# Patient Record
Sex: FEMALE | Race: WHITE | NOT HISPANIC OR LATINO | Employment: PART TIME | URBAN - METROPOLITAN AREA
[De-identification: names, ages, dates, MRNs, and addresses within clinical notes are randomized per-mention and may not be internally consistent; named-entity substitution may affect disease eponyms.]

---

## 2017-04-04 ENCOUNTER — TRANSFERRED RECORDS (OUTPATIENT)
Dept: HEALTH INFORMATION MANAGEMENT | Facility: CLINIC | Age: 57
End: 2017-04-04

## 2017-07-24 ENCOUNTER — TRANSFERRED RECORDS (OUTPATIENT)
Dept: HEALTH INFORMATION MANAGEMENT | Facility: CLINIC | Age: 57
End: 2017-07-24

## 2017-09-27 ENCOUNTER — OFFICE VISIT - RIVER FALLS (OUTPATIENT)
Dept: FAMILY MEDICINE | Facility: CLINIC | Age: 57
End: 2017-09-27

## 2017-09-29 ENCOUNTER — TRANSFERRED RECORDS (OUTPATIENT)
Dept: HEALTH INFORMATION MANAGEMENT | Facility: CLINIC | Age: 57
End: 2017-09-29

## 2017-11-01 PROBLEM — R87.810 CERVICAL HIGH RISK HPV (HUMAN PAPILLOMAVIRUS) TEST POSITIVE: Status: ACTIVE | Noted: 2017-07-01

## 2017-11-01 PROBLEM — Z02.89 PAIN MEDICATION AGREEMENT SIGNED: Status: ACTIVE | Noted: 2017-03-20

## 2017-11-01 PROBLEM — F41.1 GAD (GENERALIZED ANXIETY DISORDER): Status: ACTIVE | Noted: 2017-03-20

## 2017-11-01 PROBLEM — E78.5 HYPERLIPIDEMIA, UNSPECIFIED: Status: ACTIVE | Noted: 2017-03-20

## 2017-11-01 PROBLEM — G20.A1 PARKINSON'S DISEASE (H): Status: ACTIVE | Noted: 2017-03-20

## 2017-11-01 RX ORDER — BUSPIRONE HYDROCHLORIDE 10 MG/1
10 TABLET ORAL 2 TIMES DAILY
COMMUNITY
Start: 2017-08-29 | End: 2017-11-07

## 2017-11-01 RX ORDER — FLUOXETINE 10 MG/1
10 CAPSULE ORAL DAILY
COMMUNITY
Start: 2017-03-20 | End: 2017-11-07

## 2017-11-01 RX ORDER — POLYETHYLENE GLYCOL 3350 17 G/17G
17 POWDER, FOR SOLUTION ORAL
COMMUNITY
Start: 2017-10-06 | End: 2017-11-07

## 2017-11-01 RX ORDER — DOCUSATE SODIUM 100 MG/1
100 CAPSULE, LIQUID FILLED ORAL 2 TIMES DAILY
COMMUNITY
Start: 2017-11-01 | End: 2017-11-07

## 2017-11-01 RX ORDER — FLUTICASONE PROPIONATE 50 MCG
1 SPRAY, SUSPENSION (ML) NASAL
COMMUNITY
Start: 2016-01-21 | End: 2017-11-07

## 2017-11-01 RX ORDER — SIMVASTATIN 20 MG
20 TABLET ORAL AT BEDTIME
COMMUNITY
Start: 2017-07-24 | End: 2017-11-07

## 2017-11-01 RX ORDER — CARBIDOPA AND LEVODOPA 25; 100 MG/1; MG/1
3 TABLET ORAL 4 TIMES DAILY
COMMUNITY
End: 2017-11-02

## 2017-11-01 RX ORDER — ASPIRIN 81 MG/1
81 TABLET ORAL DAILY
COMMUNITY
Start: 2016-02-17 | End: 2017-11-07

## 2017-11-01 RX ORDER — AMANTADINE HYDROCHLORIDE 100 MG/1
100 CAPSULE, GELATIN COATED ORAL 2 TIMES DAILY
COMMUNITY
Start: 2017-03-20 | End: 2017-11-02

## 2017-11-01 RX ORDER — LORAZEPAM 1 MG/1
TABLET ORAL
COMMUNITY
Start: 2017-09-13 | End: 2017-11-07

## 2017-11-01 RX ORDER — LIDOCAINE 50 MG/G
PATCH TOPICAL
COMMUNITY
Start: 2017-10-06 | End: 2017-11-07

## 2017-11-01 RX ORDER — PRAMIPEXOLE DIHYDROCHLORIDE 0.5 MG/1
0.5 TABLET ORAL AT BEDTIME
COMMUNITY
Start: 2016-02-19 | End: 2017-11-07

## 2017-11-02 RX ORDER — CARBIDOPA AND LEVODOPA 25; 100 MG/1; MG/1
TABLET ORAL
Qty: 1080 TABLET | Refills: 3 | COMMUNITY
Start: 2017-11-02 | End: 2017-11-07

## 2017-11-02 RX ORDER — AMANTADINE HYDROCHLORIDE 100 MG/1
100 CAPSULE, GELATIN COATED ORAL 2 TIMES DAILY
Qty: 180 CAPSULE | Refills: 3 | COMMUNITY
Start: 2017-11-02 | End: 2017-11-07
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
## 2017-11-06 NOTE — TELEPHONE ENCOUNTER
APPT INFO    Date /Time: 11/7/17, 1:10pm   Reason for Appt: Parkinson's Disease   Ref Provider/Clinic: ANDREA POON   Are there internal records? If yes, list:    Patient Contact (Y/N) & Call Details: No, referred    Action: Faxed cover sheets     OUTSIDE RECORDS CHECKLIST     CLINIC NAME COMMENTS REC (x) IMG (x)   [facility]  x    [facility]  x x

## 2017-11-07 ENCOUNTER — PRE VISIT (OUTPATIENT)
Dept: NEUROLOGY | Facility: CLINIC | Age: 57
End: 2017-11-07

## 2017-11-07 ENCOUNTER — OFFICE VISIT (OUTPATIENT)
Dept: NEUROLOGY | Facility: CLINIC | Age: 57
End: 2017-11-07

## 2017-11-07 VITALS
HEART RATE: 63 BPM | RESPIRATION RATE: 12 BRPM | OXYGEN SATURATION: 98 % | DIASTOLIC BLOOD PRESSURE: 78 MMHG | SYSTOLIC BLOOD PRESSURE: 145 MMHG | HEIGHT: 66 IN | BODY MASS INDEX: 34.33 KG/M2 | WEIGHT: 213.6 LBS

## 2017-11-07 DIAGNOSIS — F43.23 ADJUSTMENT DISORDER WITH MIXED ANXIETY AND DEPRESSED MOOD: ICD-10-CM

## 2017-11-07 DIAGNOSIS — R25.1 TREMOR: ICD-10-CM

## 2017-11-07 DIAGNOSIS — G20.A1 PARALYSIS AGITANS (H): Primary | ICD-10-CM

## 2017-11-07 PROBLEM — N73.9 PID (PELVIC INFLAMMATORY DISEASE): Status: ACTIVE | Noted: 2017-11-07

## 2017-11-07 PROBLEM — Z87.891 FORMER SMOKER: Status: ACTIVE | Noted: 2017-11-07

## 2017-11-07 PROBLEM — Z86.0100 HISTORY OF COLONIC POLYPS: Status: ACTIVE | Noted: 2017-11-07

## 2017-11-07 PROBLEM — I26.99 PULMONARY EMBOLI (H): Status: ACTIVE | Noted: 2017-11-07

## 2017-11-07 PROBLEM — E73.9 LACTOSE INTOLERANCE IN ADULT: Status: ACTIVE | Noted: 2017-11-07

## 2017-11-07 PROBLEM — E78.00 HYPERCHOLESTEROLEMIA: Status: ACTIVE | Noted: 2017-11-07

## 2017-11-07 PROBLEM — M19.90 DEGENERATIVE JOINT DISEASE: Status: ACTIVE | Noted: 2017-11-07

## 2017-11-07 PROBLEM — E66.9 OBESITY: Status: ACTIVE | Noted: 2017-11-07

## 2017-11-07 RX ORDER — SIMVASTATIN 20 MG
TABLET ORAL
Qty: 90 TABLET | Refills: 3 | COMMUNITY
Start: 2017-11-07 | End: 2018-02-13

## 2017-11-07 RX ORDER — BUSPIRONE HYDROCHLORIDE 10 MG/1
TABLET ORAL
Qty: 180 TABLET | Refills: 3 | COMMUNITY
Start: 2017-11-07 | End: 2017-12-14

## 2017-11-07 RX ORDER — ASPIRIN 81 MG/1
TABLET ORAL
Qty: 90 TABLET | Refills: 3 | Status: ON HOLD
Start: 2017-11-07 | End: 2018-08-22

## 2017-11-07 RX ORDER — LORAZEPAM 1 MG/1
1 TABLET ORAL DAILY PRN
Qty: 60 TABLET | Refills: 3 | COMMUNITY
Start: 2017-11-07 | End: 2017-11-07

## 2017-11-07 RX ORDER — CARBIDOPA AND LEVODOPA 25; 100 MG/1; MG/1
TABLET ORAL
Qty: 1080 TABLET | Refills: 3 | COMMUNITY
Start: 2017-11-07 | End: 2017-11-07

## 2017-11-07 RX ORDER — FLUOXETINE 10 MG/1
CAPSULE ORAL
Qty: 90 CAPSULE | Refills: 3 | COMMUNITY
Start: 2017-11-07 | End: 2018-04-17

## 2017-11-07 RX ORDER — LORAZEPAM 1 MG/1
1 TABLET ORAL DAILY PRN
Qty: 60 TABLET | Refills: 3 | Status: SHIPPED | OUTPATIENT
Start: 2017-11-07 | End: 2018-04-17

## 2017-11-07 RX ORDER — AMANTADINE HYDROCHLORIDE 100 MG/1
CAPSULE, GELATIN COATED ORAL
Qty: 180 CAPSULE | Refills: 3 | COMMUNITY
Start: 2017-11-07 | End: 2017-12-14

## 2017-11-07 RX ORDER — FLUTICASONE PROPIONATE 50 MCG
1 SPRAY, SUSPENSION (ML) NASAL DAILY PRN
Qty: 1 BOTTLE | COMMUNITY
Start: 2017-11-07 | End: 2018-08-07

## 2017-11-07 RX ORDER — CARBIDOPA AND LEVODOPA 25; 100 MG/1; MG/1
1.5 TABLET ORAL 4 TIMES DAILY
Qty: 1170 TABLET | Refills: 3 | COMMUNITY
Start: 2017-11-07 | End: 2019-06-26

## 2017-11-07 RX ORDER — POLYETHYLENE GLYCOL 3350 17 G/17G
17 POWDER, FOR SOLUTION ORAL DAILY PRN
Qty: 119 G | COMMUNITY
Start: 2017-11-07 | End: 2018-08-07

## 2017-11-07 RX ORDER — PRAMIPEXOLE DIHYDROCHLORIDE 0.5 MG/1
TABLET ORAL
Qty: 180 TABLET | Refills: 3 | COMMUNITY
Start: 2017-11-07 | End: 2018-02-13

## 2017-11-07 RX ORDER — DOCUSATE SODIUM 100 MG/1
CAPSULE, LIQUID FILLED ORAL
Qty: 180 CAPSULE | Refills: 3 | COMMUNITY
Start: 2017-11-07 | End: 2018-02-13

## 2017-11-07 NOTE — MR AVS SNAPSHOT
After Visit Summary   11/7/2017    Erika Diaz    MRN: [id number]           Patient Information     Date Of Birth          1960        Visit Information        Provider Department      11/7/2017 1:10 PM Elver Stratton MD [facility]        Today's Diagnoses     Paralysis agitans (H)    -  1    Tremor        Adjustment disorder with mixed anxiety and depressed mood          Care Instructions    Summary and Recommendations:     Parkinson's disease 57 yrs old with diagnosis in 2009 or 2008 left side onset   Ongoing mood issues that are being managed with pharmacotherapy  She may benefit from a visit with psychiatry and psychology  Would recommend baseline dbs evaluation  Would recommend dbs class    Will need return visit to review her medications in the coming months.   She met with Zina Yu.    Her email [email or web address]  [email or web address] was set up by me to allow ready access to us    We will review her medications and decide if we will make a change to her regimen    Return back after evaluations to see Karina, or with a fellow with me or other colleague.    Elver Stratton MD          Follow-ups after your visit        Additional Services     MENTAL HEALTH REFERRAL       Your provider has referred you to: psychiatry    Please be aware that coverage of these services is subject to the terms and limitations of your health insurance plan.  Call member services at your health plan with any benefit or coverage questions.      Please bring the following to your appointment:  >>   Any x-rays, CTs or MRIs which have been performed.  Contact the facility where they were done to arrange for  prior to your scheduled appointment.  Any new CT, MRI or other procedures ordered by your specialist must be performed at a [place] facility or coordinated by your clinic's referral office.    >>   List of current medications   >>   This referral request   >>   Any documents/labs given to you for this  referral            NEUROPSYCHOLOGY REFERRAL       Your provider has referred you to:  neuropsych - Dr. Hernandez.   Baseline evaluation. Considering dbs. Has had ptsd and mood issues. She does not have a psychologist or psychiatrist presently    All scheduling is subject to the client's specific insurance plan & benefits, provider/location availability, and provider clinical specialities.  Please arrive 15 minutes early for your first appointment and bring your completed paperwork.    Please be aware that coverage of these services is subject to the terms and limitations of your health insurance plan.  Call member services at your health plan with any benefit or coverage questions.    Please bring the following to your appointment:  >>   Any x-rays, CTs or MRIs which have been performed.  Contact the facility where they were done to arrange for  prior to your scheduled appointment.  Any new CT, MRI or other procedures ordered by your specialist must be performed at a [facility] or coordinated by your clinic's referral office.    >>   List of current medications   >>   This referral request   >>   Any documents/labs given to you for this referral            PSYCHOLOGY REFERRAL       Your provider has referred you to:  psychology    Please be aware that coverage of these services is subject to the terms and limitations of your health insurance plan.  Call member services at your health plan with any benefit or coverage questions.      Please bring the following to your appointment:    >>   Any x-rays, CTs or MRIs which have been performed.  Contact the facility where they were done to arrange for  prior to your scheduled appointment.   >>   List of current medications   >>   This referral request   >>   Any documents/labs given to you for this referral                  Follow-up notes from your care team     Return in about 3 months (around 2/7/2018).      Your next 10 appointments already scheduled      Dec 12, 2017  8:00 AM CST   (Arrive by 7:45 AM)   Neuropsych Eval with Amanda Hernandez, PhD [facility] Neuropsychology ([facility])    [address] [id number] [phone]            Feb 13, 2018  1:10 PM CST   (Arrive by 12:55 PM)   Return Movement Disorder with Elver Stratton MD   [facility] ([facility])    [address] [id number] [phone]              Who to contact     Please call your clinic at [phone] to:    Ask questions about your health    Make or cancel appointments    Discuss your medicines    Learn about your test results    Speak to your doctor   If you have compliments or concerns about an experience at your clinic, or if you wish to file a complaint, please contact [facility] Patient Relations at [phone] or email us at [email or web address]         Additional Information About Your Visit        [employer] Information     [email or web address] gives you secure access to your electronic health record. If you see a primary care provider, you can also send messages to your care team and make appointments. If you have questions, please call your primary care clinic.  If you do not have a primary care provider, please call [phone] and they will assist you.      [email or web address] is an electronic gateway that provides easy, online access to your medical records. With [email or web address], you can request a clinic appointment, read your test results, renew a prescription or communicate with your care team.     To access your existing account, please contact your [facility] or call [phone] for assistance.        Care EveryWhere ID     This is your Care EveryWhere ID. This could be used by other organizations to access your [facility] medical records  [id number]        Your Vitals Were     Pulse Respirations  "Height Pulse Oximetry BMI (Body Mass Index)       63 12 1.676 m (5' 6\") 98% 34.48 kg/m2        Blood Pressure from Last 3 Encounters:   11/07/17 145/78    Weight from Last 3 Encounters:   11/07/17 96.9 kg (213 lb 9.6 oz)              We Performed the Following     MENTAL HEALTH REFERRAL     NEUROPSYCHOLOGY REFERRAL     PSYCHOLOGY REFERRAL          Today's Medication Changes          These changes are accurate as of: 11/7/17  2:00 PM.  If you have any questions, ask your nurse or doctor.               Start taking these medicines.        Dose/Directions    amantadine 100 MG capsule   Commonly known as:  SYMMETREL   Used for:  Paralysis agitans (H)   Started by:  Elver Stratton MD        1 capsule @ 7am and 4pm   Quantity:  180 capsule   Refills:  3       carbidopa-levodopa  MG per tablet   Commonly known as:  SINEMET   Used for:  Paralysis agitans (H)   Started by:  Elver Stratton MD        3 tabs @7, noon, 4p and 9pm and a few as needed = 13/day x 90 = 1170tablets   Quantity:  1170 tablet   Refills:  3       LORazepam 1 MG tablet   Commonly known as:  ATIVAN   Used for:  Paralysis agitans (H)   Started by:  Elver Stratton MD        Dose:  1 mg   Take 1 tablet (1 mg) by mouth daily as needed for anxiety   Quantity:  60 tablet   Refills:  3         These medicines have changed or have updated prescriptions.        Dose/Directions    aspirin EC 81 MG EC tablet   This may have changed:    - how much to take  - how to take this  - when to take this  - additional instructions   Used for:  Paralysis agitans (H)   Changed by:  Elver Stratton MD        1 tab @ 9pm   Quantity:  90 tablet   Refills:  3       busPIRone 10 MG tablet   Commonly known as:  BUSPAR   This may have changed:    - how much to take  - how to take this  - when to take this  - additional instructions   Used for:  Paralysis agitans (H)   Changed by:  Elver Stratton MD        1 Tab @ 7am and 1 tab @ 4pm   Quantity:  180 tablet "   Refills:  3       docusate sodium 100 MG capsule   Commonly known as:  COLACE   This may have changed:    - how much to take  - how to take this  - when to take this  - additional instructions   Used for:  Paralysis agitans (H)   Changed by:  Elver Stratton MD        1 tab @ 7am and 9pm   Quantity:  180 capsule   Refills:  3       doxylamine 25 MG Tabs tablet   Commonly known as:  UNISOM   This may have changed:    - how much to take  - when to take this  - reasons to take this   Changed by:  Elver Stratton MD        Dose:  25 mg   Take 1 tablet (25 mg) by mouth nightly as needed   Quantity:  14 each   Refills:  0       * FLUoxetine 10 MG capsule   Commonly known as:  PROzac   This may have changed:    - how much to take  - how to take this  - when to take this  - additional instructions   Used for:  Paralysis agitans (H)   Changed by:  Elver Stratton MD        Take 10 + 20mg tabs @ 7am= 30mg/day   Quantity:  90 capsule   Refills:  3       * FLUoxetine 20 MG capsule   Commonly known as:  PROzac   This may have changed:    - how much to take  - how to take this  - when to take this  - additional instructions   Used for:  Paralysis agitans (H)   Changed by:  Elver Stratton MD        Take 20mg capsule with 10mg capsule @ 7am = 30mg/day   Quantity:  90 capsule   Refills:  3       fluticasone 50 MCG/ACT spray   Commonly known as:  FLONASE   This may have changed:    - how to take this  - when to take this  - reasons to take this   Changed by:  Elver Stratton MD        Dose:  1 spray   Spray 1 spray into both nostrils daily as needed for rhinitis or allergies   Quantity:  1 Bottle   Refills:  0       omeprazole 20 MG CR capsule   Commonly known as:  priLOSEC   This may have changed:  when to take this   Changed by:  Elver Stratton MD        Dose:  20 mg   Take 1 capsule (20 mg) by mouth 3 times daily (with meals)   Quantity:  30 capsule   Refills:  0       polyethylene glycol powder    Commonly known as:  MIRALAX/GLYCOLAX   This may have changed:    - when to take this  - reasons to take this   Used for:  Paralysis agitans (H)   Changed by:  Elver Stratton MD        Dose:  17 g   Take 17 g by mouth daily as needed for constipation   Quantity:  119 g   Refills:  0       pramipexole 0.5 MG tablet   Commonly known as:  MIRAPEX   This may have changed:    - how much to take  - how to take this  - when to take this  - additional instructions   Used for:  Paralysis agitans (H)   Changed by:  Elver Stratton MD        2 tabs @ 9pm   Quantity:  180 tablet   Refills:  3       simvastatin 20 MG tablet   Commonly known as:  ZOCOR   This may have changed:    - how much to take  - how to take this  - when to take this  - additional instructions   Used for:  Paralysis agitans (H)   Changed by:  Elver Stratton MD        1 tab @ 9pm (may go back on it)   Quantity:  90 tablet   Refills:  3       * Notice:  This list has 2 medication(s) that are the same as other medications prescribed for you. Read the directions carefully, and ask your doctor or other care provider to review them with you.      Stop taking these medicines if you haven't already. Please contact your care team if you have questions.     diclofenac 1 % Gel topical gel   Commonly known as:  VOLTAREN   Stopped by:  Elver Stratton MD           lidocaine 5 % Patch   Commonly known as:  LIDODERM   Stopped by:  Elver Stratton MD                Where to get your medicines      Some of these will need a paper prescription and others can be bought over the counter.  Ask your nurse if you have questions.     Bring a paper prescription for each of these medications     LORazepam 1 MG tablet       You don't need a prescription for these medications     aspirin EC 81 MG EC tablet                Primary Care Provider Office Phone # Fax #    Ondina Edmondson [phone]       [facility] [address]  [facility] [address]        Equal Access to Services     [facility]: Hadii matilde ku sandrita Quinonez, waaxda luqadaha, qaybta kaalmada stephanie, naga krystianin hayaaelly villegasfang perkinsdomingo olivo. So [facility] [phone].    ATENCIÓN: Si habla español, tiene a guerra disposición servicios gratuitos de asistencia lingüística. Hollyame al [phone].    We comply with applicable federal civil rights laws and [place] laws. We do not discriminate on the basis of race, color, national origin, age, disability, sex, sexual orientation, or gender identity.            Thank you!     Thank you for choosing [facility] NEUROLOGY  for your care. Our goal is always to provide you with excellent care. Hearing back from our patients is one way we can continue to improve our services. Please take a few minutes to complete the written survey that you may receive in the mail after your visit with us. Thank you!             Your Updated Medication List - Protect others around you: Learn how to safely use, store and throw away your medicines at www.disposemymeds.org.          This list is accurate as of: 11/7/17  2:00 PM.  Always use your most recent med list.                   Brand Name Dispense Instructions for use Diagnosis    amantadine 100 MG capsule    SYMMETREL    180 capsule    1 capsule @ 7am and 4pm    Paralysis agitans (H)       aspirin EC 81 MG EC tablet     90 tablet    1 tab @ 9pm    Paralysis agitans (H)       busPIRone 10 MG tablet    BUSPAR    180 tablet    1 Tab @ 7am and 1 tab @ 4pm    Paralysis agitans (H)       carbidopa-levodopa  MG per tablet    SINEMET    1170 tablet    3 tabs @7, noon, 4p and 9pm and a few as needed = 13/day x 90 = 1170tablets    Paralysis agitans (H)       docusate sodium 100 MG capsule    COLACE    180 capsule    1 tab @ 7am and 9pm    Paralysis agitans (H)       doxylamine 25 MG Tabs tablet    UNISOM    14 each    Take 1 tablet (25 mg) by mouth nightly as needed        * FLUoxetine 10 MG capsule    PROzac     90 capsule    Take 10 + 20mg tabs @ 7am= 30mg/day    Paralysis agitans (H)       * FLUoxetine 20 MG capsule    PROzac    90 capsule    Take 20mg capsule with 10mg capsule @ 7am = 30mg/day    Paralysis agitans (H)       fluticasone 50 MCG/ACT spray    FLONASE    1 Bottle    Spray 1 spray into both nostrils daily as needed for rhinitis or allergies        LORazepam 1 MG tablet    ATIVAN    60 tablet    Take 1 tablet (1 mg) by mouth daily as needed for anxiety    Paralysis agitans (H)       omeprazole 20 MG CR capsule    priLOSEC    30 capsule    Take 1 capsule (20 mg) by mouth 3 times daily (with meals)        polyethylene glycol powder    MIRALAX/GLYCOLAX    119 g    Take 17 g by mouth daily as needed for constipation    Paralysis agitans (H)       pramipexole 0.5 MG tablet    MIRAPEX    180 tablet    2 tabs @ 9pm    Paralysis agitans (H)       simvastatin 20 MG tablet    ZOCOR    90 tablet    1 tab @ 9pm (may go back on it)    Paralysis agitans (H)       * Notice:  This list has 2 medication(s) that are the same as other medications prescribed for you. Read the directions carefully, and ask your doctor or other care provider to review them with you.

## 2017-11-07 NOTE — PATIENT INSTRUCTIONS
Summary and Recommendations:     Parkinson's disease 57 yrs old with diagnosis in 2009 or 2008 left side onset   Ongoing mood issues that are being managed with pharmacotherapy  She may benefit from a visit with psychiatry and psychology  Would recommend baseline dbs evaluation  Would recommend dbs class    Will need return visit to review her medications in the coming months.   She met with Zina Yu.    Her email [email or web address] was set up by me to allow ready access to us    We will review her medications and decide if we will make a change to her regimen    Return back after evaluations to see Karina, or with a fellow with me or other colleague.    Elver Stratton MD
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
## 2017-11-07 NOTE — TELEPHONE ENCOUNTER
Records Received From:  [facility]     Date/Exam/Location  (specify location if different)   Office Notes:  5/10/17, 7/24/17   Radiology Reports: - MR lumbar 9/29/17   Labs:  2017

## 2017-11-07 NOTE — TELEPHONE ENCOUNTER
Records Received From:  [place]     Date/Exam/Location  (specify location if different)   Office Notes:  2017, 2016, 2015, 2014, 2013, 2012, 2011   Radiology Reports: - MRI head cervical 12/12/16  - US soft tissue head neck 3/31/11    Images pushed    Procedure Notes:  (EMG/EEG/ECG/LP) - electromyography 12/7/16

## 2017-11-07 NOTE — TELEPHONE ENCOUNTER
Received Imaging From:     Image Type (x): Disc:_____  Pacs:____x_      Exam Date/Name: MRI head cervical 12/12/16  - US soft tissue head neck 3/31/11 Comments:

## 2017-11-08 ENCOUNTER — TELEPHONE (OUTPATIENT)
Dept: NEUROSURGERY | Facility: CLINIC | Age: 57
End: 2017-11-08

## 2017-11-08 NOTE — TELEPHONE ENCOUNTER
Called pt to invite her to attend our DBS class, per Dr. Stratton. Pt will attend December 20th class, will send letter with all pertinent information. No further questions.

## 2017-12-12 ENCOUNTER — OFFICE VISIT (OUTPATIENT)
Dept: NEUROPSYCHOLOGY | Facility: CLINIC | Age: 57
End: 2017-12-12

## 2017-12-12 DIAGNOSIS — F09 MENTAL DISORDER DUE TO GENERAL MEDICAL CONDITION: ICD-10-CM

## 2017-12-12 DIAGNOSIS — G20.A1 PARKINSON'S DISEASE (H): Primary | ICD-10-CM

## 2017-12-12 ASSESSMENT — ANXIETY QUESTIONNAIRES
GAD7 TOTAL SCORE: 2
5. BEING SO RESTLESS THAT IT IS HARD TO SIT STILL: SEVERAL DAYS
6. BECOMING EASILY ANNOYED OR IRRITABLE: NOT AT ALL
1. FEELING NERVOUS, ANXIOUS, OR ON EDGE: NOT AT ALL
2. NOT BEING ABLE TO STOP OR CONTROL WORRYING: NOT AT ALL
GAD7 TOTAL SCORE: 2
GAD7 TOTAL SCORE: 2
7. FEELING AFRAID AS IF SOMETHING AWFUL MIGHT HAPPEN: NOT AT ALL
4. TROUBLE RELAXING: SEVERAL DAYS
7. FEELING AFRAID AS IF SOMETHING AWFUL MIGHT HAPPEN: NOT AT ALL
3. WORRYING TOO MUCH ABOUT DIFFERENT THINGS: NOT AT ALL

## 2017-12-12 ASSESSMENT — PATIENT HEALTH QUESTIONNAIRE - PHQ9
10. IF YOU CHECKED OFF ANY PROBLEMS, HOW DIFFICULT HAVE THESE PROBLEMS MADE IT FOR YOU TO DO YOUR WORK, TAKE CARE OF THINGS AT HOME, OR GET ALONG WITH OTHER PEOPLE: NOT DIFFICULT AT ALL
SUM OF ALL RESPONSES TO PHQ QUESTIONS 1-9: 5
SUM OF ALL RESPONSES TO PHQ QUESTIONS 1-9: 5

## 2017-12-12 NOTE — PROGRESS NOTES
The patient was seen for neuropsychological evaluation at the request of Elver Stratton MD for the purposes of diagnostic clarification and treatment planning.  2 hours of face-to-face testing were provided by this writer.  Please see Dr. Amanda Hernandez's report for a full interpretation of the findings.

## 2017-12-12 NOTE — MR AVS SNAPSHOT
After Visit Summary   12/12/2017    Erika Diaz    MRN: [id number]           Patient Information     Date Of Birth          1960        Visit Information        Provider Department      12/12/2017 8:00 AM Amanda Hernandez, PhD [facility] Neuropsychology        Today's Diagnoses     Parkinson's disease (H)    -  1    Mental disorder due to general medical condition           Follow-ups after your visit        Your next 10 appointments already scheduled     Feb 13, 2018  1:10 PM CST   (Arrive by 12:55 PM)   Return Movement Disorder with Elver Stratton MD   [facility] Neurology ([facility])    [address] [address] [phone]              Who to contact     Please call your clinic at [phone] to:    Ask questions about your health    Make or cancel appointments    Discuss your medicines    Learn about your test results    Speak to your doctor   If you have compliments or concerns about an experience at your clinic, or if you wish to file a complaint, please contact [facility] Patient Relations at [phone] or email us at [email or web address]         Additional Information About Your Visit        MyChart Information     [employer] gives you secure access to your electronic health record. If you see a primary care provider, you can also send messages to your care team and make appointments. If you have questions, please call your primary care clinic.  If you do not have a primary care provider, please call [phone] and they will assist you.      [employer] is an electronic gateway that provides easy, online access to your medical records. With [employer], you can request a clinic appointment, read your test results, renew a prescription or communicate with your care team.     To access your existing account, please contact your [facility] Clinic or call  [phone] for assistance.        Care EveryWhere ID     This is your Care EveryWhere ID. This could be used by other organizations to access your [facility] medical records  [id number]         Blood Pressure from Last 3 Encounters:   11/07/17 145/78    Weight from Last 3 Encounters:   11/07/17 96.9 kg (213 lb 9.6 oz)              We Performed the Following     [id number] TESTING, PER HR/PSYCHOLOGIST     NEUROPSYCH TESTING BY [facility]        Primary Care Provider Office Phone # Fax #    Ondina Edmnodson [phone]       [facility] [address] [facility] [address]        Equal Access to Services     [facility]: Hadii aad peyton hadcleopatra Quinonez, waaxda luchioadaha, qaybta kaalmada adefangyaaltagracia, naga troncoso . So [facility] [phone].    ATENCIÓN: Si habla español, tiene a guerra disposición servicios gratuitos de asistencia lingüística. [facility] [phone].    We comply with applicable federal civil rights laws and [place] laws. We do not discriminate on the basis of race, color, national origin, age, disability, sex, sexual orientation, or gender identity.            Thank you!     Thank you for choosing [facility] NEUROPSYCHOLOGY  for your care. Our goal is always to provide you with excellent care. Hearing back from our patients is one way we can continue to improve our services. Please take a few minutes to complete the written survey that you may receive in the mail after your visit with us. Thank you!             Your Updated Medication List - Protect others around you: Learn how to safely use, store and throw away your medicines at www.disposemymeds.org.          This list is accurate as of: 12/12/17 11:59 PM.  Always use your most recent med list.                   Brand Name Dispense Instructions for use Diagnosis    aspirin EC 81 MG EC tablet     90 tablet    1 tab @ 9pm    Paralysis agitans (H)       carbidopa-levodopa  MG per tablet    SINEMET     1170 tablet    3 tabs @7, noon, 4p and 9pm and a few as needed = 13/day x 90 = 1170tablets    Paralysis agitans (H)       docusate sodium 100 MG capsule    COLACE    180 capsule    1 tab @ 7am and 9pm    Paralysis agitans (H)       doxylamine 25 MG Tabs tablet    UNISOM    14 each    Take 1 tablet (25 mg) by mouth nightly as needed        * FLUoxetine 10 MG capsule    PROzac    90 capsule    Take 10 + 20mg tabs @ 7am= 30mg/day    Paralysis agitans (H)       * FLUoxetine 20 MG capsule    PROzac    90 capsule    Take 20mg capsule with 10mg capsule @ 7am = 30mg/day    Paralysis agitans (H)       fluticasone 50 MCG/ACT spray    FLONASE    1 Bottle    Spray 1 spray into both nostrils daily as needed for rhinitis or allergies        LORazepam 1 MG tablet    ATIVAN    60 tablet    Take 1 tablet (1 mg) by mouth daily as needed for anxiety    Paralysis agitans (H)       omeprazole 20 MG CR capsule    priLOSEC    30 capsule    Take 1 capsule (20 mg) by mouth 3 times daily (with meals)        polyethylene glycol powder    MIRALAX/GLYCOLAX    119 g    Take 17 g by mouth daily as needed for constipation    Paralysis agitans (H)       pramipexole 0.5 MG tablet    MIRAPEX    180 tablet    2 tabs @ 9pm    Paralysis agitans (H)       simvastatin 20 MG tablet    ZOCOR    90 tablet    1 tab @ 9pm (may go back on it)    Paralysis agitans (H)       * Notice:  This list has 2 medication(s) that are the same as other medications prescribed for you. Read the directions carefully, and ask your doctor or other care provider to review them with you.

## 2017-12-13 ASSESSMENT — PATIENT HEALTH QUESTIONNAIRE - PHQ9: SUM OF ALL RESPONSES TO PHQ QUESTIONS 1-9: 5

## 2017-12-13 ASSESSMENT — ANXIETY QUESTIONNAIRES: GAD7 TOTAL SCORE: 2

## 2017-12-14 ENCOUNTER — OFFICE VISIT (OUTPATIENT)
Dept: PSYCHOLOGY | Facility: CLINIC | Age: 57
End: 2017-12-14
Payer: MEDICARE

## 2017-12-14 DIAGNOSIS — F41.1 GAD (GENERALIZED ANXIETY DISORDER): ICD-10-CM

## 2017-12-14 DIAGNOSIS — G20.A1 PARALYSIS AGITANS (H): ICD-10-CM

## 2017-12-14 DIAGNOSIS — F43.12 POST-TRAUMATIC STRESS DISORDER, CHRONIC: Primary | ICD-10-CM

## 2017-12-14 RX ORDER — AMANTADINE HYDROCHLORIDE 100 MG/1
CAPSULE, GELATIN COATED ORAL
Qty: 180 CAPSULE | Refills: 3 | Status: SHIPPED | OUTPATIENT
Start: 2017-12-14 | End: 2018-02-13

## 2017-12-14 RX ORDER — BUSPIRONE HYDROCHLORIDE 10 MG/1
TABLET ORAL
Qty: 180 TABLET | Refills: 3 | Status: SHIPPED | OUTPATIENT
Start: 2017-12-14 | End: 2018-04-17

## 2017-12-14 RX ORDER — AMANTADINE HYDROCHLORIDE 100 MG/1
CAPSULE, GELATIN COATED ORAL
Qty: 180 CAPSULE | Refills: 3 | Status: SHIPPED | OUTPATIENT
Start: 2017-12-14 | End: 2017-12-14

## 2017-12-14 RX ORDER — BUSPIRONE HYDROCHLORIDE 10 MG/1
TABLET ORAL
Qty: 180 TABLET | Refills: 3 | Status: SHIPPED | OUTPATIENT
Start: 2017-12-14 | End: 2017-12-14

## 2017-12-14 NOTE — MR AVS SNAPSHOT
After Visit Summary   12/14/2017    Erika Diaz    MRN: [id number]           Patient Information     Date Of Birth          1960        Visit Information        Provider Department      12/14/2017 8:00 AM Aimee Lowe, PhD [facility]        Today's Diagnoses     Post-traumatic stress disorder, chronic    -  1    JASON (generalized anxiety disorder)           Follow-ups after your visit        Your next 10 appointments already scheduled     Feb 13, 2018  1:10 PM CST   (Arrive by 12:55 PM)   Return Movement Disorder with Elver Stratton MD   [facility] ([facility])    [address] [place] [id number] [phone]              Who to contact     Please call your clinic at [phone] to:    Ask questions about your health    Make or cancel appointments    Discuss your medicines    Learn about your test results    Speak to your doctor   If you have compliments or concerns about an experience at your clinic, or if you wish to file a complaint, please contact [facility] Patient Relations at [phone] or email us at [email or web address]         Additional Information About Your Visit        MyChart Information     [employer] gives you secure access to your electronic health record. If you see a primary care provider, you can also send messages to your care team and make appointments. If you have questions, please call your primary care clinic.  If you do not have a primary care provider, please call [phone] and they will assist you.      [employer] is an electronic gateway that provides easy, online access to your medical records. With [facility], you can request a clinic appointment, read your test results, renew a prescription or communicate with your care team.     To access your existing account, please contact your [facility] or call  [phone] for assistance.        Care EveryWhere ID     This is your Care EveryWhere ID. This could be used by other organizations to access your [facility] medical records  [id number]         Blood Pressure from Last 3 Encounters:   11/07/17 145/78    Weight from Last 3 Encounters:   11/07/17 96.9 kg (213 lb 9.6 oz)              Today, you had the following     No orders found for display         Today's Medication Changes          These changes are accurate as of: 12/14/17 11:59 PM.  If you have any questions, ask your nurse or doctor.               Start taking these medicines.        Dose/Directions    amantadine 100 MG capsule   Commonly known as:  SYMMETREL   Used for:  Paralysis agitans (H)   Started by:  Zina Yu RN        1 capsule @ 7am and 4pm   Quantity:  180 capsule   Refills:  3       busPIRone 10 MG tablet   Commonly known as:  BUSPAR   Used for:  Paralysis agitans (H)   Started by:  Zina Yu RN        1 Tab @ 7am and 1 tab @ 4pm   Quantity:  180 tablet   Refills:  3            Where to get your medicines      These medications were sent to [facility] [place] [id number] [facility] [address] [place] [address] [address] [address]    Hours:  TRANSPLANT PHONE NUMBER [phone] Phone:  [phone]     amantadine 100 MG capsule    busPIRone 10 MG tablet                Primary Care Provider Office Phone # Fax #    Ondinakory Edmondson [phone]       [facility] [address] [facility] [address]        Equal Access to Services     AYLEEN GASCA AH: Hadii matilde todd hadasho Sovance, waaxda luqadaha, qaybta kaalmada stephanie, naga olivo. So [facility] [phone].    ATENCIÓN: Si habla español, tiene a guerra disposición servicios gratuitos de asistencia lingüística. Llame al [phone].    We comply with applicable federal civil rights laws and [place] laws. We do not discriminate on the basis of  race, color, national origin, age, disability, sex, sexual orientation, or gender identity.            Thank you!     Thank you for choosing [facility]  for your care. Our goal is always to provide you with excellent care. Hearing back from our patients is one way we can continue to improve our services. Please take a few minutes to complete the written survey that you may receive in the mail after your visit with us. Thank you!             Your Updated Medication List - Protect others around you: Learn how to safely use, store and throw away your medicines at www.disposemymeds.org.          This list is accurate as of: 12/14/17 11:59 PM.  Always use your most recent med list.                   Brand Name Dispense Instructions for use Diagnosis    amantadine 100 MG capsule    SYMMETREL    180 capsule    1 capsule @ 7am and 4pm    Paralysis agitans (H)       aspirin EC 81 MG EC tablet     90 tablet    1 tab @ 9pm    Paralysis agitans (H)       busPIRone 10 MG tablet    BUSPAR    180 tablet    1 Tab @ 7am and 1 tab @ 4pm    Paralysis agitans (H)       carbidopa-levodopa  MG per tablet    SINEMET    1170 tablet    3 tabs @7, noon, 4p and 9pm and a few as needed = 13/day x 90 = 1170tablets    Paralysis agitans (H)       docusate sodium 100 MG capsule    COLACE    180 capsule    1 tab @ 7am and 9pm    Paralysis agitans (H)       doxylamine 25 MG Tabs tablet    UNISOM    14 each    Take 1 tablet (25 mg) by mouth nightly as needed        * FLUoxetine 10 MG capsule    PROzac    90 capsule    Take 10 + 20mg tabs @ 7am= 30mg/day    Paralysis agitans (H)       * FLUoxetine 20 MG capsule    PROzac    90 capsule    Take 20mg capsule with 10mg capsule @ 7am = 30mg/day    Paralysis agitans (H)       fluticasone 50 MCG/ACT spray    FLONASE    1 Bottle    Spray 1 spray into both nostrils daily as needed for rhinitis or allergies        LORazepam 1 MG tablet    ATIVAN    60 tablet    Take 1 tablet (1 mg) by  mouth daily as needed for anxiety    Paralysis agitans (H)       omeprazole 20 MG CR capsule    priLOSEC    30 capsule    Take 1 capsule (20 mg) by mouth 3 times daily (with meals)        polyethylene glycol powder    MIRALAX/GLYCOLAX    119 g    Take 17 g by mouth daily as needed for constipation    Paralysis agitans (H)       pramipexole 0.5 MG tablet    MIRAPEX    180 tablet    2 tabs @ 9pm    Paralysis agitans (H)       simvastatin 20 MG tablet    ZOCOR    90 tablet    1 tab @ 9pm (may go back on it)    Paralysis agitans (H)       * Notice:  This list has 2 medication(s) that are the same as other medications prescribed for you. Read the directions carefully, and ask your doctor or other care provider to review them with you.

## 2017-12-20 ENCOUNTER — TELEPHONE (OUTPATIENT)
Dept: NEUROLOGY | Facility: CLINIC | Age: 57
End: 2017-12-20

## 2017-12-20 DIAGNOSIS — F39 MOOD DISORDER (H): Primary | ICD-10-CM

## 2017-12-20 PROBLEM — Z01.89 ENCOUNTER FOR NEUROPSYCHOLOGICAL TESTING: Status: ACTIVE | Noted: 2017-12-20

## 2017-12-20 NOTE — TELEPHONE ENCOUNTER
"                                                      Deep Brain Stimulation Surgery Surgical Candidacy Form    Referring Provider: Dr. Elver Stratton   Patient Information: Lives in [place], [place]  Name: Erika Diaz  YOB: 1960  Age: 57 year old  Height: 5'6\"  Weight: 218 lb. 11.1 oz  BMI: 35.37  Blood Pressure: 162/97  Diagnosis: Parkinson's disease  Age of Onset of Symptoms. Not found in record   Year of Onset of Symptoms: Not found in record  Age of Diagnosis:. Year of Diagnosis: 2008  Handedness:right handed   Side of Onset:LUE   Disease Features:Stiffness, gait imbalance   Surgery Goals: Decrease stiffness, improve gait, decrease medication    \"I want to feel better. I want to be more normal without taking the medication.\"  She would like to improve wearing off stiffness, difficulty walking, & foggy thinking, which all get better when she is ON.   reports that \"she would like to take less medication.\"  She agreed & stated that she would like to improve nausea & dyskinesias, which are the side effects of her medications.  She also wanted to be able to dance like she did when she was younger.  Currently, she is able to dance occasionally when her medications are working.   discussed some of her unrealistic expectations like being able to go back to dancing like she did when she was young    Medications: As of 3/29/18    Current Outpatient Prescriptions   Medication Sig Dispense Refill     amantadine (SYMMETREL) 100 MG capsule 1 capsule @ 7am and 4pm 180 capsule 3     docusate sodium (COLACE) 100 MG capsule 1 tab @ 7am and 9pm as needed 180 capsule 3     omeprazole (PRILOSEC) 20 MG CR capsule As needed 30 capsule      pramipexole (MIRAPEX) 0.5 MG tablet 2 tabs @ 9pm 180 tablet 3     busPIRone (BUSPAR) 10 MG tablet 1 Tab @ 7am and 1 tab @ 4pm 180 tablet 3     aspirin EC 81 MG EC tablet 1 tab @ 9pm 90 tablet 3     carbidopa-levodopa (SINEMET)  MG per tablet 3 tabs @7, noon, 4p and 9pm " and a few as needed = 13/day x 90 = 1170tablets 1170 tablet 3     polyethylene glycol (MIRALAX/GLYCOLAX) powder Take 17 g by mouth daily as needed for constipation 119 g      FLUoxetine (PROZAC) 10 MG capsule Take 10 + 20mg tabs @ 7am= 30mg/day 90 capsule 3     FLUoxetine (PROZAC) 20 MG capsule Take 20mg capsule with 10mg capsule @ 7am = 30mg/day 90 capsule 3     doxylamine (UNISOM) 25 MG TABS tablet Take 1 tablet (25 mg) by mouth nightly as needed 14 each      fluticasone (FLONASE) 50 MCG/ACT spray Spray 1 spray into both nostrils daily as needed for rhinitis or allergies 1 Bottle      LORazepam (ATIVAN) 1 MG tablet Take 1 tablet (1 mg) by mouth daily as needed for anxiety 60 tablet 3        Motor Assessment:3/12/18  ON:19  OFF: 44  % Change:57%   Neuropsychological Evaluation: 12/12/2017    Cognitive Issues: Results indicated variability in attention and memory, ranging from moderately impaired to superior, in some cases with greater difficulty on less complex tasks. Performance otherwise fell within normal limits.    Psychiatric Issues: She has a history of generalized anxiety disorder and panic disorder. Marked variability in attention and memory was suggestive of an underlying psychiatric etiology. Personality assessment was notable for somatization, and raised the possibility of a thought disorder and a possible history of manic episodes. These have been untreated. It is recommended that these psychiatric concerns be addressed prior to further consideration of surgery.     Depression: anxiety    Cognitive Function: variable attention/memory; no dementia    Psychosis: possible thought disorder, may include somatic delusions based on personality assessment    Aimee Lowe, PhD LP  Zina Yu RN; Elver Stratton MD            [facility].  I wanted to write to you both about my evaluation of Ms. Diaz.  I have administered the MMPI and we have seen one another on 8 occasions.  We have been addressing her  history of PTSD and her symptoms of anxiety.  From what I understand to date about the DBS surgery, I believe she is an appropriate candidate from a psych perspective.  We will continue to work together to increase her coping skills.  She is attending [facility] now to receive increased support to address her concerns about her grandchildren.  We have watched multiple videos about the procedure and I believe she understands the procedure and has realistic expectations.       You are scheduled to meet with her in 2 weeks.  If you continue to be concerned, please let me know what gives you pause so that we might work together to address these.  Please feel free to write back!          MRI Date: 3/19/18    Impression  Several scattered foci of T2 white matter hyperintensity,  nonspecific. Differential diagnosis includes chronic small vessel  ischemic changes, as well as prior infectious, inflammatory and  ischemic processes.   PMH: -  Past Medical History:   Diagnosis Date     Degenerative joint disease 11/7/2017     Former smoker quit 2009 11/7/2017     History of colonic polyps 11/7/2017     HTN (hypertension) 11/7/2017     Hypercholesterolemia 11/7/2017     Lactose intolerance in adult 11/7/2017     Obesity 11/7/2017     PID (pelvic inflammatory disease) due to IUD 11/7/2017     Pulmonary emboli (H) - coumadin lovenox 11/7/2017     Restless Less Syndrome - from Dr. Stratton's office visit note 11/7/17  Migraines with headaches every day  Falls    Past Surgical History:   Procedure Laterality Date     adhesioloysis       CHOLECYSTECTOMY       COLONOSCOPY       JOINT REPLACEMENT Right     right partial knee replacement     LAPAROSCOPY      adhesioloysis     LAPAROSCOPY      supracervical hysterectomy     LAPAROTOMY EXPLORATORY Left     partial removal of left ovary     REMOVE INTRAUTERINE DEVICE  2006     salpingoopherectomy         Soc Hx:  reports that she has quit smoking. Her smoking use included Cigarettes. She has a  10.00 pack-year smoking history. She has never used smokeless tobacco. She reports that she does not drink alcohol or use illicit drugs.    Family Hx of Movement Disorders:family history includes Breast Cancer in her mother.  Consult Dr. Elver Stratton     Patient discussed at conference on: 3/29/18     DBS Meeting Notes/Further Work-up Needed: -    ATTENDED DEEP BRAIN STIMULATION CLASS ON 12/20/17    1. Med management first - decrease time between doses   2. Are dyskinesias intractable after increasing the carbidopa/levodopa?    Called patient to discuss. Appointment made with Dr. Stratton on 4/17 at 12:40 to maximize her medication before deciding if she is a candidate. Patient verbalized understanding of this plan of care.    From Dr. Stratton's office visit note 4/17/18:    In summary, we wanted to make sure that we addressed her mood issues before we proceeded with surgery. She has seen Aimee Lowe and then subsequently now with Dr. Lantigua. She has done all the requirements for surgery and she continues to have wearing off and that additional medication changes probably wont resolve this as well as DBS surgery. The plan would be to proceed with unilateral DBS. She would be looking at one of the 3 surgical options: Medtronic, [employer] (8 contacts) and [employer] (8 contacts and rechargeable). We would recommend medtronic or Abbott (only Medtronic is approved in regards to MRI compatibility).  She has not had a 7T MRI scan done at this time.  She would be a right GPI DBS and is interested in the 7T study. She is right handed but wants her left body treated. She is on aspirin and does not have diabetes and has not had prior anesthesia problems and is not on a blood thinner.      PLAN  RIGHT Gpi brain DBS surgery  We explained our overly cautious approach to her condition  Return to be determined based on the surgery.
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
## 2017-12-28 ENCOUNTER — OFFICE VISIT (OUTPATIENT)
Dept: PSYCHOLOGY | Facility: CLINIC | Age: 57
End: 2017-12-28
Payer: MEDICARE

## 2017-12-28 DIAGNOSIS — F41.1 GAD (GENERALIZED ANXIETY DISORDER): Primary | ICD-10-CM

## 2017-12-28 DIAGNOSIS — F54 PSYCHOLOGICAL AND BEHAVIORAL FACTORS ASSOCIATED WITH DISORDERS OR DISEASES CLASSIFIED ELSEWHERE: ICD-10-CM

## 2017-12-28 DIAGNOSIS — G20.A1 PARKINSON'S DISEASE (H): ICD-10-CM
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
## 2017-12-28 NOTE — MR AVS SNAPSHOT
After Visit Summary   12/28/2017    Erika Diaz    MRN: [id number]           Patient Information     Date Of Birth          1960        Visit Information        Provider Department      12/28/2017 8:00 AM Aimee Lowe, PhD [facility]        Today's Diagnoses     JASON (generalized anxiety disorder)    -  1    Psychological and behavioral factors associated with disorders or diseases classified elsewhere        Parkinson's disease (H)           Follow-ups after your visit        Your next 10 appointments already scheduled     Feb 13, 2018  1:10 PM CST   (Arrive by 12:55 PM)   Return Movement Disorder with Elver Stratton MD   [facility] ([facility])    [address] [address] [phone]              Who to contact     Please call your clinic at [phone] to:    Ask questions about your health    Make or cancel appointments    Discuss your medicines    Learn about your test results    Speak to your doctor   If you have compliments or concerns about an experience at your clinic, or if you wish to file a complaint, please contact [facility] Patient Relations at [phone] or email us at [email or web address]         Additional Information About Your Visit        MyChart Information     [employer]wickt gives you secure access to your electronic health record. If you see a primary care provider, you can also send messages to your care team and make appointments. If you have questions, please call your primary care clinic.  If you do not have a primary care provider, please call [phone] and they will assist you.      [employer] is an electronic gateway that provides easy, online access to your medical records. With [employer], you can request a clinic appointment, read your test results, renew a prescription or communicate with your care team.     To access your existing  account, please contact your [facility] or call [phone] for assistance.        Care EveryWhere ID     This is your Care EveryWhere ID. This could be used by other organizations to access your [place] medical records  [id number]         Blood Pressure from Last 3 Encounters:   11/07/17 145/78    Weight from Last 3 Encounters:   11/07/17 96.9 kg (213 lb 9.6 oz)              Today, you had the following     No orders found for display       Primary Care Provider Office Phone # Fax #    Ondina Edmondson [phone]       [facility] [address] [facility] [address]        Equal Access to Services     [facility] CenterNISREEN : Hadii matilde todd hadasho Sovance, waaxda luqadaha, qaybta kaalmada adefangyada, naga troncoso . So [facility] [phone].    ATENCIÓN: Si habla español, tiene a guerra disposición servicios gratuitos de asistencia lingüística. Llame al [phone].    We comply with applicable federal civil rights laws and [place] laws. We do not discriminate on the basis of race, color, national origin, age, disability, sex, sexual orientation, or gender identity.            Thank you!     Thank you for choosing [facility]  for your care. Our goal is always to provide you with excellent care. Hearing back from our patients is one way we can continue to improve our services. Please take a few minutes to complete the written survey that you may receive in the mail after your visit with us. Thank you!             Your Updated Medication List - Protect others around you: Learn how to safely use, store and throw away your medicines at www.disposemymeds.org.          This list is accurate as of: 12/28/17 11:59 PM.  Always use your most recent med list.                   Brand Name Dispense Instructions for use Diagnosis    amantadine 100 MG capsule    SYMMETREL    180 capsule    1 capsule @ 7am and 4pm    Paralysis agitans (H)        aspirin EC 81 MG EC tablet     90 tablet    1 tab @ 9pm    Paralysis agitans (H)       busPIRone 10 MG tablet    BUSPAR    180 tablet    1 Tab @ 7am and 1 tab @ 4pm    Paralysis agitans (H)       carbidopa-levodopa  MG per tablet    SINEMET    1170 tablet    3 tabs @7, noon, 4p and 9pm and a few as needed = 13/day x 90 = 1170tablets    Paralysis agitans (H)       docusate sodium 100 MG capsule    COLACE    180 capsule    1 tab @ 7am and 9pm    Paralysis agitans (H)       doxylamine 25 MG Tabs tablet    UNISOM    14 each    Take 1 tablet (25 mg) by mouth nightly as needed        * FLUoxetine 10 MG capsule    PROzac    90 capsule    Take 10 + 20mg tabs @ 7am= 30mg/day    Paralysis agitans (H)       * FLUoxetine 20 MG capsule    PROzac    90 capsule    Take 20mg capsule with 10mg capsule @ 7am = 30mg/day    Paralysis agitans (H)       fluticasone 50 MCG/ACT spray    FLONASE    1 Bottle    Spray 1 spray into both nostrils daily as needed for rhinitis or allergies        LORazepam 1 MG tablet    ATIVAN    60 tablet    Take 1 tablet (1 mg) by mouth daily as needed for anxiety    Paralysis agitans (H)       omeprazole 20 MG CR capsule    priLOSEC    30 capsule    Take 1 capsule (20 mg) by mouth 3 times daily (with meals)        polyethylene glycol powder    MIRALAX/GLYCOLAX    119 g    Take 17 g by mouth daily as needed for constipation    Paralysis agitans (H)       pramipexole 0.5 MG tablet    MIRAPEX    180 tablet    2 tabs @ 9pm    Paralysis agitans (H)       simvastatin 20 MG tablet    ZOCOR    90 tablet    1 tab @ 9pm (may go back on it)    Paralysis agitans (H)       * Notice:  This list has 2 medication(s) that are the same as other medications prescribed for you. Read the directions carefully, and ask your doctor or other care provider to review them with you.

## 2018-01-04 ENCOUNTER — OFFICE VISIT (OUTPATIENT)
Dept: PSYCHOLOGY | Facility: CLINIC | Age: 58
End: 2018-01-04
Payer: MEDICARE

## 2018-01-04 DIAGNOSIS — F43.12 CHRONIC POST-TRAUMATIC STRESS DISORDER: ICD-10-CM

## 2018-01-04 DIAGNOSIS — G20.A1 PARKINSON'S DISEASE (H): ICD-10-CM

## 2018-01-04 DIAGNOSIS — F43.12 POST-TRAUMATIC STRESS DISORDER, CHRONIC: ICD-10-CM

## 2018-01-04 DIAGNOSIS — F41.1 GAD (GENERALIZED ANXIETY DISORDER): Primary | ICD-10-CM

## 2018-01-04 NOTE — MR AVS SNAPSHOT
After Visit Summary   1/4/2018    Erika Diaz    MRN: [id number]           Patient Information     Date Of Birth          1960        Visit Information        Provider Department      1/4/2018 1:00 PM Aimee Lowe, PhD LP [facility]        Today's Diagnoses     JASON (generalized anxiety disorder)    -  1    Parkinson's disease (H)        Chronic post-traumatic stress disorder        Post-traumatic stress disorder, chronic           Follow-ups after your visit        Your next 10 appointments already scheduled     Jan 18, 2018  1:00 PM CST   (Arrive by 12:45 PM)   Return Visit with Aimee Lowe, PhD QUINTON   [facility] ([facility])    [address] [address] [phone]            Feb 13, 2018  1:10 PM CST   (Arrive by 12:55 PM)   Return Movement Disorder with Elver Stratton MD   [facility] ([facility])    [address] [place] [address] [phone]              Who to contact     Please call your clinic at [phone] to:    Ask questions about your health    Make or cancel appointments    Discuss your medicines    Learn about your test results    Speak to your doctor   If you have compliments or concerns about an experience at your clinic, or if you wish to file a complaint, please contact [facility] Patient Relations at [phone] or email us at [email or web address]         Additional Information About Your Visit        [employer] Information     [employer] gives you secure access to your electronic health record. If you see a primary care provider, you can also send messages to your care team and make appointments. If you have questions, please call your primary care clinic.  If you do not have a primary care provider, please call [phone] and they will assist you.      [employer] is an  electronic gateway that provides easy, online access to your medical records. With [employer], you can request a clinic appointment, read your test results, renew a prescription or communicate with your care team.     To access your existing account, please contact your [facility] or call [phone] for assistance.        Care EveryWhere ID     This is your Care EveryWhere ID. This could be used by other organizations to access your [place] medical records  [id number]         Blood Pressure from Last 3 Encounters:   11/07/17 145/78    Weight from Last 3 Encounters:   11/07/17 96.9 kg (213 lb 9.6 oz)              Today, you had the following     No orders found for display       Primary Care Provider Office Phone # Fax #    Ondina Edmondson [phone]       [facility] [address] [facility] [address]        Equal Access to Services     AYLEEN GASCA : Hadii matilde todd hadasho Soomaali, waaxda luqadaha, qaybta kaalmada adeegyada, naga alcocer haylupe troncoso . So [facility] [phone].    ATENCIÓN: Si habla español, tiene a guerra disposición servicios gratuitos de asistencia lingüística. Hollyame al [phone].    We comply with applicable federal civil rights laws and [place] laws. We do not discriminate on the basis of race, color, national origin, age, disability, sex, sexual orientation, or gender identity.            Thank you!     Thank you for choosing [facility]  for your care. Our goal is always to provide you with excellent care. Hearing back from our patients is one way we can continue to improve our services. Please take a few minutes to complete the written survey that you may receive in the mail after your visit with us. Thank you!             Your Updated Medication List - Protect others around you: Learn how to safely use, store and throw away your medicines at www.disposemymeds.org.          This list is accurate as  of: 1/4/18 11:59 PM.  Always use your most recent med list.                   Brand Name Dispense Instructions for use Diagnosis    amantadine 100 MG capsule    SYMMETREL    180 capsule    1 capsule @ 7am and 4pm    Paralysis agitans (H)       aspirin EC 81 MG EC tablet     90 tablet    1 tab @ 9pm    Paralysis agitans (H)       busPIRone 10 MG tablet    BUSPAR    180 tablet    1 Tab @ 7am and 1 tab @ 4pm    Paralysis agitans (H)       carbidopa-levodopa  MG per tablet    SINEMET    1170 tablet    3 tabs @7, noon, 4p and 9pm and a few as needed = 13/day x 90 = 1170tablets    Paralysis agitans (H)       docusate sodium 100 MG capsule    COLACE    180 capsule    1 tab @ 7am and 9pm    Paralysis agitans (H)       doxylamine 25 MG Tabs tablet    UNISOM    14 each    Take 1 tablet (25 mg) by mouth nightly as needed        * FLUoxetine 10 MG capsule    PROzac    90 capsule    Take 10 + 20mg tabs @ 7am= 30mg/day    Paralysis agitans (H)       * FLUoxetine 20 MG capsule    PROzac    90 capsule    Take 20mg capsule with 10mg capsule @ 7am = 30mg/day    Paralysis agitans (H)       fluticasone 50 MCG/ACT spray    FLONASE    1 Bottle    Spray 1 spray into both nostrils daily as needed for rhinitis or allergies        LORazepam 1 MG tablet    ATIVAN    60 tablet    Take 1 tablet (1 mg) by mouth daily as needed for anxiety    Paralysis agitans (H)       omeprazole 20 MG CR capsule    priLOSEC    30 capsule    Take 1 capsule (20 mg) by mouth 3 times daily (with meals)        polyethylene glycol powder    MIRALAX/GLYCOLAX    119 g    Take 17 g by mouth daily as needed for constipation    Paralysis agitans (H)       pramipexole 0.5 MG tablet    MIRAPEX    180 tablet    2 tabs @ 9pm    Paralysis agitans (H)       simvastatin 20 MG tablet    ZOCOR    90 tablet    1 tab @ 9pm (may go back on it)    Paralysis agitans (H)       * Notice:  This list has 2 medication(s) that are the same as other medications prescribed for you. Read  the directions carefully, and ask your doctor or other care provider to review them with you.

## 2018-01-08 NOTE — PROGRESS NOTES
WAIS-IV    Raw              Age Scaled   Similarities   23     9    Comprehension        15     6   Letter Num. Seq.   18     9   Digit Span   23     8   Matrix Reasoning   13     9     WIDE RANGE ACHIEVEMENT TEST - 4    Standard  %tile               Grade      Score  Rank               Equiv.  Reading    87     19           10.2       WECHSLER MEMORY SCALE - REVISED    Raw Score        MAS         Information & Orientation       14   Logical Memory  Immed.    5    2    Logical Memory  30 min.    2    2      BOSTON NAMING TEST  Score (Correct+Stim cue)         51          MAS    7     # correct Stimulus Cue:         0    # correct Phonemic Cue:       7      ACOSTA VERBAL LEARNING TEST - REVISED  Form 4                                               Raw                T                                        Trial 1               4   Trial 2               7   Trial 3  6   Total 1-3     17   25   Learning  3   Delay  7   35   Percent Retained   100   55   True Positives     10   Discrimination Index  9   36   False Positives  1     BRIEF VISUAL MEMORY TEST - REVISED  Form 4                                              Raw                T                                        Trial 1               4   Trial 2                  11   Trial 3     11   Total 1-3     26   56   Learning  7   68   Delay     12   66   Percent Retained   100     >16 (%ile)  True Positives  5   Discrimination Index  5    11-16  (%ile)  False Positives  0     D-KEFS VERBAL FLUENCY  Alternate    Raw              Age Scaled   Letter Fluency  47     13    Category Fluency       45    13    Switching Fluency    Total Correct    9      5    Switching Acc.    6      4     CLOCK DRAWING  Command  3/3    Copy   3/3       TRAIL MAKING TEST         Seconds         Errors           MAS                 A    23   0   13  .  B    55   3   12  .    STROOP                    Raw   +  Steven =     Total       MAS        Word  87  +  - =   87   8    Color  68   +  - =   68   10    C-W  54  +  - =   54   15      [place] CARD SORTING TEST - 64  # Categories   3            >16      %ile  # persev err.         8         T        49       Conc. level resp.       39           T      41        FMS           0           VELAZQUEZ JUDGMENT OF LINE ORIENTATION  Form H     Raw     20  MAS   9     DEMENTIA RATING SCALE - 2  Standard/Alternate    Raw MAS Raw     MAS   Attention  31    5   Concept  37   10    Init/Psv  35    8  Memory  21     6   Construct    5    7  Total     129/144    6     BDI-2:       8         MMPI-2-RF    MAS = Moore Older Adult Normative Study Age Adj. Scaled Score

## 2018-01-11 ENCOUNTER — OFFICE VISIT (OUTPATIENT)
Dept: PSYCHOLOGY | Facility: CLINIC | Age: 58
End: 2018-01-11
Payer: MEDICARE

## 2018-01-11 DIAGNOSIS — F43.12 POST-TRAUMATIC STRESS DISORDER, CHRONIC: ICD-10-CM

## 2018-01-11 DIAGNOSIS — F41.1 GAD (GENERALIZED ANXIETY DISORDER): Primary | ICD-10-CM

## 2018-01-11 NOTE — PROGRESS NOTES
Health Psychology                                      Department of Medicine                                           [facility] Mail Code 743    Carli Nesbitt, Ph.D., L.P. [phone]  [address] [place] [address], [facility] Darlene Starks, Ph.D.,  L.P. [phone]  [place] [address]  Marcos Rios, Ph.D., A.B.P.P., L.P. [phone]   ________________________________________________________________________________________________  Health Psychology - Follow up Visit  Confidential Summary*    REFERRAL SOURCE  Endocrine clinic    CHIEF COMPLAINT/REASON FOR VISIT  Cognitive behavioral therapy and behavioral counseling in context of health and behavior issues related to advanced PD and evaluation for deep brain stimulator.  Patient also has a history of anxiety and trauma and multiple stressful life events.      Patient was seen today for a 60 minute individual health and behavior intervention session.  In addition, the MMPI was administered, scored and interpreted.      Subjective:  Patient arrived today at 10am to complete the MMPI.  The test was ordered to facilitate an evaluation for candidacy to receive a deep brain stimulator to assist in the treatment of her PD.      The patient was also seen for a 60 minute session and was provided with a letter supporting her application for an emotional support animal.      The patient reported hat she did NOT attend an [employer] meeting but discussed it with her  and the location of the meetings is the same [employer] where he attends AA meetings. The patient continues to describ success in her efforts to detach herself from his drinking behavior.  She continues to describe a close relationship and denied any interference of his alcohol use on her well being.  She has concerns about the impact of alcohol on his health and his continued cigarette smoking.  He is attempting to quit both substances.      She  continues to describe concern surrounding the well being of her grandchildren and described a very challenging situation with her16 year old grand daughter.  Discussed the possbility of meeting with the girls mother to offer her support.  Discussed detachment from these challenges also.  Encouraged attendance to [facility] where she will receive support and assistance in navigating some of these challenging areas.      Discussed her use of mj to help with initial insomnia.  Reviewed good sleep hygiene. Patient agreeable to discontinuing daytime napping and to have a consistent sleep and wake time and to discontinue watching tv in the bedroom.      Objective:  Patient was on time for today s session, appropriately groomed and dressed, and demonstrated good eye contact.  She appeared friendly, alert and oriented.  Mood was euthymic, with appropriate range of affect. Patient denied suicidal or assaultive ideation, plan, or intent.        Assessment:  The patient has PD and is under consideration for the DBS surgery.  She has a history of trauma and some anxiety surrounding these events.     Plan:  We will work together to decrease anxiety and increase coping mechanisms.      Will see in one week.      Time In:  1230  Time Out:  130    Diagnosis:  Axis I Generalized Anxiety Disorder;  PTSD   Axis II Deferred   Axis III Obesity (278.00), PD, please see medical records for details   [place] IV Psychosocial and Environmental Stressors:  health & concern surrounding grand daughter       Aimee Lowe, Ph.D., L.P.      *In accordance with the Rules of the [place] Board of Psychology, it is noted that psychological descriptions and scientific procedures underlying psychological evaluations have limitations.  Absolute predictions cannot be made based on information in this report.

## 2018-01-11 NOTE — MR AVS SNAPSHOT
After Visit Summary   1/11/2018    Erika Diaz    MRN: [id number]           Patient Information     Date Of Birth          1960        Visit Information        Provider Department      1/11/2018 1:00 PM Aimee Lowe, PhD [facility]        Today's Diagnoses     JASON (generalized anxiety disorder)    -  1    Post-traumatic stress disorder, chronic           Follow-ups after your visit        Your next 10 appointments already scheduled     Feb 13, 2018  1:10 PM CST   (Arrive by 12:55 PM)   Return Movement Disorder with Elver Stratton MD   [facility]rology ([facility] [place])    [address] [place] [id number] [phone]              Who to contact     Please call your clinic at [phone] to:    Ask questions about your health    Make or cancel appointments    Discuss your medicines    Learn about your test results    Speak to your doctor   If you have compliments or concerns about an experience at your clinic, or if you wish to file a complaint, please contact [facility] Patient Relations at [phone] or email us at [email or web address]         Additional Information About Your Visit        MyChart Information     [employer] gives you secure access to your electronic health record. If you see a primary care provider, you can also send messages to your care team and make appointments. If you have questions, please call your primary care clinic.  If you do not have a primary care provider, please call [phone] and they will assist you.      [employer] is an electronic gateway that provides easy, online access to your medical records. With [employer], you can request a clinic appointment, read your test results, renew a prescription or communicate with your care team.     To access your existing account, please contact your [facility] or call  [phone] for assistance.        Care EveryWhere ID     This is your Care EveryWhere ID. This could be used by other organizations to access your [place] medical records  [id number]         Blood Pressure from Last 3 Encounters:   11/07/17 145/78    Weight from Last 3 Encounters:   11/07/17 96.9 kg (213 lb 9.6 oz)              Today, you had the following     No orders found for display       Primary Care Provider Office Phone # Fax #    Ondina Edmondson [phone]       [facility] [address] [facility] [address]        Equal Access to Services     : Hadii aad ku hadasho Soomaali, waaxda luqadaha, qaybta kaalmada adeegyada, naga alcocer haylupe troncoso . So [facility] [phone].    ATENCIÓN: Si habla español, tiene a guerra disposición servicios gratuitos de asistencia lingüística. [facility] [phone].    We comply with applicable federal civil rights laws and [place] laws. We do not discriminate on the basis of race, color, national origin, age, disability, sex, sexual orientation, or gender identity.            Thank you!     Thank you for choosing [facility]  for your care. Our goal is always to provide you with excellent care. Hearing back from our patients is one way we can continue to improve our services. Please take a few minutes to complete the written survey that you may receive in the mail after your visit with us. Thank you!             Your Updated Medication List - Protect others around you: Learn how to safely use, store and throw away your medicines at www.disposemymeds.org.          This list is accurate as of: 1/11/18  1:59 PM.  Always use your most recent med list.                   Brand Name Dispense Instructions for use Diagnosis    amantadine 100 MG capsule    SYMMETREL    180 capsule    1 capsule @ 7am and 4pm    Paralysis agitans (H)       aspirin EC 81 MG EC tablet     90 tablet    1 tab @ 9pm    Paralysis  agitans (H)       busPIRone 10 MG tablet    BUSPAR    180 tablet    1 Tab @ 7am and 1 tab @ 4pm    Paralysis agitans (H)       carbidopa-levodopa  MG per tablet    SINEMET    1170 tablet    3 tabs @7, noon, 4p and 9pm and a few as needed = 13/day x 90 = 1170tablets    Paralysis agitans (H)       docusate sodium 100 MG capsule    COLACE    180 capsule    1 tab @ 7am and 9pm    Paralysis agitans (H)       doxylamine 25 MG Tabs tablet    UNISOM    14 each    Take 1 tablet (25 mg) by mouth nightly as needed        * FLUoxetine 10 MG capsule    PROzac    90 capsule    Take 10 + 20mg tabs @ 7am= 30mg/day    Paralysis agitans (H)       * FLUoxetine 20 MG capsule    PROzac    90 capsule    Take 20mg capsule with 10mg capsule @ 7am = 30mg/day    Paralysis agitans (H)       fluticasone 50 MCG/ACT spray    FLONASE    1 Bottle    Spray 1 spray into both nostrils daily as needed for rhinitis or allergies        LORazepam 1 MG tablet    ATIVAN    60 tablet    Take 1 tablet (1 mg) by mouth daily as needed for anxiety    Paralysis agitans (H)       omeprazole 20 MG CR capsule    priLOSEC    30 capsule    Take 1 capsule (20 mg) by mouth 3 times daily (with meals)        polyethylene glycol powder    MIRALAX/GLYCOLAX    119 g    Take 17 g by mouth daily as needed for constipation    Paralysis agitans (H)       pramipexole 0.5 MG tablet    MIRAPEX    180 tablet    2 tabs @ 9pm    Paralysis agitans (H)       simvastatin 20 MG tablet    ZOCOR    90 tablet    1 tab @ 9pm (may go back on it)    Paralysis agitans (H)       * Notice:  This list has 2 medication(s) that are the same as other medications prescribed for you. Read the directions carefully, and ask your doctor or other care provider to review them with you.

## 2018-01-12 NOTE — PROGRESS NOTES
Health Psychology                                      Department of Medicine                                           [facility] Mail Code 743    Carli Nebsitt, Ph.D., L.P. [phone]  [address] [place] [address], [facility] Darlene Starks, Ph.D.,  L.P. [phone]  [place] [address]  Marcos Rios, Ph.D., A.B.P.P., L.P. [phone]       Aimee Lowe, PhD, LP [phone]  ________________________________________________________________________________________________  Health Psychology - Follow up Visit  Confidential Summary*    REFERRAL SOURCE  Dr. Reid (Neurology)    CHIEF COMPLAINT/REASON FOR VISIT  Cognitive behavioral therapy and behavioral counseling in context of DBS procedure and address chronic anxiety and concerns surrounding grandchildren.      Patient was seen today for a 60 minute individual health and behavior intervention session.    Subjective: Session began with my review of the concern that neurology may have with her candidacy for the DBS procedure.  Discussed the impact of her anxiety on her ability to withstand the procedure.  Encourage patient to actively pursue CBT treatment to address her chronic generalized anxiety and her history of trauma.  Given her challenges and excepting several situations that she has no concrete control over including her 's ongoing alcohol use and several behavioral and mental health challenges experienced by her teenage grandchildren, the patient was encouraged to attend Al-Anon to learn skills related to cognitive detachment and increasing coping skills to deal with ongoing stress.  The patient did not attend a group this past week but she was encouraged to attend going forward.      Discussed her concern surrounding her 16-year-old granddaughter.  Encouraged her to examine some of her thoughts and possible miss attributions and distortion surrounding her responsibilities.  The patient was  taught paced breathing and was encouraged to consider alternate cognitions to replace fearful thoughts.    She continues to work part time and described good work relations.  She reported a busy, full life.    Discussed the possibility of MMPI testing which will give us another picture of some possible underlying personality characteristics that may enlighten our work.  She is in agreement and will come early to next session in order to complete.    Objective:  Patient was on time for today s session, appropriately groomed and dressed, and demonstrated good eye contact.  She appeared friendly, alert and oriented.  Mood was euthymic, with appropriate range of affect. Patient denied suicidal or assaultive ideation, plan, or intent.        Assessment: Patient has a long history of JASON and chronic PTSD related to multiple traumatic incidents.  She is amenable to CBT treatment and should be a good candidate for a positive treatment response.  She is also likely to benefit from the education support and cognitive framing that is encouraged and Al-Anon attendance.    Plan:  Patient has been provided with my contact information and knows how to contact me or any of my colleagues in Health Psychology for future follow up should she wish to do so.     Time In:  1:00  Time Out:  2:00    Diagnosis:  Axis I Generalized Anxiety Disorder; PTSD chronic   Axis II Deferred   Axis III Obesity (278.00), please see medical records for details   [place] IV Psychosocial and Environmental Stressors: Health & grandchildren wellbeing       Aimee Lowe, Ph.D., L.P.      *In accordance with the Rules of the [place] Board of [employer], it is noted that psychological descriptions and scientific procedures underlying psychological evaluations have limitations.  Absolute predictions cannot be made based on information in this report.

## 2018-01-18 ENCOUNTER — OFFICE VISIT (OUTPATIENT)
Dept: PSYCHOLOGY | Facility: CLINIC | Age: 58
End: 2018-01-18
Payer: MEDICARE

## 2018-01-18 DIAGNOSIS — F43.12 CHRONIC POST-TRAUMATIC STRESS DISORDER: Primary | ICD-10-CM

## 2018-01-18 NOTE — MR AVS SNAPSHOT
After Visit Summary   1/18/2018    Erika Diaz    MRN: [id number]           Patient Information     Date Of Birth          1960        Visit Information        Provider Department      1/18/2018 1:00 PM Aimee Lowe, PhD [facility]        Today's Diagnoses     Chronic post-traumatic stress disorder    -  1       Follow-ups after your visit        Your next 10 appointments already scheduled     Feb 13, 2018  1:10 PM CST   (Arrive by 12:55 PM)   Return Movement Disorder with Elver Stratton MD   [facility] ([facility] [facility] [place])    [address] [place] [address] [phone]              Who to contact     Please call your clinic at [phone] to:    Ask questions about your health    Make or cancel appointments    Discuss your medicines    Learn about your test results    Speak to your doctor   If you have compliments or concerns about an experience at your clinic, or if you wish to file a complaint, please contact [facility] Patient Relations at [phone] or email us at [email or web address]         Additional Information About Your Visit        MyChart Information     [employer] gives you secure access to your electronic health record. If you see a primary care provider, you can also send messages to your care team and make appointments. If you have questions, please call your primary care clinic.  If you do not have a primary care provider, please call [phone] and they will assist you.      [employer] is an electronic gateway that provides easy, online access to your medical records. With [employer], you can request a clinic appointment, read your test results, renew a prescription or communicate with your care team.     To access your existing account, please contact your [facility] or call [phone] for assistance.        Care EveryWhere  ID     This is your Care EveryWhere ID. This could be used by other organizations to access your [place] medical records  [id number]         Blood Pressure from Last 3 Encounters:   11/07/17 145/78    Weight from Last 3 Encounters:   11/07/17 96.9 kg (213 lb 9.6 oz)              Today, you had the following     No orders found for display       Primary Care Provider Office Phone # Fax #    Ondina Edmondson [phone]       [facility] [address] [facility] [address]        Equal Access to Services     [facility]: Hadii aad ku hadasho Soomaali, waaxda luqadaha, qaybta kaalmada adeegyada, waxay krystianin haycharletten adeeg abigail troncoso . So [facility] [phone].    ATENCIÓN: Si habla español, tiene a guerra disposición servicios gratuitos de asistencia lingüística. [facility] [phone].    We comply with applicable federal civil rights laws and [place] laws. We do not discriminate on the basis of race, color, national origin, age, disability, sex, sexual orientation, or gender identity.            Thank you!     Thank you for choosing [facility]  for your care. Our goal is always to provide you with excellent care. Hearing back from our patients is one way we can continue to improve our services. Please take a few minutes to complete the written survey that you may receive in the mail after your visit with us. Thank you!             Your Updated Medication List - Protect others around you: Learn how to safely use, store and throw away your medicines at www.disposemymeds.org.          This list is accurate as of: 1/18/18 11:59 PM.  Always use your most recent med list.                   Brand Name Dispense Instructions for use Diagnosis    amantadine 100 MG capsule    SYMMETREL    180 capsule    1 capsule @ 7am and 4pm    Paralysis agitans (H)       aspirin EC 81 MG EC tablet     90 tablet    1 tab @ 9pm    Paralysis agitans (H)       busPIRone 10 MG tablet    BUSPAR    180  tablet    1 Tab @ 7am and 1 tab @ 4pm    Paralysis agitans (H)       carbidopa-levodopa  MG per tablet    SINEMET    1170 tablet    3 tabs @7, noon, 4p and 9pm and a few as needed = 13/day x 90 = 1170tablets    Paralysis agitans (H)       docusate sodium 100 MG capsule    COLACE    180 capsule    1 tab @ 7am and 9pm    Paralysis agitans (H)       doxylamine 25 MG Tabs tablet    UNISOM    14 each    Take 1 tablet (25 mg) by mouth nightly as needed        * FLUoxetine 10 MG capsule    PROzac    90 capsule    Take 10 + 20mg tabs @ 7am= 30mg/day    Paralysis agitans (H)       * FLUoxetine 20 MG capsule    PROzac    90 capsule    Take 20mg capsule with 10mg capsule @ 7am = 30mg/day    Paralysis agitans (H)       fluticasone 50 MCG/ACT spray    FLONASE    1 Bottle    Spray 1 spray into both nostrils daily as needed for rhinitis or allergies        LORazepam 1 MG tablet    ATIVAN    60 tablet    Take 1 tablet (1 mg) by mouth daily as needed for anxiety    Paralysis agitans (H)       omeprazole 20 MG CR capsule    priLOSEC    30 capsule    Take 1 capsule (20 mg) by mouth 3 times daily (with meals)        polyethylene glycol powder    MIRALAX/GLYCOLAX    119 g    Take 17 g by mouth daily as needed for constipation    Paralysis agitans (H)       pramipexole 0.5 MG tablet    MIRAPEX    180 tablet    2 tabs @ 9pm    Paralysis agitans (H)       simvastatin 20 MG tablet    ZOCOR    90 tablet    1 tab @ 9pm (may go back on it)    Paralysis agitans (H)       * Notice:  This list has 2 medication(s) that are the same as other medications prescribed for you. Read the directions carefully, and ask your doctor or other care provider to review them with you.

## 2018-01-21 ENCOUNTER — HEALTH MAINTENANCE LETTER (OUTPATIENT)
Age: 58
End: 2018-01-21

## 2018-01-21 NOTE — PROGRESS NOTES
"Health Psychology                                      Department of Medicine                                           [facility] Mail Code 740    Carli Nesbitt, Ph.D., L.P. [phone]  [address] [place] [address], [facility] Darlene Starks, Ph.D.,  L.P. [phone]  [place] [address]  Marcos Rios, Ph.D., A.B.P.P., L.P. [phone]   ________________________________________________________________________________________________  Health Psychology - Follow up Visit  Confidential Summary*    REFERRAL SOURCE  Endocrine clinic    CHIEF COMPLAINT/REASON FOR VISIT  Cognitive behavioral therapy and behavioral counseling in context of health and behavior issues related to advanced PD and evaluation for deep brain stimulator.  Patient also has a history of anxiety and trauma and multiple stressful life events.      Patient was seen today for a 60 minute individual health and behavior intervention session.  I  Subjective: Patient seen for weekly session.  Began with a review of the MMPI which was taken last week and scored.  The patient has elevations on scales 1 3 and 9.  Her scores appear valid and consistent with what you may find in someone experiencing a chronic health condition.  Discussed her elevated energy level and her desire to do more with her time.  She described enjoyment of work and plans to have more involvement with her grandchildren.    The patient has yet to be to an Al-Anon meeting.  She reported that she discussed attending Al-Anon with the sponsor of her .  He informed her that the meeting that we had found close to her home may not be a \"good\" meeting therefore he offered to take her to another meeting.  He is also in both programs, AA and Al-Anon.  Discussed her caretaking history and reviewed why participating in Al-Anon may increase her support as well as her detachment from problems that she has no control over.    Discussed " her interest in having the deep brain stimulation operation and concerns surrounding her history of anxiety.  The patient appears to be responding well to therapy and is more open in her discussion of her prior history of trauma.      She describes sadness in session surrounding she and 's decision to euthanize their oldest.  She reported that she is reexperiencing some of the loss from losing other pets in the past.  She again described in detail witnessing the death of 1 of her dogs.      The patient today described family of origin issues in which her parents adopted a brother when she was in sixth grade she reported that she took a maternal role in the care of him and is disappointed that he now is rejecting of her because of her movement disorder and his embarrassment surrounding what others might think of him because of her.    Objective:  Patient was on time for today s session, appropriately groomed and dressed, and demonstrated good eye contact.  She appeared friendly, alert and oriented.  Mood was euthymic, with appropriate range of affect. Patient denied suicidal or assaultive ideation, plan, or intent.        Assessment:  The patient has PD and is under consideration for the DBS surgery.  She has a history of trauma.  Patient denied any anxiety related to the surgery but does have a history of anxiety surrounding other life events    Plan:  We will work together to decrease anxiety and increase coping mechanisms.      Will see in one week.      Time In:  100  Time Out:  200    Diagnosis:  Axis I Generalized Anxiety Disorder;  PTSD   Axis II Deferred   Axis III Obesity (278.00), PD, please see medical records for details   [place] IV Psychosocial and Environmental Stressors:  health & concern surrounding grand daughter       Aimee Mynor, Ph.D., L.P.      *In accordance with the Rules of the [place] Board of Psychology, it is noted that psychological descriptions and scientific procedures underlying  psychological evaluations have limitations.  Absolute predictions cannot be made based on information in this report.
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
## 2018-01-25 ENCOUNTER — OFFICE VISIT (OUTPATIENT)
Dept: PSYCHOLOGY | Facility: CLINIC | Age: 58
End: 2018-01-25
Payer: MEDICARE

## 2018-01-25 DIAGNOSIS — F54 PSYCHOLOGICAL FACTORS AFFECTING MEDICAL CONDITION: ICD-10-CM

## 2018-01-25 DIAGNOSIS — F43.12 CHRONIC POST-TRAUMATIC STRESS DISORDER: Primary | ICD-10-CM

## 2018-01-25 NOTE — MR AVS SNAPSHOT
After Visit Summary   1/25/2018    Erika Diaz    MRN: [id number]           Patient Information     Date Of Birth          1960        Visit Information        Provider Department      1/25/2018 1:00 PM Aimee Lowe, PhD [facility]        Today's Diagnoses     Chronic post-traumatic stress disorder    -  1    Psychological factors affecting medical condition           Follow-ups after your visit        Your next 10 appointments already scheduled     Feb 13, 2018  1:10 PM CST   (Arrive by 12:55 PM)   Return Movement Disorder with Elver Stratton MD   [facility] ([facility] [place])    [address] [place] [id number] [phone]              Who to contact     Please call your clinic at [phone] to:    Ask questions about your health    Make or cancel appointments    Discuss your medicines    Learn about your test results    Speak to your doctor   If you have compliments or concerns about an experience at your clinic, or if you wish to file a complaint, please contact [facility] Patient Relations at [phone] or email us at [email or web address]         Additional Information About Your Visit        MyChart Information     [employer] gives you secure access to your electronic health record. If you see a primary care provider, you can also send messages to your care team and make appointments. If you have questions, please call your primary care clinic.  If you do not have a primary care provider, please call [phone] and they will assist you.      [employer] is an electronic gateway that provides easy, online access to your medical records. With [employer], you can request a clinic appointment, read your test results, renew a prescription or communicate with your care team.     To access your existing account, please contact your [facility] or  call [phone] for assistance.        Care EveryWhere ID     This is your Care EveryWhere ID. This could be used by other organizations to access your [place] medical records  [id number]         Blood Pressure from Last 3 Encounters:   11/07/17 145/78    Weight from Last 3 Encounters:   11/07/17 96.9 kg (213 lb 9.6 oz)              Today, you had the following     No orders found for display       Primary Care Provider Office Phone # Fax #    Ondina Edmondson [phone]       [facility] [address] [facility] [address]        Equal Access to Services     [facility]: Hadii aad ku hadasho Soomaali, waaxda luqadaha, qaybta kaalmada adeegyada, naga alcocer hayaan adefang troncoso . So [facility] [phone].    ATENCIÓN: Si habla español, tiene a guerra disposición servicios gratuitos de asistencia lingüística. Llame al [phone].    We comply with applicable federal civil rights laws and [place] laws. We do not discriminate on the basis of race, color, national origin, age, disability, sex, sexual orientation, or gender identity.            Thank you!     Thank you for choosing [facility]  for your care. Our goal is always to provide you with excellent care. Hearing back from our patients is one way we can continue to improve our services. Please take a few minutes to complete the written survey that you may receive in the mail after your visit with us. Thank you!             Your Updated Medication List - Protect others around you: Learn how to safely use, store and throw away your medicines at www.disposemymeds.org.          This list is accurate as of 1/25/18 11:59 PM.  Always use your most recent med list.                   Brand Name Dispense Instructions for use Diagnosis    amantadine 100 MG capsule    SYMMETREL    180 capsule    1 capsule @ 7am and 4pm    Paralysis agitans (H)       aspirin EC 81 MG EC tablet     90 tablet    1 tab @ 9pm    Paralysis  agitans (H)       busPIRone 10 MG tablet    BUSPAR    180 tablet    1 Tab @ 7am and 1 tab @ 4pm    Paralysis agitans (H)       carbidopa-levodopa  MG per tablet    SINEMET    1170 tablet    3 tabs @7, noon, 4p and 9pm and a few as needed = 13/day x 90 = 1170tablets    Paralysis agitans (H)       docusate sodium 100 MG capsule    COLACE    180 capsule    1 tab @ 7am and 9pm    Paralysis agitans (H)       doxylamine 25 MG Tabs tablet    UNISOM    14 each    Take 1 tablet (25 mg) by mouth nightly as needed        * FLUoxetine 10 MG capsule    PROzac    90 capsule    Take 10 + 20mg tabs @ 7am= 30mg/day    Paralysis agitans (H)       * FLUoxetine 20 MG capsule    PROzac    90 capsule    Take 20mg capsule with 10mg capsule @ 7am = 30mg/day    Paralysis agitans (H)       fluticasone 50 MCG/ACT spray    FLONASE    1 Bottle    Spray 1 spray into both nostrils daily as needed for rhinitis or allergies        LORazepam 1 MG tablet    ATIVAN    60 tablet    Take 1 tablet (1 mg) by mouth daily as needed for anxiety    Paralysis agitans (H)       omeprazole 20 MG CR capsule    priLOSEC    30 capsule    Take 1 capsule (20 mg) by mouth 3 times daily (with meals)        polyethylene glycol powder    MIRALAX/GLYCOLAX    119 g    Take 17 g by mouth daily as needed for constipation    Paralysis agitans (H)       pramipexole 0.5 MG tablet    MIRAPEX    180 tablet    2 tabs @ 9pm    Paralysis agitans (H)       simvastatin 20 MG tablet    ZOCOR    90 tablet    1 tab @ 9pm (may go back on it)    Paralysis agitans (H)       * Notice:  This list has 2 medication(s) that are the same as other medications prescribed for you. Read the directions carefully, and ask your doctor or other care provider to review them with you.

## 2018-02-01 ENCOUNTER — OFFICE VISIT (OUTPATIENT)
Dept: PSYCHOLOGY | Facility: CLINIC | Age: 58
End: 2018-02-01
Payer: MEDICARE

## 2018-02-01 DIAGNOSIS — F43.12 CHRONIC POST-TRAUMATIC STRESS DISORDER: Primary | ICD-10-CM

## 2018-02-01 NOTE — MR AVS SNAPSHOT
After Visit Summary   2/1/2018    Erika Diaz    MRN: [id number]           Patient Information     Date Of Birth          1960        Visit Information        Provider Department      2/1/2018 1:00 PM Aimee Lowe, PhD [facility]        Today's Diagnoses     Chronic post-traumatic stress disorder    -  1       Follow-ups after your visit        Your next 10 appointments already scheduled     Feb 13, 2018  1:10 PM CST   (Arrive by 12:55 PM)   Return Movement Disorder with Elver Stratton MD   [facility] ([facility])    [address] [place] [id number] [phone]              Who to contact     Please call your clinic at [phone] to:    Ask questions about your health    Make or cancel appointments    Discuss your medicines    Learn about your test results    Speak to your doctor   If you have compliments or concerns about an experience at your clinic, or if you wish to file a complaint, please contact [facility] Patient Relations at [phone] or email us at [email or web address]         Additional Information About Your Visit        MyChart Information     [employer] 24/7[employer] gives you secure access to your electronic health record. If you see a primary care provider, you can also send messages to your care team and make appointments. If you have questions, please call your primary care clinic.  If you do not have a primary care provider, please call [phone] and they will assist you.      [employer] is an electronic gateway that provides easy, online access to your medical records. With [employer], you can request a clinic appointment, read your test results, renew a prescription or communicate with your care team.     To access your existing account, please contact your [facility] or call [phone] for assistance.        Care EveryWhere ID      This is your Care EveryWhere ID. This could be used by other organizations to access your [place] medical records  [id number]         Blood Pressure from Last 3 Encounters:   11/07/17 145/78    Weight from Last 3 Encounters:   11/07/17 96.9 kg (213 lb 9.6 oz)              Today, you had the following     No orders found for display       Primary Care Provider Office Phone # Fax #    Ondina Edmondson [phone]       [facility] [address] [id number]        Equal Access to Services     [facility]: Hadii aad ku hadasho Soomaali, waaxda luqadaha, qaybta kaalmada adeegyada, waxay krystianin haylupe adefang troncoso . So [facility] [phone].    ATENCIÓN: Si habla español, tiene a guerra disposición servicios gratuitos de asistencia lingüística. [facility] [phone].    We comply with applicable federal civil rights laws and [place] laws. We do not discriminate on the basis of race, color, national origin, age, disability, sex, sexual orientation, or gender identity.            Thank you!     Thank you for choosing [facility]  for your care. Our goal is always to provide you with excellent care. Hearing back from our patients is one way we can continue to improve our services. Please take a few minutes to complete the written survey that you may receive in the mail after your visit with us. Thank you!             Your Updated Medication List - Protect others around you: Learn how to safely use, store and throw away your medicines at www.disposemymeds.org.          This list is accurate as of 2/1/18 11:59 PM.  Always use your most recent med list.                   Brand Name Dispense Instructions for use Diagnosis    amantadine 100 MG capsule    SYMMETREL    180 capsule    1 capsule @ 7am and 4pm    Paralysis agitans (H)       aspirin EC 81 MG EC tablet     90 tablet    1 tab @ 9pm    Paralysis agitans (H)       busPIRone 10 MG tablet    BUSPAR    180 tablet     1 Tab @ 7am and 1 tab @ 4pm    Paralysis agitans (H)       carbidopa-levodopa  MG per tablet    SINEMET    1170 tablet    3 tabs @7, noon, 4p and 9pm and a few as needed = 13/day x 90 = 1170tablets    Paralysis agitans (H)       docusate sodium 100 MG capsule    COLACE    180 capsule    1 tab @ 7am and 9pm    Paralysis agitans (H)       doxylamine 25 MG Tabs tablet    UNISOM    14 each    Take 1 tablet (25 mg) by mouth nightly as needed        * FLUoxetine 10 MG capsule    PROzac    90 capsule    Take 10 + 20mg tabs @ 7am= 30mg/day    Paralysis agitans (H)       * FLUoxetine 20 MG capsule    PROzac    90 capsule    Take 20mg capsule with 10mg capsule @ 7am = 30mg/day    Paralysis agitans (H)       fluticasone 50 MCG/ACT spray    FLONASE    1 Bottle    Spray 1 spray into both nostrils daily as needed for rhinitis or allergies        LORazepam 1 MG tablet    ATIVAN    60 tablet    Take 1 tablet (1 mg) by mouth daily as needed for anxiety    Paralysis agitans (H)       omeprazole 20 MG CR capsule    priLOSEC    30 capsule    Take 1 capsule (20 mg) by mouth 3 times daily (with meals)        polyethylene glycol powder    MIRALAX/GLYCOLAX    119 g    Take 17 g by mouth daily as needed for constipation    Paralysis agitans (H)       pramipexole 0.5 MG tablet    MIRAPEX    180 tablet    2 tabs @ 9pm    Paralysis agitans (H)       simvastatin 20 MG tablet    ZOCOR    90 tablet    1 tab @ 9pm (may go back on it)    Paralysis agitans (H)       * Notice:  This list has 2 medication(s) that are the same as other medications prescribed for you. Read the directions carefully, and ask your doctor or other care provider to review them with you.

## 2018-02-03 NOTE — PROGRESS NOTES
Health Psychology                                      Department of Medicine                                           [facility] Mail Code 749    Carli Nesbitt, Ph.D., L.P. [phone]  [address] [place] [address], [facility] Darlene Starks, Ph.D.,  L.P. [phone]  [place] [address]  Marcos Rios, Ph.D., A.B.P.P., L.P. [phone]   ________________________________________________________________________________________________  Health Psychology - Follow up Visit  Confidential Summary*    REFERRAL SOURCE  Endocrine clinic    CHIEF COMPLAINT/REASON FOR VISIT  Cognitive behavioral therapy and behavioral counseling in context of health and behavior issues related to advanced PD and evaluation for deep brain stimulator.  Patient also has a history of anxiety and trauma and multiple stressful life events.      Patient was seen today for a 60 minute individual health and behavior intervention session.  I  Subjective: Patient seen for weekly visit.  We began with review of last session in which we discussed in detail the procedure that she is interested in having that will involve deep brain stimulator placement.  She reported that she watch several additional videos and has a list of questions to follow up with her primary neurological provider.  The patient was again cautioned that all her symptoms may not resolve following surgery and that continued medication may be indicated.  She reported that she is aware of this.    The patient reported that she attended her first Al-Anon meeting and shared with the group some of her concerns surrounding her grandchildren.  The patient also voiced some concern surrounding her 's continued alcohol use.  She became tearful as she described their early years of marriage and shared a song that he dedicated to her.  She reported that he had a fall this week while intoxicated.  Continue to encourage Al-Catherine  attendance.  Invited her  into the session to encourage alcohol abstinence given the likelihood that he may be her primary caretaker following DBS surgery.  He has had several periods of abstinence and is currently open to treatment.  Encouraged him to reach out if treatment recommendations are desired.    Objective:  Patient was on time for today s session, appropriately groomed and dressed, and demonstrated good eye contact.  She appeared friendly, alert and oriented.  Mood was euthymic, with appropriate range of affect. Patient denied suicidal or assaultive ideation, plan, or intent.        Assessment:  The patient has PD and is under consideration for the DBS surgery.  She has a history of trauma.  Patient denied any anxiety related to the surgery but does have a history of anxiety surrounding other life events    Plan:  We will work together to decrease anxiety and increase coping mechanisms.      Will see in two weeks.      Time In:  100  Time Out:  200    Diagnosis:  Axis I PTSD, chronic   Axis II Deferred   Axis III Obesity (278.00), PD, please see medical records for details   [place] IV Psychosocial and Environmental Stressors:  health & concern surrounding grand daughter, husbands alcohol abuse       Aimee Lowe, Ph.D., L.P.      *In accordance with the Rules of the [place] Board of Psychology, it is noted that psychological descriptions and scientific procedures underlying psychological evaluations have limitations.  Absolute predictions cannot be made based on information in this report.

## 2018-02-04 NOTE — PROGRESS NOTES
Health Psychology                                      Department of Medicine                                           [facility] Mail Code 745    Carli Nesbitt, Ph.D., L.P. [phone]  [address] [place] [address], [facility] Darlene Starks, Ph.D.,  L.P. [phone]  [place] [address]  Marcos Rios, Ph.D., A.B.P.P., L.P. [phone]   ________________________________________________________________________________________________  Health Psychology - Follow up Visit  Confidential Summary*    REFERRAL SOURCE  Endocrine clinic    CHIEF COMPLAINT/REASON FOR VISIT  Cognitive behavioral therapy and behavioral counseling in context of health and behavior issues related to advanced PD and evaluation for deep brain stimulator.  Patient also has a history of anxiety and trauma and multiple stressful life events.      Patient was seen today for a 60 minute individual health and behavior intervention session.  I  Subjective: Patient began with report that she has been feeling more impaired with her Parkinson's disease given that she forgot a dose and this has had a negative impact on her even 24 hours later.  She was noted to appear sleepy today.  Spent the majority of the session reviewing DBS procedure and details.  The patient described a new awareness that the procedure may not eliminate the need for all medications.  She also described a belief that she may have a brief to prolonged.  Where she is asymptomatic immediately following the procedure however this is likely not to last.  Discussed the procedure at length and watched numerous YouTube videos.    She continues to describe concern surrounding her 's increasing alcohol consumption but reported that her granddaughter is doing much better with a new psychiatrist and medication adjustment.  She continues to work part-time and described feeling busy and satisfied with her life and  relationships.    Objective:  Patient was on time for today s session, appropriately groomed and dressed, and demonstrated good eye contact.  She appeared friendly, alert and oriented.  Mood was euthymic, with appropriate range of affect. Patient denied suicidal or assaultive ideation, plan, or intent.        Assessment:  The patient has PD and is under consideration for the DBS surgery.  She has a history of trauma.  Patient denied any anxiety related to the surgery but does have a history of anxiety surrounding other life events    Plan:  We will work together to decrease anxiety and increase coping mechanisms.      Will see in one week.      Time In:  100  Time Out:  200    Diagnosis:  Axis I Generalized Anxiety Disorder;  PTSD   Axis II Deferred   Axis III Obesity (278.00), PD, please see medical records for details   [place] IV Psychosocial and Environmental Stressors:  health & concern surrounding grand daughter       Aimee Lowe, Ph.D., L.P.      *In accordance with the Rules of the [place] Board of Psychology, it is noted that psychological descriptions and scientific procedures underlying psychological evaluations have limitations.  Absolute predictions cannot be made based on information in this report.

## 2018-02-11 PROBLEM — G43.909 MIGRAINE SYNDROME: Status: ACTIVE | Noted: 2017-12-01
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
## 2018-02-13 ENCOUNTER — OFFICE VISIT (OUTPATIENT)
Dept: NEUROLOGY | Facility: CLINIC | Age: 58
End: 2018-02-13
Payer: MEDICARE

## 2018-02-13 VITALS
HEIGHT: 66 IN | BODY MASS INDEX: 34.82 KG/M2 | DIASTOLIC BLOOD PRESSURE: 86 MMHG | OXYGEN SATURATION: 96 % | SYSTOLIC BLOOD PRESSURE: 143 MMHG | WEIGHT: 216.7 LBS | HEART RATE: 69 BPM

## 2018-02-13 DIAGNOSIS — G20.A1 PARALYSIS AGITANS (H): Primary | ICD-10-CM

## 2018-02-13 RX ORDER — PRAMIPEXOLE DIHYDROCHLORIDE 0.5 MG/1
TABLET ORAL
Qty: 180 TABLET | Refills: 3 | Status: SHIPPED | OUTPATIENT
Start: 2018-02-13 | End: 2018-04-17

## 2018-02-13 RX ORDER — AMANTADINE HYDROCHLORIDE 100 MG/1
CAPSULE, GELATIN COATED ORAL
Qty: 180 CAPSULE | Refills: 3 | Status: SHIPPED | OUTPATIENT
Start: 2018-02-13 | End: 2018-04-17

## 2018-02-13 RX ORDER — DOCUSATE SODIUM 100 MG/1
CAPSULE, LIQUID FILLED ORAL
Qty: 180 CAPSULE | Refills: 3 | COMMUNITY
Start: 2018-02-13 | End: 2018-04-17

## 2018-02-13 ASSESSMENT — PAIN SCALES - GENERAL: PAINLEVEL: NO PAIN (0)

## 2018-02-13 NOTE — MR AVS SNAPSHOT
After Visit Summary   2018    Erika Diaz    MRN: [id number]           Patient Information     Date Of Birth          1960        Visit Information        Provider Department      2018 1:10 PM Elver Stratton MD [facility] Neurology        Today's Diagnoses     Paralysis agitans (H)    -  1      Care Instructions    Diagnosis/Summary/Recommendations:    PATIENT: Erika Diaz  57 year old female     : 1960    GINNY: 2018    Parkinson   or  left side onset     Had some off time with stress  Had problems this past weekend  Here today with Zane    Had neuropsychological evaluation with Dr. Hernandez    Assessment:  The patient has PD and is under consideration for the DBS surgery.  She has a history of trauma and some anxiety surrounding these events.      Plan:  With continued guidance and education from her health care team, there are no major concerns from a psychological standpoint, and patient is probably a good candidate for DBS surgery. We will work together to decrease anxiety and increase coping mechanisms.       Will see in one week.       Time In:  8:00  Time Out:  9:00     Diagnosis:  Axis I Generalized Anxiety Disorder;  Psychological factors associated with disease   Axis II Deferred   Axis III Obesity (278.00), PD, please see medical records for details   [place] IV Psychosocial and Environmental Stressors:  health & concern surrounding grand daughter         Aimee Lowe, Ph.D., L.P.    Attended the DBS class  They are interested in DBS workup - had neuropsychological evalaution  Has metal in her right knee  Is not claustrophobic  Has had mri since the knee but not clear if cleared for a 7T mri scan  Has not had motor testing yet.       Medications     7am Noon 4pm  9pm Night time   Amantadine 100mg 1  1      Aspirin 81mg     1    Buspirone buspar 10mg 1  1      Carbidopa/levodopa sinemet 25/100 3 3 3  3 As needed   4am   Docusate colace  100mg As needed        Doxylamine unisom 25mg     prn    Fluoxetine prozac 10mg 1        Fluoxetine prozac 20mg 1        Fluticasone flonase prn        Lorazepam ativan 1mg prn        Omeprazole prilosec 20mg as needed        Polyethylene gycol miralax As needed        Pramipexole mirapex 0.5mg     2    Simvastatin zocor 20mg     Not tkaing                                            Over 50% of this visit was spent in patient care and care coordination.     History obtained from patient    Total visit time was 25 minutes    PLAN  Complete dbs workup with brain mri, motor testing and meet with Dr. Concepcion  Will need to see Dr. Lowe again  Return to be determined  With me or Karina Stratton MD             Follow-ups after your visit        Follow-up notes from your care team     Return in about 6 months (around 8/13/2018).      Your next 10 appointments already scheduled     Aug 14, 2018 12:10 PM CDT   (Arrive by 11:55 AM)   Return Movement Disorder with Elver Stratton MD   [facility] ([facility])    [address] [place] [id number] [phone]              Who to contact     Please call your clinic at [phone] to:    Ask questions about your health    Make or cancel appointments    Discuss your medicines    Learn about your test results    Speak to your doctor            Additional Information About Your Visit        LinkCycle Information     LinkC[employer] gives you secure access to your electronic health record. If you see a primary care provider, you can also send messages to your care team and make appointments. If you have questions, please call your primary care clinic.  If you do not have a primary care provider, please call [phone] and they will assist you.      LinkC[employer] is an electronic gateway that provides easy, online access to your medical records. With [employer], you can request a clinic appointment, read your test results, renew  "a prescription or communicate with your care team.     To access your existing account, please contact your [facility] or call [phone] for assistance.        Care EveryWhere ID     This is your Care EveryWhere ID. This could be used by other organizations to access your [facility] medical records  [id number]        Your Vitals Were     Pulse Height Pulse Oximetry BMI (Body Mass Index)          69 1.676 m (5' 6\") 96% 34.98 kg/m2         Blood Pressure from Last 3 Encounters:   02/13/18 143/86   11/07/17 145/78    Weight from Last 3 Encounters:   02/13/18 98.3 kg (216 lb 11.2 oz)   11/07/17 96.9 kg (213 lb 9.6 oz)              Today, you had the following     No orders found for display         Today's Medication Changes          These changes are accurate as of 2/13/18  2:09 PM.  If you have any questions, ask your nurse or doctor.               These medicines have changed or have updated prescriptions.        Dose/Directions    docusate sodium 100 MG capsule   Commonly known as:  COLACE   This may have changed:  additional instructions   Used for:  Paralysis agitans (H)   Changed by:  Elver Startton MD        1 tab @ 7am and 9pm as needed   Quantity:  180 capsule   Refills:  3       omeprazole 20 MG CR capsule   Commonly known as:  priLOSEC   This may have changed:    - how much to take  - how to take this  - when to take this  - additional instructions   Changed by:  Elver Stratton MD        As needed   Quantity:  30 capsule   Refills:  0         Stop taking these medicines if you haven't already. Please contact your care team if you have questions.     simvastatin 20 MG tablet   Commonly known as:  ZOCOR   Stopped by:  Elver Stratton MD                Where to get your medicines      These medications were sent to [id number] - [place], [place] - [address]   [address] [address]     Phone:  " [phone]     amantadine 100 MG capsule    pramipexole 0.5 MG tablet                Primary Care Provider Office Phone # Fax #    Ondina Edmondson [phone]       [facility] [address] [address]        Equal Access to Services     SHANASEAN CAMPOS : Hadii aad ku hadasho Soomaali, waaxda luqadaha, qaybta kaalmada adeegyada, waxay idiin hayaan adeeg abigail lapeten ah. So [facility] [phone].    ATENCIÓN: Si habla español, tiene a guerra disposición servicios gratuitos de asistencia lingüística. Llame al [phone].    We comply with applicable federal civil rights laws and [place] laws. We do not discriminate on the basis of race, color, national origin, age, disability, sex, sexual orientation, or gender identity.            Thank you!     Thank you for choosing [facility]UROLOG[facility]  for your care. Our goal is always to provide you with excellent care. Hearing back from our patients is one way we can continue to improve our services. Please take a few minutes to complete the written survey that you may receive in the mail after your visit with us. Thank you!             Your Updated Medication List - Protect others around you: Learn how to safely use, store and throw away your medicines at www.disposemymeds.org.          This list is accurate as of 2/13/18  2:09 PM.  Always use your most recent med list.                   Brand Name Dispense Instructions for use Diagnosis    amantadine 100 MG capsule    SYMMETREL    180 capsule    1 capsule @ 7am and 4pm    Paralysis agitans (H)       aspirin EC 81 MG EC tablet     90 tablet    1 tab @ 9pm    Paralysis agitans (H)       busPIRone 10 MG tablet    BUSPAR    180 tablet    1 Tab @ 7am and 1 tab @ 4pm    Paralysis agitans (H)       carbidopa-levodopa  MG per tablet    SINEMET    1170 tablet    3 tabs @7, noon, 4p and 9pm and a few as needed = 13/day x 90 = 1170tablets    Paralysis agitans (H)       docusate sodium 100 MG  capsule    COLACE    180 capsule    1 tab @ 7am and 9pm as needed    Paralysis agitans (H)       doxylamine 25 MG Tabs tablet    UNISOM    14 each    Take 1 tablet (25 mg) by mouth nightly as needed        * FLUoxetine 10 MG capsule    PROzac    90 capsule    Take 10 + 20mg tabs @ 7am= 30mg/day    Paralysis agitans (H)       * FLUoxetine 20 MG capsule    PROzac    90 capsule    Take 20mg capsule with 10mg capsule @ 7am = 30mg/day    Paralysis agitans (H)       fluticasone 50 MCG/ACT spray    FLONASE    1 Bottle    Spray 1 spray into both nostrils daily as needed for rhinitis or allergies        LORazepam 1 MG tablet    ATIVAN    60 tablet    Take 1 tablet (1 mg) by mouth daily as needed for anxiety    Paralysis agitans (H)       omeprazole 20 MG CR capsule    priLOSEC    30 capsule    As needed        polyethylene glycol powder    MIRALAX/GLYCOLAX    119 g    Take 17 g by mouth daily as needed for constipation    Paralysis agitans (H)       pramipexole 0.5 MG tablet    MIRAPEX    180 tablet    2 tabs @ 9pm    Paralysis agitans (H)       * Notice:  This list has 2 medication(s) that are the same as other medications prescribed for you. Read the directions carefully, and ask your doctor or other care provider to review them with you.

## 2018-02-13 NOTE — PATIENT INSTRUCTIONS
Diagnosis/Summary/Recommendations:    PATIENT: Erika Diaz  57 year old female     : 1960    GINNY: 2018    Parkinson  2009 or 2008 left side onset     Had some off time with stress  Had problems this past weekend  Here today with Zane    Had neuropsychological evaluation with Dr. Hernandez    Assessment:  The patient has PD and is under consideration for the DBS surgery.  She has a history of trauma and some anxiety surrounding these events.      Plan:  With continued guidance and education from her health care team, there are no major concerns from a psychological standpoint, and patient is probably a good candidate for DBS surgery. We will work together to decrease anxiety and increase coping mechanisms.       Will see in one week.       Time In:  8:00  Time Out:  9:00     Diagnosis:  Axis I Generalized Anxiety Disorder;  Psychological factors associated with disease   Axis II Deferred   Axis III Obesity (278.00), PD, please see medical records for details   [place] IV Psychosocial and Environmental Stressors:  health & concern surrounding grand daughter         Aimee Mynor, Ph.D., L.P.    Attended the DBS class  They are interested in DBS workup - had neuropsychological evalaution  Has metal in her right knee  Is not claustrophobic  Has had mri since the knee but not clear if cleared for a 7T mri scan  Has not had motor testing yet.       Medications     7am Noon 4pm  9pm Night time   Amantadine 100mg 1  1      Aspirin 81mg     1    Buspirone buspar 10mg 1  1      Carbidopa/levodopa sinemet 25/100 3 3 3  3 As needed   4am   Docusate colace 100mg As needed        Doxylamine unisom 25mg     prn    Fluoxetine prozac 10mg 1        Fluoxetine prozac 20mg 1        Fluticasone flonase prn        Lorazepam ativan 1mg prn        Omeprazole prilosec 20mg as needed        Polyethylene gycol miralax As needed        Pramipexole mirapex 0.5mg     2    Simvastatin zocor 20mg     Not tkaing                                             Over 50% of this visit was spent in patient care and care coordination.     History obtained from patient    Total visit time was 25 minutes    PLAN  Complete dbs workup with brain mri, motor testing and meet with Dr. Concepcion  Will need to see Dr. Lowe again  Return to be determined  With me or Karina Stratton MD
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
## 2018-02-13 NOTE — NURSING NOTE
Chief Complaint   Patient presents with     RECHECK     P RETURN - MOVEMENT DISORDER     Khadra Kang MA
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
## 2018-02-13 NOTE — Clinical Note
2/13/2018       RE: Erika Diaz  [address] [facility]  [address] [place] [id number]     Dear Colleague,    Thank you for referring your patient, Erika Diaz, to the [facility] NEUROLOGY at [facility]. Please see a copy of my visit note below.    No notes on file    Again, thank you for allowing me to participate in the care of your patient.      Sincerely,    Elver Stratton MD

## 2018-02-15 DIAGNOSIS — G20.A1 PARKINSON'S DISEASE (H): Primary | ICD-10-CM

## 2018-03-06 ASSESSMENT — MOVEMENT DISORDERS SOCIETY - UNIFIED PARKINSONS DISEASE RATING SCALE (MDS-UPDRS)
FREEZING: NORMAL: NOT AT ALL (NO PROBLEMS).
HANDWRITING: NORMAL: NOT AT ALL (NO PROBLEMS).
SALIVA_AND_DROOLING: NORMAL: NOT AT ALL (NO PROBLEMS).
CHEWING_AND_SWALLOWING: SLIGHT: I AM AWARE OF SLOWNESS IN MY CHEWING OR INCREASED EFFORT AT SWALLOWING, BUT I DO NOT CHOKE OR NEED TO HAVE MY FOOD SPECIALLY PREPARED.
TURNING_IN_BED: NORMAL: NOT AT ALL (NO PROBLEMS).
TREMOR: MILD: SHAKING OR TREMOR CAUSES PROBLEMS WITH ONLY A FEW ACTIVITES.
HOBBIES_AND_OTHER_ACTIVITIES: NORMAL:  NOT AT ALL (NO PROBLEMS).
WALKING_AND_BALANCE: NORMAL: NOT AT ALL (NO PROBLEMS).
TOTAL_SCORE: 6
GETTING_OUT_OF_BED_CAR_DEEP_CHAIR: SLIGHT: I AM SLOW OR AWKWARD, BUT I USUALLY CAN DO IT ON MY FIRST TRY.
EATING_TASKS: NORMAL: NOT AT ALL (NO PROBLEMS).
HYGIENE: NORMAL: NOT AT ALL (NO PROBLEMS).
SPEECH: MILD: MY SPEECH CAUSES PEOPLE TO ASK ME TO OCCASIONALLY REPEAT MYSELF, BUT NOT EVERYDAY.
DRESSING: NORMAL: NOT AT ALL (NO PROBLEMS).

## 2018-03-12 ENCOUNTER — OFFICE VISIT (OUTPATIENT)
Dept: NEUROSURGERY | Facility: CLINIC | Age: 58
End: 2018-03-12
Payer: MEDICARE

## 2018-03-12 ENCOUNTER — OFFICE VISIT (OUTPATIENT)
Dept: NEUROLOGY | Facility: CLINIC | Age: 58
End: 2018-03-12
Payer: MEDICARE

## 2018-03-12 VITALS
DIASTOLIC BLOOD PRESSURE: 97 MMHG | SYSTOLIC BLOOD PRESSURE: 162 MMHG | HEART RATE: 85 BPM | HEIGHT: 66 IN | WEIGHT: 218.7 LBS | BODY MASS INDEX: 35.15 KG/M2

## 2018-03-12 VITALS
SYSTOLIC BLOOD PRESSURE: 162 MMHG | HEART RATE: 85 BPM | BODY MASS INDEX: 35.17 KG/M2 | WEIGHT: 218.8 LBS | OXYGEN SATURATION: 98 % | DIASTOLIC BLOOD PRESSURE: 97 MMHG | HEIGHT: 66 IN

## 2018-03-12 DIAGNOSIS — G20.A1 PARKINSON'S DISEASE (H): Primary | ICD-10-CM

## 2018-03-12 DIAGNOSIS — G20.B1 DYSKINESIA DUE TO PARKINSON'S DISEASE (H): ICD-10-CM

## 2018-03-12 ASSESSMENT — UNIFIED PARKINSONS DISEASE RATING SCALE (UPDRS)
TOTAL_SCORE_LEFT: 6
AMPLITUDE_LLE: NORMAL: NO TREMOR.
TOTAL_SCORE: 19
RIGIDITY_RUE: SLIGHT: RIGIDITY ONLY DETECTED WITH ACTIVATION MANEUVER.
RIGIDITY_LUE: SLIGHT: RIGIDITY ONLY DETECTED WITH ACTIVATION MANEUVER.
RIGIDITY_LLE: MODERATE: RIGIDITY DETECTED WITHOUT THE ACTIVATION MANEUVER. FULL RANGE OF MOTION IS ACHIEVED WITH EFFORT.
GAIT: MILD: INDEPENDENT WALKING BUT WITH SUBSTANTIAL GAIT IMPAIRMENT.
LEG_AGILITY_LEFT: MODERATE: ANY OF THE FOLLOWING:  A) MORE THAN 5 INTERRUPTIONS  OR AT LEAST ONE LONGER ARREST (FREEZE) IN ONGOING MOVEMENT  B) MODERATE SLOWING C) THE AMPLITUDE DECREMENTS STARTING AFTER THE FIRST MOVEMENT.
CONSTANCY_TREMOR_ATREST: NORMAL: NO TREMOR.
AMPLITUDE_RUE: NORMAL: NO TREMOR.
AMPLITUDE_LUE: NORMAL: NO TREMOR.
TOETAPPING_LEFT: SLIGHT: ANY OF THE FOLLOWING: A) THE REGULAR RHYTHM IS BROKEN WITH ONE WITH ONE OR TWO INTERRUPTIONS OR HESITATIONS OF THE MOVEMENT B) SLIGHT SLOWING C) THE AMPLITUDE DECREMENTS NEAR THE END OF THE 10 MOVEMENTS.
SPEECH: SLIGHT: LOSS OF MODULATION, DICTION OR VOLUME, BUT STILL ALL WORDS EASY TO UNDERSTAND.
TOETAPPING_LEFT: SEVERE: CANNOT OR CAN ONLY BARELY PERFORM THE TASK BECAUSE OF SLOWING, INTERRUPTIONS, OR DECREMENTS.
SPONTANEITY_OF_MOVEMENT: 2: MILD: MILD GLOBAL SLOWNESS AND POVERTY OF SPONTANEOUS MOVEMENTS.
POSTURAL_STABILITY: NORMAL:  RECOVERS WITH ONE OR TWO STEPS.
TOTAL_SCORE_LEFT: 21
AMPLITUDE_RLE: NORMAL: NO TREMOR.
ARISING_CHAIR: SLIGHT: ARISING IS SLOWER THAN NORMAL, OR MAY NEED MORE THAN ONE ATTEMPT, OR MAY NEED TO MOVE FORWARD IN THE CHAIR TO ARISE.  NO NEED TO USE THE ARMS OF THE CHAIR.
AMPLITUDE_RLE: NORMAL: NO TREMOR.
AMPLITUDE_RUE: NORMAL: NO TREMOR.
SPONTANEITY_OF_MOVEMENT: 1: SLIGHT: SLIGHT GLOBAL SLOWNESS AND POVERTY OF SPONTANEOUS MOVEMENTS.
RIGIDITY_LLE: NORMAL
HANDMOVEMENTS_RIGHT: SLIGHT: ANY OF THE FOLLOWING: A) THE REGULAR RHYTHM IS BROKEN WITH ONE WITH ONE OR TWO INTERRUPTIONS OR HESITATIONS OF THE MOVEMENT B) SLIGHT SLOWING C) THE AMPLITUDE DECREMENTS NEAR THE END OF THE 10 MOVEMENTS.
LEG_AGILITY_RIGHT: SLIGHT: ANY OF THE FOLLOWING: A) THE REGULAR RHYTHM IS BROKEN WITH ONE WITH ONE OR TWO INTERRUPTIONS OR HESITATIONS OF THE MOVEMENT B) SLIGHT SLOWING C) THE AMPLITUDE DECREMENTS NEAR THE END OF THE 10 MOVEMENTS.
FACIAL_EXPRESSION: MODERATE: MASKED FACIES WITH LIPS PARTED SOME OF THE TIME WHEN THE MOUTH IS AT REST.
PRONATION_SUPINATION_LEFT: SLIGHT: ANY OF THE FOLLOWING: A) THE REGULAR RHYTHM IS BROKEN WITH ONE WITH ONE OR TWO INTERRUPTIONS OR HESITATIONS OF THE MOVEMENT B) SLIGHT SLOWING C) THE AMPLITUDE DECREMENTS NEAR THE END OF THE 10 MOVEMENTS.
TOTAL_SCORE: 7
PARKINSONS_MEDS: ON
AMPLITUDE_LIP_JAW: NORMAL: NO TREMOR.
TOTAL_SCORE: 44
RIGIDITY_RLE: NORMAL
FINGER_TAPPING_RIGHT: SLIGHT: ANY OF THE FOLLOWING: A) THE REGULAR RHYTHM IS BROKEN WITH ONE WITH ONE OR TWO INTERRUPTIONS OR HESITATIONS OF THE MOVEMENT B) SLIGHT SLOWING C) THE AMPLITUDE DECREMENTS NEAR THE END OF THE 10 MOVEMENTS.
RIGIDITY_NECK: SLIGHT: RIGIDITY ONLY DETECTED WITH ACTIVATION MANEUVER.
FREEZING_GAIT: NORMAL
POSTURE: 1 SLIGHT.  NOT QUITE ERECT BUT COULD BE NORMAL FOR OLDER PERSONS.
FINGER_TAPPING_RIGHT: MILD: ANY OF THE FOLLOWING: A) 3 TO 5 INTERRUPTIONS DURING TAPPING B) MILD SLOWING C) THE AMPLITUDE DECREMENTS MIDWAY IN THE 10-MOVEMENT SEQUENCE
HANDMOVEMENTS_RIGHT: SLIGHT: ANY OF THE FOLLOWING: A) THE REGULAR RHYTHM IS BROKEN WITH ONE WITH ONE OR TWO INTERRUPTIONS OR HESITATIONS OF THE MOVEMENT B) SLIGHT SLOWING C) THE AMPLITUDE DECREMENTS NEAR THE END OF THE 10 MOVEMENTS.
TOETAPPING_RIGHT: SLIGHT: ANY OF THE FOLLOWING: A) THE REGULAR RHYTHM IS BROKEN WITH ONE WITH ONE OR TWO INTERRUPTIONS OR HESITATIONS OF THE MOVEMENT B) SLIGHT SLOWING C) THE AMPLITUDE DECREMENTS NEAR THE END OF THE 10 MOVEMENTS.
AMPLITUDE_LIP_JAW: NORMAL: NO TREMOR.
RIGIDITY_NECK: MILD: RIGIDITY DETECTED WITHOUT THE ACTIVATION MANEUVER.  FULL RANGE OF MOTION IS EASILY ACHIEVED.
FINGER_TAPPING_LEFT: SLIGHT: ANY OF THE FOLLOWING: A) THE REGULAR RHYTHM IS BROKEN WITH ONE WITH ONE OR TWO INTERRUPTIONS OR HESITATIONS OF THE MOVEMENT B) SLIGHT SLOWING C) THE AMPLITUDE DECREMENTS NEAR THE END OF THE 10 MOVEMENTS.
FINGER_TAPPING_LEFT: MODERATE: ANY OF THE FOLLOWING:  A) MORE THAN 5 INTERRUPTIONS  OR AT LEAST ONE LONGER ARREST (FREEZE) IN ONGOING MOVEMENT  B) MODERATE SLOWING C) THE AMPLITUDE DECREMENTS STARTING AFTER THE FIRST MOVEMENT.
GAIT: SLIGHT: INDEPENDENT WALKING WITH MINOR GAIT IMPAIRMENT.
RIGIDITY_LUE: MILD: RIGIDITY DETECTED WITHOUT THE ACTIVATION MANEUVER.  FULL RANGE OF MOTION IS EASILY ACHIEVED.
CONSTANCY_TREMOR_ATREST: NORMAL: NO TREMOR.
PRONATION_SUPINATION_RIGHT: SLIGHT: ANY OF THE FOLLOWING: A) THE REGULAR RHYTHM IS BROKEN WITH ONE WITH ONE OR TWO INTERRUPTIONS OR HESITATIONS OF THE MOVEMENT B) SLIGHT SLOWING C) THE AMPLITUDE DECREMENTS NEAR THE END OF THE 10 MOVEMENTS.
AMPLITUDE_LUE: NORMAL: NO TREMOR.
HANDMOVEMENTS_LEFT: MODERATE: ANY OF THE FOLLOWING:  A) MORE THAN 5 INTERRUPTIONS  OR AT LEAST ONE LONGER ARREST (FREEZE) IN ONGOING MOVEMENT  B) MODERATE SLOWING C) THE AMPLITUDE DECREMENTS STARTING AFTER THE FIRST MOVEMENT.
FACIAL_EXPRESSION: MILD: IN ADDITION TO DECREASED EYE-BLINK FREQUENCY, MASKED FACIES PRESENT IN THE LOWER FACE AS WELL, NAMELY FEWER MOVEMENTS AROUND THE MOUTH, SUCH AS LESS SPONTANEOUS SMILING, BUT LIPS NOT PARTED.
RIGIDITY_RLE: NORMAL
AMPLITUDE_LLE: NORMAL: NO TREMOR.
LEG_AGILITY_RIGHT: SLIGHT: ANY OF THE FOLLOWING: A) THE REGULAR RHYTHM IS BROKEN WITH ONE WITH ONE OR TWO INTERRUPTIONS OR HESITATIONS OF THE MOVEMENT B) SLIGHT SLOWING C) THE AMPLITUDE DECREMENTS NEAR THE END OF THE 10 MOVEMENTS.
PRONATION_SUPINATION_RIGHT: SLIGHT: ANY OF THE FOLLOWING: A) THE REGULAR RHYTHM IS BROKEN WITH ONE WITH ONE OR TWO INTERRUPTIONS OR HESITATIONS OF THE MOVEMENT B) SLIGHT SLOWING C) THE AMPLITUDE DECREMENTS NEAR THE END OF THE 10 MOVEMENTS.
ARISING_CHAIR: SLIGHT: ARISING IS SLOWER THAN NORMAL, OR MAY NEED MORE THAN ONE ATTEMPT, OR MAY NEED TO MOVE FORWARD IN THE CHAIR TO ARISE.  NO NEED TO USE THE ARMS OF THE CHAIR.
HANDMOVEMENTS_LEFT: SLIGHT: ANY OF THE FOLLOWING: A) THE REGULAR RHYTHM IS BROKEN WITH ONE WITH ONE OR TWO INTERRUPTIONS OR HESITATIONS OF THE MOVEMENT B) SLIGHT SLOWING C) THE AMPLITUDE DECREMENTS NEAR THE END OF THE 10 MOVEMENTS.
TOETAPPING_RIGHT: SLIGHT: ANY OF THE FOLLOWING: A) THE REGULAR RHYTHM IS BROKEN WITH ONE WITH ONE OR TWO INTERRUPTIONS OR HESITATIONS OF THE MOVEMENT B) SLIGHT SLOWING C) THE AMPLITUDE DECREMENTS NEAR THE END OF THE 10 MOVEMENTS.
TOTAL_SCORE: 5
AXIAL_SCORE: 8
POSTURE: 1 SLIGHT.  NOT QUITE ERECT BUT COULD BE NORMAL FOR OLDER PERSONS.
PARKINSONS_MEDS: OFF
RIGIDITY_RUE: NORMAL
PRONATION_SUPINATION_LEFT: MILD: ANY OF THE FOLLOWING: A) 3 TO 5 INTERRUPTIONS DURING TAPPING B) MILD SLOWING C) THE AMPLITUDE DECREMENTS MIDWAY IN THE 10-MOVEMENT SEQUENCE
AXIAL_SCORE: 16
FREEZING_GAIT: NORMAL
LEG_AGILITY_LEFT: SLIGHT: ANY OF THE FOLLOWING: A) THE REGULAR RHYTHM IS BROKEN WITH ONE WITH ONE OR TWO INTERRUPTIONS OR HESITATIONS OF THE MOVEMENT B) SLIGHT SLOWING C) THE AMPLITUDE DECREMENTS NEAR THE END OF THE 10 MOVEMENTS.
POSTURAL_STABILITY: MODERATE: STANDS SAFELY, BUT WITH ABSENCE OF POSTURAL RESPONSE,  FALLS IF NOT CAUGHT BY EXAMINER.
SPEECH: MILD: LOSS OF MODULATION, DICTION OR VOLUME, WITH A FEW WORDS UNCLEAR, BUT THE OVERALL SENTENCES EASY TO FOLLOW.

## 2018-03-12 ASSESSMENT — PAIN SCALES - GENERAL
PAINLEVEL: MODERATE PAIN (4)
PAINLEVEL: MODERATE PAIN (4)

## 2018-03-12 NOTE — PROGRESS NOTES
"PATIENT: Erika Diaz    : 1960    GINNY: 2018    REASON FOR VISIT:  Pre-DBS ON/OFF motor symptom evaluation.      HPI: Ms. Erika Diaz is a 57 year old right - handed  female who came to the [facility] neurology clinic accompanied by her , Zane, for ON/OFF motor evaluation as part of Deep Brain Stimulation surgery work-up for management of Parkinson's disease.     She was diagnosed with Idiopathic Parkinson's disease in  - . Her symptom started with stiffness in left hand.  She was holding her arm \"weird\" & thought she might have stroke.    When asked about her DBS goals, her  started responding.  When pt was asked directly, she had a hard time providing specific goals & said, \"I want to feel better. I want to be more normal without taking the medication.\"  She gave an example of how her medication stop working while at work & \"it's like turning the switch off.\" She would like to improve wearing off stiffness, difficulty walking, & foggy thinking, which all get better when she is ON.   reports that \"she would like to take less medication.\"  She agreed & stated that she would like to improve nausea & dyskinesias, which are the side effects of her medications.  She also wanted to be able to dance like she did when she was younger.  Currently, she is able to dance occasionally when her medications are working.     When she was asked about the risk of DBS surgery, she stated, \"stroke & infection.\"  She acknowledges that it would take longer to optimize programing & the need to return to clinic frequently.      As far as her mood & Dr. Hernandez's note, pt &  report that they're not too concerned about it.   reports that she doesn't have bipolar.  Her memory is better than his.  She sees Aimee Lowe, psychologist.  She has a dog for emotional therapy.  Pt &  report that she is psychologically stable to go through the surgery.      Current " "antiparkinsonian medication:   PD Medications 7 am 12 pm 4 pm 9 pm   Sinemet 25/100 mg  3 3 3 3   Amantadine 100 mg  1  1    Pramipexole 0.5 mg    1       Last dose of antiparkinsonian medication was taken:   Sinemet -- 3/12 at 4 pm   Amantadine -- 38 at 4 pm  Pramipexole -- 3/10 at 9 pm      In clinic, OFF motor exam was completed and patient took Sinemet 25/100 mg 3 tabs & Amantadine 100 mg 1 at 8 am.  After 60 minutes, ON motor exam was completed.    Physical Exam:    Vital Signs:  Blood pressure (!) 162/97, pulse 85, height 1.676 m (5' 6\"), weight 99.2 kg (218 lb 12.8 oz), SpO2 98 %.  Body mass index is 35.32 kg/(m^2).    MDS Part II  -- Total Score: 6     -- Over the last week -- 0: Normal -- 1: Slight -- 2: Mild -- 3: Moderate -- 4: Severe     2.1 Speech: 2   2.2 Saliva and droolin   2.3 Chewing and swallowin   2.4 Eating tasks: 0   2.5 Dressin   2.6 Hygiene:  0   2.7 Handwritin   2.8 Doing hobbies and other activities:  0   2.9 Turning in bed:  0   2.10 Tremor:  2   2.11 Getting out of bed/car/deep chair:   1   2.12 Walking and balance: 0   2.13 Freezin     Total:    6        UPDRS Values 3/12/2018 3/12/2018   Time: 8:00 AM 9:00 AM   Medication Off On   R Brain DBS: None None   L Brain DBS: None None   Speech 2 1   Facial Expression 3 2   Rigidity Neck 2 1   Rigidity RUE 1 0   Rigidity LUE 2 1   Rigidity RLE 0 0   Rigidity LLE 3 0   Finger Taps R 2 1   Finger Taps L 3 1   Hand Mvt R 1 1   Hand Mvt L 3 1   Pron-/Supinate R 1 1   Pron-/Supinate L 2 1   Toe Tap R 1 1   Toe Tap L 4 1   Leg Agility R 1 1   Leg Agility L 3 1   Arise From Chair 1 1   Gait 2 1   Gait Freezing 0 0   Postural Stability 3 0   Posture 1 1   Global Spont Mvt 2 1   Postural Tremor RUE 0 0   Postural Tremor LUE 0 0   Kinetic Tremor RUE 0 0   Kinetic Tremor LUE 1 0   Rest Tremor RUE 0 0   Rest Tremor LUE 0 0   Rest Tremor RLE 0 0   Rest Tremor LLE 0 0   Rest Tremor Lip/Jaw 0 0   Rest Tremor Constancy 0 0   Total Right " 7 5   Total Left 21 6   Axial Total 16 8   Total 44 19     MOTOR TEST: UPDRS Flowsheet was completed in Epic. Exam was videotaped.   Total OFF score = 44  Total ON score = 19    Percentage: 44 - 19 = 25 --> 25 ÷ 44 = 56.8 = 57% motor improvement.    NOTE:  She had mild body dyskinesias affecting her neck & extremities.  This became worse with mental activation.     ASSESSMENT/PLAN:    Parkinson's Disease: Ms. Diaz is a 57 year old right - handed female with about 10 year history of Idiopathic Parkinson's disease who came to the clinic for ON/OFF motor evaluation as part of her Deep Brain Stimulation surgery work-up.    __  Pt had some knowledge about DBS.       __  I discussed some of her unrealistic expectations like being able to go back to dancing like she did when she was young  . . .    __  I also discussed the concern about her neuropsychological evaluation.  We might need to get a support letter form her psychologist.     __  I briefly went over DBS risks, & benefits; the possibility of 1 - 3 % serious side effects including infection, bleeding, stroke, cognitive & speech impairment, seizures, . . . . & death; the possibility of lead misplacement; appropriate DBS candidates; and DBS programming & the need to return to clinic for reprogramming.  All questions were answered.    __  She was informed that we'll contact her after the DBS team meets & discusses her work-up. She understands the plan.    The total time spent with the patient in ON/OFF motor evaluation & discussing about PD symptoms & DBS surgery was 120 minutes and greater than 50% of the time was spent in counseling & coordination of care.    GUILLERMINA Krueger, CNP   [facility]

## 2018-03-12 NOTE — LETTER
3/12/2018       RE: Erika Diaz  [address] [facility]  [address] [place] [id number]     Dear Colleague,    Thank you for referring your patient, Erika Diaz, to the [facility] NEUROSURGERY at [facility]. Please see a copy of my visit note below.    HISTORY AND PHYSICAL EXAM    Chief Complaint   Patient presents with     Consult     Deep Brain Stimulation; Parkinson's Disease       HISTORY OF PRESENT ILLNESS  We saw Ms. Erika Diaz today in Neurosurgery Clinic as part of her work-up for Deep Brain Stimulation.  She is a 57 year old woman with a 9-10 year history of Parkinson s disease, being diagnosed in [id number].  She first developed stiffness in her left hand and shoulder that has since progressed to include tremors.  Her symptoms are worse on the left-side.  Her goals with Deep Brain Stimulation are to gain tremor control and reduce dyskinesias.  She wants to feel better, be more normal without taking the medications, improve wearing off stiffness, difficulty walking and foggy thinking.  Medication reduction is important.  She does have regular sessions with a chiropractor to relieve tension in her neck.  She is currently undergoing DBS candidacy evaluation.  She has had her ON/OFF testing and neuropsych evaluation.  She is also scheduled for MRI brain.      Past Medical History:   Diagnosis Date     Degenerative joint disease 11/7/2017     Encounter for neuropsychological testing 12/20/2017    Current results indicate variability in attention and memory, ranging from moderately impaired to superior, in some cases with greater difficulty on less complex tasks. Basic language, visual processing, and executive functioning fall within normal limits. Personality assessment is suggestive of somatization, and also raises the possibility of a thought disorder as well as a history of manic episo     Former smoker quit 2009 11/7/2017     History of colonic polyps 11/7/2017      HTN (hypertension) 11/7/2017     Hypercholesterolemia 11/7/2017     Lactose intolerance in adult 11/7/2017     Migraines      Obesity 11/7/2017     Other nervous system complications      PID (pelvic inflammatory disease) due to IUD 11/7/2017     Pulmonary emboli (H) - coumadin lovenox 11/7/2017       Past Surgical History:   Procedure Laterality Date     adhesioloysis       CHOLECYSTECTOMY       COLONOSCOPY       JOINT REPLACEMENT Right     right partial knee replacement     LAPAROSCOPY      adhesioloysis     LAPAROSCOPY      supracervical hysterectomy     LAPAROTOMY EXPLORATORY Left     partial removal of left ovary     REMOVE INTRAUTERINE DEVICE  2006     salpingoopherectomy         Family History   Problem Relation Age of Onset     Breast Cancer Mother        Social History     Social History     Marital status:      Spouse name: N/A     Number of children: N/A     Years of education: N/A     Occupational History     Not on file.     Social History Main Topics     Smoking status: Former Smoker     Packs/day: 0.50     Years: 20.00     Types: Cigarettes     Smokeless tobacco: Never Used      Comment: quit smoking 2009     Alcohol use No     Drug use: No     Sexual activity: Not Currently     Partners: Male     Birth control/ protection: None     Other Topics Concern     Parent/Sibling W/ Cabg, Mi Or Angioplasty Before 65f 55m? No     Social History Narrative    . lives in [place]. Zane Diaz spouse          Allergies   Allergen Reactions     Atorvastatin      Other reaction(s): Myalgia     Codeine Itching       Current Outpatient Prescriptions   Medication     amantadine (SYMMETREL) 100 MG capsule     docusate sodium (COLACE) 100 MG capsule     omeprazole (PRILOSEC) 20 MG CR capsule     pramipexole (MIRAPEX) 0.5 MG tablet     busPIRone (BUSPAR) 10 MG tablet     aspirin EC 81 MG EC tablet     carbidopa-levodopa (SINEMET)  MG per tablet     polyethylene glycol (MIRALAX/GLYCOLAX) powder      FLUoxetine (PROZAC) 10 MG capsule     FLUoxetine (PROZAC) 20 MG capsule     doxylamine (UNISOM) 25 MG TABS tablet     fluticasone (FLONASE) 50 MCG/ACT spray     LORazepam (ATIVAN) 1 MG tablet     No current facility-administered medications for this visit.          REVIEW OF SYSTEMS:  General: Negative for chills/sweats/fever, difficulty sleeping, headache, recent fatigue, or weight gain/loss.  Eyes: Negative for blurred vision, crossed eyes, double vision, recent eye infections, vision flashes, or vision halos.  Ears/Nose/Mouth/Throat: Negative for bleeding gums, difficulty swallowing, earache, ear discharge, hearing loss, hoarseness, nosebleeds, tinnitus, or sinus problems.  Respiratory:Negative for chronic cough, coughing blood, night sweats, shortness of breath, Tuberculosis, or wheezing.  Cardiovascular: Negative for chest pain, dyspnea at night, heart murmur, palpitations, pacemaker, pacemaker, poor circulation, swollen legs/feet, or varicose veins.  Gastrointestinal: Negative for melena, hematochezia, chronic diarrhea, heartburn, Hepatitis A/B/C, increasing constipation, Liver Disease, nausea, or vomiting.   Genitourinary: Negative for Urinary retention, genital discharge, urinary incontinence, prostate problems, urgency, or UTI.   Neurological: Negative for syncope, headaches, numbness of arms/legs, tingling in hands/arms/legs, memory problems, or seizures.  Psychological: Negative for anxiety, depression, panic attacks, or restlessness.  Skin: Negative for chronic skin itching, color changes in hand/feet when cold, poor scarring, non-healing ulcers, skin rashes/hives, unusual moles.  Musculoskeletal: Negative for arthritis, joint swelling in hands/wrists/hips/knees/joints, muscle tenderness in arms/legs, or osteoporosis.  Endocrine: Negative for excessive thirst/hunger, intolerance for warm rooms, loss of libido, multiple broken bones, rapid weight gain/loss, galactorrhea, or thyroid  "issues.  Hematologic/Lymphatic: Negative for easy skin bruising, significant fatigue, prolonged bleeding, tender glands/lymph nodes.  Allergies: Negative for asthma or hay fever.      PHYSICAL EXAM  BP (!) 162/97 (BP Location: Right arm, Patient Position: Sitting, Cuff Size: Adult Large)  Pulse 85  Ht 1.676 m (5' 6\")  Wt 99.2 kg (218 lb 11.1 oz)  BMI 35.3 kg/m2    General: Awake, alert, oriented. Well nourished, well developed, no acute distress.  HEENT: Head normocephalic, atraumatic. No carotid bruit. Neck supple. Good range of motion. No palpable thyroid mass.  Heart: Regular rhythm and rate. No murmurs.  Lungs: Clear to auscultation and percussion bilaterally. No rhonchi, rales or wheeze.  Abdomen: Soft, non-tender, non-distended. No hepatosplenomegaly.  Extremity: Warm with no clubbing or cyanosis. No lower extremity edema.    Neurological:  Awake, alert and oriented to date, time, place and person. Speech fluent.   Pupils equal, round, reactive to light.  Extraocular movement intact.  Visual fields are full on confrontation.  Hearing is grossly normal to finger rub.   Facial sensation intact.  Face symmetric.  Tongue midline.  Uvula elevates equally.    Motor: full strength throughout.  Sensation: intact to light touch and pinpoint.  Deep tendon reflexes: Trace throughout. Negative for clonus. Negative for Ashraf's sign. No dysmetria.      ASSESSMENT   57 year old right handed female with a history of Parkinson's disease.    Patient is undergoing DBS candidacy evaluation.  Her symptom is worse on the left side so right side implantation should be considered first.  She may also be a wait and see candidate.  In terms of target, STN or GPi may be appropriate but if she is desiring less medication, STN may be better.  However, if stiffness and dyskinesia GPi could be considered.  We will have to discuss the target location.    There are no images for me to review at this time so anatomical considerations for " the surgery cannot be determined.  She is scheduled for an MRI soon.    Medically, there is no significant contraindication.  She has a history of PE which was treated with Coumadin.  She is not on any blood thinners at this time.    During today's visit, we discussed both phase I and II of DBS surgery. We discussed that during Phase I, we would place the DBS electrode on the right side under MAC and microelectrode recording.  She would then return one week later for the phase II with placement of the DBS generator/battery over the right chest wall under general anesthesia.  If she is a unilateral candidate or wait and see strategy candidate, then she will undergo another evaluation/discussion prior to proceeding to the left side implantation.  If  she is a bilateral candidate in a staged fashion, she would return three weeks later for the phase I and II combined for the placement of the DBS electrode on the left side under MAC and microelectrode recording followed by connection to the previously implanted generator/battery.     Briefly, following topic was discussed.    [employer]  MRI compatible.  Non-rechargeable and rechargeable generator/battery available.  4 contact electrode.  Communication method: have the  or patient controller on the skin directly over the generator/battery.    [employer]  Not yet MRI compatible.  Will become MRI compatible with retro-approval when FDA approves the device for MRI use.  Non-rechargeable generator/battery.  9 contact segmented/directional electrode.  Communication method: Wireless/bluetooth.    [employer]  Not MRI compatible.  Working on generator/battery that will be MRI compatible.  If patient gets an implant and needs an MRI in the future, when the device becomes available, patient can have the upgrade.  Rechargeable generator/battery.  8 contact electrode.  Independent current control at each contacts.  Communication method: Wireless.    I did discuss that  the implant technique for these devices are relatively the same which was discussed again.  They all have similar risks associated with the surgery.     Risks, benefits, alternative therapies were discussed with the patient, including but not limited to infection and bleeding (intracranial), injury to the brain, stroke, death, hardware failure and possible need for more surgeries.  Surgical procedure was discussed in detail.  I also discussed possible use of NASRIN for neuronavigation.  All questions were answered, and she expressed understanding.  A full history and physical exam was performed during today's visit.  Her case will be discussed at the Movement Disorder Consensus Group Meeting to determine whether she is a good candidate for DBS surgery.      PLAN:  1. We will discuss her case at the Movement Disorder Consensus Group Meeting, and we will contact her regarding her DBS candidacy.     I, Ramiro Khan, am serving as a scribe to document services personally performed by Jerry Concepcion MD, PhD, based upon my observations and the provider's statements to me. All documentation has been reviewed and edited by the aforementioned doctor prior to being entered into the official medical record.    I, Jerry Concepcion, attest that above named individual is acting in scribe capacity, has observed my performance of the services and has documented them in accordance with my direction. The documentation recorded by the scribe accurately reflects the service I personally performed and the decisions made by me. The document was also partially recorded by me and the entire document was edited by me as well.       65 minutes were spent face to face with the patient of which more than 50% of the time was spent counseling and discussing the above issues regarding diagnosis, surgical and device options, and steps for further evaluation.    Again, thank you for allowing me to participate in the care of your patient.       Sincerely,    Jerry Concepcion MD

## 2018-03-12 NOTE — PROGRESS NOTES
HISTORY AND PHYSICAL EXAM    Chief Complaint   Patient presents with     Consult     Deep Brain Stimulation; Parkinson's Disease       HISTORY OF PRESENT ILLNESS  We saw Ms. Erika Diaz today in Neurosurgery Clinic as part of her work-up for Deep Brain Stimulation.  She is a 57 year old woman with a 9-10 year history of Parkinson s disease, being diagnosed in [id number].  She first developed stiffness in her left hand and shoulder that has since progressed to include tremors.  Her symptoms are worse on the left-side.  Her goals with Deep Brain Stimulation are to gain tremor control and reduce dyskinesias.  She wants to feel better, be more normal without taking the medications, improve wearing off stiffness, difficulty walking and foggy thinking.  Medication reduction is important.  She does have regular sessions with a chiropractor to relieve tension in her neck.  She is currently undergoing DBS candidacy evaluation.  She has had her ON/OFF testing and neuropsych evaluation.  She is also scheduled for MRI brain.      Past Medical History:   Diagnosis Date     Degenerative joint disease 11/7/2017     Encounter for neuropsychological testing 12/20/2017    Current results indicate variability in attention and memory, ranging from moderately impaired to superior, in some cases with greater difficulty on less complex tasks. Basic language, visual processing, and executive functioning fall within normal limits. Personality assessment is suggestive of somatization, and also raises the possibility of a thought disorder as well as a history of manic episo     Former smoker quit 2009 11/7/2017     History of colonic polyps 11/7/2017     HTN (hypertension) 11/7/2017     Hypercholesterolemia 11/7/2017     Lactose intolerance in adult 11/7/2017     Migraines      Obesity 11/7/2017     Other nervous system complications      PID (pelvic inflammatory disease) due to IUD 11/7/2017     Pulmonary emboli (H) - coumadin lovenox  11/7/2017       Past Surgical History:   Procedure Laterality Date     adhesioloysis       CHOLECYSTECTOMY       COLONOSCOPY       JOINT REPLACEMENT Right     right partial knee replacement     LAPAROSCOPY      adhesioloysis     LAPAROSCOPY      supracervical hysterectomy     LAPAROTOMY EXPLORATORY Left     partial removal of left ovary     REMOVE INTRAUTERINE DEVICE  2006     salpingoopherectomy         Family History   Problem Relation Age of Onset     Breast Cancer Mother        Social History     Social History     Marital status:      Spouse name: N/A     Number of children: N/A     Years of education: N/A     Occupational History     Not on file.     Social History Main Topics     Smoking status: Former Smoker     Packs/day: 0.50     Years: 20.00     Types: Cigarettes     Smokeless tobacco: Never Used      Comment: quit smoking 2009     Alcohol use No     Drug use: No     Sexual activity: Not Currently     Partners: Male     Birth control/ protection: None     Other Topics Concern     Parent/Sibling W/ Cabg, Mi Or Angioplasty Before 65f 55m? No     Social History Narrative    . lives in [place]. Zane Diaz spouse          Allergies   Allergen Reactions     Atorvastatin      Other reaction(s): Myalgia     Codeine Itching       Current Outpatient Prescriptions   Medication     amantadine (SYMMETREL) 100 MG capsule     docusate sodium (COLACE) 100 MG capsule     omeprazole (PRILOSEC) 20 MG CR capsule     pramipexole (MIRAPEX) 0.5 MG tablet     busPIRone (BUSPAR) 10 MG tablet     aspirin EC 81 MG EC tablet     carbidopa-levodopa (SINEMET)  MG per tablet     polyethylene glycol (MIRALAX/GLYCOLAX) powder     FLUoxetine (PROZAC) 10 MG capsule     FLUoxetine (PROZAC) 20 MG capsule     doxylamine (UNISOM) 25 MG TABS tablet     fluticasone (FLONASE) 50 MCG/ACT spray     LORazepam (ATIVAN) 1 MG tablet     No current facility-administered medications for this visit.          REVIEW OF  SYSTEMS:  General: Negative for chills/sweats/fever, difficulty sleeping, headache, recent fatigue, or weight gain/loss.  Eyes: Negative for blurred vision, crossed eyes, double vision, recent eye infections, vision flashes, or vision halos.  Ears/Nose/Mouth/Throat: Negative for bleeding gums, difficulty swallowing, earache, ear discharge, hearing loss, hoarseness, nosebleeds, tinnitus, or sinus problems.  Respiratory:Negative for chronic cough, coughing blood, night sweats, shortness of breath, Tuberculosis, or wheezing.  Cardiovascular: Negative for chest pain, dyspnea at night, heart murmur, palpitations, pacemaker, pacemaker, poor circulation, swollen legs/feet, or varicose veins.  Gastrointestinal: Negative for melena, hematochezia, chronic diarrhea, heartburn, Hepatitis A/B/C, increasing constipation, Liver Disease, nausea, or vomiting.   Genitourinary: Negative for Urinary retention, genital discharge, urinary incontinence, prostate problems, urgency, or UTI.   Neurological: Negative for syncope, headaches, numbness of arms/legs, tingling in hands/arms/legs, memory problems, or seizures.  Psychological: Negative for anxiety, depression, panic attacks, or restlessness.  Skin: Negative for chronic skin itching, color changes in hand/feet when cold, poor scarring, non-healing ulcers, skin rashes/hives, unusual moles.  Musculoskeletal: Negative for arthritis, joint swelling in hands/wrists/hips/knees/joints, muscle tenderness in arms/legs, or osteoporosis.  Endocrine: Negative for excessive thirst/hunger, intolerance for warm rooms, loss of libido, multiple broken bones, rapid weight gain/loss, galactorrhea, or thyroid issues.  Hematologic/Lymphatic: Negative for easy skin bruising, significant fatigue, prolonged bleeding, tender glands/lymph nodes.  Allergies: Negative for asthma or hay fever.      PHYSICAL EXAM  BP (!) 162/97 (BP Location: Right arm, Patient Position: Sitting, Cuff Size: Adult Large)  Pulse 85  " Ht 1.676 m (5' 6\")  Wt 99.2 kg (218 lb 11.1 oz)  BMI 35.3 kg/m2    General: Awake, alert, oriented. Well nourished, well developed, no acute distress.  HEENT: Head normocephalic, atraumatic. No carotid bruit. Neck supple. Good range of motion. No palpable thyroid mass.  Heart: Regular rhythm and rate. No murmurs.  Lungs: Clear to auscultation and percussion bilaterally. No rhonchi, rales or wheeze.  Abdomen: Soft, non-tender, non-distended. No hepatosplenomegaly.  Extremity: Warm with no clubbing or cyanosis. No lower extremity edema.    Neurological:  Awake, alert and oriented to date, time, place and person. Speech fluent.   Pupils equal, round, reactive to light.  Extraocular movement intact.  Visual fields are full on confrontation.  Hearing is grossly normal to finger rub.   Facial sensation intact.  Face symmetric.  Tongue midline.  Uvula elevates equally.    Motor: full strength throughout.  Sensation: intact to light touch and pinpoint.  Deep tendon reflexes: Trace throughout. Negative for clonus. Negative for Ashraf's sign. No dysmetria.      ASSESSMENT   57 year old right handed female with a history of Parkinson's disease.    Patient is undergoing DBS candidacy evaluation.  Her symptom is worse on the left side so right side implantation should be considered first.  She may also be a wait and see candidate.  In terms of target, STN or GPi may be appropriate but if she is desiring less medication, STN may be better.  However, if stiffness and dyskinesia GPi could be considered.  We will have to discuss the target location.    There are no images for me to review at this time so anatomical considerations for the surgery cannot be determined.  She is scheduled for an MRI soon.    Medically, there is no significant contraindication.  She has a history of PE which was treated with Coumadin.  She is not on any blood thinners at this time.    During today's visit, we discussed both phase I and II of DBS " surgery. We discussed that during Phase I, we would place the DBS electrode on the right side under MAC and microelectrode recording.  She would then return one week later for the phase II with placement of the DBS generator/battery over the right chest wall under general anesthesia.  If she is a unilateral candidate or wait and see strategy candidate, then she will undergo another evaluation/discussion prior to proceeding to the left side implantation.  If she is a bilateral candidate in a staged fashion, she would return three weeks later for the phase I and II combined for the placement of the DBS electrode on the left side under MAC and microelectrode recording followed by connection to the previously implanted generator/battery.     Briefly, following topic was discussed.    [employer]  MRI compatible.  Non-rechargeable and rechargeable generator/battery available.  4 contact electrode.  Communication method: have the  or patient controller on the skin directly over the generator/battery.    [employer]  Not yet MRI compatible.  Will become MRI compatible with retro-approval when FDA approves the device for MRI use.  Non-rechargeable generator/battery.  9 contact segmented/directional electrode.  Communication method: Wireless/bluetooth.    [employer]  Not MRI compatible.  Working on generator/battery that will be MRI compatible.  If patient gets an implant and needs an MRI in the future, when the device becomes available, patient can have the upgrade.  Rechargeable generator/battery.  8 contact electrode.  Independent current control at each contacts.  Communication method: Wireless.    I did discuss that the implant technique for these devices are relatively the same which was discussed again.  They all have similar risks associated with the surgery.     Risks, benefits, alternative therapies were discussed with the patient, including but not limited to infection and bleeding (intracranial), injury  to the brain, stroke, death, hardware failure and possible need for more surgeries.  Surgical procedure was discussed in detail.  I also discussed possible use of NASRIN for neuronavigation.  All questions were answered, and she expressed understanding.  A full history and physical exam was performed during today's visit.  Her case will be discussed at the Movement Disorder Consensus Group Meeting to determine whether she is a good candidate for DBS surgery.      PLAN:  1. We will discuss her case at the Movement Disorder Consensus Group Meeting, and we will contact her regarding her DBS candidacy.     I, Ramiro Khan, am serving as a scribe to document services personally performed by Jerry Concepcion MD, PhD, based upon my observations and the provider's statements to me. All documentation has been reviewed and edited by the aforementioned doctor prior to being entered into the official medical record.    I, Jerry Concepcion, attest that above named individual is acting in scribe capacity, has observed my performance of the services and has documented them in accordance with my direction. The documentation recorded by the scribe accurately reflects the service I personally performed and the decisions made by me. The document was also partially recorded by me and the entire document was edited by me as well.       65 minutes were spent face to face with the patient of which more than 50% of the time was spent counseling and discussing the above issues regarding diagnosis, surgical and device options, and steps for further evaluation.

## 2018-03-12 NOTE — MR AVS SNAPSHOT
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 Erika Diaz,    Thank you for coming today.  During your visit, we have discussed the following:     __  You have completed the ON/OFF Motor Assessment.    __  Continue with your DBS workup appointments.  __  Once you're done with your workup, we'll discuss you at the DBS Consensus meeting & will let you know the decision.  __  You may contact us with any question.       For questions, you may send us a HERMEL DELOR message or call [phone]    Fax number: [phone]    GUILLERMINA Krueger, CNP  [facility]               Follow-ups after your visit        Your next 10 appointments already scheduled     Mar 19, 2018 12:00 PM CDT   (Arrive by 11:45 AM)   MR BRAIN W/O & W CONTRAST with [id number]   [facility]    [address] [address]   [phone]           Take your medicines as usual, unless your doctor tells you not to. Bring a list of your current medicines to your exam (including vitamins, minerals and over-the-counter drugs).  You may or may not receive intravenous (IV) contrast for this exam pending the discretion of the Radiologist.  You do not need to do anything special to prepare.  The MRI machine uses a strong magnet. Please wear clothes without metal (snaps, zippers). A sweatsuit works well, or we may give you a hospital gown.  Please remove any body piercings and hair extensions before you arrive. You will also remove watches, jewelry, hairpins, wallets, dentures, partial dental plates and  hearing aids. You may wear contact lenses, and you may be able to wear your rings. We have a safe place to keep your personal items, but it is safer to leave them at home.  **IMPORTANT** THE INSTRUCTIONS BELOW ARE ONLY FOR THOSE PATIENTS WHO HAVE BEEN PRESCRIBED SEDATION OR GENERAL ANESTHESIA DURING THEIR MRI PROCEDURE:  IF YOUR DOCTOR PRESCRIBED ORAL SEDATION (take medicine to help you relax during your exam):   You must get the medicine from your doctor (oral medication) before you arrive. Bring the medicine to the exam. Do not take it at home. You ll be told when to take it upon arriving for your exam.   Arrive one hour early. Bring someone who can take you home after the test. Your medicine will make you sleepy. After the exam, you may not drive, take a bus or take a taxi by yourself.  IF YOUR DOCTOR PRESCRIBED IV SEDATION:   Arrive one hour early. Bring someone who can take you home after the test. Your medicine will make you sleepy. After the exam, you may not drive, take a bus or take a taxi by yourself.   No eating 6 hours before your exam. You may have clear liquids up until 4 hours before your exam. (Clear liquids include water, clear tea, black coffee and fruit juice without pulp.)  IF YOUR DOCTOR PRESCRIBED ANESTHESIA (be asleep for your exam):   Arrive 1 1/2 hours early. Bring someone who can take you home after the test. You may not drive, take a bus or take a taxi by yourself.   No eating 8 hours before your exam. You may have clear liquids up until 4 hours before your exam. (Clear liquids include water, clear tea, black coffee and fruit juice without pulp.)   You will spend four to five hours in the recovery room.  Please call the Imaging Department at your exam site with any questions.            Aug 14, 2018 12:10 PM CDT   (Arrive by 11:55 AM)   Return Movement Disorder with Elver Stratton MD   [facility] ([facility])    [address] [place] [address]  "[facility] [address] [phone]              Who to contact     Please call your clinic at [phone] to:    Ask questions about your health    Make or cancel appointments    Discuss your medicines    Learn about your test results    Speak to your doctor            Additional Information About Your Visit        MyChart Information     [employer] gives you secure access to your electronic health record. If you see a primary care provider, you can also send messages to your care team and make appointments. If you have questions, please call your primary care clinic.  If you do not have a primary care provider, please call [phone] and they will assist you.      [employer] is an electronic gateway that provides easy, online access to your medical records. With [employer], you can request a clinic appointment, read your test results, renew a prescription or communicate with your care team.     To access your existing account, please contact your [facility] or call [phone] for assistance.        Care EveryWhere ID     This is your Care EveryWhere ID. This could be used by other organizations to access your [facility] medical records  [id number]        Your Vitals Were     Pulse Height Pulse Oximetry BMI (Body Mass Index)          85 1.676 m (5' 6\") 98% 35.32 kg/m2         Blood Pressure from Last 3 Encounters:   03/12/18 (!) 162/97   03/12/18 (!) 162/97   02/13/18 143/86    Weight from Last 3 Encounters:   03/12/18 99.2 kg (218 lb 11.1 oz)   03/12/18 99.2 kg (218 lb 12.8 oz)   02/13/18 98.3 kg (216 lb 11.2 oz)              Today, you had the following     No orders found for display       Primary Care Provider Office Phone # Fax #    Ondina Edmondson [phone]       [facility] [address] [facility] [address]        Equal Access to Services     AYLEEN GASCA AH: Mahamed Quinonez, angelica mina, qarileyta rohit brown, naga " leodan whitten duglas shepard'aan ah. So [facility] [phone].    ATENCIÓN: Si red art, tiene a guerra disposición servicios gratuitos de asistencia lingüística. Candi al [phone].    We comply with applicable federal civil rights laws and [place] laws. We do not discriminate on the basis of race, color, national origin, age, disability, sex, sexual orientation, or gender identity.            Thank you!     Thank you for choosing [facility]  for your care. Our goal is always to provide you with excellent care. Hearing back from our patients is one way we can continue to improve our services. Please take a few minutes to complete the written survey that you may receive in the mail after your visit with us. Thank you!             Your Updated Medication List - Protect others around you: Learn how to safely use, store and throw away your medicines at www.disposemymeds.org.          This list is accurate as of 3/12/18 11:59 PM.  Always use your most recent med list.                   Brand Name Dispense Instructions for use Diagnosis    amantadine 100 MG capsule    SYMMETREL    180 capsule    1 capsule @ 7am and 4pm    Paralysis agitans (H)       aspirin EC 81 MG EC tablet     90 tablet    1 tab @ 9pm    Paralysis agitans (H)       busPIRone 10 MG tablet    BUSPAR    180 tablet    1 Tab @ 7am and 1 tab @ 4pm    Paralysis agitans (H)       carbidopa-levodopa  MG per tablet    SINEMET    1170 tablet    3 tabs @7, noon, 4p and 9pm and a few as needed = 13/day x 90 = 1170tablets    Paralysis agitans (H)       docusate sodium 100 MG capsule    COLACE    180 capsule    1 tab @ 7am and 9pm as needed    Paralysis agitans (H)       doxylamine 25 MG Tabs tablet    UNISOM    14 each    Take 1 tablet (25 mg) by mouth nightly as needed        * FLUoxetine 10 MG capsule    PROzac    90 capsule    Take 10 + 20mg tabs @ 7am= 30mg/day    Paralysis agitans (H)       * FLUoxetine 20 MG capsule    PROzac    90 capsule     Take 20mg capsule with 10mg capsule @ 7am = 30mg/day    Paralysis agitans (H)       fluticasone 50 MCG/ACT spray    FLONASE    1 Bottle    Spray 1 spray into both nostrils daily as needed for rhinitis or allergies        LORazepam 1 MG tablet    ATIVAN    60 tablet    Take 1 tablet (1 mg) by mouth daily as needed for anxiety    Paralysis agitans (H)       omeprazole 20 MG CR capsule    priLOSEC    30 capsule    As needed        polyethylene glycol powder    MIRALAX/GLYCOLAX    119 g    Take 17 g by mouth daily as needed for constipation    Paralysis agitans (H)       pramipexole 0.5 MG tablet    MIRAPEX    180 tablet    2 tabs @ 9pm    Paralysis agitans (H)       * Notice:  This list has 2 medication(s) that are the same as other medications prescribed for you. Read the directions carefully, and ask your doctor or other care provider to review them with you.

## 2018-03-12 NOTE — PATIENT INSTRUCTIONS
March 12, 2018    Dear Ms. Erika Diaz,    Thank you for coming today.  During your visit, we have discussed the following:     __  You have completed the ON/OFF Motor Assessment.    __  Continue with your DBS workup appointments.  __  Once you're done with your workup, we'll discuss you at the DBS Consensus meeting & will let you know the decision.  __  You may contact us with any question.       For questions, you may send us a [employer] message or call [phone]    Fax number: [phone]    GUILLERMINA Krueger, CNP  [facility]

## 2018-03-12 NOTE — MR AVS SNAPSHOT
"              After Visit Summary   3/12/2018    Erika Diaz    MRN: [id number]           Patient Information     Date Of Birth          1960        Visit Information        Provider Department      3/12/2018 10:00 AM Jerry Concepcion MD [facility] Neurosurgery        Today's Diagnoses     Parkinson's disease (H)    -  1       Follow-ups after your visit        Your next 10 appointments already scheduled     Aug 14, 2018 12:10 PM CDT   (Arrive by 11:55 AM)   Return Movement Disorder with Elver Stratton MD   [facility] ([facility])    [address] [place] [address] [phone]              Who to contact     Please call your clinic at [phone] to:    Ask questions about your health    Make or cancel appointments    Discuss your medicines    Learn about your test results    Speak to your doctor            Additional Information About Your Visit        MyChart Information     G[employer] gives you secure access to your electronic health record. If you see a primary care provider, you can also send messages to your care team and make appointments. If you have questions, please call your primary care clinic.  If you do not have a primary care provider, please call [phone] and they will assist you.      Ganos is an electronic gateway that provides easy, online access to your medical records. With [employer], you can request a clinic appointment, read your test results, renew a prescription or communicate with your care team.     To access your existing account, please contact your [facility] or call [phone] for assistance.        Care EveryWhere ID     This is your Care EveryWhere ID. This could be used by other organizations to access your [place] medical records  [id number]        Your Vitals Were     Pulse Height BMI (Body Mass Index)             85 1.676 m (5' 6\") 35.3 kg/m2          Blood " Pressure from Last 3 Encounters:   03/12/18 (!) 162/97   03/12/18 (!) 162/97   02/13/18 143/86    Weight from Last 3 Encounters:   03/12/18 99.2 kg (218 lb 11.1 oz)   03/12/18 99.2 kg (218 lb 12.8 oz)   02/13/18 98.3 kg (216 lb 11.2 oz)              Today, you had the following     No orders found for display       Primary Care Provider Office Phone # Fax #    Ondina Edmondson [phone]       [facility] [address] [facility] [address]        Equal Access to Services     [facility]: Hadii matlide Quinonez, walaurada leopoldo, qajony kaalmada stephanie, naga troncoso . So [facility] [phone].    ATENCIÓN: Si habla español, tiene a guerra disposición servicios gratuitos de asistencia lingüística. [facility] [phone].    We comply with applicable federal civil rights laws and [place] laws. We do not discriminate on the basis of race, color, national origin, age, disability, sex, sexual orientation, or gender identity.            Thank you!     Thank you for choosing [facility]  for your care. Our goal is always to provide you with excellent care. Hearing back from our patients is one way we can continue to improve our services. Please take a few minutes to complete the written survey that you may receive in the mail after your visit with us. Thank you!             Your Updated Medication List - Protect others around you: Learn how to safely use, store and throw away your medicines at www.disposemymeds.org.          This list is accurate as of 3/12/18 11:59 PM.  Always use your most recent med list.                   Brand Name Dispense Instructions for use Diagnosis    amantadine 100 MG capsule    SYMMETREL    180 capsule    1 capsule @ 7am and 4pm    Paralysis agitans (H)       aspirin EC 81 MG EC tablet     90 tablet    1 tab @ 9pm    Paralysis agitans (H)       busPIRone 10 MG tablet    BUSPAR    180 tablet    1 Tab @ 7am and 1 tab @ 4pm     Paralysis agitans (H)       carbidopa-levodopa  MG per tablet    SINEMET    1170 tablet    3 tabs @7, noon, 4p and 9pm and a few as needed = 13/day x 90 = 1170tablets    Paralysis agitans (H)       docusate sodium 100 MG capsule    COLACE    180 capsule    1 tab @ 7am and 9pm as needed    Paralysis agitans (H)       doxylamine 25 MG Tabs tablet    UNISOM    14 each    Take 1 tablet (25 mg) by mouth nightly as needed        * FLUoxetine 10 MG capsule    PROzac    90 capsule    Take 10 + 20mg tabs @ 7am= 30mg/day    Paralysis agitans (H)       * FLUoxetine 20 MG capsule    PROzac    90 capsule    Take 20mg capsule with 10mg capsule @ 7am = 30mg/day    Paralysis agitans (H)       fluticasone 50 MCG/ACT spray    FLONASE    1 Bottle    Spray 1 spray into both nostrils daily as needed for rhinitis or allergies        LORazepam 1 MG tablet    ATIVAN    60 tablet    Take 1 tablet (1 mg) by mouth daily as needed for anxiety    Paralysis agitans (H)       omeprazole 20 MG CR capsule    priLOSEC    30 capsule    As needed        polyethylene glycol powder    MIRALAX/GLYCOLAX    119 g    Take 17 g by mouth daily as needed for constipation    Paralysis agitans (H)       pramipexole 0.5 MG tablet    MIRAPEX    180 tablet    2 tabs @ 9pm    Paralysis agitans (H)       * Notice:  This list has 2 medication(s) that are the same as other medications prescribed for you. Read the directions carefully, and ask your doctor or other care provider to review them with you.

## 2018-03-19 ENCOUNTER — RADIANT APPOINTMENT (OUTPATIENT)
Dept: MRI IMAGING | Facility: CLINIC | Age: 58
End: 2018-03-19
Attending: PSYCHIATRY & NEUROLOGY
Payer: MEDICARE

## 2018-03-19 DIAGNOSIS — G20.A1 PARKINSON'S DISEASE (H): ICD-10-CM

## 2018-03-19 RX ORDER — GADOBUTROL 604.72 MG/ML
0.1 INJECTION INTRAVENOUS ONCE
Status: COMPLETED | OUTPATIENT
Start: 2018-03-19 | End: 2018-03-19

## 2018-03-19 RX ADMIN — GADOBUTROL 9.92 ML: 604.72 INJECTION INTRAVENOUS at 12:15

## 2018-03-21 ENCOUNTER — TELEPHONE (OUTPATIENT)
Dept: NEUROSURGERY | Facility: CLINIC | Age: 58
End: 2018-03-21

## 2018-03-21 NOTE — TELEPHONE ENCOUNTER
Pt called to say she completed her DBS workup appts on Monday and she wants to know what the decision is. I let her know that we are discussing her case at our consensus meeting tomorrow and she will get a call from Zina Yu, most likely next week, regarding her candidacy for DBS surgery. Pt understands and is in agreement with plan. No further questions.

## 2018-03-28 ENCOUNTER — TELEPHONE (OUTPATIENT)
Dept: NEUROSURGERY | Facility: CLINIC | Age: 58
End: 2018-03-28

## 2018-03-28 NOTE — TELEPHONE ENCOUNTER
I let pt know that our consensus group did not meet last week to discuss patients, so her case was moved to this week's meeting. She will get a call re outcome after our meeting. Pt understands and has no further questions.

## 2018-03-30 ENCOUNTER — TELEPHONE (OUTPATIENT)
Dept: NEUROLOGY | Facility: CLINIC | Age: 58
End: 2018-03-30

## 2018-03-30 NOTE — TELEPHONE ENCOUNTER
From the 3/29/18 DEEP BRAIN STIMULATION consensus meetin. Med management first - decrease time between doses   2. Are dyskinesias intractable after increasing the carbidopa/levodopa?    Called patient to discuss. Appointment made with Dr. Stratton on  at 12:40 to maximize her medication before deciding if she is a candidate. Patient verbalized understanding of this plan of care.    From Dr. Stratton's office visit note 18:    In summary, we wanted to make sure that we addressed her mood issues before we proceeded with surgery. She has seen Aimee Lowe and then subsequently now with Dr. Lantigua. She has done all the requirements for surgery and she continues to have wearing off and that additional medication changes probably wont resolve this as well as DBS surgery. The plan would be to proceed with unilateral DBS. She would be looking at one of the 3 surgical options: Medtronic, Abbott (8 contacts) and [employer] (8 contacts and rechargeable). We would recommend medtronic or Abbott (only Medtronic is approved in regards to MRI compatibility).  She has not had a 7T MRI scan done at this time.  She would be a right GPI DBS and is interested in the 7T study. She is right handed but wants her left body treated. She is on aspirin and does not have diabetes and has not had prior anesthesia problems and is not on a blood thinner.      PLAN  RIGHT Gpi brain DBS surgery  We explained our overly cautious approach to her condition  Return to be determined based on the surgery.

## 2018-04-11 ASSESSMENT — ENCOUNTER SYMPTOMS
PALPITATIONS: 0
FEVER: 0
ABDOMINAL PAIN: 0
SINUS CONGESTION: 0
HYPERTENSION: 1
NECK PAIN: 1
NIGHT SWEATS: 0
NECK MASS: 0
ARTHRALGIAS: 1
EYE REDNESS: 0
DIFFICULTY URINATING: 1
NAUSEA: 1
FATIGUE: 0
WEIGHT GAIN: 1
SINUS PAIN: 0
TROUBLE SWALLOWING: 1
FLANK PAIN: 0
DIARRHEA: 0
WEIGHT LOSS: 0
SMELL DISTURBANCE: 1
DECREASED APPETITE: 0
HEMATURIA: 0
LIGHT-HEADEDNESS: 0
EYE WATERING: 0
EYE PAIN: 0
SORE THROAT: 0
EXERCISE INTOLERANCE: 0
EYE IRRITATION: 0
CONSTIPATION: 1
JAUNDICE: 0
POLYDIPSIA: 0
LEG PAIN: 0
SYNCOPE: 0
ALTERED TEMPERATURE REGULATION: 1
MUSCLE WEAKNESS: 0
CHILLS: 0
BLOOD IN STOOL: 0
HYPOTENSION: 0
SLEEP DISTURBANCES DUE TO BREATHING: 0
DOUBLE VISION: 0
BACK PAIN: 1
TASTE DISTURBANCE: 0
STIFFNESS: 1
MUSCLE CRAMPS: 1
POLYPHAGIA: 1
RECTAL PAIN: 0
ORTHOPNEA: 0
MYALGIAS: 1
HALLUCINATIONS: 0
INCREASED ENERGY: 0
BLOATING: 0
HEARTBURN: 1
JOINT SWELLING: 1
DYSURIA: 0
BOWEL INCONTINENCE: 0
HOARSE VOICE: 0

## 2018-04-12 NOTE — PROGRESS NOTES
Diagnosis/Summary/Recommendations:    PATIENT: Erika Diaz  57 year old female     : 1960    GINNY: 2018    Parkinson  Idiopathic Parkinson's disease in  - .  Left side onset  Wants the left body treated    Seeing Dr. Lantigua in [place] for mood issues  Elva Rhina, Rika  [address] [place] [address]   [phone]    Has seen Aimee Lowe at the     MRI of the brain without and with intravenous contrast: 3/19/2018  12:24 PM  Parkinson's disease  ICD-10:     Comparison: 2016     Technique: Midline sagittal T1-weighted images and axial diffusion  with images through the whole brain were obtained. Axial and coronal  T2-weighted images, and inversion recovery images were obtained with  focus on the sandra through the corpus callosum. Following  administration of 10 cc intravenous Gadavist, three-dimensional  T1-weighted MP rage images were obtained.     Findings: Mild cerebral volume loss. Multiple scattered foci of T2  white matter hyperintensity, most of which are not a periventricular  location. Gray-white matter differentiation of the cerebral  hemispheres appears normal. Axial diffusion-weighted images appear  within normal limits without evidence of acute infarction. There is no  definite intra or extra-axial mass.  No abnormal enhancement.         Impression: Several scattered foci of T2 white matter hyperintensity,  nonspecific. Differential diagnosis includes chronic small vessel  ischemic changes, as well as prior infectious, inflammatory and  ischemic processes.     OLVIN BANKS MD    IMPRESSIONS AND RECOMMENDATIONS     Current results indicate variability in attention and memory, ranging from moderately impaired to superior, in some cases with greater difficulty on less complex tasks. Basic language, visual processing, and executive functioning fall within normal limits. Personality assessment is suggestive of somatization, and  also raises the possibility of a thought disorder as well as a history of manic episodes. Bipolar affective disorder or schizoaffective disorder could be considered.     This pattern of performance does not reflect dementia at this time. The marked variability in measures of attention and memory suggest an underlying psychiatric etiology; medications and nonrestorative sleep may also contribute. As noted, there is an indication of somatization on personality assessment, as well as other psychiatric symptoms including possible somatic delusions and hypomanic episodes. This does not preclude the presence of an underlying neurologic disorder, but she is likely to experience increases in physical symptoms during times of stress, and there may be a psychological component to her somatic symptoms. It will be particularly important to rely on objective medical data as much as possible when making decisions regarding diagnosis and treatment.     Given the suggestion of possible untreated psychiatric illness, there are concerns regarding her candidacy for surgery. Review of her records indicates that she has been diagnosed with generalized anxiety disorder and panic disorder. Given the concerns raised of this evaluation, she may benefit from referral to psychiatry for further evaluation and treatment recommendations. These psychiatric concerns would need to be addressed prior to further consideration of surgery from a neurocognitive and psychosocial perspective. Records indicate that she has established care recently with a health psychologist. Additionally, she has received care at various institutions. She stated that she was diagnosed with parkinsonism at [facility]. These records are not in her electronic medical records, and it may be helpful to obtain them for additional history.     In terms of daily functioning, Ms. Diaz s cognitive inefficiencies are not likely to interfere with her ability to actively  participate in treatment or to manage her instrument activities of daily living independently. Given her variability in memory and attention, however, she may find it helpful to carry a small notepad in which record important information, or for others to provide her with written reminders or checklists if possible. She may work best in environments that are relatively free from distractions, such as noises or other interruptions. She may find it helpful to complete one task before beginning another. Results may serve as a baseline of her neurocognitive functioning. Repeated neuropsychological evaluation in one year may help to determine whether her cognitive difficulties progress, remit, or remain stable.      Amanda Hernandez, Ph.D., ABPP  Licensed Psychologist, [place] [id number]  Board Certified in Clinical Neuropsychology     Time spent:  Four hours professional time, including interview, record review, data integration, and report writing (CPT 41156); an additional two hours, including testing administered by a psychometrist and interpreted by a neuropsychologist (CPT 04099). ICD-10 diagnosis: G20; F06.8.       Medications     7am noon 4pm 9pm 4AM   Amantadine symmetrel 100mg 1  1     Aspirin 81mg    1    Buspirone buspar 10mg 1  1     Carbidopa/levodopa sinemet 25/100  3 3 3 3 3 TABS as needed   Docusate colace 100mg 1       Doxylamine unisom 25mg    1 prn    Fluoxetine prozac 10mg Not taking       Fluoxetine prozac 20mg 1       Fluticasone flonase 50mcg/act spray As needed       Hydrochlorothiazide hydrodiuril 25mg 1       Lorazepam ativan 1mg As needed   1    Omeprazole prilosec 20mg Not taking       Polyethylene glycol miralax As needed       Pramipexole mirapex 0.5mg    2                                History obtained from patient     In summary, we wanted to make sure that we addressed her mood issues before we proceeded with surgery. She has seen Aimee Lowe and then subsequently now with Dr. Lantigua. She has  "done all the requirements for surgery and she continues to have wearing off and that additional medication changes probably wont resolve this as well as DBS surgery. The plan would be to proceed with unilateral DBS. She would be looking at one of the 3 surgical options: Medtronic, [employer] (8 contacts) and [employer] (8 contacts and rechargeable). We would recommend medtronic or Abbott (only Medtronic is approved in regards to MRI compatibility).  She has not had a 7T MRI scan done at this time.  She would be a right GPI DBS and is interested in the 7T study. She is right handed but wants her left body treated. She is on aspirin and does not have diabetes and has not had prior anesthesia problems and is not on a blood thinner.     PLAN  RIGHT brain DBS surgery  We explained our overly cautious approach to her condition  Return to be determined based on the surgery.       Coding statement:   Duration of  Services: patient care and care coordination was 25 minutes  Greater than 50% of this visit was spent in counseling and coordination of care.     Elver Stratton MD     ______________________________________      PATIENT: Erika Diaz     : 1960     GINNY: 2018     REASON FOR VISIT:  Pre-DBS ON/OFF motor symptom evaluation.       HPI: Ms. Erika Diaz is a 57 year old right - handed  female who came to the [facility] neurology clinic accompanied by her , Zane, for ON/OFF motor evaluation as part of Deep Brain Stimulation surgery work-up for management of Parkinson's disease.      She was diagnosed with Idiopathic Parkinson's disease in  - . Her symptom started with stiffness in left hand.  She was holding her arm \"weird\" & thought she might have stroke.     When asked about her DBS goals, her  started responding.  When pt was asked directly, she had a hard time providing specific goals & said, \"I want to feel better. I want to be more normal without taking the " "medication.\"  She gave an example of how her medication stop working while at work & \"it's like turning the switch off.\" She would like to improve wearing off stiffness, difficulty walking, & foggy thinking, which all get better when she is ON.   reports that \"she would like to take less medication.\"  She agreed & stated that she would like to improve nausea & dyskinesias, which are the side effects of her medications.  She also wanted to be able to dance like she did when she was younger.  Currently, she is able to dance occasionally when her medications are working.      When she was asked about the risk of DBS surgery, she stated, \"stroke & infection.\"  She acknowledges that it would take longer to optimize programing & the need to return to clinic frequently.       As far as her mood & Dr. Hernandez's note, pt &  report that they're not too concerned about it.   reports that she doesn't have bipolar.  Her memory is better than his.  She sees Aimee Lowe, psychologist.  She has a dog for emotional therapy.  Pt &  report that she is psychologically stable to go through the surgery.       Current antiparkinsonian medication:   PD Medications 7 am 12 pm 4 pm 9 pm   Sinemet 25/100 mg  3 3 3 3   Amantadine 100 mg  1   1     Pramipexole 0.5 mg       1         Last dose of antiparkinsonian medication was taken:   Sinemet -- 3/12 at 4 pm   Amantadine -- 3/8 at 4 pm  Pramipexole -- 3/10 at 9 pm        In clinic, OFF motor exam was completed and patient took Sinemet 25/100 mg 3 tabs & Amantadine 100 mg 1 at 8 am.  After 60 minutes, ON motor exam was completed.     Physical Exam:     Vital Signs:  Blood pressure (!) 162/97, pulse 85, height 1.676 m (5' 6\"), weight 99.2 kg (218 lb 12.8 oz), SpO2 98 %.  Body mass index is 35.32 kg/(m^2).     MDS Part II  -- Total Score: 6      -- Over the last week -- 0: Normal -- 1: Slight -- 2: Mild -- 3: Moderate -- 4: Severe      2.1 Speech: 2   2.2 Saliva and " droolin   2.3 Chewing and swallowin   2.4 Eating tasks: 0   2.5 Dressin   2.6 Hygiene:  0   2.7 Handwritin   2.8 Doing hobbies and other activities:  0   2.9 Turning in bed:  0   2.10 Tremor:  2   2.11 Getting out of bed/car/deep chair:   1   2.12 Walking and balance: 0   2.13 Freezin      Total:     6          UPDRS Values 3/12/2018 3/12/2018   Time: 8:00 AM 9:00 AM   Medication Off On   R Brain DBS: None None   L Brain DBS: None None   Speech 2 1   Facial Expression 3 2   Rigidity Neck 2 1   Rigidity RUE 1 0   Rigidity LUE 2 1   Rigidity RLE 0 0   Rigidity LLE 3 0   Finger Taps R 2 1   Finger Taps L 3 1   Hand Mvt R 1 1   Hand Mvt L 3 1   Pron-/Supinate R 1 1   Pron-/Supinate L 2 1   Toe Tap R 1 1   Toe Tap L 4 1   Leg Agility R 1 1   Leg Agility L 3 1   Arise From Chair 1 1   Gait 2 1   Gait Freezing 0 0   Postural Stability 3 0   Posture 1 1   Global Spont Mvt 2 1   Postural Tremor RUE 0 0   Postural Tremor LUE 0 0   Kinetic Tremor RUE 0 0   Kinetic Tremor LUE 1 0   Rest Tremor RUE 0 0   Rest Tremor LUE 0 0   Rest Tremor RLE 0 0   Rest Tremor LLE 0 0   Rest Tremor Lip/Jaw 0 0   Rest Tremor Constancy 0 0   Total Right 7 5   Total Left 21 6   Axial Total 16 8   Total 44 19      MOTOR TEST: UPDRS Flowsheet was completed in Epic. Exam was videotaped.   Total OFF score = 44  Total ON score = 19     Percentage: 44 - 19 = 25 --> 25 ÷ 44 = 56.8 = 57% motor improvement.     NOTE:  She had mild body dyskinesias affecting her neck & extremities.  This became worse with mental activation.      ASSESSMENT/PLAN:     Parkinson's Disease: Ms. Diaz is a 57 year old right - handed female with about 10 year history of Idiopathic Parkinson's disease who came to the clinic for ON/OFF motor evaluation as part of her Deep Brain Stimulation surgery work-up.     __  Pt had some knowledge about DBS.        __  I discussed some of her unrealistic expectations like being able to go back to dancing like she did  when she was young  . . .     __  I also discussed the concern about her neuropsychological evaluation.  We might need to get a support letter form her psychologist.      __  I briefly went over DBS risks, & benefits; the possibility of 1 - 3 % serious side effects including infection, bleeding, stroke, cognitive & speech impairment, seizures, . . . . & death; the possibility of lead misplacement; appropriate DBS candidates; and DBS programming & the need to return to clinic for reprogramming.  All questions were answered.     __  She was informed that we'll contact her after the DBS team meets & discusses her work-up. She understands the plan.     The total time spent with the patient in ON/OFF motor evaluation & discussing about PD symptoms & DBS surgery was 120 minutes and greater than 50% of the time was spent in counseling & coordination of care.     GUILLERMINA Krueger, CNP   [facility]      Answers for HPI/ROS submitted by the patient on 4/11/2018   General Symptoms: Yes  Skin Symptoms: No  HENT Symptoms: Yes  EYE SYMPTOMS: Yes  HEART SYMPTOMS: Yes  LUNG SYMPTOMS: No  INTESTINAL SYMPTOMS: Yes  URINARY SYMPTOMS: Yes  GYNECOLOGIC SYMPTOMS: No  BREAST SYMPTOMS: No  SKELETAL SYMPTOMS: Yes  BLOOD SYMPTOMS: No  NERVOUS SYSTEM SYMPTOMS: No  MENTAL HEALTH SYMPTOMS: No  Fever: No  Loss of appetite: No  Weight loss: No  Weight gain: Yes  Fatigue: No  Night sweats: No  Chills: No  Increased stress: No  Excessive hunger: Yes  Excessive thirst: No  Feeling hot or cold when others believe the temperature is normal: Yes  Loss of height: No  Post-operative complications: No  Surgical site pain: No  Hallucinations: No  Change in or Loss of Energy: No  Hyperactivity: No  Confusion: No  Ear pain: No  Ear discharge: No  Hearing loss: No  Tinnitus: Yes  Nosebleeds: No  Congestion: No  Sinus pain: No  Trouble swallowing: Yes   Voice hoarseness: No  Mouth sores: No  Sore throat: No  Tooth pain: No  Gum tenderness:  No  Bleeding gums: No  Change in taste: No  Change in sense of smell: Yes  Dry mouth: Yes  Hearing aid used: No  Neck lump: No  Eye pain: No  Vision loss: Yes  Dry eyes: Yes  Watery eyes: No  Eye bulging: No  Double vision: No  Flashing of lights: No  Spots: No  Floaters: Yes  Redness: No  Crossed eyes: No  Tunnel Vision: No  Yellowing of eyes: No  Eye irritation: No  Chest pain or pressure: No  Fast or irregular heartbeat: No  Pain in legs with walking: No  Trouble breathing while lying down: No  Fingers or toes appear blue: No  High blood pressure: Yes  Low blood pressure: No  Fainting: No  Murmurs: Yes  Pacemaker: No  Varicose veins: Yes  Edema or swelling: No  Wake up at night with shortness of breath: No  Light-headedness: No  Exercise intolerance: No  Heart burn or indigestion: Yes  Nausea: Yes  Abdominal pain: No  Bloating: No  Constipation: Yes  Diarrhea: No  Blood in stool: No  Black stools: No  Rectal or Anal pain: No  Fecal incontinence: No  Yellowing of skin or eyes: No  Vomit with blood: No  Change in stools: No  Trouble holding urine or incontinence: No  Pain or burning: No  Trouble starting or stopping: No  Increased frequency of urination: No  Blood in urine: No  Decreased frequency of urination: No  Frequent nighttime urination: No  Flank pain: No  Difficulty emptying bladder: Yes  Back pain: Yes  Muscle aches: Yes  Neck pain: Yes  Swollen joints: Yes  Joint pain: Yes  Bone pain: No  Muscle cramps: Yes  Muscle weakness: No  Joint stiffness: Yes  Bone fracture: No    Last visit date and details:      PATIENT: Erika Diaz  57 year old female      : 1960     GINNY: 2018     Parkinson   or  left side onset      Had some off time with stress  Had problems this past weekend  Here today with Zane     Had neuropsychological evaluation with Dr. Hernandez     Assessment:  The patient has PD and is under consideration for the DBS surgery.  She has a history of trauma and some  anxiety surrounding these events.       Plan:  With continued guidance and education from her health care team, there are no major concerns from a psychological standpoint, and patient is probably a good candidate for DBS surgery. We will work together to decrease anxiety and increase coping mechanisms.        Will see in one week.        Time In:  8:00  Time Out:  9:00      Diagnosis:  Axis I Generalized Anxiety Disorder;  Psychological factors associated with disease   Axis II Deferred   Axis III Obesity (278.00), PD, please see medical records for details   [place] IV Psychosocial and Environmental Stressors:  health & concern surrounding grand daughter           Aimee Lowe, Ph.D., L.P.     Attended the DBS class  They are interested in DBS workup - had neuropsychological evalaution  Has metal in her right knee  Is not claustrophobic  Has had mri since the knee but not clear if cleared for a 7T mri scan  Has not had motor testing yet.         Medications     7am Noon 4pm   9pm Night time   Amantadine 100mg 1   1         Aspirin 81mg         1     Buspirone buspar 10mg 1   1         Carbidopa/levodopa sinemet 25/100 3 3 3   3 As needed   4am   Docusate colace 100mg As needed             Doxylamine unisom 25mg         prn     Fluoxetine prozac 10mg 1             Fluoxetine prozac 20mg 1             Fluticasone flonase prn             Lorazepam ativan 1mg prn             Omeprazole prilosec 20mg as needed             Polyethylene gycol miralax As needed             Pramipexole mirapex 0.5mg         2     Simvastatin zocor 20mg         Not tkaing                                                                           Over 50% of this visit was spent in patient care and care coordination.      History obtained from patient     Total visit time was 25 minutes     PLAN  Complete dbs workup with brain mri, motor testing and meet with Dr. Concepcion  Will need to see Dr. Lowe again  Return to be determined  With me or Karina  "Rob Stratton MD      ______________________________________     Last visit date and details:      Parkinson's disease 57 yrs old with diagnosis in  or  left side onset   Ongoing mood issues that are being managed with pharmacotherapy  She may benefit from a visit with psychiatry and psychology  Would recommend baseline dbs evaluation  Would recommend dbs class      Will need return visit to review her medications in the coming months.   She met with Zina Yu.      Her email [email or web address] was set up by me to allow ready access to us      We will review her medications and decide if we will make a change to her regimen      Return back after evaluations to see Karina, or with a fellow with me or other colleague.      Elver Stratton MD        PATIENT: Erika Diaz     : 1960     GINNY: 2018     REASON FOR VISIT:  Pre-DBS ON/OFF motor symptom evaluation.       HPI: Ms. Erika Diaz is a 57 year old right - handed  female who came to the [facility] neurology clinic accompanied by her , Zane, for ON/OFF motor evaluation as part of Deep Brain Stimulation surgery work-up for management of Parkinson's disease.      She was diagnosed with Idiopathic Parkinson's disease in  - . Her symptom started with stiffness in left hand.  She was holding her arm \"weird\" & thought she might have stroke.     When asked about her DBS goals, her  started responding.  When pt was asked directly, she had a hard time providing specific goals & said, \"I want to feel better. I want to be more normal without taking the medication.\"  She gave an example of how her medication stop working while at work & \"it's like turning the switch off.\" She would like to improve wearing off stiffness, difficulty walking, & foggy thinking, which all get better when she is ON.   reports that \"she would like to take less medication.\"  She agreed & stated that she would like to " "improve nausea & dyskinesias, which are the side effects of her medications.  She also wanted to be able to dance like she did when she was younger.  Currently, she is able to dance occasionally when her medications are working.      When she was asked about the risk of DBS surgery, she stated, \"stroke & infection.\"  She acknowledges that it would take longer to optimize programing & the need to return to clinic frequently.       As far as her mood & Dr. Hernandez's note, pt &  report that they're not too concerned about it.   reports that she doesn't have bipolar.  Her memory is better than his.  She sees Aimee Lowe, psychologist.  She has a dog for emotional therapy.  Pt &  report that she is psychologically stable to go through the surgery.       Current antiparkinsonian medication:   PD Medications 7 am 12 pm 4 pm 9 pm   Sinemet 25/100 mg  3 3 3 3   Amantadine 100 mg  1   1     Pramipexole 0.5 mg       1         Last dose of antiparkinsonian medication was taken:   Sinemet -- 3/12 at 4 pm   Amantadine -- 3/8 at 4 pm  Pramipexole -- 3/10 at 9 pm        In clinic, OFF motor exam was completed and patient took Sinemet 25/100 mg 3 tabs & Amantadine 100 mg 1 at 8 am.  After 60 minutes, ON motor exam was completed.     Physical Exam:     Vital Signs:  Blood pressure (!) 162/97, pulse 85, height 1.676 m (5' 6\"), weight 99.2 kg (218 lb 12.8 oz), SpO2 98 %.  Body mass index is 35.32 kg/(m^2).     MDS Part II  -- Total Score: 6      -- Over the last week -- 0: Normal -- 1: Slight -- 2: Mild -- 3: Moderate -- 4: Severe      2.1 Speech: 2   2.2 Saliva and droolin   2.3 Chewing and swallowin   2.4 Eating tasks: 0   2.5 Dressin   2.6 Hygiene:  0   2.7 Handwritin   2.8 Doing hobbies and other activities:  0   2.9 Turning in bed:  0   2.10 Tremor:  2   2.11 Getting out of bed/car/deep chair:   1   2.12 Walking and balance: 0   2.13 Freezin      Total:     6          UPDRS Values " 3/12/2018 3/12/2018   Time: 8:00 AM 9:00 AM   Medication Off On   R Brain DBS: None None   L Brain DBS: None None   Speech 2 1   Facial Expression 3 2   Rigidity Neck 2 1   Rigidity RUE 1 0   Rigidity LUE 2 1   Rigidity RLE 0 0   Rigidity LLE 3 0   Finger Taps R 2 1   Finger Taps L 3 1   Hand Mvt R 1 1   Hand Mvt L 3 1   Pron-/Supinate R 1 1   Pron-/Supinate L 2 1   Toe Tap R 1 1   Toe Tap L 4 1   Leg Agility R 1 1   Leg Agility L 3 1   Arise From Chair 1 1   Gait 2 1   Gait Freezing 0 0   Postural Stability 3 0   Posture 1 1   Global Spont Mvt 2 1   Postural Tremor RUE 0 0   Postural Tremor LUE 0 0   Kinetic Tremor RUE 0 0   Kinetic Tremor LUE 1 0   Rest Tremor RUE 0 0   Rest Tremor LUE 0 0   Rest Tremor RLE 0 0   Rest Tremor LLE 0 0   Rest Tremor Lip/Jaw 0 0   Rest Tremor Constancy 0 0   Total Right 7 5   Total Left 21 6   Axial Total 16 8   Total 44 19      MOTOR TEST: UPDRS Flowsheet was completed in Epic. Exam was videotaped.   Total OFF score = 44  Total ON score = 19     Percentage: 44 - 19 = 25 --> 25 ÷ 44 = 56.8 = 57% motor improvement.     NOTE:  She had mild body dyskinesias affecting her neck & extremities.  This became worse with mental activation.      ASSESSMENT/PLAN:     Parkinson's Disease: Ms. Diaz is a 57 year old right - handed female with about 10 year history of Idiopathic Parkinson's disease who came to the clinic for ON/OFF motor evaluation as part of her Deep Brain Stimulation surgery work-up.     __  Pt had some knowledge about DBS.        __  I discussed some of her unrealistic expectations like being able to go back to dancing like she did when she was young  . . .     __  I also discussed the concern about her neuropsychological evaluation.  We might need to get a support letter form her psychologist.      __  I briefly went over DBS risks, & benefits; the possibility of 1 - 3 % serious side effects including infection, bleeding, stroke, cognitive & speech impairment, seizures, . . .  . & death; the possibility of lead misplacement; appropriate DBS candidates; and DBS programming & the need to return to clinic for reprogramming.  All questions were answered.     __  She was informed that we'll contact her after the DBS team meets & discusses her work-up. She understands the plan.     The total time spent with the patient in ON/OFF motor evaluation & discussing about PD symptoms & DBS surgery was 120 minutes and greater than 50% of the time was spent in counseling & coordination of care.     GUILLERMINA Krueger, CNP   [facility]      HISTORY OF PRESENT ILLNESS  We saw Ms. Erika Diaz today in Neurosurgery Clinic as part of her work-up for Deep Brain Stimulation.  She is a 57 year old woman with a 9-10 year history of Parkinson s disease, being diagnosed in [id number].  She first developed stiffness in her left hand and shoulder that has since progressed to include tremors.  Her symptoms are worse on the left-side.  Her goals with Deep Brain Stimulation are to gain tremor control and reduce dyskinesias.  She wants to feel better, be more normal without taking the medications, improve wearing off stiffness, difficulty walking and foggy thinking.  Medication reduction is important.  She does have regular sessions with a chiropractor to relieve tension in her neck.  She is currently undergoing DBS candidacy evaluation.  She has had her ON/OFF testing and neuropsych evaluation.  She is also scheduled for MRI brain.         Past Medical History         Past Medical History:   Diagnosis Date     Degenerative joint disease 11/7/2017     Encounter for neuropsychological testing 12/20/2017     Current results indicate variability in attention and memory, ranging from moderately impaired to superior, in some cases with greater difficulty on less complex tasks. Basic language, visual processing, and executive functioning fall within normal limits. Personality assessment is suggestive of  somatization, and also raises the possibility of a thought disorder as well as a history of manic episo     Former smoker quit 2009 11/7/2017     History of colonic polyps 11/7/2017     HTN (hypertension) 11/7/2017     Hypercholesterolemia 11/7/2017     Lactose intolerance in adult 11/7/2017     Migraines       Obesity 11/7/2017     Other nervous system complications       PID (pelvic inflammatory disease) due to IUD 11/7/2017     Pulmonary emboli (H) - coumadin lovenox 11/7/2017             Past Surgical History    Past Surgical History:   Procedure Laterality Date     adhesioloysis         CHOLECYSTECTOMY         COLONOSCOPY         JOINT REPLACEMENT Right       right partial knee replacement     LAPAROSCOPY         adhesioloysis     LAPAROSCOPY         supracervical hysterectomy     LAPAROTOMY EXPLORATORY Left       partial removal of left ovary     REMOVE INTRAUTERINE DEVICE   2006     salpingoopherectomy                 Family History          Family History   Problem Relation Age of Onset     Breast Cancer Mother               Social History    Social History            Social History     Marital status:        Spouse name: N/A     Number of children: N/A     Years of education: N/A          Occupational History     Not on file.             Social History Main Topics     Smoking status: Former Smoker       Packs/day: 0.50       Years: 20.00       Types: Cigarettes     Smokeless tobacco: Never Used         Comment: quit smoking 2009     Alcohol use No     Drug use: No     Sexual activity: Not Currently       Partners: Male       Birth control/ protection: None           Other Topics Concern     Parent/Sibling W/ Cabg, Mi Or Angioplasty Before 65f 55m? No          Social History Narrative     . lives in [place]. Zane Diaz spouse                      Allergies   Allergen Reactions     Atorvastatin         Other reaction(s): Myalgia     Codeine Itching         Current Outpatient  Prescriptions   Medication     amantadine (SYMMETREL) 100 MG capsule     docusate sodium (COLACE) 100 MG capsule     omeprazole (PRILOSEC) 20 MG CR capsule     pramipexole (MIRAPEX) 0.5 MG tablet     busPIRone (BUSPAR) 10 MG tablet     aspirin EC 81 MG EC tablet     carbidopa-levodopa (SINEMET)  MG per tablet     polyethylene glycol (MIRALAX/GLYCOLAX) powder     FLUoxetine (PROZAC) 10 MG capsule     FLUoxetine (PROZAC) 20 MG capsule     doxylamine (UNISOM) 25 MG TABS tablet     fluticasone (FLONASE) 50 MCG/ACT spray     LORazepam (ATIVAN) 1 MG tablet      No current facility-administered medications for this visit.             REVIEW OF SYSTEMS:  General: Negative for chills/sweats/fever, difficulty sleeping, headache, recent fatigue, or weight gain/loss.  Eyes: Negative for blurred vision, crossed eyes, double vision, recent eye infections, vision flashes, or vision halos.  Ears/Nose/Mouth/Throat: Negative for bleeding gums, difficulty swallowing, earache, ear discharge, hearing loss, hoarseness, nosebleeds, tinnitus, or sinus problems.  Respiratory:Negative for chronic cough, coughing blood, night sweats, shortness of breath, Tuberculosis, or wheezing.  Cardiovascular: Negative for chest pain, dyspnea at night, heart murmur, palpitations, pacemaker, pacemaker, poor circulation, swollen legs/feet, or varicose veins.  Gastrointestinal: Negative for melena, hematochezia, chronic diarrhea, heartburn, Hepatitis A/B/C, increasing constipation, Liver Disease, nausea, or vomiting.   Genitourinary: Negative for Urinary retention, genital discharge, urinary incontinence, prostate problems, urgency, or UTI.   Neurological: Negative for syncope, headaches, numbness of arms/legs, tingling in hands/arms/legs, memory problems, or seizures.  Psychological: Negative for anxiety, depression, panic attacks, or restlessness.  Skin: Negative for chronic skin itching, color changes in hand/feet when cold, poor scarring,  "non-healing ulcers, skin rashes/hives, unusual moles.  Musculoskeletal: Negative for arthritis, joint swelling in hands/wrists/hips/knees/joints, muscle tenderness in arms/legs, or osteoporosis.  Endocrine: Negative for excessive thirst/hunger, intolerance for warm rooms, loss of libido, multiple broken bones, rapid weight gain/loss, galactorrhea, or thyroid issues.  Hematologic/Lymphatic: Negative for easy skin bruising, significant fatigue, prolonged bleeding, tender glands/lymph nodes.  Allergies: Negative for asthma or hay fever.        PHYSICAL EXAM  BP (!) 162/97 (BP Location: Right arm, Patient Position: Sitting, Cuff Size: Adult Large)  Pulse 85  Ht 1.676 m (5' 6\")  Wt 99.2 kg (218 lb 11.1 oz)  BMI 35.3 kg/m2     General: Awake, alert, oriented. Well nourished, well developed, no acute distress.  HEENT: Head normocephalic, atraumatic. No carotid bruit. Neck supple. Good range of motion. No palpable thyroid mass.  Heart: Regular rhythm and rate. No murmurs.  Lungs: Clear to auscultation and percussion bilaterally. No rhonchi, rales or wheeze.  Abdomen: Soft, non-tender, non-distended. No hepatosplenomegaly.  Extremity: Warm with no clubbing or cyanosis. No lower extremity edema.     Neurological:  Awake, alert and oriented to date, time, place and person. Speech fluent.   Pupils equal, round, reactive to light.  Extraocular movement intact.  Visual fields are full on confrontation.  Hearing is grossly normal to finger rub.   Facial sensation intact.  Face symmetric.  Tongue midline.  Uvula elevates equally.     Motor: full strength throughout.  Sensation: intact to light touch and pinpoint.  Deep tendon reflexes: Trace throughout. Negative for clonus. Negative for Ashraf's sign. No dysmetria.        ASSESSMENT   57 year old right handed female with a history of Parkinson's disease.     Patient is undergoing DBS candidacy evaluation.  Her symptom is worse on the left side so right side implantation should " be considered first.  She may also be a wait and see candidate.  In terms of target, STN or GPi may be appropriate but if she is desiring less medication, STN may be better.  However, if stiffness and dyskinesia GPi could be considered.  We will have to discuss the target location.     There are no images for me to review at this time so anatomical considerations for the surgery cannot be determined.  She is scheduled for an MRI soon.     Medically, there is no significant contraindication.  She has a history of PE which was treated with Coumadin.  She is not on any blood thinners at this time.     During today's visit, we discussed both phase I and II of DBS surgery. We discussed that during Phase I, we would place the DBS electrode on the right side under MAC and microelectrode recording.  She would then return one week later for the phase II with placement of the DBS generator/battery over the right chest wall under general anesthesia.  If she is a unilateral candidate or wait and see strategy candidate, then she will undergo another evaluation/discussion prior to proceeding to the left side implantation.  If she is a bilateral candidate in a staged fashion, she would return three weeks later for the phase I and II combined for the placement of the DBS electrode on the left side under MAC and microelectrode recording followed by connection to the previously implanted generator/battery.      Briefly, following topic was discussed.     [employer]  MRI compatible.  Non-rechargeable and rechargeable generator/battery available.  4 contact electrode.  Communication method: have the  or patient controller on the skin directly over the generator/battery.     [employer]  Not yet MRI compatible.  Will become MRI compatible with retro-approval when FDA approves the device for MRI use.  Non-rechargeable generator/battery.  9 contact segmented/directional electrode.  Communication method: Wireless/bluetooth.     [employer]  [employer]  Not MRI compatible.  Working on generator/battery that will be MRI compatible.  If patient gets an implant and needs an MRI in the future, when the device becomes available, patient can have the upgrade.  Rechargeable generator/battery.  8 contact electrode.  Independent current control at each contacts.  Communication method: Wireless.     I did discuss that the implant technique for these devices are relatively the same which was discussed again.  They all have similar risks associated with the surgery.      Risks, benefits, alternative therapies were discussed with the patient, including but not limited to infection and bleeding (intracranial), injury to the brain, stroke, death, hardware failure and possible need for more surgeries.  Surgical procedure was discussed in detail.  I also discussed possible use of NASRIN for neuronavigation.  All questions were answered, and she expressed understanding.  A full history and physical exam was performed during today's visit.  Her case will be discussed at the Movement Disorder Consensus Group Meeting to determine whether she is a good candidate for DBS surgery.        PLAN:  1. We will discuss her case at the Movement Disorder Consensus Group Meeting, and we will contact her regarding her DBS candidacy.      I, Ramiro Khan, am serving as a scribe to document services personally performed by Jerry Concepcion MD, PhD, based upon my observations and the provider's statements to me. All documentation has been reviewed and edited by the aforementioned doctor prior to being entered into the official medical record.     I, Jerry Concepcion, attest that above named individual is acting in scribe capacity, has observed my performance of the services and has documented them in accordance with my direction. The documentation recorded by the scribe accurately reflects the service I personally performed and the decisions made by me. The document was also partially  recorded by me and the entire document was edited by me as well.         65 minutes were spent face to face with the patient of which more than 50% of the time was spent counseling and discussing the above issues regarding diagnosis, surgical and device options, and steps for further evaluation.    ______________________________________      Patient was asked about 14 Review of systems including changes in vision (dry eyes, double vision), hearing, heart, lungs, musculoskeletal, depression, anxiety, snoring, RBD, insomnia, urinary frequency, urinary urgency, constipation, swallowing problems, hematological, ID, allergies, skin problems: seborrhea, endocrinological: thyroid, diabetes, cholesterol; balance, weight changes, and other neurological problems and these were not significant at this time except for   Patient Active Problem List   Diagnosis     Abdominal pain     Cervical high risk HPV (human papillomavirus) test positive     JASON (generalized anxiety disorder)     Hyperlipidemia, unspecified     Osteoarthritis of knee, unilateral     Pain medication agreement signed     Parkinson's disease (H)     Pulmonary embolism (H)     Lactose intolerance in adult     Degenerative joint disease     History of colonic polyps     Hypercholesterolemia     HTN (hypertension)     Obesity     PID (pelvic inflammatory disease) due to IUD     Pulmonary emboli (H) - coumadin lovenox     Former smoker quit 2009     Encounter for neuropsychological testing     Migraine syndrome          Allergies   Allergen Reactions     Atorvastatin      Other reaction(s): Myalgia     Codeine Itching     Past Surgical History:   Procedure Laterality Date     adhesioloysis       CHOLECYSTECTOMY       COLONOSCOPY       JOINT REPLACEMENT Right     right partial knee replacement     LAPAROSCOPY      adhesioloysis     LAPAROSCOPY      supracervical hysterectomy     LAPAROTOMY EXPLORATORY Left     partial removal of left ovary     REMOVE INTRAUTERINE  DEVICE  2006     salpingoopherectomy       Past Medical History:   Diagnosis Date     Degenerative joint disease 11/7/2017     Encounter for neuropsychological testing 12/20/2017    Current results indicate variability in attention and memory, ranging from moderately impaired to superior, in some cases with greater difficulty on less complex tasks. Basic language, visual processing, and executive functioning fall within normal limits. Personality assessment is suggestive of somatization, and also raises the possibility of a thought disorder as well as a history of manic episo     Former smoker quit 2009 11/7/2017     History of colonic polyps 11/7/2017     HTN (hypertension) 11/7/2017     Hypercholesterolemia 11/7/2017     Lactose intolerance in adult 11/7/2017     Migraines      Obesity 11/7/2017     Other nervous system complications      PID (pelvic inflammatory disease) due to IUD 11/7/2017     Pulmonary emboli (H) - coumadin lovenox 11/7/2017     Social History     Social History     Marital status:      Spouse name: N/A     Number of children: N/A     Years of education: N/A     Occupational History     Not on file.     Social History Main Topics     Smoking status: Former Smoker     Packs/day: 0.50     Years: 20.00     Types: Cigarettes     Smokeless tobacco: Never Used      Comment: quit smoking 2009     Alcohol use No     Drug use: No     Sexual activity: Not Currently     Partners: Male     Birth control/ protection: None     Other Topics Concern     Parent/Sibling W/ Cabg, Mi Or Angioplasty Before 65f 55m? No     Social History Narrative    . lives in [place]. Zane Diaz spouse       Drug and lactation database from the [place] [employer]:  http://toxnet.nlm.nih.gov/cgi-bin/sis/htmlgen?LACT      B/P: Data Unavailable, T: Data Unavailable, P: Data Unavailable, R: Data Unavailable 0 lbs 0 oz  There were no vitals taken for this visit., There is no height or  weight on file to calculate BMI.  Medications and Vitals not listed above were documented in the cart and reviewed by me.     Current Outpatient Prescriptions   Medication Sig Dispense Refill     amantadine (SYMMETREL) 100 MG capsule 1 capsule @ 7am and 4pm 180 capsule 3     docusate sodium (COLACE) 100 MG capsule 1 tab @ 7am and 9pm as needed 180 capsule 3     omeprazole (PRILOSEC) 20 MG CR capsule As needed 30 capsule      pramipexole (MIRAPEX) 0.5 MG tablet 2 tabs @ 9pm 180 tablet 3     busPIRone (BUSPAR) 10 MG tablet 1 Tab @ 7am and 1 tab @ 4pm 180 tablet 3     aspirin EC 81 MG EC tablet 1 tab @ 9pm 90 tablet 3     carbidopa-levodopa (SINEMET)  MG per tablet 3 tabs @7, noon, 4p and 9pm and a few as needed = 13/day x 90 = 1170tablets 1170 tablet 3     polyethylene glycol (MIRALAX/GLYCOLAX) powder Take 17 g by mouth daily as needed for constipation 119 g      FLUoxetine (PROZAC) 10 MG capsule Take 10 + 20mg tabs @ 7am= 30mg/day 90 capsule 3     FLUoxetine (PROZAC) 20 MG capsule Take 20mg capsule with 10mg capsule @ 7am = 30mg/day 90 capsule 3     doxylamine (UNISOM) 25 MG TABS tablet Take 1 tablet (25 mg) by mouth nightly as needed 14 each      fluticasone (FLONASE) 50 MCG/ACT spray Spray 1 spray into both nostrils daily as needed for rhinitis or allergies 1 Bottle      LORazepam (ATIVAN) 1 MG tablet Take 1 tablet (1 mg) by mouth daily as needed for anxiety 60 tablet 3         Elver Stratton MD        Answers for HPI/ROS submitted by the patient on 4/11/2018   General Symptoms: Yes  Skin Symptoms: No  HENT Symptoms: Yes  EYE SYMPTOMS: Yes  HEART SYMPTOMS: Yes  LUNG SYMPTOMS: No  INTESTINAL SYMPTOMS: Yes  URINARY SYMPTOMS: Yes  GYNECOLOGIC SYMPTOMS: No  BREAST SYMPTOMS: No  SKELETAL SYMPTOMS: Yes  BLOOD SYMPTOMS: No  NERVOUS SYSTEM SYMPTOMS: No  MENTAL HEALTH SYMPTOMS: No  Fever: No  Loss of appetite: No  Weight loss: No  Weight gain: Yes  Fatigue: No  Night sweats: No  Chills: No  Increased stress: No  Excessive  hunger: Yes  Excessive thirst: No  Feeling hot or cold when others believe the temperature is normal: Yes  Loss of height: No  Post-operative complications: No  Surgical site pain: No  Hallucinations: No  Change in or Loss of Energy: No  Hyperactivity: No  Confusion: No  Ear pain: No  Ear discharge: No  Hearing loss: No  Tinnitus: Yes  Nosebleeds: No  Congestion: No  Sinus pain: No  Trouble swallowing: Yes   Voice hoarseness: No  Mouth sores: No  Sore throat: No  Tooth pain: No  Gum tenderness: No  Bleeding gums: No  Change in taste: No  Change in sense of smell: Yes  Dry mouth: Yes  Hearing aid used: No  Neck lump: No  Eye pain: No  Vision loss: Yes  Dry eyes: Yes  Watery eyes: No  Eye bulging: No  Double vision: No  Flashing of lights: No  Spots: No  Floaters: Yes  Redness: No  Crossed eyes: No  Tunnel Vision: No  Yellowing of eyes: No  Eye irritation: No  Chest pain or pressure: No  Fast or irregular heartbeat: No  Pain in legs with walking: No  Trouble breathing while lying down: No  Fingers or toes appear blue: No  High blood pressure: Yes  Low blood pressure: No  Fainting: No  Murmurs: Yes  Pacemaker: No  Varicose veins: Yes  Edema or swelling: No  Wake up at night with shortness of breath: No  Light-headedness: No  Exercise intolerance: No  Heart burn or indigestion: Yes  Nausea: Yes  Abdominal pain: No  Bloating: No  Constipation: Yes  Diarrhea: No  Blood in stool: No  Black stools: No  Rectal or Anal pain: No  Fecal incontinence: No  Yellowing of skin or eyes: No  Vomit with blood: No  Change in stools: No  Trouble holding urine or incontinence: No  Pain or burning: No  Trouble starting or stopping: No  Increased frequency of urination: No  Blood in urine: No  Decreased frequency of urination: No  Frequent nighttime urination: No  Flank pain: No  Difficulty emptying bladder: Yes  Back pain: Yes  Muscle aches: Yes  Neck pain: Yes  Swollen joints: Yes  Joint pain: Yes  Bone pain: No  Muscle cramps:  Yes  Muscle weakness: No  Joint stiffness: Yes  Bone fracture: No

## 2018-04-17 ENCOUNTER — OFFICE VISIT (OUTPATIENT)
Dept: NEUROLOGY | Facility: CLINIC | Age: 58
End: 2018-04-17
Payer: MEDICARE

## 2018-04-17 VITALS
DIASTOLIC BLOOD PRESSURE: 80 MMHG | WEIGHT: 218 LBS | TEMPERATURE: 98.2 F | BODY MASS INDEX: 35.03 KG/M2 | HEIGHT: 66 IN | RESPIRATION RATE: 24 BRPM | OXYGEN SATURATION: 95 % | HEART RATE: 85 BPM | SYSTOLIC BLOOD PRESSURE: 119 MMHG

## 2018-04-17 DIAGNOSIS — G20.A1 PARALYSIS AGITANS (H): ICD-10-CM

## 2018-04-17 DIAGNOSIS — G20.A1 PARKINSON'S DISEASE (H): Primary | ICD-10-CM

## 2018-04-17 RX ORDER — AMANTADINE HYDROCHLORIDE 100 MG/1
CAPSULE, GELATIN COATED ORAL
Qty: 180 CAPSULE | Refills: 3 | Status: SHIPPED | OUTPATIENT
Start: 2018-04-17 | End: 2018-11-13

## 2018-04-17 RX ORDER — HYDROCHLOROTHIAZIDE 25 MG/1
25 TABLET ORAL AT BEDTIME
Status: ON HOLD | COMMUNITY
Start: 2018-04-09 | End: 2021-03-12

## 2018-04-17 RX ORDER — PRAMIPEXOLE DIHYDROCHLORIDE 0.5 MG/1
TABLET ORAL
Qty: 180 TABLET | Refills: 3 | Status: SHIPPED | OUTPATIENT
Start: 2018-04-17 | End: 2018-11-13

## 2018-04-17 RX ORDER — BUSPIRONE HYDROCHLORIDE 10 MG/1
10 TABLET ORAL 2 TIMES DAILY
Qty: 180 TABLET | Refills: 3 | COMMUNITY
Start: 2018-04-17 | End: 2019-04-01

## 2018-04-17 RX ORDER — DOCUSATE SODIUM 100 MG/1
CAPSULE, LIQUID FILLED ORAL
Qty: 180 CAPSULE | Refills: 3 | COMMUNITY
Start: 2018-04-17 | End: 2018-08-07

## 2018-04-17 RX ORDER — LORAZEPAM 1 MG/1
TABLET ORAL
Qty: 60 TABLET | Refills: 3 | Status: SHIPPED | OUTPATIENT
Start: 2018-04-17 | End: 2018-09-21

## 2018-04-17 ASSESSMENT — PAIN SCALES - GENERAL: PAINLEVEL: MILD PAIN (3)

## 2018-04-17 NOTE — MR AVS SNAPSHOT
After Visit Summary   2018    Erika Diaz    MRN: [id number]           Patient Information     Date Of Birth          1960        Visit Information        Provider Department      2018 12:40 PM Elver Stratton MD [facility]        Today's Diagnoses     Parkinson's disease (H)    -  1    Paralysis agitans (H)          Care Instructions    Diagnosis/Summary/Recommendations:    PATIENT: Erika Diaz  57 year old female     : 1960    GINNY: 2018    Parkinson  Idiopathic Parkinson's disease in  - .  Left side onset  Wants the left body treated    Seeing Dr. Lantigua in [place] for mood issues  Elva Lantigua, [facility]  [address] [place] [address]   [phone]    Has seen Aimee Lowe at the     MRI of the brain without and with intravenous contrast: 3/19/2018  12:24 PM  Parkinson's disease  ICD-10:     Comparison: 2016     Technique: Midline sagittal T1-weighted images and axial diffusion  with images through the whole brain were obtained. Axial and coronal  T2-weighted images, and inversion recovery images were obtained with  focus on the sandra through the corpus callosum. Following  administration of 10 cc intravenous Gadavist, three-dimensional  T1-weighted MP rage images were obtained.     Findings: Mild cerebral volume loss. Multiple scattered foci of T2  white matter hyperintensity, most of which are not a periventricular  location. Gray-white matter differentiation of the cerebral  hemispheres appears normal. Axial diffusion-weighted images appear  within normal limits without evidence of acute infarction. There is no  definite intra or extra-axial mass.  No abnormal enhancement.         Impression: Several scattered foci of T2 white matter hyperintensity,  nonspecific. Differential diagnosis includes chronic small vessel  ischemic changes, as well as prior infectious, inflammatory and  ischemic  processes.     OLVIN BANKS MD    IMPRESSIONS AND RECOMMENDATIONS     Current results indicate variability in attention and memory, ranging from moderately impaired to superior, in some cases with greater difficulty on less complex tasks. Basic language, visual processing, and executive functioning fall within normal limits. Personality assessment is suggestive of somatization, and also raises the possibility of a thought disorder as well as a history of manic episodes. Bipolar affective disorder or schizoaffective disorder could be considered.     This pattern of performance does not reflect dementia at this time. The marked variability in measures of attention and memory suggest an underlying psychiatric etiology; medications and nonrestorative sleep may also contribute. As noted, there is an indication of somatization on personality assessment, as well as other psychiatric symptoms including possible somatic delusions and hypomanic episodes. This does not preclude the presence of an underlying neurologic disorder, but she is likely to experience increases in physical symptoms during times of stress, and there may be a psychological component to her somatic symptoms. It will be particularly important to rely on objective medical data as much as possible when making decisions regarding diagnosis and treatment.     Given the suggestion of possible untreated psychiatric illness, there are concerns regarding her candidacy for surgery. Review of her records indicates that she has been diagnosed with generalized anxiety disorder and panic disorder. Given the concerns raised of this evaluation, she may benefit from referral to psychiatry for further evaluation and treatment recommendations. These psychiatric concerns would need to be addressed prior to further consideration of surgery from a neurocognitive and psychosocial perspective. Records indicate that she has established care recently with a health psychologist.  Additionally, she has received care at various institutions. She stated that she was diagnosed with parkinsonism at [facility]. These records are not in her electronic medical records, and it may be helpful to obtain them for additional history.     In terms of daily functioning, Ms. Diaz s cognitive inefficiencies are not likely to interfere with her ability to actively participate in treatment or to manage her instrument activities of daily living independently. Given her variability in memory and attention, however, she may find it helpful to carry a small notepad in which record important information, or for others to provide her with written reminders or checklists if possible. She may work best in environments that are relatively free from distractions, such as noises or other interruptions. She may find it helpful to complete one task before beginning another. Results may serve as a baseline of her neurocognitive functioning. Repeated neuropsychological evaluation in one year may help to determine whether her cognitive difficulties progress, remit, or remain stable.      Amanda Hernandez, Ph.D., [facility]  Licensed Psychologist, LP [id number]  Board Certified in Clinical Neuropsychology     Time spent:  Four hours professional time, including interview, record review, data integration, and report writing (CPT 74481); an additional two hours, including testing administered by a psychometrist and interpreted by a neuropsychologist (CPT 26798). ICD-10 diagnosis: G20; F06.8.       Medications     7am noon 4pm 9pm 4AM   Amantadine symmetrel 100mg 1  1     Aspirin 81mg    1    Buspirone buspar 10mg 1  1     Carbidopa/levodopa sinemet 25/100  3 3 3 3 3 TABS as needed   Docusate colace 100mg 1       Doxylamine unisom 25mg    1 prn    Fluoxetine prozac 10mg Not taking       Fluoxetine prozac 20mg 1       Fluticasone flonase 50mcg/act spray As needed       Hydrochlorothiazide hydrodiuril 25mg 1       Lorazepam ativan 1mg As  needed   1    Omeprazole prilosec 20mg Not taking       Polyethylene glycol miralax As needed       Pramipexole mirapex 0.5mg    2                                History obtained from patient     In summary, we wanted to make sure that we addressed her mood issues before we proceeded with surgery. She has seen Aimee Lowe and then subsequently now with Dr. Lantigua. She has done all the requirements for surgery and she continues to have wearing off and that additional medication changes probably wont resolve this as well as DBS surgery. The plan would be to proceed with unilateral DBS. She would be looking at one of the 3 surgical options: Medtronic, Abbott (8 contacts) and [employer] (8 contacts and rechargeable). We would recommend medtronic or Abbott (only Medtronic is approved in regards to MRI compatibility).  She has not had a 7T MRI scan done at this time.  She would be a right GPI DBS and is interested in the 7T study. She is right handed but wants her left body treated. She is on aspirin and does not have diabetes and has not had prior anesthesia problems and is not on a blood thinner.     PLAN  RIGHT brain DBS surgery  We explained our overly cautious approach to her condition  Return to be determined based on the surgery.             Follow-ups after your visit        Your next 10 appointments already scheduled     Aug 14, 2018 12:10 PM CDT   (Arrive by 11:55 AM)   Return Movement Disorder with Elver Stratton MD   [facility] ([facility])    [address] [place] [id number] [phone]              Who to contact     Please call your clinic at [phone] to:    Ask questions about your health    Make or cancel appointments    Discuss your medicines    Learn about your test results    Speak to your doctor            Additional Information About Your Visit        MyChart Information     [employer] gives you secure access to your  "electronic health record. If you see a primary care provider, you can also send messages to your care team and make appointments. If you have questions, please call your primary care clinic.  If you do not have a primary care provider, please call [phone] and they will assist you.      [employer] is an electronic gateway that provides easy, online access to your medical records. With [employer], you can request a clinic appointment, read your test results, renew a prescription or communicate with your care team.     To access your existing account, please contact your [facility] or call [phone] for assistance.        Care EveryWhere ID     This is your Care EveryWhere ID. This could be used by other organizations to access your [place] medical records  [id number]        Your Vitals Were     Pulse Temperature Respirations Height Pulse Oximetry Breastfeeding?    85 98.2  F (36.8  C) 24 1.676 m (5' 6\") 95% No    BMI (Body Mass Index)                   35.19 kg/m2            Blood Pressure from Last 3 Encounters:   04/17/18 119/80   03/12/18 (!) 162/97   03/12/18 (!) 162/97    Weight from Last 3 Encounters:   04/17/18 98.9 kg (218 lb)   03/12/18 99.2 kg (218 lb 11.1 oz)   03/12/18 99.2 kg (218 lb 12.8 oz)              Today, you had the following     No orders found for display         Today's Medication Changes          These changes are accurate as of 4/17/18  1:25 PM.  If you have any questions, ask your nurse or doctor.               These medicines have changed or have updated prescriptions.        Dose/Directions    docusate sodium 100 MG capsule   Commonly known as:  COLACE   This may have changed:  additional instructions   Used for:  Paralysis agitans (H)   Changed by:  Elver Stratton MD        1 tab @ 7am   Quantity:  180 capsule   Refills:  3       FLUoxetine 20 MG capsule   Commonly known as:  PROzac   This may have changed:    - additional instructions  - Another " medication with the same name was removed. Continue taking this medication, and follow the directions you see here.   Used for:  Paralysis agitans (H)   Changed by:  Elver Stratton MD        Take 20mg capsule  @ 7am   Quantity:  90 capsule   Refills:  3       LORazepam 1 MG tablet   Commonly known as:  ATIVAN   This may have changed:    - how much to take  - how to take this  - when to take this  - reasons to take this  - additional instructions   Used for:  Paralysis agitans (H)   Changed by:  Elver Stratton MD        1 tab by mouth @ 9pm as needed   Quantity:  60 tablet   Refills:  3         Stop taking these medicines if you haven't already. Please contact your care team if you have questions.     omeprazole 20 MG CR capsule   Commonly known as:  priLOSEC   Stopped by:  Elver Stratton MD                Where to get your medicines      These medications were sent to [email or web address] [id number] [facility] [address] [facility]  [address] [facility] [address]     Phone:  [phone]     pramipexole 0.5 MG tablet         Some of these will need a paper prescription and others can be bought over the counter.  Ask your nurse if you have questions.     Bring a paper prescription for each of these medications     LORazepam 1 MG tablet         Call your pharmacy to confirm that your medication is ready for pickup. It may take up to 24 hours for them to receive the prescription. If the prescription is not ready within 3 business days, please contact your clinic or your provider.     We will let you know when these medications are ready. If you don't hear back within 3 business days, please contact us.     amantadine 100 MG capsule                Primary Care Provider Office Phone # Fax #    Ondina Edmondson [phone]       [facility] [address] [facility] [address]        Equal Access to Services     AYLEEN GASCA AH: Mahamed remy  Somaryali, waaxda luqadaha, qaybta kaalmada stephanie, naga olivo. So [facility] [phone].    ATENCIÓN: Si red art, tiene a guerra disposición servicios gratuitos de asistencia lingüística. Candi al [phone].    We comply with applicable federal civil rights laws and [place] laws. We do not discriminate on the basis of race, color, national origin, age, disability, sex, sexual orientation, or gender identity.            Thank you!     Thank you for choosing [facility]  for your care. Our goal is always to provide you with excellent care. Hearing back from our patients is one way we can continue to improve our services. Please take a few minutes to complete the written survey that you may receive in the mail after your visit with us. Thank you!             Your Updated Medication List - Protect others around you: Learn how to safely use, store and throw away your medicines at www.disposemymeds.org.          This list is accurate as of 4/17/18  1:25 PM.  Always use your most recent med list.                   Brand Name Dispense Instructions for use Diagnosis    amantadine 100 MG capsule    SYMMETREL    180 capsule    1 capsule @ 7am and 4pm    Paralysis agitans (H)       aspirin EC 81 MG EC tablet     90 tablet    1 tab @ 9pm    Paralysis agitans (H)       busPIRone 10 MG tablet    BUSPAR    180 tablet    1 Tab @ 7am and 1 tab @ 4pm    Paralysis agitans (H)       carbidopa-levodopa  MG per tablet    SINEMET    1170 tablet    3 tabs @7, noon, 4p and 9pm and a few as needed = 13/day x 90 = 1170tablets    Paralysis agitans (H)       docusate sodium 100 MG capsule    COLACE    180 capsule    1 tab @ 7am    Paralysis agitans (H)       doxylamine 25 MG Tabs tablet    UNISOM    14 each    Take 1 tablet (25 mg) by mouth nightly as needed        FLUoxetine 20 MG capsule    PROzac    90 capsule    Take 20mg capsule  @ 7am    Paralysis agitans (H)       fluticasone 50 MCG/ACT  spray    FLONASE    1 Bottle    Spray 1 spray into both nostrils daily as needed for rhinitis or allergies        hydrochlorothiazide 25 MG tablet    HYDRODIURIL     Take 25 mg by mouth daily        LORazepam 1 MG tablet    ATIVAN    60 tablet    1 tab by mouth @ 9pm as needed    Paralysis agitans (H)       polyethylene glycol powder    MIRALAX/GLYCOLAX    119 g    Take 17 g by mouth daily as needed for constipation    Paralysis agitans (H)       pramipexole 0.5 MG tablet    MIRAPEX    180 tablet    2 tabs @ 9pm    Paralysis agitans (H)

## 2018-04-17 NOTE — PATIENT INSTRUCTIONS
Diagnosis/Summary/Recommendations:    PATIENT: Erika Diaz  57 year old female     : 1960    GINNY: 2018    Parkinson  Idiopathic Parkinson's disease in  - .  Left side onset  Wants the left body treated    Seeing Dr. Lantigua in [place] for mood issues  Elva Rhina, [facility]  [address] [place] [address]   [phone]    Has seen Aimee Lowe at the     MRI of the brain without and with intravenous contrast: 3/19/2018  12:24 PM  Parkinson's disease  ICD-10:     Comparison: 2016     Technique: Midline sagittal T1-weighted images and axial diffusion  with images through the whole brain were obtained. Axial and coronal  T2-weighted images, and inversion recovery images were obtained with  focus on the sandra through the corpus callosum. Following  administration of 10 cc intravenous Gadavist, three-dimensional  T1-weighted MP rage images were obtained.     Findings: Mild cerebral volume loss. Multiple scattered foci of T2  white matter hyperintensity, most of which are not a periventricular  location. Gray-white matter differentiation of the cerebral  hemispheres appears normal. Axial diffusion-weighted images appear  within normal limits without evidence of acute infarction. There is no  definite intra or extra-axial mass.  No abnormal enhancement.         Impression: Several scattered foci of T2 white matter hyperintensity,  nonspecific. Differential diagnosis includes chronic small vessel  ischemic changes, as well as prior infectious, inflammatory and  ischemic processes.     OLVIN BANKS MD    IMPRESSIONS AND RECOMMENDATIONS     Current results indicate variability in attention and memory, ranging from moderately impaired to superior, in some cases with greater difficulty on less complex tasks. Basic language, visual processing, and executive functioning fall within normal limits. Personality assessment is suggestive of somatization, and also raises  the possibility of a thought disorder as well as a history of manic episodes. Bipolar affective disorder or schizoaffective disorder could be considered.     This pattern of performance does not reflect dementia at this time. The marked variability in measures of attention and memory suggest an underlying psychiatric etiology; medications and nonrestorative sleep may also contribute. As noted, there is an indication of somatization on personality assessment, as well as other psychiatric symptoms including possible somatic delusions and hypomanic episodes. This does not preclude the presence of an underlying neurologic disorder, but she is likely to experience increases in physical symptoms during times of stress, and there may be a psychological component to her somatic symptoms. It will be particularly important to rely on objective medical data as much as possible when making decisions regarding diagnosis and treatment.     Given the suggestion of possible untreated psychiatric illness, there are concerns regarding her candidacy for surgery. Review of her records indicates that she has been diagnosed with generalized anxiety disorder and panic disorder. Given the concerns raised of this evaluation, she may benefit from referral to psychiatry for further evaluation and treatment recommendations. These psychiatric concerns would need to be addressed prior to further consideration of surgery from a neurocognitive and psychosocial perspective. Records indicate that she has established care recently with a health psychologist. Additionally, she has received care at various institutions. She stated that she was diagnosed with parkinsonism at [facility]. These records are not in her electronic medical records, and it may be helpful to obtain them for additional history.     In terms of daily functioning, Ms. Diaz s cognitive inefficiencies are not likely to interfere with her ability to actively participate in  treatment or to manage her instrument activities of daily living independently. Given her variability in memory and attention, however, she may find it helpful to carry a small notepad in which record important information, or for others to provide her with written reminders or checklists if possible. She may work best in environments that are relatively free from distractions, such as noises or other interruptions. She may find it helpful to complete one task before beginning another. Results may serve as a baseline of her neurocognitive functioning. Repeated neuropsychological evaluation in one year may help to determine whether her cognitive difficulties progress, remit, or remain stable.      Amanda Hernandez, Ph.D., ABPP  Licensed Psychologist, LP 4336  Board Certified in Clinical Neuropsychology     Time spent:  Four hours professional time, including interview, record review, data integration, and report writing (CPT 91699); an additional two hours, including testing administered by a psychometrist and interpreted by a neuropsychologist (CPT 05264). ICD-10 diagnosis: G20; F06.8.       Medications     7am noon 4pm 9pm 4AM   Amantadine symmetrel 100mg 1  1     Aspirin 81mg    1    Buspirone buspar 10mg 1  1     Carbidopa/levodopa sinemet 25/100  3 3 3 3 3 TABS as needed   Docusate colace 100mg 1       Doxylamine unisom 25mg    1 prn    Fluoxetine prozac 10mg Not taking       Fluoxetine prozac 20mg 1       Fluticasone flonase 50mcg/act spray As needed       Hydrochlorothiazide hydrodiuril 25mg 1       Lorazepam ativan 1mg As needed   1    Omeprazole prilosec 20mg Not taking       Polyethylene glycol miralax As needed       Pramipexole mirapex 0.5mg    2                                History obtained from patient     In summary, we wanted to make sure that we addressed her mood issues before we proceeded with surgery. She has seen Aimee Lowe and then subsequently now with Dr. Lantigua. She has done all the  requirements for surgery and she continues to have wearing off and that additional medication changes probably wont resolve this as well as DBS surgery. The plan would be to proceed with unilateral DBS. She would be looking at one of the 3 surgical options: Medtronic, Abbott (8 contacts) and [employer] (8 contacts and rechargeable). We would recommend medtronic or Abbott (only Medtronic is approved in regards to MRI compatibility).  She has not had a 7T MRI scan done at this time.  She would be a right GPI DBS and is interested in the 7T study. She is right handed but wants her left body treated. She is on aspirin and does not have diabetes and has not had prior anesthesia problems and is not on a blood thinner.     PLAN  RIGHT brain DBS surgery  We explained our overly cautious approach to her condition  Return to be determined based on the surgery.

## 2018-04-17 NOTE — LETTER
2018      RE: Erika Diaz  [address]    [facility] [address]     Diagnosis/Summary/Recommendations:    PATIENT: Erika Diaz  57 year old female     : 1960    GINNY: 2018    Parkinson  Idiopathic Parkinson's disease in  - .  Left side onset  Wants the left body treated    Seeing Dr. Lantigua in [place] for mood issues  Elva Lantigua, PsyD  [address] [place] [address]   [phone]    Has seen Aimee Lowe at the     MRI of the brain without and with intravenous contrast: 3/19/2018  12:24 PM  Parkinson's disease  ICD-10:     Comparison: 2016     Technique: Midline sagittal T1-weighted images and axial diffusion  with images through the whole brain were obtained. Axial and coronal  T2-weighted images, and inversion recovery images were obtained with  focus on the sandra through the corpus callosum. Following  administration of 10 cc intravenous Gadavist, three-dimensional  T1-weighted MP rage images were obtained.     Findings: Mild cerebral volume loss. Multiple scattered foci of T2  white matter hyperintensity, most of which are not a periventricular  location. Gray-white matter differentiation of the cerebral  hemispheres appears normal. Axial diffusion-weighted images appear  within normal limits without evidence of acute infarction. There is no  definite intra or extra-axial mass.  No abnormal enhancement.         Impression: Several scattered foci of T2 white matter hyperintensity,  nonspecific. Differential diagnosis includes chronic small vessel  ischemic changes, as well as prior infectious, inflammatory and  ischemic processes.     OLVIN BANKS MD    IMPRESSIONS AND RECOMMENDATIONS     Current results indicate variability in attention and memory, ranging from moderately impaired to superior, in some cases with greater difficulty on less complex tasks. Basic language, visual processing, and executive functioning fall  within normal limits. Personality assessment is suggestive of somatization, and also raises the possibility of a thought disorder as well as a history of manic episodes. Bipolar affective disorder or schizoaffective disorder could be considered.     This pattern of performance does not reflect dementia at this time. The marked variability in measures of attention and memory suggest an underlying psychiatric etiology; medications and nonrestorative sleep may also contribute. As noted, there is an indication of somatization on personality assessment, as well as other psychiatric symptoms including possible somatic delusions and hypomanic episodes. This does not preclude the presence of an underlying neurologic disorder, but she is likely to experience increases in physical symptoms during times of stress, and there may be a psychological component to her somatic symptoms. It will be particularly important to rely on objective medical data as much as possible when making decisions regarding diagnosis and treatment.     Given the suggestion of possible untreated psychiatric illness, there are concerns regarding her candidacy for surgery. Review of her records indicates that she has been diagnosed with generalized anxiety disorder and panic disorder. Given the concerns raised of this evaluation, she may benefit from referral to psychiatry for further evaluation and treatment recommendations. These psychiatric concerns would need to be addressed prior to further consideration of surgery from a neurocognitive and psychosocial perspective. Records indicate that she has established care recently with a health psychologist. Additionally, she has received care at various institutions. She stated that she was diagnosed with parkinsonism at [facility]. These records are not in her electronic medical records, and it may be helpful to obtain them for additional history.     In terms of daily functioning, Ms. Diaz s cognitive  inefficiencies are not likely to interfere with her ability to actively participate in treatment or to manage her instrument activities of daily living independently. Given her variability in memory and attention, however, she may find it helpful to carry a small notepad in which record important information, or for others to provide her with written reminders or checklists if possible. She may work best in environments that are relatively free from distractions, such as noises or other interruptions. She may find it helpful to complete one task before beginning another. Results may serve as a baseline of her neurocognitive functioning. Repeated neuropsychological evaluation in one year may help to determine whether her cognitive difficulties progress, remit, or remain stable.      Amanda Hernandez, Ph.D., [facility]  Licensed Psychologist, [employer] [id number]  Board Certified in Clinical Neuropsychology     Time spent:  Four hours professional time, including interview, record review, data integration, and report writing (CPT 25558); an additional two hours, including testing administered by a psychometrist and interpreted by a neuropsychologist (CPT 66290). ICD-10 diagnosis: G20; F06.8.       Medications     7am noon 4pm 9pm 4AM   Amantadine symmetrel 100mg 1  1     Aspirin 81mg    1    Buspirone buspar 10mg 1  1     Carbidopa/levodopa sinemet 25/100  3 3 3 3 3 TABS as needed   Docusate colace 100mg 1       Doxylamine unisom 25mg    1 prn    Fluoxetine prozac 10mg Not taking       Fluoxetine prozac 20mg 1       Fluticasone flonase 50mcg/act spray As needed       Hydrochlorothiazide hydrodiuril 25mg 1       Lorazepam ativan 1mg As needed   1    Omeprazole prilosec 20mg Not taking       Polyethylene glycol miralax As needed       Pramipexole mirapex 0.5mg    2                                History obtained from patient     In summary, we wanted to make sure that we addressed her mood issues before we proceeded with surgery. She has  "seen Aimee Lowe and then subsequently now with Dr. Lantigua. She has done all the requirements for surgery and she continues to have wearing off and that additional medication changes probably wont resolve this as well as DBS surgery. The plan would be to proceed with unilateral DBS. She would be looking at one of the 3 surgical options: Medtronic, Abbott (8 contacts) and [employer] (8 contacts and rechargeable). We would recommend medtronic or Abbott (only Medtronic is approved in regards to MRI compatibility).  She has not had a 7T MRI scan done at this time.  She would be a right GPI DBS and is interested in the 7T study. She is right handed but wants her left body treated. She is on aspirin and does not have diabetes and has not had prior anesthesia problems and is not on a blood thinner.     PLAN  RIGHT brain DBS surgery  We explained our overly cautious approach to her condition  Return to be determined based on the surgery.       Coding statement:   Duration of  Services: patient care and care coordination was 25 minutes  Greater than 50% of this visit was spent in counseling and coordination of care.     Elver Stratton MD     ______________________________________      PATIENT: Erika Diaz     : 1960     GINNY: 2018     REASON FOR VISIT:  Pre-DBS ON/OFF motor symptom evaluation.       HPI: Ms. Erika Diaz is a 57 year old right - handed  female who came to the [facility] neurology clinic accompanied by her , Zane, for ON/OFF motor evaluation as part of Deep Brain Stimulation surgery work-up for management of Parkinson's disease.      She was diagnosed with Idiopathic Parkinson's disease in  - . Her symptom started with stiffness in left hand.  She was holding her arm \"weird\" & thought she might have stroke.     When asked about her DBS goals, her  started responding.  When pt was asked directly, she had a hard time providing specific goals & said, " "\"I want to feel better. I want to be more normal without taking the medication.\"  She gave an example of how her medication stop working while at work & \"it's like turning the switch off.\" She would like to improve wearing off stiffness, difficulty walking, & foggy thinking, which all get better when she is ON.   reports that \"she would like to take less medication.\"  She agreed & stated that she would like to improve nausea & dyskinesias, which are the side effects of her medications.  She also wanted to be able to dance like she did when she was younger.  Currently, she is able to dance occasionally when her medications are working.      When she was asked about the risk of DBS surgery, she stated, \"stroke & infection.\"  She acknowledges that it would take longer to optimize programing & the need to return to clinic frequently.       As far as her mood & Dr. Hernandez's note, pt &  report that they're not too concerned about it.   reports that she doesn't have bipolar.  Her memory is better than his.  She sees Aimee Lowe, psychologist.  She has a dog for emotional therapy.  Pt &  report that she is psychologically stable to go through the surgery.       Current antiparkinsonian medication:   PD Medications 7 am 12 pm 4 pm 9 pm   Sinemet 25/100 mg  3 3 3 3   Amantadine 100 mg  1   1     Pramipexole 0.5 mg       1         Last dose of antiparkinsonian medication was taken:   Sinemet -- 3/12 at 4 pm   Amantadine -- 3/8 at 4 pm  Pramipexole -- 3/10 at 9 pm        In clinic, OFF motor exam was completed and patient took Sinemet 25/100 mg 3 tabs & Amantadine 100 mg 1 at 8 am.  After 60 minutes, ON motor exam was completed.     Physical Exam:     Vital Signs:  Blood pressure (!) 162/97, pulse 85, height 1.676 m (5' 6\"), weight 99.2 kg (218 lb 12.8 oz), SpO2 98 %.  Body mass index is 35.32 kg/(m^2).     MDS Part II  -- Total Score: 6      -- Over the last week -- 0: Normal -- 1: Slight -- 2: " Mild -- 3: Moderate -- 4: Severe      2.1 Speech: 2   2.2 Saliva and droolin   2.3 Chewing and swallowin   2.4 Eating tasks: 0   2.5 Dressin   2.6 Hygiene:  0   2.7 Handwritin   2.8 Doing hobbies and other activities:  0   2.9 Turning in bed:  0   2.10 Tremor:  2   2.11 Getting out of bed/car/deep chair:   1   2.12 Walking and balance: 0   2.13 Freezin      Total:     6          UPDRS Values 3/12/2018 3/12/2018   Time: 8:00 AM 9:00 AM   Medication Off On   R Brain DBS: None None   L Brain DBS: None None   Speech 2 1   Facial Expression 3 2   Rigidity Neck 2 1   Rigidity RUE 1 0   Rigidity LUE 2 1   Rigidity RLE 0 0   Rigidity LLE 3 0   Finger Taps R 2 1   Finger Taps L 3 1   Hand Mvt R 1 1   Hand Mvt L 3 1   Pron-/Supinate R 1 1   Pron-/Supinate L 2 1   Toe Tap R 1 1   Toe Tap L 4 1   Leg Agility R 1 1   Leg Agility L 3 1   Arise From Chair 1 1   Gait 2 1   Gait Freezing 0 0   Postural Stability 3 0   Posture 1 1   Global Spont Mvt 2 1   Postural Tremor RUE 0 0   Postural Tremor LUE 0 0   Kinetic Tremor RUE 0 0   Kinetic Tremor LUE 1 0   Rest Tremor RUE 0 0   Rest Tremor LUE 0 0   Rest Tremor RLE 0 0   Rest Tremor LLE 0 0   Rest Tremor Lip/Jaw 0 0   Rest Tremor Constancy 0 0   Total Right 7 5   Total Left 21 6   Axial Total 16 8   Total 44 19      MOTOR TEST: UPDRS Flowsheet was completed in Epic. Exam was videotaped.   Total OFF score = 44  Total ON score = 19     Percentage: 44 - 19 = 25 --> 25 ÷ 44 = 56.8 = 57% motor improvement.     NOTE:  She had mild body dyskinesias affecting her neck & extremities.  This became worse with mental activation.      ASSESSMENT/PLAN:     Parkinson's Disease: Ms. Diaz is a 57 year old right - handed female with about 10 year history of Idiopathic Parkinson's disease who came to the clinic for ON/OFF motor evaluation as part of her Deep Brain Stimulation surgery work-up.     __  Pt had some knowledge about DBS.        __  I discussed some of her  unrealistic expectations like being able to go back to dancing like she did when she was young  . . .     __  I also discussed the concern about her neuropsychological evaluation.  We might need to get a support letter form her psychologist.      __  I briefly went over DBS risks, & benefits; the possibility of 1 - 3 % serious side effects including infection, bleeding, stroke, cognitive & speech impairment, seizures, . . . . & death; the possibility of lead misplacement; appropriate DBS candidates; and DBS programming & the need to return to clinic for reprogramming.  All questions were answered.     __  She was informed that we'll contact her after the DBS team meets & discusses her work-up. She understands the plan.     The total time spent with the patient in ON/OFF motor evaluation & discussing about PD symptoms & DBS surgery was 120 minutes and greater than 50% of the time was spent in counseling & coordination of care.     GUILLERMINA Krueger, CNP   [facility]      Last visit date and details:      PATIENT: Erika Diaz  57 year old female      : 1960     GINNY: 2018     Parkinson   or  left side onset      Had some off time with stress  Had problems this past weekend  Here today with Zane     Had neuropsychological evaluation with Dr. Hernandez     Assessment:  The patient has PD and is under consideration for the DBS surgery.  She has a history of trauma and some anxiety surrounding these events.       Plan:  With continued guidance and education from her health care team, there are no major concerns from a psychological standpoint, and patient is probably a good candidate for DBS surgery. We will work together to decrease anxiety and increase coping mechanisms.        Will see in one week.        Time In:  8:00  Time Out:  9:00      Diagnosis:  Axis I Generalized Anxiety Disorder;  Psychological factors associated with disease   Axis II Deferred   Axis III Obesity  (278.00), PD, please see medical records for details   [place] IV Psychosocial and Environmental Stressors:  health & concern surrounding grand daughter           Aimee Mynor, Ph.D., L.P.     Attended the DBS class  They are interested in DBS workup - had neuropsychological evalaution  Has metal in her right knee  Is not claustrophobic  Has had mri since the knee but not clear if cleared for a 7T mri scan  Has not had motor testing yet.         Medications     7am Noon 4pm   9pm Night time   Amantadine 100mg 1   1         Aspirin 81mg         1     Buspirone buspar 10mg 1   1         Carbidopa/levodopa sinemet 25/100 3 3 3   3 As needed   4am   Docusate colace 100mg As needed             Doxylamine unisom 25mg         prn     Fluoxetine prozac 10mg 1             Fluoxetine prozac 20mg 1             Fluticasone flonase prn             Lorazepam ativan 1mg prn             Omeprazole prilosec 20mg as needed             Polyethylene gycol miralax As needed             Pramipexole mirapex 0.5mg         2     Simvastatin zocor 20mg         Not tkaing                                                                           Over 50% of this visit was spent in patient care and care coordination.      History obtained from patient     Total visit time was 25 minutes     PLAN  Complete dbs workup with brain mri, motor testing and meet with Dr. Concepcion  Will need to see Dr. Lowe again  Return to be determined  With me or Karina Stratton MD      ______________________________________     Last visit date and details:      Parkinson's disease 57 yrs old with diagnosis in 2009 or 2008 left side onset   Ongoing mood issues that are being managed with pharmacotherapy  She may benefit from a visit with psychiatry and psychology  Would recommend baseline dbs evaluation  Would recommend dbs class      Will need return visit to review her medications in the coming months.   She met with Zina Yu.      Her email  "[email or web address]  mychart was set up by me to allow ready access to us      We will review her medications and decide if we will make a change to her regimen      Return back after evaluations to see Karina, or with a fellow with me or other colleague.      Elver Stratton MD        PATIENT: Erika Diaz     : 1960     GINNY: 2018     REASON FOR VISIT:  Pre-DBS ON/OFF motor symptom evaluation.       HPI: Ms. Erika Diaz is a 57 year old right - handed  female who came to the [facility] neurology clinic accompanied by her , Zane, for ON/OFF motor evaluation as part of Deep Brain Stimulation surgery work-up for management of Parkinson's disease.      She was diagnosed with Idiopathic Parkinson's disease in  - . Her symptom started with stiffness in left hand.  She was holding her arm \"weird\" & thought she might have stroke.     When asked about her DBS goals, her  started responding.  When pt was asked directly, she had a hard time providing specific goals & said, \"I want to feel better. I want to be more normal without taking the medication.\"  She gave an example of how her medication stop working while at work & \"it's like turning the switch off.\" She would like to improve wearing off stiffness, difficulty walking, & foggy thinking, which all get better when she is ON.   reports that \"she would like to take less medication.\"  She agreed & stated that she would like to improve nausea & dyskinesias, which are the side effects of her medications.  She also wanted to be able to dance like she did when she was younger.  Currently, she is able to dance occasionally when her medications are working.      When she was asked about the risk of DBS surgery, she stated, \"stroke & infection.\"  She acknowledges that it would take longer to optimize programing & the need to return to clinic frequently.       As far as her mood & Dr. Hernandez's note, pt &  report that " "they're not too concerned about it.   reports that she doesn't have bipolar.  Her memory is better than his.  She sees Aimee Lowe, psychologist.  She has a dog for emotional therapy.  Pt &  report that she is psychologically stable to go through the surgery.       Current antiparkinsonian medication:   PD Medications 7 am 12 pm 4 pm 9 pm   Sinemet 25/100 mg  3 3 3 3   Amantadine 100 mg  1   1     Pramipexole 0.5 mg       1         Last dose of antiparkinsonian medication was taken:   Sinemet -- 3/12 at 4 pm   Amantadine -- 3/8 at 4 pm  Pramipexole -- 3/10 at 9 pm        In clinic, OFF motor exam was completed and patient took Sinemet 25/100 mg 3 tabs & Amantadine 100 mg 1 at 8 am.  After 60 minutes, ON motor exam was completed.     Physical Exam:     Vital Signs:  Blood pressure (!) 162/97, pulse 85, height 1.676 m (5' 6\"), weight 99.2 kg (218 lb 12.8 oz), SpO2 98 %.  Body mass index is 35.32 kg/(m^2).     MDS Part II  -- Total Score: 6      -- Over the last week -- 0: Normal -- 1: Slight -- 2: Mild -- 3: Moderate -- 4: Severe      2.1 Speech: 2   2.2 Saliva and droolin   2.3 Chewing and swallowin   2.4 Eating tasks: 0   2.5 Dressin   2.6 Hygiene:  0   2.7 Handwritin   2.8 Doing hobbies and other activities:  0   2.9 Turning in bed:  0   2.10 Tremor:  2   2.11 Getting out of bed/car/deep chair:   1   2.12 Walking and balance: 0   2.13 Freezin      Total:     6          UPDRS Values 3/12/2018 3/12/2018   Time: 8:00 AM 9:00 AM   Medication Off On   R Brain DBS: None None   L Brain DBS: None None   Speech 2 1   Facial Expression 3 2   Rigidity Neck 2 1   Rigidity RUE 1 0   Rigidity LUE 2 1   Rigidity RLE 0 0   Rigidity LLE 3 0   Finger Taps R 2 1   Finger Taps L 3 1   Hand Mvt R 1 1   Hand Mvt L 3 1   Pron-/Supinate R 1 1   Pron-/Supinate L 2 1   Toe Tap R 1 1   Toe Tap L 4 1   Leg Agility R 1 1   Leg Agility L 3 1   Arise From Chair 1 1   Gait 2 1   Gait Freezing 0 0   Postural " Stability 3 0   Posture 1 1   Global Spont Mvt 2 1   Postural Tremor RUE 0 0   Postural Tremor LUE 0 0   Kinetic Tremor RUE 0 0   Kinetic Tremor LUE 1 0   Rest Tremor RUE 0 0   Rest Tremor LUE 0 0   Rest Tremor RLE 0 0   Rest Tremor LLE 0 0   Rest Tremor Lip/Jaw 0 0   Rest Tremor Constancy 0 0   Total Right 7 5   Total Left 21 6   Axial Total 16 8   Total 44 19      MOTOR TEST: UPDRS Flowsheet was completed in Epic. Exam was videotaped.   Total OFF score = 44  Total ON score = 19     Percentage: 44 - 19 = 25 --> 25 ÷ 44 = 56.8 = 57% motor improvement.     NOTE:  She had mild body dyskinesias affecting her neck & extremities.  This became worse with mental activation.      ASSESSMENT/PLAN:     Parkinson's Disease: Ms. Diaz is a 57 year old right - handed female with about 10 year history of Idiopathic Parkinson's disease who came to the clinic for ON/OFF motor evaluation as part of her Deep Brain Stimulation surgery work-up.     __  Pt had some knowledge about DBS.        __  I discussed some of her unrealistic expectations like being able to go back to dancing like she did when she was young  . . .     __  I also discussed the concern about her neuropsychological evaluation.  We might need to get a support letter form her psychologist.      __  I briefly went over DBS risks, & benefits; the possibility of 1 - 3 % serious side effects including infection, bleeding, stroke, cognitive & speech impairment, seizures, . . . . & death; the possibility of lead misplacement; appropriate DBS candidates; and DBS programming & the need to return to clinic for reprogramming.  All questions were answered.     __  She was informed that we'll contact her after the DBS team meets & discusses her work-up. She understands the plan.     The total time spent with the patient in ON/OFF motor evaluation & discussing about PD symptoms & DBS surgery was 120 minutes and greater than 50% of the time was spent in counseling & coordination  of care.     GUILLERMINA Krueger, CNP   [facility]      HISTORY OF PRESENT ILLNESS  We saw Ms. Erika Diaz today in Neurosurgery Clinic as part of her work-up for Deep Brain Stimulation.  She is a 57 year old woman with a 9-10 year history of Parkinson s disease, being diagnosed in [id number].  She first developed stiffness in her left hand and shoulder that has since progressed to include tremors.  Her symptoms are worse on the left-side.  Her goals with Deep Brain Stimulation are to gain tremor control and reduce dyskinesias.  She wants to feel better, be more normal without taking the medications, improve wearing off stiffness, difficulty walking and foggy thinking.  Medication reduction is important.  She does have regular sessions with a chiropractor to relieve tension in her neck.  She is currently undergoing DBS candidacy evaluation.  She has had her ON/OFF testing and neuropsych evaluation.  She is also scheduled for MRI brain.         Past Medical History         Past Medical History:   Diagnosis Date     Degenerative joint disease 11/7/2017     Encounter for neuropsychological testing 12/20/2017     Current results indicate variability in attention and memory, ranging from moderately impaired to superior, in some cases with greater difficulty on less complex tasks. Basic language, visual processing, and executive functioning fall within normal limits. Personality assessment is suggestive of somatization, and also raises the possibility of a thought disorder as well as a history of manic episo     Former smoker quit 2009 11/7/2017     History of colonic polyps 11/7/2017     HTN (hypertension) 11/7/2017     Hypercholesterolemia 11/7/2017     Lactose intolerance in adult 11/7/2017     Migraines       Obesity 11/7/2017     Other nervous system complications       PID (pelvic inflammatory disease) due to IUD 11/7/2017     Pulmonary emboli (H) - coumadin lovenox 11/7/2017              Past Surgical History          Past Surgical History:   Procedure Laterality Date     adhesioloysis         CHOLECYSTECTOMY         COLONOSCOPY         JOINT REPLACEMENT Right       right partial knee replacement     LAPAROSCOPY         adhesioloysis     LAPAROSCOPY         supracervical hysterectomy     LAPAROTOMY EXPLORATORY Left       partial removal of left ovary     REMOVE INTRAUTERINE DEVICE   2006     salpingoopherectomy                 Family History          Family History   Problem Relation Age of Onset     Breast Cancer Mother               Social History    Social History            Social History     Marital status:        Spouse name: N/A     Number of children: N/A     Years of education: N/A          Occupational History     Not on file.             Social History Main Topics     Smoking status: Former Smoker       Packs/day: 0.50       Years: 20.00       Types: Cigarettes     Smokeless tobacco: Never Used         Comment: quit smoking 2009     Alcohol use No     Drug use: No     Sexual activity: Not Currently       Partners: Male       Birth control/ protection: None           Other Topics Concern     Parent/Sibling W/ Cabg, Mi Or Angioplasty Before 65f 55m? No          Social History Narrative     . lives in [place]. Zane Diaz spouse                      Allergies   Allergen Reactions     Atorvastatin         Other reaction(s): Myalgia     Codeine Itching             Current Outpatient Prescriptions   Medication     amantadine (SYMMETREL) 100 MG capsule     docusate sodium (COLACE) 100 MG capsule     omeprazole (PRILOSEC) 20 MG CR capsule     pramipexole (MIRAPEX) 0.5 MG tablet     busPIRone (BUSPAR) 10 MG tablet     aspirin EC 81 MG EC tablet     carbidopa-levodopa (SINEMET)  MG per tablet     polyethylene glycol (MIRALAX/GLYCOLAX) powder     FLUoxetine (PROZAC) 10 MG capsule     FLUoxetine (PROZAC) 20 MG capsule     doxylamine (UNISOM) 25 MG TABS tablet      fluticasone (FLONASE) 50 MCG/ACT spray     LORazepam (ATIVAN) 1 MG tablet      No current facility-administered medications for this visit.             REVIEW OF SYSTEMS:  General: Negative for chills/sweats/fever, difficulty sleeping, headache, recent fatigue, or weight gain/loss.  Eyes: Negative for blurred vision, crossed eyes, double vision, recent eye infections, vision flashes, or vision halos.  Ears/Nose/Mouth/Throat: Negative for bleeding gums, difficulty swallowing, earache, ear discharge, hearing loss, hoarseness, nosebleeds, tinnitus, or sinus problems.  Respiratory:Negative for chronic cough, coughing blood, night sweats, shortness of breath, Tuberculosis, or wheezing.  Cardiovascular: Negative for chest pain, dyspnea at night, heart murmur, palpitations, pacemaker, pacemaker, poor circulation, swollen legs/feet, or varicose veins.  Gastrointestinal: Negative for melena, hematochezia, chronic diarrhea, heartburn, Hepatitis A/B/C, increasing constipation, Liver Disease, nausea, or vomiting.   Genitourinary: Negative for Urinary retention, genital discharge, urinary incontinence, prostate problems, urgency, or UTI.   Neurological: Negative for syncope, headaches, numbness of arms/legs, tingling in hands/arms/legs, memory problems, or seizures.  Psychological: Negative for anxiety, depression, panic attacks, or restlessness.  Skin: Negative for chronic skin itching, color changes in hand/feet when cold, poor scarring, non-healing ulcers, skin rashes/hives, unusual moles.  Musculoskeletal: Negative for arthritis, joint swelling in hands/wrists/hips/knees/joints, muscle tenderness in arms/legs, or osteoporosis.  Endocrine: Negative for excessive thirst/hunger, intolerance for warm rooms, loss of libido, multiple broken bones, rapid weight gain/loss, galactorrhea, or thyroid issues.  Hematologic/Lymphatic: Negative for easy skin bruising, significant fatigue, prolonged bleeding, tender glands/lymph  "nodes.  Allergies: Negative for asthma or hay fever.        PHYSICAL EXAM  BP (!) 162/97 (BP Location: Right arm, Patient Position: Sitting, Cuff Size: Adult Large)  Pulse 85  Ht 1.676 m (5' 6\")  Wt 99.2 kg (218 lb 11.1 oz)  BMI 35.3 kg/m2     General: Awake, alert, oriented. Well nourished, well developed, no acute distress.  HEENT: Head normocephalic, atraumatic. No carotid bruit. Neck supple. Good range of motion. No palpable thyroid mass.  Heart: Regular rhythm and rate. No murmurs.  Lungs: Clear to auscultation and percussion bilaterally. No rhonchi, rales or wheeze.  Abdomen: Soft, non-tender, non-distended. No hepatosplenomegaly.  Extremity: Warm with no clubbing or cyanosis. No lower extremity edema.     Neurological:  Awake, alert and oriented to date, time, place and person. Speech fluent.   Pupils equal, round, reactive to light.  Extraocular movement intact.  Visual fields are full on confrontation.  Hearing is grossly normal to finger rub.   Facial sensation intact.  Face symmetric.  Tongue midline.  Uvula elevates equally.     Motor: full strength throughout.  Sensation: intact to light touch and pinpoint.  Deep tendon reflexes: Trace throughout. Negative for clonus. Negative for Ashraf's sign. No dysmetria.        ASSESSMENT   57 year old right handed female with a history of Parkinson's disease.     Patient is undergoing DBS candidacy evaluation.  Her symptom is worse on the left side so right side implantation should be considered first.  She may also be a wait and see candidate.  In terms of target, STN or GPi may be appropriate but if she is desiring less medication, STN may be better.  However, if stiffness and dyskinesia GPi could be considered.  We will have to discuss the target location.     There are no images for me to review at this time so anatomical considerations for the surgery cannot be determined.  She is scheduled for an MRI soon.     Medically, there is no significant " contraindication.  She has a history of PE which was treated with Coumadin.  She is not on any blood thinners at this time.     During today's visit, we discussed both phase I and II of DBS surgery. We discussed that during Phase I, we would place the DBS electrode on the right side under MAC and microelectrode recording.  She would then return one week later for the phase II with placement of the DBS generator/battery over the right chest wall under general anesthesia.  If she is a unilateral candidate or wait and see strategy candidate, then she will undergo another evaluation/discussion prior to proceeding to the left side implantation.  If she is a bilateral candidate in a staged fashion, she would return three weeks later for the phase I and II combined for the placement of the DBS electrode on the left side under MAC and microelectrode recording followed by connection to the previously implanted generator/battery.      Briefly, following topic was discussed.     [employer]  MRI compatible.  Non-rechargeable and rechargeable generator/battery available.  4 contact electrode.  Communication method: have the  or patient controller on the skin directly over the generator/battery.     [employer]  Not yet MRI compatible.  Will become MRI compatible with retro-approval when FDA approves the device for MRI use.  Non-rechargeable generator/battery.  9 contact segmented/directional electrode.  Communication method: Wireless/bluetooth.     "[employer]  Not MRI compatible.  Working on generator/battery that will be MRI compatible.  If patient gets an implant and needs an MRI in the future, when the device becomes available, patient can have the upgrade.  Rechargeable generator/battery.  8 contact electrode.  Independent current control at each contacts.  Communication method: Wireless.     I did discuss that the implant technique for these devices are relatively the same which was discussed again.  They all  have similar risks associated with the surgery.      Risks, benefits, alternative therapies were discussed with the patient, including but not limited to infection and bleeding (intracranial), injury to the brain, stroke, death, hardware failure and possible need for more surgeries.  Surgical procedure was discussed in detail.  I also discussed possible use of NASRIN for neuronavigation.  All questions were answered, and she expressed understanding.  A full history and physical exam was performed during today's visit.  Her case will be discussed at the Movement Disorder Consensus Group Meeting to determine whether she is a good candidate for DBS surgery.        PLAN:  1. We will discuss her case at the Movement Disorder Consensus Group Meeting, and we will contact her regarding her DBS candidacy.      I, Ramiro Khan, am serving as a scribe to document services personally performed by Jerry Concepcion MD, PhD, based upon my observations and the provider's statements to me. All documentation has been reviewed and edited by the aforementioned doctor prior to being entered into the official medical record.     I, Jerry Concepcion, attest that above named individual is acting in scribe capacity, has observed my performance of the services and has documented them in accordance with my direction. The documentation recorded by the scribe accurately reflects the service I personally performed and the decisions made by me. The document was also partially recorded by me and the entire document was edited by me as well.         65 minutes were spent face to face with the patient of which more than 50% of the time was spent counseling and discussing the above issues regarding diagnosis, surgical and device options, and steps for further evaluation.    ______________________________________      Patient was asked about 14 Review of systems including changes in vision (dry eyes, double vision), hearing, heart, lungs,  musculoskeletal, depression, anxiety, snoring, RBD, insomnia, urinary frequency, urinary urgency, constipation, swallowing problems, hematological, ID, allergies, skin problems: seborrhea, endocrinological: thyroid, diabetes, cholesterol; balance, weight changes, and other neurological problems and these were not significant at this time except for   Patient Active Problem List   Diagnosis     Abdominal pain     Cervical high risk HPV (human papillomavirus) test positive     JASON (generalized anxiety disorder)     Hyperlipidemia, unspecified     Osteoarthritis of knee, unilateral     Pain medication agreement signed     Parkinson's disease (H)     Pulmonary embolism (H)     Lactose intolerance in adult     Degenerative joint disease     History of colonic polyps     Hypercholesterolemia     HTN (hypertension)     Obesity     PID (pelvic inflammatory disease) due to IUD     Pulmonary emboli (H) - coumadin lovenox     Former smoker quit 2009     Encounter for neuropsychological testing     Migraine syndrome          Allergies   Allergen Reactions     Atorvastatin      Other reaction(s): Myalgia     Codeine Itching     Past Surgical History:   Procedure Laterality Date     adhesioloysis       CHOLECYSTECTOMY       COLONOSCOPY       JOINT REPLACEMENT Right     right partial knee replacement     LAPAROSCOPY      adhesioloysis     LAPAROSCOPY      supracervical hysterectomy     LAPAROTOMY EXPLORATORY Left     partial removal of left ovary     REMOVE INTRAUTERINE DEVICE  2006     salpingoopherectomy       Past Medical History:   Diagnosis Date     Degenerative joint disease 11/7/2017     Encounter for neuropsychological testing 12/20/2017    Current results indicate variability in attention and memory, ranging from moderately impaired to superior, in some cases with greater difficulty on less complex tasks. Basic language, visual processing, and executive functioning fall within normal limits. Personality assessment is  suggestive of somatization, and also raises the possibility of a thought disorder as well as a history of manic episo     Former smoker quit 2009 11/7/2017     History of colonic polyps 11/7/2017     HTN (hypertension) 11/7/2017     Hypercholesterolemia 11/7/2017     Lactose intolerance in adult 11/7/2017     Migraines      Obesity 11/7/2017     Other nervous system complications      PID (pelvic inflammatory disease) due to IUD 11/7/2017     Pulmonary emboli (H) - coumadin lovenox 11/7/2017     Social History     Social History     Marital status:      Spouse name: N/A     Number of children: N/A     Years of education: N/A     Occupational History     Not on file.     Social History Main Topics     Smoking status: Former Smoker     Packs/day: 0.50     Years: 20.00     Types: Cigarettes     Smokeless tobacco: Never Used      Comment: quit smoking 2009     Alcohol use No     Drug use: No     Sexual activity: Not Currently     Partners: Male     Birth control/ protection: None     Other Topics Concern     Parent/Sibling W/ Cabg, Mi Or Angioplasty Before 65f 55m? No     Social History Narrative    . lives in [place]. Zane Diaz spouse       Drug and lactation database from the [place] [employer]:  http://toxnet.nlm.nih.gov/cgi-bin/sis/htmlgen?LACT      B/P: Data Unavailable, T: Data Unavailable, P: Data Unavailable, R: Data Unavailable 0 lbs 0 oz  There were no vitals taken for this visit., There is no height or weight on file to calculate BMI.  Medications and Vitals not listed above were documented in the cart and reviewed by me.     Current Outpatient Prescriptions   Medication Sig Dispense Refill     amantadine (SYMMETREL) 100 MG capsule 1 capsule @ 7am and 4pm 180 capsule 3     docusate sodium (COLACE) 100 MG capsule 1 tab @ 7am and 9pm as needed 180 capsule 3     omeprazole (PRILOSEC) 20 MG CR capsule As needed 30 capsule      pramipexole (MIRAPEX) 0.5 MG tablet 2  tabs @ 9pm 180 tablet 3     busPIRone (BUSPAR) 10 MG tablet 1 Tab @ 7am and 1 tab @ 4pm 180 tablet 3     aspirin EC 81 MG EC tablet 1 tab @ 9pm 90 tablet 3     carbidopa-levodopa (SINEMET)  MG per tablet 3 tabs @7, noon, 4p and 9pm and a few as needed = 13/day x 90 = 1170tablets 1170 tablet 3     polyethylene glycol (MIRALAX/GLYCOLAX) powder Take 17 g by mouth daily as needed for constipation 119 g      FLUoxetine (PROZAC) 10 MG capsule Take 10 + 20mg tabs @ 7am= 30mg/day 90 capsule 3     FLUoxetine (PROZAC) 20 MG capsule Take 20mg capsule with 10mg capsule @ 7am = 30mg/day 90 capsule 3     doxylamine (UNISOM) 25 MG TABS tablet Take 1 tablet (25 mg) by mouth nightly as needed 14 each      fluticasone (FLONASE) 50 MCG/ACT spray Spray 1 spray into both nostrils daily as needed for rhinitis or allergies 1 Bottle      LORazepam (ATIVAN) 1 MG tablet Take 1 tablet (1 mg) by mouth daily as needed for anxiety 60 tablet 3         Elver Stratton MD        Answers for HPI/ROS submitted by the patient on 4/11/2018   General Symptoms: Yes  Skin Symptoms: No  HENT Symptoms: Yes  EYE SYMPTOMS: Yes  HEART SYMPTOMS: Yes  LUNG SYMPTOMS: No  INTESTINAL SYMPTOMS: Yes  URINARY SYMPTOMS: Yes  GYNECOLOGIC SYMPTOMS: No  BREAST SYMPTOMS: No  SKELETAL SYMPTOMS: Yes  BLOOD SYMPTOMS: No  NERVOUS SYSTEM SYMPTOMS: No  MENTAL HEALTH SYMPTOMS: No  Fever: No  Loss of appetite: No  Weight loss: No  Weight gain: Yes  Fatigue: No  Night sweats: No  Chills: No  Increased stress: No  Excessive hunger: Yes  Excessive thirst: No  Feeling hot or cold when others believe the temperature is normal: Yes  Loss of height: No  Post-operative complications: No  Surgical site pain: No  Hallucinations: No  Change in or Loss of Energy: No  Hyperactivity: No  Confusion: No  Ear pain: No  Ear discharge: No  Hearing loss: No  Tinnitus: Yes  Nosebleeds: No  Congestion: No  Sinus pain: No  Trouble swallowing: Yes   Voice hoarseness: No  Mouth sores: No  Sore throat:  No  Tooth pain: No  Gum tenderness: No  Bleeding gums: No  Change in taste: No  Change in sense of smell: Yes  Dry mouth: Yes  Hearing aid used: No  Neck lump: No  Eye pain: No  Vision loss: Yes  Dry eyes: Yes  Watery eyes: No  Eye bulging: No  Double vision: No  Flashing of lights: No  Spots: No  Floaters: Yes  Redness: No  Crossed eyes: No  Tunnel Vision: No  Yellowing of eyes: No  Eye irritation: No  Chest pain or pressure: No  Fast or irregular heartbeat: No  Pain in legs with walking: No  Trouble breathing while lying down: No  Fingers or toes appear blue: No  High blood pressure: Yes  Low blood pressure: No  Fainting: No  Murmurs: Yes  Pacemaker: No  Varicose veins: Yes  Edema or swelling: No  Wake up at night with shortness of breath: No  Light-headedness: No  Exercise intolerance: No  Heart burn or indigestion: Yes  Nausea: Yes  Abdominal pain: No  Bloating: No  Constipation: Yes  Diarrhea: No  Blood in stool: No  Black stools: No  Rectal or Anal pain: No  Fecal incontinence: No  Yellowing of skin or eyes: No  Vomit with blood: No  Change in stools: No  Trouble holding urine or incontinence: No  Pain or burning: No  Trouble starting or stopping: No  Increased frequency of urination: No  Blood in urine: No  Decreased frequency of urination: No  Frequent nighttime urination: No  Flank pain: No  Difficulty emptying bladder: Yes  Back pain: Yes  Muscle aches: Yes  Neck pain: Yes  Swollen joints: Yes  Joint pain: Yes  Bone pain: No  Muscle cramps: Yes  Muscle weakness: No  Joint stiffness: Yes  Bone fracture: No      Elver Stratton MD

## 2018-04-17 NOTE — NURSING NOTE
Chief Complaint   Patient presents with     RECHECK     [facility]- MOVEMENT DISORDER F/U     Elpidio Marti, CECILIA

## 2018-04-17 NOTE — Clinical Note
4/17/2018       RE: Erika Diaz  [address] [facility]  [address] [place] [id number]     Dear Colleague,    Thank you for referring your patient, Erika Diaz, to the [facility] NEUROLOGY at [facility]. Please see a copy of my visit note below.    No notes on file    Again, thank you for allowing me to participate in the care of your patient.      Sincerely,    Elver Stratton MD

## 2018-05-21 DIAGNOSIS — G20.A1 PARKINSON'S DISEASE (H): Primary | ICD-10-CM

## 2018-06-27 ENCOUNTER — MYC MEDICAL ADVICE (OUTPATIENT)
Dept: OTHER | Age: 58
End: 2018-06-27

## 2018-06-28 NOTE — TELEPHONE ENCOUNTER
"Patient called me at 9:30am on 18 to talk about research studies. She said she is very interested in all she can do to \"give back and figure out this Parkinsons\". I will email her consents for Beddit sleep study, , and Clinical Core studies and follow up with her in one week. We will either meet to sign consents at her PAC appt on  or at her 7T once scheduled.     She said she is very interested in 7T, and if she can't be reached on her phone, we are welcome to call her  Zane at [phone]. I will forward this information to Mindy Car, research coordinator for 7T MRI study.       Shanice Contreras, RN, MPH  Research Nurse, Movement Disorders  "

## 2018-07-05 ENCOUNTER — DOCUMENTATION ONLY (OUTPATIENT)
Dept: NEUROSURGERY | Facility: CLINIC | Age: 58
End: 2018-07-05

## 2018-07-24 DIAGNOSIS — G20.A1 PARKINSON'S DISEASE (H): Primary | ICD-10-CM

## 2018-07-27 ENCOUNTER — ALLIED HEALTH/NURSE VISIT (OUTPATIENT)
Dept: NEUROLOGY | Facility: CLINIC | Age: 58
End: 2018-07-27
Payer: MEDICARE

## 2018-07-27 ENCOUNTER — APPOINTMENT (OUTPATIENT)
Dept: LAB | Facility: CLINIC | Age: 58
End: 2018-07-27
Payer: MEDICARE

## 2018-07-27 VITALS
BODY MASS INDEX: 34.36 KG/M2 | HEART RATE: 79 BPM | TEMPERATURE: 97.8 F | OXYGEN SATURATION: 97 % | WEIGHT: 213.8 LBS | DIASTOLIC BLOOD PRESSURE: 57 MMHG | RESPIRATION RATE: 20 BRPM | SYSTOLIC BLOOD PRESSURE: 121 MMHG | HEIGHT: 66 IN

## 2018-07-27 DIAGNOSIS — Z00.6 EXAMINATION OF PARTICIPANT IN CLINICAL TRIAL: Primary | ICD-10-CM

## 2018-07-27 RX ORDER — SIMVASTATIN 20 MG
20 TABLET ORAL AT BEDTIME
Qty: 90 TABLET | Refills: 3 | Status: CANCELLED | OUTPATIENT
Start: 2018-07-27

## 2018-07-27 ASSESSMENT — PAIN SCALES - GENERAL: PAINLEVEL: NO PAIN (0)

## 2018-07-27 NOTE — MR AVS SNAPSHOT
After Visit Summary   7/27/2018    Erika Diaz    MRN: [id number]           Patient Information     Date Of Birth          1960        Visit Information        Provider Department      7/27/2018 10:00 AM Nurse, [facility]        Today's Diagnoses     Examination of participant in clinical trial    -  1       Follow-ups after your visit        Your next 10 appointments already scheduled     Aug 07, 2018 10:30 AM CDT   (Arrive by 10:15 AM)   PAC EVALUATION with Rose Roland PA-C   [facility] ([facility])    [address] [place] [address]  4th [facility] [address]   [phone]            Aug 07, 2018 11:30 AM CDT   (Arrive by 11:15 AM)   PAC RN ASSESSMENT with  Pac Rn   [facility] ([facility])    [address] [place] [address]   [phone]            Aug 07, 2018 12:00 PM CDT   (Arrive by 11:45 AM)   PAC Anesthesia Consult with  Pac Anesthesiologist   [facility] ([facility])    [address] [place] [address]   [phone]            Aug 14, 2018 12:10 PM CDT   (Arrive by 11:55 AM)   Return Movement Disorder with Elver Stratton MD   [facility] ([facility])    [address] [place] [address]  3rd [facility] [address]   [phone]            Aug 21, 2018   Procedure with Jerry Concepcion MD   [facility] [place], Same Day Surgery (--)    [address] [place] [id number] [phone]            Aug 21, 2018  8:40 AM CDT   CT HEAD W/O CONTRAST with UUCT1   [facility] [place], [place] ([facility])    [address] [facility] [id number] [phone]           Please bring any scans or X-rays taken at other hospitals, if similar tests were done. Also  bring a list of your medicines, including vitamins, minerals and over-the-counter drugs. It is safest to leave personal items at home.  Be sure to tell your doctor:   If you have any allergies.   If there s any chance you are pregnant.   If you are breastfeeding.  You do not need to do anything special to prepare for this exam.  Please wear loose clothing, such as a sweat suit or jogging clothes. Avoid snaps, zippers and other metal. We may ask you to undress and put on a hospital gown.            Aug 28, 2018   Procedure with Jerry Concepcion MD   [facility] [place], Same Day Surgery (--)    [address] [facility] [address]   [phone]            Sep 11, 2018 12:00 PM CDT   (Arrive by 11:45 AM)   Return Visit with Venus Anthony PA-C   [facility] ([facility])    [address] [place] [id number] [phone]            Sep 21, 2018  7:00 AM CDT   (Arrive by 6:45 AM)   Return Visit with GUILLERMINA Vallecillo On license of [facility] ([facility])    [address] [place] [id number] [phone]            Sep 21, 2018 11:40 AM CDT   CT HEAD W/O CONTRAST with UCCT2   [facility] CT ([facility])    [address] [place] [id number] [phone]           Please bring any scans or X-rays taken at other hospitals, if similar tests were done. Also bring a list of your medicines, including vitamins, minerals and over-the-counter drugs. It is safest to leave personal items at home.  Be sure to tell your doctor:   If you have any allergies.   If there s any chance you are pregnant.   If you are breastfeeding.  You do not need to do anything special to prepare for this exam.  Please wear loose clothing, such as a sweat suit or jogging clothes. Avoid snaps, zippers and other metal. We may ask you to undress and put on a hospital gown.     "          Who to contact     Please call your clinic at [phone] to:    Ask questions about your health    Make or cancel appointments    Discuss your medicines    Learn about your test results    Speak to your doctor            Additional Information About Your Visit        [employer] Information     [employer] gives you secure access to your electronic health record. If you see a primary care provider, you can also send messages to your care team and make appointments. If you have questions, please call your primary care clinic.  If you do not have a primary care provider, please call [phone] and they will assist you.      [employer] is an electronic gateway that provides easy, online access to your medical records. With [employer], you can request a clinic appointment, read your test results, renew a prescription or communicate with your care team.     To access your existing account, please contact your [facility] or call [phone] for assistance.        Care EveryWhere ID     This is your Care EveryWhere ID. This could be used by other organizations to access your [place] medical records  [id number]        Your Vitals Were     Pulse Temperature Respirations Height Pulse Oximetry BMI (Body Mass Index)    79 97.8  F (36.6  C) (Oral) 20 1.676 m (5' 6\") 97% 34.51 kg/m2       Blood Pressure from Last 3 Encounters:   07/27/18 121/57   04/17/18 119/80   03/12/18 (!) 162/97    Weight from Last 3 Encounters:   07/27/18 97 kg (213 lb 12.8 oz)   04/17/18 98.9 kg (218 lb)   03/12/18 99.2 kg (218 lb 11.1 oz)              Today, you had the following     No orders found for display       Primary Care Provider Office Phone # Fax #    Ondina Edmondson [phone]       [facility] [address] [facility] [address]        Equal Access to Services     AYLEEN GASCA AH: Mahamed Quinonez, angelica mina, qajony brown, naga ardon " richaromiddomingo shepardTiffanieaaelly ah. So [facility] [phone].    ATENCIÓN: Si red art, tiene a guerra disposición servicios gratuitos de asistencia lingüística. Candi leon [phone].    We comply with applicable federal civil rights laws and [place] laws. We do not discriminate on the basis of race, color, national origin, age, disability, sex, sexual orientation, or gender identity.            Thank you!     Thank you for choosing [facility]  for your care. Our goal is always to provide you with excellent care. Hearing back from our patients is one way we can continue to improve our services. Please take a few minutes to complete the written survey that you may receive in the mail after your visit with us. Thank you!             Your Updated Medication List - Protect others around you: Learn how to safely use, store and throw away your medicines at www.disposemymeds.org.          This list is accurate as of 7/27/18  1:18 PM.  Always use your most recent med list.                   Brand Name Dispense Instructions for use Diagnosis    amantadine 100 MG capsule    SYMMETREL    180 capsule    1 capsule @ 7am and 4pm    Paralysis agitans (H)       aspirin 81 MG EC tablet     90 tablet    1 tab @ 9pm    Paralysis agitans (H)       busPIRone 10 MG tablet    BUSPAR    180 tablet    1 Tab @ 7am and 1 tab @ 4pm    Paralysis agitans (H)       carbidopa-levodopa  MG per tablet    SINEMET    1170 tablet    3 tabs @7, noon, 4p and 9pm and a few as needed = 13/day x 90 = 1170tablets    Paralysis agitans (H)       docusate sodium 100 MG capsule    COLACE    180 capsule    1 tab @ 7am    Paralysis agitans (H)       doxylamine 25 MG Tabs tablet    UNISOM    14 each    Take 1 tablet (25 mg) by mouth nightly as needed        FLUoxetine 20 MG capsule    PROzac    90 capsule    Take 20mg capsule  @ 7am    Paralysis agitans (H)       fluticasone 50 MCG/ACT spray    FLONASE    1 Bottle    Spray 1 spray into both nostrils daily as needed for  rhinitis or allergies        hydrochlorothiazide 25 MG tablet    HYDRODIURIL     Take 25 mg by mouth daily        LORazepam 1 MG tablet    ATIVAN    60 tablet    1 tab by mouth @ 9pm as needed    Paralysis agitans (H)       polyethylene glycol powder    MIRALAX/GLYCOLAX    119 g    Take 17 g by mouth daily as needed for constipation    Paralysis agitans (H)       pramipexole 0.5 MG tablet    MIRAPEX    180 tablet    2 tabs @ 9pm    Paralysis agitans (H)

## 2018-07-27 NOTE — PROGRESS NOTES
Participant consented for  intraoperative recordings study as well as Clinical Core study. Following consent, baseline nurse visit for clinical core study was completed. Writer escorted participant to 1st floor lab at end of visit for research-only lab draw. Participant to mail back data watch in 6 days. All questions answered and participant was given my contact information for any further concerns.     Shanice Contreras RN, MPH  Research Nurse, Movement Disorders

## 2018-08-02 ENCOUNTER — TELEPHONE (OUTPATIENT)
Dept: NEUROLOGY | Facility: CLINIC | Age: 58
End: 2018-08-02

## 2018-08-02 NOTE — TELEPHONE ENCOUNTER
Patient had expressed interest at her nurse appointment on 7/27/18 about participating in the [place] externalization study. After discussion with the study team, if she agrees to consent we would be able to reschedule her IPG surgery from Tues 8/28/18 to Thurs 8/30/18 to allow for the clinical research stay during weekdays.    Patient has agreed to meet after her PAC appointment on Tues 8/7 to discuss consent and scheduling. Current consent emailed.     Also of note, she has her 7T MRI at the [facility] on Monday 8/6/18.     Shanice Contreras, RN, MPH  Research Nurse, Movement Disorders

## 2018-08-07 ENCOUNTER — OFFICE VISIT (OUTPATIENT)
Dept: SURGERY | Facility: CLINIC | Age: 58
End: 2018-08-07
Payer: MEDICARE

## 2018-08-07 ENCOUNTER — ANESTHESIA EVENT (OUTPATIENT)
Dept: SURGERY | Facility: CLINIC | Age: 58
DRG: 027 | End: 2018-08-07
Payer: MEDICARE

## 2018-08-07 ENCOUNTER — APPOINTMENT (OUTPATIENT)
Dept: SURGERY | Facility: CLINIC | Age: 58
End: 2018-08-07
Payer: MEDICARE

## 2018-08-07 ENCOUNTER — ALLIED HEALTH/NURSE VISIT (OUTPATIENT)
Dept: SURGERY | Facility: CLINIC | Age: 58
End: 2018-08-07
Payer: MEDICARE

## 2018-08-07 VITALS
OXYGEN SATURATION: 99 % | HEIGHT: 66 IN | HEART RATE: 66 BPM | RESPIRATION RATE: 16 BRPM | SYSTOLIC BLOOD PRESSURE: 126 MMHG | BODY MASS INDEX: 34.84 KG/M2 | TEMPERATURE: 97.6 F | DIASTOLIC BLOOD PRESSURE: 76 MMHG | WEIGHT: 216.8 LBS

## 2018-08-07 DIAGNOSIS — G20.A1 PARKINSON'S DISEASE (H): ICD-10-CM

## 2018-08-07 DIAGNOSIS — Z01.818 PREOP EXAMINATION: Primary | ICD-10-CM

## 2018-08-07 LAB
ANION GAP SERPL CALCULATED.3IONS-SCNC: 7 MMOL/L (ref 3–14)
APTT PPP: 27 SEC (ref 22–37)
BUN SERPL-MCNC: 14 MG/DL (ref 7–30)
CALCIUM SERPL-MCNC: 8.4 MG/DL (ref 8.5–10.1)
CHLORIDE SERPL-SCNC: 103 MMOL/L (ref 94–109)
CO2 SERPL-SCNC: 28 MMOL/L (ref 20–32)
CREAT SERPL-MCNC: 0.76 MG/DL (ref 0.52–1.04)
ERYTHROCYTE [DISTWIDTH] IN BLOOD BY AUTOMATED COUNT: 13.6 % (ref 10–15)
GFR SERPL CREATININE-BSD FRML MDRD: 77 ML/MIN/1.7M2
GLUCOSE SERPL-MCNC: 99 MG/DL (ref 70–99)
HCT VFR BLD AUTO: 47.8 % (ref 35–47)
HGB BLD-MCNC: 15.4 G/DL (ref 11.7–15.7)
INR PPP: 0.91 (ref 0.86–1.14)
MCH RBC QN AUTO: 29.6 PG (ref 26.5–33)
MCHC RBC AUTO-ENTMCNC: 32.2 G/DL (ref 31.5–36.5)
MCV RBC AUTO: 92 FL (ref 78–100)
PLATELET # BLD AUTO: 229 10E9/L (ref 150–450)
POTASSIUM SERPL-SCNC: 3.7 MMOL/L (ref 3.4–5.3)
RBC # BLD AUTO: 5.2 10E12/L (ref 3.8–5.2)
SODIUM SERPL-SCNC: 138 MMOL/L (ref 133–144)
WBC # BLD AUTO: 7.3 10E9/L (ref 4–11)

## 2018-08-07 RX ORDER — CALCIUM CARBONATE 500 MG/1
2 TABLET, CHEWABLE ORAL PRN
COMMUNITY

## 2018-08-07 RX ORDER — SIMVASTATIN 20 MG
20 TABLET ORAL AT BEDTIME
Refills: 1 | COMMUNITY
Start: 2018-07-24 | End: 2018-09-25 | Stop reason: SINTOL

## 2018-08-07 ASSESSMENT — LIFESTYLE VARIABLES: TOBACCO_USE: 1

## 2018-08-07 NOTE — ANESTHESIA PREPROCEDURE EVALUATION
Anesthesia Evaluation     . Pt has had prior anesthetic. Type: General and MAC    No history of anesthetic complications          ROS/MED HX    ENT/Pulmonary:     (+)TYLER risk factors hypertension, obese, tobacco use, Past use , . .    Neurologic:     (+)Parkinson's disease features: limited mouth opening and speech ,     Cardiovascular:     (+) Dyslipidemia, hypertension----. : . . . :. . Previous cardiac testing date:results:date: results:ECG reviewed date:2015 results: date: results:          METS/Exercise Tolerance:  >4 METS   Hematologic:     (+) History of blood clots pt is not anticoagulated, -      Musculoskeletal:   (+) , , other musculoskeletal- sprained ankle on right      GI/Hepatic:  - neg GI/hepatic ROS       Renal/Genitourinary:  - ROS Renal section negative       Endo:     (+) Obesity, .      Psychiatric:     (+) psychiatric history anxiety      Infectious Disease:  - neg infectious disease ROS       Malignancy:      - no malignancy   Other:    (+) no H/O Chronic Pain,                   Physical Exam      Airway   Comment: Limited mouth opening due to Parkinsons    Dental     Cardiovascular       Pulmonary                PAC Discussion and Assessment    ASA Classification: 3  Case is suitable for: [place]  Anesthetic techniques and relevant risks discussed:   Invasive monitoring and risk discussed:   Types:   Possibility and Risk of blood transfusion discussed:   NPO instructions given:   Additional anesthetic preparation and risks discussed:   Needs early admission to pre-op area:   Other:     PAC Resident/NP Anesthesia Assessment:  Erika Diaz is a 57 year old female who presents for pre-operative H & P in preparation for a   Phase I Stealth Assisted   Side Deep Brain Stimulator Placement on 8/21/18  and Phase II Placement Of Deep Brain Stimulator Generator/Battery  on 8/28/18  with Dr. Concepcion for Parkinsons at the [facility].    PAC referral for  risk assessment and optimization for anesthesia with comorbid conditions of:    Pre-operative considerations:  1.  Cardiac:  Functional status METS = >4    Risk of Major Adverse Cardiac event: 0.4%  -HTN controlled hydrochlorothiazide (hold AM DOS)  2.  Pulm:   TYLER risk:  Intermediate  -Former smoker 10 pack years, quit 2009  -No known pulmonary disease    3.  GI:  Risk of PONV score = 3 .  If > 2, anti-emetic intervention recommended.  4.  Neuro:  -Parkinson's with features of limited mouth opening and speech difficulties  5.  Heme:  -History of provoked PE after knee replacement in 2015  4.  Meds:  -Antiplts:  Asa 81 mg, hold starting  8/14      Patient is optimized and is an acceptable candidate for the proposed procedure.  No further diagnostic evaluation is needed.    Rose Roland MS, PA-C  08/07/18 11:09 AM        Mid-Level Provider/Resident:   Date:   Time:     Attending Anesthesiologist Anesthesia Assessment:        Anesthesiologist:   Date:   Time:   Pass/Fail:   Disposition:     PAC Pharmacist Assessment:        Pharmacist:   Date:   Time:      Anesthesia Plan      History & Physical Review      ASA Status:  3 .    NPO Status:  > 6 hours    Plan for MAC, General and ETT with Intravenous induction. Maintenance will be Other.    PONV prophylaxis:  Ondansetron (or other 5HT-3)  Plan is for DBS placement via usual MAC procedure - then after removal of frame, GA for tunneling of leads to a right flank position as part of planned research study.        Postoperative Care      Consents  Anesthetic plan, risks, benefits and alternatives discussed with:  Patient..                          .

## 2018-08-07 NOTE — H&P
Pre-Operative H & P     CC:  Preoperative exam to assess for increased cardiopulmonary risk while undergoing surgery and anesthesia.    Date of Encounter: August 7, 2018   Primary Care Physician:  Ondina Edmondson   Reason for Visit/Surgery:  Parkinson's disease (H) [G20]      HPI  Erika Diaz is a 57 year old female who presents for pre-operative H & P in preparation for a   Phase I Stealth Assisted   Side Deep Brain Stimulator Placement on 8/21/18  and Phase II Placement Of Deep Brain Stimulator Generator/Battery  on 8/28/18  with Dr. Concepcion for Parkinsons at the [facility].     Ms. Diaz has a 9-10 year history of Parkinson s disease, being diagnosed in [id number].  She first developed stiffness in her left hand and shoulder that has since progressed to include tremors.  She also has difficulty opening her mouth and speech issues.    Her goals with Deep Brain Stimulation are to gain tremor control and reduce dyskinesias and she also would like to wean off medications due to side effects.    She has no known cardiac disease.  She has a 10 pack year smoking history and quit in 2009.  She did have a provoked PE after knee surgery in 2015.      History is obtained from the patient and  medical record including Care Everywhere.        Past Medical History  Past Medical History:   Diagnosis Date     Degenerative joint disease 11/7/2017     Encounter for neuropsychological testing 12/20/2017    Current results indicate variability in attention and memory, ranging from moderately impaired to superior, in some cases with greater difficulty on less complex tasks. Basic language, visual processing, and executive functioning fall within normal limits. Personality assessment is suggestive of somatization, and also raises the possibility of a thought disorder as well as a history of manic episo     JASON (generalized anxiety disorder)      History of colonic polyps 11/7/2017      HTN (hypertension) 11/7/2017     Hypercholesterolemia 11/7/2017     Lactose intolerance in adult 11/7/2017     Migraines      Obesity 11/7/2017     Parkinson disease (H)      PID (pelvic inflammatory disease) due to IUD 11/7/2017     Pulmonary emboli (H) - coumadin lovenox 11/07/2017    provoked after knee replacement        Past Surgical History  Past Surgical History:   Procedure Laterality Date     adhesioloysis       CHOLECYSTECTOMY       COLONOSCOPY       JOINT REPLACEMENT Right     right partial knee replacement     LAPAROSCOPY      adhesioloysis     LAPAROSCOPY      supracervical hysterectomy     LAPAROTOMY EXPLORATORY Left     partial removal of left ovary     REMOVE INTRAUTERINE DEVICE  2006     salpingoopherectomy         Hx of Blood transfusions/reactions: No transfusion history       Personal or FH with difficulty with Anesthesia:  None    Prior to Admission Medications  Current Outpatient Prescriptions   Medication Sig Dispense Refill     amantadine (SYMMETREL) 100 MG capsule 1 capsule @ 7am and 4pm (Patient taking differently: Take 100 mg by mouth 2 times daily 1 capsule @ 7am and 4pm) 180 capsule 3     aspirin EC 81 MG EC tablet 1 tab @ 9pm (Patient taking differently: Take 81 mg by mouth At Bedtime 1 tab @ 9pm) 90 tablet 3     Aspirin-Acetaminophen-Caffeine (EXCEDRIN MIGRAINE PO) Take 1-2 tablets by mouth as needed       busPIRone (BUSPAR) 10 MG tablet Take 10 mg by mouth 2 times daily 1 Tab @ 7am and 1 tab @ 4pm 180 tablet 3     calcium carbonate (TUMS) 500 MG chewable tablet Take 2 chew tab by mouth as needed for heartburn       carbidopa-levodopa (SINEMET)  MG per tablet Take 3 tablets by mouth 4 times daily 3 tabs @7, noon, 4p and 9pm and a few as needed = 13/day x 90 = 1170tablets 1170 tablet 3     doxylamine (UNISOM) 25 MG TABS tablet Take 1 tablet (25 mg) by mouth nightly as needed 14 each      FLUoxetine (PROZAC) 20 MG capsule Take 20 mg by mouth every morning Take 20mg capsule  @  7am 90 capsule 3     hydrochlorothiazide (HYDRODIURIL) 25 MG tablet Take 25 mg by mouth At Bedtime @9 pm       LORazepam (ATIVAN) 1 MG tablet 1 tab by mouth @ 9pm as needed (Patient taking differently: Take 1 mg by mouth as needed 1 tab by mouth @ 9pm as needed) 60 tablet 3     pramipexole (MIRAPEX) 0.5 MG tablet 2 tabs @ 9pm (Patient taking differently: Take 1 mg by mouth At Bedtime 2 tabs @ 9pm) 180 tablet 3     simvastatin (ZOCOR) 20 MG tablet Take 20 mg by mouth At Bedtime  1         Allergies  Atorvastatin; Codeine; and Mold     Social History  Social History     Social History     Marital status:      Spouse name: N/A     Number of children: N/A     Years of education: N/A     Occupational History     Not on file.     Social History Main Topics     Smoking status: Former Smoker     Packs/day: 0.50     Years: 20.00     Types: Cigarettes     Quit date: 8/7/2009     Smokeless tobacco: Never Used     Alcohol use No     Drug use: No     Sexual activity: Not Currently     Partners: Male     Birth control/ protection: None     Other Topics Concern     Parent/Sibling W/ Cabg, Mi Or Angioplasty Before 65f 55m? No     Social History Narrative    . lives in [place]. Zane Diaz spouse          Family History  No family history of bleeding, clotting disorders or complications with anesthesia.    ROS/MED HX     ENT/Pulmonary:     (+)TYLER risk factors hypertension, obese, tobacco use, Past use , . .    Neurologic:     (+)Parkinson's disease features: limited mouth opening and speech ,     Cardiovascular:     (+) Dyslipidemia, hypertension----. : . . . :. . Previous cardiac testing date:results:date: results:ECG reviewed date:2015 results: date: results:        METS/Exercise Tolerance:  >4 METS   Hematologic:     (+) History of blood clots pt is not anticoagulated, -    Musculoskeletal:   (+) , , other musculoskeletal- sprained ankle on right    GI/Hepatic:  - neg GI/hepatic ROS     Renal/Genitourinary:   "- ROS Renal section negative     Endo:     (+) Obesity, .    Psychiatric:     (+) psychiatric history anxiety    Infectious Disease:  - neg infectious disease ROS     Malignancy:      - no malignancy   Other:    (+) no H/O Chronic Pain,         Cardiology Tests: (personally reviewed):   Review Results Below in A/P    Labs: (personally reviewed):  Lab Results   Component Value Date    WBC 7.3 08/07/2018    HGB 15.4 08/07/2018    HCT 47.8 (H) 08/07/2018     08/07/2018    INR 0.91 08/07/2018    PTT 27 08/07/2018     08/07/2018    POTASSIUM 3.7 08/07/2018    AVANI 8.4 (L) 08/07/2018    GLC 99 08/07/2018    CR 0.76 08/07/2018    BUN 14 08/07/2018    CO2 28 08/07/2018          Physical Exam:  No LMP recorded. Patient has had a hysterectomy.   Vital signs:  /76  Pulse 66  Temp 97.6  F (36.4  C) (Oral)  Resp 16  Ht 1.676 m (5' 6\")  Wt 98.3 kg (216 lb 12.8 oz)  SpO2 99%  BMI 34.99 kg/m2    Constitutional: Awake, alert, cooperative, no apparent distress, and appears stated age.  Eyes: Pupils equal, round and reactive to light, sclera clear, conjunctiva normal.  HENT: Normocephalic, oral pharynx with moist mucus membranes. No goiter appreciated.   Respiratory: Clear to auscultation bilaterally, no crackles or wheezing.  Cardiovascular: Regular rate and rhythm and no overt murmur noted.  No carotid bruits auscultated. No edema. Palpable pulses to radial  DP and PT arteries.   GI: Normal bowel sounds, soft, non-distended, non-tender, no masses palpated  Skin: Warm and dry.  No rashes at anticipated surgical site.   Musculoskeletal: Full extension of the neck.  No redness, warmth, or swelling of exposed joints noted. Gross motor strength is normal.    Neurologic: Awake, alert, oriented to name, place and time.  Gait is normal.   Neuropsychiatric: Calm, cooperative. Normal affect.     Assessment/Plan  Erika Diaz is a 57 year old female who presents for pre-operative H & P in preparation for a   Phase " I Stealth Assisted   Side Deep Brain Stimulator Placement on 8/21/18  and Phase II Placement Of Deep Brain Stimulator Generator/Battery  on 8/28/18  [facility]    PAC referral for risk assessment and optimization for anesthesia with comorbid conditions of:    Pre-operative considerations:  1.  Cardiac:  Functional status METS = >4    Risk of Major Adverse Cardiac event: 0.4%  -HTN controlled hydrochlorothiazide (hold AM DOS)  2.  Pulm:   TYLER risk:  Intermediate  -Former smoker 10 pack years, quit 2009  -No known pulmonary disease    3.  GI:  Risk of PONV score = 3 .  If > 2, anti-emetic intervention recommended.  4.  Neuro:  -Parkinson's with features of limited mouth opening and speech difficulties  5.  Heme:  -History of provoked PE after knee replacement in 2015  4.  Meds:  -Antiplts:  Asa 81 mg, hold starting  8/14      Patient is optimized and is an acceptable candidate for the proposed procedure.  No further diagnostic evaluation is needed.      AVS given to patient regarding medication instructions,  surgery time/arrival time and NPO status.  Rose Roland MS PA-C   Preoperative Assessment Center  [facility]  Clinic and Surgery Center  Phone: [phone]  Fax: [phone]

## 2018-08-07 NOTE — MR AVS SNAPSHOT
After Visit Summary   2018    Erika Diaz    MRN: [id number]           Patient Information     Date Of Birth          1960        Visit Information        Provider Department      2018 11:30 AM Rn, [facility]        Care Instructions    Preparing for Your Surgery      Name:  Erika Diaz   MRN:  [id number]   :  1960   Today's Date:  2018     Arriving for surgery:  Surgery date:  18  Arrival time:  6 am    Please come to:  [facility] [address]  [address] [place]  [address]    -   parking is available in front of the hospital from 5:15 am to 8:00 pm    -  Stop at the Information Desk in the lobby    -   Inform the information person that you are here for surgery. An escort to 3C will be provided. If you would not like an escort, please proceed to 3C on the 3rd floor. [phone]     -  Bring your ID and insurance card.    What can I eat or drink?  -  You may have solid food or milk products until 8 hours prior to your surgery. (Until 12 midnight )  -  You may have water, apple juice, clear BLACK coffee (NO creamer or nondairy creamer), or 7up/Sprite until 2 hours prior to your surgery. (Until 6 am )    Which medicines can I take?       -  Do not bring your own medications to the hospital.        -  Follow Neurosurgery Clinic instructions regarding Ibuprofen. If no instructions given, NO Ibuprofen the day prior to surgery.         -  Hold Aspirin and Aspirin products (Excedrin) 7 days prior to surgery. Last dose 18.    -  Do NOT take these medications in the morning, the day of surgery:  Pramipexole (Mirapex), Carbidopa-Levodopa (Sinemet), Amantadine (Symmetrel)    -  Please take these medications the morning of surgery:  Buspirone (Buspar), Fluoxetine (Prozac),        Acetaminophen (Tylenol) if needed    How do I prepare myself?  -  Take two showers: one the night before  surgery; and one the morning of surgery.         Use Scrubcare or Hibiclens to wash from neck down.  You may use your own shampoo and conditioner. No other hair products.   -  Do NOT use lotion, powder, colognes, deodorant, or antiperspirant the day of your surgery.  -  Do NOT wear any makeup, fingernail polish or jewelry.    Questions or Concerns:  If you have questions or concerns prior to your surgery, call [phone]. (Mon - Fri   8 am- 5:30 pm)  Questions after surgery, contact your Surgeons office.      AFTER YOUR SURGERY  Breathing exercises   Breathing exercises help you recover faster. Take deep breaths and let the air out slowly. This will:     Help you wake up after surgery.    Help prevent complications like pneumonia.  Preventing complications will help you go home sooner.   We may give you a breathing device (incentive spirometer) to encourage you to breathe deeply.   Nausea and vomiting   You may feel sick to your stomach after surgery; if so, let your nurse know.    Pain control:  After surgery, you may have pain. Our goal is to help you manage your pain. Pain medicine will help you feel comfortable enough to do activities that will help you heal.  These activities may include breathing exercises, walking and physical therapy.   To help your health care team treat your pain we will ask: 1) If you have pain  2) where it is located 3) describe your pain in your words  Methods of pain control include medications given by mouth, vein or by nerve block for some surgeries.  Sequential Compression Device (SCD) or Pneumo Boots:  You may need to wear SCD S on your legs or feet. These are wraps connected to a machine that pumps in air and releases it. The repeated pumping helps prevent blood clots from forming.                     Follow-ups after your visit        Your next 10 appointments already scheduled     Aug 07, 2018 11:30 AM CDT   (Arrive by 11:15 AM)   PAC RN ASSESSMENT with Jesse Pac Rn   [facility] ([facility])    [address]  4th Floor  [facility] [address]   [phone]            Aug 07, 2018 12:00 PM CDT   (Arrive by 11:45 AM)   PAC Anesthesia Consult with  Pac Anesthesiologist   [facility] Preoperative [facility] ([facility] [place])    [address]  4th Floor  [facility] [address]   [phone]            Aug 14, 2018 12:10 PM CDT   (Arrive by 11:55 AM)   Return Movement Disorder with Elver Stratton MD   [facility] Neurology ([facility] [place])    [address]  3rd Floor  [facility] [address]   [phone]            Aug 21, 2018   Procedure with Jerry Concepcion MD   [facility], Same Day Surgery (--)    [address] [place] [address]   [phone]            Aug 21, 2018  8:40 AM CDT   CT HEAD W/O CONTRAST with UUCT1   [facility], CT ([facility])    [address] [address]   [phone]           Please bring any scans or X-rays taken at other hospitals, if similar tests were done. Also bring a list of your medicines, including vitamins, minerals and over-the-counter drugs. It is safest to leave personal items at home.  Be sure to tell your doctor:   If you have any allergies.   If there s any chance you are pregnant.   If you are breastfeeding.  You do not need to do anything special to prepare for this exam.  Please wear loose clothing, such as a sweat suit or jogging clothes. Avoid snaps, zippers and other metal. We may ask you to undress and put on a hospital gown.            Aug 28, 2018   Procedure with Jerry Concepcion MD   [facility] [place], Same Day Surgery (--)    [address] [place] [address]   [phone]            Sep 11, 2018 12:00 PM CDT   (Arrive by 11:45 AM)   Return Visit with Venus Anthony PA-C   [facility] Neurosurgery ([facility])    [address]  [address] [facility] [address] [phone]            Sep 21, 2018  7:00 AM CDT   (Arrive by 6:45 AM)   Return Visit with GUILLERMINA Vallecillo [facility] Neurology ([facility] [place])    [address] [place] [id number] [phone]            Sep 21, 2018 11:40 AM CDT   CT HEAD W/O CONTRAST with UCCT2   [facility] CT ([facility] [place])    [address] [place] [id number] [phone]           Please bring any scans or X-rays taken at other hospitals, if similar tests were done. Also bring a list of your medicines, including vitamins, minerals and over-the-counter drugs. It is safest to leave personal items at home.  Be sure to tell your doctor:   If you have any allergies.   If there s any chance you are pregnant.   If you are breastfeeding.  You do not need to do anything special to prepare for this exam.  Please wear loose clothing, such as a sweat suit or jogging clothes. Avoid snaps, zippers and other metal. We may ask you to undress and put on a hospital gown.              Future tests that were ordered for you today     Open Future Orders        Priority Expected Expires Ordered    CBC with platelets Routine 8/7/2018 9/6/2018 8/7/2018    Basic metabolic panel Routine 8/7/2018 9/6/2018 8/7/2018    INR Routine 8/7/2018 9/6/2018 8/7/2018    Partial thromboplastin time Routine 8/7/2018 9/6/2018 8/7/2018            Who to contact     Please call your clinic at [phone] to:    Ask questions about your health    Make or cancel appointments    Discuss your medicines    Learn about your test results    Speak to your doctor            Additional Information About Your Visit        MyChart Information     [employer] gives you secure access to your electronic health record. If you see a primary care provider, you can also send messages to your care team and  make appointments. If you have questions, please call your primary care clinic.  If you do not have a primary care provider, please call [phone] and they will assist you.      [employer] is an electronic gateway that provides easy, online access to your medical records. With [employer], you can request a clinic appointment, read your test results, renew a prescription or communicate with your care team.     To access your existing account, please contact your [facility] or call [phone] for assistance.        Care EveryWhere ID     This is your Care EveryWhere ID. This could be used by other organizations to access your [place] medical records  [id number]         Blood Pressure from Last 3 Encounters:   08/07/18 126/76   07/27/18 121/57   04/17/18 119/80    Weight from Last 3 Encounters:   08/07/18 98.3 kg (216 lb 12.8 oz)   07/27/18 97 kg (213 lb 12.8 oz)   04/17/18 98.9 kg (218 lb)              Today, you had the following     No orders found for display         Today's Medication Changes          These changes are accurate as of 8/7/18 11:05 AM.  If you have any questions, ask your nurse or doctor.               These medicines have changed or have updated prescriptions.        Dose/Directions    amantadine 100 MG capsule   Commonly known as:  SYMMETREL   This may have changed:    - how much to take  - how to take this  - when to take this  - additional instructions   Used for:  Paralysis agitans (H)        1 capsule @ 7am and 4pm   Quantity:  180 capsule   Refills:  3       aspirin 81 MG EC tablet   This may have changed:    - how much to take  - how to take this  - when to take this  - additional instructions   Used for:  Paralysis agitans (H)        1 tab @ 9pm   Quantity:  90 tablet   Refills:  3       LORazepam 1 MG tablet   Commonly known as:  ATIVAN   This may have changed:    - how much to take  - how to take this  - when to take this  - reasons to take this  -  additional instructions   Used for:  Paralysis agitans (H)        1 tab by mouth @ 9pm as needed   Quantity:  60 tablet   Refills:  3       pramipexole 0.5 MG tablet   Commonly known as:  MIRAPEX   This may have changed:    - how much to take  - how to take this  - when to take this  - additional instructions   Used for:  Paralysis agitans (H)        2 tabs @ 9pm   Quantity:  180 tablet   Refills:  3                Primary Care Provider Office Phone # Fax #    Ondina Edmondson [phone]       [facility] [address] [facility] [address]        Equal Access to Services     [facility]: Hadii matilde todd hadasho Soomaali, waaxda luqadaha, qaybta kaalmada stephanie, naga troncoso . So [facility] [phone].    ATENCIÓN: Si habla español, tiene a guerra disposición servicios gratuitos de asistencia lingüística. [facility] [phone].    We comply with applicable federal civil rights laws and [place] laws. We do not discriminate on the basis of race, color, national origin, age, disability, sex, sexual orientation, or gender identity.            Thank you!     Thank you for choosing [facility]  for your care. Our goal is always to provide you with excellent care. Hearing back from our patients is one way we can continue to improve our services. Please take a few minutes to complete the written survey that you may receive in the mail after your visit with us. Thank you!             Your Updated Medication List - Protect others around you: Learn how to safely use, store and throw away your medicines at www.disposemymeds.org.          This list is accurate as of 8/7/18 11:05 AM.  Always use your most recent med list.                   Brand Name Dispense Instructions for use Diagnosis    amantadine 100 MG capsule    SYMMETREL    180 capsule    1 capsule @ 7am and 4pm    Paralysis agitans (H)       aspirin 81 MG EC tablet     90 tablet    1 tab @  9pm    Paralysis agitans (H)       busPIRone 10 MG tablet    BUSPAR    180 tablet    Take 10 mg by mouth 2 times daily 1 Tab @ 7am and 1 tab @ 4pm    Paralysis agitans (H)       calcium carbonate 500 MG chewable tablet    TUMS     Take 2 chew tab by mouth as needed for heartburn        carbidopa-levodopa  MG per tablet    SINEMET    1170 tablet    Take 3 tablets by mouth 4 times daily 3 tabs @7, noon, 4p and 9pm and a few as needed = 13/day x 90 = 1170tablets    Paralysis agitans (H)       doxylamine 25 MG Tabs tablet    UNISOM    14 each    Take 1 tablet (25 mg) by mouth nightly as needed        EXCEDRIN MIGRAINE PO      Take 1-2 tablets by mouth as needed        FLUoxetine 20 MG capsule    PROzac    90 capsule    Take 20 mg by mouth every morning Take 20mg capsule  @ 7am    Paralysis agitans (H)       hydrochlorothiazide 25 MG tablet    HYDRODIURIL     Take 25 mg by mouth At Bedtime @9 pm        LORazepam 1 MG tablet    ATIVAN    60 tablet    1 tab by mouth @ 9pm as needed    Paralysis agitans (H)       pramipexole 0.5 MG tablet    MIRAPEX    180 tablet    2 tabs @ 9pm    Paralysis agitans (H)       simvastatin 20 MG tablet    ZOCOR     Take 20 mg by mouth At Bedtime

## 2018-08-21 ENCOUNTER — APPOINTMENT (OUTPATIENT)
Dept: CT IMAGING | Facility: CLINIC | Age: 58
DRG: 027 | End: 2018-08-21
Attending: STUDENT IN AN ORGANIZED HEALTH CARE EDUCATION/TRAINING PROGRAM
Payer: MEDICARE

## 2018-08-21 ENCOUNTER — HOSPITAL ENCOUNTER (INPATIENT)
Facility: CLINIC | Age: 58
LOS: 1 days | Discharge: HOME | DRG: 027 | End: 2018-08-22
Attending: NEUROLOGICAL SURGERY | Admitting: NEUROLOGICAL SURGERY
Payer: MEDICARE

## 2018-08-21 ENCOUNTER — APPOINTMENT (OUTPATIENT)
Dept: GENERAL RADIOLOGY | Facility: CLINIC | Age: 58
DRG: 027 | End: 2018-08-21
Attending: NEUROLOGICAL SURGERY
Payer: MEDICARE

## 2018-08-21 ENCOUNTER — APPOINTMENT (OUTPATIENT)
Dept: GENERAL RADIOLOGY | Facility: CLINIC | Age: 58
DRG: 027 | End: 2018-08-21
Attending: STUDENT IN AN ORGANIZED HEALTH CARE EDUCATION/TRAINING PROGRAM
Payer: MEDICARE

## 2018-08-21 ENCOUNTER — HOSPITAL ENCOUNTER (OUTPATIENT)
Dept: CT IMAGING | Facility: CLINIC | Age: 58
DRG: 027 | End: 2018-08-21
Attending: NEUROLOGICAL SURGERY | Admitting: NEUROLOGICAL SURGERY
Payer: MEDICARE

## 2018-08-21 ENCOUNTER — ANESTHESIA (OUTPATIENT)
Dept: SURGERY | Facility: CLINIC | Age: 58
DRG: 027 | End: 2018-08-21
Payer: MEDICARE

## 2018-08-21 DIAGNOSIS — Z96.89 S/P DEEP BRAIN STIMULATOR PLACEMENT: ICD-10-CM

## 2018-08-21 DIAGNOSIS — G20.A1 PARKINSON'S DISEASE (H): Primary | ICD-10-CM

## 2018-08-21 DIAGNOSIS — G20.A1 PARKINSON'S DISEASE (H): ICD-10-CM

## 2018-08-21 LAB
ABO + RH BLD: NORMAL
ABO + RH BLD: NORMAL
ANION GAP SERPL CALCULATED.3IONS-SCNC: 7 MMOL/L (ref 3–14)
APTT PPP: 28 SEC (ref 22–37)
BLD GP AB SCN SERPL QL: NORMAL
BLOOD BANK CMNT PATIENT-IMP: NORMAL
BUN SERPL-MCNC: 17 MG/DL (ref 7–30)
CALCIUM SERPL-MCNC: 8.8 MG/DL (ref 8.5–10.1)
CHLORIDE SERPL-SCNC: 106 MMOL/L (ref 94–109)
CO2 SERPL-SCNC: 28 MMOL/L (ref 20–32)
CREAT SERPL-MCNC: 0.91 MG/DL (ref 0.52–1.04)
ERYTHROCYTE [DISTWIDTH] IN BLOOD BY AUTOMATED COUNT: 14.6 % (ref 10–15)
GFR SERPL CREATININE-BSD FRML MDRD: 63 ML/MIN/1.7M2
GLUCOSE BLDC GLUCOMTR-MCNC: 107 MG/DL (ref 70–99)
GLUCOSE SERPL-MCNC: 118 MG/DL (ref 70–99)
HCT VFR BLD AUTO: 45.7 % (ref 35–47)
HGB BLD-MCNC: 14.9 G/DL (ref 11.7–15.7)
INR PPP: 0.98 (ref 0.86–1.14)
MCH RBC QN AUTO: 30 PG (ref 26.5–33)
MCHC RBC AUTO-ENTMCNC: 32.6 G/DL (ref 31.5–36.5)
MCV RBC AUTO: 92 FL (ref 78–100)
PLATELET # BLD AUTO: 222 10E9/L (ref 150–450)
POTASSIUM SERPL-SCNC: 4.4 MMOL/L (ref 3.4–5.3)
RBC # BLD AUTO: 4.96 10E12/L (ref 3.8–5.2)
SODIUM SERPL-SCNC: 141 MMOL/L (ref 133–144)
SPECIMEN EXP DATE BLD: NORMAL
WBC # BLD AUTO: 5.2 10E9/L (ref 4–11)

## 2018-08-21 PROCEDURE — 8E09XBG COMPUTER ASSISTED PROCEDURE OF HEAD AND NECK REGION, WITH COMPUTERIZED TOMOGRAPHY: ICD-10-PCS | Performed by: NEUROLOGICAL SURGERY

## 2018-08-21 PROCEDURE — 85027 COMPLETE CBC AUTOMATED: CPT | Performed by: NEUROLOGICAL SURGERY

## 2018-08-21 PROCEDURE — 87640 STAPH A DNA AMP PROBE: CPT | Performed by: STUDENT IN AN ORGANIZED HEALTH CARE EDUCATION/TRAINING PROGRAM

## 2018-08-21 PROCEDURE — C1778 LEAD, NEUROSTIMULATOR: HCPCS | Performed by: NEUROLOGICAL SURGERY

## 2018-08-21 PROCEDURE — 37000008 ZZH ANESTHESIA TECHNICAL FEE, 1ST 30 MIN: Performed by: NEUROLOGICAL SURGERY

## 2018-08-21 PROCEDURE — 20000004 ZZH R&B ICU UMMC

## 2018-08-21 PROCEDURE — 86901 BLOOD TYPING SEROLOGIC RH(D): CPT | Performed by: ANESTHESIOLOGY

## 2018-08-21 PROCEDURE — 70250 X-RAY EXAM OF SKULL: CPT

## 2018-08-21 PROCEDURE — 27810169 ZZH OR IMPLANT GENERAL: Performed by: NEUROLOGICAL SURGERY

## 2018-08-21 PROCEDURE — 25000128 H RX IP 250 OP 636: Performed by: NURSE ANESTHETIST, CERTIFIED REGISTERED

## 2018-08-21 PROCEDURE — 86850 RBC ANTIBODY SCREEN: CPT | Performed by: ANESTHESIOLOGY

## 2018-08-21 PROCEDURE — 00H03MZ INSERTION OF NEUROSTIMULATOR LEAD INTO BRAIN, PERCUTANEOUS APPROACH: ICD-10-PCS | Performed by: NEUROLOGICAL SURGERY

## 2018-08-21 PROCEDURE — 25000128 H RX IP 250 OP 636: Performed by: STUDENT IN AN ORGANIZED HEALTH CARE EDUCATION/TRAINING PROGRAM

## 2018-08-21 PROCEDURE — 36415 COLL VENOUS BLD VENIPUNCTURE: CPT | Performed by: NEUROLOGICAL SURGERY

## 2018-08-21 PROCEDURE — 85730 THROMBOPLASTIN TIME PARTIAL: CPT | Performed by: NEUROLOGICAL SURGERY

## 2018-08-21 PROCEDURE — 80048 BASIC METABOLIC PNL TOTAL CA: CPT | Performed by: NEUROLOGICAL SURGERY

## 2018-08-21 PROCEDURE — 86900 BLOOD TYPING SEROLOGIC ABO: CPT | Performed by: ANESTHESIOLOGY

## 2018-08-21 PROCEDURE — 25000566 ZZH SEVOFLURANE, EA 15 MIN: Performed by: NEUROLOGICAL SURGERY

## 2018-08-21 PROCEDURE — 70450 CT HEAD/BRAIN W/O DYE: CPT

## 2018-08-21 PROCEDURE — 40000170 ZZH STATISTIC PRE-PROCEDURE ASSESSMENT II: Performed by: NEUROLOGICAL SURGERY

## 2018-08-21 PROCEDURE — 25000125 ZZHC RX 250: Performed by: NURSE ANESTHETIST, CERTIFIED REGISTERED

## 2018-08-21 PROCEDURE — C1883 ADAPT/EXT, PACING/NEURO LEAD: HCPCS | Performed by: NEUROLOGICAL SURGERY

## 2018-08-21 PROCEDURE — 4A1034G MONITORING OF CENTRAL NERVOUS ELECTRICAL ACTIVITY, INTRAOPERATIVE, PERCUTANEOUS APPROACH: ICD-10-PCS | Performed by: NEUROLOGICAL SURGERY

## 2018-08-21 PROCEDURE — 36000074 ZZH SURGERY LEVEL 6 1ST 30 MIN - UMMC: Performed by: NEUROLOGICAL SURGERY

## 2018-08-21 PROCEDURE — 87641 MR-STAPH DNA AMP PROBE: CPT | Performed by: STUDENT IN AN ORGANIZED HEALTH CARE EDUCATION/TRAINING PROGRAM

## 2018-08-21 PROCEDURE — 71000015 ZZH RECOVERY PHASE 1 LEVEL 2 EA ADDTL HR: Performed by: NEUROLOGICAL SURGERY

## 2018-08-21 PROCEDURE — 25000128 H RX IP 250 OP 636: Performed by: ANESTHESIOLOGY

## 2018-08-21 PROCEDURE — 71000014 ZZH RECOVERY PHASE 1 LEVEL 2 FIRST HR: Performed by: NEUROLOGICAL SURGERY

## 2018-08-21 PROCEDURE — 36000076 ZZH SURGERY LEVEL 6 EA 15 ADDTL MIN - UMMC: Performed by: NEUROLOGICAL SURGERY

## 2018-08-21 PROCEDURE — 25000131 ZZH RX MED GY IP 250 OP 636 PS 637: Mod: GY | Performed by: NURSE ANESTHETIST, CERTIFIED REGISTERED

## 2018-08-21 PROCEDURE — 27210794 ZZH OR GENERAL SUPPLY STERILE: Performed by: NEUROLOGICAL SURGERY

## 2018-08-21 PROCEDURE — 25000128 H RX IP 250 OP 636: Performed by: NEUROLOGICAL SURGERY

## 2018-08-21 PROCEDURE — A9270 NON-COVERED ITEM OR SERVICE: HCPCS | Mod: GY | Performed by: STUDENT IN AN ORGANIZED HEALTH CARE EDUCATION/TRAINING PROGRAM

## 2018-08-21 PROCEDURE — C9399 UNCLASSIFIED DRUGS OR BIOLOG: HCPCS | Performed by: NURSE ANESTHETIST, CERTIFIED REGISTERED

## 2018-08-21 PROCEDURE — 00000146 ZZHCL STATISTIC GLUCOSE BY METER IP

## 2018-08-21 PROCEDURE — 40000277 XR SURGERY CARM FLUORO LESS THAN 5 MIN W STILLS: Mod: TC

## 2018-08-21 PROCEDURE — 25000132 ZZH RX MED GY IP 250 OP 250 PS 637: Mod: GY | Performed by: STUDENT IN AN ORGANIZED HEALTH CARE EDUCATION/TRAINING PROGRAM

## 2018-08-21 PROCEDURE — 70450 CT HEAD/BRAIN W/O DYE: CPT | Mod: 77

## 2018-08-21 PROCEDURE — 37000009 ZZH ANESTHESIA TECHNICAL FEE, EACH ADDTL 15 MIN: Performed by: NEUROLOGICAL SURGERY

## 2018-08-21 PROCEDURE — 27210995 ZZH RX 272: Performed by: NEUROLOGICAL SURGERY

## 2018-08-21 PROCEDURE — 85610 PROTHROMBIN TIME: CPT | Performed by: NEUROLOGICAL SURGERY

## 2018-08-21 PROCEDURE — 25000125 ZZHC RX 250: Performed by: NEUROLOGICAL SURGERY

## 2018-08-21 DEVICE — IMPLANTABLE DEVICE: Type: IMPLANTABLE DEVICE | Site: CHEST  WALL | Status: FUNCTIONAL

## 2018-08-21 DEVICE — IMP BUR HOLE COVER GUARDIAN 6010ANS: Type: IMPLANTABLE DEVICE | Site: CRANIAL | Status: FUNCTIONAL

## 2018-08-21 DEVICE — KIT DBS LEAD DBS 8CH 40CM 1-3,3-1 SPACE 0.5 BLACK 6172ANS: Type: IMPLANTABLE DEVICE | Site: BRAIN | Status: FUNCTIONAL

## 2018-08-21 RX ORDER — PROPOFOL 10 MG/ML
INJECTION, EMULSION INTRAVENOUS CONTINUOUS PRN
Status: DISCONTINUED | OUTPATIENT
Start: 2018-08-21 | End: 2018-08-21

## 2018-08-21 RX ORDER — LABETALOL HYDROCHLORIDE 5 MG/ML
INJECTION, SOLUTION INTRAVENOUS PRN
Status: DISCONTINUED | OUTPATIENT
Start: 2018-08-21 | End: 2018-08-21

## 2018-08-21 RX ORDER — ONDANSETRON 4 MG/1
4 TABLET, ORALLY DISINTEGRATING ORAL EVERY 30 MIN PRN
Status: DISCONTINUED | OUTPATIENT
Start: 2018-08-21 | End: 2018-08-21 | Stop reason: HOSPADM

## 2018-08-21 RX ORDER — NALOXONE HYDROCHLORIDE 0.4 MG/ML
.1-.4 INJECTION, SOLUTION INTRAMUSCULAR; INTRAVENOUS; SUBCUTANEOUS
Status: DISCONTINUED | OUTPATIENT
Start: 2018-08-21 | End: 2018-08-22 | Stop reason: HOSPADM

## 2018-08-21 RX ORDER — FENTANYL CITRATE 50 UG/ML
INJECTION, SOLUTION INTRAMUSCULAR; INTRAVENOUS PRN
Status: DISCONTINUED | OUTPATIENT
Start: 2018-08-21 | End: 2018-08-21

## 2018-08-21 RX ORDER — LABETALOL HYDROCHLORIDE 5 MG/ML
10-40 INJECTION, SOLUTION INTRAVENOUS EVERY 10 MIN PRN
Status: DISCONTINUED | OUTPATIENT
Start: 2018-08-21 | End: 2018-08-22 | Stop reason: HOSPADM

## 2018-08-21 RX ORDER — HYDROMORPHONE HYDROCHLORIDE 1 MG/ML
.3-.5 INJECTION, SOLUTION INTRAMUSCULAR; INTRAVENOUS; SUBCUTANEOUS EVERY 5 MIN PRN
Status: DISCONTINUED | OUTPATIENT
Start: 2018-08-21 | End: 2018-08-21 | Stop reason: HOSPADM

## 2018-08-21 RX ORDER — PRAMIPEXOLE DIHYDROCHLORIDE 1 MG/1
1 TABLET ORAL AT BEDTIME
Status: DISCONTINUED | OUTPATIENT
Start: 2018-08-21 | End: 2018-08-22 | Stop reason: HOSPADM

## 2018-08-21 RX ORDER — LIDOCAINE 40 MG/G
CREAM TOPICAL
Status: DISCONTINUED | OUTPATIENT
Start: 2018-08-21 | End: 2018-08-22 | Stop reason: HOSPADM

## 2018-08-21 RX ORDER — SODIUM CHLORIDE 9 MG/ML
INJECTION, SOLUTION INTRAVENOUS CONTINUOUS
Status: DISPENSED | OUTPATIENT
Start: 2018-08-21 | End: 2018-08-22

## 2018-08-21 RX ORDER — HYDROCHLOROTHIAZIDE 25 MG/1
25 TABLET ORAL AT BEDTIME
Status: DISCONTINUED | OUTPATIENT
Start: 2018-08-21 | End: 2018-08-22 | Stop reason: HOSPADM

## 2018-08-21 RX ORDER — PROPOFOL 10 MG/ML
INJECTION, EMULSION INTRAVENOUS PRN
Status: DISCONTINUED | OUTPATIENT
Start: 2018-08-21 | End: 2018-08-21

## 2018-08-21 RX ORDER — HYDROMORPHONE HYDROCHLORIDE 1 MG/ML
.3-.5 INJECTION, SOLUTION INTRAMUSCULAR; INTRAVENOUS; SUBCUTANEOUS
Status: COMPLETED | OUTPATIENT
Start: 2018-08-21 | End: 2018-08-22

## 2018-08-21 RX ORDER — LIDOCAINE HYDROCHLORIDE AND EPINEPHRINE 10; 10 MG/ML; UG/ML
INJECTION, SOLUTION INFILTRATION; PERINEURAL PRN
Status: DISCONTINUED | OUTPATIENT
Start: 2018-08-21 | End: 2018-08-21 | Stop reason: HOSPADM

## 2018-08-21 RX ORDER — SODIUM CHLORIDE, SODIUM LACTATE, POTASSIUM CHLORIDE, CALCIUM CHLORIDE 600; 310; 30; 20 MG/100ML; MG/100ML; MG/100ML; MG/100ML
INJECTION, SOLUTION INTRAVENOUS CONTINUOUS
Status: DISCONTINUED | OUTPATIENT
Start: 2018-08-21 | End: 2018-08-21 | Stop reason: HOSPADM

## 2018-08-21 RX ORDER — CEFAZOLIN SODIUM 1 G/3ML
1 INJECTION, POWDER, FOR SOLUTION INTRAMUSCULAR; INTRAVENOUS SEE ADMIN INSTRUCTIONS
Status: DISCONTINUED | OUTPATIENT
Start: 2018-08-21 | End: 2018-08-21 | Stop reason: HOSPADM

## 2018-08-21 RX ORDER — CEFAZOLIN SODIUM 2 G/100ML
2 INJECTION, SOLUTION INTRAVENOUS
Status: COMPLETED | OUTPATIENT
Start: 2018-08-21 | End: 2018-08-21

## 2018-08-21 RX ORDER — MAGNESIUM HYDROXIDE 1200 MG/15ML
LIQUID ORAL PRN
Status: DISCONTINUED | OUTPATIENT
Start: 2018-08-21 | End: 2018-08-21 | Stop reason: HOSPADM

## 2018-08-21 RX ORDER — ACETAMINOPHEN 325 MG/1
975 TABLET ORAL EVERY 8 HOURS
Status: DISCONTINUED | OUTPATIENT
Start: 2018-08-21 | End: 2018-08-22 | Stop reason: HOSPADM

## 2018-08-21 RX ORDER — ONDANSETRON 4 MG/1
4 TABLET, ORALLY DISINTEGRATING ORAL EVERY 6 HOURS PRN
Status: DISCONTINUED | OUTPATIENT
Start: 2018-08-21 | End: 2018-08-22 | Stop reason: HOSPADM

## 2018-08-21 RX ORDER — BUSPIRONE HYDROCHLORIDE 10 MG/1
10 TABLET ORAL 2 TIMES DAILY
Status: DISCONTINUED | OUTPATIENT
Start: 2018-08-21 | End: 2018-08-22 | Stop reason: HOSPADM

## 2018-08-21 RX ORDER — SIMVASTATIN 20 MG
20 TABLET ORAL AT BEDTIME
Status: DISCONTINUED | OUTPATIENT
Start: 2018-08-21 | End: 2018-08-22 | Stop reason: HOSPADM

## 2018-08-21 RX ORDER — LIDOCAINE 40 MG/G
CREAM TOPICAL
Status: DISCONTINUED | OUTPATIENT
Start: 2018-08-21 | End: 2018-08-21 | Stop reason: HOSPADM

## 2018-08-21 RX ORDER — AMANTADINE HYDROCHLORIDE 100 MG/1
100 CAPSULE, GELATIN COATED ORAL 2 TIMES DAILY
Status: DISCONTINUED | OUTPATIENT
Start: 2018-08-21 | End: 2018-08-22 | Stop reason: HOSPADM

## 2018-08-21 RX ORDER — LABETALOL HYDROCHLORIDE 5 MG/ML
10 INJECTION, SOLUTION INTRAVENOUS
Status: DISCONTINUED | OUTPATIENT
Start: 2018-08-21 | End: 2018-08-21 | Stop reason: HOSPADM

## 2018-08-21 RX ORDER — HYDRALAZINE HYDROCHLORIDE 20 MG/ML
10-20 INJECTION INTRAMUSCULAR; INTRAVENOUS EVERY 30 MIN PRN
Status: DISCONTINUED | OUTPATIENT
Start: 2018-08-21 | End: 2018-08-22 | Stop reason: HOSPADM

## 2018-08-21 RX ORDER — CARBIDOPA AND LEVODOPA 25; 100 MG/1; MG/1
3 TABLET ORAL 4 TIMES DAILY
Status: DISCONTINUED | OUTPATIENT
Start: 2018-08-21 | End: 2018-08-22 | Stop reason: HOSPADM

## 2018-08-21 RX ORDER — HYDRALAZINE HYDROCHLORIDE 20 MG/ML
INJECTION INTRAMUSCULAR; INTRAVENOUS PRN
Status: DISCONTINUED | OUTPATIENT
Start: 2018-08-21 | End: 2018-08-21

## 2018-08-21 RX ORDER — CEFAZOLIN SODIUM 1 G/3ML
1 INJECTION, POWDER, FOR SOLUTION INTRAMUSCULAR; INTRAVENOUS EVERY 8 HOURS
Status: DISCONTINUED | OUTPATIENT
Start: 2018-08-21 | End: 2018-08-22 | Stop reason: HOSPADM

## 2018-08-21 RX ORDER — DEXMEDETOMIDINE HYDROCHLORIDE 4 UG/ML
0.2-1.2 INJECTION, SOLUTION INTRAVENOUS CONTINUOUS
Status: DISCONTINUED | OUTPATIENT
Start: 2018-08-21 | End: 2018-08-21 | Stop reason: HOSPADM

## 2018-08-21 RX ORDER — ACETAMINOPHEN 325 MG/1
650 TABLET ORAL EVERY 4 HOURS PRN
Status: DISCONTINUED | OUTPATIENT
Start: 2018-08-24 | End: 2018-08-22 | Stop reason: HOSPADM

## 2018-08-21 RX ORDER — BACITRACIN 500 [USP'U]/G
OINTMENT OPHTHALMIC PRN
Status: DISCONTINUED | OUTPATIENT
Start: 2018-08-21 | End: 2018-08-21 | Stop reason: HOSPADM

## 2018-08-21 RX ORDER — SODIUM CHLORIDE, SODIUM LACTATE, POTASSIUM CHLORIDE, CALCIUM CHLORIDE 600; 310; 30; 20 MG/100ML; MG/100ML; MG/100ML; MG/100ML
INJECTION, SOLUTION INTRAVENOUS CONTINUOUS PRN
Status: DISCONTINUED | OUTPATIENT
Start: 2018-08-21 | End: 2018-08-21

## 2018-08-21 RX ORDER — ONDANSETRON 2 MG/ML
INJECTION INTRAMUSCULAR; INTRAVENOUS PRN
Status: DISCONTINUED | OUTPATIENT
Start: 2018-08-21 | End: 2018-08-21

## 2018-08-21 RX ORDER — LIDOCAINE HYDROCHLORIDE 20 MG/ML
INJECTION, SOLUTION INFILTRATION; PERINEURAL PRN
Status: DISCONTINUED | OUTPATIENT
Start: 2018-08-21 | End: 2018-08-21

## 2018-08-21 RX ORDER — FENTANYL CITRATE 50 UG/ML
25-50 INJECTION, SOLUTION INTRAMUSCULAR; INTRAVENOUS
Status: DISCONTINUED | OUTPATIENT
Start: 2018-08-21 | End: 2018-08-21 | Stop reason: HOSPADM

## 2018-08-21 RX ORDER — ONDANSETRON 2 MG/ML
4 INJECTION INTRAMUSCULAR; INTRAVENOUS EVERY 30 MIN PRN
Status: DISCONTINUED | OUTPATIENT
Start: 2018-08-21 | End: 2018-08-21 | Stop reason: HOSPADM

## 2018-08-21 RX ORDER — OXYCODONE HYDROCHLORIDE 5 MG/1
5-10 TABLET ORAL
Status: DISCONTINUED | OUTPATIENT
Start: 2018-08-21 | End: 2018-08-22 | Stop reason: HOSPADM

## 2018-08-21 RX ORDER — HYDRALAZINE HYDROCHLORIDE 20 MG/ML
2.5-5 INJECTION INTRAMUSCULAR; INTRAVENOUS EVERY 10 MIN PRN
Status: DISCONTINUED | OUTPATIENT
Start: 2018-08-21 | End: 2018-08-21 | Stop reason: HOSPADM

## 2018-08-21 RX ORDER — ONDANSETRON 2 MG/ML
4 INJECTION INTRAMUSCULAR; INTRAVENOUS EVERY 6 HOURS PRN
Status: DISCONTINUED | OUTPATIENT
Start: 2018-08-21 | End: 2018-08-22 | Stop reason: HOSPADM

## 2018-08-21 RX ORDER — MEPERIDINE HYDROCHLORIDE 25 MG/ML
12.5 INJECTION INTRAMUSCULAR; INTRAVENOUS; SUBCUTANEOUS EVERY 5 MIN PRN
Status: DISCONTINUED | OUTPATIENT
Start: 2018-08-21 | End: 2018-08-21 | Stop reason: HOSPADM

## 2018-08-21 RX ADMIN — FENTANYL CITRATE 25 MCG: 50 INJECTION, SOLUTION INTRAMUSCULAR; INTRAVENOUS at 14:15

## 2018-08-21 RX ADMIN — DEXMEDETOMIDINE HYDROCHLORIDE 0.5 MCG/KG/HR: 100 INJECTION, SOLUTION INTRAVENOUS at 09:11

## 2018-08-21 RX ADMIN — ONDANSETRON 4 MG: 2 INJECTION INTRAMUSCULAR; INTRAVENOUS at 20:32

## 2018-08-21 RX ADMIN — CEFAZOLIN SODIUM 1 G: 2 INJECTION, SOLUTION INTRAVENOUS at 11:40

## 2018-08-21 RX ADMIN — Medication 0.5 MG: at 23:58

## 2018-08-21 RX ADMIN — CARBIDOPA AND LEVODOPA 3 TABLET: 25; 100 TABLET ORAL at 22:14

## 2018-08-21 RX ADMIN — ONDANSETRON 4 MG: 2 INJECTION INTRAMUSCULAR; INTRAVENOUS at 16:05

## 2018-08-21 RX ADMIN — Medication 0.5 MG: at 20:35

## 2018-08-21 RX ADMIN — HYDRALAZINE HYDROCHLORIDE 5 MG: 20 INJECTION INTRAMUSCULAR; INTRAVENOUS at 13:14

## 2018-08-21 RX ADMIN — CEFAZOLIN SODIUM 1 G: 2 INJECTION, SOLUTION INTRAVENOUS at 15:30

## 2018-08-21 RX ADMIN — FENTANYL CITRATE 75 MCG: 50 INJECTION, SOLUTION INTRAMUSCULAR; INTRAVENOUS at 15:15

## 2018-08-21 RX ADMIN — SIMVASTATIN 20 MG: 20 TABLET, FILM COATED ORAL at 22:15

## 2018-08-21 RX ADMIN — PROPOFOL 10 MG: 10 INJECTION, EMULSION INTRAVENOUS at 08:26

## 2018-08-21 RX ADMIN — ROCURONIUM BROMIDE 30 MG: 10 INJECTION INTRAVENOUS at 15:45

## 2018-08-21 RX ADMIN — PROPOFOL 10 MG: 10 INJECTION, EMULSION INTRAVENOUS at 10:18

## 2018-08-21 RX ADMIN — PRAMIPEXOLE DIHYDROCHLORIDE 1 MG: 1 TABLET ORAL at 22:15

## 2018-08-21 RX ADMIN — ROCURONIUM BROMIDE 40 MG: 10 INJECTION INTRAVENOUS at 15:15

## 2018-08-21 RX ADMIN — ROCURONIUM BROMIDE 20 MG: 10 INJECTION INTRAVENOUS at 16:30

## 2018-08-21 RX ADMIN — HYDROCHLOROTHIAZIDE 25 MG: 25 TABLET ORAL at 22:15

## 2018-08-21 RX ADMIN — SODIUM CHLORIDE: 9 INJECTION, SOLUTION INTRAVENOUS at 19:03

## 2018-08-21 RX ADMIN — CEFAZOLIN SODIUM 1 G: 2 INJECTION, SOLUTION INTRAVENOUS at 13:45

## 2018-08-21 RX ADMIN — HYDRALAZINE HYDROCHLORIDE 5 MG: 20 INJECTION INTRAMUSCULAR; INTRAVENOUS at 12:54

## 2018-08-21 RX ADMIN — AMANTADINE HYDROCHLORIDE 100 MG: 100 CAPSULE ORAL at 22:13

## 2018-08-21 RX ADMIN — HYDRALAZINE HYDROCHLORIDE 5 MG: 20 INJECTION INTRAMUSCULAR; INTRAVENOUS at 13:29

## 2018-08-21 RX ADMIN — SUGAMMADEX 200 MG: 100 INJECTION, SOLUTION INTRAVENOUS at 17:35

## 2018-08-21 RX ADMIN — PROPOFOL 50 MCG/KG/MIN: 10 INJECTION, EMULSION INTRAVENOUS at 08:13

## 2018-08-21 RX ADMIN — CEFAZOLIN SODIUM 2 G: 2 INJECTION, SOLUTION INTRAVENOUS at 09:45

## 2018-08-21 RX ADMIN — LABETALOL HYDROCHLORIDE 5 MG: 5 INJECTION INTRAVENOUS at 13:43

## 2018-08-21 RX ADMIN — PROPOFOL 50 MG: 10 INJECTION, EMULSION INTRAVENOUS at 16:37

## 2018-08-21 RX ADMIN — PROPOFOL 20 MG: 10 INJECTION, EMULSION INTRAVENOUS at 08:30

## 2018-08-21 RX ADMIN — OXYCODONE HYDROCHLORIDE 5 MG: 5 TABLET ORAL at 22:15

## 2018-08-21 RX ADMIN — FENTANYL CITRATE 50 MCG: 50 INJECTION, SOLUTION INTRAMUSCULAR; INTRAVENOUS at 16:35

## 2018-08-21 RX ADMIN — SODIUM CHLORIDE, POTASSIUM CHLORIDE, SODIUM LACTATE AND CALCIUM CHLORIDE: 600; 310; 30; 20 INJECTION, SOLUTION INTRAVENOUS at 14:45

## 2018-08-21 RX ADMIN — PROPOFOL 10 MG: 10 INJECTION, EMULSION INTRAVENOUS at 10:26

## 2018-08-21 RX ADMIN — FENTANYL CITRATE 25 MCG: 50 INJECTION, SOLUTION INTRAMUSCULAR; INTRAVENOUS at 18:57

## 2018-08-21 RX ADMIN — HYDRALAZINE HYDROCHLORIDE 10 MG: 20 INJECTION INTRAMUSCULAR; INTRAVENOUS at 23:58

## 2018-08-21 RX ADMIN — FENTANYL CITRATE 25 MCG: 50 INJECTION, SOLUTION INTRAMUSCULAR; INTRAVENOUS at 17:56

## 2018-08-21 RX ADMIN — PROPOFOL 150 MCG/KG/MIN: 10 INJECTION, EMULSION INTRAVENOUS at 15:46

## 2018-08-21 RX ADMIN — ACETAMINOPHEN 975 MG: 325 TABLET, FILM COATED ORAL at 22:13

## 2018-08-21 RX ADMIN — ONDANSETRON 4 MG: 2 INJECTION INTRAMUSCULAR; INTRAVENOUS at 19:46

## 2018-08-21 RX ADMIN — SODIUM CHLORIDE, POTASSIUM CHLORIDE, SODIUM LACTATE AND CALCIUM CHLORIDE: 600; 310; 30; 20 INJECTION, SOLUTION INTRAVENOUS at 07:45

## 2018-08-21 RX ADMIN — HYDRALAZINE HYDROCHLORIDE 5 MG: 20 INJECTION INTRAMUSCULAR; INTRAVENOUS at 12:33

## 2018-08-21 RX ADMIN — LABETALOL HYDROCHLORIDE 5 MG: 5 INJECTION INTRAVENOUS at 13:50

## 2018-08-21 RX ADMIN — BUSPIRONE HYDROCHLORIDE 10 MG: 10 TABLET ORAL at 22:14

## 2018-08-21 RX ADMIN — PROPOFOL 130 MG: 10 INJECTION, EMULSION INTRAVENOUS at 15:15

## 2018-08-21 RX ADMIN — LIDOCAINE HYDROCHLORIDE 40 MG: 20 INJECTION, SOLUTION INFILTRATION; PERINEURAL at 08:16

## 2018-08-21 RX ADMIN — PROPOFOL 50 MG: 10 INJECTION, EMULSION INTRAVENOUS at 16:47

## 2018-08-21 RX ADMIN — LIDOCAINE HYDROCHLORIDE 100 MG: 20 INJECTION, SOLUTION INFILTRATION; PERINEURAL at 15:15

## 2018-08-21 RX ADMIN — PROPOFOL 20 MG: 10 INJECTION, EMULSION INTRAVENOUS at 10:16

## 2018-08-21 RX ADMIN — CEFAZOLIN 1 G: 330 INJECTION, POWDER, FOR SOLUTION INTRAMUSCULAR; INTRAVENOUS at 22:09

## 2018-08-21 ASSESSMENT — PAIN DESCRIPTION - DESCRIPTORS
DESCRIPTORS: ACHING;SHARP

## 2018-08-21 ASSESSMENT — ACTIVITIES OF DAILY LIVING (ADL): ADLS_ACUITY_SCORE: 10

## 2018-08-21 ASSESSMENT — VISUAL ACUITY: OU: GLASSES

## 2018-08-21 NOTE — IP AVS SNAPSHOT
[address] [address]    Phone:  [phone]                                       After Visit Summary   8/21/2018    Erika Diaz    MRN: [id number]           After Visit Summary Signature Page     I have received my discharge instructions, and my questions have been answered. I have discussed any challenges I see with this plan with the nurse or doctor.    ..........................................................................................................................................  Patient/Patient Representative Signature      ..........................................................................................................................................  Patient Representative Print Name and Relationship to Patient    ..................................................               ................................................  Date                                            Time    ..........................................................................................................................................  Reviewed by Signature/Title    ...................................................              ..............................................  Date                                                            Time

## 2018-08-21 NOTE — ANESTHESIA CARE TRANSFER NOTE
Patient: Erika Diaz    Procedure(s):  Stealth Assisted Right Deep Brain Stimulator Placement, Phase I, Placement Of Right Side Deep Brain Stimulator Electrode, Target Right Globus Pallidus Internus With Microelectrode Recording - Wound Class: I-Clean    Diagnosis: Parkinson's Disease   Diagnosis Additional Information: No value filed.    Anesthesia Type:   No value filed.     Note:  Airway :Nasal Cannula  Patient transferred to:PACU  Comments: Pt extubated in the OR without incident or complications. Pt VSS upon arrival to the PACU. Pt has no c/o pain/N/V. Pt care report given to receiving RN.       Vitals: (Last set prior to Anesthesia Care Transfer)    CRNA VITALS  8/21/2018 1516 - 8/21/2018 1616      8/21/2018             NIBP: 136/67    Pulse: 53    NIBP Mean: 89    Ht Rate: 53    Temp 2: 36.3  C (97.3  F)    SpO2: 98 %    Resp Rate (observed): 11                Electronically Signed By: GUILLERMINA Marin CRNA  August 21, 2018  5:55 PM

## 2018-08-21 NOTE — BRIEF OP NOTE
"   [facility] [place]    Brief Operative Note    Pre-operative diagnosis: Parkinson's Disease   Post-operative diagnosis Parkinson's Disease   Procedure: Procedure(s):  Stealth Assisted Right Deep Brain Stimulator Placement, Phase I, Placement Of Right Side Deep Brain Stimulator Electrode, Target Right Globus Pallidus Internus With Microelectrode Recording - Wound Class: I-Clean  Surgeon: Surgeon(s) and Role:     * Jerry Concepcion MD - Primary     * Kris Price MD - Resident - Assisting  Anesthesia: Combined MAC with Local and General Endotracheal Anesthesia     Estimated blood loss: 30 ml   Drains: None  Specimens: * No specimens in log *  Findings:   Impedances within normal limits assessed intra-operatively.  Complications: Bed in OR 20 had leaking hydraulic fluid after implantation of the DBS electrode and closure of the wound, necessitating relocation to OR 14. Patient did not suffer any immediate complications from this equipment failure.   Implants:   1) [employer] DBS system REF 6372 S/N [id number]; 60 cm extension wire   2) [employer] DBS System REF 6172 S/N [id number]; cranial lead   3) [employer] REF 6010 Lot [id number]; maxwell hole cap   4) [employer] REF 3383 Lot [id number]; 30 cm percutaneous extension wire     Kris \"José Luis\" MD Dee   Neurosurgery, PGY-2    Please contact neurosurgery resident on call with questions.    Dial * * *[phone], enter [phone] when prompted.           "

## 2018-08-21 NOTE — IP AVS SNAPSHOT
MRN:[id number]                      After Visit Summary   8/21/2018    Erika Diaz    MRN: [id number]           Thank you!     Thank you for choosing [facility] for your care. Our goal is always to provide you with excellent care. Hearing back from our patients is one way we can continue to improve our services. Please take a few minutes to complete the written survey that you may receive in the mail after you visit with us. Thank you!        Patient Information     Date Of Birth          1960        Designated Caregiver       Most Recent Value    Caregiver    Name of designated caregiver davin Degroot    Phone number of caregiver [phone]      About your hospital stay     You were admitted on:  August 21, 2018 You last received care in the:  [address] [facility]    You were discharged on:  August 22, 2018        Reason for your hospital stay       You underwent surgery to have Deep Brain Stimulator leads placed                  Who to Call     For medical emergencies, please call 911.  For non-urgent questions about your medical care, please call your primary care provider or clinic, [phone]  For questions related to your surgery, please call your surgery clinic        Attending Provider     Provider Jerry Suero MD Neurosurgery       Primary Care Provider Office Phone # Fax #    Ondina Edmondson [phone]      After Care Instructions     Activity       Your activity upon discharge:   Do not do any bending, twisting, strenuous exercise, or heavy lifting (greater than 10 pounds) for 4-6 weeks. Be careful and ask for assistance when walking or going up and down stairs. Avoid any activities that could result in trauma to the surgical wound. Do not drive within 3 months of having your last seizure or while using narcotics or other sedating medications, such as sleep aids, muscle relaxants, etc.            Diet       Follow this diet  upon discharge: Orders Placed This Encounter      Regular Diet Adult            Discharge Instructions       You underwent surgery place a  deep brain stimulator  by Jerry Concepcion MD, PhD      - Your sutures are absorbable.     - You will have follow up scheduled with the physician assistants and/or nurse practitioners in our clinic 2 weeks after your surgery.  If you live far away, you may see your primary care doctor for a wound check at 2 weeks.     - If you have not heard from our clinic about your follow up visit by 3-4 days following your discharge, please call our clinic at [phone] to schedule an appointment with the Neurosurgery teams.     After discharge, your activity restrictions are:   -We encourage short frequent walks, increasing as tolerated.  - No driving until you are seen in clinic and cleared by your neurosurgeon.  If you have had a seizure, you may not drive for at least 3 months according to [place] law.    - No strenuous activity.  - No lifting more than 10 pounds until you are seen in clinic (a gallon of milk weighs approximately 8 pounds)    Wound care  - You are ok to shower, but do not soak your incisions. Pat them dry if they get damp.   - Avoid coloring your hair or permanent styling until cleared by your surgeon  - No baths, hot tubs or pools for 4-6 weeks after surgery.       Call if you have any of the followin. Temperature greater than 101.5 F.   2. Any redness, swelling or discharge from the wound.   3. Any new weakness, numbness or altered mental status.  4. Worsening pain that is not improving with the pain medications you were prescribed.     Call [phone] or after 5:00 pm or on weekends call [phone] and ask for the neurosurgery resident on call. Thank You.            Wound care and dressings       Instructions to care for your wound at home:   You should remove your dressings and bandages on post-operative day #2. You should then keep the wound undressed  and open to air. You are allowed to take showers and get the wound wet starting on post-operative day #3 but you may not scrub or soak the wound or keep it submerged under water. If you do happen to get the wound wet, be sure to pat dry it rather than scrubbing it with a towel.                  Follow-up Appointments     Adult [facility] Follow-up and recommended labs and tests       Follow up with Dr Jerry Concepcion on 8/30/2018 as scheduled for stage II of your procedure     Appointments on [facility] and/or [facility] (with [facility] or [facility] provider or service). Call [phone] if you haven't heard regarding these appointments within 7 days of discharge.                  Your next 10 appointments already scheduled     Aug 30, 2018   Procedure with Jerry Concepcion MD   [facility], [place], Same Day Surgery (--)    [address] [address]   [phone]            Sep 13, 2018 12:00 PM CDT   (Arrive by 11:45 AM)   Return Visit with Venus Anthony PA-C   [facility] Neurosurgery ([facility])    [address] [place] [id number] [phone]            Sep 21, 2018  7:00 AM CDT   (Arrive by 6:45 AM)   Return Visit with GUILLERMINA Vallecillo [facility] Neurology ([facility])    [address] [place] [id number] [phone]            Sep 21, 2018 11:40 AM CDT   CT HEAD W/O CONTRAST with UCCT2   [facility] CT ([facility])    [address] [place] [id number] [phone]           Please bring any scans or X-rays taken at other hospitals, if similar tests were done. Also bring a list of your medicines, including vitamins, minerals and over-the-counter drugs. It is safest to leave personal items at home.  Be sure to tell your doctor:   If you have any allergies.   If there s any chance you are pregnant.   If you are breastfeeding.  You  "do not need to do anything special to prepare for this exam.  Please wear loose clothing, such as a sweat suit or jogging clothes. Avoid snaps, zippers and other metal. We may ask you to undress and put on a hospital gown.              Future tests that were ordered for you     Basic metabolic panel           CBC with platelets       Last Lab Result: No results found for: HGB            INR           Partial thromboplastin time                 Additional Information     If you use hormonal birth control (such as the pill, patch, ring or implants): You'll need a second form of birth control for 7 days (condoms, a diaphragm or contraceptive foam). While in the hospital, you received a medicine called Bridion. Your normal birth control will not work as well for a week after taking this medicine.          Pending Results     No orders found for last 3 day(s).            Statement of Approval     Ordered          08/22/18 1050  I have reviewed and agree with all the recommendations and orders detailed in this document.  EFFECTIVE NOW     Approved and electronically signed by:  Mindy Montes De Oca APRN CNP             Admission Information     Date & Time Provider Department Dept. Phone    8/21/2018 Jerry Concepcion MD [address] [facility] [phone]      Your Vitals Were     Blood Pressure Pulse Temperature Respirations Height Weight    122/69 65 98.1  F (36.7  C) (Oral) 15 1.676 m (5' 6\") 97.3 kg (214 lb 8.1 oz)    Pulse Oximetry BMI (Body Mass Index)                98% 34.62 kg/m2          MyChart Information     [facility] gives you secure access to your electronic health record. If you see a primary care provider, you can also send messages to your care team and make appointments. If you have questions, please call your primary care clinic.  If you do not have a primary care provider, please call [phone] and they will assist you.        Care EveryWhere ID     This is your Care EveryWhere ID. " This could be used by other organizations to access your [facility] medical records  [id number]        Equal Access to Services     AYLEEN GASCA : Hadii matilde Quinonez, walaurada leopoldo, gail arieximenaaltagracia villegasmacaltagracia, naga alcocer penelopeelly villegasfang richarpolly aba olivo. So [facility] [phone].    ATENCIÓN: Si habla español, tiene a guerra disposición servicios gratuitos de asistencia lingüística. Llame al [phone].    We comply with applicable federal civil rights laws and [place] laws. We do not discriminate on the basis of race, color, national origin, age, disability, sex, sexual orientation, or gender identity.               Review of your medicines      START taking        Dose / Directions    cephALEXin 500 MG capsule   Commonly known as:  KEFLEX        Dose:  500 mg   Take 1 capsule (500 mg) by mouth 3 times daily for 9 days   Quantity:  27 capsule   Refills:  0       cyclobenzaprine 5 MG tablet   Commonly known as:  FLEXERIL        Dose:  5 mg   Take 1 tablet (5 mg) by mouth 3 times daily   Quantity:  30 tablet   Refills:  0       oxyCODONE IR 5 MG tablet   Commonly known as:  ROXICODONE   Used for:  Parkinson's disease (H)        Dose:  5-10 mg   Take 1-2 tablets (5-10 mg) by mouth every 3 hours as needed for other (pain control or improvement in physical function. Hold dose for analgesic side effects.)   Quantity:  45 tablet   Refills:  0       senna 8.6 MG tablet   Commonly known as:  SENOKOT        Dose:  1 tablet   Take 1 tablet by mouth daily   Quantity:  30 tablet   Refills:  0         CONTINUE these medicines which may have CHANGED, or have new prescriptions. If we are uncertain of the size of tablets/capsules you have at home, strength may be listed as something that might have changed.        Dose / Directions    amantadine 100 MG capsule   Commonly known as:  SYMMETREL   This may have changed:    - how much to take  - how to take this  - when to take this  - additional instructions   Used for:   Paralysis agitans (H)        1 capsule @ 7am and 4pm   Quantity:  180 capsule   Refills:  3       LORazepam 1 MG tablet   Commonly known as:  ATIVAN   This may have changed:    - how much to take  - how to take this  - when to take this  - reasons to take this  - additional instructions   Used for:  Paralysis agitans (H)        1 tab by mouth @ 9pm as needed   Quantity:  60 tablet   Refills:  3       pramipexole 0.5 MG tablet   Commonly known as:  MIRAPEX   This may have changed:    - how much to take  - how to take this  - when to take this  - additional instructions   Used for:  Paralysis agitans (H)        2 tabs @ 9pm   Quantity:  180 tablet   Refills:  3         CONTINUE these medicines which have NOT CHANGED        Dose / Directions    busPIRone 10 MG tablet   Commonly known as:  BUSPAR   Used for:  Paralysis agitans (H)        Dose:  10 mg   Take 10 mg by mouth 2 times daily 1 Tab @ 7am and 1 tab @ 4pm   Quantity:  180 tablet   Refills:  3       calcium carbonate 500 MG chewable tablet   Commonly known as:  TUMS        Dose:  2 chew tab   Take 2 chew tab by mouth as needed for heartburn   Refills:  0       carbidopa-levodopa  MG per tablet   Commonly known as:  SINEMET   Used for:  Paralysis agitans (H)        Dose:  3 tablet   Take 3 tablets by mouth 4 times daily 3 tabs @7, noon, 4p and 9pm and a few as needed = 13/day x 90 = 1170tablets   Quantity:  1170 tablet   Refills:  3       doxylamine 25 MG Tabs tablet   Commonly known as:  UNISOM        Dose:  25 mg   Take 1 tablet (25 mg) by mouth nightly as needed   Quantity:  14 each   Refills:  0       FLUoxetine 20 MG capsule   Commonly known as:  PROzac   Used for:  Paralysis agitans (H)        Dose:  20 mg   Take 20 mg by mouth every morning Take 20mg capsule  @ 7am   Quantity:  90 capsule   Refills:  3       hydrochlorothiazide 25 MG tablet   Commonly known as:  HYDRODIURIL        Dose:  25 mg   Take 25 mg by mouth At Bedtime @9 pm   Refills:  0        simvastatin 20 MG tablet   Commonly known as:  ZOCOR        Dose:  20 mg   Take 20 mg by mouth At Bedtime   Refills:  1       TYLENOL PO        Dose:  1000 mg   Take 1,000 mg by mouth every 8 hours as needed for mild pain or fever   Refills:  0         STOP taking     aspirin 81 MG EC tablet           EXCEDRIN MIGRAINE PO                Where to get your medicines      These medications were sent to [facility] - [place], [place] - [address]  [address] [address]     Phone:  [phone]     cephALEXin 500 MG capsule    cyclobenzaprine 5 MG tablet    senna 8.6 MG tablet         Some of these will need a paper prescription and others can be bought over the counter. Ask your nurse if you have questions.     Bring a paper prescription for each of these medications     oxyCODONE IR 5 MG tablet                Protect others around you: Learn how to safely use, store and throw away your medicines at www.disposemymeds.org.        ANTIBIOTIC INSTRUCTION     You've Been Prescribed an Antibiotic - Now What?  Your healthcare team thinks that you or your loved one might have an infection. Some infections can be treated with antibiotics, which are powerful, life-saving drugs. Like all medications, antibiotics have side effects and should only be used when necessary. There are some important things you should know about your antibiotic treatment.      Your healthcare team may run tests before you start taking an antibiotic.    Your team may take samples (e.g., from your blood, urine or other areas) to run tests to look for bacteria. These test can be important to determine if you need an antibiotic at all and, if you do, which antibiotic will work best.      Within a few days, your healthcare team might change or even stop your antibiotic.    Your team may start you on an antibiotic while they are working to find out what is making you sick.    Your team might change your antibiotic  because test results show that a different antibiotic would be better to treat your infection.    In some cases, once your team has more information, they learn that you do not need an antibiotic at all. They may find out that you don't have an infection, or that the antibiotic you're taking won't work against your infection. For example, an infection caused by a virus can't be treated with antibiotics. Staying on an antibiotic when you don't need it is more likely to be harmful than helpful.      You may experience side effects from your antibiotic.    Like all medications, antibiotics have side effects. Some of these can be serious.    Let you healthcare team know if you have any known allergies when you are admitted to the hospital.    One significant side effect of nearly all antibiotics is the risk of severe and sometimes deadly diarrhea caused by Clostridium difficile (C. Difficile). This occurs when a person takes antibiotics because some good germs are destroyed. Antibiotic use allows C. diificile to take over, putting patients at high risk for this serious infection.    As a patient or caregiver, it is important to understand your or your loved one's antibiotic treatment. It is especially important for caregivers to speak up when patients can't speak for themselves. Here are some important questions to ask your healthcare team.    What infection is this antibiotic treating and how do you know I have that infection?    What side effects might occur from this antibiotic?    How long will I need to take this antibiotic?    Is it safe to take this antibiotic with other medications or supplements (e.g., vitamins) that I am taking?     Are there any special directions I need to know about taking this antibiotic? For example, should I take it with food?    How will I be monitored to know whether my infection is responding to the antibiotic?    What tests may help to make sure the right antibiotic is prescribed for  me?      Information provided by:  www.cdc.gov/getsmart  U.S. Department of Health and Human Services  Centers for disease Control and Prevention  National Center for Emerging and Zoonotic Infectious Diseases  Division of Healthcare Quality Promotion        Information about OPIOIDS     PRESCRIPTION OPIOIDS: WHAT YOU NEED TO KNOW   We gave you an opioid (narcotic) pain medicine. It is important to manage your pain, but opioids are not always the best choice. You should first try all the other options your care team gave you. Take this medicine for as short a time (and as few doses) as possible.    Some activities can increase your pain, such as bandage changes or therapy sessions. It may help to take your pain medicine 30 to 60 minutes before these activities. Reduce your stress by getting enough sleep, working on hobbies you enjoy and practicing relaxation or meditation. Talk to your care team about ways to manage your pain beyond prescription opioids.    These medicines have risks:    DO NOT drive when on new or higher doses of pain medicine. These medicines can affect your alertness and reaction times, and you could be arrested for driving under the influence (DUI). If you need to use opioids long-term, talk to your care team about driving.    DO NOT operate heavy machinery    DO NOT do any other dangerous activities while taking these medicines.    DO NOT drink any alcohol while taking these medicines.     If the opioid prescribed includes acetaminophen, DO NOT take with any other medicines that contain acetaminophen. Read all labels carefully. Look for the word  acetaminophen  or  Tylenol.  Ask your pharmacist if you have questions or are unsure.    You can get addicted to pain medicines, especially if you have a history of addiction (chemical, alcohol or substance dependence). Talk to your care team about ways to reduce this risk.    All opioids tend to cause constipation. Drink plenty of water and eat foods  that have a lot of fiber, such as fruits, vegetables, prune juice, apple juice and high-fiber cereal. Take a laxative (Miralax, milk of magnesia, Colace, Senna) if you don t move your bowels at least every other day. Other side effects include upset stomach, sleepiness, dizziness, throwing up, tolerance (needing more of the medicine to have the same effect), physical dependence and slowed breathing.    Store your pills in a secure place, locked if possible. We will not replace any lost or stolen medicine. If you don t finish your medicine, please throw away (dispose) as directed by your pharmacist. The [place] Pollution Control Agency has more information about safe disposal: https://www.pca.[facility].[place]/living-green/managing-unwanted-medications             Medication List: This is a list of all your medications and when to take them. Check marks below indicate your daily home schedule. Keep this list as a reference.      Medications           Morning Afternoon Evening Bedtime As Needed    amantadine 100 MG capsule   Commonly known as:  SYMMETREL   1 capsule @ 7am and 4pm   Last time this was given:  100 mg on 8/22/2018  7:52 AM                                busPIRone 10 MG tablet   Commonly known as:  BUSPAR   Take 10 mg by mouth 2 times daily 1 Tab @ 7am and 1 tab @ 4pm   Last time this was given:  10 mg on 8/22/2018  7:52 AM                                calcium carbonate 500 MG chewable tablet   Commonly known as:  TUMS   Take 2 chew tab by mouth as needed for heartburn                                carbidopa-levodopa  MG per tablet   Commonly known as:  SINEMET   Take 3 tablets by mouth 4 times daily 3 tabs @7, noon, 4p and 9pm and a few as needed = 13/day x 90 = 1170tablets   Last time this was given:  3 tablets on 8/22/2018  6:13 AM                                cephALEXin 500 MG capsule   Commonly known as:  KEFLEX   Take 1 capsule (500 mg) by mouth 3 times daily for 9 days                                 cyclobenzaprine 5 MG tablet   Commonly known as:  FLEXERIL   Take 1 tablet (5 mg) by mouth 3 times daily   Last time this was given:  5 mg on 8/22/2018 10:25 AM                                doxylamine 25 MG Tabs tablet   Commonly known as:  UNISOM   Take 1 tablet (25 mg) by mouth nightly as needed                                FLUoxetine 20 MG capsule   Commonly known as:  PROzac   Take 20 mg by mouth every morning Take 20mg capsule  @ 7am   Last time this was given:  20 mg on 8/22/2018  7:51 AM                                hydrochlorothiazide 25 MG tablet   Commonly known as:  HYDRODIURIL   Take 25 mg by mouth At Bedtime @9 pm   Last time this was given:  25 mg on 8/21/2018 10:15 PM                                LORazepam 1 MG tablet   Commonly known as:  ATIVAN   1 tab by mouth @ 9pm as needed                                oxyCODONE IR 5 MG tablet   Commonly known as:  ROXICODONE   Take 1-2 tablets (5-10 mg) by mouth every 3 hours as needed for other (pain control or improvement in physical function. Hold dose for analgesic side effects.)   Last time this was given:  5 mg on 8/22/2018  8:00 AM                                pramipexole 0.5 MG tablet   Commonly known as:  MIRAPEX   2 tabs @ 9pm   Last time this was given:  1 mg on 8/21/2018 10:15 PM                                senna 8.6 MG tablet   Commonly known as:  SENOKOT   Take 1 tablet by mouth daily                                simvastatin 20 MG tablet   Commonly known as:  ZOCOR   Take 20 mg by mouth At Bedtime   Last time this was given:  20 mg on 8/21/2018 10:15 PM                                TYLENOL PO   Take 1,000 mg by mouth every 8 hours as needed for mild pain or fever   Last time this was given:  975 mg on 8/22/2018  6:13 AM

## 2018-08-21 NOTE — PROGRESS NOTES
SPIRITUAL HEALTH SERVICES  [facility] ([place] 3C   PRE-SURGERY VISIT    Had pre-surgery visit with pt.  Provided spiritual support, prayer.     [facility]  Oncology   Pager [phone]

## 2018-08-22 VITALS
HEART RATE: 65 BPM | WEIGHT: 214.51 LBS | RESPIRATION RATE: 15 BRPM | DIASTOLIC BLOOD PRESSURE: 69 MMHG | OXYGEN SATURATION: 98 % | SYSTOLIC BLOOD PRESSURE: 122 MMHG | BODY MASS INDEX: 34.47 KG/M2 | TEMPERATURE: 98.1 F | HEIGHT: 66 IN

## 2018-08-22 LAB
MRSA DNA SPEC QL NAA+PROBE: NEGATIVE
SPECIMEN SOURCE: NORMAL

## 2018-08-22 PROCEDURE — 25000132 ZZH RX MED GY IP 250 OP 250 PS 637: Mod: GY | Performed by: NURSE PRACTITIONER

## 2018-08-22 PROCEDURE — 25000132 ZZH RX MED GY IP 250 OP 250 PS 637: Mod: GY | Performed by: STUDENT IN AN ORGANIZED HEALTH CARE EDUCATION/TRAINING PROGRAM

## 2018-08-22 PROCEDURE — 25000128 H RX IP 250 OP 636: Performed by: STUDENT IN AN ORGANIZED HEALTH CARE EDUCATION/TRAINING PROGRAM

## 2018-08-22 PROCEDURE — 25000125 ZZHC RX 250: Performed by: STUDENT IN AN ORGANIZED HEALTH CARE EDUCATION/TRAINING PROGRAM

## 2018-08-22 PROCEDURE — A9270 NON-COVERED ITEM OR SERVICE: HCPCS | Mod: GY | Performed by: STUDENT IN AN ORGANIZED HEALTH CARE EDUCATION/TRAINING PROGRAM

## 2018-08-22 PROCEDURE — A9270 NON-COVERED ITEM OR SERVICE: HCPCS | Mod: GY | Performed by: NURSE PRACTITIONER

## 2018-08-22 RX ORDER — ONDANSETRON 4 MG/1
4 TABLET, FILM COATED ORAL EVERY 8 HOURS PRN
Qty: 45 TABLET | Refills: 0 | Status: SHIPPED | OUTPATIENT
Start: 2018-08-22 | End: 2018-08-22

## 2018-08-22 RX ORDER — OXYCODONE HYDROCHLORIDE 5 MG/1
5-10 TABLET ORAL
Qty: 45 TABLET | Refills: 0 | Status: SHIPPED | OUTPATIENT
Start: 2018-08-22 | End: 2018-09-21

## 2018-08-22 RX ORDER — SENNOSIDES A AND B 8.6 MG/1
1 TABLET, FILM COATED ORAL DAILY
Qty: 30 TABLET | Refills: 0 | Status: SHIPPED | OUTPATIENT
Start: 2018-08-22 | End: 2018-09-21

## 2018-08-22 RX ORDER — CYCLOBENZAPRINE HCL 5 MG
5 TABLET ORAL 3 TIMES DAILY
Qty: 30 TABLET | Refills: 0 | Status: SHIPPED | OUTPATIENT
Start: 2018-08-22 | End: 2018-09-21

## 2018-08-22 RX ORDER — CEPHALEXIN 500 MG/1
500 CAPSULE ORAL 3 TIMES DAILY
Qty: 27 CAPSULE | Refills: 0 | Status: ON HOLD | OUTPATIENT
Start: 2018-08-22 | End: 2018-08-30

## 2018-08-22 RX ORDER — CYCLOBENZAPRINE HCL 5 MG
5 TABLET ORAL 3 TIMES DAILY
Status: DISCONTINUED | OUTPATIENT
Start: 2018-08-22 | End: 2018-08-22 | Stop reason: HOSPADM

## 2018-08-22 RX ORDER — ONDANSETRON 4 MG/1
4 TABLET, FILM COATED ORAL EVERY 8 HOURS PRN
Qty: 45 TABLET | Refills: 0 | Status: SHIPPED | OUTPATIENT
Start: 2018-08-22 | End: 2018-09-21

## 2018-08-22 RX ADMIN — CARBIDOPA AND LEVODOPA 3 TABLET: 25; 100 TABLET ORAL at 06:13

## 2018-08-22 RX ADMIN — CYCLOBENZAPRINE HYDROCHLORIDE 5 MG: 5 TABLET, FILM COATED ORAL at 10:25

## 2018-08-22 RX ADMIN — AMANTADINE HYDROCHLORIDE 100 MG: 100 CAPSULE ORAL at 07:52

## 2018-08-22 RX ADMIN — BUSPIRONE HYDROCHLORIDE 10 MG: 10 TABLET ORAL at 07:52

## 2018-08-22 RX ADMIN — ONDANSETRON 4 MG: 2 INJECTION INTRAMUSCULAR; INTRAVENOUS at 02:36

## 2018-08-22 RX ADMIN — Medication 0.5 MG: at 02:36

## 2018-08-22 RX ADMIN — SODIUM CHLORIDE: 9 INJECTION, SOLUTION INTRAVENOUS at 06:21

## 2018-08-22 RX ADMIN — CARBIDOPA AND LEVODOPA 3 TABLET: 25; 100 TABLET ORAL at 12:49

## 2018-08-22 RX ADMIN — ONDANSETRON 4 MG: 4 TABLET, ORALLY DISINTEGRATING ORAL at 11:20

## 2018-08-22 RX ADMIN — ACETAMINOPHEN 975 MG: 325 TABLET, FILM COATED ORAL at 06:13

## 2018-08-22 RX ADMIN — OXYCODONE HYDROCHLORIDE 5 MG: 5 TABLET ORAL at 08:00

## 2018-08-22 RX ADMIN — CEFAZOLIN 1 G: 330 INJECTION, POWDER, FOR SOLUTION INTRAMUSCULAR; INTRAVENOUS at 06:11

## 2018-08-22 RX ADMIN — ACETAMINOPHEN 975 MG: 325 TABLET, FILM COATED ORAL at 12:49

## 2018-08-22 RX ADMIN — FLUOXETINE 20 MG: 20 CAPSULE ORAL at 07:51

## 2018-08-22 ASSESSMENT — ACTIVITIES OF DAILY LIVING (ADL)
ADLS_ACUITY_SCORE: 11
ADLS_ACUITY_SCORE: 11
ADLS_ACUITY_SCORE: 12
ADLS_ACUITY_SCORE: 10

## 2018-08-22 NOTE — DISCHARGE SUMMARY
[facility] Discharge Summary and Instructions    Erika Diaz MRN# [id number]   Age: 57 year old YOB: 1960     Date of Admission:  8/21/2018  Date of Discharge::  8/22/2018  Admitting Physician:  Jerry Concepcion MD  Discharge Physician:  Jerry Concepcion MD          Admission Diagnoses:   S/P deep brain stimulator placement [Z96.89]          Discharge Diagnosis:   S/P deep brain stimulator placement [Z96.89]          Procedures:   8/22/2018  Stealth Assisted Right Deep Brain Stimulator Placement, Phase I, Placement Of Right Side Deep Brain Stimulator Electrode, Target Right Globus Pallidus Internus With Microelectrode Recording            Brief History of Illness:   57 year old woman with a 9-10 year history of Parkinson s disease, being diagnosed in [id number].  She first developed stiffness in her left hand and shoulder that has since progressed to include tremors.  Her symptoms are worse on the left-side.   After undergoing DBS candidacy evaluation, patient was deemed appropriate for procedure and surgical decision was made to move forward DBS placement.            Hospital Course:   Following surgery the patient complained of bifrontal headache and right neck pain secondary to tunneling.  Denied vision changes, weakness, confusion, nausea or vomiting.  Patient was voiding and passing flatus POD #1.  Patient remained medically and neurologically stable throughout her stay.  Prior to discharge patient was ambulating, tolerating diet, pain was controlled, and medically stable.    Patient will follow up next week in research clinic for UDALL study.  Patient will return for generator placement on August 30, 2018.  Patient will discharge on Keflex 500 mg three times daily until he returns for battery generator.  Patient needs to continue to hold Aspirin until after stage II of procedure.             Discharge Medications:     Current Discharge Medication List      START taking  these medications    Details   cephALEXin (KEFLEX) 500 MG capsule Take 1 capsule (500 mg) by mouth 3 times daily for 9 days  Qty: 27 capsule, Refills: 0    Associated Diagnoses: S/P deep brain stimulator placement      cyclobenzaprine (FLEXERIL) 5 MG tablet Take 1 tablet (5 mg) by mouth 3 times daily  Qty: 30 tablet, Refills: 0    Associated Diagnoses: S/P deep brain stimulator placement      oxyCODONE IR (ROXICODONE) 5 MG tablet Take 1-2 tablets (5-10 mg) by mouth every 3 hours as needed for other (pain control or improvement in physical function. Hold dose for analgesic side effects.)  Qty: 45 tablet, Refills: 0    Associated Diagnoses: Parkinson's disease (H)      senna (SENOKOT) 8.6 MG tablet Take 1 tablet by mouth daily  Qty: 30 tablet, Refills: 0    Associated Diagnoses: S/P deep brain stimulator placement         CONTINUE these medications which have NOT CHANGED    Details   Acetaminophen (TYLENOL PO) Take 1,000 mg by mouth every 8 hours as needed for mild pain or fever      amantadine (SYMMETREL) 100 MG capsule 1 capsule @ 7am and 4pm  Qty: 180 capsule, Refills: 3    Associated Diagnoses: Paralysis agitans (H)      busPIRone (BUSPAR) 10 MG tablet Take 10 mg by mouth 2 times daily 1 Tab @ 7am and 1 tab @ 4pm  Qty: 180 tablet, Refills: 3    Associated Diagnoses: Paralysis agitans (H)      carbidopa-levodopa (SINEMET)  MG per tablet Take 3 tablets by mouth 4 times daily 3 tabs @7, noon, 4p and 9pm and a few as needed = 13/day x 90 = 1170tablets  Qty: 1170 tablet, Refills: 3    Associated Diagnoses: Paralysis agitans (H)      FLUoxetine (PROZAC) 20 MG capsule Take 20 mg by mouth every morning Take 20mg capsule  @ 7am  Qty: 90 capsule, Refills: 3    Associated Diagnoses: Paralysis agitans (H)      hydrochlorothiazide (HYDRODIURIL) 25 MG tablet Take 25 mg by mouth At Bedtime @9 pm      LORazepam (ATIVAN) 1 MG tablet 1 tab by mouth @ 9pm as needed  Qty: 60 tablet, Refills: 3    Associated Diagnoses: Paralysis  "agitans (H)      pramipexole (MIRAPEX) 0.5 MG tablet 2 tabs @ 9pm  Qty: 180 tablet, Refills: 3    Associated Diagnoses: Paralysis agitans (H)      simvastatin (ZOCOR) 20 MG tablet Take 20 mg by mouth At Bedtime  Refills: 1      calcium carbonate (TUMS) 500 MG chewable tablet Take 2 chew tab by mouth as needed for heartburn      doxylamine (UNISOM) 25 MG TABS tablet Take 1 tablet (25 mg) by mouth nightly as needed  Qty: 14 each         STOP taking these medications       aspirin EC 81 MG EC tablet Comments:   Reason for Stopping:         Aspirin-Acetaminophen-Caffeine (EXCEDRIN MIGRAINE PO) Comments:   Reason for Stopping:                Exam:   Physical Exam  /78  Pulse 65  Temp 98.1  F (36.7  C) (Oral)  Resp 15  Ht 1.676 m (5' 6\")  Wt 97.3 kg (214 lb 8.1 oz)  SpO2 98%  BMI 34.62 kg/m2  General: Appears comfortable, NAD  Wound:   Right cranial incision:  Clean, dry, intact, closed with monocryl and dermabond  Right chest incision:  Clean, dry, intact, closed with monocryl and dermabond  Right abdominal incision:  Clean, dry, covered with ioband which was redressed until sterile fashion  Neurologic Exam:  - AOx3.  - Follows commands.  - Speech fluent, spontaneous. No aphasia or dysarthria.  - No gaze preference. No apparent hemineglect.  - PERRL, EOMI.  - Face symmetric with sensation intact to light touch.  - Palate elevates symmetrically, uvula midline, tongue protrudes midline.  - Trapezii and sternocleidomastoid muscles 5/5 bilaterally.  - No pronator drift.  Motor: Normal bulk/tone; no tremor, rigidity, or bradykinesia.   Right:  Deltoid 5/5, tricep 5/5, bicep 5/5, wrist flexor 5/5, wrist extensor 5/5, finger intrinsic 5/5  Left:  Deltoid 5/5, tricep 5/5, bicep 5/5, wrist flexor 5/5, wrist extensor 5/5, finger intrinsic 5/5  Right: Iliopsoas 5/5, quadricep 5/5, hamstring 5/5, tibialis anterior 5/5, gastrocnemius 5/5, EHL 5/5  Left:  Iliopsoas 5/5, quadricep 5/5, hamstring 5/5, tibialis anterior 5/5, " gastrocnemius 5/5, EHL 5/5                   Discharge Instructions and Follow-Up:   Discharge diet: Regular   Discharge activity: You may advance activity as tolerated. No strenuous exercise or heay lifting greater than 10 lbs for 4 weeks or until seen and cleared in clinic.   Discharge follow-up: Follow-up with Dr. Jerry Concepcion MD  On 8/30/2018     Wound care: Ok to shower,however no scrubbing of the wound and no soaking of the wound, meaning no bathtubs or swimming pools. Pat dry only. Leave wound open to air.  Sutures are not absorbable and need to be removed in 2 weeks. If patient still at rehab by this time, the sutures may be removed by the rehab physician if he or she considers that the wound has healed completely.     Please call if you have:  1. increased pain, redness, drainage, swelling at your incision  2. fevers > 101.5 F degrees  3. with any questions or concerns.  You may reach the Neurosurgery clinic at [phone] during regular work hours. ER at [phone].    and ask for the Neurosurgery Resident on call at [phone], during off hours or weekends.         Discharge Disposition:   Discharged to home        GUILLERMINA Ontiveros, CNP  Department of Neurosurgery  Pager: [phone]

## 2018-08-22 NOTE — PROGRESS NOTES
"S: complains of head pains    O:  Exam:  General: Awake;  Alert, In No Acute Distress  Pulm: Breathing Comfortably on room air  Mental status: Oriented x 3  Cranial Nerves: Cranial Nerves II-XII Intact Bilaterally  Strength:      Del Tr Bi WE WF Gr  R 5 5 5 5 5 5  L 5 5 5 5 5 5     HF KE KF DF PF EHL  R 5 5 5 5 5 5  L 5 5 5 5 5 5    Pronator Drift: Absent  Sensory: Intact to Light Touch  Reflexes: No Hyperreflexia, Ashraf s or Clonus Present; Toes Down-Going Bilaterally  INCISION: clean, dry    Assessment:   # Parkinson s disease, s/p DBS right GPi    Plan by problem:     Neuro:   Post-operative pain: pain controlled  Neurological exam frequency: q1h then Q2 hrs post-op  Sutures out: absorbable  Required imaging: Completed  PTA Parkinson s medications    Cardiovascular  Blood pressure goals: SBP < 140     Pulmonary:   No concerns    Gastrointestinal: ADAT    Renal: discontinue underwood on POD#1    Heme:   No concerns    Endocrine:   No concerns    Infectious   Ancef x3 doses s/p DBS placement ppx    PT/OT: not indicated    DVT prophylaxis: Sequential compression devices    Ulcer prophylaxis: none indicated    DISPO:  Anticipate D/C Home 8/22    Barriers: None    Ponce \"José Luis\" MD Dee   Neurosurgery, PGY-2    Please contact neurosurgery resident on call with questions.    Dial * * [phone], enter [phone] when prompted.     "

## 2018-08-22 NOTE — ANESTHESIA POSTPROCEDURE EVALUATION
Patient: Erika Diaz    Procedure(s):  Stealth Assisted Right Deep Brain Stimulator Placement, Phase I, Placement Of Right Side Deep Brain Stimulator Electrode, Target Right Globus Pallidus Internus With Microelectrode Recording - Wound Class: I-Clean    Diagnosis:Parkinson's Disease   Diagnosis Additional Information: No value filed.    Anesthesia Type:  No value filed.    Note:  Anesthesia Post Evaluation    Patient location during evaluation: PACU  Patient participation: Able to fully participate in evaluation  Level of consciousness: awake and sleepy but conscious  Pain management: adequate  Airway patency: patent  Cardiovascular status: acceptable  Respiratory status: acceptable  Hydration status: acceptable  PONV: none     Anesthetic complications: None          Last vitals:  Vitals:    08/21/18 1845 08/21/18 1900 08/21/18 1915   BP: 137/62 142/58 139/57   Pulse:      Resp:      Temp:      SpO2: 99% 99% 98%         Electronically Signed By: Conner Pina MD  August 21, 2018  7:17 PM

## 2018-08-22 NOTE — PLAN OF CARE
Problem: Patient Care Overview  Goal: Plan of Care/Patient Progress Review  Neuro: a/o x 4, mild tremors, ambulated independently to bathroom in room. Pt does report mild dizziness when returning to bed.     CV: NSR, /69. Afebrile. No edema.    Pulm: room air    GI: one loose BM today per patient report     : voided 100 ml after underwood removal. 87 ml bladder scanned PVR.    Skin: Dr. Concepcion at bedside to remove forehead dressings, now JUAN, liquid bandage for closure. Right abdomen hardware/stimulator dressing changed. Pt instructed not to get right abdominal area wet, ok to wash hair around incisions closed with liquid bandage.

## 2018-08-22 NOTE — PLAN OF CARE
Problem: Patient Care Overview  Goal: Plan of Care/Patient Progress Review  Outcome: No Change  Pt with parkinson's disease s/p Phase I Stealth Assisted Right Side Deep Brain Stimulator Placement on 8/21/18  and Phase II Placement Of Deep Brain Stimulator Generator/Battery  on 8/28/18. Neuros intact apart from intermittent garbled speech and tremors at baseline. Prn hydralazine given x1 to maintain SBP <140. Sinus bradycardia on cardiac monitor. Afebrile. Sating upper 90's on 2L O2 via N/C. LS clear bilaterally. Tolerating regular diet fairly. Zofran given x2 for nausea with relief. No vomiting and no stool. Bowel sounds hypoactive. Underwood with good UOP. Underwood removed @ 0630. Due to void post underwood removal. Dilaudid, tylenol and oxycodone given for headache with optimal relief. R side head surgical incision is WDL. Plan: see flow sheet for detailed assessments and interventions, continue to support POC.

## 2018-08-22 NOTE — PLAN OF CARE
Problem: Pain, Acute (Adult)  Goal: Identify Related Risk Factors and Signs and Symptoms  Related risk factors and signs and symptoms are identified upon initiation of [employer] Clinical Practice Guideline (CPG).   Bilateral eye and right head pain. She also reports muscle tightness/spasm in left neck. Oxycodone 5 mg x 2 given, cyclobenzaprine 5 mg started and given. Encouraged patient to use heat/ice packs as needed at home.

## 2018-08-24 ENCOUNTER — TELEPHONE (OUTPATIENT)
Dept: NEUROSURGERY | Facility: CLINIC | Age: 58
End: 2018-08-24

## 2018-08-24 ENCOUNTER — TELEPHONE (OUTPATIENT)
Dept: NEUROLOGY | Facility: CLINIC | Age: 58
End: 2018-08-24

## 2018-08-24 DIAGNOSIS — G20.A1 PARKINSON'S DISEASE (H): Primary | ICD-10-CM

## 2018-08-24 NOTE — TELEPHONE ENCOUNTER
"I called patient today to check in with her post-DBS lead placement on 8/21/18 as she had an additional procedure for research involving extra tunneling for the externalization study visit coming up on 8/28.     I reminded her to bring her surgical soap and follow the instructions for holding her Parkinson's disease medications before arriving to the CRU on 8/28.    She explained that her dressing over the externalized cable had come off due to sweating last night so this morning she called her primary care clinic in [place] to be seen today. Labs were drawn to rule out infection (due to sweating symptoms) and results came back WNL (CBC results available in Care Everywhere).  Site dressing was changed and patient was given a \"belly band\" made of mesh stocking material that will help secure dressing.  She said her incisions have been slightly itchy but she denied fever, redness, swelling, or discharge.     Patient reported a mild headache upon waking this morning, but took an oxycodone and it has gone away. She otherwise feels well and has been walking and eating as normal.     She reported she had tremor this morning that went away in the afternoon, which she thinks qualifies as a reduction in tremor since surgery. It is unclear to me if it is a true lesion effect.    All questions answered, patient was encouraged to contact me by phone, Mychart, or email with any additional concerns or questions before Tuesday's Clinical Research Unit stay.    Shanice Contreras RN, MPH  Research Nurse, Movement Disorders                "

## 2018-08-24 NOTE — TELEPHONE ENCOUNTER
I called to follow up with the patient.  She underwent DBS phase I surgery on Tuesday, 8/21/2018.  She was discharged on POD#1.  She is part of the [facility] research project and she is a participant.  She also has an externalized wire which was dressed.    Patient went to her PCP's office today, 8/24/2018 because her dressing came off and she did not have any supplies to reinforce it.  She reported sweating too much and the dressing falling off.  Because of sweating, her PCP performed workup for infection which were negative.    New dressing was placed and patient was sent home.  I called her and she is doing well.  She denies any drainage from the dressing and it is staying put.  She denies any fevers, chills, nausea or vomiting.  She just feels tired.    She had no further questions and I told her to give us a call if she has any questions.

## 2018-08-29 ENCOUNTER — ANESTHESIA EVENT (OUTPATIENT)
Dept: SURGERY | Facility: CLINIC | Age: 58
End: 2018-08-29
Payer: MEDICARE

## 2018-08-30 ENCOUNTER — ANESTHESIA (OUTPATIENT)
Dept: SURGERY | Facility: CLINIC | Age: 58
End: 2018-08-30
Payer: MEDICARE

## 2018-08-30 ENCOUNTER — HOSPITAL ENCOUNTER (OUTPATIENT)
Facility: CLINIC | Age: 58
Discharge: HOME | End: 2018-08-30
Attending: NEUROLOGICAL SURGERY | Admitting: NEUROLOGICAL SURGERY
Payer: MEDICARE

## 2018-08-30 VITALS
OXYGEN SATURATION: 96 % | TEMPERATURE: 97.8 F | RESPIRATION RATE: 16 BRPM | WEIGHT: 211.42 LBS | BODY MASS INDEX: 33.98 KG/M2 | HEIGHT: 66 IN | DIASTOLIC BLOOD PRESSURE: 70 MMHG | SYSTOLIC BLOOD PRESSURE: 138 MMHG

## 2018-08-30 DIAGNOSIS — Z96.89 S/P DEEP BRAIN STIMULATOR PLACEMENT: ICD-10-CM

## 2018-08-30 LAB
CREAT SERPL-MCNC: 0.75 MG/DL (ref 0.52–1.04)
GFR SERPL CREATININE-BSD FRML MDRD: 79 ML/MIN/1.7M2
GLUCOSE BLDC GLUCOMTR-MCNC: 133 MG/DL (ref 70–99)
INR PPP: 1.05 (ref 0.86–1.14)
POTASSIUM SERPL-SCNC: 3.2 MMOL/L (ref 3.4–5.3)

## 2018-08-30 PROCEDURE — 25000128 H RX IP 250 OP 636: Performed by: ANESTHESIOLOGY

## 2018-08-30 PROCEDURE — 27210794 ZZH OR GENERAL SUPPLY STERILE: Performed by: NEUROLOGICAL SURGERY

## 2018-08-30 PROCEDURE — 25000125 ZZHC RX 250: Performed by: NURSE ANESTHETIST, CERTIFIED REGISTERED

## 2018-08-30 PROCEDURE — 25000566 ZZH SEVOFLURANE, EA 15 MIN: Performed by: NEUROLOGICAL SURGERY

## 2018-08-30 PROCEDURE — 82962 GLUCOSE BLOOD TEST: CPT

## 2018-08-30 PROCEDURE — 71000027 ZZH RECOVERY PHASE 2 EACH 15 MINS: Performed by: NEUROLOGICAL SURGERY

## 2018-08-30 PROCEDURE — 27210995 ZZH RX 272: Performed by: NEUROLOGICAL SURGERY

## 2018-08-30 PROCEDURE — 25000125 ZZHC RX 250: Performed by: NEUROLOGICAL SURGERY

## 2018-08-30 PROCEDURE — 25000128 H RX IP 250 OP 636: Performed by: NURSE ANESTHETIST, CERTIFIED REGISTERED

## 2018-08-30 PROCEDURE — 36000059 ZZH SURGERY LEVEL 3 EA 15 ADDTL MIN UMMC: Performed by: NEUROLOGICAL SURGERY

## 2018-08-30 PROCEDURE — C1767 GENERATOR, NEURO NON-RECHARG: HCPCS | Performed by: NEUROLOGICAL SURGERY

## 2018-08-30 PROCEDURE — 71000014 ZZH RECOVERY PHASE 1 LEVEL 2 FIRST HR: Performed by: NEUROLOGICAL SURGERY

## 2018-08-30 PROCEDURE — 85610 PROTHROMBIN TIME: CPT | Performed by: ANESTHESIOLOGY

## 2018-08-30 PROCEDURE — 84132 ASSAY OF SERUM POTASSIUM: CPT | Performed by: ANESTHESIOLOGY

## 2018-08-30 PROCEDURE — 37000009 ZZH ANESTHESIA TECHNICAL FEE, EACH ADDTL 15 MIN: Performed by: NEUROLOGICAL SURGERY

## 2018-08-30 PROCEDURE — 36000057 ZZH SURGERY LEVEL 3 1ST 30 MIN - UMMC: Performed by: NEUROLOGICAL SURGERY

## 2018-08-30 PROCEDURE — 25000128 H RX IP 250 OP 636: Performed by: NEUROLOGICAL SURGERY

## 2018-08-30 PROCEDURE — 36415 COLL VENOUS BLD VENIPUNCTURE: CPT | Performed by: ANESTHESIOLOGY

## 2018-08-30 PROCEDURE — 40000170 ZZH STATISTIC PRE-PROCEDURE ASSESSMENT II: Performed by: NEUROLOGICAL SURGERY

## 2018-08-30 PROCEDURE — 37000008 ZZH ANESTHESIA TECHNICAL FEE, 1ST 30 MIN: Performed by: NEUROLOGICAL SURGERY

## 2018-08-30 PROCEDURE — 82565 ASSAY OF CREATININE: CPT | Performed by: ANESTHESIOLOGY

## 2018-08-30 PROCEDURE — 27211024 ZZHC OR SUPPLY OTHER OPNP: Performed by: NEUROLOGICAL SURGERY

## 2018-08-30 DEVICE — GENERATOR DBS SYSTEM INFINITY 5 IPG 6660ANS
Type: IMPLANTABLE DEVICE | Site: CHEST  WALL | Status: NON-FUNCTIONAL
Removed: 2022-10-24

## 2018-08-30 RX ORDER — CEFAZOLIN SODIUM 2 G/100ML
2 INJECTION, SOLUTION INTRAVENOUS
Status: COMPLETED | OUTPATIENT
Start: 2018-08-30 | End: 2018-08-30

## 2018-08-30 RX ORDER — CEPHALEXIN 500 MG/1
500 CAPSULE ORAL 3 TIMES DAILY
Qty: 18 CAPSULE | Refills: 0 | Status: SHIPPED | OUTPATIENT
Start: 2018-08-30 | End: 2018-09-05

## 2018-08-30 RX ORDER — FENTANYL CITRATE 50 UG/ML
INJECTION, SOLUTION INTRAMUSCULAR; INTRAVENOUS PRN
Status: DISCONTINUED | OUTPATIENT
Start: 2018-08-30 | End: 2018-08-30

## 2018-08-30 RX ORDER — PROPOFOL 10 MG/ML
INJECTION, EMULSION INTRAVENOUS CONTINUOUS PRN
Status: DISCONTINUED | OUTPATIENT
Start: 2018-08-30 | End: 2018-08-30

## 2018-08-30 RX ORDER — ONDANSETRON 4 MG/1
4 TABLET, ORALLY DISINTEGRATING ORAL EVERY 30 MIN PRN
Status: DISCONTINUED | OUTPATIENT
Start: 2018-08-30 | End: 2018-08-30 | Stop reason: HOSPADM

## 2018-08-30 RX ORDER — ONDANSETRON 2 MG/ML
4 INJECTION INTRAMUSCULAR; INTRAVENOUS EVERY 30 MIN PRN
Status: DISCONTINUED | OUTPATIENT
Start: 2018-08-30 | End: 2018-08-30 | Stop reason: HOSPADM

## 2018-08-30 RX ORDER — ONDANSETRON 2 MG/ML
INJECTION INTRAMUSCULAR; INTRAVENOUS PRN
Status: DISCONTINUED | OUTPATIENT
Start: 2018-08-30 | End: 2018-08-30

## 2018-08-30 RX ORDER — LIDOCAINE 40 MG/G
CREAM TOPICAL
Status: DISCONTINUED | OUTPATIENT
Start: 2018-08-30 | End: 2018-08-30 | Stop reason: HOSPADM

## 2018-08-30 RX ORDER — FENTANYL CITRATE 50 UG/ML
25-50 INJECTION, SOLUTION INTRAMUSCULAR; INTRAVENOUS
Status: DISCONTINUED | OUTPATIENT
Start: 2018-08-30 | End: 2018-08-30 | Stop reason: HOSPADM

## 2018-08-30 RX ORDER — LIDOCAINE HYDROCHLORIDE AND EPINEPHRINE 10; 10 MG/ML; UG/ML
INJECTION, SOLUTION INFILTRATION; PERINEURAL PRN
Status: DISCONTINUED | OUTPATIENT
Start: 2018-08-30 | End: 2018-08-30 | Stop reason: HOSPADM

## 2018-08-30 RX ORDER — LIDOCAINE HYDROCHLORIDE 20 MG/ML
INJECTION, SOLUTION INFILTRATION; PERINEURAL PRN
Status: DISCONTINUED | OUTPATIENT
Start: 2018-08-30 | End: 2018-08-30

## 2018-08-30 RX ORDER — PROPOFOL 10 MG/ML
INJECTION, EMULSION INTRAVENOUS PRN
Status: DISCONTINUED | OUTPATIENT
Start: 2018-08-30 | End: 2018-08-30

## 2018-08-30 RX ORDER — CEFAZOLIN SODIUM 1 G/3ML
1 INJECTION, POWDER, FOR SOLUTION INTRAMUSCULAR; INTRAVENOUS SEE ADMIN INSTRUCTIONS
Status: DISCONTINUED | OUTPATIENT
Start: 2018-08-30 | End: 2018-08-30 | Stop reason: HOSPADM

## 2018-08-30 RX ORDER — NALOXONE HYDROCHLORIDE 0.4 MG/ML
.1-.4 INJECTION, SOLUTION INTRAMUSCULAR; INTRAVENOUS; SUBCUTANEOUS
Status: DISCONTINUED | OUTPATIENT
Start: 2018-08-30 | End: 2018-08-30 | Stop reason: HOSPADM

## 2018-08-30 RX ORDER — DEXAMETHASONE SODIUM PHOSPHATE 4 MG/ML
INJECTION, SOLUTION INTRA-ARTICULAR; INTRALESIONAL; INTRAMUSCULAR; INTRAVENOUS; SOFT TISSUE PRN
Status: DISCONTINUED | OUTPATIENT
Start: 2018-08-30 | End: 2018-08-30

## 2018-08-30 RX ORDER — SODIUM CHLORIDE, SODIUM LACTATE, POTASSIUM CHLORIDE, CALCIUM CHLORIDE 600; 310; 30; 20 MG/100ML; MG/100ML; MG/100ML; MG/100ML
INJECTION, SOLUTION INTRAVENOUS CONTINUOUS
Status: DISCONTINUED | OUTPATIENT
Start: 2018-08-30 | End: 2018-08-30 | Stop reason: HOSPADM

## 2018-08-30 RX ADMIN — DEXAMETHASONE SODIUM PHOSPHATE 4 MG: 4 INJECTION, SOLUTION INTRA-ARTICULAR; INTRALESIONAL; INTRAMUSCULAR; INTRAVENOUS; SOFT TISSUE at 07:45

## 2018-08-30 RX ADMIN — PROPOFOL 20 MG: 10 INJECTION, EMULSION INTRAVENOUS at 08:03

## 2018-08-30 RX ADMIN — MIDAZOLAM 1 MG: 1 INJECTION INTRAMUSCULAR; INTRAVENOUS at 07:26

## 2018-08-30 RX ADMIN — PROPOFOL 75 MCG/KG/MIN: 10 INJECTION, EMULSION INTRAVENOUS at 07:32

## 2018-08-30 RX ADMIN — PROPOFOL 150 MG: 10 INJECTION, EMULSION INTRAVENOUS at 07:51

## 2018-08-30 RX ADMIN — PROPOFOL 20 MG: 10 INJECTION, EMULSION INTRAVENOUS at 08:01

## 2018-08-30 RX ADMIN — SODIUM CHLORIDE, POTASSIUM CHLORIDE, SODIUM LACTATE AND CALCIUM CHLORIDE: 600; 310; 30; 20 INJECTION, SOLUTION INTRAVENOUS at 07:21

## 2018-08-30 RX ADMIN — FENTANYL CITRATE 50 MCG: 50 INJECTION, SOLUTION INTRAMUSCULAR; INTRAVENOUS at 08:00

## 2018-08-30 RX ADMIN — FENTANYL CITRATE 50 MCG: 50 INJECTION, SOLUTION INTRAMUSCULAR; INTRAVENOUS at 08:05

## 2018-08-30 RX ADMIN — CEFAZOLIN SODIUM 2 G: 2 INJECTION, SOLUTION INTRAVENOUS at 07:38

## 2018-08-30 RX ADMIN — ONDANSETRON 4 MG: 2 INJECTION INTRAMUSCULAR; INTRAVENOUS at 08:27

## 2018-08-30 RX ADMIN — LIDOCAINE HYDROCHLORIDE 40 MG: 20 INJECTION, SOLUTION INFILTRATION; PERINEURAL at 07:32

## 2018-08-30 RX ADMIN — MIDAZOLAM 1 MG: 1 INJECTION INTRAMUSCULAR; INTRAVENOUS at 07:21

## 2018-08-30 ASSESSMENT — LIFESTYLE VARIABLES: TOBACCO_USE: 1

## 2018-08-30 NOTE — H&P
NEUROSURGERY    HISTORY AND PHYSICAL EXAM UPDATE    Chief Complaint   Patient presents with     Consult       Ongoing Deep Brain Stimulation surgery; Parkinson's Disease, S/p right side deep brain stimulator placement, phase I, placement of right side deep brain stimulator electrode placement, target right globus pallidus internus, with microelectrode recording on 8/21/2018.  [employer] Participant         HISTORY OF PRESENT ILLNESS  Ms. Erika Diaz returns today for her ongoing Deep Brain Stimulation surgery.  She is s/p right side deep brain stimulator placement, phase I, placement of right side deep brain stimulator electrode placement, target right globus pallidus internus, with microelectrode recording on 8/21/2018.  She is also part of a clinical research with [employer] [facility] so she had a modified phase I where extension wire has been placed already and additional extension wire was placed exiting out of her right side/flank area for externalized recording.  She tolerated the procedure, although it was long, and there were no complications.  She was discharged on POD#1 and her postoperative CT head was without any concerning features.  She did go to her PCP few days after her surgery when her dressing came off due to excessive sweating.  Infection workup was negative.  Her externalized wire was redressed and has remained on.  She denies any fevers, chills, nausea, vomiting, dizziness, weakness, numbness or seizure like activity.  Other than the dressing coming off, she did not have any issues with her wounds and they are all healing well.  She is on oral antibiotics.  She also participated in our research for the past two days.  Everything went well.  Briefly, she is a 58 year old woman with a 9-10 year history of Parkinson s disease, being diagnosed in [id number].  She first developed stiffness in her left hand and shoulder that has since progressed to include tremors.  Her symptoms  are worse on the left-side.  Her goals with Deep Brain Stimulation are to gain tremor control and reduce dyskinesias.  She wants to feel better, be more normal without taking the medications, improve wearing off stiffness, difficulty walking and foggy thinking.  Medication reduction is important.  She does have regular sessions with a chiropractor to relieve tension in her neck.  She is currently undergoing DBS candidacy evaluation.  She has had her ON/OFF testing and neuropsych evaluation.  She is also scheduled for MRI brain.      Past Medical History:   Diagnosis Date     Degenerative joint disease 11/7/2017     Encounter for neuropsychological testing 12/20/2017    Current results indicate variability in attention and memory, ranging from moderately impaired to superior, in some cases with greater difficulty on less complex tasks. Basic language, visual processing, and executive functioning fall within normal limits. Personality assessment is suggestive of somatization, and also raises the possibility of a thought disorder as well as a history of manic episo     JASON (generalized anxiety disorder)      History of colonic polyps 11/7/2017     HTN (hypertension) 11/7/2017     Hypercholesterolemia 11/7/2017     Lactose intolerance in adult 11/7/2017     Migraines      Obesity 11/7/2017     Parkinson disease (H)      PID (pelvic inflammatory disease) due to IUD 11/7/2017     Pulmonary emboli (H) - coumadin lovenox 11/07/2017    provoked after knee replacement     Past Surgical History:   Procedure Laterality Date     adhesioloysis       CHOLECYSTECTOMY       COLONOSCOPY       JOINT REPLACEMENT Right     right partial knee replacement     LAPAROSCOPY      adhesioloysis     LAPAROSCOPY      supracervical hysterectomy     LAPAROTOMY EXPLORATORY Left     partial removal of left ovary     OPTICAL TRACKING SYSTEM INSERTION DEEP BRAIN STIMULATION Right 8/21/2018    Procedure: OPTICAL TRACKING SYSTEM INSERTION DEEP BRAIN  STIMULATION;  Stealth Assisted Right Deep Brain Stimulator Placement, Phase I, Placement Of Right Side Deep Brain Stimulator Electrode, Target Right Globus Pallidus Internus With Microelectrode Recording;  Surgeon: Jerry Concepcion MD;  Location: [facility] OR     REMOVE INTRAUTERINE DEVICE  2006     salpingoopherectomy       Social History     Social History     Marital status:      Spouse name: N/A     Number of children: N/A     Years of education: N/A     Occupational History     Not on file.     Social History Main Topics     Smoking status: Former Smoker     Packs/day: 0.50     Years: 20.00     Types: Cigarettes     Quit date: 8/7/2009     Smokeless tobacco: Never Used     Alcohol use No     Drug use: No     Sexual activity: Not Currently     Partners: Male     Birth control/ protection: None     Other Topics Concern     Parent/Sibling W/ Cabg, Mi Or Angioplasty Before 65f 55m? No     Social History Narrative    . lives in [place]. Zane Diaz spouse     Family History   Problem Relation Age of Onset     Breast Cancer Mother         Allergies   Allergen Reactions     Atorvastatin Muscle Pain (Myalgia)     Other reaction(s): Myalgia  Per PCP note 7/24/18 - is to resume simvastatin - monitor for myalgia     Codeine Itching     Mold          Current Outpatient Prescriptions   Medication     amantadine (SYMMETREL) 100 MG capsule     docusate sodium (COLACE) 100 MG capsule     omeprazole (PRILOSEC) 20 MG CR capsule     pramipexole (MIRAPEX) 0.5 MG tablet     busPIRone (BUSPAR) 10 MG tablet     aspirin EC 81 MG EC tablet     carbidopa-levodopa (SINEMET)  MG per tablet     polyethylene glycol (MIRALAX/GLYCOLAX) powder     FLUoxetine (PROZAC) 10 MG capsule     FLUoxetine (PROZAC) 20 MG capsule     doxylamine (UNISOM) 25 MG TABS tablet     fluticasone (FLONASE) 50 MCG/ACT spray     LORazepam (ATIVAN) 1 MG tablet      No current facility-administered medications for this visit.        REVIEW OF  SYSTEMS: Per HPI.  Updated 8/30/2018  General: Negative for chills/sweats/fever, difficulty sleeping, headache, recent fatigue, or weight gain/loss.  Eyes: Negative for blurred vision, crossed eyes, double vision, recent eye infections, vision flashes, or vision halos.  Ears/Nose/Mouth/Throat: Negative for bleeding gums, difficulty swallowing, earache, ear discharge, hearing loss, hoarseness, nosebleeds, tinnitus, or sinus problems.  Respiratory:Negative for chronic cough, coughing blood, night sweats, shortness of breath, Tuberculosis, or wheezing.  Cardiovascular: Negative for chest pain, dyspnea at night, heart murmur, palpitations, pacemaker, pacemaker, poor circulation, swollen legs/feet, or varicose veins.  Gastrointestinal: Negative for melena, hematochezia, chronic diarrhea, heartburn, Hepatitis A/B/C, increasing constipation, Liver Disease, nausea, or vomiting.   Genitourinary: Negative for Urinary retention, genital discharge, urinary incontinence, prostate problems, urgency, or UTI.   Neurological: Negative for syncope, headaches, numbness of arms/legs, tingling in hands/arms/legs, memory problems, or seizures.  Psychological: Negative for anxiety, depression, panic attacks, or restlessness.  Skin: Negative for chronic skin itching, color changes in hand/feet when cold, poor scarring, non-healing ulcers, skin rashes/hives, unusual moles.  Musculoskeletal: Negative for arthritis, joint swelling in hands/wrists/hips/knees/joints, muscle tenderness in arms/legs, or osteoporosis.  Endocrine: Negative for excessive thirst/hunger, intolerance for warm rooms, loss of libido, multiple broken bones, rapid weight gain/loss, galactorrhea, or thyroid issues.  Hematologic/Lymphatic: Negative for easy skin bruising, significant fatigue, prolonged bleeding, tender glands/lymph nodes.  Allergies: Negative for asthma or hay fever.      PHYSICAL EXAM  Temp: 99.1  F (37.3  C) Temp src: Oral BP: 130/77   Heart Rate: 95    SpO2: 97 % O2 Device: None (Room air)      General: Awake, alert, oriented. Well nourished, well developed, no acute distress.  HEENT: Head normocephalic, atraumatic. No carotid bruit. Neck supple. Good range of motion. No palpable thyroid mass.  Heart: Regular rhythm and rate. No murmurs.  Lungs: Clear to auscultation and percussion bilaterally. No rhonchi, rales or wheeze.  Abdomen: Soft, non-tender, non-distended. No hepatosplenomegaly.  Extremity: Warm with no clubbing or cyanosis. No lower extremity edema.  Right frontal incision clean/dry and intact.  No redness. No drainage. No fluid collection.  Right chest wall incision clean/dry and intact.  No redness. No drainge. No fluid collection. Some bruise inferiorly.  Right flank wire exit site dressing intact.    Neurological:  Awake, alert and oriented to date, time, place and person. Speech fluent but rushed.   Pupils equal, round, reactive to light.  Extraocular movement intact.  Visual fields are full on confrontation.  Hearing is grossly normal to finger rub.   Facial sensation intact.  Face symmetric.  Tongue midline.  Uvula elevates equally.    Motor: full strength throughout.  Sensation: intact to light touch and pinpoint.  Deep tendon reflexes: Trace throughout. Negative for clonus. Negative for Ashraf's sign. No dysmetria.      ASSESSMENT   57 year old right handed female with a history of Parkinson's disease.  She is s/p right side deep brain stimulator placement, phase I, placement of right side deep brain stimulator electrode placement, target right globus pallidus internus, with microelectrode recording on 8/21/2018.    Patient returns for her phase II surgery.  She has done well with phase I and the interim research sessions.  There are no concerns for infection or complications at this time.    We discussed phase II of DBS surgery, placement of the DBS generator/battery over the right chest wall MAC.  Since she already has the tunneled extension  wire, we will only need to remove the externalized wire and place the new generator/battery after making the proper connection.        Risks, benefits, alternative therapies were discussed with the patient, including but not limited to infection and bleeding, stroke, death, hardware failure and possible need for more surgeries.  Surgical procedure was discussed in detail.  She expressed understanding and all questions were answered.       PLAN:  1.  Deep brain stimulator placement, phase II, placement of deep brain stimulator generator/battery over the right chest wall.  2.  Study protocol - externalized wire will be removed.  3.  Continue postoperative antibiotics.

## 2018-08-30 NOTE — IP AVS SNAPSHOT
Post Anesthesia Care Unit [address] [address]    Phone:  [phone]                                       After Visit Summary   8/30/2018    Erika Diaz    MRN: [id number]           After Visit Summary Signature Page     I have received my discharge instructions, and my questions have been answered. I have discussed any challenges I see with this plan with the nurse or doctor.    ..........................................................................................................................................  Patient/Patient Representative Signature      ..........................................................................................................................................  Patient Representative Print Name and Relationship to Patient    ..................................................               ................................................  Date                                            Time    ..........................................................................................................................................  Reviewed by Signature/Title    ...................................................              ..............................................  Date                                                            Time          22EPIC Rev 08/18

## 2018-08-30 NOTE — BRIEF OP NOTE
"   [facility], [place]    Brief Operative Note    Pre-operative diagnosis: Parkinson's Disease   Post-operative diagnosis Parkinson's Disease  Procedure: Procedure(s):  Deep Brain Stimulator Placement, Phase II, Placement Of Deep Brain Stimulator Generator/Battery Over The Right Chest Wall - Wound Class: I-Clean  Surgeon: Surgeon(s) and Role:     * Jerry Concepcion MD - Primary     * Kris Price MD - Resident - Assisting  Anesthesia: General   Estimated blood loss: 1 mL  Drains: None  Specimens: * No specimens in log *  Findings:   Impedances within normal limits based on intra-operative interrogation of battery/generator.  Complications: None.  Implants:   1) [employer] 5 REF [id number] S/N [id number]  Explants:   1) [employer] REF [id number]; 30 cm percutaneous extension wire     Kris \"José Luis\" MD Dee   Neurosurgery, PGY-2    Please contact neurosurgery resident on call with questions.    Dial * * [phone], enter [phone] when prompted.           "

## 2018-08-30 NOTE — IP AVS SNAPSHOT
MRN:[id number]                      After Visit Summary   2018    Erika Diaz    MRN: [id number]           Thank you!     Thank you for choosing [facility] for your care. Our goal is always to provide you with excellent care. Hearing back from our patients is one way we can continue to improve our services. Please take a few minutes to complete the written survey that you may receive in the mail after you visit with us. Thank you!        Patient Information     Date Of Birth          1960        About your hospital stay     You were admitted on:  2018 You last received care in the:  Post Anesthesia Care Unit [facility]    You were discharged on:  2018        Reason for your hospital stay       Deep brain stimulator phase II                  Who to Call     For medical emergencies, please call 911.  For non-urgent questions about your medical care, please call your primary care provider or clinic, [phone]  For questions related to your surgery, please call your surgery clinic        Attending Provider     Provider Jerry Suero MD Neurosurgery       Primary Care Provider Office Phone # Fax #    Ondina Edmondson [phone]       When to contact your care team       Call if you have any of the followin. Temperature greater than 101.5 F.   2. Any redness, swelling or discharge from the wound.   3. Any new weakness, numbness or altered mental status.  4. Worsening pain that is not improving with the pain medications you were prescribed.     Call [phone] or after 5:00 pm or on weekends call [phone] and ask for the neurosurgery resident on call. Thank You.                  After Care Instructions     Activity       After discharge, your activity restrictions are:   -We encourage short frequent walks, increasing as tolerated.  - No driving until you are seen in clinic and cleared by your neurosurgeon.   If you have had a seizure, you may not drive for at least 3 months according to [place] law.    - No strenuous activity.  - No lifting more than 10 pounds until you are seen in clinic (a gallon of milk weighs approximately 8 pounds)            Diet       Follow this diet upon discharge: regular diet            Wound care and dressings       Wound care  - You are ok to shower, but do not soak your incisions. Pat them dry if they get damp.   - Avoid coloring your hair or permanent styling until cleared by your surgeon  - No baths, hot tubs or pools for 4-6 weeks after surgery.                  Follow-up Appointments     Follow Up and recommended labs and tests       You underwent surgery place a  deep brain stimulator battery/generator by Jerry Concepcion MD, PhD      - Your sutures are absorbable.   - You will have follow up scheduled with Dr. Concepcion in 2 weeks.   - If you have not heard from our clinic about your follow up visit by 3-4 days following your discharge, please call our clinic at [phone] to schedule an appointment with the Neurosurgery teams.                  Your next 10 appointments already scheduled     Sep 13, 2018 12:00 PM CDT   (Arrive by 11:45 AM)   Return Visit with Venus Anthony PA-C   [facility] ([facility])    [address]  [address] [facility] [address] [phone]            Sep 21, 2018  7:00 AM CDT   (Arrive by 6:45 AM)   Return Visit with GUILLERMINA Vallecillo [facility] Neu[facility] ([facility])    [address]  [address] [facility] [address] [phone]            Sep 21, 2018 11:40 AM CDT   CT HEAD W/O CONTRAST with UCCT2   [facility] CT ([facility])    [address]  [address] [facility] [address] [phone]           Please bring any scans or X-rays taken at other hospitals, if similar tests were done. Also bring a list of  "your medicines, including vitamins, minerals and over-the-counter drugs. It is safest to leave personal items at home.  Be sure to tell your doctor:   If you have any allergies.   If there s any chance you are pregnant.   If you are breastfeeding.  You do not need to do anything special to prepare for this exam.  Please wear loose clothing, such as a sweat suit or jogging clothes. Avoid snaps, zippers and other metal. We may ask you to undress and put on a hospital gown.              Pending Results     No orders found from 8/28/2018 to 8/31/2018.            Admission Information     Date & Time Provider Department Dept. Phone    8/30/2018 Jerry Concepcion MD Post Anesthesia Care Unit [facility] [phone]      Your Vitals Were     Blood Pressure Temperature Respirations Height Weight Pulse Oximetry    129/63 97.8  F (36.6  C) (Oral) 16 1.676 m (5' 6\") 95.9 kg (211 lb 6.7 oz) 97%    BMI (Body Mass Index)                   34.12 kg/m2           [employer] Information     [employer] gives you secure access to your electronic health record. If you see a primary care provider, you can also send messages to your care team and make appointments. If you have questions, please call your primary care clinic.  If you do not have a primary care provider, please call [phone] and they will assist you.        Care EveryWhere ID     This is your Care EveryWhere ID. This could be used by other organizations to access your [facility] medical records  [id number]        Equal Access to Services     AYLEEN GASCA : Hadii aad ku hadasho Soomaali, waaxda luqadaha, qaybta kaalmada adeegyada, waxay idiin haycharletten duglas troncoso . So [facility] [phone].    ATENCIÓN: Si habla español, tiene a guerra disposición servicios gratuitos de asistencia lingüística. Llame al [phone].    We comply with applicable federal civil rights laws and [place] laws. We do not discriminate on the basis of race, color, national origin, age, " disability, sex, sexual orientation, or gender identity.               Review of your medicines      CONTINUE these medicines which may have CHANGED, or have new prescriptions. If we are uncertain of the size of tablets/capsules you have at home, strength may be listed as something that might have changed.        Dose / Directions    amantadine 100 MG capsule   Commonly known as:  SYMMETREL   This may have changed:    - how much to take  - how to take this  - when to take this  - additional instructions   Used for:  Paralysis agitans (H)        1 capsule @ 7am and 4pm   Quantity:  180 capsule   Refills:  3       LORazepam 1 MG tablet   Commonly known as:  ATIVAN   This may have changed:    - how much to take  - how to take this  - when to take this  - reasons to take this  - additional instructions   Used for:  Paralysis agitans (H)        1 tab by mouth @ 9pm as needed   Quantity:  60 tablet   Refills:  3       pramipexole 0.5 MG tablet   Commonly known as:  MIRAPEX   This may have changed:    - how much to take  - how to take this  - when to take this  - additional instructions   Used for:  Paralysis agitans (H)        2 tabs @ 9pm   Quantity:  180 tablet   Refills:  3         CONTINUE these medicines which have NOT CHANGED        Dose / Directions    busPIRone 10 MG tablet   Commonly known as:  BUSPAR   Used for:  Paralysis agitans (H)        Dose:  10 mg   Take 10 mg by mouth 2 times daily 1 Tab @ 7am and 1 tab @ 4pm   Quantity:  180 tablet   Refills:  3       calcium carbonate 500 MG chewable tablet   Commonly known as:  TUMS        Dose:  2 chew tab   Take 2 chew tab by mouth as needed for heartburn   Refills:  0       carbidopa-levodopa  MG per tablet   Commonly known as:  SINEMET   Used for:  Paralysis agitans (H)        Dose:  3 tablet   Take 3 tablets by mouth 4 times daily 3 tabs @7, noon, 4p and 9pm and a few as needed = 13/day x 90 = 1170tablets   Quantity:  1170 tablet   Refills:  3        cephALEXin 500 MG capsule   Commonly known as:  KEFLEX   Used for:  S/P deep brain stimulator placement        Dose:  500 mg   Take 1 capsule (500 mg) by mouth 3 times daily for 6 days   Quantity:  18 capsule   Refills:  0       cyclobenzaprine 5 MG tablet   Commonly known as:  FLEXERIL   Used for:  S/P deep brain stimulator placement        Dose:  5 mg   Take 1 tablet (5 mg) by mouth 3 times daily   Quantity:  30 tablet   Refills:  0       doxylamine 25 MG Tabs tablet   Commonly known as:  UNISOM        Dose:  25 mg   Take 1 tablet (25 mg) by mouth nightly as needed   Quantity:  14 each   Refills:  0       FLUoxetine 20 MG capsule   Commonly known as:  PROzac   Used for:  Paralysis agitans (H)        Dose:  20 mg   Take 20 mg by mouth every morning Take 20mg capsule  @ 7am   Quantity:  90 capsule   Refills:  3       hydrochlorothiazide 25 MG tablet   Commonly known as:  HYDRODIURIL        Dose:  25 mg   Take 25 mg by mouth At Bedtime @9 pm   Refills:  0       ondansetron 4 MG tablet   Commonly known as:  ZOFRAN   Used for:  S/P deep brain stimulator placement        Dose:  4 mg   Take 1 tablet (4 mg) by mouth every 8 hours as needed for nausea   Quantity:  45 tablet   Refills:  0       oxyCODONE IR 5 MG tablet   Commonly known as:  ROXICODONE   Used for:  Parkinson's disease (H)        Dose:  5-10 mg   Take 1-2 tablets (5-10 mg) by mouth every 3 hours as needed for other (pain control or improvement in physical function. Hold dose for analgesic side effects.)   Quantity:  45 tablet   Refills:  0       senna 8.6 MG tablet   Commonly known as:  SENOKOT   Used for:  S/P deep brain stimulator placement        Dose:  1 tablet   Take 1 tablet by mouth daily   Quantity:  30 tablet   Refills:  0       simvastatin 20 MG tablet   Commonly known as:  ZOCOR        Dose:  20 mg   Take 20 mg by mouth At Bedtime   Refills:  1       TYLENOL PO        Dose:  1000 mg   Take 1,000 mg by mouth every 8 hours as needed for mild pain or  fever   Refills:  0            Where to get your medicines      These medications were sent to [facility] - [place], [place] - [address]  [address], [facility] [address]     Phone:  [phone]     cephALEXin 500 MG capsule                Protect others around you: Learn how to safely use, store and throw away your medicines at www.disposemymeds.org.        ANTIBIOTIC INSTRUCTION     You've Been Prescribed an Antibiotic - Now What?  Your healthcare team thinks that you or your loved one might have an infection. Some infections can be treated with antibiotics, which are powerful, life-saving drugs. Like all medications, antibiotics have side effects and should only be used when necessary. There are some important things you should know about your antibiotic treatment.      Your healthcare team may run tests before you start taking an antibiotic.    Your team may take samples (e.g., from your blood, urine or other areas) to run tests to look for bacteria. These test can be important to determine if you need an antibiotic at all and, if you do, which antibiotic will work best.      Within a few days, your healthcare team might change or even stop your antibiotic.    Your team may start you on an antibiotic while they are working to find out what is making you sick.    Your team might change your antibiotic because test results show that a different antibiotic would be better to treat your infection.    In some cases, once your team has more information, they learn that you do not need an antibiotic at all. They may find out that you don't have an infection, or that the antibiotic you're taking won't work against your infection. For example, an infection caused by a virus can't be treated with antibiotics. Staying on an antibiotic when you don't need it is more likely to be harmful than helpful.      You may experience side effects from your antibiotic.    Like all medications,  antibiotics have side effects. Some of these can be serious.    Let you healthcare team know if you have any known allergies when you are admitted to the hospital.    One significant side effect of nearly all antibiotics is the risk of severe and sometimes deadly diarrhea caused by Clostridium difficile (C. Difficile). This occurs when a person takes antibiotics because some good germs are destroyed. Antibiotic use allows C. diificile to take over, putting patients at high risk for this serious infection.    As a patient or caregiver, it is important to understand your or your loved one's antibiotic treatment. It is especially important for caregivers to speak up when patients can't speak for themselves. Here are some important questions to ask your healthcare team.    What infection is this antibiotic treating and how do you know I have that infection?    What side effects might occur from this antibiotic?    How long will I need to take this antibiotic?    Is it safe to take this antibiotic with other medications or supplements (e.g., vitamins) that I am taking?     Are there any special directions I need to know about taking this antibiotic? For example, should I take it with food?    How will I be monitored to know whether my infection is responding to the antibiotic?    What tests may help to make sure the right antibiotic is prescribed for me?      Information provided by:  www.cdc.gov/getsmart  U.S. Department of Health and Human Services  Centers for disease Control and Prevention  National Center for Emerging and Zoonotic Infectious Diseases  Division of Healthcare Quality Promotion             Medication List: This is a list of all your medications and when to take them. Check marks below indicate your daily home schedule. Keep this list as a reference.      Medications           Morning Afternoon Evening Bedtime As Needed    amantadine 100 MG capsule   Commonly known as:  SYMMETREL   1 capsule @ 7am and  4pm                                busPIRone 10 MG tablet   Commonly known as:  BUSPAR   Take 10 mg by mouth 2 times daily 1 Tab @ 7am and 1 tab @ 4pm                                calcium carbonate 500 MG chewable tablet   Commonly known as:  TUMS   Take 2 chew tab by mouth as needed for heartburn                                carbidopa-levodopa  MG per tablet   Commonly known as:  SINEMET   Take 3 tablets by mouth 4 times daily 3 tabs @7, noon, 4p and 9pm and a few as needed = 13/day x 90 = 1170tablets                                cephALEXin 500 MG capsule   Commonly known as:  KEFLEX   Take 1 capsule (500 mg) by mouth 3 times daily for 6 days                                cyclobenzaprine 5 MG tablet   Commonly known as:  FLEXERIL   Take 1 tablet (5 mg) by mouth 3 times daily                                doxylamine 25 MG Tabs tablet   Commonly known as:  UNISOM   Take 1 tablet (25 mg) by mouth nightly as needed                                FLUoxetine 20 MG capsule   Commonly known as:  PROzac   Take 20 mg by mouth every morning Take 20mg capsule  @ 7am                                hydrochlorothiazide 25 MG tablet   Commonly known as:  HYDRODIURIL   Take 25 mg by mouth At Bedtime @9 pm                                LORazepam 1 MG tablet   Commonly known as:  ATIVAN   1 tab by mouth @ 9pm as needed                                ondansetron 4 MG tablet   Commonly known as:  ZOFRAN   Take 1 tablet (4 mg) by mouth every 8 hours as needed for nausea                                oxyCODONE IR 5 MG tablet   Commonly known as:  ROXICODONE   Take 1-2 tablets (5-10 mg) by mouth every 3 hours as needed for other (pain control or improvement in physical function. Hold dose for analgesic side effects.)                                pramipexole 0.5 MG tablet   Commonly known as:  MIRAPEX   2 tabs @ 9pm                                senna 8.6 MG tablet   Commonly known as:  SENOKOT   Take 1 tablet by mouth  daily                                simvastatin 20 MG tablet   Commonly known as:  ZOCOR   Take 20 mg by mouth At Bedtime                                TYLENOL PO   Take 1,000 mg by mouth every 8 hours as needed for mild pain or fever

## 2018-08-30 NOTE — ANESTHESIA POSTPROCEDURE EVALUATION
Patient: Erika Diaz    Procedure(s):  Deep Brain Stimulator Placement, Phase II, Placement Of Deep Brain Stimulator Generator/Battery Over The Right Chest Wall - Wound Class: I-Clean    Diagnosis:Parkinson's Disease   Diagnosis Additional Information: No value filed.    Anesthesia Type:  General, ETT    Note:  Anesthesia Post Evaluation    Patient location during evaluation: PACU  Patient participation: Able to fully participate in evaluation  Level of consciousness: awake and alert  Pain management: adequate  Airway patency: patent  Cardiovascular status: acceptable  Respiratory status: acceptable  Hydration status: acceptable  PONV: none     Anesthetic complications: None          Last vitals:  Vitals:    08/30/18 0855 08/30/18 0900 08/30/18 0915   BP: 122/78 131/72 129/63   Resp: 16 16 16   Temp: 33.9  C (93  F) 35.4  C (95.7  F) 36.6  C (97.8  F)   SpO2:            Electronically Signed By: Carlos dEuardo Mansfield MD  August 30, 2018  9:27 AM

## 2018-08-30 NOTE — ANESTHESIA CARE TRANSFER NOTE
Patient: Erika Diaz    Procedure(s):  Deep Brain Stimulator Placement, Phase II, Placement Of Deep Brain Stimulator Generator/Battery Over The Right Chest Wall - Wound Class: I-Clean    Diagnosis: Parkinson's Disease   Diagnosis Additional Information: No value filed.    Anesthesia Type:   General, ETT     Note:  Airway :Face Mask  Patient transferred to:PACU  Comments: VSS on arrival, report to RN. Handoff Report: Identifed the Patient, Identified the Reponsible Provider, Reviewed the pertinent medical history, Discussed the surgical course, Reviewed Intra-OP anesthesia mangement and issues during anesthesia, Set expectations for post-procedure period and Allowed opportunity for questions and acknowledgement of understanding      Vitals: (Last set prior to Anesthesia Care Transfer)    CRNA VITALS  8/30/2018 0818 - 8/30/2018 0848      8/30/2018             Pulse: 73    SpO2: 100 %    Resp Rate (observed): (!)  6                Electronically Signed By: GUILLERMINA Gao CRNA  August 30, 2018  8:48 AM

## 2018-08-30 NOTE — ANESTHESIA PREPROCEDURE EVALUATION
Anesthesia Evaluation     . Pt has had prior anesthetic. Type: General and MAC    No history of anesthetic complications          ROS/MED HX    ENT/Pulmonary:     (+)TYLER risk factors hypertension, obese, tobacco use, Past use , . .    Neurologic:     (+)Parkinson's disease features: limited mouth opening and speech ,     Cardiovascular:     (+) Dyslipidemia, hypertension----. : . . . :. . Previous cardiac testing date:results:date: results:ECG reviewed date:2015 results: date: results:          METS/Exercise Tolerance:  >4 METS   Hematologic:     (+) History of blood clots pt is not anticoagulated, -      Musculoskeletal:   (+) , , other musculoskeletal- sprained ankle on right      GI/Hepatic:  - neg GI/hepatic ROS       Renal/Genitourinary:  - ROS Renal section negative       Endo:     (+) Obesity, .      Psychiatric:     (+) psychiatric history anxiety      Infectious Disease:  - neg infectious disease ROS       Malignancy:      - no malignancy   Other:    (+) no H/O Chronic Pain,                   Physical Exam      Airway   Mallampati: III  TM distance: >3 FB  Neck ROM: full  Comment: Limited mouth opening due to Parkinsons    Dental   (+) upper dentures and lower dentures    Cardiovascular   Rhythm and rate: regular and normal      Pulmonary    breath sounds clear to auscultation                 PAC Discussion and Assessment    ASA Classification: 3  Case is suitable for: [place]  Anesthetic techniques and relevant risks discussed:   Invasive monitoring and risk discussed:   Types:   Possibility and Risk of blood transfusion discussed:   NPO instructions given:   Additional anesthetic preparation and risks discussed:   Needs early admission to pre-op area:   Other:     PAC Resident/NP Anesthesia Assessment:  Erika Diaz is a 57 year old female who presents for pre-operative H & P in preparation for a   Phase I Stealth Assisted   Side Deep Brain Stimulator Placement on 8/21/18  and Phase II Placement  Of Deep Brain Stimulator Generator/Battery  on 8/28/18  with Dr. Concepcion for Parkinsons at the [facility].    PAC referral for risk assessment and optimization for anesthesia with comorbid conditions of:    Pre-operative considerations:  1.  Cardiac:  Functional status METS = >4    Risk of Major Adverse Cardiac event: 0.4%  -HTN controlled hydrochlorothiazide (hold AM DOS)  2.  Pulm:   TYLER risk:  Intermediate  -Former smoker 10 pack years, quit 2009  -No known pulmonary disease    3.  GI:  Risk of PONV score = 3 .  If > 2, anti-emetic intervention recommended.  4.  Neuro:  -Parkinson's with features of limited mouth opening and speech difficulties  5.  Heme:  -History of provoked PE after knee replacement in 2015  4.  Meds:  -Antiplts:  Asa 81 mg, hold starting  8/14      Patient is optimized and is an acceptable candidate for the proposed procedure.  No further diagnostic evaluation is needed.    Rose WALLACE-MICHI  08/07/18 11:09 AM        Mid-Level Provider/Resident:   Date:   Time:     Attending Anesthesiologist Anesthesia Assessment:        Anesthesiologist:   Date:   Time:   Pass/Fail:   Disposition:     PAC Pharmacist Assessment:        Pharmacist:   Date:   Time:      Anesthesia Plan      History & Physical Review  History and physical reviewed and following examination; no interval change.    ASA Status:  3 .    NPO Status:  > 6 hours    Plan for General and ETT with Intravenous induction. Maintenance will be Balanced.    PONV prophylaxis:  Ondansetron (or other 5HT-3)       Postoperative Care      Consents  Anesthetic plan, risks, benefits and alternatives discussed with:  Patient..                          .

## 2018-09-04 NOTE — OP NOTE
PATIENT NAME: DELIA AQUINO  YOB: 1960  MRN:   [id number]  ACCOUNT:  [id number]      DATE OF PROCEDURE:  08/21/2018    PREOPERATIVE DIAGNOSIS:    1.  Parkinson's disease  2.  Left side stiffness, dyskinesia and tremor    POSTOPERATIVE DIAGNOSIS:    1.  Parkinson's disease   2.  Left side stiffness, dyskinesia and tremor    PROCEDURES PERFORMED:   1.  Placement of CRW stereotactic headframe.   2.  Stereotactic neuronavigation planning and CT of head.   3.  Stereotactic neuronavigation using the [employer] system for surgical planning, targeting and approach. The target is right globus pallidus internus (GPi).  4.  Right side deep brain stimulator placement, phase I, placement of right side deep brain stimulator electrode, target is right globus pallidus internus (GPi).   5.  Use of intraoperative microelectrode recording.   6.  Use of intraoperative fluoroscopy.  7.  Placement of one extension wire and connection to the right side deep brain stimulator electrode, tunneled to the right chest wall.  8.  Placement of one externalization wire and connection to the extension wire, exiting at the right lateral chest wall.    ATTENDING SURGEON:  Jerry Concepcion MD, PhD     RESIDENT SURGEON:  Kris Price MD    ANESTHESIA:    1.  Monitored anesthesia care and local anesthetic.   2.  General anesthesia    ESTIMATED BLOOD LOSS:  30 mL.     COMPLICATIONS:  No surgical complication.  However, surgical bed in OR 20 began leaking what appeared to be hydraulic fluid after the first part of the surgery but before the second part of the surgery.  Decision was made to relocate to OR 14 to continue with surgical case, second part.  Patient did not suffer any immediate complications from this equipment failure.    FINDINGS:  Final electrode location, 1.5 mm X-Y stage shift laterally, then anterior Scott Gun position, 1.5 mm above target.    IMPLANTS:    1.  Zavala DBS electrode, Infinity 0.5, REF [id number], S/N [id number].  2.   Abbott DBS extension wire, 60 cm, REF 6372, S/N [id number].  3.  Abbott Guardian maxwell hole cover, REF 6010, Lot# [id number].  4.  Abbott Medical 30 cm percutaneous extension wire, REF 3383, Lot# [id number]    INDICATIONS FOR PROCEDURE:  Ms. Erika Diaz is a 57 year old woman with a 9-10 year history of Parkinson s disease, being diagnosed in [id number].  She first developed stiffness in her left hand and shoulder that has since progressed to include tremors.  Her symptoms are worse on the left-side.  Her goals with Deep Brain Stimulation are to gain tremor control and reduce dyskinesias.  She wants to feel better, be more normal without taking the medications, improve wearing off stiffness, difficulty walking and foggy thinking.  Medication reduction is important.  She does have regular sessions with a chiropractor to relieve tension in her neck.  She underwent DBS candidacy evaluation and she was considered to be a good candidate.  Her symptom is worse on the left side so right side implantation was recommended.  She was also considered to be a wait and see candidate.  In terms of target, right GPi was recommended.  We discussed both phase I and II of DBS surgery.  We discussed that during phase I, we would place the DBS electrode on the right side under MAC and microelectrode recording.  She is also part of a clinical research with [employer] so she will undergo a modified phase I where extension wire will be placed to the right chest wall and additional extension wire will be placed exiting out of her right side chest area for externalized recording.  She would then return one week later for the phase II with placement of the DBS generator/battery over the right chest wall.  Risks, benefits, alternative therapies were discussed with the patient, including but not limited to infection and bleeding.  Surgical procedure was discussed in detail.  We also discussed at length the device options and reasons  for recommending Abbott device.  All questions were answered, and she expressed understanding.    DESCRIPTION OF PROCEDURE:      CRW head frame placement and stereotactic neuronavigation.      Informed consent was obtained.  The patient was brought to the operating room and kept on the gurney.  She was identified and a brief timeout was performed for the placement of the CRW head frame.  She was given sedation to make her comfortable.  The intended pin sites, two in the front and two in the back of the head, were cleaned and were injected with local anesthetic, 1% lidocaine with epinephrine.  A total of 24 mL were used during this portion of the surgical case.  The CRW stereotactic head frame was placed onto her head with 4 pins for fixation.  Care was taken so that the fiducial box wound fit over the head and the frame.  Once the head frame was on, the fiducial box was easily placed without interference from her forehead.  The patient tolerated the procedure well and her sedation was lightened and she was awakened.      After the CRW stereotactic head frame was placed, she was taken directly to the CT scanner, at which time a CT scan of the head stereotactic protocol was obtained.  Subsequently, the patient was taken back up to the operating room where she was placed supine on the operative bed with the CRW head frame affixed to the bed as well.  Patient was in a slight sitting up position with the neck in a neutral position and she was made comfortable.  The bed was positioned so that it would be a beach chair reclining position.  All pressure points were well padded.  Care was taken to make sure that the neck was in neutral position and that the Patel head carbajal device had room for manipulation in case more flexion or extension is needed throughout the case.     At this time, attention was turned to the neuro navigation imaging that was obtained.  The Stealth neuronavigation device was loaded with the CT head  that had just been obtained.  The device also had an MRI of the head, stereotactic protocol, that was obtained prior to surgery.  CT of the head was merged with the previously obtained MRI of the brain.  The merge demonstrated good coherence/registration.  Then, using this merged image, neuronavigation was used to stereotactically target the right globus pallidus internus.  The technique used was the AC-PC line localization technique to target the GPi using stereotactic coordinates.  We utilized the T1, T2 and SWI sequence of the MRI to identify the GPi and target it.  We also had 7 Shannan MRI available as a reference to confirm our targeting.  The X, Y and Z as well as the ring and arc angles for the CRW head frame were obtained for the right side.  Then, we adjusted the target by 2 mm posteriorly so that the GPi target would correspond with the anterior Scott Gun position.  The entry into the skull, as well as the trajectory of the electrode were checked with a probe's eye view to avoid any sulci, ventricle or vascular structures.     The stereotactic coordinates for the right side was then transferred to the [facility] stereotactic targeting apparatus as well as the phantom base.  The coordinates were confirmed and double checked for accuracy.  Accuracy was also confirmed using phantom base.  The coordinates were X = +18.4, Y = -8.4, Z =+11.4, ring angle = 55.8 degrees anterior, and arc angle = 4.2 degrees to the right.     At this time, attention was turned back to the patient.  Using a hair clipper, her hair over the right frontal area was clipped.  A semicircular C-shaped incision was planned on the right side and this was marked.  Then, the surgical area was prepped and draped in routine surgical fashion.  We also prepped the area posterior to this as well as area towards the right parietal area.  The patient was also made comfortable.     Timeout was then performed confirming the patient's identity, procedure to be  performed, side and site of surgery identified, imaging corresponding with the patient and administration of preoperative prophylactic antibiotic.    Right-sided electrode placement with microelectrode recording: Target right GPi.     The CRW targeting apparatus was attached to the head frame on top of the sterile drapes.  The entry point was marked on the scalp on the right side.  A C-shaped incision was made with a skin knife, after the area was injected with local anesthetic, 1% Lidocaine with epinephrine and 1/4% Marcaine plain, 50:50 mix.  The area posterior to the incision was also injected as a pocket would be created.  Area posterior lateral, towards the right parietal area was also injected as the electrode connector will be tunneled here later.  Total of 17 mL of the above mentioned local anesthetic was used.  The incision was then further carried down to the pericranium.  Hemostasis was obtained using monopolar and bipolar cautery.  Thin layer of pericranial tissue was left behind on the scalp and the skin flap was reflected posteriorly.  Then, using a blunt dissection technique, a pocket was created posteriorly as well as a track that was made towards the right parietal area for later use.    At this time, the targeting apparatus again positioned and the entry point to the skull was marked.  Area of the intended bur hole was cleaned and pericranial tissue removed.  Then using an acorn drill, bur hole was created over this entry point to expose the dura.  The area was vigorously irrigated and bone wax used to control the bone bleeding.  Dura was coagulated with bipolar cautery for hemostasis, and again no active bleeding was noted.     At this time, the electrode fixation cover was fixed to the skull using two screws.  Care was taken to overlap the pericranium over the edge of the cover to provide a smooth tissue transition.  Then, the electrode drive was attached to the targeting apparatus.  The entry  into the dura was again checked.  It appeared that all positions of the Scott Gun electrode carbajal were accessible without any interference from the bone edge.  Then using a Bovie cautery and a #4 Penfield instrument, opening was made into the dura, as well as a small corticectomy.  No active bleeding was noted.    Dr. Raymon Keenan and Dr. Nolberto San of the Neurology Department participated in the recording and neurological testing.  During the microelectrode recording and testing, Dr. Keenan and Dr. San took detailed notes of the electrophysiologic data and neurologic exam as well as any stimulation effects.  Patient also consented to be part of the [employer] research project.  Therefore, intraoperative recording was performed.    At this time, the electrode guide tubes were inserted in the anterior, lateral and posterior Scott Gun positions and they were advanced slowly until they were fully inserted at which point the tips would be well above the target.  No abnormal resistance was noted.  Duraseal was used to seal the entry site to provide a seal and to minimize cerebrospinal fluid leak and air entry.  Then, recording microelectrodes were brought in and placed within the guide tubes and they were connected for intraoperative recording.  The tips of the electrode were now 15 mm above target.  The patient was awakened.  Then, using a micro drive, the electrodes were slowly advanced towards the target, collecting microelectrode recording data for mapping the track.  Anterior track yielded approximately 7.0 mm of GPi with somatosensory response activity in elbow and shoulder.  Optic track was found at 0.5 mm below target.  Posterior track yielded 3.2 mm of GPi with somatosensory response activity in shoulder and possibly hip.  No optic track was noted.  Lateral track yielded 6.5 to 7 mm of GPi with somatosensory response in elbow and wrist.  No optic track was noted.  Microstimulation yielded phosphenes in anterior track  but not posterior or lateral tracks.  Ring electrode stimulation yielded capsular effect in the posterior track at 1 mA, 200 microseconds and 300 Hz setting.    As per Miles protocol, during the microelectrode recording, when the cells were isolated, patient's electrophysiological data was collected while the patient performed various tasks on the experimental apparatus.    Based on the electrophysiology data collected, it was thought that the current tracks were mesial to the desirable target area.  Therefore, a X-Y stage shift was planned for 1.5 mm laterally which would make the new anterior Scott Gun position the desired electrode location.  The electrodes were pulled out of the guide tubes and the guide tubes were removed.  The frame shift was made on the X-Y stage of the microdrive, 1.5 mm laterally.  At this time, the electrode guide tube was inserted in the anterior Scott Gun position and it was advanced slowly until it was fully inserted at which point the tip would be well above the target.  No abnormal resistance was noted.  Duraseal was used to seal the entry site to provide a seal and to minimize cerebrospinal fluid leak and air entry.  Then, recording microelectrode was brought in and placed within the guide tube and it was connected for intraoperative recording.  The tip of the electrode was now 15 mm above target.  The patient remained awakened.  Then, using a micro drive, the electrode was slowly advanced towards the target, collecting microelectrode recording data for mapping the track.  Anterior track yielded approximately 7.4 mm of GPi with somatosensory response activity in elbow.  Rhythmic cells were also noted.  Optic track was found also.     Based on the mapping data, it was decided that the current anterior Scott Gun position may be the best placement.  The electrode drive was positioned to the depth of 1.5 mm above the target level.  Then, the electrode was pulled out of the guide tube.  The  permanent DBS electrode was brought into the field and placed in the anterior guide tube with the tip being placed at 1.5 mm above the target depth.  The electrode demonstrated good impedance values.  The electrode was tested.  With 1- and 3+ configuration, 130 Hz and 60 microsecond pulse width, patient reported cheek pulling.  With various other configuration, patient reported pulling in her neck.  However, these pulling sensation being reported did not accompany any observable muscle contraction and did not correlate consistently with stimulation.  However, she continued to report contraction.  Also, patient was reportedly tired and visibly tired and had tremor in her mouth and fact which made muscle contraction difficult to observe.  However, based on our electrophysiology data, we decided that the current position was satisfactory for placement of the stimulating electrode.    With the satisfactory testing of the electrode position and the stimulation parameters, the electrode was secured at this location.  The patient was again made comfortable.  The guide tube was pulled out of the brain.  Duraseal was again used to seal any opening.  The area was generously irrigated.  Hemostasis was also obtained.  Fluoroscopy was brought into the field to test that the electrode did not move with each manipulation.  The electrode tip was seen to be above the target, as expected.  The electrode clamp was applied to hold the electrode.  Then, the electrode stylet was removed. The electrode was then removed from the electrode carbajal.  The cap cover was finally placed and secured in place.  Fluoroscopy confirmed that the tip of the electrode did not change in position with each manipulation.  The directional marker, on fluoroscopy, also demonstrated that the contact 2A is facing anteriorly.    The connecting portion of the electrode was covered with a protective covering/dead-end connector, and this apparatus was inserted into  the subgaleal space/pocket towards the right parietal area that was created at the beginning of the case.  The excess wire was also buried posteriorly in the previously created pocket.  Fluoroscopy confirmed that the tip did not move.  The wound was then generously irrigated.    The galeal layer was reapproximated using 3-0 Vicryl sutures in an inverted interrupted fashion and the skin was reapproximated using 4-0 Monocryl suture in a running fashion.  The wound was cleaned and dried and prepped with ChoraPrep.  Then, Dermabond was applied.    Removal of the head frame and end of procedure    First, the stereotactic targeting apparatus was removed.  Then, the sterile drapes were removed.  Subsequently, the patient was taken out of the CRW head frame.  The four pin sites were clean and dry and no active bleeding was noted.  Antibiotic ointment was applied to the pin sites.    Placement of extension wire and connection to the right side deep brain stimulator electrode, tunneled to the right chest wall.    At this time, per [place] protocol, we began our modified phase I for the placement of the extension wire.  Patient was positioned supine on the operating room table.  All pressure points were properly padded.  With the placement of endotracheal tube, general endotracheal anesthesia was induced.  During the final positioning, it was noted that there was a pool of yellow fluid on the floor.  It appeared first to be coming from the patient and her underwood problem was suspected.  However, her underwood was intact.  It was determined that the fluid was coming from the surgical bed itself.  It was the surgical bed in OR 20 that began leaking what appeared to be hydraulic fluid.  This began after the closure of the wound and intubation of the patient.  Along with the fluid was the bed's ability to maintain the level of the leg part of the bed.  Decision was made to relocate to OR 14 to continue with the surgical case, second part.   Patient remained intubated and transferred to a transport bed to OR 14.  Then she was transferred to the new OR bed in room 14.  Patient did not suffer any immediate complications from this equipment failure.    We then began to position her.  She remained supine on the operative bed.  All pressure points were well padded.  Her head was turned to the left side, thus exposing the right parietal area of the head.  The previously implanted connector portion of the stimulating electrode from the right side could be palpated on the right side of the head.  Small area of the hair was removed over this palpable connector using a surgical hair clipper.  Then, the right side of the head, neck and the chest area, to include the lower chest area as well, was prepped and draped in the normal sterile fashion.  Local anesthetic was injected in the areas of intended incision at the right scalp and right chest wall as well as along the path of the intended tunneling.  Total of 8 mL of 1% Lidocaine with epinephrine mixed with 1/4% Marcaine plain, 50:50 mix, were used.  A timeout was performed confirming the patient's identity, procedure to be performed, site and side of the surgery and the administration of preoperative prophylactic antibiotic.    Attention was first turned to the head.  A #10 blade was used to make a 2 cm incision at the distal end of the connector that was palpated in her scalp.  Hemostasis was obtained with bipolar cautery.  The incision was carried down to the pericranium.  The buried connector protective cover tip was visible.  The mosquito was used to hold the protective cover and we gently pulled out the connector.  This was found to be fully intact.  At that point, a pocket was made using a Shanice clamp down toward behind the ear into the nuchal line.  This was in preparation for the tunneling of the extension wire.     At that point, attention was turned to the right chest area.  A #10 blade was used to  make a 6 cm incision on the right chest wall.  The #10 blade scalpel was used to finish dissection down to the proper plane of less than 1 cm below the skin.  We found a nice easily dissecting plane superficial to the pectoralis muscle.  Then blunt dissection technique with fingers and mosquito was used to establish a subcutaneous pocket about 3 fingerbreadths wide and deep enough to encompass the full length of the fingers.  Hemostasis was obtained.  The pocket was generously irrigated with antibiotic saline and sponges were placed within the pocket.     At this point, a tunneler was brought onto the field.  A subcutaneous passage was tunneled from the head incision to the chest wall incision, careful not to be too superficial to the skin, but within the correct plane and taking a course over the clavicle.  The tunneler was removed, leaving behind a plastic sheath.  The right side extension wire was placed into the sheath from the head incision to the chest incision.  Then, the plastic sheath was pulled from the chest wound, leaving behind the tunneled extension wire.    We then proceeded to make the connection between the intracranial lead and the extension wire.  The protective cover on the connector of the intracranial electrode was removed.  Then, the connector was inserted into the extension wire and secured using a fastening screws.  The extension wire was gently pulled down at the chest wall area in order to seat the extension wire connection correctly in the scalp pocket and not directly under the incision.  It was placed slightly superior to the incision.      Placement of one externalization wire and connection to the extension wire, exiting at the right lateral chest wall.    Then, the tunneler was again brought in and a subcutaneous passage was tunneled from the right chest pocket incision to the right lower chest area, exiting out of the skin.  The exit site was previously injected with the above named  local anesthetic.  The tunneler was removed, leaving behind a plastic sheath.  At this time extension wire for externalization was brought in and placed into the sheath from the chest incision to the chest exit site.  Then, the plastic sheath was pulled from the chest exit site, leaving behind the externalization wire.  We then proceeded to make the connection between the extension wire and the externalization wire.  The connector of the extension wire was inserted and secured to the externalization wire connector.  Then, the externalization wire was gently pulled at the right chest exit site until the connection was buried within the right chest pocket.    The externalization wire was then connected to a testing cable and tested.  All the impedance values were noted to be within normal.  No problems were found.    The sponges were removed from the pocket and the pocket was inspected for hemostasis.  The excess wire was placed in the pocket.     We began closing the wound.  The scalp incision was closed in the following manner.  A thin layer of tissue was reapproximated using 3-0 Vicryl sutures in an inverted interrupted fashion to cover the hardware and to reapproximate the galeal layer.  The skin was reapproximated using 4-0 Monocryl suture in a running fashion.  The right chest wound was closed in the following manner.  The pocket capsule layer was reapproximated with 3-0 Vicryl sutures in an inverted interrupted fashion.  Subcutaneous tissue was reapproximated with 3-0 Vicryl sutures in an inverted interrupted fashion and the dermal layer was reapproximated with 3-0 Vicryl sutures in an inverted interrupted fashion.  The skin was reapproximated with 4-0 Monocryl suture in a subcuticular fashion.     Both incisions were cleaned with a wet and dry and reprepped with ChloraPrep.  Dermabond was then placed on both incisions.     The right chest wire exit site was prepared.  The exit site was cleaned with wet and dry  and reprepped with ChloraPrep.  Using a 4-0 Monocryl suture, the exit site was closed and the exiting wire anchored.  Then, Biopatch was placed followed by Xeroform.  Then, sponge was laid on top for cushion and the testing cable head was placed on top.  The excess wire was curled around and Ioban dressing was placed on top for dressing.    Patient was extubated and taken to the recovery room in a stable condition.  At the end of the case, all counts including needle, sponge and instrument counts were correct x 2.  There were no complications other than the issues with the OR bed as described.    I, Sherri Concepcion M.D., Ph.D., Neurosurgery Attending, was present and scrubbed for the entire case and performed the key and critical portions of the case.      SHERRI CONCEPCION MD, PHD

## 2018-09-11 NOTE — OP NOTE
Stereotaxic Neurosurgery Neurophysiology Summary    Name: Erika Diaz  : 1960  MRN: [facility] [id number]  Surgery Date: 2018  Surgery Performed:  Right GPi DBS lead placement  Neurologist: Raymon Keenan MD, PhD  Ordering Physician: Jerry Concepcion MD, PhD    Diagnosis: Parkinson s disease    Target Structure: Globus Pallidus internus (GPi)    Target Side:  Right    Procedure  The surgical field was prepared as per the neurosurgeon's note. Microelectrodes were attached to the stereotactic frame and lowered through the brain with a calibrated microdrive. Neuronal activity was recorded along each track. Single units were isolated and somatosensory/motor responses (SSR) were determined.     Functional Physiologic Mappin.0 Hours    Mapping Equipment: [employer] [place] [employer]      Microelectrode Mapping    Electrophysiological Mapping: The target was physiologically mapped using 2 passes, 4 total tracks.    Pass 1    *Prior to GABBY, the frame was adjusted 2.0 mm posterior such that the target location was aligned with the anterior Scott Gun track and the posterior Scott Gun track was used to map the posterior-medial border of GPi.    Mapping: Three simultaneous tracks, utilizing an anterior, lateral and posterior Scott Gun microelectrode configuration, starting 15 mm above target depth.   Anterior track: dense cell activity in GPi with ~7.0 mm interpreted as GPi; SSR s tested in GPi showed responses to elbow extension and int. rotation of the shoulder. A flashlight was shined into the patient s eyes in order to induce light evoked potentials in order to localize optic tract (OT). A positive response for OT was noted at 0.7 mm below the ventral border of GPi.   Posterior track: moderate density cell activity with ~3.2 mm interpreted as GPi; SSR s tested in GPi showed responses to hip/knee rotation and shoulder rotation. Light evoked potentials for identifying OT were absent at 0.5 mm below target  (3.7 mm below interpreted ventral border of GPi).  Lateral track: moderate to high density cell activity with ~6.5 - 7.0 mm interpreted as GPi. SSR s noted during elbow flex, wrist flex and rhythmic cells indicative of tremor. Light evoked potentials for identifying OT were absent at 0.5 - 0.7 mm below interpreted ventral border of GPi.    Microstimulation: Anterior track: patient reported phosphenes with 20  A microstim at 1.7 mm below the ventral border of GPi. Posterior track: no phosphenes reported with up to 100  A microstim. Lateral track: no phosphenes reported with up to 100  A microstim.    Ring electrode stimulation: Posterior track: robust muscle contractions noted in the neck and retrograde of the jaw with stim at the approximate ventral border of GPi with 1.0 mA stim (200  s, 300 Hz), consistent with stimulation of the posterior limb of the internal capsule. Equivocal muscle contractions with lower current and pulse width due to consistent non-stim induced flutter/tremor of jaw and tongue.     Pass 2    Mapping: One track, utilizing the anterior Scott Gun microelectrode position, starting 15 mm above target depth.   Anterior track: robust, dense cell activity in Heber and GPi with noted border cells and presumed lamina between Heber and GPi. ~7.4 mm interpreted as GPi; SSR s tested in GPi showed responses to elbow flexion and rhythmic activity indicative of tremor. Light evoked potentials for identifying OT were sharply present at 0.6 mm below interpreted ventral border of GPi.     Microstimulation: No microstimulation performed.    Ring electrode stimulation: No ring electrode stimulation performed.    Lead placement  Anterior Scott Gun track (original target location) with placement depth set to 1.5 mm above target depth. Intention was for the top of contact 3 to be placed at the dorsal border of GPi, which was ~7 mm above the planned target depth.    Macrostimulation (DBS lead)  Lead: Abbott Infinity 0.5  [contacts 1-4]    *During test stimulation, muscle contractions were generally difficult to discern due to flutter/tremor of the jaw, mouth and tongue region.    Stimulation Parameters: 1(-) 3abc(+) 130 Hz, 60 s:   Outcome: Patient reported feeling muscle pulling of the left cheek/neck area at 2.0 mA. However, stimulation-induced muscle contractions were not discernable.    Stimulation Parameters: 2abc(-) 4(+) 130 Hz, 60 s:   Outcome: Patient reported feeling muscle pulling of the left cheek/neck area at 2.75 mA. However, stimulation-induced muscle contractions were not discernable.    Stimulation Parameters: 2ab(-) 4(+) 130 Hz, 60 s:   Outcome: Patient reported feeling muscle pulling of the left cheek/neck area at 2.75 mA. However, stimulation-induced muscle contractions were not discernable.    Stimulation Parameters: 2bc(-) 4(+) 130 Hz, 60 s:   Outcome: Patient reported feeling muscle pulling of the left cheek/neck area at 1.75 mA. However, stimulation-induced muscle contractions were not discernable.    Stimulation Parameters: 2a(-) 4(+) 130 Hz, 60 s:   Outcome: Patient reported feeling muscle pulling of the left cheek/neck area at 1.75 mA. However, stimulation-induced muscle contractions were not discernable.    Stimulation Parameters: 2b(-) 4(+) 130 Hz, 60 s:   Outcome: Patient reported feeling muscle pulling of the left cheek/neck area at 1.75 mA. However, stimulation-induced muscle contractions were not discernable.    Stimulation Parameters: 2c(-) 4(+) 130 Hz, 60 s:   Outcome: Patient reported feeling muscle pulling of the left cheek/neck area at 1.75 mA. However, stimulation-induced muscle contractions were not discernable.    Stimulation Parameters: 2abc(-) 3(+) 130 Hz, 60 s:   Outcome: Patient reported feeling muscle pulling of the left cheek/neck area at 1.75 mA. However, stimulation-induced muscle contractions were not discernable.    Stimulation Parameters: 3abc(-) 1(+) 130 Hz, 60 s:   Outcome:  Patient reported feeling muscle pulling of the left cheek/neck area at 0.75 mA. However, stimulation-induced muscle contractions were not discernable.    Comments  Overall, microelectrode recordings were good. The lead was placed in a good location commensurate with electrophysiological recordings. During test stimulation of the DBS lead, the patient exhibited organic, persistent fluttering/tremor of the tongue, mouth and jaw that made it difficult to discern stimulation-induced muscle contractions. Although the patient reported muscle  pulling  at low amplitude stimulation, stimulation-induced side effects were not unequivocally observable. The patient also reported severe discomfort due to restless leg syndrome and at times was uncertain when muscle pulling in the cheek/neck region was or was not present. Despite the inability to establish a precise side-effect threshold, contacts 2 or 3 and possibly 4 should provide good benefit with a sufficient therapeutic window.    Raymon Keenan MD, PhD

## 2018-09-13 ENCOUNTER — OFFICE VISIT (OUTPATIENT)
Dept: NEUROSURGERY | Facility: CLINIC | Age: 58
End: 2018-09-13
Payer: MEDICARE

## 2018-09-13 VITALS
OXYGEN SATURATION: 98 % | BODY MASS INDEX: 34.01 KG/M2 | HEIGHT: 68 IN | HEART RATE: 69 BPM | TEMPERATURE: 98 F | DIASTOLIC BLOOD PRESSURE: 61 MMHG | SYSTOLIC BLOOD PRESSURE: 125 MMHG | WEIGHT: 224.4 LBS

## 2018-09-13 DIAGNOSIS — G20.A1 PARKINSON'S DISEASE (H): Primary | ICD-10-CM

## 2018-09-13 DIAGNOSIS — Z98.890 POST-OPERATIVE STATE: ICD-10-CM

## 2018-09-13 PROBLEM — G20.C PARKINSONISM (H): Status: ACTIVE | Noted: 2017-03-20

## 2018-09-13 PROBLEM — I10 ESSENTIAL HYPERTENSION: Status: ACTIVE | Noted: 2017-11-07

## 2018-09-13 ASSESSMENT — PAIN SCALES - GENERAL: PAINLEVEL: MILD PAIN (3)

## 2018-09-13 NOTE — MR AVS SNAPSHOT
After Visit Summary   9/13/2018    Erika Diaz    MRN: [id number]           Patient Information     Date Of Birth          1960        Visit Information        Provider Department      9/13/2018 12:00 PM Venus Anthony PA-C [facility] Neurosurgery        Today's Diagnoses     Parkinson's disease (H)    -  1    Post-operative state           Follow-ups after your visit        Your next 10 appointments already scheduled     Sep 21, 2018  7:00 AM CDT   (Arrive by 6:45 AM)   Return Visit with GUILLERMINA Vallecillo [facility] Neurology ([facility] [place])    [address] [place] [id number] [phone]            Sep 21, 2018 11:40 AM CDT   CT HEAD W/O CONTRAST with UCCT2   [facility] [place] [facility] ([facility] [place])    [address] [place] [id number] [phone]           How do I prepare for my exam? (Food and drink instructions) No Food and Drink Restrictions.  How do I prepare for my exam? (Other instructions) You do not need to do anything special to prepare for this exam. For a sinus scan: Use your nose spray (nasal decongestant spray) as directed.  What should I wear: Please wear loose clothing, such as a sweat suit or jogging clothes. Avoid snaps, zippers and other metal. We may ask you to undress and put on a hospital gown.  How long does the exam take: Most scans take less than 20 minutes.  What should I bring: Please bring any scans or X-rays taken at other hospitals, if similar tests were done. Also bring a list of your medicines, including vitamins, minerals and over-the-counter drugs. It is safest to leave personal items at home.  Do I need a : No  is needed.  What do I need to tell my doctor? Be sure to tell your doctor: * If you have any allergies. * If there s any chance you are pregnant. * If you are breastfeeding.  What should I do after the exam: No  "restrictions, You may resume normal activities.  What is this test: A CT (computed tomography) scan is a series of pictures that allows us to look inside your body. The scanner creates images of the body in cross sections, much like slices of bread. This helps us see any problems more clearly.  Who should I call with questions: If you have any questions, please call the Imaging Department where you will have your exam. Directions, parking instructions, and other information is available on our website, [email or web address].              Who to contact     Please call your clinic at [phone] to:    Ask questions about your health    Make or cancel appointments    Discuss your medicines    Learn about your test results    Speak to your doctor            Additional Information About Your Visit        [employer] Information     [employer] gives you secure access to your electronic health record. If you see a primary care provider, you can also send messages to your care team and make appointments. If you have questions, please call your primary care clinic.  If you do not have a primary care provider, please call [phone] and they will assist you.      [employer] is an electronic gateway that provides easy, online access to your medical records. With [employer], you can request a clinic appointment, read your test results, renew a prescription or communicate with your care team.     To access your existing account, please contact your [facility] or call [phone] for assistance.        Care EveryWhere ID     This is your Care EveryWhere ID. This could be used by other organizations to access your [facility] medical records  [id number]        Your Vitals Were     Pulse Temperature Height Pulse Oximetry Breastfeeding? BMI (Body Mass Index)    69 98  F (36.7  C) (Oral) 1.721 m (5' 7.75\") 98% No 34.37 kg/m2       Blood Pressure from Last 3 Encounters:   09/13/18 125/61   08/30/18 138/70 "   08/22/18 122/69    Weight from Last 3 Encounters:   09/13/18 101.8 kg (224 lb 6.4 oz)   08/30/18 95.9 kg (211 lb 6.7 oz)   08/21/18 97.3 kg (214 lb 8.1 oz)              Today, you had the following     No orders found for display         Today's Medication Changes          These changes are accurate as of 9/13/18  1:47 PM.  If you have any questions, ask your nurse or doctor.               These medicines have changed or have updated prescriptions.        Dose/Directions    amantadine 100 MG capsule   Commonly known as:  SYMMETREL   This may have changed:    - how much to take  - how to take this  - when to take this  - additional instructions   Used for:  Paralysis agitans (H)        1 capsule @ 7am and 4pm   Quantity:  180 capsule   Refills:  3       pramipexole 0.5 MG tablet   Commonly known as:  MIRAPEX   This may have changed:    - how much to take  - how to take this  - when to take this  - additional instructions   Used for:  Paralysis agitans (H)        2 tabs @ 9pm   Quantity:  180 tablet   Refills:  3                Primary Care Provider Office Phone # Fax #    Ondina Edmondson [phone]       [facility] [address] [address]        Equal Access to Services     AYLEEN GASCA AH: Mahamed munozo Somaryali, waaxda luqadaha, qaybta kaalmada adeegyada, naga olivo. So [facility] [phone].    ATENCIÓN: Si habla español, tiene a guerra disposición servicios gratuitos de asistencia lingüística. Llame al [phone].    We comply with applicable federal civil rights laws and [place] laws. We do not discriminate on the basis of race, color, national origin, age, disability, sex, sexual orientation, or gender identity.            Thank you!     Thank you for choosing [facility]  for your care. Our goal is always to provide you with excellent care. Hearing back from our patients is one way we can continue to improve our services.  Please take a few minutes to complete the written survey that you may receive in the mail after your visit with us. Thank you!             Your Updated Medication List - Protect others around you: Learn how to safely use, store and throw away your medicines at www.disposemymeds.org.          This list is accurate as of 9/13/18  1:47 PM.  Always use your most recent med list.                   Brand Name Dispense Instructions for use Diagnosis    amantadine 100 MG capsule    SYMMETREL    180 capsule    1 capsule @ 7am and 4pm    Paralysis agitans (H)       busPIRone 10 MG tablet    BUSPAR    180 tablet    Take 10 mg by mouth 2 times daily 1 Tab @ 7am and 1 tab @ 4pm    Paralysis agitans (H)       calcium carbonate 500 MG chewable tablet    TUMS     Take 2 chew tab by mouth as needed for heartburn        carbidopa-levodopa  MG per tablet    SINEMET    1170 tablet    Take 3 tablets by mouth 4 times daily 3 tabs @7, noon, 4p and 9pm and a few as needed = 13/day x 90 = 1170tablets    Paralysis agitans (H)       cyclobenzaprine 5 MG tablet    FLEXERIL    30 tablet    Take 1 tablet (5 mg) by mouth 3 times daily    S/P deep brain stimulator placement       doxylamine 25 MG Tabs tablet    UNISOM    14 each    Take 1 tablet (25 mg) by mouth nightly as needed        FLUoxetine 20 MG capsule    PROzac    90 capsule    Take 20 mg by mouth every morning Take 20mg capsule  @ 7am    Paralysis agitans (H)       hydrochlorothiazide 25 MG tablet    HYDRODIURIL     Take 25 mg by mouth At Bedtime @9 pm        LORazepam 1 MG tablet    ATIVAN    60 tablet    1 tab by mouth @ 9pm as needed    Paralysis agitans (H)       ondansetron 4 MG tablet    ZOFRAN    45 tablet    Take 1 tablet (4 mg) by mouth every 8 hours as needed for nausea    S/P deep brain stimulator placement       oxyCODONE IR 5 MG tablet    ROXICODONE    45 tablet    Take 1-2 tablets (5-10 mg) by mouth every 3 hours as needed for other (pain control or improvement in  physical function. Hold dose for analgesic side effects.)    Parkinson's disease (H)       pramipexole 0.5 MG tablet    MIRAPEX    180 tablet    2 tabs @ 9pm    Paralysis agitans (H)       senna 8.6 MG tablet    SENOKOT    30 tablet    Take 1 tablet by mouth daily    S/P deep brain stimulator placement       simvastatin 20 MG tablet    ZOCOR     Take 20 mg by mouth At Bedtime        TYLENOL PO      Take 1,000 mg by mouth every 8 hours as needed for mild pain or fever

## 2018-09-13 NOTE — PROGRESS NOTES
NEUROSURGERY WOUND CHECK  September 13, 2018    PROCEDURE 8/30/18 [place]  Placement Of Deep Brain Stimulator Generator/Battery Over The Right   IMPLANT:  1) Zavala Infinity 5 REF 6660 S/N [id number]  Explants:   1) [employer] REF 3383 Lot [id number]; 30 cm percutaneous extension wire     PROCEDURE [place] 8/21/18:    DIAGNOSIS:    1.  Parkinson's disease   2.  Left side stiffness and dyskinesia     PROCEDURES PERFORMED:   1.  Placement of CRW stereotactic headframe.   2.  Stereotactic neuronavigation planning and CT of head.   3.  Stereotactic neuronavigation using the [employer] system for surgical planning, targeting and approach. The target is right globus pallidus internus (GPi).  4.  Right side deep brain stimulator placement, phase I, placement of right side deep brain stimulator electrode, target is right globus pallidus internus (GPi).   5.  Use of intraoperative microelectrode recording.   6.  Use of intraoperative fluoroscopy.     COMPLICATIONS:  No surgical complication.  However, surgical bed in OR 20 began leaking what appeared to be hydraulic fluid after the first part of the surgery but before the second part of the surgery.  Decision was made to relocate to OR 14 to continue with surgical case, second part.  Patient did not suffer any immediate complications from this equipment failure.     FINDINGS:  Final electrode location, 1.5 mm X-Y stage shift laterally, then anterior Scott Gun position, 1.5 mm above target.     IMPLANTS:    1.  Zavala DBS electrode, Infinity 0.5, REF 6172ANS, S/N [id number].  2.  Abbott DBS extension wire, 60 cm, REF 6372, S/N [id number].  3.  Abbott Guardian maxwell hole cover, REF 6010, Lot# [id number].  4.  [employer] 30 cm percutaneous extension wire, REF 3383, Lot# [id number]     INDICATIONS FOR PROCEDURE:    Risks, benefits, alternative therapies were discussed with the patient, including but not limited to infection and bleeding. Surgical procedure was discussed in detail.  We also  "discussed at length the device options and reasons for recommending Abbott device.  All questions were answered, and she expressed understanding.    HPI:  Erika Diaz is a pleasant 58 year old female now 2-3 weeks status post the above procedure.  The procedure itself was without incident.  She recovered well and was discharged home in good condition.  Since then she is doing well, she removed her own stitches.  She has had no headaches, fevers, has a little tenderness over the right postauricular incision.  She is due to be activated on September 21.  She is ready to go back to work as of October 1.  She presents for routine post op visit    EXAM:  /61 (BP Location: Left arm, Patient Position: Chair, Cuff Size: Adult Large)  Pulse 69  Temp 98  F (36.7  C) (Oral)  Ht 1.721 m (5' 7.75\")  Wt 101.8 kg (224 lb 6.4 oz)  SpO2 98%  Breastfeeding? No  BMI 34.37 kg/m2  Well developed well nourished female found seated comfortably in exam chair.  No apparent distress. She is accompanied by her .  A&O X3.  Mood and affect WNL. Language and fund of knowledge intact.  Is able to sit and rise independently.   She has nicely healing incisions at all sites. Sutures are all out already.    ASSESSMENT/PLAN  1. Parkinson's disease (H)    2. Post-operative state      Doing well now 2 weeks sp Placement Of right-sided deep Brain Stimulator and Generator  RTW 10/1/18, no restrictions, forms are written.  Wounds looks great.  May swim or immerse wound when all scabbing has disappeared.  That is any time.  Continue activity restrictions until 1 month post op.  Follow up NS PRN.       It has been a pleasure looking after this very nice patient. We appreciate your confidence in the [facility], Department of Neurosurgery.    Venus Anthony PA-C  [facility]  Department of Neurosurgery  Phone: [phone]  Fax: [phone]      This note was generated using voice recognition software. While " edited for content some inaccurate phrasing may be found.

## 2018-09-13 NOTE — LETTER
9/13/2018       RE: Erika Diaz  [address] [place] [id number]     Dear Colleague,    Thank you for referring your patient, Erika Diaz, to the [facility] NEUROSURGERY at [facility]. Please see a copy of my visit note below.      NEUROSURGERY WOUND CHECK  September 13, 2018    PROCEDURE 8/30/18 [place]  Placement Of Deep Brain Stimulator Generator/Battery Over The Right   IMPLANT:  1) Zavala Infinity 5 REF 6660 S/N [id number]  Explants:   1) [employer] REF 3383 Lot [id number]; 30 cm percutaneous extension wire     PROCEDURE [place] 8/21/18:    DIAGNOSIS:    1.  Parkinson's disease   2.  Left side stiffness and dyskinesia     PROCEDURES PERFORMED:   1.  Placement of CRW stereotactic headframe.   2.  Stereotactic neuronavigation planning and CT of head.   3.  Stereotactic neuronavigation using the [employer] system for surgical planning, targeting and approach. The target is right globus pallidus internus (GPi).  4.  Right side deep brain stimulator placement, phase I, placement of right side deep brain stimulator electrode, target is right globus pallidus internus (GPi).   5.  Use of intraoperative microelectrode recording.   6.  Use of intraoperative fluoroscopy.     COMPLICATIONS:  No surgical complication.  However, surgical bed in OR 20 began leaking what appeared to be hydraulic fluid after the first part of the surgery but before the second part of the surgery.  Decision was made to relocate to OR 14 to continue with surgical case, second part.  Patient did not suffer any immediate complications from this equipment failure.     FINDINGS:  Final electrode location, 1.5 mm X-Y stage shift laterally, then anterior Scott Gun position, 1.5 mm above target.     IMPLANTS:    1.  Zavala DBS electrode, Infinity 0.5, REF 6172ANS, S/N [id number].  2.  Abbott DBS extension wire, 60 cm, REF 6372, S/N [id number].  3.  Abbott Guardian maxwell hole cover, REF 6010, Lot#  [id number].  4.  [employer] 30 cm percutaneous extension wire, REF 3383, Lot# [id number]     INDICATIONS FOR PROCEDURE:    Risks, benefits, alternative therapies were discussed with the patient, including but not limited to infection and bleeding. Surgical procedure was discussed in detail.  We also discussed at length the device options and reasons for recommending Abbott device.  All questions were answered, and she expressed understanding.    HPI:  Erika Diaz is a pleasant 58 year old female now 2-3 weeks status post the above procedure.  The procedure itself was without incident.  She recovered well and was discharged home in good condition.  Since then she is doing well, she removed her own stitches.  She has had no headaches, fevers, has a little tenderness over the right postauricular incision.  She is due to be activated on September 21.  She is ready to go back to work as of October 1.  She presents for routine post op visit    EXAM:  /61 (BP Location: Left arm, Patient Position: Chair, Cuff Size: Adult Large)  Pulse 69  Temp 98  F (36.7  C) (Oral)  Ht 1.721 m (5' 7.75\")  Wt 101.8 kg (224 lb 6.4 oz)  SpO2 98%  Breastfeeding? No  BMI 34.37 kg/m2  Well developed well nourished female found seated comfortably in exam chair.  No apparent distress. She is accompanied by her .  A&O X3.  Mood and affect WNL. Language and fund of knowledge intact.  Is able to sit and rise independently.   She has nicely healing incisions at all sites. Sutures are all out already.    ASSESSMENT/PLAN  1. Parkinson's disease (H)    2. Post-operative state      Doing well now 2 weeks sp Placement Of right-sided deep Brain Stimulator and Generator  RTW 10/1/18, no restrictions, forms are written.  Wounds looks great.  May swim or immerse wound when all scabbing has disappeared.  That is any time.  Continue activity restrictions until 1 month post op.  Follow up NS PRN.     It has been a pleasure looking after " this very nice patient. We appreciate your confidence in the [facility], Department of Neurosurgery.    Venus Anthony PA-C  [facility]  Department of Neurosurgery  Phone: [phone]  Fax: [phone]

## 2018-09-13 NOTE — NURSING NOTE
Chief Complaint   Patient presents with     RECHECK     [facility] RETURN 2WK POST OP       Mara Purdy, EMT

## 2018-09-17 NOTE — OP NOTE
PATIENT NAME: DELIA AQUINO  YOB: 1960  MRN:   [id number]  ACCOUNT:  [id number]      DATE OF PROCEDURE:  08/30/2018    PREOPERATIVE DIAGNOSIS:    1.  Parkinson's disease  2.  Left side stiffness, dyskinesia and tremor  3.  S/p right side deep brain stimulator placement, phase I, placement of right side deep brain stimulator electrode, target right globus pallidus internus, with microelectrode recording, on 8/21/2018.    POSTOPERATIVE DIAGNOSIS:    1.  Parkinson's disease  2.  Left side stiffness, dyskinesia and tremor  3.  S/p right side deep brain stimulator placement, phase I, placement of right side deep brain stimulator electrode, target right globus pallidus internus, with microelectrode recording, on 8/21/2018.    PROCEDURES PERFORMED:   1.  Deep brain stimulator placement, phase II, placement of new deep brain stimulator generator/battery, model Infinity 5, over right chest wall.    2.  Connection of the right side deep brain stimulator electrode, one electrode array, to the new generator/battery.  3.  Electrical interrogation and analysis of deep brain stimulator system.  4.  Removal of the externalization wire.    ATTENDING SURGEON:  Jerry Concepcion MD, PhD    RESIDENT SURGEON:  Kris Price MD    ANESTHESIA:  General anesthesia.     ESTIMATED BLOOD LOSS:  1 mL.    COMPLICATIONS:  None.     EXPLANTS:  [employer] 30 cm percutaneous extension wire, REF 3383, Lot# [id number].    IMPLANTS:  Abbott DBS generator/battery, Infinity 5, REF [id number], S/N [id number].     FINDINGS:  Normal impedance testing of the new device.  No problems found.    INDICATIONS FOR PROCEDURE:  Ms. Delia Aquino returns today for her ongoing Deep Brain Stimulation surgery.  She is s/p right side deep brain stimulator placement, phase I, placement of right side deep brain stimulator electrode placement, target right globus pallidus internus, with microelectrode recording on 8/21/2018.  She is also part of a clinical  research with [facility] so she had a modified phase I where extension wire has been placed already and additional extension wire, or externalization wire, was placed exiting out of her right side/flank area for externalized recording.  She tolerated the procedure, although it was long, and there were no complications other than issues with her OR bed which required switching the OR rooms.  She was discharged on POD#1 and her postoperative CT head was without any concerning features.  She did go to her PCP few days after her surgery when her dressing came off due to excessive sweating.  Infection workup was negative.  Her externalization wire was redressed and has remained on.  She denies any fevers, chills, nausea, vomiting, dizziness, weakness, numbness or seizure like activity.  Other than the dressing coming off, she did not have any issues with her wounds and they are all healing well.  She is on oral antibiotics.  She also participated in our research for the past two days.  Everything went well.  Briefly, she is a 58 year old woman with a 9-10 year history of Parkinson s disease, being diagnosed in [id number].  She first developed stiffness in her left hand and shoulder that has since progressed to include tremors.  Her symptoms are worse on the left-side.  Her goals with Deep Brain Stimulation are to gain tremor control and reduce dyskinesias.  She wants to feel better, be more normal without taking the medications, improve wearing off stiffness, difficulty walking and foggy thinking.  Medication reduction is important.  She does have regular sessions with a chiropractor to relieve tension in her neck.  She underwent DBS candidacy evaluation.  She was recommended right side GPi DBS and wait and see strategy for the second side.  She also agreed to be a participant in the [facility] research protocol.  Patient returns for her phase II surgery.  She has done well with phase I and the interim research  sessions.  There are no concerns for infection or complications at this time.  We discussed phase II of DBS surgery, placement of the DBS generator/battery over the right chest wall MAC.  Since she already has the tunneled extension wire, we will only need to remove the externalization wire and place the new generator/battery after making the proper connection.  Risks, benefits, alternative therapies were discussed with the patient, including but not limited to infection and bleeding, stroke, death, hardware failure and possible need for more surgeries.  Surgical procedure was discussed in detail.  She expressed understanding and all questions were answered.     DESCRIPTION OF PROCEDURE:  The patient was taken to the operating theater and positioned supine on the operating room table.  All pressure points were appropriately padded.  Initially MAC was considered.  However, when the patient was sedated, her O2 saturation could not be maintained at a good level.  Therefore, the decision was made to place an LMA and general anesthesia was obtained.  She remained supine on the operative bed.  Attention was first turned to her right lower chest area and the dressing for the externalization wire.  The dressing was removed, thus revealing the externalization wire exiting out of the skin.  The 4-0 Monocryl suture anchoring the wire was cut and removed.  The externalization wire was tagged with a hemostat.  We cleaned and prepped her right chest wall, her previous incision.  Dermabond was removed and cleaned.  Then, her right chest wall area, location of her previous incision, was cleaned and prepped and draped in routine surgical fashion.  The right lateral chest wall area where the wires are exiting out was also prepped but was draped out with the hemostat under the drapes.  The circulating nurse was notified of the location of the hemostat and was instructed to pull it when given the go ahead.  Time out was then performed  confirming the patient's identity, procedure to be performed, site and side of the surgery, imaging corresponding with the patient and the administration of preoperative prophylactic antibiotic.     The areas of intended incision, right chest wall incision, was then injected with local anesthetic. Total of 10 mL of 1% Lidocaine with epinephrine, 1:100,000, was used for the case.     Attention was first turned to the patient's right chest wall incision.  Using a Metzenbaum scissors and blunt dissection technique, the previous incision was opened by cutting and removing the sutures.  The wound was opened up further.  The layer protecting the hardware or wire was also opened carefully to expose the hardware.  Some serous fluid was seen.  The area did not appear to be infected.  No signs of infection was noted.  The area was generously irrigated and the wire was carefully uncoiled out of the wound.    The one connector for the externalization wire was identified.  This was pulled out slightly from the deep pocket.  Then, under direct vision, the connector was cut at the distal end towards the exiting portion of the wire.  Then, the circulating nurse was instructed to pull the wire and the hemostat that was tagging the wire from below the drapes.  The wire was pulled without any difficulties.  The cut wire was visualized to confirm that all of the wire was removed.  Then the remaining connector was  from the extension wire.  The cut connector was removed.  Therefore, the externalization wire was removed entirely.  Only the extension wire connected to the right intracranial electrode remained.    At this time, a new Infinity 5 generator/battery was brought onto the field.  The extension wire was then cleaned at its contacts and inserted into the generator/battery portal.  The extension wire for the right sided implant was placed into the back connector portal.  A plug was placed in the front connector portal.   These were secured with a screwdriver.  The pocket was inspected to be clean and no active bleeding was noted.  Then, the new generator/battery with the excess wire being placed posterior to the generator/battery was placed within the right chest wall pocket.  The entire apparatus fit into the pocket comfortably.  The generator/battery was anchored to the pocket using two 2-0 Ethibond sutures.  Therefore, one electrode array was connected to the generator/battery.    With proper placement of the connection of the deep brain stimulator system, wireless interrogation and analysis was performed.  All the impedance values were noted to be within normal.  No problems were found.      With the satisfactory placement of the new system, we began closing the wound.  The right chest incision was closed in the following manner.  The deep pocket was reapproximated using 3-0 Vicryl sutures in an inverted interrupted fashion.  The subcutaneous fat layer was reapproximated using 3-0 Vicryl sutures in an inverted interrupted fashion.  The dermal layer was reapproximated using 3-0 Vicryl sutures in an inverted interrupted fashion.  The skin was reapproximated using 4-0 Monocryl suture in a subcuticular fashion.  The wound was cleaned and dried.  ChoraPrep was used to clean the incision again.  Then, Dermabond was applied.    The drapes were taken down.  The right lower chest wire exit site was cleaned and dried.  Antibiotic ointment was applied.  Dry sterile dressing was placed.    We again performed wireless interrogation and analysis of the entire deep brain stimulator system.  All impedances were noted to be within normal and no problems were found.      At the end of the case, all counts including needle, sponge and instrument counts were correct x 2.  There were no complications.  Patient's LMA was removed and taken to the recovery room in a stable condition.    Jerry KC M.D., Ph.D., Neurosurgery Attending, was  present and scrubbed for the entire case and performed the key and critical portions of the case.      SHERRI ALBRIGHT MD, PHD

## 2018-09-21 ENCOUNTER — RADIANT APPOINTMENT (OUTPATIENT)
Dept: CT IMAGING | Facility: CLINIC | Age: 58
End: 2018-09-21
Attending: NURSE PRACTITIONER
Payer: MEDICARE

## 2018-09-21 ENCOUNTER — APPOINTMENT (OUTPATIENT)
Dept: NEUROLOGY | Facility: CLINIC | Age: 58
End: 2018-09-21
Payer: MEDICARE

## 2018-09-21 DIAGNOSIS — Z98.890 POST-OPERATIVE STATE: ICD-10-CM

## 2018-09-25 ENCOUNTER — OFFICE VISIT (OUTPATIENT)
Dept: NEUROLOGY | Facility: CLINIC | Age: 58
End: 2018-09-25
Payer: MEDICARE

## 2018-09-25 VITALS
HEART RATE: 64 BPM | HEIGHT: 68 IN | RESPIRATION RATE: 16 BRPM | WEIGHT: 223.4 LBS | BODY MASS INDEX: 33.86 KG/M2 | SYSTOLIC BLOOD PRESSURE: 142 MMHG | TEMPERATURE: 97.6 F | OXYGEN SATURATION: 96 % | DIASTOLIC BLOOD PRESSURE: 83 MMHG

## 2018-09-25 DIAGNOSIS — G20.A1 PARKINSON'S DISEASE (H): Primary | ICD-10-CM

## 2018-09-25 ASSESSMENT — UNIFIED PARKINSONS DISEASE RATING SCALE (UPDRS)
RIGIDITY_NECK: NORMAL
AMPLITUDE_RUE: NORMAL: NO TREMOR.
RIGIDITY_LLE: SLIGHT: RIGIDITY ONLY DETECTED WITH ACTIVATION MANEUVER.
FINGER_TAPPING_RIGHT: SLIGHT: ANY OF THE FOLLOWING: A) THE REGULAR RHYTHM IS BROKEN WITH ONE WITH ONE OR TWO INTERRUPTIONS OR HESITATIONS OF THE MOVEMENT B) SLIGHT SLOWING C) THE AMPLITUDE DECREMENTS NEAR THE END OF THE 10 MOVEMENTS.
TOTAL_SCORE_LEFT: 12
RIGIDITY_NECK: SLIGHT: RIGIDITY ONLY DETECTED WITH ACTIVATION MANEUVER.
ARISING_CHAIR: SLIGHT: ARISING IS SLOWER THAN NORMAL, OR MAY NEED MORE THAN ONE ATTEMPT, OR MAY NEED TO MOVE FORWARD IN THE CHAIR TO ARISE.  NO NEED TO USE THE ARMS OF THE CHAIR.
LEG_AGILITY_LEFT: NORMAL
RIGIDITY_RUE: NORMAL
AMPLITUDE_RLE: NORMAL: NO TREMOR.
GAIT: SLIGHT: INDEPENDENT WALKING WITH MINOR GAIT IMPAIRMENT.
POSTURE: 1 SLIGHT.  NOT QUITE ERECT BUT COULD BE NORMAL FOR OLDER PERSONS.
PARKINSONS_MEDS: OFF
AMPLITUDE_LUE: NORMAL: NO TREMOR.
AXIAL_SCORE: 11
PRONATION_SUPINATION_LEFT: SLIGHT: ANY OF THE FOLLOWING: A) THE REGULAR RHYTHM IS BROKEN WITH ONE WITH ONE OR TWO INTERRUPTIONS OR HESITATIONS OF THE MOVEMENT B) SLIGHT SLOWING C) THE AMPLITUDE DECREMENTS NEAR THE END OF THE 10 MOVEMENTS.
RIGIDITY_RLE: SLIGHT: RIGIDITY ONLY DETECTED WITH ACTIVATION MANEUVER.
RIGIDITY_RLE: SLIGHT: RIGIDITY ONLY DETECTED WITH ACTIVATION MANEUVER.
SPONTANEITY_OF_MOVEMENT: 2: MILD: MILD GLOBAL SLOWNESS AND POVERTY OF SPONTANEOUS MOVEMENTS.
POSTURAL_STABILITY: SLIGHT: 3-5 STEPS, BUT RECOVERS UNAIDED.
AXIAL_SCORE: 12
SPONTANEITY_OF_MOVEMENT: 1: SLIGHT: SLIGHT GLOBAL SLOWNESS AND POVERTY OF SPONTANEOUS MOVEMENTS.
POSTURE: 1 SLIGHT.  NOT QUITE ERECT BUT COULD BE NORMAL FOR OLDER PERSONS.
GAIT: SLIGHT: INDEPENDENT WALKING WITH MINOR GAIT IMPAIRMENT.
PARKINSONS_MEDS: OFF
AMPLITUDE_LLE: NORMAL: NO TREMOR.
RIGIDITY_RLE: NORMAL
RIGIDITY_LUE: SLIGHT: RIGIDITY ONLY DETECTED WITH ACTIVATION MANEUVER.
SPONTANEITY_OF_MOVEMENT: 2: MILD: MILD GLOBAL SLOWNESS AND POVERTY OF SPONTANEOUS MOVEMENTS.
TOTAL_SCORE: 8
TOETAPPING_RIGHT: NORMAL
FINGER_TAPPING_LEFT: MILD: ANY OF THE FOLLOWING: A) 3 TO 5 INTERRUPTIONS DURING TAPPING B) MILD SLOWING C) THE AMPLITUDE DECREMENTS MIDWAY IN THE 10-MOVEMENT SEQUENCE
FINGER_TAPPING_RIGHT: SLIGHT: ANY OF THE FOLLOWING: A) THE REGULAR RHYTHM IS BROKEN WITH ONE WITH ONE OR TWO INTERRUPTIONS OR HESITATIONS OF THE MOVEMENT B) SLIGHT SLOWING C) THE AMPLITUDE DECREMENTS NEAR THE END OF THE 10 MOVEMENTS.
AMPLITUDE_RUE: NORMAL: NO TREMOR.
HANDMOVEMENTS_LEFT: SLIGHT: ANY OF THE FOLLOWING: A) THE REGULAR RHYTHM IS BROKEN WITH ONE WITH ONE OR TWO INTERRUPTIONS OR HESITATIONS OF THE MOVEMENT B) SLIGHT SLOWING C) THE AMPLITUDE DECREMENTS NEAR THE END OF THE 10 MOVEMENTS.
AXIAL_SCORE: 9
TOTAL_SCORE_LEFT: 7
POSTURAL_STABILITY: NORMAL:  RECOVERS WITH ONE OR TWO STEPS.
LEG_AGILITY_RIGHT: NORMAL
PRONATION_SUPINATION_LEFT: SLIGHT: ANY OF THE FOLLOWING: A) THE REGULAR RHYTHM IS BROKEN WITH ONE WITH ONE OR TWO INTERRUPTIONS OR HESITATIONS OF THE MOVEMENT B) SLIGHT SLOWING C) THE AMPLITUDE DECREMENTS NEAR THE END OF THE 10 MOVEMENTS.
TOTAL_SCORE: 3
RIGIDITY_RUE: SLIGHT: RIGIDITY ONLY DETECTED WITH ACTIVATION MANEUVER.
AMPLITUDE_LIP_JAW: NORMAL: NO TREMOR.
TOETAPPING_LEFT: NORMAL
PRONATION_SUPINATION_RIGHT: SLIGHT: ANY OF THE FOLLOWING: A) THE REGULAR RHYTHM IS BROKEN WITH ONE WITH ONE OR TWO INTERRUPTIONS OR HESITATIONS OF THE MOVEMENT B) SLIGHT SLOWING C) THE AMPLITUDE DECREMENTS NEAR THE END OF THE 10 MOVEMENTS.
LEG_AGILITY_RIGHT: SLIGHT: ANY OF THE FOLLOWING: A) THE REGULAR RHYTHM IS BROKEN WITH ONE WITH ONE OR TWO INTERRUPTIONS OR HESITATIONS OF THE MOVEMENT B) SLIGHT SLOWING C) THE AMPLITUDE DECREMENTS NEAR THE END OF THE 10 MOVEMENTS.
RIGIDITY_NECK: SLIGHT: RIGIDITY ONLY DETECTED WITH ACTIVATION MANEUVER.
FACIAL_EXPRESSION: MODERATE: MASKED FACIES WITH LIPS PARTED SOME OF THE TIME WHEN THE MOUTH IS AT REST.
TOTAL_SCORE: 6
SPEECH: MILD: LOSS OF MODULATION, DICTION OR VOLUME, WITH A FEW WORDS UNCLEAR, BUT THE OVERALL SENTENCES EASY TO FOLLOW.
AMPLITUDE_LLE: NORMAL: NO TREMOR.
AMPLITUDE_LLE: NORMAL: NO TREMOR.
CONSTANCY_TREMOR_ATREST: NORMAL: NO TREMOR.
LEG_AGILITY_LEFT: NORMAL
POSTURAL_STABILITY: NORMAL:  RECOVERS WITH ONE OR TWO STEPS.
AMPLITUDE_LUE: NORMAL: NO TREMOR.
TOETAPPING_LEFT: NORMAL
RIGIDITY_LUE: SLIGHT: RIGIDITY ONLY DETECTED WITH ACTIVATION MANEUVER.
PRONATION_SUPINATION_RIGHT: SLIGHT: ANY OF THE FOLLOWING: A) THE REGULAR RHYTHM IS BROKEN WITH ONE WITH ONE OR TWO INTERRUPTIONS OR HESITATIONS OF THE MOVEMENT B) SLIGHT SLOWING C) THE AMPLITUDE DECREMENTS NEAR THE END OF THE 10 MOVEMENTS.
HANDMOVEMENTS_LEFT: SLIGHT: ANY OF THE FOLLOWING: A) THE REGULAR RHYTHM IS BROKEN WITH ONE WITH ONE OR TWO INTERRUPTIONS OR HESITATIONS OF THE MOVEMENT B) SLIGHT SLOWING C) THE AMPLITUDE DECREMENTS NEAR THE END OF THE 10 MOVEMENTS.
PRONATION_SUPINATION_RIGHT: SLIGHT: ANY OF THE FOLLOWING: A) THE REGULAR RHYTHM IS BROKEN WITH ONE WITH ONE OR TWO INTERRUPTIONS OR HESITATIONS OF THE MOVEMENT B) SLIGHT SLOWING C) THE AMPLITUDE DECREMENTS NEAR THE END OF THE 10 MOVEMENTS.
FINGER_TAPPING_LEFT: MODERATE: ANY OF THE FOLLOWING:  A) MORE THAN 5 INTERRUPTIONS  OR AT LEAST ONE LONGER ARREST (FREEZE) IN ONGOING MOVEMENT  B) MODERATE SLOWING C) THE AMPLITUDE DECREMENTS STARTING AFTER THE FIRST MOVEMENT.
TOETAPPING_LEFT: NORMAL
POSTURE: 1 SLIGHT.  NOT QUITE ERECT BUT COULD BE NORMAL FOR OLDER PERSONS.
FINGER_TAPPING_RIGHT: SLIGHT: ANY OF THE FOLLOWING: A) THE REGULAR RHYTHM IS BROKEN WITH ONE WITH ONE OR TWO INTERRUPTIONS OR HESITATIONS OF THE MOVEMENT B) SLIGHT SLOWING C) THE AMPLITUDE DECREMENTS NEAR THE END OF THE 10 MOVEMENTS.
RIGIDITY_RUE: NORMAL
AMPLITUDE_LIP_JAW: NORMAL: NO TREMOR.
RIGIDITY_LUE: NORMAL
FINGER_TAPPING_LEFT: MILD: ANY OF THE FOLLOWING: A) 3 TO 5 INTERRUPTIONS DURING TAPPING B) MILD SLOWING C) THE AMPLITUDE DECREMENTS MIDWAY IN THE 10-MOVEMENT SEQUENCE
FACIAL_EXPRESSION: MODERATE: MASKED FACIES WITH LIPS PARTED SOME OF THE TIME WHEN THE MOUTH IS AT REST.
LEG_AGILITY_LEFT: SLIGHT: ANY OF THE FOLLOWING: A) THE REGULAR RHYTHM IS BROKEN WITH ONE WITH ONE OR TWO INTERRUPTIONS OR HESITATIONS OF THE MOVEMENT B) SLIGHT SLOWING C) THE AMPLITUDE DECREMENTS NEAR THE END OF THE 10 MOVEMENTS.
GAIT: SLIGHT: INDEPENDENT WALKING WITH MINOR GAIT IMPAIRMENT.
PARKINSONS_MEDS: ON
AMPLITUDE_RUE: NORMAL: NO TREMOR.
RIGIDITY_LLE: NORMAL
FREEZING_GAIT: NORMAL
AMPLITUDE_LUE: NORMAL: NO TREMOR.
HANDMOVEMENTS_RIGHT: SLIGHT: ANY OF THE FOLLOWING: A) THE REGULAR RHYTHM IS BROKEN WITH ONE WITH ONE OR TWO INTERRUPTIONS OR HESITATIONS OF THE MOVEMENT B) SLIGHT SLOWING C) THE AMPLITUDE DECREMENTS NEAR THE END OF THE 10 MOVEMENTS.
TOETAPPING_RIGHT: NORMAL
CONSTANCY_TREMOR_ATREST: NORMAL: NO TREMOR.
HANDMOVEMENTS_RIGHT: SLIGHT: ANY OF THE FOLLOWING: A) THE REGULAR RHYTHM IS BROKEN WITH ONE WITH ONE OR TWO INTERRUPTIONS OR HESITATIONS OF THE MOVEMENT B) SLIGHT SLOWING C) THE AMPLITUDE DECREMENTS NEAR THE END OF THE 10 MOVEMENTS.
ARISING_CHAIR: SLIGHT: ARISING IS SLOWER THAN NORMAL, OR MAY NEED MORE THAN ONE ATTEMPT, OR MAY NEED TO MOVE FORWARD IN THE CHAIR TO ARISE.  NO NEED TO USE THE ARMS OF THE CHAIR.
FREEZING_GAIT: NORMAL
TOETAPPING_RIGHT: NORMAL
AMPLITUDE_LIP_JAW: NORMAL: NO TREMOR.
HANDMOVEMENTS_RIGHT: SLIGHT: ANY OF THE FOLLOWING: A) THE REGULAR RHYTHM IS BROKEN WITH ONE WITH ONE OR TWO INTERRUPTIONS OR HESITATIONS OF THE MOVEMENT B) SLIGHT SLOWING C) THE AMPLITUDE DECREMENTS NEAR THE END OF THE 10 MOVEMENTS.
FREEZING_GAIT: NORMAL
SPEECH: MILD: LOSS OF MODULATION, DICTION OR VOLUME, WITH A FEW WORDS UNCLEAR, BUT THE OVERALL SENTENCES EASY TO FOLLOW.
ARISING_CHAIR: SLIGHT: ARISING IS SLOWER THAN NORMAL, OR MAY NEED MORE THAN ONE ATTEMPT, OR MAY NEED TO MOVE FORWARD IN THE CHAIR TO ARISE.  NO NEED TO USE THE ARMS OF THE CHAIR.
AMPLITUDE_RLE: NORMAL: NO TREMOR.
AMPLITUDE_RLE: NORMAL: NO TREMOR.
FACIAL_EXPRESSION: MODERATE: MASKED FACIES WITH LIPS PARTED SOME OF THE TIME WHEN THE MOUTH IS AT REST.
TOTAL_SCORE: 17
CONSTANCY_TREMOR_ATREST: NORMAL: NO TREMOR.
TOTAL_SCORE_LEFT: 5
HANDMOVEMENTS_LEFT: MODERATE: ANY OF THE FOLLOWING:  A) MORE THAN 5 INTERRUPTIONS  OR AT LEAST ONE LONGER ARREST (FREEZE) IN ONGOING MOVEMENT  B) MODERATE SLOWING C) THE AMPLITUDE DECREMENTS STARTING AFTER THE FIRST MOVEMENT.
TOTAL_SCORE: 31
TOTAL_SCORE: 25
RIGIDITY_LLE: NORMAL
LEG_AGILITY_RIGHT: NORMAL
SPEECH: MILD: LOSS OF MODULATION, DICTION OR VOLUME, WITH A FEW WORDS UNCLEAR, BUT THE OVERALL SENTENCES EASY TO FOLLOW.
PRONATION_SUPINATION_LEFT: SLIGHT: ANY OF THE FOLLOWING: A) THE REGULAR RHYTHM IS BROKEN WITH ONE WITH ONE OR TWO INTERRUPTIONS OR HESITATIONS OF THE MOVEMENT B) SLIGHT SLOWING C) THE AMPLITUDE DECREMENTS NEAR THE END OF THE 10 MOVEMENTS.

## 2018-09-25 ASSESSMENT — PAIN SCALES - GENERAL: PAINLEVEL: NO PAIN (0)

## 2018-09-25 NOTE — Clinical Note
"2018       RE: Erika Diaz  [address] [place] [id number]     Dear Colleague,    Thank you for referring your patient, Erika Diaz, to the [facility] NEUROLOGY at [facility]. Please see a copy of my visit note below.    PATIENT: Erika Diaz    : 1960    GINNY: 2018    REASON FOR VISIT:  Right hemisphere monopolar review & initial deep brain stimulation (DBS) programming for Parkinson's disease.     HPI:  Ms. Erika Diaz is a 58 year old right - handed  female who came to the [facility] neurology clinic accompanied by her  for DBS initial programming.  Shanice Contreras RN research coordinator is present.  She underwent Right Globus Pallidus Internus (Gpi) DBS lead placement on 2018 and right chest infraclavicular Infinity 5 generator placement on 2018 by Dr. Concepcion.     Lesion effect:  She had she report tremor I proved , walking better, people able to understand speech better, & mood was good until ,     Pre-programming antiparkinsonian medications:     PD Medications 7 am 12 pm 4 pm 9 pm   Sinemet 25/100 mg  3 3 3 3   Amantadine 100 mg 1  1    Pramipexole 0.5 mg    2       Last antiparkinsonian dose taken:    Amantadine:  at 4 pm  Pramipexole:  at 9:30 pm  Carbidopa/levodopa:  at 4 pm      PHYSICAL EXAM:  Vital Signs:  Blood pressure 142/83, pulse 64, temperature 97.6  F (36.4  C), temperature source Oral, resp. rate 16, height 1.721 m (5' 7.75\"), weight 101.3 kg (223 lb 6.4 oz), SpO2 96 %, not currently breastfeeding. Body mass index is 34.22 kg/(m^2).    General:  Ms. Diaz is a pleasant  female who is well-groomed, well-developed and overweight sitting comfortably in the exam room without any distress.  Affect is appropriate.    Incision Site:  Right scalp 2 areas & Rt chest has healed nicely.     MDS Part II  -- Over the last week -- 0: Normal -- 1: Slight -- 2: Mild -- 3: Moderate " -- 4: Severe     2.1 Speech: 3   2.2 Saliva and droolin   2.3 Chewing and swallowin   2.4 Eating tasks: 1   2.5 Dressin   2.6 Hygiene:  0   2.7 Handwritin   2.8 Doing hobbies and other activities:  0   2.9 Turning in bed:  0   2.10 Tremor:  2   2.11 Getting out of bed/car/deep chair:   1   2.12 Walking and balance: 0   2.13 Freezin     Total:    10         MOVEMENT DISORDERS ASSESSMENT:  UPDRS 3   UPDRS Values 2018   Time: 7:24 AM 9:18 AM 10:22 AM   Medication Off Off On   R Brain DBS: Off On On   L Brain DBS: None None None   Speech 2 2 2   Facial Expression 3 3 3   Rigidity Neck 1 1 0   Rigidity RUE 1 0 0   Rigidity LUE 1 1 0   Rigidity RLE 1 1 0   Rigidity LLE 1 0 0   Finger Taps R 1 1 1   Finger Taps L 3 2 2   Hand Mvt R 1 1 1   Hand Mvt L 3 1 1   Pron-/Supinate R 1 1 1   Pron-/Supinate L 1 1 1   Toe Tap R 0 0 0   Toe Tap L 0 0 0   Leg Agility R 1 0 0   Leg Agility L 1 0 0   Arise From Chair 1 1 1   Gait 1 1 1   Gait Freezing 0 0 0   Postural Stability 0 1 0   Posture 1 1 1   Global Spont Mvt 2 2 1   Postural Tremor RUE 1 1 0   Postural Tremor LUE 1 1 0   Kinetic Tremor RUE 1 1 0   Kinetic Tremor LUE 1 1 1   Rest Tremor RUE 0 0 0   Rest Tremor LUE 0 0 0   Rest Tremor RLE 0 0 0   Rest Tremor LLE 0 0 0   Rest Tremor Lip/Jaw 0 0 0   Rest Tremor Constancy 0 0 0   Total Right 8 6 3   Total Left 12 7 5   Axial Total 11 12 9   Total 31 25 17          IMPLANTS:    1.  Zavala DBS electrode, Infinity 0.5, REF [id number], S/N [id number].  2.  Abbott DBS extension wire, 60 cm, REF 6372, S/N [id number].  3.  Abbott Guardian maxwell hole cover, REF 6010, Lot# [id number].  4.  [employer] 30 cm percutaneous extension wire, REF 3383, Lot# [id number]    DBS Interrogation & Analysis:    Implanted generator:  Right chest [employer] Infinity 5  Lead placement:  Right Globus Pallidus Interus (Gpi).      Right Brain  Therapy On:  0%  Battery Service Life:  OK.  *** volts.      Electrode Impedance Check:   Normal.    See scanned report for impedance details.    Clinical Core Programming Form    Please complete upon monopolar review and any subsequent  mini-monopolar reviews   Please avoid extra spaces at ends of cells.    Generator Serial Number  ex: [id number] Lead Hemisphere  ex: Left or Right Lead Region  ex: STN, GPi, or thalamus Programmers Initials  ex: AS, SC   [id number] Right GPi TO         Ramp = 8 sec    Right Hemisphere    Contacts  Cpos, 2abcneg Amp  0.5 PW   msec Rate  hertz Assessment **No blank Cells**  -NA (not assessed)  -NC (no change from setting above)  -NN (none noted)   Cpos,9neg 0.0 mA 60 130 Finger tapping = 3.  Hand opening & closing = 3.  Rigidity = 1.  NN    1.0   No change.  NN    2.0   Finger tapping = 3.  Hand opening & closing = 2.  Rigidity = 1.  NN    2.5   NC. NN    3.0   Hand opening closing = 1.  Very subtle rigidity in wrist.  NN    3.5   No change.  NN    4.0   Finger tapping = 3.  Hand opening & closing = 1.  Rigidity = 1. NN    4.5   NC. NN    5.0    Left lower eyelid twitching & Left jaw tightness.    Cpos, 10abcneg 0.0 mA 60 130 Finger tapping = 4.  Hand opening & closing = 3.  Rigidity = 2.  NN    1.0   No change.  NN    2.0   Finger tapping = 3.  Hand opening & closing = 2.  Rigidity = 2.  NN    2.5   NC. NN    3.0   Finger tapping = 2.  Hand opening & closing = 1.  Rigidity = 1.  NN    3.5   Slightly more improvement in hand movements. NN    4.0    Left lower eyelid twitching.   Cpos, 11abcneg 0.0 mA 60 130 Finger tapping = 4.  Hand opening & closing = 3.  Rigidity = 2.     1.0   No change.      2.0   Finger tapping = 3.  Hand opening & closing = 2.  Rigidity = 2.      2.5   Finger tapping = 2.  Hand opening & closing = 1. Hand pronation & supination = 1. Rigidity = 1.     3.0    Twitching around mouth.     3.5    Blurred vision.    Cpos,12neg 0.0 60 130 Finger tapping = 4.  Hand opening & closing = 3. Hand pronation & supination = 2. Rigidity = 2.     1.0   NC.      2.0   Finger  tapping = 4.  Hand opening & closing = 2. Hand pronation & supination = 1. Rigidity = 2. Twitching around mouth that subsided.    2.5   No change.  Twiching around mouth.  Pt felt tightness in Lt eye. Slight left jaw tightness.            MONOPOLAR REVIEW          __  Monopolar survey showed partial improvement in fine hand coordinations and rigidity.  The dorsal contact showed low threshold.  Based on the monopolar review, patient was programmed as below: -       Right Gpi  C +, 10abc -  Current:  3.0 mA  PW:  60 msec  Rate:  130 Hz    Therapy impedance:  1250 Ohms     Patient :  Upper Limit: 3.0 volts  Lower Limit: 0.0 volts       __  Pt took Sinemet 25/100 mg 3 pills; Amantadine 100 mg at 9:30 am.  She waited *** minutes.  No side effects.    __ Went over patient controller with pt & her .  They were shown how to turn DBS on & off and adjust voltage.  Pt was able to demonstrate independently.    __  Instructed to stay on the same antiparkinsonian medication for now.      __ Instructed to call if there is any side effect from today's programming or worsening symptoms.     __ Will return for DBS programming follow up in 1 month; in 6 months for OFF/ON DBS & Med Motor assessment & videotaping.     The total amount of time spent with patient in DBS programming was *** minutes.     GUILLERMINA Krueger, CNP  [facility]     10:45 AM      Again, thank you for allowing me to participate in the care of your patient.      Sincerely,    UGILLERMINA Valero CNP

## 2018-09-25 NOTE — NURSING NOTE
PT IS TAKING ALL HER MEDICATIONS, BUT SHE IS ONLY ON TYLENOL FOR THIS APPOINTMENT.    Chief Complaint   Patient presents with     DBS Programming     [facility] RETURN INITIAL PROGRAMMING FOR DBS     Jd Garcia EMT

## 2018-09-25 NOTE — MR AVS SNAPSHOT
After Visit Summary   9/25/2018    Erika Diaz    MRN: [id number]           Patient Information     Date Of Birth          1960        Visit Information        Provider Department      9/25/2018 7:00 AM Arminda Rivas APRN CNP [facility] Neurology        Care Instructions    September 25, 2018    Dear Ms. Erika Diaz,    Thank you for coming today.  During your visit, we have discussed the following:     __ Your Right brain (Left body) DBS electrical system function (impedance) is normal. The battery life is also good.    __ Please call if your symptoms worsen or you experience side effects.  You can also reduce the current.    __ stay on the same antiparkinsonian medication.    __  Send me a UrbanSitter message in 2 weeks & let me know how you're doing.    __ For your subsequent DBS programming visits, come ON medication.     To contact our clinic, you may call [phone] or use UrbanSitter message.     It's good to see you!  I'll see you on Nov 1st at 7 am.      GUILLERMINA Krueger CNP  [facility]              Follow-ups after your visit        Your next 10 appointments already scheduled     Nov 01, 2018  7:00 AM CDT   (Arrive by 6:45 AM)   Return Visit with GUILLERMINA Vallecillo CNP   [facility] Neurology ([facility])    [address] [place] [id number] [phone]              Who to contact     Please call your clinic at [phone] to:    Ask questions about your health    Make or cancel appointments    Discuss your medicines    Learn about your test results    Speak to your doctor            Additional Information About Your Visit        MyChart Information     UrbanSitter gives you secure access to your electronic health record. If you see a primary care provider, you can also send messages to your care team and make appointments. If you have questions, please call your primary care clinic.  If you do not have a  "primary care provider, please call [phone] and they will assist you.      [employer] is an electronic gateway that provides easy, online access to your medical records. With [employer], you can request a clinic appointment, read your test results, renew a prescription or communicate with your care team.     To access your existing account, please contact your [facility] or call [phone] for assistance.        Care EveryWhere ID     This is your Care EveryWhere ID. This could be used by other organizations to access your [place] medical records  [id number]        Your Vitals Were     Pulse Temperature Respirations Height Pulse Oximetry Breastfeeding?    64 97.6  F (36.4  C) (Oral) 16 1.721 m (5' 7.75\") 96% No    BMI (Body Mass Index)                   34.22 kg/m2            Blood Pressure from Last 3 Encounters:   09/25/18 142/83   09/21/18 131/62   09/13/18 125/61    Weight from Last 3 Encounters:   09/25/18 101.3 kg (223 lb 6.4 oz)   09/21/18 98.7 kg (217 lb 9.6 oz)   09/13/18 101.8 kg (224 lb 6.4 oz)              Today, you had the following     No orders found for display         Today's Medication Changes          These changes are accurate as of 9/25/18 10:40 AM.  If you have any questions, ask your nurse or doctor.               These medicines have changed or have updated prescriptions.        Dose/Directions    amantadine 100 MG capsule   Commonly known as:  SYMMETREL   This may have changed:    - how much to take  - how to take this  - when to take this  - additional instructions   Used for:  Paralysis agitans (H)        1 capsule @ 7am and 4pm   Quantity:  180 capsule   Refills:  3       pramipexole 0.5 MG tablet   Commonly known as:  MIRAPEX   This may have changed:    - how much to take  - how to take this  - when to take this  - additional instructions   Used for:  Paralysis agitans (H)        2 tabs @ 9pm   Quantity:  180 tablet   Refills:  3         Stop taking " these medicines if you haven't already. Please contact your care team if you have questions.     simvastatin 20 MG tablet   Commonly known as:  ZOCOR   Stopped by:  Arminda Rivas APRN CNP                    Primary Care Provider Office Phone # Fax #    Ondina Edmondson [phone]       Critical access hospital [address] [facility] [address]        Equal Access to Services     [facility]: Hadii aad ku hadasho Soomaali, waaxda luqadaha, qaybta kaalmada adeegyada, waxay idiin hayaan adeeg khomidsh la'aan . So [facility] [phone].    ATENCIÓN: Si habla español, tiene a guerra disposición servicios gratuitos de asistencia lingüística. [facility] [phone].    We comply with applicable federal civil rights laws and [place] laws. We do not discriminate on the basis of race, color, national origin, age, disability, sex, sexual orientation, or gender identity.            Thank you!     Thank you for choosing [facility] NEUROLOGY  for your care. Our goal is always to provide you with excellent care. Hearing back from our patients is one way we can continue to improve our services. Please take a few minutes to complete the written survey that you may receive in the mail after your visit with us. Thank you!             Your Updated Medication List - Protect others around you: Learn how to safely use, store and throw away your medicines at www.disposemymeds.org.          This list is accurate as of 9/25/18 10:40 AM.  Always use your most recent med list.                   Brand Name Dispense Instructions for use Diagnosis    amantadine 100 MG capsule    SYMMETREL    180 capsule    1 capsule @ 7am and 4pm    Paralysis agitans (H)       busPIRone 10 MG tablet    BUSPAR    180 tablet    Take 10 mg by mouth 2 times daily 1 Tab @ 7am and 1 tab @ 4pm    Paralysis agitans (H)       calcium carbonate 500 MG chewable tablet    TUMS     Take 2 chew tab by mouth as needed for heartburn        carbidopa-levodopa   MG per tablet    SINEMET    1170 tablet    Take 3 tablets by mouth 4 times daily 3 tabs @7, noon, 4p and 9pm and a few as needed = 13/day x 90 = 1170tablets    Paralysis agitans (H)       diphenhydrAMINE 25 MG tablet    BENADRYL    60 tablet    Take 1-2 tablets (25-50 mg) by mouth nightly as needed for itching or allergies        FLUoxetine 20 MG capsule    PROzac    90 capsule    Take 20 mg by mouth every morning Take 20mg capsule  @ 7am    Paralysis agitans (H)       hydrochlorothiazide 25 MG tablet    HYDRODIURIL     Take 25 mg by mouth At Bedtime @9 pm        ondansetron 4 MG tablet    ZOFRAN    18 tablet    Take 1 tablet (4 mg) by mouth every 8 hours as needed for nausea        pramipexole 0.5 MG tablet    MIRAPEX    180 tablet    2 tabs @ 9pm    Paralysis agitans (H)       TYLENOL PO      Take 1,000 mg by mouth every 8 hours as needed for mild pain or fever

## 2018-09-25 NOTE — PROGRESS NOTES
"PATIENT: Erika Diaz    : 1960    GINNY: 2018    REASON FOR VISIT:  Right hemisphere monopolar review & initial deep brain stimulation (DBS) programming for Parkinson's disease.     HPI:  Ms. Erika Diaz is a 58 year old right - handed  female who came to the [facility] neurology clinic accompanied by her  for DBS initial programming.  Shanice Contreras RN research coordinator is present. Patient underwent Right Globus Pallidus Internus (Gpi) DBS lead placement on 2018 and right chest infraclavicular Infinity 5 generator placement on 2018 by Dr. Concepcion.     She reports no side effects from the surgery.  Denies speech, gait, mood, memory issues or infection.  She has participated in DBS externalization research and talked about her positive experience with the research team.  She also talked about her positive OR experience.      Lesion effect:  She reports improvement in tremor , walking, speech and mood.  \"People were able to understand my speech better.\"  This lasted for almost a month.  Pt & her  report noticeable inner tremor on Sep 17th.     Pre-programming antiparkinsonian medications:     PD Medications 7 am 12 pm 4 pm 9 pm   Sinemet 25/100 mg  3 3 3 3   Amantadine 100 mg 1  1    Pramipexole 0.5 mg    2       Last antiparkinsonian dose taken:    Amantadine:  at 4 pm  Pramipexole:  at 9:30 pm  Carbidopa/levodopa:  at 4 pm    PHYSICAL EXAM:  Vital Signs:  Blood pressure 142/83, pulse 64, temperature 97.6  F (36.4  C), temperature source Oral, resp. rate 16, height 1.721 m (5' 7.75\"), weight 101.3 kg (223 lb 6.4 oz), SpO2 96 %.  Body mass index is 34.22 kg/(m^2).    General:  Ms. iDaz is a pleasant  female who is well-groomed, well-developed and overweight sitting comfortably in the exam room without any distress.  Affect is appropriate.    Incision Site:  Right scalp 2 areas & Rt chest has healed nicely.     MDS Part II  -- Over the last week -- " 0: Normal -- 1: Slight -- 2: Mild -- 3: Moderate -- 4: Severe     2.1 Speech: 3   2.2 Saliva and droolin   2.3 Chewing and swallowin   2.4 Eating tasks: 1   2.5 Dressin   2.6 Hygiene:  0   2.7 Handwritin   2.8 Doing hobbies and other activities:  0   2.9 Turning in bed:  0   2.10 Tremor:  2   2.11 Getting out of bed/car/deep chair:   1   2.12 Walking and balance: 0   2.13 Freezin     Total:    10         MOVEMENT DISORDERS ASSESSMENT:  UPDRS 3   UPDRS Values 2018   Time: 7:24 AM 9:18 AM 10:22 AM   Medication Off Off On   R Brain DBS: Off On On   L Brain DBS: None None None   Speech 2 2 2   Facial Expression 3 3 3   Rigidity Neck 1 1 0   Rigidity RUE 1 0 0   Rigidity LUE 1 1 0   Rigidity RLE 1 1 0   Rigidity LLE 1 0 0   Finger Taps R 1 1 1   Finger Taps L 3 2 2   Hand Mvt R 1 1 1   Hand Mvt L 3 1 1   Pron-/Supinate R 1 1 1   Pron-/Supinate L 1 1 1   Toe Tap R 0 0 0   Toe Tap L 0 0 0   Leg Agility R 1 0 0   Leg Agility L 1 0 0   Arise From Chair 1 1 1   Gait 1 1 1   Gait Freezing 0 0 0   Postural Stability 0 1 0   Posture 1 1 1   Global Spont Mvt 2 2 1   Postural Tremor RUE 1 1 0   Postural Tremor LUE 1 1 0   Kinetic Tremor RUE 1 1 0   Kinetic Tremor LUE 1 1 1   Rest Tremor RUE 0 0 0   Rest Tremor LUE 0 0 0   Rest Tremor RLE 0 0 0   Rest Tremor LLE 0 0 0   Rest Tremor Lip/Jaw 0 0 0   Rest Tremor Constancy 0 0 0   Total Right 8 6 3   Total Left 12 7 5   Axial Total 11 12 9   Total 31 25 17          IMPLANTS:    1.  Zavala DBS electrode, Infinity 0.5, REF 6172ANS, S/N [id number].  2.  [employer] DBS extension wire, 60 cm, REF 6372, S/N [id number].  3.  [employer] Guardian maxwell hole cover, REF 6010, Lot# [id number].  4.  [employer] Medical 30 cm percutaneous extension wire, REF 3383, Lot# [id number]    DBS Interrogation & Analysis:    Implanted generator:  Right chest [employer] Infinity 5  Lead placement:  Right Globus Pallidus Internus (Gpi).      Right Brain  Therapy On:  0%  Battery Service  "Life:  OK.  >3.00 volts.      Electrode Impedance Check:  Normal.    See scanned report for impedance details.    MONOPOLAR REVIEW    Clinical Core Programming Form  Please complete upon monopolar review and any subsequent \"mini-monopolar reviews\".  Please avoid any extra spaces at the ends of cells.    Generator Serial Number  ex: [id number]                  Lead Hemisphere  ex: Left Lead Region ex: STN Programmers' Initials ex: AS, SC   [id number]                                                 Right GPi TO         ~Only enter value in first six columns upon changes~         No blank cells for effect columns   Contacts  Cpos, 2abcneg        Ampl  0.5 Ampl Unit  V/mA Pulse Width (ms)  60 Rate (Hz)  130 Ramp (s)  8 Effect on Symptoms  NA (not assessed)  NC (no change from setting above)  NN (none noted) Side Effects     NA  NC  NN      Cpos,9neg         0.0 mA 60 130 8 Finger tapping = 3.  Hand opening & closing = 3.  Rigidity = 1.  NN                   1.0     NC NN    2.0     Finger tapping = 3.  Hand opening & closing = 2.  Rigidity = 1.  NN    2.5     NC NN    3.0     Hand opening closing = 1.  Very subtle rigidity in wrist only.  NN    3.5     NC NN    4.0     Finger tapping = 3.  Hand opening & closing = 1.  Rigidity = 1. NN    4.5     NC NN    5.0     NA Left lower eyelid twitching & Left jaw tightness.    Cpos, 10abcneg 0.0 mA 60 130 8 Finger tapping = 4.  Hand opening & closing = 3.  Rigidity = 2. NN    1.0     NC NN    2.0     Finger tapping = 3.  Hand opening & closing = 2.  Rigidity = 2.  NN    2.5     NC NN    3.0     Finger tapping = 2.  Hand opening & closing = 1.  Rigidity = 1.  NN    3.5     Slightly more improvement in hand movements. NN    4.0     NA Persistent Left lower eyelid twitching.   Cpos, 11abcneg 0.0 mA 60 130 8 Finger tapping = 4.  Hand opening & closing = 3.  Rigidity = 2. NN    1.0     NC NN    2.0     Finger tapping = 3.  Hand opening & closing = 2.  Rigidity = 2.  NN    2.5     Finger " tapping = 2.  Hand opening & closing = 1. Hand pronation & supination = 1. Rigidity = 1. NN    3.0     NC Persistent twitching around mouth.     3.5     NA Blurred vision.    Cpos,12neg 0.0 mA 60 130 8 Finger tapping = 4.  Hand opening & closing = 3. Hand pronation & supination = 2. Rigidity = 2. NN    1.0     NC NN    2.0     Finger tapping = 4.  Hand opening & closing = 2. Hand pronation & supination = 1. Rigidity = 2. Twitching around mouth that subsided.    2.5     NC Persistent twitching around mouth.  Pt felt tightness in Lt eye. Slight persistent left jaw tightness.              __  Monopolar survey showed partial improvement in fine hand coordinations and rigidity.  Since this is GPi, delay benefit is quite common.  This was explained to pt.  The dorsal contacts showed low threshold.  Based on the monopolar review, patient was programmed as below.   The deeper contact would also be a good option as the therapeutic window is much wider.  Will start with the program below and will consider checking the segmented contacts and see if the therapeutic window widens for more option.        Right Gpi  C +, 10abc -  Current:  3.0 mA  PW:  60 msec  Rate:  130 Hz    Therapy impedance:  1250 Ohms     Patient :  Upper Limit: 3.0 volts  Lower Limit: 0.0 volts       __  Pt took Sinemet 25/100 mg 3 pills; Amantadine 100 mg at 9:30 am.  She waited 60 minutes.  No side effects.  No dyskinesias.     __ I went over patient controller with pt & her .  They were shown how to turn DBS on & off and adjust voltage.  Pt was able to demonstrate independently.    __  Instructed to stay on the same antiparkinsonian medication for now.  She'll send me a registracija vozila message in 2 weeks & let me know how you're doing.  At that point, depending on how she is doing, will consider reducing her medications slowly.     __ Instructed to call if there is any side effect from today's programming or worsening symptoms.     __ Will  return for DBS programming follow up in 1 month for a follow up.    The total amount of time spent with patient in DBS programming was 225 minutes.     GUILLERMINA Krueger, CNP  [facility]     7:01 AM  10:45 AM

## 2018-09-25 NOTE — PATIENT INSTRUCTIONS
September 25, 2018    Dear Ms. Erika NIELSON Joe,    Thank you for coming today.  During your visit, we have discussed the following:     __ Your Right brain (Left body) DBS electrical system function (impedance) is normal. The battery life is also good.    __ Please call if your symptoms worsen or you experience side effects.  You can also reduce the current.    __ stay on the same antiparkinsonian medication.    __  Send me a 6Scan message in 2 weeks & let me know how you're doing.    __ For your subsequent DBS programming visits, come ON medication.     To contact our clinic, you may call [phone] or use 6Scan message.     It's good to see you!  I'll see you on Nov 1st at 7 am.      GUILLERMINA Krueger, CNP  [facility]

## 2018-10-04 ENCOUNTER — TELEPHONE (OUTPATIENT)
Dept: NEUROLOGY | Facility: CLINIC | Age: 58
End: 2018-10-04

## 2018-10-04 NOTE — TELEPHONE ENCOUNTER
Patient called my office line reporting that her dog chewed on her dentures, so she is heading to the dental office to possibly get a new set. Patient was wondering if she can get imaging. I let her know that X-rays are fine, but certain MRI's are not. Depending on which type of imaging her dentist wants, she should give them her 's card and call the [employer] Patient Services line to confirm that procedures are compatible with her system.    Shanice Contreras RN, MPH  Research Nurse, Movement Disorders

## 2018-10-04 NOTE — TELEPHONE ENCOUNTER
"I received the following email from the patient on 10/2/18 at 1:55pm:    \"Erika Diaz  Oct 2, 2018, 1:55 PM (2 days ago)  to me    I was wondering if I could get a report showing what restrictions my chiropractic needs to consider in order so he can give me an adjustment. Is there anyway I can get an image showing where the wires are tunneled to show him.    I also mailed a revised copy of my story with a couple of other items..    I got a call yesterday asking if I could do another CT Scan after our appointment on Nov. 1st. The person who called said they got the wrong image of something .     Have a great day!\"    I inquired with Karina Rivas and she confirmed that a repeat CT scan was needed with a different protocol being used. I will advise patient to follow up with staff who called her to reschedule.    I called [employer] [facility] at [phone] option [phone] and spoke to Landon Carroll about chiropractic restrictions. He confirmed that the patient should avoid:  A) diathermy  B) deep tissue or manipulation of device components  C) sudden jerking movements (such as in a neck adjustment)  D) equipment with KRAIG (Electromagnetic interference) TENS unit should be OK, but distance from DBS components should be maximized    He also emailed me the \"DBS Patient Controller 3875 Manual\" with general restrictions which I will direct the patient to review (that came with her system). She or her provider can call with any other specific questions.     I will send the patient a video from the [employer] website that shows a simulation of the DBS system components as we don't have an image of her neck with wire placement: [email or web address]        "

## 2018-10-09 ENCOUNTER — TELEPHONE (OUTPATIENT)
Dept: NEUROLOGY | Facility: CLINIC | Age: 58
End: 2018-10-09

## 2018-10-09 NOTE — TELEPHONE ENCOUNTER
Patient left voicemail this morning saying that she is doing well since her device was turned on, and is looking forward to her next appointment on November 1st. Patient wanted me to forward to Karina Rivas who had requested an update two weeks post programming.    Shanice Contreras, RN, MPH  Research Nurse, Movement Disorders

## 2018-10-12 ENCOUNTER — TELEPHONE (OUTPATIENT)
Dept: NEUROLOGY | Facility: CLINIC | Age: 58
End: 2018-10-12

## 2018-10-12 NOTE — TELEPHONE ENCOUNTER
I called patient to reschedule her follow-up programming session with Karina Rivas. Patient was understanding, and her repeat CT scan and DBS programming session were rescheduled from Thurs 11/1 to Mon 11/12.     Per Karina Rivas's request, I spoke with patient about possibly reducing a few of her Sinemet doses as long as her symptoms are being controlled with the DBS.     Patient said she only forgets to take her 4pm dose, so I said she can try doing 2 tablets instead of 3 tablets at 4pm. If her symptoms worsen, she can go back to the regular 3 tablets at each dose. She said she will try for her four doses of the day: 3 tablets, 3 tablets, 2 tablets, 3 tablets.  If that goes OK, I asked her to wait 2 weeks before reducing any other doses. The most she should reduce to in total is 2 tablets four times a day.  She will follow up in two weeks with the result.    Incidentally, today was also her first day back at work and she was doing well, coworkers noting she had less symptoms and more energy.        ===View-only below this line===    ----- Message -----     From: Arminda Rivas APRN CNP     Sent: 10/12/2018  12:43 PM       To: Shanice Contreras RN  Subject: RE: reschedule patient                           Thank you so much Shanice!!!    ----- Message -----     From: Shanice Contreras RN     Sent: 10/12/2018  12:31 PM       To: GUILLERMINA Vallecillo CNP, *  Subject: reschedule patient                               Hello,    Can you please reschedule this patient's follow-up DBS programming visit with Karina Rivas? (Karina has put a hold on this time on her schedule.)    Previous time: Thursday November 1st 7:00am (120 min)  New time: Monday November 12th 7:20 (120 min)    I just spoke with imaging and her repeat CT scan has also been rescheduled from 11/1 to 11/12.    I spoke with patient and she is aware of new appointment times.    Thank you,  Shanice

## 2018-11-13 ENCOUNTER — TELEPHONE (OUTPATIENT)
Dept: NEUROLOGY | Facility: CLINIC | Age: 58
End: 2018-11-13

## 2018-11-13 ENCOUNTER — OFFICE VISIT (OUTPATIENT)
Dept: NEUROLOGY | Facility: CLINIC | Age: 58
End: 2018-11-13
Payer: MEDICARE

## 2018-11-13 ENCOUNTER — RADIANT APPOINTMENT (OUTPATIENT)
Dept: CT IMAGING | Facility: CLINIC | Age: 58
End: 2018-11-13
Attending: NURSE PRACTITIONER
Payer: MEDICARE

## 2018-11-13 VITALS
DIASTOLIC BLOOD PRESSURE: 71 MMHG | OXYGEN SATURATION: 96 % | TEMPERATURE: 97.8 F | RESPIRATION RATE: 16 BRPM | HEART RATE: 79 BPM | BODY MASS INDEX: 34.66 KG/M2 | SYSTOLIC BLOOD PRESSURE: 142 MMHG | HEIGHT: 68 IN | WEIGHT: 228.7 LBS

## 2018-11-13 DIAGNOSIS — Z98.890 POST-OPERATIVE STATE: ICD-10-CM

## 2018-11-13 DIAGNOSIS — G47.9 SLEEP DISTURBANCES: ICD-10-CM

## 2018-11-13 DIAGNOSIS — Z96.89 S/P DEEP BRAIN STIMULATOR PLACEMENT: ICD-10-CM

## 2018-11-13 DIAGNOSIS — G20.A1 PARKINSON'S DISEASE (H): Primary | ICD-10-CM

## 2018-11-13 DIAGNOSIS — G25.81 RESTLESS LEGS SYNDROME (RLS): ICD-10-CM

## 2018-11-13 LAB — RADIOLOGIST FLAGS: ABNORMAL

## 2018-11-13 RX ORDER — PRAMIPEXOLE DIHYDROCHLORIDE 0.5 MG/1
TABLET ORAL
Qty: 180 TABLET | Refills: 3 | Status: SHIPPED | OUTPATIENT
Start: 2018-11-13 | End: 2019-06-26

## 2018-11-13 RX ORDER — AMANTADINE HYDROCHLORIDE 100 MG/1
CAPSULE, GELATIN COATED ORAL
Qty: 180 CAPSULE | Refills: 3 | Status: SHIPPED | OUTPATIENT
Start: 2018-11-13 | End: 2019-06-26

## 2018-11-13 RX ORDER — LORAZEPAM 0.5 MG/1
0.5 TABLET ORAL
Qty: 60 TABLET | Refills: 3 | Status: SHIPPED | OUTPATIENT
Start: 2018-11-13 | End: 2019-04-01

## 2018-11-13 RX ORDER — CARBIDOPA AND LEVODOPA 25; 100 MG/1; MG/1
TABLET ORAL
COMMUNITY
Start: 2018-09-28 | End: 2018-11-13

## 2018-11-13 ASSESSMENT — UNIFIED PARKINSONS DISEASE RATING SCALE (UPDRS)
TOETAPPING_LEFT: NORMAL
AMPLITUDE_LLE: NORMAL: NO TREMOR.
TOETAPPING_RIGHT: NORMAL
RIGIDITY_RLE: NORMAL
AMPLITUDE_RLE: NORMAL: NO TREMOR.
TOETAPPING_RIGHT: NORMAL
HANDMOVEMENTS_RIGHT: SLIGHT: ANY OF THE FOLLOWING: A) THE REGULAR RHYTHM IS BROKEN WITH ONE WITH ONE OR TWO INTERRUPTIONS OR HESITATIONS OF THE MOVEMENT B) SLIGHT SLOWING C) THE AMPLITUDE DECREMENTS NEAR THE END OF THE 10 MOVEMENTS.
RIGIDITY_NECK: SLIGHT: RIGIDITY ONLY DETECTED WITH ACTIVATION MANEUVER.
POSTURAL_STABILITY: NORMAL:  RECOVERS WITH ONE OR TWO STEPS.
RIGIDITY_RLE: SLIGHT: RIGIDITY ONLY DETECTED WITH ACTIVATION MANEUVER.
TOTAL_SCORE_LEFT: 11
RIGIDITY_NECK: NORMAL
POSTURE: 1 SLIGHT.  NOT QUITE ERECT BUT COULD BE NORMAL FOR OLDER PERSONS.
ARISING_CHAIR: SLIGHT: ARISING IS SLOWER THAN NORMAL, OR MAY NEED MORE THAN ONE ATTEMPT, OR MAY NEED TO MOVE FORWARD IN THE CHAIR TO ARISE.  NO NEED TO USE THE ARMS OF THE CHAIR.
LEG_AGILITY_RIGHT: NORMAL
AMPLITUDE_LIP_JAW: NORMAL: NO TREMOR.
HANDMOVEMENTS_RIGHT: SLIGHT: ANY OF THE FOLLOWING: A) THE REGULAR RHYTHM IS BROKEN WITH ONE WITH ONE OR TWO INTERRUPTIONS OR HESITATIONS OF THE MOVEMENT B) SLIGHT SLOWING C) THE AMPLITUDE DECREMENTS NEAR THE END OF THE 10 MOVEMENTS.
RIGIDITY_LLE: SLIGHT: RIGIDITY ONLY DETECTED WITH ACTIVATION MANEUVER.
TOTAL_SCORE: 26
FINGER_TAPPING_LEFT: MILD: ANY OF THE FOLLOWING: A) 3 TO 5 INTERRUPTIONS DURING TAPPING B) MILD SLOWING C) THE AMPLITUDE DECREMENTS MIDWAY IN THE 10-MOVEMENT SEQUENCE
AMPLITUDE_RLE: NORMAL: NO TREMOR.
SPONTANEITY_OF_MOVEMENT: 1: SLIGHT: SLIGHT GLOBAL SLOWNESS AND POVERTY OF SPONTANEOUS MOVEMENTS.
PARKINSONS_MEDS: ON
AMPLITUDE_RUE: NORMAL: NO TREMOR.
RIGIDITY_RUE: NORMAL
ARISING_CHAIR: SLIGHT: ARISING IS SLOWER THAN NORMAL, OR MAY NEED MORE THAN ONE ATTEMPT, OR MAY NEED TO MOVE FORWARD IN THE CHAIR TO ARISE.  NO NEED TO USE THE ARMS OF THE CHAIR.
TOTAL_SCORE: 4
FACIAL_EXPRESSION: MILD: IN ADDITION TO DECREASED EYE-BLINK FREQUENCY, MASKED FACIES PRESENT IN THE LOWER FACE AS WELL, NAMELY FEWER MOVEMENTS AROUND THE MOUTH, SUCH AS LESS SPONTANEOUS SMILING, BUT LIPS NOT PARTED.
LEG_AGILITY_LEFT: SLIGHT: ANY OF THE FOLLOWING: A) THE REGULAR RHYTHM IS BROKEN WITH ONE WITH ONE OR TWO INTERRUPTIONS OR HESITATIONS OF THE MOVEMENT B) SLIGHT SLOWING C) THE AMPLITUDE DECREMENTS NEAR THE END OF THE 10 MOVEMENTS.
POSTURAL_STABILITY: NORMAL:  RECOVERS WITH ONE OR TWO STEPS.
TOTAL_SCORE: 2
GAIT: MILD: INDEPENDENT WALKING BUT WITH SUBSTANTIAL GAIT IMPAIRMENT.
PRONATION_SUPINATION_RIGHT: SLIGHT: ANY OF THE FOLLOWING: A) THE REGULAR RHYTHM IS BROKEN WITH ONE WITH ONE OR TWO INTERRUPTIONS OR HESITATIONS OF THE MOVEMENT B) SLIGHT SLOWING C) THE AMPLITUDE DECREMENTS NEAR THE END OF THE 10 MOVEMENTS.
LEG_AGILITY_LEFT: NORMAL
AXIAL_SCORE: 11
FREEZING_GAIT: NORMAL
SPEECH: MILD: LOSS OF MODULATION, DICTION OR VOLUME, WITH A FEW WORDS UNCLEAR, BUT THE OVERALL SENTENCES EASY TO FOLLOW.
FINGER_TAPPING_RIGHT: NORMAL
SPONTANEITY_OF_MOVEMENT: 0: NORMAL.  NO PROBLEMS.
PRONATION_SUPINATION_RIGHT: SLIGHT: ANY OF THE FOLLOWING: A) THE REGULAR RHYTHM IS BROKEN WITH ONE WITH ONE OR TWO INTERRUPTIONS OR HESITATIONS OF THE MOVEMENT B) SLIGHT SLOWING C) THE AMPLITUDE DECREMENTS NEAR THE END OF THE 10 MOVEMENTS.
RIGIDITY_RUE: NORMAL
AMPLITUDE_LIP_JAW: NORMAL: NO TREMOR.
RIGIDITY_LUE: SLIGHT: RIGIDITY ONLY DETECTED WITH ACTIVATION MANEUVER.
PRONATION_SUPINATION_LEFT: SLIGHT: ANY OF THE FOLLOWING: A) THE REGULAR RHYTHM IS BROKEN WITH ONE WITH ONE OR TWO INTERRUPTIONS OR HESITATIONS OF THE MOVEMENT B) SLIGHT SLOWING C) THE AMPLITUDE DECREMENTS NEAR THE END OF THE 10 MOVEMENTS.
TOTAL_SCORE: 13
FINGER_TAPPING_LEFT: SLIGHT: ANY OF THE FOLLOWING: A) THE REGULAR RHYTHM IS BROKEN WITH ONE WITH ONE OR TWO INTERRUPTIONS OR HESITATIONS OF THE MOVEMENT B) SLIGHT SLOWING C) THE AMPLITUDE DECREMENTS NEAR THE END OF THE 10 MOVEMENTS.
AMPLITUDE_LLE: NORMAL: NO TREMOR.
SPEECH: MILD: LOSS OF MODULATION, DICTION OR VOLUME, WITH A FEW WORDS UNCLEAR, BUT THE OVERALL SENTENCES EASY TO FOLLOW.
GAIT: SLIGHT: INDEPENDENT WALKING WITH MINOR GAIT IMPAIRMENT.
PRONATION_SUPINATION_LEFT: SLIGHT: ANY OF THE FOLLOWING: A) THE REGULAR RHYTHM IS BROKEN WITH ONE WITH ONE OR TWO INTERRUPTIONS OR HESITATIONS OF THE MOVEMENT B) SLIGHT SLOWING C) THE AMPLITUDE DECREMENTS NEAR THE END OF THE 10 MOVEMENTS.
RIGIDITY_LLE: SLIGHT: RIGIDITY ONLY DETECTED WITH ACTIVATION MANEUVER.
AMPLITUDE_LUE: NORMAL: NO TREMOR.
LEG_AGILITY_RIGHT: NORMAL
PARKINSONS_MEDS: OFF
AMPLITUDE_LUE: NORMAL: NO TREMOR.
AXIAL_SCORE: 7
TOTAL_SCORE_LEFT: 4
RIGIDITY_LUE: NORMAL
FINGER_TAPPING_RIGHT: NORMAL
FACIAL_EXPRESSION: MODERATE: MASKED FACIES WITH LIPS PARTED SOME OF THE TIME WHEN THE MOUTH IS AT REST.
FREEZING_GAIT: NORMAL
HANDMOVEMENTS_LEFT: SLIGHT: ANY OF THE FOLLOWING: A) THE REGULAR RHYTHM IS BROKEN WITH ONE WITH ONE OR TWO INTERRUPTIONS OR HESITATIONS OF THE MOVEMENT B) SLIGHT SLOWING C) THE AMPLITUDE DECREMENTS NEAR THE END OF THE 10 MOVEMENTS.
HANDMOVEMENTS_LEFT: SLIGHT: ANY OF THE FOLLOWING: A) THE REGULAR RHYTHM IS BROKEN WITH ONE WITH ONE OR TWO INTERRUPTIONS OR HESITATIONS OF THE MOVEMENT B) SLIGHT SLOWING C) THE AMPLITUDE DECREMENTS NEAR THE END OF THE 10 MOVEMENTS.
AMPLITUDE_RUE: NORMAL: NO TREMOR.
POSTURE: 1 SLIGHT.  NOT QUITE ERECT BUT COULD BE NORMAL FOR OLDER PERSONS.
CONSTANCY_TREMOR_ATREST: NORMAL: NO TREMOR.
TOETAPPING_LEFT: MILD: ANY OF THE FOLLOWING: A) 3 TO 5 INTERRUPTIONS DURING TAPPING B) MILD SLOWING C) THE AMPLITUDE DECREMENTS MIDWAY IN THE 10-MOVEMENT SEQUENCE
CONSTANCY_TREMOR_ATREST: NORMAL: NO TREMOR.

## 2018-11-13 ASSESSMENT — PAIN SCALES - GENERAL: PAINLEVEL: NO PAIN (0)

## 2018-11-13 NOTE — PROGRESS NOTES
PATIENT: Erika Diaz    : 1960    GINNY: 2018    REASON FOR VISIT: Deep Brain Stimulation (DBS) Programming follow up for Parkinson's disease.    HPI:  Ms. Erika Diaz is a 58 year old right - handed  female who came to the [facility] neurology clinic accompanied by her  for DBS programming optimization.  Her last DBS programming was on 2018, which was her initial programming.  Since then, she reports doing very well.  Before surgery, she used to have wearing off and motor fluctuation, but now these have resolved.  She forgets to take the 4 pm dose except when she is at work.  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 Her usual hours are in the mornings.  Over the last week while at work, she noticed wearing off at 4 pm.       Since her programming, her family has commented that her speech is more clear and understandable.  She walks and moves freely.  Her inner tremor is much improved; she reports an 80% improvement in inner tremors.  Her  reiterated that she hasn't been complaining much about it.  Fatigue is much improved.     Pre-programming antiparkinsonian medications:     PD Medications 7 am 12 pm 4 pm 9 pm   Sinemet 25/100 mg  3 3 3 3   Amantadine 100 mg  1  1    Pramipexole 0.5 mg    2     She reports difficulty falling asleep.  She wakes up at 4 am & has difficulty falling back to sleep.  She has used Lorazepam 1 mg PRN in the past that was prescribed by Dr. Stratton.  She is asking for a Rx refill.     She continues getting headache & pressure at the extension lead behind her ear where the tip of her glasses is sitting.  Today, she has an appointment to get a pair of glasses that would curl behind her ears to avoid the issue.     She reports some type of pulling and shock like sensation when laying in a certain position, which resolves with repositioning.      Denies mood, gait, or  "memory issues associated with DBS programming/implantation.     Last antiparkinsonian dose taken:    Amantadine 100 mg -- 11/10 at 4 pm  Sinemet 25/100 mg --  at 4 pm  Pramipexole 0.5 mg -- 11/10 at 9 pm      PHYSICAL EXAM:  Vital Signs:  Blood pressure 142/71, pulse 79, temperature 97.8  F (36.6  C), temperature source Oral, resp. rate 16, height 1.721 m (5' 7.75\"), weight 103.7 kg (228 lb 11.2 oz), SpO2 96 %.  Body mass index is 35.03 kg/(m^2).     General:  Ms. Diaz is a pleasant  female who is well-groomed and well-developed sitting comfortably in the exam room without any distress.  Affect is appropriate.    MDS Part II  -- Over the last week -- 0: Normal -- 1: Slight -- 2: Mild -- 3: Moderate -- 4: Severe     2.1 Speech: 2   2.2 Saliva and droolin   2.3 Chewing and swallowin   2.4 Eating tasks: 0   2.5 Dressin   2.6 Hygiene:  1   2.7 Handwritin   2.8 Doing hobbies and other activities:  0   2.9 Turning in bed:  0   2.10 Tremor:  1   2.11 Getting out of bed/car/deep chair:   1   2.12 Walking and balance: 1   2.13 Freezin     Total:    9         MOVEMENT DISORDERS ASSESSMENT:    UPDRS 3   UPDRS Values 2018   Time: 7:25 AM 8:42 AM   Medication Off On   R Brain DBS: On On   L Brain DBS: None None   Speech 2 2   Facial Expression 3 2   Rigidity Neck 1 0   Rigidity RUE 0 0   Rigidity LUE 1 0   Rigidity RLE 1 0   Rigidity LLE 1 1   Finger Taps R 0 0   Finger Taps L 2 1   Hand Mvt R 1 1   Hand Mvt L 1 1   Pron-/Supinate R 1 1   Pron-/Supinate L 1 1   Toe Tap R 0 0   Toe Tap L 2 0   Leg Agility R 0 0   Leg Agility L 1 0   Arise From Chair 1 1   Gait 2 1   Gait Freezing 0 0   Postural Stability 0 0   Posture 1 1   Global Spont Mvt 1 0   Postural Tremor RUE 0 0   Postural Tremor LUE 1 0   Kinetic Tremor RUE 1 0   Kinetic Tremor LUE 1 0   Rest Tremor RUE 0 0   Rest Tremor LUE 0 0   Rest Tremor RLE 0 0   Rest Tremor LLE 0 0   Rest Tremor Lip/Jaw 0 0   Rest Tremor " Constancy 0 0   Total Right 4 2   Total Left 11 4   Axial Total 11 7   Total 26 13     UPDRS 4 also completed as part of Clinical Core Study.    DBS Interrogation & Analysis:     Implanted generator:  Right chest Abbott Infinity 5  Lead placement:  Right Globus Pallidus Internus (Gpi).     Right Gpi  Therapy On: 100%  Battery Service Life:  OK.  2.96 volts.    Right Gpi  C +, 10abc -  Current:  3.0 mA  PW:  60 msec  Rate:  130 Hz     Therapy impedance:  925 Ohms       Electrode Impedance Check:  Normal.    Patient :  Upper Limit: 3.0 mA  Lower Limit: 0.0 mA     See scanned report for impedance details.      DBS PROGRAMMING:  Monopolar survey form last visit reviewed.   Amplitude increased from 3.0 to 3.4 mA.    Patient  adjusted:  Upper Limit: 3.8 mA  Lower Limit: 3.0 mA    ASSESSMENT/PLAN    1. Parkinson's disease.  Motor symptoms are much improved post unilateral Gpi DBS implantation.    __  DBS programming completed as above.  __  Pt was given the following instructions: -     __  Reducer your Sinemet dose from 3 to 2 tablets 4 x a day.     PD Medications 7 am 12 pm 4 pm 9 pm   Sinemet 25/100 mg  2 2 2 2   Amantadine 100 mg  1  1    Pramipexole 0.5 mg    2     __  In 2 weeks, call or send a BearTail message if you're doing well with the new medication regimen.  If you start wearing off, please call sooner than 2 weeks.     2.  Sleep disturbances.  Ongoing issue from insomnia & RLS.    __  Will continue with Pramipexole as above.  __  Rx for Lorazepam 0.5 mg at HS to take PRN given to pt.       The total amount of time spent with patient was 95 minutes; 60 minutes in DBS programming and 35 in addressing PD & sleep issues.      GUILLERMINA Krueger, CNP  [facility] [email or web address]] Neurology Clinic     7:10 AM  8:45 AM

## 2018-11-13 NOTE — LETTER
2018       RE: Erika Diaz  [address] [place] [id number]     Dear Colleague,    Thank you for referring your patient, Erika Diaz, to the [facility] NEUROLOGY at [facility]. Please see a copy of my visit note below.    PATIENT: Erika Diaz    : 1960    GINNY: 2018    REASON FOR VISIT: Deep Brain Stimulation (DBS) Programming follow up for Parkinson's disease.    HPI:  Ms. Erika Diaz is a 58 year old right - handed  female who came to the [facility] neurology clinic accompanied by her  for DBS programming optimization.  Her last DBS programming was on 2018, which was her initial programming.  Since then, she reports doing very well.  Before surgery, she used to have wearing off and motor fluctuation, but now these have resolved.  She forgets to take the 4 pm dose except when she is at work.  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 Her usual hours are in the mornings.  Over the last week while at work, she noticed wearing off at 4 pm.       Since her programming, her family has commented that her speech is more clear and understandable.  She walks and moves freely.  Her inner tremor is much improved; she reports an 80% improvement in inner tremors.  Her  reiterated that she hasn't been complaining much about it.  Fatigue is much improved.     Pre-programming antiparkinsonian medications:     PD Medications 7 am 12 pm 4 pm 9 pm   Sinemet 25/100 mg  3 3 3 3   Amantadine 100 mg  1  1    Pramipexole 0.5 mg    2     She reports difficulty falling asleep.  She wakes up at 4 am & has difficulty falling back to sleep.  She has used Lorazepam 1 mg PRN in the past that was prescribed by Dr. Stratton.  She is asking for a Rx refill.     She continues getting headache & pressure at the extension lead behind her ear where the tip of  "her glasses is sitting.  Today, she has an appointment to get a pair of glasses that would curl behind her ears to avoid the issue.     She reports some type of pulling and shock like sensation when laying in a certain position, which resolves with repositioning.      Denies mood, gait, or memory issues associated with DBS programming/implantation.     Last antiparkinsonian dose taken:    Amantadine 100 mg -- 11/10 at 4 pm  Sinemet 25/100 mg --  at 4 pm  Pramipexole 0.5 mg -- 11/10 at 9 pm      PHYSICAL EXAM:  Vital Signs:  Blood pressure 142/71, pulse 79, temperature 97.8  F (36.6  C), temperature source Oral, resp. rate 16, height 1.721 m (5' 7.75\"), weight 103.7 kg (228 lb 11.2 oz), SpO2 96 %.  Body mass index is 35.03 kg/(m^2).     General:  Ms. Diaz is a pleasant  female who is well-groomed and well-developed sitting comfortably in the exam room without any distress.  Affect is appropriate.    MDS Part II  -- Over the last week -- 0: Normal -- 1: Slight -- 2: Mild -- 3: Moderate -- 4: Severe     2.1 Speech: 2   2.2 Saliva and droolin   2.3 Chewing and swallowin   2.4 Eating tasks: 0   2.5 Dressin   2.6 Hygiene:  1   2.7 Handwritin   2.8 Doing hobbies and other activities:  0   2.9 Turning in bed:  0   2.10 Tremor:  1   2.11 Getting out of bed/car/deep chair:   1   2.12 Walking and balance: 1   2.13 Freezin     Total:    9         MOVEMENT DISORDERS ASSESSMENT:    UPDRS 3   UPDRS Values 2018   Time: 7:25 AM 8:42 AM   Medication Off On   R Brain DBS: On On   L Brain DBS: None None   Speech 2 2   Facial Expression 3 2   Rigidity Neck 1 0   Rigidity RUE 0 0   Rigidity LUE 1 0   Rigidity RLE 1 0   Rigidity LLE 1 1   Finger Taps R 0 0   Finger Taps L 2 1   Hand Mvt R 1 1   Hand Mvt L 1 1   Pron-/Supinate R 1 1   Pron-/Supinate L 1 1   Toe Tap R 0 0   Toe Tap L 2 0   Leg Agility R 0 0   Leg Agility L 1 0   Arise From Chair 1 1   Gait 2 1   Gait Freezing 0 0 "   Postural Stability 0 0   Posture 1 1   Global Spont Mvt 1 0   Postural Tremor RUE 0 0   Postural Tremor LUE 1 0   Kinetic Tremor RUE 1 0   Kinetic Tremor LUE 1 0   Rest Tremor RUE 0 0   Rest Tremor LUE 0 0   Rest Tremor RLE 0 0   Rest Tremor LLE 0 0   Rest Tremor Lip/Jaw 0 0   Rest Tremor Constancy 0 0   Total Right 4 2   Total Left 11 4   Axial Total 11 7   Total 26 13     UPDRS 4 also completed as part of Clinical Core Study.    DBS Interrogation & Analysis:     Implanted generator:  Right chest Abbott Infinity 5  Lead placement:  Right Globus Pallidus Internus (Gpi).     Right Gpi  Therapy On: 100%  Battery Service Life:  OK.    2.96 volts.    Right Gpi  C +, 10abc -  Current:  3.0 mA  PW:  60 msec  Rate:  130 Hz     Therapy impedance:   925 Ohms       Electrode Impedance Check:  Normal.    Patient :  Upper Limit: 3.0  mA  Lower Limit: 0.0  mA     See scanned report for impedance details.      DBS PROGRAMMING:  Monopolar survey form last visit reviewed.   Amplitude increased from 3.0 to 3.4 mA.    Patient  adjusted:  Upper Limit: 3.8 mA  Lower Limit: 3.0 mA    ASSESSMENT/PLAN    1. Parkinson's disease.  Motor symptoms are much improved post unilateral Gpi DBS implantation.    __  DBS programming completed as above.  __  Pt was given the following instructions: -     __  Reducer your Sinemet dose from 3 to 2 tablets 4 x a day.     PD Medications 7 am 12 pm 4 pm 9 pm   Sinemet 25/100 mg  2 2 2 2   Amantadine 100 mg  1  1    Pramipexole 0.5 mg    2     __  In 2 weeks, call or send a Skyrider message if you're doing well with the new medication regimen.  If you start wearing off, please call sooner than 2 weeks.     2.  Sleep disturbances.  Ongoing issue from insomnia & RLS.    __  Will continue with Pramipexole as above.  __  Rx for Lorazepam 0.5 mg at HS to take PRN given to pt.       The total amount of time spent with patient was 95 minutes; 60 minutes in DBS programming and 35 in addressing  PD & sleep issues.      GUILLERMINA Krueger, CNP  [facility]rology [facility]     7:10 AM  8:45 AM        Again, thank you for allowing me to participate in the care of your patient.      Sincerely,    GUILLERMINA Valero CNP

## 2018-11-13 NOTE — MR AVS SNAPSHOT
After Visit Summary   11/13/2018    Erika Diaz    MRN: [id number]           Patient Information     Date Of Birth          1960        Visit Information        Provider Department      11/13/2018 7:00 AM Arminda Rivas APRN CNP [facility] Neurology        Today's Diagnoses     Parkinson's disease (H)    -  1    Restless legs syndrome (RLS)        Sleep disturbances          Care Instructions    November 13, 2018    Dear Ms. Erika Diaz,    Thank you for coming today.  During your visit, we have discussed the following:     __ Your DBS electrical system function (impedance) is normal. The battery life is also good.    __  Today, your amplitude was increased from 3.0 to 3.4 mA.    Here is your patient :  Upper Limit: 3.8 mA  Lower Limit: 3.0 mA    __  Please call if your symptoms worsen or you experience side effects.  __  Reducer your Sinemet dose from 3 to 2 tablets 4 x a day.       PD Medications 7 am 12 pm 4 pm 9 pm   Sinemet 25/100 mg  2 2 2 2   Amantadine 100 mg  1  1    Pramipexole 0.5 mg    2     __  In 2 weeks, call or send a App Press message if you're doing well with the new medication regimen.  If you start wearing off, please call sooner than 2 weeks.     __ For your subsequent DBS programming visits, come ON medication.     To contact our clinic, you may call [phone] or use App Press message.     It's good to see you!   I'll see you in January.     GUILLERMINA Krueger, CNP  [facility] Neurology [facility]              Follow-ups after your visit        Your next 10 appointments already scheduled     Nov 13, 2018  9:20 AM CST   CT HEAD W/O CONTRAST with UCCT2   [facility] CT ([facility])    [address]  [facility] [address] [phone]           How do I prepare for my exam? (Food and drink instructions) No Food and Drink Restrictions.  How do I prepare for my exam? (Other instructions) You do not need to  do anything special to prepare for this exam. For a sinus scan: Use your nose spray (nasal decongestant spray) as directed.  What should I wear: Please wear loose clothing, such as a sweat suit or jogging clothes. Avoid snaps, zippers and other metal. We may ask you to undress and put on a hospital gown.  How long does the exam take: Most scans take less than 20 minutes.  What should I bring: Please bring any scans or X-rays taken at other hospitals, if similar tests were done. Also bring a list of your medicines, including vitamins, minerals and over-the-counter drugs. It is safest to leave personal items at home.  Do I need a : No  is needed.  What do I need to tell my doctor? Be sure to tell your doctor: * If you have any allergies. * If there s any chance you are pregnant. * If you are breastfeeding.  What should I do after the exam: No restrictions, You may resume normal activities.  What is this test: A CT (computed tomography) scan is a series of pictures that allows us to look inside your body. The scanner creates images of the body in cross sections, much like slices of bread. This helps us see any problems more clearly.  Who should I call with questions: If you have any questions, please call the Imaging Department where you will have your exam. Directions, parking instructions, and other information is available on our website, [email or web address].            Jan 19, 2019  7:00 AM CST   (Arrive by 6:45 AM)   Return Visit with GUILLERMINA Vallecillo [facility] Neurology ([facility] [[email or web address] and [facility])    [address] [place] [address] [phone]              Who to contact     Please call your clinic at [phone] to:    Ask questions about your health    Make or cancel appointments    Discuss your medicines    Learn about your test results    Speak to your doctor            Additional Information About Your Visit        MyChart Information      "Appwiz gives you secure access to your electronic health record. If you see a primary care provider, you can also send messages to your care team and make appointments. If you have questions, please call your primary care clinic.  If you do not have a primary care provider, please call [phone] and they will assist you.      Appwiz is an electronic gateway that provides easy, online access to your medical records. With Appwiz, you can request a clinic appointment, read your test results, renew a prescription or communicate with your care team.     To access your existing account, please contact your [facility] or call [phone] for assistance.        Care EveryWhere ID     This is your Care EveryWhere ID. This could be used by other organizations to access your [facility] medical records  [id number]        Your Vitals Were     Pulse Temperature Respirations Height Pulse Oximetry Breastfeeding?    79 97.8  F (36.6  C) (Oral) 16 1.721 m (5' 7.75\") 96% No    BMI (Body Mass Index)                   35.03 kg/m2            Blood Pressure from Last 3 Encounters:   11/13/18 142/71   09/25/18 142/83   09/21/18 131/62    Weight from Last 3 Encounters:   11/13/18 103.7 kg (228 lb 11.2 oz)   09/25/18 101.3 kg (223 lb 6.4 oz)   09/21/18 98.7 kg (217 lb 9.6 oz)              Today, you had the following     No orders found for display         Today's Medication Changes          These changes are accurate as of 11/13/18  9:07 AM.  If you have any questions, ask your nurse or doctor.               Start taking these medicines.        Dose/Directions    LORazepam 0.5 MG tablet   Commonly known as:  ATIVAN   Used for:  Parkinson's disease (H), Sleep disturbances   Started by:  Arminda Rivas APRN CNP        Dose:  0.5 mg   Take 1 tablet (0.5 mg) by mouth nightly as needed for sleep   Quantity:  60 tablet   Refills:  3         These medicines have changed or have updated prescriptions.     "    Dose/Directions    amantadine 100 MG capsule   Commonly known as:  SYMMETREL   This may have changed:  additional instructions   Used for:  Parkinson's disease (H)   Changed by:  Arminda Rivas APRN CNP        1 capsule orally @ 7am and 4pm   Quantity:  180 capsule   Refills:  3       pramipexole 0.5 MG tablet   Commonly known as:  MIRAPEX   This may have changed:  additional instructions   Used for:  Parkinson's disease (H), Restless legs syndrome (RLS)   Changed by:  Arminda Rivas APRN CNP        2 tabs orally @ 9 pm   Quantity:  180 tablet   Refills:  3            Where to get your medicines      These medications were sent to [facility] [address] [facility] [address]  [address] [facility] [address]     Phone:  [phone]     amantadine 100 MG capsule    pramipexole 0.5 MG tablet         Some of these will need a paper prescription and others can be bought over the counter.  Ask your nurse if you have questions.     Bring a paper prescription for each of these medications     LORazepam 0.5 MG tablet                Primary Care Provider Office Phone # Fax #    Ondina Edmondson [phone]       [facility] [address] [address]        Equal Access to Services     AYLEEN GASCA AH: Hadii matilde todd hadasho Soomaali, waaxda luqadaha, qaybta kaalmada adeegyada, naga bolandin haylupe olivo. So [facility] [phone].    ATENCIÓN: Si habla español, tiene a guerra disposición servicios gratuitos de asistencia lingüística. Llame al [phone].    We comply with applicable federal civil rights laws and [place] laws. We do not discriminate on the basis of race, color, national origin, age, disability, sex, sexual orientation, or gender identity.            Thank you!     Thank you for choosing [facility] NEUROLOGY  for your care. Our goal is always to provide you with excellent care. Hearing back from our  patients is one way we can continue to improve our services. Please take a few minutes to complete the written survey that you may receive in the mail after your visit with us. Thank you!             Your Updated Medication List - Protect others around you: Learn how to safely use, store and throw away your medicines at www.disposemymeds.org.          This list is accurate as of 11/13/18  9:07 AM.  Always use your most recent med list.                   Brand Name Dispense Instructions for use Diagnosis    amantadine 100 MG capsule    SYMMETREL    180 capsule    1 capsule orally @ 7am and 4pm    Parkinson's disease (H)       busPIRone 10 MG tablet    BUSPAR    180 tablet    Take 10 mg by mouth 2 times daily 1 Tab @ 7am and 1 tab @ 4pm    Paralysis agitans (H)       calcium carbonate 500 MG chewable tablet    TUMS     Take 2 chew tab by mouth as needed for heartburn        carbidopa-levodopa  MG per tablet    SINEMET    1170 tablet    Take 3 tablets by mouth 4 times daily 3 tabs @7, noon, 4p and 9pm and a few as needed = 13/day x 90 = 1170tablets    Paralysis agitans (H)       FLUoxetine 20 MG capsule    PROzac    90 capsule    Take 20 mg by mouth every morning Take 20mg capsule  @ 7am    Paralysis agitans (H)       hydrochlorothiazide 25 MG tablet    HYDRODIURIL     Take 25 mg by mouth At Bedtime @9 pm        LORazepam 0.5 MG tablet    ATIVAN    60 tablet    Take 1 tablet (0.5 mg) by mouth nightly as needed for sleep    Parkinson's disease (H), Sleep disturbances       ondansetron 4 MG tablet    ZOFRAN    18 tablet    Take 1 tablet (4 mg) by mouth every 8 hours as needed for nausea        pramipexole 0.5 MG tablet    MIRAPEX    180 tablet    2 tabs orally @ 9 pm    Parkinson's disease (H), Restless legs syndrome (RLS)       TYLENOL PO      Take 1,000 mg by mouth every 8 hours as needed for mild pain or fever

## 2018-11-13 NOTE — TELEPHONE ENCOUNTER
[facility] Call [facility]    Phone Message    May a detailed message be left on voicemail: yes    Reason for Call: Other: Lisa called in again to relay an urgent radiology finding for this patient. Please return her call.     Action Taken: Message routed to:  [facility] Neurology

## 2018-11-13 NOTE — TELEPHONE ENCOUNTER
[facility]    Phone Message    May a detailed message be left on voicemail: yes    Reason for Call: Other: Imaging called to report a critical result. Call was made to priority line with no answer, please call.     Action Taken: Message routed to:  [facility] Neuro

## 2018-11-13 NOTE — PATIENT INSTRUCTIONS
November 13, 2018    Dear MsTuyet Erika NIELSON Joe,    Thank you for coming today.  During your visit, we have discussed the following:     __ Your DBS electrical system function (impedance) is normal. The battery life is also good.    __  Today, your amplitude was increased from 3.0 to 3.4 mA.    Here is your patient :  Upper Limit: 3.8 mA  Lower Limit: 3.0 mA    __  Please call if your symptoms worsen or you experience side effects.  __  Reducer your Sinemet dose from 3 to 2 tablets 4 x a day.       PD Medications 7 am 12 pm 4 pm 9 pm   Sinemet 25/100 mg  2 2 2 2   Amantadine 100 mg  1  1    Pramipexole 0.5 mg    2     __  In 2 weeks, call or send a Anvato message if you're doing well with the new medication regimen.  If you start wearing off, please call sooner than 2 weeks.     __ For your subsequent DBS programming visits, come ON medication.     To contact our clinic, you may call [phone] or use Anvato message.     It's good to see you!   I'll see you in January.     GUILLERMINA Krueger, CNP  [facility]rology [facility]

## 2018-11-14 NOTE — TELEPHONE ENCOUNTER
Dr. Concepcion already notified yesterday.     Pt called & reassured not to be concerned as sometimes edema is seen in CT post DBS lead placement.  She appreciated the call.     When she was see yesterday, she was asymptomatic.  In fact, post DBS placement, she has been doing very well.

## 2018-11-26 ENCOUNTER — TELEPHONE (OUTPATIENT)
Dept: NEUROLOGY | Facility: CLINIC | Age: 58
End: 2018-11-26

## 2018-11-26 NOTE — TELEPHONE ENCOUNTER
"Email received from patient this morning:     \"Erika Diaz  7:47 AM (4 hours ago)  to me    I m going to have to change the January 18th appt because that s the day they scheduled my ankle surgery.    I tried to put this on my chart for Kairna but couldn t figure out how to do it so I m sending it to you. I had to turn down the stimulator to 3 from 3.4 you had programmed it at the last time I saw you. I was driving to see the ankle doctor and had problems with my eyes and focusing. Plus I ve been having pressure behind my ear giving me a sort of headache. I did turn it back up while I was at my folks for Thanksgiving to see what would happen and after awhile it happened again. I don t know why this happened unless it had something to do with what was seen on the Scan.    Speaking of Scan I never have gotten a report for this as of yet.    Hope you guys had a great Thanksgiving. You can call me if you need to talk to me or tell me when you rescheduled my appt for.    ThanksErika    Sent from my iPhone\"    Writer will forward information to Karina about DBS settings and will work with patient to reschedule January appointment.     Shanice Contreras, RN, MPH  Research Nurse, Movement Disorders    "

## 2018-11-27 NOTE — TELEPHONE ENCOUNTER
I called and talked to Erika about her symptoms.    Since she also got a new eyeglass Rx, it's unclear if her vision changes and pressure are due to DBS.    She is now at 3.0 mA.  She has reduced her Sinemet 25/100 mg from 3 to 2 tabs QID.  Denies wearing off.  Every time her alarm goes off, she doesn't feel her symptoms, but she still takes her dose.  She continue to forget taking her 4 pm dose.    Plan:  __  Stay at 3.0 mA   __  Reduce Sinemet 25/100 mg from 2 tabs to 1.5 tabs QID.  __  Shanice & I will figure out a time to see her before her ankle surgery, which is scheduled on 1/18/2018.  She was told no weight-bearing for 6 weeks post surgery.

## 2018-11-30 NOTE — TELEPHONE ENCOUNTER
I called Erika to reschedule her appointment, she can come in to see Karina Rivas at 9am on Thursday, January 17th (the day before her ankle surgery). I also asked her to make a copy of her DBS system card to give to her provider so that they can work with [facility] to make sure the equipment used for surgery is compatible with her DBS system.     Shanice Contreras RN, MPH  Research Nurse, Movement Disorders

## 2018-12-08 DIAGNOSIS — G20.A1 PARALYSIS AGITANS (H): ICD-10-CM

## 2018-12-10 RX ORDER — BUSPIRONE HYDROCHLORIDE 10 MG/1
TABLET ORAL
Qty: 180 TABLET | Refills: 0 | Status: SHIPPED | OUTPATIENT
Start: 2018-12-10 | End: 2019-03-24

## 2018-12-24 ENCOUNTER — MYC MEDICAL ADVICE (OUTPATIENT)
Dept: NEUROLOGY | Facility: CLINIC | Age: 58
End: 2018-12-24

## 2018-12-24 DIAGNOSIS — H90.5 SENSORY HEARING LOSS, UNILATERAL: Primary | ICD-10-CM

## 2018-12-26 ENCOUNTER — HOSPITAL ENCOUNTER (EMERGENCY)
Facility: CLINIC | Age: 58
Discharge: HOME | End: 2018-12-27
Attending: EMERGENCY MEDICINE | Admitting: EMERGENCY MEDICINE
Payer: MEDICARE

## 2018-12-26 ENCOUNTER — CARE COORDINATION (OUTPATIENT)
Dept: NEUROLOGY | Facility: CLINIC | Age: 58
End: 2018-12-26

## 2018-12-26 ENCOUNTER — APPOINTMENT (OUTPATIENT)
Dept: GENERAL RADIOLOGY | Facility: CLINIC | Age: 58
End: 2018-12-26
Attending: EMERGENCY MEDICINE
Payer: MEDICARE

## 2018-12-26 DIAGNOSIS — H90.5 UNILATERAL SENSORINEURAL HEARING LOSS: Primary | ICD-10-CM

## 2018-12-26 DIAGNOSIS — H90.5 UNILATERAL SENSORINEURAL HEARING LOSS: ICD-10-CM

## 2018-12-26 PROCEDURE — 85025 COMPLETE CBC W/AUTO DIFF WBC: CPT | Performed by: EMERGENCY MEDICINE

## 2018-12-26 PROCEDURE — 99285 EMERGENCY DEPT VISIT HI MDM: CPT | Mod: 25 | Performed by: EMERGENCY MEDICINE

## 2018-12-26 PROCEDURE — 96374 THER/PROPH/DIAG INJ IV PUSH: CPT | Mod: 59 | Performed by: EMERGENCY MEDICINE

## 2018-12-26 PROCEDURE — 71046 X-RAY EXAM CHEST 2 VIEWS: CPT

## 2018-12-26 PROCEDURE — 80053 COMPREHEN METABOLIC PANEL: CPT | Performed by: EMERGENCY MEDICINE

## 2018-12-26 PROCEDURE — 99285 EMERGENCY DEPT VISIT HI MDM: CPT | Mod: Z6 | Performed by: EMERGENCY MEDICINE

## 2018-12-26 RX ORDER — LORAZEPAM 2 MG/ML
0.5 INJECTION INTRAMUSCULAR ONCE
Status: COMPLETED | OUTPATIENT
Start: 2018-12-26 | End: 2018-12-27

## 2018-12-26 ASSESSMENT — ENCOUNTER SYMPTOMS
SHORTNESS OF BREATH: 0
DIZZINESS: 1
FEVER: 0
ABDOMINAL PAIN: 0

## 2018-12-26 ASSESSMENT — MIFFLIN-ST. JEOR: SCORE: 1612.34

## 2018-12-26 NOTE — TELEPHONE ENCOUNTER
"From Dr. Berry:  Lupe Prasad,     I agree that this is not likely DBS related. However, this does need to be managed urgently. How soon is her ENT appointment? The causes of sudden unilateral hearing loss are postulated to be inflammatory, vascular, or neoplastic. She should have a course of prednisone 1mg/kg (max 60 mg daily x10 days) while she waits to see ENT. She also needs an MRI with contrast. The CT is not sufficient to exclude tumors of the intra-auditory canal.     Please let me know how I can help. I don't think she needs to see a neurologist and an ENT doc. But I would be willing to call her an order the steroids if she doesn't have a prescription. \"    Pt is on her way to emergency room now, order has been placed (standing order, waiting for MD to sign) for MRI with contrast. Spoke with Melquiades in MRI department to give him a heads up.  Calling ED to notify them of patient on her way. I will meet patient in ED to check her system and update her extension information on her patient  so that she is MRI compatible.     Shanice Contreras, RN, MPH  Research Nurse, Movement Disorders    "

## 2018-12-26 NOTE — ED AVS SNAPSHOT
[facility], [place], Emergency Department  [address] [place] [id number]  Phone:  [phone]                                    Erika Diaz   MRN: [id number]    Department:  [facility], Emergency Department   Date of Visit:  12/26/2018           After Visit Summary Signature Page    I have received my discharge instructions, and my questions have been answered. I have discussed any challenges I see with this plan with the nurse or doctor.    ..........................................................................................................................................  Patient/Patient Representative Signature      ..........................................................................................................................................  Patient Representative Print Name and Relationship to Patient    ..................................................               ................................................  Date                                   Time    ..........................................................................................................................................  Reviewed by Signature/Title    ...................................................              ..............................................  Date                                               Time          22EPIC Rev 08/18

## 2018-12-26 NOTE — PROGRESS NOTES
Spoke with patient throughout the day about her hearing loss issue and it was recommended by Dr. Berry that the patient have a brain MRI with contrast to visualize issues with the inner ear canal that can't be seen in CT.     Unfortunately the only way to get an urgent MRI was via the emergency room, so patient was advised to visit the ED. I notified Melquiades in the MRI department that a patient with DBS would be coming and I would be checking her system to make sure it was compatible. She is full-body eligible for MRI with the [employer] System.    I met patient in the [facility] ED lobby so that we could update her system to make her eligible for MRI mode. I did an impedance check, no problems found. Her patient  was updated to 12.1 [employer] and I verified that she was able to turn MRI mode on and off (therapy is OFF when in MRI mode and patient feels OK with this). I reiterated that she needs to turn therapy back ON when she is done with the MRI.    Patient said she had just made an appointment with an ENT clinic in [facility] tomorrow morning.    Dr. Berry felt that the ED could prescribe prednisone if they felt it was indicated.    Shanice Contreras, RN, MPH  Research Nurse, Movement Disorders

## 2018-12-26 NOTE — ED TRIAGE NOTES
Pt presents ambulatory to triage with c/o hearing loss in her right ear that began Sunday and was seen in [place], but is here now because she needs a MRI. Denies dizziness, N/V, or shortness of breath.

## 2018-12-26 NOTE — TELEPHONE ENCOUNTER
Patient called to sudden hearing loss in her R ear (has now been three days). She states she was at work on Sunday the 23rd and had some loud ringing in her R ear (has history of tinnitus in both ears). After 30 minutes of loud ringing, she states that her hearing completely stopped. She did go to the emergency department in [place] where she said she had a CT scan and blood work that came back normal. She is currently waiting on a referral to an ENT specialist. She is wondering if she needs to be seen by neurology.    She says she also feels some pressure and mild pain on the R side of her neck that felt more prominent during the hearing loss episode.     She did try turning off her DBS stimulator for 45 minutes but there was no effect on her hearing loss.     She states she called the neurology resident on-call just previous to calling myself (unsure who exactly she spoke with), and is waiting on a call back.    I will attempt to contact our neurology team to see if she needs to be seen this week.    Shanice Contreras, RN, MPH  Research Nurse, Movement Disorders

## 2018-12-27 ENCOUNTER — TELEPHONE (OUTPATIENT)
Dept: NEUROLOGY | Facility: CLINIC | Age: 58
End: 2018-12-27

## 2018-12-27 ENCOUNTER — APPOINTMENT (OUTPATIENT)
Dept: MRI IMAGING | Facility: CLINIC | Age: 58
End: 2018-12-27
Attending: EMERGENCY MEDICINE
Payer: MEDICARE

## 2018-12-27 VITALS
BODY MASS INDEX: 38.25 KG/M2 | RESPIRATION RATE: 16 BRPM | HEART RATE: 68 BPM | DIASTOLIC BLOOD PRESSURE: 81 MMHG | HEIGHT: 65 IN | TEMPERATURE: 98.1 F | OXYGEN SATURATION: 93 % | SYSTOLIC BLOOD PRESSURE: 140 MMHG | WEIGHT: 229.6 LBS

## 2018-12-27 LAB
ALBUMIN SERPL-MCNC: 3.5 G/DL (ref 3.4–5)
ALP SERPL-CCNC: 100 U/L (ref 40–150)
ALT SERPL W P-5'-P-CCNC: 30 U/L (ref 0–50)
ANION GAP SERPL CALCULATED.3IONS-SCNC: 7 MMOL/L (ref 3–14)
AST SERPL W P-5'-P-CCNC: 33 U/L (ref 0–45)
BASOPHILS # BLD AUTO: 0 10E9/L (ref 0–0.2)
BASOPHILS NFR BLD AUTO: 0.6 %
BILIRUB SERPL-MCNC: 0.5 MG/DL (ref 0.2–1.3)
BUN SERPL-MCNC: 15 MG/DL (ref 7–30)
CALCIUM SERPL-MCNC: 8.4 MG/DL (ref 8.5–10.1)
CHLORIDE SERPL-SCNC: 108 MMOL/L (ref 94–109)
CO2 SERPL-SCNC: 27 MMOL/L (ref 20–32)
CREAT SERPL-MCNC: 0.8 MG/DL (ref 0.52–1.04)
DIFFERENTIAL METHOD BLD: NORMAL
EOSINOPHIL # BLD AUTO: 0.2 10E9/L (ref 0–0.7)
EOSINOPHIL NFR BLD AUTO: 2.8 %
ERYTHROCYTE [DISTWIDTH] IN BLOOD BY AUTOMATED COUNT: 13.4 % (ref 10–15)
GFR SERPL CREATININE-BSD FRML MDRD: 80 ML/MIN/{1.73_M2}
GLUCOSE SERPL-MCNC: 98 MG/DL (ref 70–99)
HCT VFR BLD AUTO: 46 % (ref 35–47)
HGB BLD-MCNC: 15.1 G/DL (ref 11.7–15.7)
IMM GRANULOCYTES # BLD: 0 10E9/L (ref 0–0.4)
IMM GRANULOCYTES NFR BLD: 0.1 %
LYMPHOCYTES # BLD AUTO: 2.5 10E9/L (ref 0.8–5.3)
LYMPHOCYTES NFR BLD AUTO: 37.8 %
MCH RBC QN AUTO: 30.6 PG (ref 26.5–33)
MCHC RBC AUTO-ENTMCNC: 32.8 G/DL (ref 31.5–36.5)
MCV RBC AUTO: 93 FL (ref 78–100)
MONOCYTES # BLD AUTO: 0.3 10E9/L (ref 0–1.3)
MONOCYTES NFR BLD AUTO: 4.3 %
NEUTROPHILS # BLD AUTO: 3.6 10E9/L (ref 1.6–8.3)
NEUTROPHILS NFR BLD AUTO: 54.4 %
NRBC # BLD AUTO: 0 10*3/UL
NRBC BLD AUTO-RTO: 0 /100
PLATELET # BLD AUTO: 220 10E9/L (ref 150–450)
POTASSIUM SERPL-SCNC: 3.6 MMOL/L (ref 3.4–5.3)
PROT SERPL-MCNC: 6.9 G/DL (ref 6.8–8.8)
RBC # BLD AUTO: 4.94 10E12/L (ref 3.8–5.2)
SODIUM SERPL-SCNC: 141 MMOL/L (ref 133–144)
WBC # BLD AUTO: 6.7 10E9/L (ref 4–11)

## 2018-12-27 PROCEDURE — A9585 GADOBUTROL INJECTION: HCPCS | Performed by: EMERGENCY MEDICINE

## 2018-12-27 PROCEDURE — 25000128 H RX IP 250 OP 636: Performed by: EMERGENCY MEDICINE

## 2018-12-27 PROCEDURE — 96374 THER/PROPH/DIAG INJ IV PUSH: CPT | Mod: 59 | Performed by: EMERGENCY MEDICINE

## 2018-12-27 PROCEDURE — 70553 MRI BRAIN STEM W/O & W/DYE: CPT

## 2018-12-27 PROCEDURE — 25500064 ZZH RX 255 OP 636: Performed by: EMERGENCY MEDICINE

## 2018-12-27 RX ORDER — GADOBUTROL 604.72 MG/ML
7.5 INJECTION INTRAVENOUS ONCE
Status: COMPLETED | OUTPATIENT
Start: 2018-12-27 | End: 2018-12-27

## 2018-12-27 RX ADMIN — LORAZEPAM 0.5 MG: 2 INJECTION, SOLUTION INTRAMUSCULAR; INTRAVENOUS at 00:07

## 2018-12-27 RX ADMIN — GADOBUTROL 7.5 ML: 604.72 INJECTION INTRAVENOUS at 01:04

## 2018-12-27 NOTE — ED PROVIDER NOTES
History     Chief Complaint   Patient presents with     Hearing Problem     right ear     HPI  Erika Diaz is a 58 year old female with a history of hypertension, Parkinson's disease status post experimental deep brain stimulator placement in August 2018, PE, and anxiety who presents emergency department for evaluation of a loss of hearing.  The patient reports on 12/23/18 (3 days ago) she experienced rather sudden onset of loss of hearing in her right ear with associated tinnitus and dizziness.  She was seen at [facility] and MRI was ordered; however, that facility felt they could not perform MRI with deep brain stimulator in place.  CTA head/neck was taken instead and showed no abnormal findings.  She was told to come to the [place] as soon as possible for MRI and consult with ENT. Per chart review, the patient contacted neurology today and they felt emergent evaluation was necessary. They instructed her to come to the emergency department and contact MRI staff. They also recommended a course of prednisone while waiting to see ENT. In the ED, she states that she continues to have complete loss of hearing on her right side as well as tinnitus.  She states her dizziness has improved, but she will have intermittent dizzy spells.  She denies any other symptoms or complaints.    Past Medical History:   Diagnosis Date     Degenerative joint disease 11/7/2017     Encounter for neuropsychological testing 12/20/2017    Current results indicate variability in attention and memory, ranging from moderately impaired to superior, in some cases with greater difficulty on less complex tasks. Basic language, visual processing, and executive functioning fall within normal limits. Personality assessment is suggestive of somatization, and also raises the possibility of a thought disorder as well as a history of manic episo     JASON (generalized anxiety disorder)      History of colonic polyps 11/7/2017     HTN  (hypertension) 2017     Hypercholesterolemia 2017     Lactose intolerance in adult 2017     Migraines      Obesity 2017     Parkinson disease (H)      PID (pelvic inflammatory disease) due to IUD 2017     Pulmonary emboli (H) - coumadin lovenox 2017    provoked after knee replacement       Past Surgical History:   Procedure Laterality Date     adhesioloysis       CHOLECYSTECTOMY       COLONOSCOPY       IMPLANT DEEP BRAIN STIMULATION GENERATOR / BATTERY Right 2018    Procedure: IMPLANT DEEP BRAIN STIMULATION GENERATOR / BATTERY;  Deep Brain Stimulator Placement, Phase II, Placement Of Deep Brain Stimulator Generator/Battery Over The Right Chest Wall;  Surgeon: Jerry Concepcion MD;  Location: [facility] OR     JOINT REPLACEMENT Right     right partial knee replacement     LAPAROSCOPY      adhesioloysis     LAPAROSCOPY      supracervical hysterectomy     LAPAROTOMY EXPLORATORY Left     partial removal of left ovary     OPTICAL TRACKING SYSTEM INSERTION DEEP BRAIN STIMULATION Right 2018    Procedure: OPTICAL TRACKING SYSTEM INSERTION DEEP BRAIN STIMULATION;  Stealth Assisted Right Deep Brain Stimulator Placement, Phase I, Placement Of Right Side Deep Brain Stimulator Electrode, Target Right Globus Pallidus Internus With Microelectrode Recording;  Surgeon: Jerry Concepcion MD;  Location: [facility] OR     REMOVE INTRAUTERINE DEVICE  2006     salpingoopherectomy         Family History   Problem Relation Age of Onset     Breast Cancer Mother        Social History     Tobacco Use     Smoking status: Former Smoker     Packs/day: 0.50     Years: 20.00     Pack years: 10.00     Types: Cigarettes     Last attempt to quit: 2009     Years since quittin.3     Smokeless tobacco: Never Used   Substance Use Topics     Alcohol use: No       No current facility-administered medications for this encounter.      Current Outpatient Medications   Medication     amantadine (SYMMETREL) 100 MG  capsule     busPIRone (BUSPAR) 10 MG tablet     busPIRone (BUSPAR) 10 MG tablet     carbidopa-levodopa (SINEMET)  MG per tablet     FLUoxetine (PROZAC) 20 MG capsule     hydrochlorothiazide (HYDRODIURIL) 25 MG tablet     pramipexole (MIRAPEX) 0.5 MG tablet     Acetaminophen (TYLENOL PO)     calcium carbonate (TUMS) 500 MG chewable tablet     LORazepam (ATIVAN) 0.5 MG tablet     ondansetron (ZOFRAN) 4 MG tablet        Allergies   Allergen Reactions     Atorvastatin Muscle Pain (Myalgia)     Other reaction(s): Myalgia  Per PCP note 7/24/18 - is to resume simvastatin - monitor for myalgia     Codeine Itching     Mold        Results for orders placed or performed during the hospital encounter of 12/26/18   XR Chest 2 Views    Narrative    Preliminary report: Deep brain stimulator device over the right chest  wall with lead coursing superiorly and not crossing the midline.   MR Brain w/o & w Contrast    Narrative    Preliminary report: no emergent findings. Full report to follow.   CBC with platelets differential   Result Value Ref Range    WBC 6.7 4.0 - 11.0 10e9/L    RBC Count 4.94 3.8 - 5.2 10e12/L    Hemoglobin 15.1 11.7 - 15.7 g/dL    Hematocrit 46.0 35.0 - 47.0 %    MCV 93 78 - 100 fl    MCH 30.6 26.5 - 33.0 pg    MCHC 32.8 31.5 - 36.5 g/dL    RDW 13.4 10.0 - 15.0 %    Platelet Count 220 150 - 450 10e9/L    Diff Method Automated Method     % Neutrophils 54.4 %    % Lymphocytes 37.8 %    % Monocytes 4.3 %    % Eosinophils 2.8 %    % Basophils 0.6 %    % Immature Granulocytes 0.1 %    Nucleated RBCs 0 0 /100    Absolute Neutrophil 3.6 1.6 - 8.3 10e9/L    Absolute Lymphocytes 2.5 0.8 - 5.3 10e9/L    Absolute Monocytes 0.3 0.0 - 1.3 10e9/L    Absolute Eosinophils 0.2 0.0 - 0.7 10e9/L    Absolute Basophils 0.0 0.0 - 0.2 10e9/L    Abs Immature Granulocytes 0.0 0 - 0.4 10e9/L    Absolute Nucleated RBC 0.0    Comprehensive metabolic panel   Result Value Ref Range    Sodium 141 133 - 144 mmol/L    Potassium 3.6 3.4 -  "5.3 mmol/L    Chloride 108 94 - 109 mmol/L    Carbon Dioxide 27 20 - 32 mmol/L    Anion Gap 7 3 - 14 mmol/L    Glucose 98 70 - 99 mg/dL    Urea Nitrogen 15 7 - 30 mg/dL    Creatinine 0.80 0.52 - 1.04 mg/dL    GFR Estimate 80 >60 mL/min/[1.73_m2]    GFR Estimate If Black >90 >60 mL/min/[1.73_m2]    Calcium 8.4 (L) 8.5 - 10.1 mg/dL    Bilirubin Total 0.5 0.2 - 1.3 mg/dL    Albumin 3.5 3.4 - 5.0 g/dL    Protein Total 6.9 6.8 - 8.8 g/dL    Alkaline Phosphatase 100 40 - 150 U/L    ALT 30 0 - 50 U/L    AST 33 0 - 45 U/L         I have reviewed the Medications, Allergies, Past Medical and Surgical History, and Social History in the Epic system.    Review of Systems   Constitutional: Negative for fever.   HENT: Positive for hearing loss (right).    Respiratory: Negative for shortness of breath.    Cardiovascular: Negative for chest pain.   Gastrointestinal: Negative for abdominal pain.   Neurological: Positive for dizziness.   All other systems reviewed and are negative.      Physical Exam   BP: 161/88  Pulse: 60  Temp: 98.1  F (36.7  C)  Resp: 16  Height: 165.1 cm (5' 5\")  Weight: 104.1 kg (229 lb 9.6 oz)  SpO2: 97 %      Physical Exam   Constitutional: No distress.   HENT:   Head: Atraumatic.   Mouth/Throat: Oropharynx is clear and moist. No oropharyngeal exudate.   Eyes: EOM are normal. Pupils are equal, round, and reactive to light. No scleral icterus.   Cardiovascular: Normal heart sounds and intact distal pulses.   Pulmonary/Chest: Breath sounds normal. No respiratory distress.   Abdominal: Soft. Bowel sounds are normal. There is no tenderness.   Musculoskeletal: She exhibits no edema or tenderness.   Skin: Skin is warm. No rash noted. She is not diaphoretic.   When you nerves II through Xii intact except for cranial nerve VIII cannot be assessed, rest of neuro exam is intact.    ED Course patient remained the same in the ED.  MRI done at its normal.  Patient will be discharged to follow-up with ENT and neurology in " the morning.        Procedures                 Labs Ordered and Resulted from Time of ED Arrival Up to the Time of Departure from the ED - No data to display         Assessments & Plan (with Medical Decision Making): Right hearing loss.  She to follow-up with ENT and neurology in the morning.       I have reviewed the nursing notes.    I have reviewed the findings, diagnosis, plan and need for follow up with the patient.       Medication List      There are no discharge medications for this visit.         Final diagnoses:   Unilateral sensorineural hearing loss   ILalit, am serving as a trained medical scribe to document services personally performed by Eriberto Robb DO, based on the provider's statements to me.      IEriberto DO, was physically present and have reviewed and verified the accuracy of this note documented by Lalit Taylor.       12/26/2018   [facility], [place], EMERGENCY DEPARTMENT     Eriberto Howard DO  12/27/18 0212

## 2019-01-16 ENCOUNTER — TELEPHONE (OUTPATIENT)
Dept: NEUROLOGY | Facility: CLINIC | Age: 59
End: 2019-01-16

## 2019-01-16 NOTE — TELEPHONE ENCOUNTER
Patient left me a voice message wondering if she should come to tomorrow's appointment on or off medication. Per Karina Rivas today, patient should come to appointment ON medication tomorrow for DBS programming.     I left a voicemail on her cell phone with this information.    Shanice Contreras RN, MPH  Research Nurse, Movement Disorders

## 2019-01-17 ENCOUNTER — OFFICE VISIT (OUTPATIENT)
Dept: NEUROLOGY | Facility: CLINIC | Age: 59
End: 2019-01-17
Payer: MEDICARE

## 2019-01-17 VITALS
WEIGHT: 234.4 LBS | HEIGHT: 65 IN | SYSTOLIC BLOOD PRESSURE: 142 MMHG | OXYGEN SATURATION: 98 % | TEMPERATURE: 97.8 F | BODY MASS INDEX: 39.05 KG/M2 | DIASTOLIC BLOOD PRESSURE: 78 MMHG | RESPIRATION RATE: 16 BRPM | HEART RATE: 78 BPM

## 2019-01-17 DIAGNOSIS — G20.A1 PARKINSON'S DISEASE (H): ICD-10-CM

## 2019-01-17 DIAGNOSIS — Z96.89 S/P DEEP BRAIN STIMULATOR PLACEMENT: Primary | ICD-10-CM

## 2019-01-17 DIAGNOSIS — F41.9 ANXIETY: ICD-10-CM

## 2019-01-17 DIAGNOSIS — H90.5 UNILATERAL SENSORINEURAL HEARING LOSS: ICD-10-CM

## 2019-01-17 RX ORDER — ROSUVASTATIN CALCIUM 20 MG/1
20 TABLET, COATED ORAL
COMMUNITY
Start: 2019-01-11 | End: 2020-08-12

## 2019-01-17 ASSESSMENT — UNIFIED PARKINSONS DISEASE RATING SCALE (UPDRS)
HANDMOVEMENTS_RIGHT: SLIGHT: ANY OF THE FOLLOWING: A) THE REGULAR RHYTHM IS BROKEN WITH ONE WITH ONE OR TWO INTERRUPTIONS OR HESITATIONS OF THE MOVEMENT B) SLIGHT SLOWING C) THE AMPLITUDE DECREMENTS NEAR THE END OF THE 10 MOVEMENTS.
RIGIDITY_RUE: SLIGHT: RIGIDITY ONLY DETECTED WITH ACTIVATION MANEUVER.
PRONATION_SUPINATION_LEFT: NORMAL
TOETAPPING_RIGHT: NORMAL
TOTAL_SCORE: 5
FINGER_TAPPING_RIGHT: NORMAL
FACIAL_EXPRESSION: MODERATE: MASKED FACIES WITH LIPS PARTED SOME OF THE TIME WHEN THE MOUTH IS AT REST.
SPEECH: SLIGHT: LOSS OF MODULATION, DICTION OR VOLUME, BUT STILL ALL WORDS EASY TO UNDERSTAND.
SPONTANEITY_OF_MOVEMENT: 0: NORMAL.  NO PROBLEMS.
CONSTANCY_TREMOR_ATREST: NORMAL: NO TREMOR.
RIGIDITY_LLE: SLIGHT: RIGIDITY ONLY DETECTED WITH ACTIVATION MANEUVER.
AMPLITUDE_LUE: NORMAL: NO TREMOR.
RIGIDITY_LUE: NORMAL
ARISING_CHAIR: NORMAL: ABLE TO ARISE QUICKLY WITHOUT HESITATION.
AMPLITUDE_RUE: NORMAL: NO TREMOR.
LEG_AGILITY_RIGHT: NORMAL
PARKINSONS_MEDS: ON
POSTURE: 1 SLIGHT.  NOT QUITE ERECT BUT COULD BE NORMAL FOR OLDER PERSONS.
TOETAPPING_LEFT: NORMAL
HANDMOVEMENTS_LEFT: SLIGHT: ANY OF THE FOLLOWING: A) THE REGULAR RHYTHM IS BROKEN WITH ONE WITH ONE OR TWO INTERRUPTIONS OR HESITATIONS OF THE MOVEMENT B) SLIGHT SLOWING C) THE AMPLITUDE DECREMENTS NEAR THE END OF THE 10 MOVEMENTS.
AMPLITUDE_RLE: NORMAL: NO TREMOR.
AMPLITUDE_LIP_JAW: NORMAL: NO TREMOR.
RIGIDITY_RLE: NORMAL
AXIAL_SCORE: 7
TOTAL_SCORE_LEFT: 4
AMPLITUDE_LLE: NORMAL: NO TREMOR.
POSTURAL_STABILITY: SLIGHT: 3-5 STEPS, BUT RECOVERS UNAIDED.
FINGER_TAPPING_LEFT: SLIGHT: ANY OF THE FOLLOWING: A) THE REGULAR RHYTHM IS BROKEN WITH ONE WITH ONE OR TWO INTERRUPTIONS OR HESITATIONS OF THE MOVEMENT B) SLIGHT SLOWING C) THE AMPLITUDE DECREMENTS NEAR THE END OF THE 10 MOVEMENTS.
TOTAL_SCORE: 16
RIGIDITY_NECK: NORMAL
PRONATION_SUPINATION_RIGHT: SLIGHT: ANY OF THE FOLLOWING: A) THE REGULAR RHYTHM IS BROKEN WITH ONE WITH ONE OR TWO INTERRUPTIONS OR HESITATIONS OF THE MOVEMENT B) SLIGHT SLOWING C) THE AMPLITUDE DECREMENTS NEAR THE END OF THE 10 MOVEMENTS.
GAIT: SLIGHT: INDEPENDENT WALKING WITH MINOR GAIT IMPAIRMENT.
FREEZING_GAIT: NORMAL
LEG_AGILITY_LEFT: NORMAL

## 2019-01-17 ASSESSMENT — MIFFLIN-ST. JEOR: SCORE: 1634.11

## 2019-01-17 ASSESSMENT — PAIN SCALES - GENERAL: PAINLEVEL: NO PAIN (0)

## 2019-01-17 NOTE — PATIENT INSTRUCTIONS
January 17, 2019    Dear MsTuyet Erika NIELSON Joe,    Thank you for coming today.  During your visit, we have discussed the following:     __  When you get home, charge your patient .  __  Your DBS electrical system function (impedance) is normal. The battery life is also good.  __  No DBS programming changes were made.  __  If your surgeon is using cautery, have him contact [facility] -- [phone].  __  Tomorrow, before your ankle surgery, change your patient  to surgery mode.   __  A week after your ankle surgery, when things are stable, turn up your DBS current/amplitude to 3.4 mA.  __  Stay on the same dose of antiparkinsonian medications.    PD Medications 7 am 12 pm 4 pm 9 pm   Sinemet 25/100 mg  1.5 1.5 1.5 1.5   Amantadine 100 mg 1  1    Pramipexole 0.5 mg    2     __  Shanice Contreras RN will contact you for your next Clinical Core visit.  It will be around March/April.     To contact our clinic, you may call [phone] or use [employer] message.     It's good to see you!       GUILLERMINA Krueger, CNP  [facility]

## 2019-01-17 NOTE — Clinical Note
2019       RE: Erika Diaz  [address] [place] [id number]     Dear Colleague,    Thank you for referring your patient, Erika Diaz, to the [facility] NEUROLOGY at [facility]. Please see a copy of my visit note below.    PATIENT: Erika Diaz    : 1960    GINNY: 2019    REASON FOR VISIT:  Deep Brain Stimulation (DBS) Programming follow up for Parkinson's disease (PD.)    HPI: Ms. Erika Diaz is a 58 year old right - handed  female who came to the [facility] neurology clinic accompanied by her adam for PD and DBS follow up visit.  She was last seen in the clinic on *** by *** for ***.  During that visit, the following were discussed: -      About 3 weeks ago, while she was at work, she had ringing in her right ear for 30 minutes and she lost her hearing.  She was seen by ENT and so far, she had 2 cortisone shots.  S    She has turned down 3.4 to 3.2 mA.    She wakes up       PD Medications 7 am 12 pm 4 pm 9 pm   Sinemet 25/100 mg  1.5 1.5 1.5 1.5   Amantadine 100 mg 1      Pramipexole 0.5 mg           On days she was waking, she took 2 tables.  When she is not working she forgets to take the 4 pm dose.    When she got back to work she was very anxious & was taking Lorazepam daily while at works.  In the last 2 weeks, she has been taking it about twice a week while at work.   She reports crying.    MEDICATIONS:   Outpatient Medications Marked as Taking for the 19 encounter (Office Visit) with Arminda Rivas APRN CNP   Medication Sig     Acetaminophen (TYLENOL PO) Take 1,000 mg by mouth every 8 hours as needed for mild pain or fever     amantadine (SYMMETREL) 100 MG capsule 1 capsule orally @ 7am and 4pm     aspirin (ASA) 81 MG tablet Take 1 tablet (81 mg) by mouth daily     busPIRone (BUSPAR) 10 MG tablet TAKE 1 TABLET BY MOUTH AT 7 AM AND AT 4 PM     busPIRone (BUSPAR) 10 MG tablet Take 10 mg by mouth 2  "times daily 1 Tab @ 7am and 1 tab @ 4pm     calcium carbonate (TUMS) 500 MG chewable tablet Take 2 chew tab by mouth as needed for heartburn     carbidopa-levodopa (SINEMET)  MG per tablet Take 1.5 tablets by mouth 4 times daily 3 tabs @7, noon, 4p and 9pm and a few as needed = 13/day x 90 = 1170tablets     FLUoxetine (PROZAC) 20 MG capsule Take 20 mg by mouth every morning Take 20mg capsule  @ 7am     hydrochlorothiazide (HYDRODIURIL) 25 MG tablet Take 25 mg by mouth At Bedtime @9 pm     LORazepam (ATIVAN) 0.5 MG tablet Take 1 tablet (0.5 mg) by mouth nightly as needed for sleep     ondansetron (ZOFRAN) 4 MG tablet Take 1 tablet (4 mg) by mouth every 8 hours as needed for nausea     pramipexole (MIRAPEX) 0.5 MG tablet 2 tabs orally @ 9 pm     rosuvastatin (CRESTOR) 20 MG tablet Take 20 mg by mouth       ALLERGIES: Atorvastatin; Codeine; and Mold    PAST MEDICAL HISTORY, PAST SURGICAL HISTORY, FAMILY HISTORY AND SOCIAL HISTORY:  Reviewed in Epic.  Sensory loss was added to her medical Hx.       PHYSICAL EXAM:    VITAL SIGNS:  Blood pressure (!) 150/94, pulse 89, temperature 97.8  F (36.6  C), temperature source Oral, resp. rate 16, height 1.651 m (5' 5\"), weight 106.3 kg (234 lb 6.4 oz), SpO2 98 %, not currently breastfeeding., Body mass index is 39.01 kg/m .    GENERAL:  Ms. Diaz is a pleasant  female who is well-groomed and well-developed sitting comfortably in the exam room without any distress.  Affect is appropriate.    MDS Part II  -- Over the last week -- 0: Normal -- 1: Slight -- 2: Mild -- 3: Moderate -- 4: Severe     2.1 Speech: 0   2.2 Saliva and droolin   2.3 Chewing and swallowin   2.4 Eating tasks: 0   2.5 Dressin   2.6 Hygiene:  0   2.7 Handwritin   2.8 Doing hobbies and other activities:  0   2.9 Turning in bed:  0   2.10 Tremor:  1   2.11 Getting out of bed:  2   2.12 Walking and balance: 1   2.13 Freezin     Total:    7         Last antiparkinsonian dose " taken:    Amantadine 100 mg -- 1/17 at 6am  Sinemet 25/100 mg -- 1/17 at 6am  Pramipexole 0.5 mg -- 1/16 at 10pm    MOVEMENT DISORDERS ASSESSMENT: (Last Sinemet was about 3.5 hrs ago)  UPDRS Values 1/17/2019   Time: 9:47 AM   Medication On   R Brain DBS: On   L Brain DBS: None   Speech 1   Facial Expression 3   Rigidity Neck 0   Rigidity RUE 1   Rigidity LUE 0   Rigidity RLE 0   Rigidity LLE 1   Finger Taps R 0   Finger Taps L 1   Hand Mvt R 1   Hand Mvt L 1   Pron-/Supinate R 1   Pron-/Supinate L 0   Toe Tap R 0   Toe Tap L 0   Leg Agility R 0   Leg Agility L 0   Arise From Chair 0   Gait 1   Gait Freezing 0   Postural Stability 1   Posture 1   Global Spont Mvt 0   Postural Tremor RUE 1   Postural Tremor LUE 0   Kinetic Tremor RUE 1   Kinetic Tremor LUE 1   Rest Tremor RUE 0   Rest Tremor LUE 0   Rest Tremor RLE 0   Rest Tremor LLE 0   Rest Tremor Lip/Jaw 0   Rest Tremor Constancy 0   Total Right 5   Total Left 4   Axial Total 7   Total 16     Dyskinesias:  Absent.     DBS Interrogation & Analysis:    Implanted generator:  Right chest Infinity [id number]  Lead placement:  Left Globus Pallidus Interus (Gpi).     Right Brain   Lead placement date:  8/21/2018  Generator placement date:  8/30/2018    C +, 10abc -  Current:  3.0 mA  PW:  60 msec  Rate:  130 Hz  Therapy impedance:  1200 Ohms      Therapy On:  57%  Battery Service Life:  OK.  2.95 volts.    Patient :  Upper Limit: 3.80 mA  Lower Limit: 3.00 mA    Electrode Impedance Check:   Right Lead: No Problems Found.      See scanned report for impedance details.    ASSESSMENT:    1)  Parkinson's Disease:  Patient has a *** year history of PD.    2)  ***:    3)  ***:    4)  ***:     PLAN:    1)      2)  ***  3)  ***  4)  ***    Will return to our clinic in *** months or sooner as needed.    The total time spent with the patient was *** minutes, and greater than 50% of this time was spent in counseling and coordination of care.    Karina Rivas, APRN,  CNP  [facility]    8:53 AM    Again, thank you for allowing me to participate in the care of your patient.      Sincerely,    GUILLERMINA Valero CNP

## 2019-01-17 NOTE — PROGRESS NOTES
PATIENT: Erika Diaz    : 1960    GINNY: 2019    REASON FOR VISIT:  Deep Brain Stimulation (DBS) Programming follow up for Parkinson's disease (PD.)    HPI: Ms. Erika Diaz is a 58 year old right - handed  female who came to the [facility] neurology clinic accompanied by her  for PD and DBS follow up visit.  I saw her last in the clinic on 2018 for DBS programming f/u visit.     Since her last visit, she was in ER on 2018 at [facility] for Rt ear Tinnitus and hearing loss.  While she was at work, she had ringing in her right ear for 30 minutes and she lost her hearing.  She was seen by ENT and so far, she had 2 cortisone shots.  She has some hearing back, but the words are not clear.     During hearing problems, she has turned down her DBS amplitude from 3.4 to 3.2 mA.  She reports experiencing tremor when she wakes up.    PD Medications 7 am 12 pm 4 pm 9 pm   Sinemet 25/100 mg  1.5 1.5 1.5 1.5   Amantadine 100 mg 1      Pramipexole 0.5 mg         On work days, she takes extra 2 tables.  When she is not working, she forgets to take the 4 pm dose.    Overall, anxiety is controlled.  When she got back to work she was very anxious & was taking Lorazepam daily while at works.  In the last 2 weeks, she has been taking it about twice a week while at work.  She reports crying as she's going through a lot.      She is scheduled to have surgery on her Rt ankle tomorrow.      MEDICATIONS:   Medication Sig     Acetaminophen (TYLENOL PO) Take 1,000 mg by mouth every 8 hours as needed for mild pain or fever     amantadine (SYMMETREL) 100 MG capsule 1 capsule orally @ 7am and 4pm     aspirin (ASA) 81 MG tablet Take 1 tablet (81 mg) by mouth daily     busPIRone (BUSPAR) 10 MG tablet TAKE 1 TABLET BY MOUTH AT 7 AM AND AT 4 PM     busPIRone (BUSPAR) 10 MG tablet Take 10 mg by mouth 2 times daily 1 Tab @ 7am and 1 tab @ 4pm     calcium carbonate (TUMS) 500 MG chewable  "tablet Take 2 chew tab by mouth as needed for heartburn     carbidopa-levodopa (SINEMET)  MG per tablet Take 1.5 tablets by mouth 4 times daily 3 tabs @7, noon, 4p and 9pm and a few as needed = 13/day x 90 = 1170tablets     FLUoxetine (PROZAC) 20 MG capsule Take 20 mg by mouth every morning Take 20mg capsule  @ 7am     hydrochlorothiazide (HYDRODIURIL) 25 MG tablet Take 25 mg by mouth At Bedtime @9 pm     LORazepam (ATIVAN) 0.5 MG tablet Take 1 tablet (0.5 mg) by mouth nightly as needed for sleep     ondansetron (ZOFRAN) 4 MG tablet Take 1 tablet (4 mg) by mouth every 8 hours as needed for nausea     pramipexole (MIRAPEX) 0.5 MG tablet 2 tabs orally @ 9 pm     rosuvastatin (CRESTOR) 20 MG tablet Take 20 mg by mouth       ALLERGIES: Atorvastatin; Codeine; and Mold    PAST MEDICAL HISTORY, PAST SURGICAL HISTORY, FAMILY HISTORY AND SOCIAL HISTORY:  Reviewed in Epic.  Sensory loss was added to her medical Hx.       PHYSICAL EXAM:    VITAL SIGNS:  Blood pressure (!) 150/94, pulse 89, temperature 97.8  F (36.6  C), temperature source Oral, resp. rate 16, height 1.651 m (5' 5\"), weight 106.3 kg (234 lb 6.4 oz), SpO2 98 %, ., Body mass index is 39.01 kg/m .    GENERAL:  Ms. Diaz is a pleasant  female who is well-groomed and well-developed sitting comfortably in the exam room without any distress.  Affect is appropriate.    MDS Part II  -- Over the last week -- 0: Normal -- 1: Slight -- 2: Mild -- 3: Moderate -- 4: Severe     2.1 Speech: 0   2.2 Saliva and droolin   2.3 Chewing and swallowin   2.4 Eating tasks: 0   2.5 Dressin   2.6 Hygiene:  0   2.7 Handwritin   2.8 Doing hobbies and other activities:  0   2.9 Turning in bed:  0   2.10 Tremor:  1   2.11 Getting out of bed:  2   2.12 Walking and balance: 1   2.13 Freezin     Total:    7         Last antiparkinsonian dose taken:    Amantadine 100 mg --  at 6am  Sinemet 25/100 mg --  at 6am  Pramipexole 0.5 mg --  at " 10pm    MOVEMENT DISORDERS ASSESSMENT: (Last Sinemet was about 3.5 hrs ago)  UPDRS Values 1/17/2019   Time: 9:47 AM   Medication On   R Brain DBS: On   L Brain DBS: None   Speech 1   Facial Expression 3   Rigidity Neck 0   Rigidity RUE 1   Rigidity LUE 0   Rigidity RLE 0   Rigidity LLE 1   Finger Taps R 0   Finger Taps L 1   Hand Mvt R 1   Hand Mvt L 1   Pron-/Supinate R 1   Pron-/Supinate L 0   Toe Tap R 0   Toe Tap L 0   Leg Agility R 0   Leg Agility L 0   Arise From Chair 0   Gait 1   Gait Freezing 0   Postural Stability 1   Posture 1   Global Spont Mvt 0   Postural Tremor RUE 1   Postural Tremor LUE 0   Kinetic Tremor RUE 1   Kinetic Tremor LUE 1   Rest Tremor RUE 0   Rest Tremor LUE 0   Rest Tremor RLE 0   Rest Tremor LLE 0   Rest Tremor Lip/Jaw 0   Rest Tremor Constancy 0   Total Right 5   Total Left 4   Axial Total 7   Total 16     Dyskinesias:  Absent.     DBS Interrogation & Analysis:    Implanted generator:  Right chest Infinity [id number]  Lead placement:  Right Globus Pallidus Internus (Gpi).     Right Brain   Lead placement date:  8/21/2018  Generator placement date:  8/30/2018    C +, 10abc -  Current:  3.0 mA  PW:  60 msec  Rate:  130 Hz  Therapy impedance:  1200 Ohms      Therapy On:  57%  Battery Service Life:  OK.  2.95 volts.    Patient :  Upper Limit: 3.80 mA  Lower Limit: 3.00 mA    Electrode Impedance Check:   Right Lead: No Problems Found.      See scanned report for impedance details.    ASSESSMENT:    1)  Parkinson's Disease:  Patient has a history of PD since 2008- 2009.  Overall motor symptoms are stable.  Since she has reduced her DBS current/amplitude from 3.4 to 3.2 mA, she has noticed tremors in the moring.  Other motor symptoms are stable.       2)  S/p Right Gpi DBS lead placement:  Impedance & battery life are WNL.  Opted not to make any changes as pt is going to have Rt ankle surgery tomorrow.      3)  Right ear sensory neural healing loss:  Some benefit with steroid injection.   Following up with ENT.      4)  Anxiety:  Ongoing problem.  Was slightly worse going back to work after DBS and with recent hearing loss.  Managed with PRN Lorazepam.     PLAN:    The following plan provided: -    __  When you get home, charge your patient .  __  Your DBS electrical system function (impedance) is normal. The battery life is also good.  __  No DBS programming changes were made.  __  If your surgeon is using cautery, have him contact [facility] -- [phone].  __  Tomorrow, before your ankle surgery, change your patient  to surgery mode.   __  A week after your ankle surgery, when things are stable, turn up your DBS current/amplitude to 3.4 mA.  __  Stay on the same dose of antiparkinsonian medications.    PD Medications 7 am 12 pm 4 pm 9 pm   Sinemet 25/100 mg  1.5 1.5 1.5 1.5   Amantadine 100 mg 1  1    Pramipexole 0.5 mg    2     __  Shanice Contreras RN will contact you for your next Clinical Core visit.  It will be around March/April.     May return to our clinic sooner as needed.    The total amount of time spent with the patient was 90 minutes; 20 min in DBS interrogation & analysis and 70 min in addressing PD, anxiety, showing how to operate DBS , & providing support for hearing loss.  50% of the time was spent in counseling & coordination of care.     GUILLERMINA Krueger,  CNP  [facility]    8:59 AM  10:30 AM

## 2019-01-17 NOTE — NURSING NOTE
Chief Complaint   Patient presents with     DBS Programming     [employer] RETURN DBS PROGRAMMING     Jd Garcia, EMT

## 2019-01-24 ENCOUNTER — TELEPHONE (OUTPATIENT)
Dept: NEUROLOGY | Facility: CLINIC | Age: 59
End: 2019-01-24

## 2019-01-24 NOTE — TELEPHONE ENCOUNTER
"I received the following email after confirming pt's next appts:  Thursday March 7th at 7am (videotaped on/off testing)  Friday March 1st for 6 month neuropsych for the clinical core study:      Per Patient:  \"That sounds good to me. I had my ankle surgery last Friday but by Sunday I was in the ER with a chest pain when I swallowed. This is due to getting PE s again and the first day to get the Lovenox I needed to prevent this. The surgeon and The anesthesiologist were told by me numerous times that I needed this due to the knee partial replacement surgery issues but they never gave me any. A big mistake on their part. One of these PE s could have broke loose and killed me . Talk to you again laterKendra\"      Will update provider and start event form for clinical core study.     Shanice Contreras, RN, MPH  Research Nurse, Movement Disorders    "

## 2019-02-21 ASSESSMENT — MOVEMENT DISORDERS SOCIETY - UNIFIED PARKINSONS DISEASE RATING SCALE (MDS-UPDRS)
TREMOR: NORMAL: NOT AT ALL (NO PROBLEMS).
GETTING_OUT_OF_BED_CAR_DEEP_CHAIR: SLIGHT: I AM SLOW OR AWKWARD, BUT I USUALLY CAN DO IT ON MY FIRST TRY.
WALKING_AND_BALANCE: SLIGHT: I AM SLIGHTLY SLOW OR MAY DRAG A LEG.  I NEVER USE A WALKING AID.
SALIVA_AND_DROOLING: SLIGHT: I HAVE TOO MUCH SALIVA, BUT DO NOT DROOL.
TOTAL_SCORE: 5
SPEECH: NORMAL: NOT AT ALL (NO PROBLEMS).
FREEZING: NORMAL: NOT AT ALL (NO PROBLEMS).
EATING_TASKS: NORMAL: NOT AT ALL (NO PROBLEMS).
HYGIENE: NORMAL: NOT AT ALL (NO PROBLEMS).
DRESSING: NORMAL: NOT AT ALL (NO PROBLEMS).
HANDWRITING: NORMAL: NOT AT ALL (NO PROBLEMS).
HOBBIES_AND_OTHER_ACTIVITIES: NORMAL:  NOT AT ALL (NO PROBLEMS).
TURNING_IN_BED: SLIGHT: I HAVE A BIT OF TROUBLE TURNING, BUT I DO NOT NEED ANY HELP.
CHEWING_AND_SWALLOWING: SLIGHT: I AM AWARE OF SLOWNESS IN MY CHEWING OR INCREASED EFFORT AT SWALLOWING, BUT I DO NOT CHOKE OR NEED TO HAVE MY FOOD SPECIALLY PREPARED.

## 2019-02-25 ENCOUNTER — TELEPHONE (OUTPATIENT)
Dept: NEUROLOGY | Facility: CLINIC | Age: 59
End: 2019-02-25

## 2019-02-25 NOTE — TELEPHONE ENCOUNTER
Returned patient's call regarding upcoming appointments. She has another two weeks of staying off her R ankle after surgery so we have rescheduled her 6 month videotaped ON/OFF testing for clinical core to April 1st--deadline for the study would be April 7th. She will be coming in for 6 month neuropsych on March 1st. I confirmed with her that she should take all meds as normal for her neuropsych on March 1st, but to hold her Sinemet and Mirapex for 12 hours prior to the ON/OFF testing visit.     She also turned her stimulator back up to 3.4 mA which had been the recommendation by GUILLERMINA Desir; pt has had no tremor and no issues with her vision as before when she had turned up to 3.4 mA. She is happy with this setting.     She's been on warfarin and getting INR's twice a week since her PE. She is unsure how long she will be on warfarin.

## 2019-03-01 ENCOUNTER — OFFICE VISIT (OUTPATIENT)
Dept: NEUROPSYCHOLOGY | Facility: CLINIC | Age: 59
End: 2019-03-01
Payer: MEDICARE

## 2019-03-01 DIAGNOSIS — Z00.6 EXAMINATION OF PARTICIPANT IN CLINICAL TRIAL: Primary | ICD-10-CM

## 2019-03-01 NOTE — PROGRESS NOTES
The patient was seen for neuropsychological evaluation at the request of Arminda Rivas for the purposes of diagnostic clarification and treatment planning. 70 minutes of test administration and scoring were provided by this writer, Aishwarya Hodges.  Please see Dr. Amanda Hernandez's report for a full interpretation of the findings.

## 2019-03-11 NOTE — PROGRESS NOTES
The patient completed the 6 month neuropsychological evaluation for the [place] Clinical Core. There was 1 hour and 10 minutes of psychometrist time. OnCore ID: [id number]     Measures administered:    WAIS-IV: Letter Number Sequencing, Digit Span  WMS-R Information & Orientation  D-KEFS Verbal Fluency  Clock Drawing  Trail Making Test  Stroop  Harris Verbal Learning Test - Revised  MoCA v 7.3    BDI-2  QUIP  ESS  RBDSQ  PDQ-39  Apathy Scale

## 2019-03-24 DIAGNOSIS — G20.A1 PARALYSIS AGITANS (H): ICD-10-CM

## 2019-03-24 DIAGNOSIS — F41.1 GAD (GENERALIZED ANXIETY DISORDER): Primary | ICD-10-CM

## 2019-03-26 RX ORDER — BUSPIRONE HYDROCHLORIDE 10 MG/1
TABLET ORAL
Qty: 180 TABLET | Refills: 3 | Status: SHIPPED | OUTPATIENT
Start: 2019-03-26 | End: 2020-04-02

## 2019-03-26 NOTE — TELEPHONE ENCOUNTER
Nurse received refill request on 3/26/19  for: Buspirone 10 mg    Pharmacy: [facility]    Last re-ordered: 12/10/2018 by Dr. Stratton    Last appointment: 1/17/19    Next appointment: 4/1/19    Action taken: Refill pended and routed to Movement Disorder provider GUILLERMINA Desir, NP

## 2019-03-29 ENCOUNTER — TELEPHONE (OUTPATIENT)
Dept: NEUROLOGY | Facility: CLINIC | Age: 59
End: 2019-03-29

## 2019-03-29 NOTE — TELEPHONE ENCOUNTER
I called patient with instructions regarding her upcoming visit with Karina Rivas on Monday 4/1/19 for videotaped ON/OFF motor testing for the Clinical Core study. Patient has already completed her 6M neuropsych visit.    I instructed patient to hold her doses of amantadine beginning tomorrow, 3/30. She should also skip her evening dose of Mirapex the night before the appointment, and hold her sinemet the morning of the appointment. I also asked her to bring her PD meds as well as her patient . Patient expressed understanding of instructions. She will bring both her sneaker and ankle boot for walking (she has been putting weight on her ankle now, but has a follow up appointment with podiatry on 4/2/19).    Pt noted that she will be asking for a refill of lorazepam at the appointment.    Shanice Contreras RN, MPH  Research Nurse, Movement Disorders

## 2019-04-01 ENCOUNTER — OFFICE VISIT (OUTPATIENT)
Dept: NEUROLOGY | Facility: CLINIC | Age: 59
End: 2019-04-01

## 2019-04-01 VITALS
RESPIRATION RATE: 18 BRPM | SYSTOLIC BLOOD PRESSURE: 134 MMHG | WEIGHT: 240.9 LBS | HEART RATE: 71 BPM | HEIGHT: 65 IN | BODY MASS INDEX: 40.14 KG/M2 | TEMPERATURE: 97.8 F | DIASTOLIC BLOOD PRESSURE: 62 MMHG

## 2019-04-01 DIAGNOSIS — G20.A1 PARKINSON'S DISEASE (H): ICD-10-CM

## 2019-04-01 DIAGNOSIS — Z96.89 S/P DEEP BRAIN STIMULATOR PLACEMENT: Primary | ICD-10-CM

## 2019-04-01 DIAGNOSIS — G47.9 SLEEP DISTURBANCES: ICD-10-CM

## 2019-04-01 DIAGNOSIS — F41.9 ANXIETY: ICD-10-CM

## 2019-04-01 RX ORDER — WARFARIN SODIUM 5 MG/1
TABLET ORAL
COMMUNITY
Start: 2019-04-01 | End: 2019-06-26

## 2019-04-01 RX ORDER — LORAZEPAM 0.5 MG/1
0.5 TABLET ORAL DAILY PRN
Qty: 30 TABLET | Refills: 5 | Status: SHIPPED | OUTPATIENT
Start: 2019-04-01 | End: 2019-09-17

## 2019-04-01 ASSESSMENT — UNIFIED PARKINSONS DISEASE RATING SCALE (UPDRS)
FINGER_TAPPING_LEFT: SLIGHT: ANY OF THE FOLLOWING: A) THE REGULAR RHYTHM IS BROKEN WITH ONE WITH ONE OR TWO INTERRUPTIONS OR HESITATIONS OF THE MOVEMENT B) SLIGHT SLOWING C) THE AMPLITUDE DECREMENTS NEAR THE END OF THE 10 MOVEMENTS.
FACIAL_EXPRESSION: MODERATE: MASKED FACIES WITH LIPS PARTED SOME OF THE TIME WHEN THE MOUTH IS AT REST.
RIGIDITY_LLE: SLIGHT: RIGIDITY ONLY DETECTED WITH ACTIVATION MANEUVER.
TOTAL_SCORE_LEFT: 10
FREEZING_GAIT: NORMAL
AMPLITUDE_LIP_JAW: NORMAL: NO TREMOR.
POSTURAL_STABILITY: MODERATE: STANDS SAFELY, BUT WITH ABSENCE OF POSTURAL RESPONSE,  FALLS IF NOT CAUGHT BY EXAMINER.
RIGIDITY_RLE: NORMAL
TOTAL_SCORE_LEFT: 14
PARKINSONS_MEDS: OFF
PRONATION_SUPINATION_RIGHT: MILD: ANY OF THE FOLLOWING: A) 3 TO 5 INTERRUPTIONS DURING TAPPING B) MILD SLOWING C) THE AMPLITUDE DECREMENTS MIDWAY IN THE 10-MOVEMENT SEQUENCE
HANDMOVEMENTS_LEFT: MILD: ANY OF THE FOLLOWING: A) 3 TO 5 INTERRUPTIONS DURING TAPPING B) MILD SLOWING C) THE AMPLITUDE DECREMENTS MIDWAY IN THE 10-MOVEMENT SEQUENCE
ARISING_CHAIR: SLIGHT: ARISING IS SLOWER THAN NORMAL, OR MAY NEED MORE THAN ONE ATTEMPT, OR MAY NEED TO MOVE FORWARD IN THE CHAIR TO ARISE.  NO NEED TO USE THE ARMS OF THE CHAIR.
AXIAL_SCORE: 8
AMPLITUDE_RLE: NORMAL: NO TREMOR.
RIGIDITY_NECK: SLIGHT: RIGIDITY ONLY DETECTED WITH ACTIVATION MANEUVER.
LEG_AGILITY_RIGHT: NORMAL
TOETAPPING_RIGHT: SLIGHT: ANY OF THE FOLLOWING: A) THE REGULAR RHYTHM IS BROKEN WITH ONE WITH ONE OR TWO INTERRUPTIONS OR HESITATIONS OF THE MOVEMENT B) SLIGHT SLOWING C) THE AMPLITUDE DECREMENTS NEAR THE END OF THE 10 MOVEMENTS.
LEG_AGILITY_LEFT: SLIGHT: ANY OF THE FOLLOWING: A) THE REGULAR RHYTHM IS BROKEN WITH ONE WITH ONE OR TWO INTERRUPTIONS OR HESITATIONS OF THE MOVEMENT B) SLIGHT SLOWING C) THE AMPLITUDE DECREMENTS NEAR THE END OF THE 10 MOVEMENTS.
FREEZING_GAIT: NORMAL
CONSTANCY_TREMOR_ATREST: NORMAL: NO TREMOR.
TOTAL_SCORE: 37
POSTURAL_STABILITY: MILD:  MORE THAN 5 STEPS, BUT SUBJECT RECOVERS UNAIDED.
FINGER_TAPPING_LEFT: MILD: ANY OF THE FOLLOWING: A) 3 TO 5 INTERRUPTIONS DURING TAPPING B) MILD SLOWING C) THE AMPLITUDE DECREMENTS MIDWAY IN THE 10-MOVEMENT SEQUENCE
CONSTANCY_TREMOR_ATREST: NORMAL: NO TREMOR.
AMPLITUDE_LIP_JAW: NORMAL: NO TREMOR.
FREEZING_GAIT: NORMAL
RIGIDITY_NECK: NORMAL
FACIAL_EXPRESSION: MODERATE: MASKED FACIES WITH LIPS PARTED SOME OF THE TIME WHEN THE MOUTH IS AT REST.
GAIT: SLIGHT: INDEPENDENT WALKING WITH MINOR GAIT IMPAIRMENT.
TOTAL_SCORE: 5
AMPLITUDE_RLE: NORMAL: NO TREMOR.
TOTAL_SCORE_LEFT: 6
PARKINSONS_MEDS: ON
TOTAL_SCORE: 26
TOTAL_SCORE: 9
ARISING_CHAIR: SLIGHT: ARISING IS SLOWER THAN NORMAL, OR MAY NEED MORE THAN ONE ATTEMPT, OR MAY NEED TO MOVE FORWARD IN THE CHAIR TO ARISE.  NO NEED TO USE THE ARMS OF THE CHAIR.
AMPLITUDE_LLE: NORMAL: NO TREMOR.
RIGIDITY_RUE: SLIGHT: RIGIDITY ONLY DETECTED WITH ACTIVATION MANEUVER.
RIGIDITY_RLE: NORMAL
POSTURE: 1 SLIGHT.  NOT QUITE ERECT BUT COULD BE NORMAL FOR OLDER PERSONS.
SPEECH: SLIGHT: LOSS OF MODULATION, DICTION OR VOLUME, BUT STILL ALL WORDS EASY TO UNDERSTAND.
FINGER_TAPPING_LEFT: MILD: ANY OF THE FOLLOWING: A) 3 TO 5 INTERRUPTIONS DURING TAPPING B) MILD SLOWING C) THE AMPLITUDE DECREMENTS MIDWAY IN THE 10-MOVEMENT SEQUENCE
AMPLITUDE_LIP_JAW: NORMAL: NO TREMOR.
TOTAL_SCORE: 19
LEG_AGILITY_RIGHT: SLIGHT: ANY OF THE FOLLOWING: A) THE REGULAR RHYTHM IS BROKEN WITH ONE WITH ONE OR TWO INTERRUPTIONS OR HESITATIONS OF THE MOVEMENT B) SLIGHT SLOWING C) THE AMPLITUDE DECREMENTS NEAR THE END OF THE 10 MOVEMENTS.
PARKINSONS_MEDS: OFF
RIGIDITY_LLE: MILD: RIGIDITY DETECTED WITHOUT THE ACTIVATION MANEUVER.  FULL RANGE OF MOTION IS EASILY ACHIEVED.
RIGIDITY_LLE: SLIGHT: RIGIDITY ONLY DETECTED WITH ACTIVATION MANEUVER.
AMPLITUDE_RLE: NORMAL: NO TREMOR.
AMPLITUDE_LLE: NORMAL: NO TREMOR.
AMPLITUDE_RUE: NORMAL: NO TREMOR.
SPONTANEITY_OF_MOVEMENT: 1: SLIGHT: SLIGHT GLOBAL SLOWNESS AND POVERTY OF SPONTANEOUS MOVEMENTS.
LEG_AGILITY_LEFT: NORMAL
HANDMOVEMENTS_RIGHT: SLIGHT: ANY OF THE FOLLOWING: A) THE REGULAR RHYTHM IS BROKEN WITH ONE WITH ONE OR TWO INTERRUPTIONS OR HESITATIONS OF THE MOVEMENT B) SLIGHT SLOWING C) THE AMPLITUDE DECREMENTS NEAR THE END OF THE 10 MOVEMENTS.
RIGIDITY_LUE: SLIGHT: RIGIDITY ONLY DETECTED WITH ACTIVATION MANEUVER.
TOTAL_SCORE: 5
ARISING_CHAIR: SLIGHT: ARISING IS SLOWER THAN NORMAL, OR MAY NEED MORE THAN ONE ATTEMPT, OR MAY NEED TO MOVE FORWARD IN THE CHAIR TO ARISE.  NO NEED TO USE THE ARMS OF THE CHAIR.
AMPLITUDE_LUE: NORMAL: NO TREMOR.
FACIAL_EXPRESSION: MODERATE: MASKED FACIES WITH LIPS PARTED SOME OF THE TIME WHEN THE MOUTH IS AT REST.
HANDMOVEMENTS_RIGHT: SLIGHT: ANY OF THE FOLLOWING: A) THE REGULAR RHYTHM IS BROKEN WITH ONE WITH ONE OR TWO INTERRUPTIONS OR HESITATIONS OF THE MOVEMENT B) SLIGHT SLOWING C) THE AMPLITUDE DECREMENTS NEAR THE END OF THE 10 MOVEMENTS.
RIGIDITY_LUE: NORMAL
AXIAL_SCORE: 14
LEG_AGILITY_LEFT: NORMAL
RIGIDITY_LUE: MILD: RIGIDITY DETECTED WITHOUT THE ACTIVATION MANEUVER.  FULL RANGE OF MOTION IS EASILY ACHIEVED.
SPEECH: SLIGHT: LOSS OF MODULATION, DICTION OR VOLUME, BUT STILL ALL WORDS EASY TO UNDERSTAND.
RIGIDITY_RUE: NORMAL
PRONATION_SUPINATION_RIGHT: SLIGHT: ANY OF THE FOLLOWING: A) THE REGULAR RHYTHM IS BROKEN WITH ONE WITH ONE OR TWO INTERRUPTIONS OR HESITATIONS OF THE MOVEMENT B) SLIGHT SLOWING C) THE AMPLITUDE DECREMENTS NEAR THE END OF THE 10 MOVEMENTS.
TOETAPPING_RIGHT: NORMAL
CONSTANCY_TREMOR_ATREST: NORMAL: NO TREMOR.
LEG_AGILITY_RIGHT: NORMAL
POSTURAL_STABILITY: NORMAL:  RECOVERS WITH ONE OR TWO STEPS.
TOETAPPING_LEFT: SLIGHT: ANY OF THE FOLLOWING: A) THE REGULAR RHYTHM IS BROKEN WITH ONE WITH ONE OR TWO INTERRUPTIONS OR HESITATIONS OF THE MOVEMENT B) SLIGHT SLOWING C) THE AMPLITUDE DECREMENTS NEAR THE END OF THE 10 MOVEMENTS.
PRONATION_SUPINATION_LEFT: MILD: ANY OF THE FOLLOWING: A) 3 TO 5 INTERRUPTIONS DURING TAPPING B) MILD SLOWING C) THE AMPLITUDE DECREMENTS MIDWAY IN THE 10-MOVEMENT SEQUENCE
POSTURE: 1 SLIGHT.  NOT QUITE ERECT BUT COULD BE NORMAL FOR OLDER PERSONS.
GAIT: MILD: INDEPENDENT WALKING BUT WITH SUBSTANTIAL GAIT IMPAIRMENT.
TOETAPPING_RIGHT: SLIGHT: ANY OF THE FOLLOWING: A) THE REGULAR RHYTHM IS BROKEN WITH ONE WITH ONE OR TWO INTERRUPTIONS OR HESITATIONS OF THE MOVEMENT B) SLIGHT SLOWING C) THE AMPLITUDE DECREMENTS NEAR THE END OF THE 10 MOVEMENTS.
FINGER_TAPPING_RIGHT: SLIGHT: ANY OF THE FOLLOWING: A) THE REGULAR RHYTHM IS BROKEN WITH ONE WITH ONE OR TWO INTERRUPTIONS OR HESITATIONS OF THE MOVEMENT B) SLIGHT SLOWING C) THE AMPLITUDE DECREMENTS NEAR THE END OF THE 10 MOVEMENTS.
AMPLITUDE_LLE: NORMAL: NO TREMOR.
AMPLITUDE_LUE: NORMAL: NO TREMOR.
TOETAPPING_LEFT: SLIGHT: ANY OF THE FOLLOWING: A) THE REGULAR RHYTHM IS BROKEN WITH ONE WITH ONE OR TWO INTERRUPTIONS OR HESITATIONS OF THE MOVEMENT B) SLIGHT SLOWING C) THE AMPLITUDE DECREMENTS NEAR THE END OF THE 10 MOVEMENTS.
GAIT: MILD: INDEPENDENT WALKING BUT WITH SUBSTANTIAL GAIT IMPAIRMENT.
AXIAL_SCORE: 11
PRONATION_SUPINATION_LEFT: MILD: ANY OF THE FOLLOWING: A) 3 TO 5 INTERRUPTIONS DURING TAPPING B) MILD SLOWING C) THE AMPLITUDE DECREMENTS MIDWAY IN THE 10-MOVEMENT SEQUENCE
AMPLITUDE_RUE: NORMAL: NO TREMOR.
HANDMOVEMENTS_RIGHT: SLIGHT: ANY OF THE FOLLOWING: A) THE REGULAR RHYTHM IS BROKEN WITH ONE WITH ONE OR TWO INTERRUPTIONS OR HESITATIONS OF THE MOVEMENT B) SLIGHT SLOWING C) THE AMPLITUDE DECREMENTS NEAR THE END OF THE 10 MOVEMENTS.
RIGIDITY_RLE: NORMAL
RIGIDITY_RUE: SLIGHT: RIGIDITY ONLY DETECTED WITH ACTIVATION MANEUVER.
AMPLITUDE_RUE: NORMAL: NO TREMOR.
POSTURE: 1 SLIGHT.  NOT QUITE ERECT BUT COULD BE NORMAL FOR OLDER PERSONS.
HANDMOVEMENTS_LEFT: SLIGHT: ANY OF THE FOLLOWING: A) THE REGULAR RHYTHM IS BROKEN WITH ONE WITH ONE OR TWO INTERRUPTIONS OR HESITATIONS OF THE MOVEMENT B) SLIGHT SLOWING C) THE AMPLITUDE DECREMENTS NEAR THE END OF THE 10 MOVEMENTS.
SPONTANEITY_OF_MOVEMENT: 1: SLIGHT: SLIGHT GLOBAL SLOWNESS AND POVERTY OF SPONTANEOUS MOVEMENTS.
TOETAPPING_LEFT: SLIGHT: ANY OF THE FOLLOWING: A) THE REGULAR RHYTHM IS BROKEN WITH ONE WITH ONE OR TWO INTERRUPTIONS OR HESITATIONS OF THE MOVEMENT B) SLIGHT SLOWING C) THE AMPLITUDE DECREMENTS NEAR THE END OF THE 10 MOVEMENTS.
PRONATION_SUPINATION_RIGHT: SLIGHT: ANY OF THE FOLLOWING: A) THE REGULAR RHYTHM IS BROKEN WITH ONE WITH ONE OR TWO INTERRUPTIONS OR HESITATIONS OF THE MOVEMENT B) SLIGHT SLOWING C) THE AMPLITUDE DECREMENTS NEAR THE END OF THE 10 MOVEMENTS.
PRONATION_SUPINATION_LEFT: SLIGHT: ANY OF THE FOLLOWING: A) THE REGULAR RHYTHM IS BROKEN WITH ONE WITH ONE OR TWO INTERRUPTIONS OR HESITATIONS OF THE MOVEMENT B) SLIGHT SLOWING C) THE AMPLITUDE DECREMENTS NEAR THE END OF THE 10 MOVEMENTS.
FINGER_TAPPING_RIGHT: NORMAL
SPONTANEITY_OF_MOVEMENT: 1: SLIGHT: SLIGHT GLOBAL SLOWNESS AND POVERTY OF SPONTANEOUS MOVEMENTS.
RIGIDITY_NECK: NORMAL
AMPLITUDE_LUE: NORMAL: NO TREMOR.
HANDMOVEMENTS_LEFT: SLIGHT: ANY OF THE FOLLOWING: A) THE REGULAR RHYTHM IS BROKEN WITH ONE WITH ONE OR TWO INTERRUPTIONS OR HESITATIONS OF THE MOVEMENT B) SLIGHT SLOWING C) THE AMPLITUDE DECREMENTS NEAR THE END OF THE 10 MOVEMENTS.
FINGER_TAPPING_RIGHT: SLIGHT: ANY OF THE FOLLOWING: A) THE REGULAR RHYTHM IS BROKEN WITH ONE WITH ONE OR TWO INTERRUPTIONS OR HESITATIONS OF THE MOVEMENT B) SLIGHT SLOWING C) THE AMPLITUDE DECREMENTS NEAR THE END OF THE 10 MOVEMENTS.
SPEECH: MILD: LOSS OF MODULATION, DICTION OR VOLUME, WITH A FEW WORDS UNCLEAR, BUT THE OVERALL SENTENCES EASY TO FOLLOW.

## 2019-04-01 ASSESSMENT — PAIN SCALES - GENERAL: PAINLEVEL: NO PAIN (0)

## 2019-04-01 ASSESSMENT — MIFFLIN-ST. JEOR: SCORE: 1663.6
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
## 2019-04-01 NOTE — PATIENT INSTRUCTIONS
April 1, 2019    Dear Ms. Erika NIELSON Joe,    Thank you for coming today.  During your visit, we have discussed the following:     __ Your DBS electrical system function (impedance) is normal. The battery life is also good.  __ You have the ability to increased your voltage:    Patient :  Upper Limit: 3.80 mA  Lower Limit: 3.00 mA    __ Try to reduce your Sinemet 25/100 mg as below:  -    PD Medications 7 am 12 pm 4 pm 9 pm   Sinemet 25/100 mg  1.5 1 1.5 1   Amantadine 100 mg 1  1    Pramipexole 0.5 mg    1     __  Return in 3 months.     To contact our clinic, you may call [phone] or use [employer] message.     It's good to see you!       GUILLERMINA Krueger, CNP  [facility]

## 2019-05-14 ENCOUNTER — TELEPHONE (OUTPATIENT)
Dept: NEUROLOGY | Facility: CLINIC | Age: 59
End: 2019-05-14
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
## 2019-06-24 ASSESSMENT — MOVEMENT DISORDERS SOCIETY - UNIFIED PARKINSONS DISEASE RATING SCALE (MDS-UPDRS)
HANDWRITING: NORMAL: NOT AT ALL (NO PROBLEMS).
GETTING_OUT_OF_BED_CAR_DEEP_CHAIR: SLIGHT: I AM SLOW OR AWKWARD, BUT I USUALLY CAN DO IT ON MY FIRST TRY.
EATING_TASKS: NORMAL: NOT AT ALL (NO PROBLEMS).
SALIVA_AND_DROOLING: SLIGHT: I HAVE TOO MUCH SALIVA, BUT DO NOT DROOL.
FREEZING: SLIGHTLY: I BRIEFLY FREEZE BUT I CAN EASILY START WALKING AGAIN. I DO NOT NEED HELP FROM SOMEONE ELSE OR A WALKING AID (CANE OR WALKER) BECAUSE OF FREEZING.
TURNING_IN_BED: SLIGHT: I HAVE A BIT OF TROUBLE TURNING, BUT I DO NOT NEED ANY HELP.
SPEECH: NORMAL: NOT AT ALL (NO PROBLEMS).
HYGIENE: NORMAL: NOT AT ALL (NO PROBLEMS).
DRESSING: NORMAL: NOT AT ALL (NO PROBLEMS).
WALKING_AND_BALANCE: SLIGHT: I AM SLIGHTLY SLOW OR MAY DRAG A LEG.  I NEVER USE A WALKING AID.
CHEWING_AND_SWALLOWING: NORMAL: NO PROBLEMS.
TREMOR: SLIGHT: SHAKING OR TREMOR OCCURS BUT DOES NOT CAUSE PROBLEMS WITH ANY ACTIVITIES.
HOBBIES_AND_OTHER_ACTIVITIES: NORMAL:  NOT AT ALL (NO PROBLEMS).
TOTAL_SCORE: 6

## 2019-06-26 ENCOUNTER — OFFICE VISIT (OUTPATIENT)
Dept: NEUROLOGY | Facility: CLINIC | Age: 59
End: 2019-06-26
Payer: MEDICARE

## 2019-06-26 VITALS
HEIGHT: 65 IN | HEART RATE: 70 BPM | BODY MASS INDEX: 40.55 KG/M2 | WEIGHT: 243.4 LBS | DIASTOLIC BLOOD PRESSURE: 61 MMHG | SYSTOLIC BLOOD PRESSURE: 135 MMHG

## 2019-06-26 DIAGNOSIS — G20.A1 PARKINSON'S DISEASE (H): ICD-10-CM

## 2019-06-26 DIAGNOSIS — G25.81 RESTLESS LEGS SYNDROME (RLS): ICD-10-CM

## 2019-06-26 DIAGNOSIS — Z96.89 S/P DEEP BRAIN STIMULATOR PLACEMENT: ICD-10-CM

## 2019-06-26 DIAGNOSIS — F43.23 ADJUSTMENT DISORDER WITH MIXED ANXIETY AND DEPRESSED MOOD: Primary | ICD-10-CM

## 2019-06-26 RX ORDER — WARFARIN SODIUM 5 MG/1
TABLET ORAL
Refills: 0 | COMMUNITY
Start: 2019-06-26 | End: 2019-10-08

## 2019-06-26 RX ORDER — PRAMIPEXOLE DIHYDROCHLORIDE 0.5 MG/1
TABLET ORAL
Qty: 180 TABLET | Refills: 3 | Status: SHIPPED | OUTPATIENT
Start: 2019-06-26 | End: 2020-04-08

## 2019-06-26 RX ORDER — AMANTADINE HYDROCHLORIDE 100 MG/1
CAPSULE, GELATIN COATED ORAL
Qty: 180 CAPSULE | Refills: 3 | Status: SHIPPED | OUTPATIENT
Start: 2019-06-26 | End: 2020-04-08

## 2019-06-26 RX ORDER — CARBIDOPA AND LEVODOPA 25; 100 MG/1; MG/1
TABLET ORAL
Qty: 630 TABLET | Refills: 3 | Status: SHIPPED | OUTPATIENT
Start: 2019-06-26 | End: 2020-04-08

## 2019-06-26 ASSESSMENT — UNIFIED PARKINSONS DISEASE RATING SCALE (UPDRS)
PRONATION_SUPINATION_RIGHT: SLIGHT: ANY OF THE FOLLOWING: A) THE REGULAR RHYTHM IS BROKEN WITH ONE WITH ONE OR TWO INTERRUPTIONS OR HESITATIONS OF THE MOVEMENT B) SLIGHT SLOWING C) THE AMPLITUDE DECREMENTS NEAR THE END OF THE 10 MOVEMENTS.
AMPLITUDE_LIP_JAW: NORMAL: NO TREMOR.
POSTURAL_STABILITY: NORMAL:  RECOVERS WITH ONE OR TWO STEPS.
GAIT: SLIGHT: INDEPENDENT WALKING WITH MINOR GAIT IMPAIRMENT.
FREEZING_GAIT: NORMAL
RIGIDITY_RUE: SLIGHT: RIGIDITY ONLY DETECTED WITH ACTIVATION MANEUVER.
HANDMOVEMENTS_RIGHT: NORMAL
TOETAPPING_RIGHT: NORMAL
TOTAL_SCORE: 3
FINGER_TAPPING_RIGHT: NORMAL
FACIAL_EXPRESSION: MODERATE: MASKED FACIES WITH LIPS PARTED SOME OF THE TIME WHEN THE MOUTH IS AT REST.
HANDMOVEMENTS_LEFT: NORMAL
PRONATION_SUPINATION_LEFT: SLIGHT: ANY OF THE FOLLOWING: A) THE REGULAR RHYTHM IS BROKEN WITH ONE WITH ONE OR TWO INTERRUPTIONS OR HESITATIONS OF THE MOVEMENT B) SLIGHT SLOWING C) THE AMPLITUDE DECREMENTS NEAR THE END OF THE 10 MOVEMENTS.
SPONTANEITY_OF_MOVEMENT: 1: SLIGHT: SLIGHT GLOBAL SLOWNESS AND POVERTY OF SPONTANEOUS MOVEMENTS.
TOTAL_SCORE: 15
AMPLITUDE_LLE: NORMAL: NO TREMOR.
LEG_AGILITY_LEFT: NORMAL
LEG_AGILITY_RIGHT: NORMAL
AMPLITUDE_RLE: NORMAL: NO TREMOR.
AXIAL_SCORE: 8
FINGER_TAPPING_LEFT: MILD: ANY OF THE FOLLOWING: A) 3 TO 5 INTERRUPTIONS DURING TAPPING B) MILD SLOWING C) THE AMPLITUDE DECREMENTS MIDWAY IN THE 10-MOVEMENT SEQUENCE
CONSTANCY_TREMOR_ATREST: NORMAL: NO TREMOR.
AMPLITUDE_LUE: NORMAL: NO TREMOR.
ARISING_CHAIR: SLIGHT: ARISING IS SLOWER THAN NORMAL, OR MAY NEED MORE THAN ONE ATTEMPT, OR MAY NEED TO MOVE FORWARD IN THE CHAIR TO ARISE.  NO NEED TO USE THE ARMS OF THE CHAIR.
SPEECH: NORMAL
PARKINSONS_MEDS: ON
AMPLITUDE_RUE: NORMAL: NO TREMOR.
TOETAPPING_LEFT: SLIGHT: ANY OF THE FOLLOWING: A) THE REGULAR RHYTHM IS BROKEN WITH ONE WITH ONE OR TWO INTERRUPTIONS OR HESITATIONS OF THE MOVEMENT B) SLIGHT SLOWING C) THE AMPLITUDE DECREMENTS NEAR THE END OF THE 10 MOVEMENTS.
TOTAL_SCORE_LEFT: 4
RIGIDITY_LLE: NORMAL
POSTURE: 1 SLIGHT.  NOT QUITE ERECT BUT COULD BE NORMAL FOR OLDER PERSONS.
RIGIDITY_LUE: NORMAL
RIGIDITY_RLE: NORMAL
RIGIDITY_NECK: SLIGHT: RIGIDITY ONLY DETECTED WITH ACTIVATION MANEUVER.

## 2019-06-26 ASSESSMENT — PAIN SCALES - GENERAL: PAINLEVEL: NO PAIN (0)

## 2019-06-26 ASSESSMENT — MIFFLIN-ST. JEOR: SCORE: 1674.94

## 2019-06-26 NOTE — LETTER
2019       RE: Erika Diaz  [address]  [place] [address]     Dear Colleague,    Thank you for referring your patient, Erika Diaz, to the [facility] NEUROLOGY at [facility]. Please see a copy of my visit note below.    MOVEMENT DISORDERS CLINIC    PATIENT: Erika Diaz    : 1960    GINNY: 2019    REASON FOR VISIT: Parkinson's disease (PD) follow up and DBS interrogation and analysis.     HPI: Ms. Erika Diaz is a 58 year old right - handed  female who came to the [facility] neurology clinic accompanied by her  and Doxin therapy dog for a follow up visit.  Shanice Contreras RN Research Coordinator is present.  I saw her last in clinic on 2019 for a 6-month routine f/u visit.      Since her last visit, she was in ER on 2019 after a fall and injuring the right foot, which she had ankle surgery on 2019.  Although the x-ray didn't show fracture, MRI on  showed metatarsal fracture.  She wore a boot for a month.  Due to the fall, she had stopped PT.  She'll see the ortho surgeon tomorrow to discuss if she needs more PT.     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  She had taken time off after she broke the right foot.      Motor symptoms are stable.  She continues to forget taking the 4 pm dose.   She only takes it when she works.  She also takes 1 extra Sinemet pill on days that she works.     PD Medications 7 am 12 pm 4 pm 9 pm   Sinemet 25/100 mg  1.5 1.5 1.5 1.5   Amantadine 100 mg 1   1     Pramipexole 0.5 mg       2      Mood is stable.  She uses Lorazepam about 1 - 2 x a week.  Anxiety is improved.  She has taken it at  to help her relax and fall asleep when her foot was hurting.     AUTONOMIC FUNCTION   Orthostatic Hypotension: Denies.  Constipation: Denies.  Eats 3 - 4 oranges per day.   Urinary symptoms: She has urinary urgency.    MENTATION, BEHAVIOR, MOOD   Depression, anxiety:    As above.  Fatigue: Denies.   Memory problems: Denies.   Confusion, hallucinations: Denies.   Sleep Problems: She goes to bed anywhere between 10 pm - 2 am and gets up at 4 pm.  She naps daily.  She gets about 8 hours per day.  She has vivid dreams.  Her  reports that she occasionally talks in her sleep.  She doesn't move extremities in her sleep.       MEDICATIONS:   Medication Sig     Acetaminophen (TYLENOL PO) Take 1,000 mg by mouth every 8 hours as needed for mild pain or fever     amantadine (SYMMETREL) 100 MG capsule 1 capsule orally @ 7am and 4pm     busPIRone (BUSPAR) 10 MG tablet TAKE 1 TABLET BY MOUTH AT 7 AM AND AT 4 PM     calcium carbonate (TUMS) 500 MG chewable tablet Take 2 chew tab by mouth as needed for heartburn     carbidopa-levodopa (SINEMET)  MG per tablet Take 1.5 tablets by mouth 4 times daily 3 tabs @7, noon, 4p and 9pm and a few as needed = 13/day x 90 = 1170tablets     FLUoxetine (PROZAC) 20 MG capsule Take 20 mg by mouth every morning Take 20mg capsule  @ 7am     hydrochlorothiazide (HYDRODIURIL) 25 MG tablet Take 25 mg by mouth At Bedtime @9 pm     LORazepam (ATIVAN) 0.5 MG tablet Take 1 tablet (0.5 mg) by mouth daily as needed for anxiety or sleep     ondansetron (ZOFRAN) 4 MG tablet Take 1 tablet (4 mg) by mouth every 8 hours as needed for nausea     pramipexole (MIRAPEX) 0.5 MG tablet 2 tabs orally @ 9 pm     rosuvastatin (CRESTOR) 20 MG tablet Take 20 mg by mouth     warfarin (COUMADIN) 5 MG   = 1 tab; other days 1/2 tab       ALLERGIES: Atorvastatin; Codeine; and Mold    MDS Part II  -- Over the last week -- 0: Normal -- 1: Slight -- 2: Mild -- 3: Moderate -- 4: Severe      2.1 Speech: 0   2.2 Saliva and droolin   2.3 Chewing and swallowin   2.4 Eating tasks: 0   2.5 Dressin   2.6 Hygiene:  0   2.7 Handwritin   2.8 Doing hobbies and other activities:  0   2.9 Turning in bed:  0   2.10 Tremor:  0   2.11 Getting out of bed/car/deep chair:   1  "  2.12 Walking and balance: 1   2.13 Freezin      Total:  4       PHYSICAL EXAM:    VITAL SIGNS:  Blood pressure 135/61, pulse 70, height 1.651 m (5' 5\"), weight 110.4 kg (243 lb 6.4 oz).  Body mass index is 40.5 kg/m .    GENERAL:  Ms. Diaz is a pleasant  female who is well-groomed and well-developed sitting comfortably in the exam room without any distress.  Affect is appropriate.    DBS Interrogation & Analysis:     Implanted generator:  Right chest Infinity [id number]  Lead placement:  Right Globus Pallidus Internus (Gpi).     Right Brain   Lead placement date:  2018  Generator placement date:  2018     C +, 10abc -  Current:  3.8 mA  PW:  60 msec  Rate:  130 Hz     Therapy impedance:  975 Ohms     Battery Service Life:  OK.  2.95 volts.     Patient :  Upper Limit: 3.80 mA  Lower Limit: 3.00 mA     Electrode Impedance Check:   Right Lead: No Problems Found.      See scanned report for impedance details.    MOVEMENT DISORDERS ASSESSMENT:  Last antiparkinsonian dose taken:    Amantadine 100 mg --  6/25 at  6 am  Sinemet 25/100 mg --  6/25 at  6 am  Pramipexole 0.5 mg -- 6/25 at  5 pm    UPDRS Values 2019   Time: 12:02 PM   Medication On   R Brain DBS: On   L Brain DBS: None   Speech 0   Facial Expression 3   Rigidity Neck 1   Rigidity RUE 1   Rigidity LUE 0   Rigidity RLE 0   Rigidity LLE 0   Finger Taps R 0   Finger Taps L 2   Hand Mvt R 0   Hand Mvt L 0   Pron-/Supinate R 1   Pron-/Supinate L 1   Toe Tap R 0   Toe Tap L 1   Leg Agility R 0   Leg Agility L 0   Arise From Chair 1   Gait 1   Gait Freezing 0   Postural Stability 0   Posture 1   Global Spont Mvt 1   Postural Tremor RUE 1   Postural Tremor LUE 0   Kinetic Tremor RUE 0   Kinetic Tremor LUE 0   Rest Tremor RUE 0   Rest Tremor LUE 0   Rest Tremor RLE 0   Rest Tremor LLE 0   Rest Tremor Lip/Jaw 0   Rest Tremor Constancy 0   Total Right 3   Total Left 4   Axial Total 8   Total 15     Dyskinesias:  Absent. "     ASSESSMENT:    1)  Parkinson's Disease:  Patient has a 10 - 11 year history of PD.  Motor symptoms are stable on current medication and unilateral DBS.       2)  S/p Right STN DBS lead implantation:  Normal impedance and battery life.        3)  Anxiety and depression:   Well controlled on Buspirone 10 mg BID; Fluoxetine 20 mg daily, and Lorazepam 0.5 mg 1 tab PRN daily.      PLAN:    __  Since pt is not having any wearing off, will try to reduce the 12 pm Sinemet dose from 1.5 to 1 tablet.    PD Medications 7 am 12 pm 4 pm 9 pm As needed   Sinemet 25/100 mg  1.5 1 1.5 1.5 1   Amantadine 100 mg 1   1      Pramipexole 0.5 mg       2       __  She was encouraged to send us a [employer] message in 1 - 2 weeks with an update.     __  Will return on September 10th at 8 am as part of the Clinical Core research protocol.      __  She was instructed to come to her next appointment OFF antiparkinsonian medication as follows:      Amantadine 100 mg --  9/8 at 7 am  Sinemet 25/100 mg --9/9 at 4 pm  Pramipexole 0.5 mg -- 9/9 at 4 pm    __  Will stay on the same dose of antidepressants.     The total amount of time spent with the patient was 60 minutes; 20 min in DBS interrogation & analysis and 40 min in addressing PD and mood.  Over 50% of the time was spent in counseling & coordination of care.      GUILLERMINA Krueger,  CNP  [facility] Neurology Clinic    11:19 AM  12:22 PM

## 2019-06-26 NOTE — PATIENT INSTRUCTIONS
June 26, 2019    Dear Ms. Erika NIELSON Joe,    Thank you for coming today.  During your visit, we have discussed the following:     __  Try to reduce your 12 pm Sinemet dose from 1.5 to 1 tablet.    PD Medications 7 am 12 pm 4 pm 9 pm As needed   Sinemet 25/100 mg  1.5 1 1.5 1.5 1   Amantadine 100 mg 1   1      Pramipexole 0.5 mg       2         __  Send us a [email or web address] message in 1 - 2 weeks with an update.     __  Will see you on September 10th at 8 am.     __  Come OFF antiparkinsonian medication    Take your last antiparkinsonian medications as follows:     Amantadine 100 mg --  9/8 at 7 am  Sinemet 25/100 mg --9/9 at 4 pm  Pramipexole 0.5 mg -- 9/9 at 4 pm      For questions, you may send us a [email or web address] message or call [phone]    Fax number: [phone]    GUILLERMINA Krueger, CNP  [facility] Neurology Clinic

## 2019-06-26 NOTE — PROGRESS NOTES
MOVEMENT DISORDERS CLINIC    PATIENT: Erika Diaz    : 1960    GINNY: 2019    REASON FOR VISIT: Parkinson's disease (PD) follow up and DBS interrogation and analysis.     HPI: Ms. Erika Diaz is a 58 year old right - handed  female who came to the [facility] neurology clinic accompanied by her  and Doxin therapy dog for a follow up visit.  Shanice Contreras RN Research Coordinator is present.  I saw her last in clinic on 2019 for a 6-month routine f/u visit.      Since her last visit, she was in ER on 2019 after a fall and injuring the right foot, which she had ankle surgery on 2019.  Although the x-ray didn't show fracture, MRI on  showed metatarsal fracture.  She wore a boot for a month.  Due to the fall, she had stopped PT.  She'll see the ortho surgeon tomorrow to discuss if she needs more PT.     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  She had taken time off after she broke the right foot.      Motor symptoms are stable.  She continues to forget taking the 4 pm dose.   She only takes it when she works.  She also takes 1 extra Sinemet pill on days that she works.     PD Medications 7 am 12 pm 4 pm 9 pm   Sinemet 25/100 mg  1.5 1.5 1.5 1.5   Amantadine 100 mg 1   1     Pramipexole 0.5 mg       2      Mood is stable.  She uses Lorazepam about 1 - 2 x a week.  Anxiety is improved.  She has taken it at HS to help her relax and fall asleep when her foot was hurting.     AUTONOMIC FUNCTION   Orthostatic Hypotension: Denies.  Constipation: Denies.  Eats 3 - 4 oranges per day.   Urinary symptoms: She has urinary urgency.    MENTATION, BEHAVIOR, MOOD   Depression, anxiety:   As above.  Fatigue: Denies.   Memory problems: Denies.   Confusion, hallucinations: Denies.   Sleep Problems: She goes to bed anywhere between 10 pm - 2 am and gets up at 4 pm.  She naps daily.  She gets about 8 hours per day.  She has vivid dreams.  Her  reports that she  "occasionally talks in her sleep.  She doesn't move extremities in her sleep.       MEDICATIONS:   Medication Sig     Acetaminophen (TYLENOL PO) Take 1,000 mg by mouth every 8 hours as needed for mild pain or fever     amantadine (SYMMETREL) 100 MG capsule 1 capsule orally @ 7am and 4pm     busPIRone (BUSPAR) 10 MG tablet TAKE 1 TABLET BY MOUTH AT 7 AM AND AT 4 PM     calcium carbonate (TUMS) 500 MG chewable tablet Take 2 chew tab by mouth as needed for heartburn     carbidopa-levodopa (SINEMET)  MG per tablet Take 1.5 tablets by mouth 4 times daily 3 tabs @7, noon, 4p and 9pm and a few as needed = 13/day x 90 = 1170tablets     FLUoxetine (PROZAC) 20 MG capsule Take 20 mg by mouth every morning Take 20mg capsule  @ 7am     hydrochlorothiazide (HYDRODIURIL) 25 MG tablet Take 25 mg by mouth At Bedtime @9 pm     LORazepam (ATIVAN) 0.5 MG tablet Take 1 tablet (0.5 mg) by mouth daily as needed for anxiety or sleep     ondansetron (ZOFRAN) 4 MG tablet Take 1 tablet (4 mg) by mouth every 8 hours as needed for nausea     pramipexole (MIRAPEX) 0.5 MG tablet 2 tabs orally @ 9 pm     rosuvastatin (CRESTOR) 20 MG tablet Take 20 mg by mouth     warfarin (COUMADIN) 5 MG  Mo,  = 1 tab; other days 1/2 tab       ALLERGIES: Atorvastatin; Codeine; and Mold    MDS Part II  -- Over the last week -- 0: Normal -- 1: Slight -- 2: Mild -- 3: Moderate -- 4: Severe      2.1 Speech: 0   2.2 Saliva and droolin   2.3 Chewing and swallowin   2.4 Eating tasks: 0   2.5 Dressin   2.6 Hygiene:  0   2.7 Handwritin   2.8 Doing hobbies and other activities:  0   2.9 Turning in bed:  0   2.10 Tremor:  0   2.11 Getting out of bed/car/deep chair:   1   2.12 Walking and balance: 1   2.13 Freezin      Total:  4       PHYSICAL EXAM:    VITAL SIGNS:  Blood pressure 135/61, pulse 70, height 1.651 m (5' 5\"), weight 110.4 kg (243 lb 6.4 oz).  Body mass index is 40.5 kg/m .    GENERAL:  Ms. Diaz is a pleasant  female " who is well-groomed and well-developed sitting comfortably in the exam room without any distress.  Affect is appropriate.    DBS Interrogation & Analysis:     Implanted generator:  Right chest Infinity [id number]  Lead placement:  Right Globus Pallidus Internus (Gpi).     Right Brain   Lead placement date:  8/21/2018  Generator placement date:  8/30/2018     C +, 10abc -  Current:  3.8 mA  PW:  60 msec  Rate:  130 Hz     Therapy impedance:  975 Ohms     Battery Service Life:  OK.  2.95 volts.     Patient :  Upper Limit: 3.80 mA  Lower Limit: 3.00 mA     Electrode Impedance Check:   Right Lead: No Problems Found.      See scanned report for impedance details.    MOVEMENT DISORDERS ASSESSMENT:  Last antiparkinsonian dose taken:    Amantadine 100 mg --  6/26 at 6 am  Sinemet 25/100 mg --  6/26 at 6 am  Pramipexole 0.5 mg -- 6/25 at 5 pm   UPDRS Values 6/26/2019   Time: 12:02 PM   Medication On   R Brain DBS: On   L Brain DBS: None   Speech 0   Facial Expression 3   Rigidity Neck 1   Rigidity RUE 1   Rigidity LUE 0   Rigidity RLE 0   Rigidity LLE 0   Finger Taps R 0   Finger Taps L 2   Hand Mvt R 0   Hand Mvt L 0   Pron-/Supinate R 1   Pron-/Supinate L 1   Toe Tap R 0   Toe Tap L 1   Leg Agility R 0   Leg Agility L 0   Arise From Chair 1   Gait 1   Gait Freezing 0   Postural Stability 0   Posture 1   Global Spont Mvt 1   Postural Tremor RUE 1   Postural Tremor LUE 0   Kinetic Tremor RUE 0   Kinetic Tremor LUE 0   Rest Tremor RUE 0   Rest Tremor LUE 0   Rest Tremor RLE 0   Rest Tremor LLE 0   Rest Tremor Lip/Jaw 0   Rest Tremor Constancy 0   Total Right 3   Total Left 4   Axial Total 8   Total 15     Dyskinesias:  Absent.     ASSESSMENT:    1)  Parkinson's Disease:  Patient has a 10 - 11 year history of PD.  Motor symptoms are stable on current medication and unilateral DBS.       2)  S/p Right STN DBS lead implantation:  Normal impedance and battery life.        3)  Anxiety and depression:   Well controlled on  Buspirone 10 mg BID; Fluoxetine 20 mg daily, and Lorazepam 0.5 mg 1 tab PRN daily.      PLAN:    __  Since pt is not having any wearing off, will try to reduce the 12 pm Sinemet dose from 1.5 to 1 tablet.    PD Medications 7 am 12 pm 4 pm 9 pm As needed   Sinemet 25/100 mg  1.5 1 1.5 1.5 1   Amantadine 100 mg 1   1      Pramipexole 0.5 mg       2       __  She was encouraged to send us a [employer] message in 1 - 2 weeks with an update.     __  Will return on September 10th at 8 am as part of the Clinical Core research protocol.      __  She was instructed to come to her next appointment OFF antiparkinsonian medication as follows:      Amantadine 100 mg --  9/8 at 7 am  Sinemet 25/100 mg --9/9 at 4 pm  Pramipexole 0.5 mg -- 9/9 at 4 pm    __  Will stay on the same dose of antidepressants.     The total amount of time spent with the patient was 60 minutes; 20 min in DBS interrogation & analysis and 40 min in addressing PD and mood.  Over 50% of the time was spent in counseling & coordination of care.      GUILLERMINA Krueger,  CNP  [facility] Neurology Clinic    11:19 AM  12:22 PM

## 2019-08-01 ENCOUNTER — TELEPHONE (OUTPATIENT)
Dept: NEUROLOGY | Facility: CLINIC | Age: 59
End: 2019-08-01

## 2019-08-01 NOTE — TELEPHONE ENCOUNTER
"Patient sent update regarding how she's been doing since in the clinic to see Karina Rivas last on 6/26/19:    -Pt has experienced stressors from recent apartment renovation as well has spouse Zane having workup for \"dark spot on his liver\".    -Due to this stress, pt reports, \"Some days I've taken 2 of my Parkinson's pills instead of one and a half due to the stress of events going on but other than that everything is OK\"    I thanked patient for update and offered my sympathies to her and her  during this stressful time. Karina Rivas updated. Patient due to come in again to clinic (OFF meds) 9/10/19 for videotaped on/off testing as 12 month DBS follow up for clinical core study.    Shanice Coyne, RN, MPH  Research Nurse, Movement Disorders        "

## 2019-08-28 ENCOUNTER — TELEPHONE (OUTPATIENT)
Dept: NEUROLOGY | Facility: CLINIC | Age: 59
End: 2019-08-28

## 2019-08-28 ENCOUNTER — OFFICE VISIT (OUTPATIENT)
Dept: NEUROPSYCHOLOGY | Facility: CLINIC | Age: 59
End: 2019-08-28
Payer: MEDICARE

## 2019-08-28 DIAGNOSIS — G20.A1 PARKINSON'S DISEASE (H): Primary | ICD-10-CM

## 2019-08-28 DIAGNOSIS — F09 MENTAL DISORDER DUE TO GENERAL MEDICAL CONDITION: ICD-10-CM

## 2019-08-28 NOTE — TELEPHONE ENCOUNTER
Patient called to ask if she should take or hold any medications for her neuropsych 12 month visit for clinical core study 8/28. I instructed her to take all medications as usual and to try to get a good night's sleep so that she is most comfortable tomorrow for her evaluation.    Patient also shared that her spouse will be having surgery to treat liver cancer, on September 11th. She is scheduled for 12M ON/OFF testing on September 10th with Karina Rivas. I asked if she would like to reschedule this appointment to a different day to reduce stress but she was adamant about keeping the appointment. If patient changes her mind, I let her know we do have a window from now until October 20th for rescheduling.     Shanice Coyne, RN, MPH  Research Nurse, Movement Disorders

## 2019-09-09 NOTE — TELEPHONE ENCOUNTER
Patient called to cancel 9/10/19 appt with Karina Rivas due to spouse being brought to the emergency room for liver complications, prior to his scheduled surgery on 9/12/19. Expressed our team's condolences and reassured patient not to worry about this appointment and that we will get her rescheduled at a different time. Will check in with patient in one week. Provider and schedulers updated. (Research window for this visit is open until October 20th.)    Shanice Coyne RN, MPH  Research Nurse, Movement Disorders

## 2019-09-17 ENCOUNTER — TELEPHONE (OUTPATIENT)
Dept: NEUROLOGY | Facility: CLINIC | Age: 59
End: 2019-09-17

## 2019-09-17 DIAGNOSIS — F41.9 ANXIETY: ICD-10-CM

## 2019-09-17 DIAGNOSIS — G47.9 SLEEP DISTURBANCES: ICD-10-CM

## 2019-09-17 DIAGNOSIS — G20.A1 PARKINSON'S DISEASE (H): ICD-10-CM

## 2019-09-17 RX ORDER — LORAZEPAM 0.5 MG/1
0.5 TABLET ORAL DAILY PRN
Qty: 30 TABLET | Refills: 5 | Status: SHIPPED | OUTPATIENT
Start: 2019-09-17 | End: 2020-03-26

## 2019-09-17 NOTE — TELEPHONE ENCOUNTER
Patient called to notify me that she will not be able to attend her research appointment tomorrow for [place] Project 2 study. Her spouse is still in the hospital.     Patient also requested a refill for lorazepam. Request sent to Karina Rivas NP, APRN for refill.    Will follow up with patient next week to reschedule on/off testing.    Shanice Coyne RN, MPH  Research Nurse, Movement Disorders

## 2019-09-22 NOTE — PROGRESS NOTES
Name: Erika Diaz MRN: [id number]  : 1960  RAMÍREZ: 2019  Staff: ROSALIO Tech: JOSÉ ANTONIO Age: 59  Sex: Female Hand: Right   Educ: 12  Occupation: [email or web address]  Vision:  ?with correction / ?without correction  Hearing:  ?with correction / ?without correction    WAIS-IV     Raw SSa    Similarities  27 11     Comprehension  18 7     Letter Num. Seq. 19 9     Digit Span  22 8     Matrix Reasoning 10 7    WMS-Revised  Raw    MAS     Info & Orientation 14       LM I   21 8     LM II   17 8      [place] VERBAL LEARNING TEST - REVISED  Form   6  Raw T     Trial 1   6     Trial 2   8     Trial 3   11     Total 1-3  25 44     Learning  5     Delayed Recall  11 56     Percent Retention 100 55     True Positives  12     Discrimination Index 12 60     False Positives  0    BRIEF VISUAL MEMORY TEST - REVISED  Form   2  Raw                   T     Trial 1   6     Trial 2   5     Trial 3   7     Total 1-3  18 42     Learning  1 35     Delay   8 48     Percent Retention 100 >16th%ile     True Positives  6     Discrimination Index 6 >16th%ile     False Positives  0    [place] NAMING TEST   Score: 57 MAS: 12  75%ile                    [ 57   w/o cues        2   w/phonemic cues]     D-KEFS VERBAL FLUENCY Alternate        Raw  Age Scaled     Letter Fluency  52                   15     Category Fluency 45                   15       Switching Fluency              Total Correct 18                   17              Switching [id number]            14    CLOCK DRAWING     Command   3/3             Copy     3/3    TRAIL MAKING TEST    Raw         Err  MAS  %ile   A 38            0               8             25   B 45        0               14             91    STROOP   Raw + Steven  =   Total          MAS %ile    Word 103 +     --  = 103 11 63   Color  72 +     --= 72 11 63       Color/Word  39 +    --= 39 11 63     WCST (64 cards)    Raw   T/%ile   Categories: 5 >16   #Persev. Err.: 4 64   Concept. Resp.: 57 65   FTMS:  0    VELAZQUEZ JUDGMENT OF LINE  ORIENTATION Form V   Raw: 22      MAS: 10  50%ile:    DEMENTIA RATING SCALE - 2 Standard       Raw       MAS            Raw      MAS  Attention  36  11       Concept    37           10  Init/Psv  37  11  Memory   23       9  Construct 6  10   Total     139/144     10     BDI-II:  5     APATHY SCALE:        13     MAS = [place] Older Adult Normative Study Age Adj. Scaled Score

## 2019-09-22 NOTE — PROGRESS NOTES
NAME:  Erika Diaz  MR#: [id number]  YOB: 1960  DATE OF EXAM: 8/28/2019    Neuropsychology Laboratory  [address]  [address] [phone]    NEUROPSYCHOLOGICAL EVALUATION    RELEVANT HISTORY AND REASON FOR REFERRAL    Erika Diaz is a 59 year old, right handed,  with 12 years of formal education. Information was obtained via interview the patient and her , and review of her medical records, including a prior neuropsychological evaluation. Ms. Diaz has a history of Parkinson s disease with diagnosis around 2008, with a left-side onset. She also has a history of generalized anxiety disorder and panic attacks, and a self-reported history of carbon monoxide poisoning five times as she had tailpipe problems on her car and waited three hours to cross the border between [place] and [place]. She is status post right STN DBS lead implantation in August, 2018, for management of her Parkinson s symptoms. A neurology visit note from 6/26/2019 indicates that her motor symptoms are stable on her current medication and unilateral DBS, and anxiety and depression are well controlled on medications. She was referred for neuropsychological evaluation by GUILLERMINA Desir CNP, one year following surgery, for further characterization of any cognitive difficulties, to evaluate mood, and to assist with treatment planning.    Ms. Diaz first underwent a neuropsychological evaluation under my direction on 12/12/2017, as part of the presurgical protocol. Please refer to the report from that date for full details regarding her history and the findings of the evaluation. Results of her evaluation indicated variability in attention and memory, ranging from moderately impaired to superior, in some cases with greater difficulty on less complex tasks. Basic language, visual processing, and executive functioning fell within normal limits.  Personality assessment was suggestive of somatization, and raised the possibility of manic episodes.     CLINICAL INTERVIEW FINDINGS    Upon interview, Ms. Diaz stated that she has had at least 80% improvement from her DBS surgery. She has been able to reduce her PD medications. Her left body is very good, but she is noticing some tremor on the right side.     Ms. Diaz has not noticed any changes in cognition. She denied difficulty with memory, word finding, comprehension, attention or concentration, decision making, or organization. She lives with her . She manages their finances, and she drives and cooks, apparently without difficulty. She manages her own medications, sometimes forgetting to take her 4:00 dose, although she stated that is because she does not need it. She stated that if she works, the PD medications go through her system faster, so she may take an extra dose or take a dose early. She handles her personal cares independently.    Ms. Diaz stated that she has not met with a psychiatrist for the last few months. She described her mood as stressed. Her  has liver cancer, and will have surgery on September 11. She does not feel depressed, but she does feel worried. She has not had feelings of helplessness, hopelessness, or guilt. She stated that she has not had any panic attacks in more than a year, which she attributed to Buspar, which has been working well for her. A couple of months ago, she was late for work for the first time, and she felt horrible; she stated that she broke down and cried. She has been no more irritable than normal. Her sleep has been poor. She tends to wake up every hour. She is generally able to go back to sleep again. 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 She does not act out her dreams, although sometimes she laughs or yells during her sleep. When she has worked over the weekends, she may  nap for several hours the next day. Her appetite has been good. She has been non-weight bearing for the last 8 weeks, and she has gained weight. Her interest level is good. She enjoys spending time with her grandkids. She denied visual or auditory hallucinations. She denied suicidal ideation or any history of suicide attempts.  She stated that she has joined online PD groups and support groups.    Ms. Diaz denied alcohol or tobacco use. She is smoking marijuana every night, which she says relaxes her. She also uses CBD oil. She last used marijuana last night. She buys scratch off tickets frequently. Her  stated that he does not believe this has been a problem, particularly because she is tomi and tends to win frequently. She endorsed some compulsive eating and taking her PD medications, and to a lesser extent, compulsive behaviors involving sex, performing tasks or hobbies, and repeating simple activities.     Ms. Diaz has had some problems with balance. Last August, fell on her face when she was walking on rocks; she had black eyes after the fall.  She fell off of a scooter and broke her ankle in January. She has undergone ankle surgery. She denied unilateral weakness or numbness. She experiences occasional headaches, depending on how she is lying down. Sometimes she feels like she is pulling on the wires, or it feels like they have moved a little. She has some aches and pains in her knees, shoulders, and joint, especially if she stands too long.    PAST MEDICAL HISTORY: Medical records indicate a history of generalized anxiety disorder, depressive disorder, migraine syndrome, degenerative joint disease, history of colonic polyps, hypercholesterolemia, hypertension, obesity, pulmonary emboli, and osteoarthritis.    CURRENT MEDICATIONS:  Include acetaminophen, amantadine, buspirone, calcium carbonate, carbidopa-levodopa (Sinemet), fluoxetine, hydrochlorothiazide, lorazepam, ondansetron, pramipexole,  rosuvastatin, warfarin.     FAMILY MEDICAL HISTORY:  Significant for memory problems in both grandmothers, in their 80 s. She has no known family history of Parkinson s disease or psychiatric illness.     BEHAVIORAL OBSERVATIONS    During the evaluation, Ms. Diaz was pleasant, cooperative, and seemed to understand the instructions. She was alert and oriented to person, place and time. No abnormal movements were observed clinically. Mood was euthymic. Speech was fluent, with normal articulation, volume, and rate. Spontaneous conversation was present and appropriate. Internal performance validity measures fell within normal limits. The results are beige led to accurately reflect her current level of functioning.     MEASURES ADMINISTERED    The following measures were administered by a trained psychometrist, under the direct supervision of a licensed psychologist.    Subtests of the Wechsler Adult Intelligence Scale-4; subtests of the Wechsler Memory Scale-Revised; Harris Verbal Learning Test-Revised, Form 6; Brief Visual Memory Test - Revised, Form 2; Waskish Naming Test; D-KEFS Verbal Fluency, Alternate Form; Trail Making Test; Stroop; Wisconsin Card Sorting Test - 64; Fuentes Judgement of Line Orientation Form V; Clock Drawing; Dementia Rating Scale - 2 (DRS-2) Standard Form; Harman Depression Inventory-2 (BDI-2), HAM-D, HAM-A, Apathy Scale, RBDSQ; QUIP, PDQ-39, ESS.     RESULTS AND INTERPRETATION    Overall intellectual functioning was estimated to fall in the low average range, consistent with premorbid estimates based on single word reading abilities. Performance on a screening measure of dementia was average (DRS-2 Total Score = 139/144). Performance on the MoCA fell within normal limits (29/30).    Confrontation naming was high average for her age. Verbal abstract reasoning was average. Ability to comprehend and articulate responses to complex social situations was low average. Letter fluency, generative  naming to category, and switching fluency were above average.    Attention span was low average for her age. Divided attention was above average on a timed, visuomotor sequencing task, and was average on an untimed, auditory sequencing task. Performance on a measure of distractibility was average.     Basic visual perception, including matching lines and angles, was average for her age. Construction of a clock fell within normal limits. Nonverbal deductive reasoning was low average.    Novel problem solving, including the ability to generate strategies and solutions, fell within normal limits for her age and level of education.    Immediate and 30 minute delayed recall of verbal narrative material was low average. On a multiple trial list learning task, immediate recall was average, with high average recall following a. 25 minute delay. Immediate recall of visual material was low average, with average recall following a 25 minute delay.    On the BDI-2, a self-report questionnaire, she endorsed minimal depressive symptomatology. Specifically, she acknowledged that she has less energy than she used to have, sleeps somewhat more than usual, and gets tire or fatigued more easily. She is less interested in sex than she used to be. Her appetite is somewhat greater than usual. She endorsed minimal to mild apathy on a scale.    IMPRESSIONS AND RECOMMENDATIONS    Current results indicate performance that falls within normal limits across cognitive domains. She endorses minimal depressive symptomatology, apathy, and anxiety.    Compared to her 12/12/2017 neuropsychological evaluation, she has had significant improvements across all language tasks, and on tasks of verbal memory. Visual memory has declined, but remains well within normal limits. Performance otherwise remains stable across cognitive domains.    This pattern of performance does not reflect dementia at this time, nor is it suggestive of focal or lateralized  cerebral involvement. Her last evaluation was notable for significant variability in attention and memory, thought likely to be related to underlying psychiatric illness, along with the effects of medications and non-restorative sleep. Her profile is not longer characterized by variability, and improvements across several tasks likely reflects more stable psychiatric symptoms, consistent with her self-report. She is reporting some compulsive behaviors involving eating and taking her Parkinson s disease medications, and to a lesser extent, involving sex, performing tasks or hobbies, and repeating simple activities. She also endorsed daily marijuana use in addition to use of CBD oil, to help her to relax.    In terms of daily functioning, Ms. Diaz is not experiencing cognitive difficulties that are likely to interfere with her ability to actively participate in treatment or to manage her instrumental activities of daily living independently. Results may serve as an updated baseline of her neurocognitive functioning. The evaluation may be repeated in the future for comparison should a change in mental status occur.    Amanda Hernandez, Ph.D., ABPP  Licensed Psychologist, LP [id number]  Board Certified in Clinical Neuropsychology    Time spent: 65 minutes neurobehavioral status exam including interview, clinical assessment by licensed and board-certified neuropsychologist (CPT 41818). 60 minutes (1 unit) neuropsychological testing evaluation by licensed and board-certified neuropsychologist, including integration of patient data, interpretation of standardized test results and clinical data, clinical decision-making, treatment planning, report, and interactive feedback to the patient, first hour (CPT 95749). 95 minutes (2 units) of neuropsychological testing evaluation by licensed and board-certified neuropsychologist, including integration of patient data, interpretation of standardized test results and clinical data,  clinical decision-making, treatment planning, report, and interactive feedback to the patient, subsequent hours (CPT 53125). 30 minutes of neuropsychological test administration and scoring by technician, first 30 minutes (CPT 95216). 125 additional minutes (4 units) neuropsychological test administration and scoring by technician, subsequent 30 minutes (CPT 79206). ICD-10 Diagnoses: G20; F06.8.

## 2019-09-24 ENCOUNTER — TELEPHONE (OUTPATIENT)
Dept: NEUROLOGY | Facility: CLINIC | Age: 59
End: 2019-09-24

## 2019-09-24 NOTE — TELEPHONE ENCOUNTER
Patient called 9/23/19 to cancel her research visit for [facility] Project 2 neurology study and cancel hotel stay. I checked in again with patient on 9/24/19 and her  is still in the hospital, but closer to home and easier for other family to visit. Patient to return to work this weekend. Will check in with patient next week to see if she would like to reschedule ON/OFF testing visit for clinical core study with Karina Rivas and/or Project 2 visit with Donell Shields.    Shanice Coyne, RN, MPH  Research Nurse, Movement Disorders

## 2019-10-03 NOTE — TELEPHONE ENCOUNTER
Called patient to check on how she and her , Zane, are doing. Patient reports that her spouse is still in the hospital ([facility]) battling kidney issues and creatinine levels. She has returned to work part-time. She is open to scheduling her follow-up appointment with Karina, and is wondering about availability next week. Pt asking about 10/8 at 7am with Karina.I will check with provider and update pt tomorrow if this will work.  I reminded patient that there is no pressure to make this appointment now if she feels she needs more time. She insisted that she is OK with making appointment despite current stressors.       Shanice Coyne, RN, MPH  Research Nurse, Movement Disorders

## 2019-10-04 ENCOUNTER — TELEPHONE (OUTPATIENT)
Dept: NEUROLOGY | Facility: CLINIC | Age: 59
End: 2019-10-04

## 2019-10-04 NOTE — TELEPHONE ENCOUNTER
"Called patient to review instructions for appointment on Tuesday 10/8 7am (12 month on/off testing appointment for Clinical Core study with Karina Rivas)    We have arranged for \"[employer]\" transportation service to drive patient to and from appointment, as she will be off meds and does not have her spouse to drive her. Research study to pay for this cost.  [employer] to  patient at 6am at apartment and will plan for a 10:30am  at the clinic.  [employer] can be reached at [phone].    Patient is to hold her PD medications for Tuesday appointment:    Amantadine: last dose Eric 10/6 7am  Sinemet : last dose Monday 10/7 4pm  Pramipexole: last dose Monday 10/7  7pm (can take a few hours early as she needs this to sleep)    I spoke to patient and also sent her written instructions. She is welcome to call with any questions.    Shanice Coyne RN, MPH  Research Nurse, Movement Disorders    "

## 2019-10-08 ENCOUNTER — OFFICE VISIT (OUTPATIENT)
Dept: NEUROLOGY | Facility: CLINIC | Age: 59
End: 2019-10-08
Payer: MEDICARE

## 2019-10-08 VITALS
TEMPERATURE: 98.6 F | HEIGHT: 65 IN | BODY MASS INDEX: 40.98 KG/M2 | WEIGHT: 246 LBS | SYSTOLIC BLOOD PRESSURE: 137 MMHG | HEART RATE: 73 BPM | OXYGEN SATURATION: 97 % | RESPIRATION RATE: 20 BRPM | DIASTOLIC BLOOD PRESSURE: 85 MMHG

## 2019-10-08 DIAGNOSIS — G47.9 SLEEP DISTURBANCES: ICD-10-CM

## 2019-10-08 DIAGNOSIS — Z96.89 S/P DEEP BRAIN STIMULATOR PLACEMENT: Primary | ICD-10-CM

## 2019-10-08 DIAGNOSIS — F41.9 ANXIETY: ICD-10-CM

## 2019-10-08 DIAGNOSIS — G20.A1 PARKINSON'S DISEASE (H): ICD-10-CM

## 2019-10-08 PROBLEM — Z79.01: Status: ACTIVE | Noted: 2019-01-31

## 2019-10-08 PROBLEM — Z79.01 ANTICOAGULATION MONITORING, INR RANGE 2-3: Status: ACTIVE | Noted: 2019-02-07

## 2019-10-08 PROBLEM — R20.0 NUMBNESS OF LEFT HAND: Status: ACTIVE | Noted: 2019-01-20

## 2019-10-08 PROBLEM — M25.371: Status: ACTIVE | Noted: 2019-01-20

## 2019-10-08 RX ORDER — ASPIRIN 81 MG/1
81 TABLET ORAL AT BEDTIME
Status: ON HOLD | COMMUNITY
End: 2021-03-05

## 2019-10-08 ASSESSMENT — UNIFIED PARKINSONS DISEASE RATING SCALE (UPDRS)
TOTAL_SCORE: 22
AMPLITUDE_RUE: NORMAL: NO TREMOR.
AMPLITUDE_LLE: NORMAL: NO TREMOR.
AMPLITUDE_LUE: SLIGHT: < 1 CM IN MAXIMAL AMPLITUDE.
RIGIDITY_RLE: SLIGHT: RIGIDITY ONLY DETECTED WITH ACTIVATION MANEUVER.
RIGIDITY_LUE: MILD: RIGIDITY DETECTED WITHOUT THE ACTIVATION MANEUVER.  FULL RANGE OF MOTION IS EASILY ACHIEVED.
AMPLITUDE_RLE: NORMAL: NO TREMOR.
SPEECH: MILD: LOSS OF MODULATION, DICTION OR VOLUME, WITH A FEW WORDS UNCLEAR, BUT THE OVERALL SENTENCES EASY TO FOLLOW.
TOTAL_SCORE: 5
ARISING_CHAIR: SLIGHT: ARISING IS SLOWER THAN NORMAL, OR MAY NEED MORE THAN ONE ATTEMPT, OR MAY NEED TO MOVE FORWARD IN THE CHAIR TO ARISE.  NO NEED TO USE THE ARMS OF THE CHAIR.
AMPLITUDE_RUE: NORMAL: NO TREMOR.
AMPLITUDE_LIP_JAW: NORMAL: NO TREMOR.
AMPLITUDE_LIP_JAW: NORMAL: NO TREMOR.
FACIAL_EXPRESSION: MODERATE: MASKED FACIES WITH LIPS PARTED SOME OF THE TIME WHEN THE MOUTH IS AT REST.
RIGIDITY_RUE: MILD: RIGIDITY DETECTED WITHOUT THE ACTIVATION MANEUVER.  FULL RANGE OF MOTION IS EASILY ACHIEVED.
TOTAL_SCORE: 13
TOETAPPING_LEFT: SLIGHT: ANY OF THE FOLLOWING: A) THE REGULAR RHYTHM IS BROKEN WITH ONE WITH ONE OR TWO INTERRUPTIONS OR HESITATIONS OF THE MOVEMENT B) SLIGHT SLOWING C) THE AMPLITUDE DECREMENTS NEAR THE END OF THE 10 MOVEMENTS.
FINGER_TAPPING_LEFT: MILD: ANY OF THE FOLLOWING: A) 3 TO 5 INTERRUPTIONS DURING TAPPING B) MILD SLOWING C) THE AMPLITUDE DECREMENTS MIDWAY IN THE 10-MOVEMENT SEQUENCE
FACIAL_EXPRESSION: MODERATE: MASKED FACIES WITH LIPS PARTED SOME OF THE TIME WHEN THE MOUTH IS AT REST.
RIGIDITY_LUE: NORMAL
POSTURE: 1 SLIGHT.  NOT QUITE ERECT BUT COULD BE NORMAL FOR OLDER PERSONS.
RIGIDITY_LLE: SLIGHT: RIGIDITY ONLY DETECTED WITH ACTIVATION MANEUVER.
TOETAPPING_RIGHT: SLIGHT: ANY OF THE FOLLOWING: A) THE REGULAR RHYTHM IS BROKEN WITH ONE WITH ONE OR TWO INTERRUPTIONS OR HESITATIONS OF THE MOVEMENT B) SLIGHT SLOWING C) THE AMPLITUDE DECREMENTS NEAR THE END OF THE 10 MOVEMENTS.
ARISING_CHAIR: SLIGHT: ARISING IS SLOWER THAN NORMAL, OR MAY NEED MORE THAN ONE ATTEMPT, OR MAY NEED TO MOVE FORWARD IN THE CHAIR TO ARISE.  NO NEED TO USE THE ARMS OF THE CHAIR.
PARKINSONS_MEDS: OFF
FINGER_TAPPING_RIGHT: SLIGHT: ANY OF THE FOLLOWING: A) THE REGULAR RHYTHM IS BROKEN WITH ONE WITH ONE OR TWO INTERRUPTIONS OR HESITATIONS OF THE MOVEMENT B) SLIGHT SLOWING C) THE AMPLITUDE DECREMENTS NEAR THE END OF THE 10 MOVEMENTS.
PRONATION_SUPINATION_RIGHT: SLIGHT: ANY OF THE FOLLOWING: A) THE REGULAR RHYTHM IS BROKEN WITH ONE WITH ONE OR TWO INTERRUPTIONS OR HESITATIONS OF THE MOVEMENT B) SLIGHT SLOWING C) THE AMPLITUDE DECREMENTS NEAR THE END OF THE 10 MOVEMENTS.
RIGIDITY_NECK: SLIGHT: RIGIDITY ONLY DETECTED WITH ACTIVATION MANEUVER.
SPONTANEITY_OF_MOVEMENT: 1: SLIGHT: SLIGHT GLOBAL SLOWNESS AND POVERTY OF SPONTANEOUS MOVEMENTS.
PRONATION_SUPINATION_LEFT: MILD: ANY OF THE FOLLOWING: A) 3 TO 5 INTERRUPTIONS DURING TAPPING B) MILD SLOWING C) THE AMPLITUDE DECREMENTS MIDWAY IN THE 10-MOVEMENT SEQUENCE
SPEECH: MILD: LOSS OF MODULATION, DICTION OR VOLUME, WITH A FEW WORDS UNCLEAR, BUT THE OVERALL SENTENCES EASY TO FOLLOW.
POSTURAL_STABILITY: MODERATE: STANDS SAFELY, BUT WITH ABSENCE OF POSTURAL RESPONSE,  FALLS IF NOT CAUGHT BY EXAMINER.
POSTURE: 1 SLIGHT.  NOT QUITE ERECT BUT COULD BE NORMAL FOR OLDER PERSONS.
AMPLITUDE_RLE: NORMAL: NO TREMOR.
LEG_AGILITY_RIGHT: SLIGHT: ANY OF THE FOLLOWING: A) THE REGULAR RHYTHM IS BROKEN WITH ONE WITH ONE OR TWO INTERRUPTIONS OR HESITATIONS OF THE MOVEMENT B) SLIGHT SLOWING C) THE AMPLITUDE DECREMENTS NEAR THE END OF THE 10 MOVEMENTS.
RIGIDITY_LLE: MILD: RIGIDITY DETECTED WITHOUT THE ACTIVATION MANEUVER.  FULL RANGE OF MOTION IS EASILY ACHIEVED.
AMPLITUDE_RUE: NORMAL: NO TREMOR.
SPONTANEITY_OF_MOVEMENT: 1: SLIGHT: SLIGHT GLOBAL SLOWNESS AND POVERTY OF SPONTANEOUS MOVEMENTS.
TOETAPPING_RIGHT: SLIGHT: ANY OF THE FOLLOWING: A) THE REGULAR RHYTHM IS BROKEN WITH ONE WITH ONE OR TWO INTERRUPTIONS OR HESITATIONS OF THE MOVEMENT B) SLIGHT SLOWING C) THE AMPLITUDE DECREMENTS NEAR THE END OF THE 10 MOVEMENTS.
TOTAL_SCORE: 38
PARKINSONS_MEDS: ON
AXIAL_SCORE: 13
ARISING_CHAIR: NORMAL: ABLE TO ARISE QUICKLY WITHOUT HESITATION.
LEG_AGILITY_RIGHT: SLIGHT: ANY OF THE FOLLOWING: A) THE REGULAR RHYTHM IS BROKEN WITH ONE WITH ONE OR TWO INTERRUPTIONS OR HESITATIONS OF THE MOVEMENT B) SLIGHT SLOWING C) THE AMPLITUDE DECREMENTS NEAR THE END OF THE 10 MOVEMENTS.
POSTURAL_STABILITY: SLIGHT: 3-5 STEPS, BUT RECOVERS UNAIDED.
LEG_AGILITY_LEFT: MILD: ANY OF THE FOLLOWING: A) 3 TO 5 INTERRUPTIONS DURING TAPPING B) MILD SLOWING C) THE AMPLITUDE DECREMENTS MIDWAY IN THE 10-MOVEMENT SEQUENCE
AMPLITUDE_LUE: NORMAL: NO TREMOR.
AMPLITUDE_LUE: NORMAL: NO TREMOR.
HANDMOVEMENTS_RIGHT: SLIGHT: ANY OF THE FOLLOWING: A) THE REGULAR RHYTHM IS BROKEN WITH ONE WITH ONE OR TWO INTERRUPTIONS OR HESITATIONS OF THE MOVEMENT B) SLIGHT SLOWING C) THE AMPLITUDE DECREMENTS NEAR THE END OF THE 10 MOVEMENTS.
FREEZING_GAIT: NORMAL
PRONATION_SUPINATION_LEFT: SLIGHT: ANY OF THE FOLLOWING: A) THE REGULAR RHYTHM IS BROKEN WITH ONE WITH ONE OR TWO INTERRUPTIONS OR HESITATIONS OF THE MOVEMENT B) SLIGHT SLOWING C) THE AMPLITUDE DECREMENTS NEAR THE END OF THE 10 MOVEMENTS.
HANDMOVEMENTS_RIGHT: MODERATE: ANY OF THE FOLLOWING:  A) MORE THAN 5 INTERRUPTIONS  OR AT LEAST ONE LONGER ARREST (FREEZE) IN ONGOING MOVEMENT  B) MODERATE SLOWING C) THE AMPLITUDE DECREMENTS STARTING AFTER THE FIRST MOVEMENT.
GAIT: SLIGHT: INDEPENDENT WALKING WITH MINOR GAIT IMPAIRMENT.
AMPLITUDE_LLE: NORMAL: NO TREMOR.
PARKINSONS_MEDS: OFF
AMPLITUDE_LLE: NORMAL: NO TREMOR.
FINGER_TAPPING_RIGHT: NORMAL
FINGER_TAPPING_LEFT: SLIGHT: ANY OF THE FOLLOWING: A) THE REGULAR RHYTHM IS BROKEN WITH ONE WITH ONE OR TWO INTERRUPTIONS OR HESITATIONS OF THE MOVEMENT B) SLIGHT SLOWING C) THE AMPLITUDE DECREMENTS NEAR THE END OF THE 10 MOVEMENTS.
RIGIDITY_RLE: MILD: RIGIDITY DETECTED WITHOUT THE ACTIVATION MANEUVER.  FULL RANGE OF MOTION IS EASILY ACHIEVED.
PRONATION_SUPINATION_RIGHT: SLIGHT: ANY OF THE FOLLOWING: A) THE REGULAR RHYTHM IS BROKEN WITH ONE WITH ONE OR TWO INTERRUPTIONS OR HESITATIONS OF THE MOVEMENT B) SLIGHT SLOWING C) THE AMPLITUDE DECREMENTS NEAR THE END OF THE 10 MOVEMENTS.
GAIT: SLIGHT: INDEPENDENT WALKING WITH MINOR GAIT IMPAIRMENT.
PRONATION_SUPINATION_LEFT: SLIGHT: ANY OF THE FOLLOWING: A) THE REGULAR RHYTHM IS BROKEN WITH ONE WITH ONE OR TWO INTERRUPTIONS OR HESITATIONS OF THE MOVEMENT B) SLIGHT SLOWING C) THE AMPLITUDE DECREMENTS NEAR THE END OF THE 10 MOVEMENTS.
RIGIDITY_LLE: MILD: RIGIDITY DETECTED WITHOUT THE ACTIVATION MANEUVER.  FULL RANGE OF MOTION IS EASILY ACHIEVED.
TOTAL_SCORE_LEFT: 13
AMPLITUDE_LIP_JAW: NORMAL: NO TREMOR.
LEG_AGILITY_LEFT: SLIGHT: ANY OF THE FOLLOWING: A) THE REGULAR RHYTHM IS BROKEN WITH ONE WITH ONE OR TWO INTERRUPTIONS OR HESITATIONS OF THE MOVEMENT B) SLIGHT SLOWING C) THE AMPLITUDE DECREMENTS NEAR THE END OF THE 10 MOVEMENTS.
LEG_AGILITY_RIGHT: MILD: ANY OF THE FOLLOWING: A) 3 TO 5 INTERRUPTIONS DURING TAPPING B) MILD SLOWING C) THE AMPLITUDE DECREMENTS MIDWAY IN THE 10-MOVEMENT SEQUENCE
FACIAL_EXPRESSION: MODERATE: MASKED FACIES WITH LIPS PARTED SOME OF THE TIME WHEN THE MOUTH IS AT REST.
SPEECH: SLIGHT: LOSS OF MODULATION, DICTION OR VOLUME, BUT STILL ALL WORDS EASY TO UNDERSTAND.
FREEZING_GAIT: NORMAL
TOETAPPING_RIGHT: SLIGHT: ANY OF THE FOLLOWING: A) THE REGULAR RHYTHM IS BROKEN WITH ONE WITH ONE OR TWO INTERRUPTIONS OR HESITATIONS OF THE MOVEMENT B) SLIGHT SLOWING C) THE AMPLITUDE DECREMENTS NEAR THE END OF THE 10 MOVEMENTS.
TOETAPPING_LEFT: SLIGHT: ANY OF THE FOLLOWING: A) THE REGULAR RHYTHM IS BROKEN WITH ONE WITH ONE OR TWO INTERRUPTIONS OR HESITATIONS OF THE MOVEMENT B) SLIGHT SLOWING C) THE AMPLITUDE DECREMENTS NEAR THE END OF THE 10 MOVEMENTS.
GAIT: SLIGHT: INDEPENDENT WALKING WITH MINOR GAIT IMPAIRMENT.
CONSTANCY_TREMOR_ATREST: SLIGHT: TREMOR AT REST IS PRESENT  25% OF THE ENTIRE EXAMINATION PERIOD.
CONSTANCY_TREMOR_ATREST: NORMAL: NO TREMOR.
RIGIDITY_RUE: SLIGHT: RIGIDITY ONLY DETECTED WITH ACTIVATION MANEUVER.
CONSTANCY_TREMOR_ATREST: NORMAL: NO TREMOR.
HANDMOVEMENTS_LEFT: SLIGHT: ANY OF THE FOLLOWING: A) THE REGULAR RHYTHM IS BROKEN WITH ONE WITH ONE OR TWO INTERRUPTIONS OR HESITATIONS OF THE MOVEMENT B) SLIGHT SLOWING C) THE AMPLITUDE DECREMENTS NEAR THE END OF THE 10 MOVEMENTS.
RIGIDITY_LUE: MILD: RIGIDITY DETECTED WITHOUT THE ACTIVATION MANEUVER.  FULL RANGE OF MOTION IS EASILY ACHIEVED.
AXIAL_SCORE: 9
AMPLITUDE_RLE: NORMAL: NO TREMOR.
RIGIDITY_NECK: MILD: RIGIDITY DETECTED WITHOUT THE ACTIVATION MANEUVER.  FULL RANGE OF MOTION IS EASILY ACHIEVED.
POSTURAL_STABILITY: MODERATE: STANDS SAFELY, BUT WITH ABSENCE OF POSTURAL RESPONSE,  FALLS IF NOT CAUGHT BY EXAMINER.
TOTAL_SCORE: 42
RIGIDITY_RUE: NORMAL
TOTAL_SCORE_LEFT: 15
POSTURE: 1 SLIGHT.  NOT QUITE ERECT BUT COULD BE NORMAL FOR OLDER PERSONS.
LEG_AGILITY_LEFT: SLIGHT: ANY OF THE FOLLOWING: A) THE REGULAR RHYTHM IS BROKEN WITH ONE WITH ONE OR TWO INTERRUPTIONS OR HESITATIONS OF THE MOVEMENT B) SLIGHT SLOWING C) THE AMPLITUDE DECREMENTS NEAR THE END OF THE 10 MOVEMENTS.
FINGER_TAPPING_RIGHT: SLIGHT: ANY OF THE FOLLOWING: A) THE REGULAR RHYTHM IS BROKEN WITH ONE WITH ONE OR TWO INTERRUPTIONS OR HESITATIONS OF THE MOVEMENT B) SLIGHT SLOWING C) THE AMPLITUDE DECREMENTS NEAR THE END OF THE 10 MOVEMENTS.
SPONTANEITY_OF_MOVEMENT: 1: SLIGHT: SLIGHT GLOBAL SLOWNESS AND POVERTY OF SPONTANEOUS MOVEMENTS.
RIGIDITY_RLE: SLIGHT: RIGIDITY ONLY DETECTED WITH ACTIVATION MANEUVER.
FREEZING_GAIT: NORMAL
TOETAPPING_LEFT: SLIGHT: ANY OF THE FOLLOWING: A) THE REGULAR RHYTHM IS BROKEN WITH ONE WITH ONE OR TWO INTERRUPTIONS OR HESITATIONS OF THE MOVEMENT B) SLIGHT SLOWING C) THE AMPLITUDE DECREMENTS NEAR THE END OF THE 10 MOVEMENTS.
FINGER_TAPPING_LEFT: MODERATE: ANY OF THE FOLLOWING:  A) MORE THAN 5 INTERRUPTIONS  OR AT LEAST ONE LONGER ARREST (FREEZE) IN ONGOING MOVEMENT  B) MODERATE SLOWING C) THE AMPLITUDE DECREMENTS STARTING AFTER THE FIRST MOVEMENT.
PRONATION_SUPINATION_RIGHT: SLIGHT: ANY OF THE FOLLOWING: A) THE REGULAR RHYTHM IS BROKEN WITH ONE WITH ONE OR TWO INTERRUPTIONS OR HESITATIONS OF THE MOVEMENT B) SLIGHT SLOWING C) THE AMPLITUDE DECREMENTS NEAR THE END OF THE 10 MOVEMENTS.
HANDMOVEMENTS_RIGHT: MODERATE: ANY OF THE FOLLOWING:  A) MORE THAN 5 INTERRUPTIONS  OR AT LEAST ONE LONGER ARREST (FREEZE) IN ONGOING MOVEMENT  B) MODERATE SLOWING C) THE AMPLITUDE DECREMENTS STARTING AFTER THE FIRST MOVEMENT.
RIGIDITY_NECK: SLIGHT: RIGIDITY ONLY DETECTED WITH ACTIVATION MANEUVER.
HANDMOVEMENTS_LEFT: SLIGHT: ANY OF THE FOLLOWING: A) THE REGULAR RHYTHM IS BROKEN WITH ONE WITH ONE OR TWO INTERRUPTIONS OR HESITATIONS OF THE MOVEMENT B) SLIGHT SLOWING C) THE AMPLITUDE DECREMENTS NEAR THE END OF THE 10 MOVEMENTS.
TOTAL_SCORE: 11
HANDMOVEMENTS_LEFT: SLIGHT: ANY OF THE FOLLOWING: A) THE REGULAR RHYTHM IS BROKEN WITH ONE WITH ONE OR TWO INTERRUPTIONS OR HESITATIONS OF THE MOVEMENT B) SLIGHT SLOWING C) THE AMPLITUDE DECREMENTS NEAR THE END OF THE 10 MOVEMENTS.
AXIAL_SCORE: 14
TOTAL_SCORE_LEFT: 8

## 2019-10-08 ASSESSMENT — MOVEMENT DISORDERS SOCIETY - UNIFIED PARKINSONS DISEASE RATING SCALE (MDS-UPDRS)
GETTING_OUT_OF_BED_CAR_DEEP_CHAIR: SLIGHT: I AM SLOW OR AWKWARD, BUT I USUALLY CAN DO IT ON MY FIRST TRY.
SALIVA_AND_DROOLING: SLIGHT: I HAVE TOO MUCH SALIVA, BUT DO NOT DROOL.
SPEECH: SLIGHT: MY SPEECH IS SOFT, SLURRED OR UNEVEN, BUT IT DOES NOT CAUSE OTHERS TO ASK ME TO REPEAT MYSELF.
TURNING_IN_BED: SLIGHT: I HAVE A BIT OF TROUBLE TURNING, BUT I DO NOT NEED ANY HELP.
HOBBIES_AND_OTHER_ACTIVITIES: NORMAL:  NOT AT ALL (NO PROBLEMS).
TREMOR: NORMAL: NOT AT ALL (NO PROBLEMS).
EATING_TASKS: NORMAL: NOT AT ALL (NO PROBLEMS).
HANDWRITING: NORMAL: NOT AT ALL (NO PROBLEMS).
TOTAL_SCORE: 4
HYGIENE: NORMAL: NOT AT ALL (NO PROBLEMS).
FREEZING: NORMAL: NOT AT ALL (NO PROBLEMS).
DRESSING: NORMAL: NOT AT ALL (NO PROBLEMS).
CHEWING_AND_SWALLOWING: NORMAL: NO PROBLEMS.
WALKING_AND_BALANCE: NORMAL: NOT AT ALL (NO PROBLEMS).

## 2019-10-08 ASSESSMENT — MIFFLIN-ST. JEOR: SCORE: 1681.73

## 2019-10-08 ASSESSMENT — PAIN SCALES - GENERAL: PAINLEVEL: MODERATE PAIN (4)

## 2019-10-08 NOTE — PROGRESS NOTES
"MOVEMENT DISORDERS CLINIC    PATIENT: Erika Diaz    : 1960    GINNY: 2019    REASON FOR VISIT: Clinical Core 12 month follow up visit.    HPI: Ms. Erika Diaz is a 59 year old right - handed  female who came to the [facility] neurology clinic by herself for a 12-month clinical core study follow up visit.  She came via transportation.  Shanice Coyne RN Research Coordinator is present.     Patient was teary and crying on and off during today's visit.  She had a rough month due to her  being in the hospital for liver cancer.  He had part of his liver removed.  After he was discharged to rehab, he was sent back to the hospital due to renal failure, hypotension, and hypokalemia.       Her PD motor symptoms, mood, and sleep have been affected due to her 's illness.  She has been spending most nights in the hospital.  She hasn't been sleeping well at night as \"the nurses come and wakes me up every hour.\"  There is some tension between her and her step-son.    She has inner tremor, which has gotten worse.  \"I get the shakes.\"  She has been taking extra dose of sinemet 25/100 mg 1 tab at 12 pm as needed. She continues missing the 4 pm unless she is working.  She has taken time off from work.  She has not consistently taking her medications as scheduled due to the schedule changes in her life as discussed above.       PD Medications 7 am 12 pm 4 pm 9 pm   Sinemet 25/100 mg  1.5 1.5 1.5 1.5   Amantadine 100 mg 1   1     Pramipexole 0.5 mg       2     She has been using Lorazepam 0.5 mg every night to help her sleep.    MEDICATIONS:   Medication Sig     Acetaminophen (TYLENOL PO) Take 1,000 mg by mouth every 8 hours as needed for mild pain or fever     amantadine (SYMMETREL) 100 MG capsule 1 capsule orally @ 7am and 4pm     aspirin 81 MG EC tablet Take 81 mg by mouth daily     busPIRone (BUSPAR) 10 MG tablet TAKE 1 TABLET BY MOUTH AT 7 AM AND AT 4 PM     calcium carbonate (TUMS) 500 MG " "chewable tablet Take 2 chew tab by mouth as needed for heartburn     carbidopa-levodopa (SINEMET)  MG tablet 1.5 @ 7, 12 pm, 4 pm and 9 pm and 1 needed = 7 tabs per day     FLUoxetine (PROZAC) 20 MG capsule Take 20 mg by mouth every morning Take 20mg capsule  @ 7am     hydrochlorothiazide (HYDRODIURIL) 25 MG tablet Take 25 mg by mouth At Bedtime @9 pm     LORazepam (ATIVAN) 0.5 MG tablet Take 1 tablet (0.5 mg) by mouth daily as needed for anxiety or sleep     pramipexole (MIRAPEX) 0.5 MG tablet 2 tabs orally @ 9 pm     rosuvastatin (CRESTOR) 20 MG tablet Take 20 mg by mouth       ALLERGIES: Atorvastatin; Codeine; and Mold    DBS Interrogation & Analysis:     Implanted generator:  Right chest Infinity [id number]  Lead placement:  Right Globus Pallidus Internus (Gpi).     Right Brain   Lead placement date:  2018  Generator placement date:  2018     C +, 10abc -  Current:  3.80 mA  PW:  60 msec  Rate:  130 Hz     Therapy impedance:  912 Ohms     Battery Service Life:  OK.  2.96 volts.     Patient :  Upper Limit: 3.80 mA  Lower Limit: 3.00 mA     Electrode Impedance Check:   Right Lead: No Problems Found.       See scanned report for impedance details.    PHYSICAL EXAM:    VITAL SIGNS:  Blood pressure 137/85, pulse 73, temperature 98.6  F (37  C), temperature source Oral, resp. rate 20, height 1.651 m (5' 5\"), weight 111.6 kg (246 lb), SpO2 97 %. Body mass index is 40.94 kg/m .    GENERAL:  Ms. Diaz is a pleasant  female who is well-groomed, well-developed, and overweight sitting comfortably in the exam room without any distress.  Affect is appropriate.    MOVEMENT DISORDERS ASSESSMENT:  MDS Part II  -- Over the last week -- 0: Normal -- 1: Slight -- 2: Mild -- 3: Moderate -- 4: Severe     2.1 Speech: 1   2.2 Saliva and droolin   2.3 Chewing and swallowin   2.4 Eating tasks: 0   2.5 Dressin   2.6 Hygiene:  0   2.7 Handwritin   2.8 Doing hobbies and other activities:  0 "   2.9 Turning in bed:  1   2.10 Tremor:  1   2.11 Getting out of bed:  1   2.12 Walking and balance: 1   2.13 Freezin     Total:    6       UPDRS I, and IV completed and given to Shanice Coyne RN.     Last antiparkinsonian dose taken:    Amantadine 100 mg --  10/6 at 7 am  Sinemet 25/100 mg --  10/7 at 7 am  Pramipexole 0.5 mg -- 10/7 at 4 pm     UPDRS III completed as below.   DBS was turned off at 7:27 am.  Patient took Sinemet 25/100 mg 1.5 tabs and Amantadine 100 mg 1 tab at 8:06 am.  UPDRS Values 10/8/2019 10/8/2019 10/8/2019   Time: 7:16 AM 7:56 AM 8:53 AM   Medication Off Off On   R Brain DBS: On Off On   L Brain DBS: None None None   Speech 2 2 1   Facial Expression 3 3 3   Rigidity Neck 1 2 1   Rigidity RUE 1 2 0   Rigidity LUE 2 2 0   Rigidity RLE 1 2 1   Rigidity LLE 2 2 1   Finger Taps R 1 1 0   Finger Taps L 2 3 1   Hand Mvt R 3 3 1   Hand Mvt L 1 1 1   Pron-/Supinate R 1 1 1   Pron-/Supinate L 1 2 1   Toe Tap R 1 1 1   Toe Tap L 1 1 1   Leg Agility R 1 2 1   Leg Agility L 1 2 1   Arise From Chair 1 1 0   Gait 1 1 1   Gait Freezing 0 0 0   Postural Stability 3 3 1   Posture 1 1 1   Global Spont Mvt 1 1 1   Postural Tremor RUE 1 1 0   Postural Tremor LUE 1 1 1   Kinetic Tremor RUE 1 0 0   Kinetic Tremor LUE 1 1 1   Rest Tremor RUE 0 0 0   Rest Tremor LUE 1 0 0   Rest Tremor RLE 0 0 0   Rest Tremor LLE 0 0 0   Rest Tremor Lip/Jaw 0 0 0   Rest Tremor Constancy 1 0 0   Total Right 11 13 5   Total Left 13 15 8   Axial Total 13 14 9   Total 38 42 22     Dyskinesias:  Absent.     ASSESSMENT:    1)  Parkinson's Disease:  Patient has a 10 - 11 year history of PD.  Due to her 's hospitalization, she has increased stress, which has worsened her inner tremors.  She hasn't been taking her meds as scheduled because of the routine change in her life.  She is taking extra Sinemet 25/100 mg dose PRN at 12 pm to help tremors.     2)  Sleep disturbances:  This is situational as she is sleeping in the hospital  visiting her .  She takes Lorazepam 0.5 mg nighty to help her relax and sleep.     3)  Anxiety:  She is more anxious of the unknown with her 's health.  She is on Buspirone 10 mg BID; Fluoxetine 20 mg daily, and Lorazepam 0.5 mg 1 tab PRN daily.    4)  S/p Right Gpi DBS lead implantation:  Normal impedance and battery life.       PLAN:    __  No DBS programming changes were made.    __  Discussed the impact of non-restorative sleep and stress on motor symptoms.  She was encourage to take time to relax and take care of herself.  She was encouraged not to stay in the hospital overnight for more than 2 days and to go home and sleep on the 3rd day.    __  Encouraged to stay on scheduled times as able.    __  Okay to take an extra dose of Sinemet at noon as needed.      PD Medications 7 am 12 pm 4 pm 9 pm   Sinemet 25/100 mg  1.5 1.5 1.5 1.5   Amantadine 100 mg 1   1     Pramipexole 0.5 mg       2     __  Will continue taking Lorazepam at bedtime to help you relax and sleep.     __  No medication changes on her antianxiety medications.    __  She might benefit from seeing a counselor, however, due to her  being in the hospital will wait until things stabilize.       Will return to our clinic in 6 months or sooner as needed.    The total amount of time spent with the patient was 120 minutes; 20 min in DBS interrogation & analysis and 100 min in completing assessments for PD, discussing sleep and mood issues.  Over 50% of the time was spent in counseling & coordination of care.     GUILLERMINA Krueger,  CNP  [facility]    7:09 AM  9:06 AM

## 2019-10-08 NOTE — Clinical Note
"10/8/2019       RE: Erika Diaz  [address] [facility] [address] [place] [id number]     Dear Colleague,    Thank you for referring your patient, Erika Diaz, to the [facility] NEUROLOGY at [facility]. Please see a copy of my visit note below.    MOVEMENT DISORDERS CLINIC    PATIENT: Erika Diaz    : 1960    GINNY: 2019    REASON FOR VISIT: Clinical Core 12 month visit follow up.    HPI: Ms. Erika Diaz is a 59 year old right - handed Caucasain female who came to the [facility] neurology clinic by herself  for a 12-month clinical core study follow up visit.     She had a rough month.  Her  has been in the hospital due to liver cancer.  He had part of his liver removed.  After he was discharged to rehab, he was sent back to the hospital due to renal failure, hypotension, and hypokalema.       She has been spending the night in the hospital.  She hasn't been sleeping well at night.  \"The nurses come and wake me up.\"    This has been stressful for patient.  \"I get the shakes.\"  She has been taking extra dose of sinemet at 12 pm as needed. She continues missing her 4 pm.  Her dose times is not       PD Medications 7 am 12 pm 4 pm 9 pm   Sinemet 25/100 mg  1.5 1.5 1.5 1.5   Amantadine 100 mg 1   1     Pramipexole 0.5 mg       2     She has been using Lorazepam 0.5 mg every night to help her sleep.    MEDICATIONS:   Outpatient Medications Marked as Taking for the 10/8/19 encounter (Office Visit) with Arminda Rivas APRN CNP   Medication Sig     Acetaminophen (TYLENOL PO) Take 1,000 mg by mouth every 8 hours as needed for mild pain or fever     amantadine (SYMMETREL) 100 MG capsule 1 capsule orally @ 7am and 4pm     aspirin 81 MG EC tablet Take 81 mg by mouth daily     busPIRone (BUSPAR) 10 MG tablet TAKE 1 TABLET BY MOUTH AT 7 AM AND AT 4 PM     calcium carbonate (TUMS) 500 MG chewable tablet Take 2 chew tab by mouth as needed for heartburn     " "carbidopa-levodopa (SINEMET)  MG tablet 1.5 @ 7, 12 pm, 4 pm and 9 pm and 1 needed = 7 tabs per day     FLUoxetine (PROZAC) 20 MG capsule Take 20 mg by mouth every morning Take 20mg capsule  @ 7am     hydrochlorothiazide (HYDRODIURIL) 25 MG tablet Take 25 mg by mouth At Bedtime @9 pm     LORazepam (ATIVAN) 0.5 MG tablet Take 1 tablet (0.5 mg) by mouth daily as needed for anxiety or sleep     pramipexole (MIRAPEX) 0.5 MG tablet 2 tabs orally @ 9 pm     rosuvastatin (CRESTOR) 20 MG tablet Take 20 mg by mouth       ALLERGIES: Atorvastatin; Codeine; and Mold    DBS Interrogation & Analysis:     Implanted generator:  Right chest Infinity [id number]  Lead placement:  Right Globus Pallidus Internus (Gpi).     Right Brain   Lead placement date:  2018  Generator placement date:  2018     C +, 10abc -  Current:  3.80 mA  PW:  60 msec  Rate:  130 Hz     Therapy impedance:  912 Ohms     Battery Service Life:  OK.  2.96 volts.     Patient :  Upper Limit: 3.80 mA  Lower Limit: 3.00 mA     Electrode Impedance Check:   Right Lead: No Problems Found.       See scanned report for impedance details.    PHYSICAL EXAM:    VITAL SIGNS:  Blood pressure 137/85, pulse 73, temperature 98.6  F (37  C), temperature source Oral, resp. rate 20, height 1.651 m (5' 5\"), weight 111.6 kg (246 lb), SpO2 97 %. Body mass index is 40.94 kg/m .    GENERAL:  Ms. Diaz is a pleasant *** female who is {General:[id number]} sitting comfortably in the exam room without any distress.  Affect is appropriate.    MOVEMENT DISORDERS ASSESSMENT:  MDS Part II  -- Over the last week -- 0: Normal -- 1: Slight -- 2: Mild -- 3: Moderate -- 4: Severe     2.1 Speech: 1   2.2 Saliva and droolin   2.3 Chewing and swallowin   2.4 Eating tasks: 0   2.5 Dressin   2.6 Hygiene:  0   2.7 Handwritin   2.8 Doing hobbies and other activities:  0   2.9 Turning in bed:  1   2.10 Tremor:  1   2.11 Getting out of bed:  1   2.12 Walking and balance: " 1   2.13 Freezin     Total:    6     UPDRS I, and IV completed and given to Shanice Coyne RN.     Last antiparkinsonian dose taken:    Amantadine 100 mg --  10/6 at 7 am  Sinemet 25/100 mg --  10/7 at 7 am  Pramipexole 0.5 mg -- 10/7 at 4 pm   DBS was turned off at 7:27 am  Patient took med meds at 8:06 am  UPDRS Values 10/8/2019 10/8/2019 10/8/2019   Time: 7:16 AM 7:56 AM 8:53 AM   Medication Off Off On   R Brain DBS: On Off On   L Brain DBS: None None None   Speech 2 2 1   Facial Expression 3 3 3   Rigidity Neck 1 2 1   Rigidity RUE 1 2 0   Rigidity LUE 2 2 0   Rigidity RLE 1 2 1   Rigidity LLE 2 2 1   Finger Taps R 1 1 0   Finger Taps L 2 3 1   Hand Mvt R 3 3 1   Hand Mvt L 1 1 1   Pron-/Supinate R 1 1 1   Pron-/Supinate L 1 2 1   Toe Tap R 1 1 1   Toe Tap L 1 1 1   Leg Agility R 1 2 1   Leg Agility L 1 2 1   Arise From Chair 1 1 0   Gait 1 1 1   Gait Freezing 0 0 0   Postural Stability 3 3 1   Posture 1 1 1   Global Spont Mvt 1 1 1   Postural Tremor RUE 1 1 0   Postural Tremor LUE 1 1 1   Kinetic Tremor RUE 1 0 0   Kinetic Tremor LUE 1 1 1   Rest Tremor RUE 0 0 0   Rest Tremor LUE 1 0 0   Rest Tremor RLE 0 0 0   Rest Tremor LLE 0 0 0   Rest Tremor Lip/Jaw 0 0 0   Rest Tremor Constancy 1 0 0   Total Right 11 13 5   Total Left 13 15 8   Axial Total 13 14 9   Total 38 42 22     Dyskinesias:  ***.     ASSESSMENT:    1)  Parkinson's Disease:  Patient has a *** year history of PD.      2)  ***:      3)  ***:      4)  ***:       PLAN:    __  ***    __  ***    __  ***    __  ***    Will return to our clinic in *** months or sooner as needed.    The total time spent with the patient was *** minutes, and greater than 50% of this time was spent in counseling and coordination of care.    GUILLERMINA Krueger,  CNP  [facility]    7:09 AM  9:06 AM      Again, thank you for allowing me to participate in the care of your patient.      Sincerely,    GUILLERMINA Valero CNP

## 2019-10-08 NOTE — PATIENT INSTRUCTIONS
October 8, 2019    Dear Ms. Erika NIELSON Joe,    Thank you for coming today.  During your visit, we have discussed the following:     __  Your DBS electrical system function (impedance) is normal. The battery life is also good.    __  No DBS programming changes were made.    __  Take time to relax and take care of yourself.  If you stay in the hospital overnight to visit your  for 2 days, go home and spent the 3rd night at home.  You need to rest and take care of yourself as your parkinson's disease can get worse.        PD Medications 7 am 12 pm 4 pm 9 pm   Sinemet 25/100 mg  1.5 1.5 1.5 1.5   Amantadine 100 mg 1   1     Pramipexole 0.5 mg       2     __  Continue taking Lorazepam at bedtime to help you relax and sleep.       To contact our clinic, you may call [phone] or use [employer] message.     It's good to see you!  I'll see you in 6 months.      GUILLERMINA Krueger, CNP  [facility]

## 2019-10-08 NOTE — NURSING NOTE
Chief Complaint   Patient presents with     Parkinson     Clinical Trials     P RETURN MOVEMENT DISORDER VIDEOTAPED ON/OFF TESTING PD       Jd Garcia, EMT

## 2019-10-11 ENCOUNTER — TELEPHONE (OUTPATIENT)
Dept: NEUROLOGY | Facility: CLINIC | Age: 59
End: 2019-10-11

## 2019-10-11 NOTE — TELEPHONE ENCOUNTER
I called patient to give her Karina Rivas's recommendation that she utilize a counselor or therapist to manage her increased stress.     Patient agrees with this plan. She said she used to see a therapist in [place] at the same clinic that houses her primary care. This therapist moved, so she will look into seeing someone else there. She has seen Aimee Mynor at the [facility] in the past but she prefers to see someone closer to her home for convenience. She will let us know if she needs any more help with this.    Patient was currently at the hospital with her  who just had 4.2 L drained from his abdomen. They are now waiting on a culture, but hope be discharged in a few days. Patient has been utilizing free acupressure services at the hospital for patients and families and reports that this helped release tension in her shoulders.    Patient had no further questions.    Shanice Coyne, RN, MPH  Research Nurse, Movement Disorders

## 2020-02-05 ENCOUNTER — DOCUMENTATION ONLY (OUTPATIENT)
Dept: CARE COORDINATION | Facility: CLINIC | Age: 60
End: 2020-02-05

## 2020-03-11 ENCOUNTER — HEALTH MAINTENANCE LETTER (OUTPATIENT)
Age: 60
End: 2020-03-11

## 2020-03-25 ENCOUNTER — TELEPHONE (OUTPATIENT)
Dept: NEUROLOGY | Facility: CLINIC | Age: 60
End: 2020-03-25

## 2020-03-25 ASSESSMENT — MOVEMENT DISORDERS SOCIETY - UNIFIED PARKINSONS DISEASE RATING SCALE (MDS-UPDRS)
DRESSING: NORMAL: NOT AT ALL (NO PROBLEMS).
TOTAL_SCORE: 7
HOBBIES_AND_OTHER_ACTIVITIES: NORMAL:  NOT AT ALL (NO PROBLEMS).
SALIVA_AND_DROOLING: SLIGHT: I HAVE TOO MUCH SALIVA, BUT DO NOT DROOL.
CHEWING_AND_SWALLOWING: SLIGHT: I AM AWARE OF SLOWNESS IN MY CHEWING OR INCREASED EFFORT AT SWALLOWING, BUT I DO NOT CHOKE OR NEED TO HAVE MY FOOD SPECIALLY PREPARED.
HYGIENE: NORMAL: NOT AT ALL (NO PROBLEMS).
HANDWRITING: NORMAL: NOT AT ALL (NO PROBLEMS).
GETTING_OUT_OF_BED_CAR_DEEP_CHAIR: SLIGHT: I AM SLOW OR AWKWARD, BUT I USUALLY CAN DO IT ON MY FIRST TRY.
EATING_TASKS: NORMAL: NOT AT ALL (NO PROBLEMS).
TURNING_IN_BED: SLIGHT: I HAVE A BIT OF TROUBLE TURNING, BUT I DO NOT NEED ANY HELP.
TREMOR: SLIGHT: SHAKING OR TREMOR OCCURS BUT DOES NOT CAUSE PROBLEMS WITH ANY ACTIVITIES.
SPEECH: SLIGHT: MY SPEECH IS SOFT, SLURRED OR UNEVEN, BUT IT DOES NOT CAUSE OTHERS TO ASK ME TO REPEAT MYSELF.
WALKING_AND_BALANCE: NORMAL: NOT AT ALL (NO PROBLEMS).
FREEZING: SLIGHTLY: I BRIEFLY FREEZE BUT I CAN EASILY START WALKING AGAIN. I DO NOT NEED HELP FROM SOMEONE ELSE OR A WALKING AID (CANE OR WALKER) BECAUSE OF FREEZING.

## 2020-03-25 NOTE — TELEPHONE ENCOUNTER
"Patient called to say she is looking forward to seeing the team at clinic on April 8th. I did let her know that in response to Covid we are currently limiting in-person contact in clinic to urgent cases, and the team is changing visits to telephone/virtual visits about a week beforehand. I asked how she felt about a virtual visit. Patient is OK with switching to telephone visit on April 8th with Karina. I updated appointment note with her mobile number. I reviewed with her that a CMA will call to review her medications & times prior to visit and to have a list of questions/concerns beforehand.    Patient states she has stopped working this week out of caution during the Covid pandemic.because her spouse is being treated for cancer. 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 She's been feeling more shaky on her right body and is wondering if her PD is progressing, but also thinks it could be stress related, and she hopes it will improve while being on this break.     She asked \"Is there a different medication I can be on for restless leg? I don't feel like it's working like it used to\". She is on pramipexole 0.5 mg, 2 tablets at bedtime and says she's been on it for a long time. I explained that she can discuss different medications adjustments at her upcoming visit with Karina.     She has had a dry cough since late February and saw her doctor then. She does not have a fever and does not think she has been exposed to anyone with covid, or traveled to high risk areas.  I explained that if she feels her symptoms start worsening or has shortness of breath she should visit [email or web address] and go through the prompts to seek care, or get in touch with her primary care provider.Pt thinks it could \"just be the weather and dry throat\". Pt understands she is at higher risk due to Parkinson's disease diagnosis and to avoid crowds, wash hands frequently, and stay home if possible. She plans to stay home.    Will " update GUILLERMINA Desir, NP. Patient does not have any urgent questions at this time and will discuss with Karina on April 8th.    Shanice Coyne, RN, MPH  Research Nurse, Movement Disorders

## 2020-03-26 DIAGNOSIS — F41.9 ANXIETY: ICD-10-CM

## 2020-03-26 DIAGNOSIS — G47.9 SLEEP DISTURBANCES: ICD-10-CM

## 2020-03-26 DIAGNOSIS — G20.A1 PARKINSON'S DISEASE (H): ICD-10-CM

## 2020-03-26 RX ORDER — LORAZEPAM 0.5 MG/1
0.5 TABLET ORAL DAILY PRN
Qty: 30 TABLET | Refills: 5 | Status: SHIPPED | OUTPATIENT
Start: 2020-03-26 | End: 2020-11-10

## 2020-03-26 NOTE — TELEPHONE ENCOUNTER
Lorazepam refill, pended and routed to provider GUILLERMINA Desir, NP.    Shanice Coyne, RN, MPH  Research Nurse, Movement Disorders

## 2020-03-26 NOTE — TELEPHONE ENCOUNTER
Rx Authorization:    Requested Medication/ Dose: Lorazepam 0.5mg tabs    Date last refill ordered: 9/17/2019    Quantity ordered: 30    # refills: 5    Date of last clinic visit with ordering provider: 10/8/2019    Date of next clinic visit with ordering provider: 4/8/20    All pertinent protocol data (lab date/result):     Include pertinent information from patients message:

## 2020-03-31 ENCOUNTER — TELEPHONE (OUTPATIENT)
Dept: NEUROLOGY | Facility: CLINIC | Age: 60
End: 2020-03-31

## 2020-03-31 DIAGNOSIS — H81.10 BENIGN PAROXYSMAL POSITIONAL VERTIGO, UNSPECIFIED LATERALITY: Primary | ICD-10-CM

## 2020-03-31 NOTE — TELEPHONE ENCOUNTER
Patient called to report she has had episodes of dizziness the past two mornings. She slept in later this morning and did not take her first dose of medication until 10am.     Symptoms: Patient feeling dizzy upon standing in the morning, she had to hold on to the wall and felt like she was falling to the right. She states this has not happened before yesterday.    How long have you had the increase in symptoms?    This morning is the 2nd time this has happened    Do you notice any pattern?    Seems to get better throughout the day, normal by evening    How are the symptoms in relation to the dose times of PD medications?    Symptoms occur prior to taking first dose of medication      Are you having any new symptoms such as,   Fever/chills  No   Recent illness  Has had a cough for about a month, dry cough, had chest x ray February 28th, which was clear. Pt took an antibiotic. She is being followed by primary care for this.   Dehydration  Pt reports she is drinking iced tea. Patient acknowledges she needs to drink more fluids.   Eating OK  Appetite is OK   Hit your head  Denies   Mental stress   being treated for liver cancer, stress from COVID-19 and having to stay home, feels she is not as physically active as before   Emotional stress See above   Confusion  Denies   Hallucinations Sees things in the corner of her eyes occasionally, not new   Urinating OK? OK, she has to hurry sometimes but this is same for her since her last surgery   Any discomfort while urinating No    I explained that dizziness can be due to many things, but one explanation could be a drop in blood pressure referred to as orthostatic hypotension, which can result from Parkinson's disease effects or the medications that we use to treat Parkinson's disease.     I encouraged patient to drink water throughout the day, at least 6-8 cups and to focus on liquids without caffeine as that can dehydrate. Patient does not drink alcohol. I asked her  to keep a glass of water at her bedside and to drink this first thing in the morning, before she gets up for the day. To prevent dizziness and falls, when she rises from bed, she should move to sitting first for a minute, then stand slowly and hang on to something until she feel safe to walk. I also reviewed eating small, frequent meals throughout the day and to avoid huge meals.    I encouraged patient to call back if symptoms worsen.    Patient is currently on hydrochlorothiazide (25 mg at bedtime). I told her not to make any changes to her medications at this time.     I will update GUILLERMINA Desir, NP to see if there are any additional instructions for patient.    Shanice Coyne, RN, MPH  Research Nurse, Movement Disorders

## 2020-03-31 NOTE — TELEPHONE ENCOUNTER
I agree with the recommendations.    If symptoms persist, she can do a sitting and standing BP and pulse log and attach it to [employer].  I can review and confirm if this is orthostatic hypotension, how sever it is, and if we need to involve her PCP in adjusting her antihypertensive medication dose or the time she takes it.  Thank you.

## 2020-04-02 ENCOUNTER — MYC MEDICAL ADVICE (OUTPATIENT)
Dept: NEUROLOGY | Facility: CLINIC | Age: 60
End: 2020-04-02

## 2020-04-02 DIAGNOSIS — F41.1 GAD (GENERALIZED ANXIETY DISORDER): ICD-10-CM

## 2020-04-02 DIAGNOSIS — G20.A1 PARALYSIS AGITANS (H): ICD-10-CM

## 2020-04-02 RX ORDER — BUSPIRONE HYDROCHLORIDE 10 MG/1
TABLET ORAL
Qty: 180 TABLET | Refills: 3 | Status: SHIPPED | OUTPATIENT
Start: 2020-04-02 | End: 2020-07-16

## 2020-04-02 NOTE — TELEPHONE ENCOUNTER
Rx Authorization:    Requested Medication/ Dose: Buspirone 10mg    Date last refill ordered: 3/26/2019    Quantity ordered: 180    # refills: 3    Date of last clinic visit with ordering provider: 10/8/2019    Date of next clinic visit with ordering provider: 4/8/20    All pertinent protocol data (lab date/result):     Include pertinent information from patients message:

## 2020-04-02 NOTE — TELEPHONE ENCOUNTER
Called patient to let her know GUILLERMINA Desir, NP put in referral for PT specializing in vertigo, with the caveat that there may be delays in scheduling due to COVID. Per the referral notes, scheduling office will call her within two business days, but I gave her the number if she does not hear back.     I encouraged patient to work on fall prevention, arising slowly, sitting down when dizzy, use lighting at night to walk, use a walking aid.    Patient appreciated call and will update us if symptoms worsen. She has downloaded the jacob for her video visit on Wednesday.      Shanice Coyne, RN, MPH  Research Nurse, Movement Disorders

## 2020-04-02 NOTE — TELEPHONE ENCOUNTER
I have placed a vestibular/balance PT.  I don't know what their protocol is during COVID-19.  But, I don't know if it would go away on its own, how urgent pt needs to be seen. . .

## 2020-04-02 NOTE — TELEPHONE ENCOUNTER
"Called patient to inquire if she would be OK with video visit instead of telephone visit. Patient agreed to video visit and would like to use her smartphone. I changed visit type to video visit and sent patient instructions via [employer].    I also checked in with patient regarding her symptoms she called about on Tuesday 3/31/20. Patient states she woke up this morning with the same symptoms but is feeling better now. She doesn't notice a change when she takes medications. Patient does not have access to a blood pressure cuff at home. However, patient describes her dizziness as \"like when you put your head on a baseball bat and spin around and then try to walk\" \"I feel like I'm pulling to the right and have to hang on to the wall\" \"It's almost like when you overdrink\" She thinks this is worse when she moves her head while lying in bed. These feelings seems to improve as the day goes on and worsen again in the morning.      Pt denies feelings of fainting or passing out upon standing, it sounds more like vertigo, or room is spinning.  Patient is still eating and is working on drinking more fluids. She has not had vomiting, but does feel nauseated (although she says she often feels this after taking her pills anyway). She comments that she is sleeping a lot more, and looking at her phone, which she thinks might be making things worse.    Will update Karina SARMIENTO NP, and await any further recommendations.    Shanice Coyne, RN, MPH  Research Nurse, Movement Disorders     "

## 2020-04-03 NOTE — TELEPHONE ENCOUNTER
Returned patient's call with update on vertigo symptoms. Patient went to chiropractor this morning who also thought her symptoms to be consistent with BPPV. Chiropractor performed maneuver to reset crystals in inner ear and so far patient is feeling better. Pt was called by PT scheduling today and has appt scheduled for end of April (delay due to COVID-19).     Pt shares that her father suffers from Meniere's disease, she wonders if her symptoms are related. Pt happy to share that she received a settlement from her lawsuit related to her ankle surgery in 2019.      She looks forward to her appointment with Karina Rivas next week and has no further questions.    Shanice Coyne, RN, MPH  Research Nurse, Movement Disorders

## 2020-04-08 ENCOUNTER — VIRTUAL VISIT (OUTPATIENT)
Dept: NEUROLOGY | Facility: CLINIC | Age: 60
End: 2020-04-08
Payer: MEDICARE

## 2020-04-08 DIAGNOSIS — G20.A1 PARKINSON'S DISEASE (H): ICD-10-CM

## 2020-04-08 DIAGNOSIS — H81.10 BENIGN PAROXYSMAL POSITIONAL VERTIGO, UNSPECIFIED LATERALITY: ICD-10-CM

## 2020-04-08 DIAGNOSIS — G25.81 RESTLESS LEGS SYNDROME (RLS): Primary | ICD-10-CM

## 2020-04-08 DIAGNOSIS — F41.8 DEPRESSION WITH ANXIETY: ICD-10-CM

## 2020-04-08 RX ORDER — GABAPENTIN 100 MG/1
100 CAPSULE ORAL AT BEDTIME
Qty: 90 CAPSULE | Refills: 1 | Status: SHIPPED | OUTPATIENT
Start: 2020-04-08 | End: 2020-08-25

## 2020-04-08 RX ORDER — AMANTADINE HYDROCHLORIDE 100 MG/1
CAPSULE, GELATIN COATED ORAL
Qty: 180 CAPSULE | Refills: 3 | Status: SHIPPED | OUTPATIENT
Start: 2020-04-08 | End: 2021-04-07

## 2020-04-08 RX ORDER — CARBIDOPA AND LEVODOPA 25; 100 MG/1; MG/1
TABLET ORAL
Qty: 630 TABLET | Refills: 3 | Status: SHIPPED | OUTPATIENT
Start: 2020-04-08 | End: 2020-07-16

## 2020-04-08 RX ORDER — PRAMIPEXOLE DIHYDROCHLORIDE 0.5 MG/1
TABLET ORAL
Qty: 180 TABLET | Refills: 3 | Status: SHIPPED | OUTPATIENT
Start: 2020-04-08 | End: 2021-04-07

## 2020-04-08 NOTE — LETTER
"2020      RE: Erika Diaz  [address] [facility] [address] [place] [id number]       Erika Diaz is a 59 year old female who is being evaluated via a billable video visit.      The patient has been notified of following:     \"This video visit will be conducted via a call between you and your physician/provider. We have found that certain health care needs can be provided without the need for an in-person physical exam.  This service lets us provide the care you need with a video conversation.  If a prescription is necessary we can send it directly to your pharmacy.  If lab work is needed we can place an order for that and you can then stop by our lab to have the test done at a later time.    Video visits are billed at different rates depending on your insurance coverage.  Please reach out to your insurance provider with any questions.    If during the course of the call the physician/provider feels a video visit is not appropriate, you will not be charged for this service.\"    Patient has given verbal consent for Video visit? Yes    Patient would like the video invitation sent by: [phone]    Erika Diaz complains of    Chief Complaint   Patient presents with     Parkinson     Video Visit Clinical Study PD     I have reviewed and updated the patient's Past Medical History, Social History, Family History and Medication List.    ALLERGIES  Atorvastatin; Codeine; and Mold    Video-Visit Details    Type of service:  Video Visit    Originating Location (pt. Location): Home    Distant Location (provider location):  [facility] NEUROLOGY     Mode of Communication:  Video Conference via RUBEN Gatica    MOVEMENT DISORDERS VIDEO VISIT:     PATIENT: Erika Diaz    : 1960    DATE: 2020    REASON FOR VISIT: Parkinson's disease follow up.    After a review of the patient s situation, this visit was changed from an in-person visit to a video visit to reduce the risk of " "COVID-19 exposure.    Video visit was completed with mostly patient, and her  contributed as needed.  He was in the same room with her during the visit.     Patient had recently called due to dizziness.  See Telephone Note by Shanice Coyne RN on 3/31/2020.  Most likely, she has  benign paroxysmal positional vertigo (BPPV).  Today, she reports that the dizziness only occurs when she is laying on her right side.  She is referred to PT for the management of BPPV.  Her appointment is scheduled for April 30th at 11 am.  She reports some imbalance when she walks.  Denies any falls.  She is holding on the wall and furniture to prevent falls.     She was seen by a chiropractor \"to get adjusted.\"  Due to inactivity and staying home, she reports feeling stiff and achy in her lower back and between her shoulder blades.  She reports seeing the chiropractor to adjust her lower back and between her shoulders.  \"He didn't touch my neck because of my wires.\"  Pt reported that she has not had any neck adjustments since her DBS surgery \"not to mess up the wires.\"   She reports that her chiropractor agreed that she had BPPV and had her lay down in certain positions to improve her symptoms.  She reports that the symptoms temporarily got better.  She still gets dizzy when laying on her right side.     Her  has recovered from liver cancer hospitalization and rehab and has been home.      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     She reports her PD motor symptoms are stable.  Her biggest concern is restless leg syndrome (RLS).  Due to prolonged sitting and not doing much, she has been getting the symptoms earlier.  She has taken her Pramipexole 0.5 mg 2 tabs as early as 2 pm and additional dose in the middle of the night if the RLS wakes her up.     PD Medications 7 am 12 " "pm 4 pm 9 pm   Sinemet 25/100 mg  1.5 1.5 1.5 1.5   Amantadine 100 mg 1   1     Pramipexole 0.5 mg       2      Mood is \"depending on the day.\"  At times she gets anxious and other times she is fine.  Her  agree. Pt reports that her biggest worry is her 's health.  Overall, she is able to \"get by.\"  Due to COVID-19, she mostly stays at home except going to the get groceries.  She hasn't been exercising.  Her son is going to bring her a stationary bike.    MEDICATIONS:   Outpatient Medications Marked as Taking for the 4/8/20 encounter (Virtual Visit) with Arminda Rivas APRN CNP   Medication Sig     Acetaminophen (TYLENOL PO) Take 1,000 mg by mouth every 8 hours as needed for mild pain or fever     amantadine (SYMMETREL) 100 MG capsule 1 capsule orally @ 7am and 4pm     aspirin 81 MG EC tablet Take 81 mg by mouth daily     busPIRone (BUSPAR) 10 MG tablet TAKE 1 TABLET BY MOUTH AT 7 AM AND AT 4 PM     calcium carbonate (TUMS) 500 MG chewable tablet Take 2 chew tab by mouth as needed for heartburn     carbidopa-levodopa (SINEMET)  MG tablet 1.5 @ 7, 12 pm, 4 pm and 9 pm and 1 needed = 7 tabs per day     FLUoxetine (PROZAC) 20 MG capsule Take 20 mg by mouth every morning Take 20mg capsule  @ 7am     hydrochlorothiazide (HYDRODIURIL) 25 MG tablet Take 25 mg by mouth At Bedtime @9 pm     LORazepam (ATIVAN) 0.5 MG tablet Take 1 tablet (0.5 mg) by mouth daily as needed for anxiety or sleep     ondansetron (ZOFRAN) 4 MG tablet Take 1 tablet (4 mg) by mouth every 8 hours as needed for nausea     pramipexole (MIRAPEX) 0.5 MG tablet 2 tabs orally @ 9 pm     rosuvastatin (CRESTOR) 20 MG tablet Take 20 mg by mouth       ALLERGIES: Atorvastatin; Codeine; and Mold    Patient reports that no new changes in medical history, surgical Hx, family Hx, and social Hx.      ASSESSMENT:    1)  Parkinson's Disease:  Stable.     2)  Restless Leg Syndrome:  Needs improvement.  Most likely worsened due to inactivity.   "     3)  Anxiety with Depression:  Having good days and bad days.  Manageable.       4)  Benign Paroxysmal Positional Vertigo:  Needs improvement.       PLAN:    __  Will stay on the same antiparkinsonian medications.  Rx refilled.     PD Medications 7 am 12 pm 4 pm 9 pm   Sinemet 25/100 mg  1.5 1.5 1.5 1.5   Amantadine 100 mg 1   1     Pramipexole 0.5 mg       2       __  Discussed about augmentation.  She was instructed not to increase her dopamine agonist dose to manage RLS as it would make it worse.     __  Will add Gabapentin 100 mg capsules at HS to manage RLS.  If symptoms don't improve, and continue to occur earlier, will add a dose around midday.    __  Discussed the importance of exercise and increasing activity to manage PD, mood, and RLS.  Encouraged her to go for walks using her walker and to start using the stationary bike once her son brings it for her.  Will send her online exercise resources.    Total video time was 28 minutes.  Greater than 50% of this time was spent in therapeutic plan, counseling, and coordination of care.     GUILLERMINA Krueger,  CNP  [facility]      Video Start Time: 12:27 pm  Video End Time (time video stopped): 12:55 pm      GUILLERMINA Valero CNP

## 2020-04-08 NOTE — PROGRESS NOTES
"Erika Diaz is a 59 year old female who is being evaluated via a billable video visit.      The patient has been notified of following:     \"This video visit will be conducted via a call between you and your physician/provider. We have found that certain health care needs can be provided without the need for an in-person physical exam.  This service lets us provide the care you need with a video conversation.  If a prescription is necessary we can send it directly to your pharmacy.  If lab work is needed we can place an order for that and you can then stop by our lab to have the test done at a later time.    Video visits are billed at different rates depending on your insurance coverage.  Please reach out to your insurance provider with any questions.    If during the course of the call the physician/provider feels a video visit is not appropriate, you will not be charged for this service.\"    Patient has given verbal consent for Video visit? Yes    Patient would like the video invitation sent by: [phone]    Erika Diaz complains of    Chief Complaint   Patient presents with     Parkinson     Video Visit Clinical Study PD     I have reviewed and updated the patient's Past Medical History, Social History, Family History and Medication List.    ALLERGIES  Atorvastatin; Codeine; and Mold    Video-Visit Details    Type of service:  Video Visit    Originating Location (pt. Location): Home    Distant Location (provider location):  [facility] NEUROLOGY     Mode of Communication:  Video Conference via RUBEN Gatica    MOVEMENT DISORDERS VIDEO VISIT:     PATIENT: Erika Diaz    : 1960    DATE: 2020    REASON FOR VISIT: Parkinson's disease follow up.    After a review of the patient s situation, this visit was changed from an in-person visit to a video visit to reduce the risk of COVID-19 exposure.    Video visit was completed with mostly patient, and her  " "contributed as needed.  He was in the same room with her during the visit.     Patient had recently called due to dizziness.  See Telephone Note by Shanice Coyne RN on 3/31/2020.  Most likely, she has  benign paroxysmal positional vertigo (BPPV).  Today, she reports that the dizziness only occurs when she is laying on her right side.  She is referred to PT for the management of BPPV.  Her appointment is scheduled for April 30th at 11 am.  She reports some imbalance when she walks.  Denies any falls.  She is holding on the wall and furniture to prevent falls.     She was seen by a chiropractor \"to get adjusted.\"  Due to inactivity and staying home, she reports feeling stiff and achy in her lower back and between her shoulder blades.  She reports seeing the chiropractor to adjust her lower back and between her shoulders.  \"He didn't touch my neck because of my wires.\"  Pt reported that she has not had any neck adjustments since her DBS surgery \"not to mess up the wires.\"   She reports that her chiropractor agreed that she had BPPV and had her lay down in certain positions to improve her symptoms.  She reports that the symptoms temporarily got better.  She still gets dizzy when laying on her right side.     Her  has recovered from liver cancer hospitalization and rehab and has been home.      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     She reports her PD motor symptoms are stable.  Her biggest concern is restless leg syndrome (RLS).  Due to prolonged sitting and not doing much, she has been getting the symptoms earlier.  She has taken her Pramipexole 0.5 mg 2 tabs as early as 2 pm and additional dose in the middle of the night if the RLS wakes her up.     PD Medications 7 am 12 pm 4 pm 9 pm   Sinemet 25/100 mg  1.5 1.5 1.5 1.5   Amantadine 100 mg 1   1   " "  Pramipexole 0.5 mg       2      Mood is \"depending on the day.\"  At times she gets anxious and other times she is fine.  Her  agree. Pt reports that her biggest worry is her 's health.  Overall, she is able to \"get by.\"  Due to COVID-19, she mostly stays at home except going to the get groceries.  She hasn't been exercising.  Her son is going to bring her a stationary bike.    MEDICATIONS:   Outpatient Medications Marked as Taking for the 4/8/20 encounter (Virtual Visit) with Arminda Rivas APRN CNP   Medication Sig     Acetaminophen (TYLENOL PO) Take 1,000 mg by mouth every 8 hours as needed for mild pain or fever     amantadine (SYMMETREL) 100 MG capsule 1 capsule orally @ 7am and 4pm     aspirin 81 MG EC tablet Take 81 mg by mouth daily     busPIRone (BUSPAR) 10 MG tablet TAKE 1 TABLET BY MOUTH AT 7 AM AND AT 4 PM     calcium carbonate (TUMS) 500 MG chewable tablet Take 2 chew tab by mouth as needed for heartburn     carbidopa-levodopa (SINEMET)  MG tablet 1.5 @ 7, 12 pm, 4 pm and 9 pm and 1 needed = 7 tabs per day     FLUoxetine (PROZAC) 20 MG capsule Take 20 mg by mouth every morning Take 20mg capsule  @ 7am     hydrochlorothiazide (HYDRODIURIL) 25 MG tablet Take 25 mg by mouth At Bedtime @9 pm     LORazepam (ATIVAN) 0.5 MG tablet Take 1 tablet (0.5 mg) by mouth daily as needed for anxiety or sleep     ondansetron (ZOFRAN) 4 MG tablet Take 1 tablet (4 mg) by mouth every 8 hours as needed for nausea     pramipexole (MIRAPEX) 0.5 MG tablet 2 tabs orally @ 9 pm     rosuvastatin (CRESTOR) 20 MG tablet Take 20 mg by mouth       ALLERGIES: Atorvastatin; Codeine; and Mold    Patient reports that no new changes in medical history, surgical Hx, family Hx, and social Hx.      ASSESSMENT:    1)  Parkinson's Disease:  Stable.     2)  Restless Leg Syndrome:  Needs improvement.  Most likely worsened due to inactivity.       3)  Anxiety with Depression:  Having good days and bad days.  Manageable.  "      4)  Benign Paroxysmal Positional Vertigo:  Needs improvement.       PLAN:    __  Will stay on the same antiparkinsonian medications.  Rx refilled.     PD Medications 7 am 12 pm 4 pm 9 pm   Sinemet 25/100 mg  1.5 1.5 1.5 1.5   Amantadine 100 mg 1   1     Pramipexole 0.5 mg       2       __  Discussed about augmentation.  She was instructed not to increase her dopamine agonist dose to manage RLS as it would make it worse.     __  Will add Gabapentin 100 mg capsules at HS to manage RLS.  If symptoms don't improve, and continue to occur earlier, will add a dose around midday.    __  Discussed the importance of exercise and increasing activity to manage PD, mood, and RLS.  Encouraged her to go for walks using her walker and to start using the stationary bike once her son brings it for her.  Will send her online exercise resources.    Total video time was 28 minutes.  Greater than 50% of this time was spent in therapeutic plan, counseling, and coordination of care.     GUILLERMINA Krueger,  CNP  [facility]      Video Start Time: 12:27 pm  Video End Time (time video stopped): 12:55 pm

## 2020-04-09 PROBLEM — H81.10 BENIGN PAROXYSMAL POSITIONAL VERTIGO, UNSPECIFIED LATERALITY: Status: ACTIVE | Noted: 2020-04-09

## 2020-04-09 NOTE — PATIENT INSTRUCTIONS
"April 9, 2020    Dear Ms. Erika NIELSON Joe,    Video Visit Summary: -     __  Stay on the same antiparkinsonian medications.  Rx refilled.     PD Medications 7 am 12 pm 4 pm 9 pm   Sinemet 25/100 mg  1.5 1.5 1.5 1.5   Amantadine 100 mg 1   1     Pramipexole 0.5 mg       2       __  We have discussed about augmentation, which refers to your restless leg syndrome (RLS) symptoms coming earlier and earlier as it gets worse.  Medication that helps (RLS) like Pramipexole can exacerbate and make augmentation worse.  So, do not increase your Pramipexole dose to manage RLS.    __  Instead, add Gabapentin 100 mg capsules at bedtime to manage RLS.  If symptoms don't improve, and continues to occur earlier, call Shanice Coyne RN.    __  We have discussed the importance of exercising and increasing activity to manage parkinson's disease, mood, and RLS.  I would encouraged to go for walks using your walker and start using the stationary bike once your son brings it for you.    __  Shanice will contact your to schedule your next visit.    Here are some resources that our physical therapist shared: -     1. Go on a walk outside - outdoors is not closed!    2. Try a new virtual class!   [employer] has a new YouTube channel that is free: [email or web address]   [employer] YouTube: [email or web address] [email or web address]   \"Parkinson Seated Exercise\": https://www.youVirtugo Softwareube.com/watch?v=KNWqyKluZgg    3. Do your BIG and LOUD exercises   If you would like to download the BIG homework helper  ($28): https://www.TourPal/store/[email or web address]    If you would like to download the LOUD homework helper  ($28): https://www.TourPal/store/CelltrixDetailLSVT?Eb=[email or web address]    4. Learn something new:   ParkinsonTV: https://www.Dolphin Geeks.com/channel/UCtH-STbJ_fshxFjf7Srf_zQ    5. [employer] Webinars: https://www.cody.[email or web address]/[email or web address]       For questions, you may send us a MyChart message or " call [phone]    Fax number: [phone]    GUILLERMINA Krueger, CNP  [facility]

## 2020-04-24 ENCOUNTER — HOSPITAL ENCOUNTER (OUTPATIENT)
Dept: PHYSICAL THERAPY | Facility: CLINIC | Age: 60
Setting detail: THERAPIES SERIES
End: 2020-04-24
Attending: NURSE PRACTITIONER
Payer: MEDICARE

## 2020-04-24 PROCEDURE — 97161 PT EVAL LOW COMPLEX 20 MIN: CPT | Mod: GP | Performed by: PHYSICAL THERAPIST

## 2020-04-24 PROCEDURE — 97112 NEUROMUSCULAR REEDUCATION: CPT | Mod: GP | Performed by: PHYSICAL THERAPIST

## 2020-04-24 NOTE — IP AVS SNAPSHOT
MRN:[id number]                      After Visit Summary   4/24/2020    Erika Diza    MRN: [id number]           Visit Information        Provider Department      4/24/2020 11:30 AM Ayesha Mendes, PT [facility], [place], Physical Therapy - Outpatient          Further instructions from your care team       Based on our exam today, it looks like you do NOT have BPPV (crystals out of place), instead it looks like loss of vestibular (balance) nerve function on your right side.    The treatment for this is Habituation - in other words, getting your brain to not freak out about movement.      Balance exercises - do each 5 repetitions, 1-2x per day  1. Laying down on right side to sitting up - wait for the dizziness to pass each time before changing positions.    2. Rolling from right side to left side - use your usual pillows, wait for the the dizziness to pass each time before changing positions.        Do your BIG exercises! At least 2x per day.    Walk outside every day! You can use your walker if you feel you need to. THINK BIG!          [employer] Information    [employer] gives you secure access to your electronic health record. If you see a primary care provider, you can also send messages to your care team and make appointments. If you have questions, please call your primary care clinic.  If you do not have a primary care provider, please call [phone] and they will assist you.       Care EveryWhere ID    This is your Care EveryWhere ID. This could be used by other organizations to access your [place] medical records  [id number]       Equal Access to Services    AYLEEN GASCA AH: Hadii matilde Quinonez, waaxda luqadaha, qaybta kaalmada stephanie, naga olivo. So [facility] [phone].    ATENCIÓN: Si habla español, tiene a guerra disposición servicios gratuitos de asistencia lingüística. Llame al [phone].    We comply with applicable federal and state civil  rights laws, including the [place] Human Rights Act. We do not discriminate on the basis of race, color, creed, Oriental orthodox, national origin, marital status, age, disability, sex, sexual orientation, or gender identity.

## 2020-04-24 NOTE — DISCHARGE INSTRUCTIONS
Based on our exam today, it looks like you do NOT have BPPV (crystals out of place), instead it looks like loss of vestibular (balance) nerve function on your right side.    The treatment for this is Habituation - in other words, getting your brain to not freak out about movement.      Balance exercises - do each 5 repetitions, 1-2x per day  1. Laying down on right side to sitting up - wait for the dizziness to pass each time before changing positions.    2. Rolling from right side to left side - use your usual pillows, wait for the the dizziness to pass each time before changing positions.        Do your BIG exercises! At least 2x per day.    Walk outside every day! You can use your walker if you feel you need to. THINK BIG!

## 2020-04-24 NOTE — PROGRESS NOTES
04/24/20 1100   Signing Clinician's Name / Credentials   Signing clinician's name / credentials Nereyda Mendes, DPT, NCS   Functional Gait Assessment (RANDALL White, ISAURA Funez, et al. (2004))   1. GAIT LEVEL SURFACE 2  (6.12 secs)   2. CHANGE IN GAIT SPEED 2   3. GAIT WITH HORIZONTAL HEAD TURNS 2  (mild dizziness, mild path deviation)   4. GAIT WITH VERTICAL HEAD TURNS 3  (mild dizziness, no path deviation)   5. GAIT AND PIVOT TURN 3   6. STEP OVER OBSTACLE 3   7. GAIT WITH NARROW BASE OF SUPPORT 2  (7 steps)   8. GAIT WITH EYES CLOSED 2  (slowed but straight)   9. AMBULATING BACKWARDS 2  (slowed, small step lengths, nervous)   10. STEPS 2   Total Functional Gait Assessment Score   TOTAL SCORE: (MAXIMUM SCORE 30) 23       Functional Gait Assessment (FGA): The FGA assesses postural stability during various walking tasks.   Gait assistive device used: None    Patient Score: 23/30  Scores of <22/30 have been correlated with predicting falls in community-dwelling older adults according to Christopher & Carmelo 2010.   Scores of <18/30 have been correlated with increased risk for falls in patients with Parkinsons Disease according to SaldivarHolger, Eli et al 2014.  Minimal Detectable Change for patients with acute/chronic stroke = 4.2 according to Thigold & Ritschel 2009  Minimal Detectable Change for patients with vestibular disorder = 8 according to Christopher & Carmelo 2010    Assessment (rationale for performing, application to patient s function & care plan): assess falls risk, balance challenges.    Minutes billed as physical performance test: 0 - day of eval

## 2020-04-25 NOTE — PROGRESS NOTES
04/24/20 1100   Quick Adds   Quick Adds Vestibular Eval   General Information   Start of Care Date 04/24/20   Referring Physician Elyssa Rivas CNP   Orders Evaluate and Treat as Indicated   Order Date 04/02/20   Medical Diagnosis BPPV   Onset of illness/injury or Date of Surgery 04/02/20   Surgical/Medical history reviewed Yes   Pertinent history of current problem (include personal factors and/or comorbidities that impact the POC) Pt with PMHx significant for PD with DBS about 2 years ago on R side for L sided tremor. Pt notes she has started to get some R sided tremor now. Did BIG and LOUD in [place] less than a year ago and hasn't been consistent about doing her exercises. Pt with sudden loss of hearing about 1yr ago, doesn't recall any dizziness during or after this. About 1 mo ago, pt started noticing dizziness when laying on her R side. Pt notes she always sleeps on R side, otherwise she cant hear what is going on in her house at night. Pts dizziness is primarily with rolling in bed and with sup/sit.    Patient role/Employment history Employed  ([employer])   Living environment Apartment/condo   Home/Community Accessibility Comments main floor   Current Assistive Devices Front Wheeled Walker   Patient/Family Goals Statement Get rid of dizziness.   General Information Comments Exercise: not a lot. Hasn't been doing BIG exercises.   Fall Risk Screen   Fall screen completed by PT   Have you fallen 2 or more times in the past year? No   Have you fallen and had an injury in the past year? No   Is patient a fall risk? No   Functional Scales   Functional Scales and Outcomes DHI 36/100   Pain   Patient currently in pain No   Cognitive Status Examination   Orientation orientation to person, place and time   Level of Consciousness alert   Follows Commands and Answers Questions 100% of the time;able to follow multistep instructions   Personal Safety and Judgment intact   Memory intact   Posture   Posture  Forward head position;Protracted shoulders   Transfer Skills   Transfer Comments Pt able to stand without UEs efficiently.   Gait   Gait Comments Pt amb with shortened step length L LE, flat foot initial contact, fair arm swing B.   Gait Special Tests 25 Foot Timed Walk   Seconds 7.65   Steps 15 Steps   Comments no AD   Gait Special Tests Functional Gait Assessment Score out of 30   Score out of 30 23   Comments see note   Balance Special Tests Modified CTSIB Conditions   Condition 1, seconds 30 Seconds   Condition 2, seconds 30 Seconds   Condition 4, seconds 30 Seconds   Condition 5, seconds 30 Seconds   Modified CTSIB Comments minimal sway with all positions   Cervicogenic Screen   Neck ROM Full neck ROM, no dizziness with end ranges   Oculomotor Exam   Smooth Pursuit Normal   Saccades Normal   VOR Normal   Rapid Head Thrust Corrective Saccade R head thrust   Infrared Goggle Exam or Frenzel Lense Exam   Vestibular Suppressant in Last 24 Hours? No   Exam completed with Infrared Goggles   Spontaneous Nystagmus Negative   Gaze Evoked Nystagmus Negative   Head Shake Horizontal Nystagmus Negative   Head Shake Horizontal Nystagmus comments no dizziness or nystagmus   Oma-Hallpike (right) Negative   Oma-Hallpike (Left) Negative   HSCC Supine Roll Test (Right) Negative   HSCC Supine Roll Test (Left) Negative   Planned Therapy Interventions   Planned Therapy Interventions balance training;neuromuscular re-education;gait training   Clinical Impression   Criteria for Skilled Therapeutic Interventions Met yes, treatment indicated   PT Diagnosis dizziness with functional mobility.   Influenced by the following impairments dizziness with bed mobility, corrective saccade for R head thrust   Functional limitations due to impairments dizziness with functional mobility   Clinical Presentation Stable/Uncomplicated   Clinical Presentation Rationale no significant functional impairment once she gets up in the AM   Clinical Decision  Making (Complexity) Low complexity   Therapy Frequency other (see comments)  (2-3 visits anticipate)   Predicted Duration of Therapy Intervention (days/wks) 90 days   Risk & Benefits of therapy have been explained Yes   Patient, Family & other staff in agreement with plan of care Yes   Clinical Impression Comments Pt's presententation consistent with mostly compensated R vestibular hypofunction. Recommended that pt work on BIG ex for increasing activity in general, and specific habituation exercises. Pt lives in [place], so unable to complete telehealth. Pt willing to return to this clinic for followup visits if necessary.   Goal 1   Goal Identifier DHI   Goal Description Pt report decreased dizziness with functional activities as evidenced by DHI <16/100.   Target Date 07/22/20   Goal 2   Goal Identifier FGA   Goal Description Pt demo improved balance via FGA >27/30 for improved community accessibility and decreased falls risk.   Target Date 07/22/20   Total Evaluation Time   PT Eval, Low Complexity Minutes (70183) 35   Therapy Certification   Certification date from 04/24/20   Certification date to 07/22/20   Medical Diagnosis BPPV       8/14/2020: Pt has not returned to clinic. Please consider this a discharge note.      MARC HendricksT, NCS  Physical Therapist     [facility] [place]  [address] [place] [address]  [address]  [place] [address]  [email or web address]  [employer] [email or web address]  Office: [phone]  Pager: [phone]  Fax: [phone]  Gender Pronouns: she/her  Employed by [employer] [facility]

## 2020-06-22 ENCOUNTER — TELEPHONE (OUTPATIENT)
Dept: NEUROLOGY | Facility: CLINIC | Age: 60
End: 2020-06-22

## 2020-06-22 NOTE — TELEPHONE ENCOUNTER
"Patient left voicemail:    \"I was wondering if I should come in and see you before the October appt. I noticed I have the shakes more, I think maybe it's progression of the Parkinson's on the right side.\"    (Patient currently has R Gpi DBS to control left side body.)    Will ask GUILLERMINA Desir, MARIA whether she would prefer a virtual or in-person visit to address pt concerns. Pt is scheduled for 10/13/2020 for 2 year follow up on/off testing for Clinical Core study.     Shanice Coyne, RN, MPH  Research Nurse, Movement Disorders    "

## 2020-06-29 NOTE — TELEPHONE ENCOUNTER
Shanice,    Before seeing patient in clinic, it would be reasonable to see her via Video Visit to discuss the changes that she is having.  If a 2nd side is appropriate, then, we can have her go through the work up.

## 2020-07-01 NOTE — TELEPHONE ENCOUNTER
Pt offered various appt times on July 14th or July 16th for video visit, she said either day would work for her.    Appt requested to schedulers next day for July 16th at 8am for video visit.  I will work with patient to get her set up for video visit via [employer].     Shanice Coyne, RN, MPH  Research Nurse, Movement Disorders

## 2020-07-07 ASSESSMENT — MOVEMENT DISORDERS SOCIETY - UNIFIED PARKINSONS DISEASE RATING SCALE (MDS-UPDRS)
TREMOR: SLIGHT: SHAKING OR TREMOR OCCURS BUT DOES NOT CAUSE PROBLEMS WITH ANY ACTIVITIES.
FREEZING: SLIGHTLY: I BRIEFLY FREEZE, BUT I CAN EASILY START WALKING AGAIN. I DO NOT NEED HELP FROM SOMEONE ELSE OR A WALKING AID (CANE OR WALKER) BECAUSE OF FREEZING.
SPEECH: MILD: MY SPEECH CAUSES PEOPLE TO ASK ME TO OCCASIONALLY REPEAT MYSELF, BUT NOT EVERYDAY.
HANDWRITING: SLIGHT: MY WRITING IS SLOW, CLUMSY OR UNEVEN, BUT ALL WORDS ARE CLEAR.
HOBBIES_AND_OTHER_ACTIVITIES: SLIGHT: I AM A BIT SLOW BUT DO THESE ACTIVITIES EASILY.
GETTING_OUT_OF_BED_CAR_DEEP_CHAIR: SLIGHT: I AM SLOW OR AWKWARD, BUT I USUALLY CAN DO IT ON MY FIRST TRY.
TOTAL_SCORE: 12
EATING_TASKS: NORMAL: NOT AT ALL (NO PROBLEMS).
DRESSING: NORMAL: NOT AT ALL (NO PROBLEMS).
TURNING_IN_BED: SLIGHT: I HAVE A BIT OF TROUBLE TURNING, BUT I DO NOT NEED ANY HELP.
HYGIENE: NORMAL: NOT AT ALL (NO PROBLEMS).
CHEWING_AND_SWALLOWING: SLIGHT: I AM AWARE OF SLOWNESS IN MY CHEWING OR INCREASED EFFORT AT SWALLOWING, BUT I DO NOT CHOKE OR NEED TO HAVE MY FOOD SPECIALLY PREPARED.
WALKING_AND_BALANCE: MILD: I OCCASIONALLY USE A WALKING AID, BUT I DO NOT NEED ANY HELP FROM ANOTHER PERSON.
SALIVA_AND_DROOLING: SLIGHT: I HAVE TOO MUCH SALIVA, BUT DO NOT DROOL.

## 2020-07-16 ENCOUNTER — VIRTUAL VISIT (OUTPATIENT)
Dept: NEUROLOGY | Facility: CLINIC | Age: 60
End: 2020-07-16
Payer: MEDICARE

## 2020-07-16 DIAGNOSIS — G20.A1 PARKINSON'S DISEASE (H): ICD-10-CM

## 2020-07-16 DIAGNOSIS — F41.1 GAD (GENERALIZED ANXIETY DISORDER): ICD-10-CM

## 2020-07-16 DIAGNOSIS — G25.81 RESTLESS LEGS SYNDROME (RLS): Primary | ICD-10-CM

## 2020-07-16 RX ORDER — CARBIDOPA AND LEVODOPA 25; 100 MG/1; MG/1
TABLET ORAL
Qty: 720 TABLET | Refills: 3 | Status: SHIPPED | OUTPATIENT
Start: 2020-07-16 | End: 2021-05-21

## 2020-07-16 RX ORDER — BUSPIRONE HYDROCHLORIDE 10 MG/1
10 TABLET ORAL 3 TIMES DAILY
Qty: 270 TABLET | Refills: 3 | Status: SHIPPED | OUTPATIENT
Start: 2020-07-16 | End: 2021-05-21

## 2020-07-16 NOTE — PATIENT INSTRUCTIONS
July 16, 2020    Dear Ms. Erika NIELSON Joe,    Video Visit Summary: -     __  To manage anxiety, increase BusPIRone (Buspar) from 1 tablet 2 x per day to 3 x per day.     __  Today, call and make an appointment with a counselor to manage anxiety.    __  In 2 weeks, give us an update on your mood and counseling.  Shanice Coyne RN will send you a reminder to get the update.     __  If all is stable in 2 weeks, I'll increase your Gabapentin 100 mg dose to improve your    __  Zakiya Matthews RN will contact you to get the 2nd side workup.     __  May contact us anytime as needed.     For questions, you may send us a [employer] message or call [phone]    Fax number: [phone]    GUILLERMINA Krueger, CNP  [facility]

## 2020-07-16 NOTE — PROGRESS NOTES
"Erika Diaz is a 59 year old female who is being evaluated via a billable video visit.      The patient has been notified of following:     \"This video visit will be conducted via a call between you and your physician/provider. We have found that certain health care needs can be provided without the need for an in-person physical exam.  This service lets us provide the care you need with a video conversation.  If a prescription is necessary we can send it directly to your pharmacy.  If lab work is needed we can place an order for that and you can then stop by our lab to have the test done at a later time.    Video visits are billed at different rates depending on your insurance coverage.  Please reach out to your insurance provider with any questions.    If during the course of the call the physician/provider feels a video visit is not appropriate, you will not be charged for this service.\"    Patient has given verbal consent for Video visit? Yes  How would you like to obtain your AVS? MyChart  If you are dropped from the video visit, the video invite should be resent to: MyChart  Will anyone else be joining your video visit? No        Video-Visit Details    Type of service:  Video Visit    Originating Location (pt. Location): Home    Distant Location (provider location):  [facility]     Platform used for Video Visit: Doximity    --------------------------------------------------------------------------------------------------------------------------------------------------------------------------------------------------------    MOVEMENT DISORDERS VIDEO VISIT:     PATIENT: Erika Diaz    : 1960    DATE: 2020    REASON FOR VISIT: To discuss the 2nd side DBS surgery.    After a review of the patient s situation, this visit was changed from an in-person visit to a video visit to reduce the risk of COVID-19 exposure.    She reports being more emotional like she felt when her " Parkinson's disease (PD) symptoms started.  She is having more difficulty with writing.  She is getting frequent anxiety attacks.  She is having more breakdowns.  She is worried about various things including giving her mother COVID-19.  Inner shaking and restlessness has worsened.  In the last 3 - 4 weeks, she has increased her Carbidopa/Levodopa (CD/LD) from 25/100 mg 1.5 tabs QID to 2 tabs QID without much improvement.     Her RLS is worse as she was at home for 2 months.  She was started on Gabapentin 100 mg at HS to help with RLS 1 cap in early April.  RLS is occurring during the day after she gets off work around 1 pm and in the afternoon.  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    She has fallen 3 times due to not being cautions and tripping on things (exercise ball and log.)     PD Medications 7 am 12 pm 4 pm 9 pm   Sinemet 25/100 mg  2 2 2 2   Amantadine 100 mg 1   1     Pramipexole 0.5 mg       2   Gabapentin 100 mg     1      She is taking Lorazepam 0.5 at HS to help her sleep.     Still having left side pain in hip/sciatica--she is seeing a chiropractor for this and wonders if it bursitis.     Still having right knee pain when standing or moving (post knee replacement 2015): this was exacerbated by recent falls.     Still has dry cough since end of February. She plans to follow up with PCP to make sure that this is not COVID-19 before going to stay with her mother who is in her 80's at the end of August. (She had phone visit with primary care in March and was prescribed Flonase and Claritin.)     She feels her loss of balance (falling to the right) has improved, although she still gets this occasionally.  She feels that the maneuvers that her chiropractor taught her helps her symptoms of BPPV.  Pt still referred to this as BPPV, although PT she saw in April said it was loss of vestibular balance and not BPPV.     Her rash under her right breast (and apparently had also been  on her right stomach and leg) had been coming and going, but it has resolved for now. Primary care was unsure if it was yeast or shingles, so she was treated for both (anti-yeast cream and anti-viral medication).     UPDRS ADL:     UPDRS Questionnaire 7/15/2020   Over the past week, have you had problems with your speech? 2   Over the past week, have you usually had too much saliva during when you are awake or when you sleep? 1   Over the past week, have you usually had problems swallowing pills or eating meals?  Do you need your pills cut or crushed or your meals to be made soft, chopped or blended to avoid choking? 1   Over the past week, have you usually had troubles handling your food and using eating utensils?  For example, do you have trouble handling finger foods or using forks, knifes, spoons, chopsticks? 1   Over the past week, have you usually had problems dressing?  For example, are you slow or do you need help with buttoning, using zippers, putting on or taking off your clothes or jewelry? 2   Over the past week, have you usually been slow or do you need help with washing, bathing, shaving, brushing teeth, combing your hair or with other personal hygiene? 1   Over the past week, have people usually had trouble reading your handwriting? 1   Over the past week, have you usually had trouble doing your hobbies or other things that you like to do? 1   Over the past week, do you usually have trouble turning over in bed? 1   Over the past week, have you usually had shaking or tremor? 1   Over the past week, have you usually had trouble getting out of bed, a car seat, or a deep chair? 2   Over the past week, have you usually had problems with balance and walking? 2   Over the past week, on your usual day when walking, do you suddenly stop or freeze as if your feet are stuck to the floor. 1   MDS-UPDRS II Total Score 17        MEDICATIONS:   Outpatient Medications Marked as Taking for the 7/16/20 encounter  (Virtual Visit) with Arminda Rivas APRN CNP   Medication Sig     Acetaminophen (TYLENOL PO) Take 1,000 mg by mouth every 8 hours as needed for mild pain or fever     amantadine (SYMMETREL) 100 MG capsule 1 capsule orally @ 7am and 4pm     aspirin 81 MG EC tablet Take 81 mg by mouth At Bedtime      busPIRone (BUSPAR) 10 MG tablet TAKE 1 TABLET BY MOUTH AT 7 AM AND AT 4 PM     calcium carbonate (TUMS) 500 MG chewable tablet Take 2 chew tab by mouth as needed for heartburn     carbidopa-levodopa (SINEMET)  MG tablet 1.5 @ 7, 12 pm, 4 pm and 9 pm and 1 needed = 7 tabs per day (Patient taking differently: 2 @ 7, 12 pm, 4 pm and 9 pm = 8 tabs per day)     FLUoxetine (PROZAC) 20 MG capsule Take 20 mg by mouth every morning Take 20mg capsule  @ 7am     gabapentin (NEURONTIN) 100 MG capsule Take 1 capsule (100 mg) by mouth At Bedtime     hydrochlorothiazide (HYDRODIURIL) 25 MG tablet Take 25 mg by mouth At Bedtime @9 pm     LORazepam (ATIVAN) 0.5 MG tablet Take 1 tablet (0.5 mg) by mouth daily as needed for anxiety or sleep     pramipexole (MIRAPEX) 0.5 MG tablet 2 tabs orally @ 9 pm     rosuvastatin (CRESTOR) 20 MG tablet Take 20 mg by mouth       ALLERGIES: Atorvastatin; Codeine; and Mold    Patient reports that no new changes in medical history, surgical Hx, family Hx, and social Hx.      ASSESSMENT:    1)  Parkinson's Disease:  Has worsened.     2)  Anxiety:  Needs improvement.  This is the most bothersome symptom.      3)  Restless leg syndrome:  Has worsened.         PLAN:    __  To manage anxiety, will increase BusPIRone (Buspar) from 1 tablet 2 x per day to 3 x per day.     __  Instructed to call and make an appointment with a counselor to manage anxiety.    __  In 2 weeks, she will give us an update on mood and counseling.  Shanice Coyne RN will send a reminder to get the update.     __  If all is stable in 2 weeks, will increase Gabapentin 100 mg dose to improve RLS.     __  Zakiya Matthews RN will contact  pt to get the 2nd side workup.     __  May contact us anytime as needed.    Total video time was 45 minutes.  Greater than 50% of this time was spent in therapeutic plan, counseling, and coordination of care.     GUILLERMINA Krueger,  CNP  [facility]      Video Time:  Start: 07/13/2020 8:25 am   Stop: 07/13/2020 9:10 pm

## 2020-07-30 ENCOUNTER — TELEPHONE (OUTPATIENT)
Dept: NEUROLOGY | Facility: CLINIC | Age: 60
End: 2020-07-30

## 2020-07-30 DIAGNOSIS — F41.1 GAD (GENERALIZED ANXIETY DISORDER): Primary | ICD-10-CM

## 2020-07-30 DIAGNOSIS — G20.A1 PARKINSON'S DISEASE (H): ICD-10-CM

## 2020-07-30 NOTE — TELEPHONE ENCOUNTER
I left  for pt for update on how things have gone the last two weeks after increasing Buspar for anxiety and to see if pt has been able to make appt with counselor.  (Plan had been if things are stable, will increase gapapentin dose to treat RLS symptoms. Will wait for pt update and then update GUILLERMINA Desir, CNP.     Shanice Coyne, RN, MPH  Research Nurse, Movement Disorders

## 2020-07-30 NOTE — TELEPHONE ENCOUNTER
"Patient called back and provided an update:    She recently went to the doctor and had her statin changed from 20 mg to 5 mg due muscle aches. She hasn't noticed much of a difference yet.       Patient also states \"When I take my pills I get a wave of nausousness\". This happened recently when she was at the chiropractor. Discussed taking Sinemet with a cracker rather than on an empty stomach--only eat things with low protein for an hour before/after pills.     She says that her and her  got COVID-19 tests and they were negative. She wanted to get tests done for peace of mind before they went to visit her elderly parents next week.     I asked patient how things have been going since we increased her Buspar dose from twice a day to three times a day. She says she hasn't noticed a difference in anxiety level.     I asked if she had been able to make an appointment with a therapist or counselor yet. She said she had not, that she was unable to get in touch with her past counselor, Aimee Lowe. I told her I was not certain Aimee Lowe was still on our list of health psychologists and offered to give her names of others at the [facility] and in the area. She asked if I could email her this list. She also said she will ask her primary for a referral.    I reiterated the importance of establishing a relationship with a therapist to help manage her anxiety, especially before we discuss her candidacy for 2nd side surgery. She said \"yes, but I know that I can get through the surgery, I've already done it once.\" I explained that in order for the team to approve her for another surgery, we need to know that her anxiety is being managed beforehand.       I said that I would send update to GUILLERMINA Desir CNP and we would come up with a plan about the gabapentin. Patient says she had increased her gabapentin dose from 1 capsule to 2 capsules already, she thought she was instructed to do that at her last visit. I said that " we had initially wanted to wait to see what affect the Buspar had before increasing her gabapentin, but that she shouldn't worry and that I will update Karina Rivas. She thinks her RLS symptoms have improved slightly. She thinks she will know more after her work day tomorrow, because her RLS symptoms are worse the afternoon after she works (she starts her shift early in the morning). She says she usually takes her gabapentin at 8 or 9pm because it also helps her get to sleep.     Pended another Health psycholgy referral for Karina Rivas to sign in case patient needs this.     Sent patient the following information in an email:      Health Psychology    Call one of the psychologists to help you manage and deal with the chronic disease you're experiencing and other stressors in your life.  See below for contact information.  You can leave a voicemail & they'll get back to you and make the appointment.    Health Psychology Psychologists in the [facility]   Darlene Starks, Ph.D.,  L.P. [phone]   Makenzie Cooley, Ph.D., L.P. [phone]            Marcos Rios, Ph.D., A.B.P.P., L.P. [phone]    Cris Campoverde, Ph.D.  [phone]     Sometimes, fitting therapy into your busy life can create even more stress. We understand this. [facility] are conveniently located throughout the [facility] and surrounding areas. We make it easy to find the location closest to you. Appointments are available Monday through Friday. If the need arises, you can usually receive a same-day crisis appointment.  To schedule an appointment, call [phone] or [phone] (toll-free).    Shanice Coyne, RN, MPH  Research Nurse, Movement Disorders

## 2020-07-30 NOTE — TELEPHONE ENCOUNTER
I have placed a referral for pt to see Dr. Lala to optimize her antianxiety medications.      I've signed the mental health referral.     Thank you.

## 2020-08-12 ENCOUNTER — ALLIED HEALTH/NURSE VISIT (OUTPATIENT)
Dept: PHARMACY | Facility: CLINIC | Age: 60
End: 2020-08-12

## 2020-08-12 ENCOUNTER — TELEPHONE (OUTPATIENT)
Dept: NEUROLOGY | Facility: CLINIC | Age: 60
End: 2020-08-12

## 2020-08-12 DIAGNOSIS — G25.81 RESTLESS LEGS SYNDROME (RLS): ICD-10-CM

## 2020-08-12 DIAGNOSIS — I10 ESSENTIAL HYPERTENSION: ICD-10-CM

## 2020-08-12 DIAGNOSIS — E78.5 HYPERLIPIDEMIA, UNSPECIFIED HYPERLIPIDEMIA TYPE: ICD-10-CM

## 2020-08-12 DIAGNOSIS — G20.A1 PARKINSON'S DISEASE (H): Primary | ICD-10-CM

## 2020-08-12 DIAGNOSIS — R11.0 NAUSEA: ICD-10-CM

## 2020-08-12 DIAGNOSIS — F41.1 GAD (GENERALIZED ANXIETY DISORDER): ICD-10-CM

## 2020-08-12 PROCEDURE — 99605 MTMS BY PHARM NP 15 MIN: CPT | Performed by: PHARMACIST

## 2020-08-12 PROCEDURE — 99607 MTMS BY PHARM ADDL 15 MIN: CPT | Performed by: PHARMACIST

## 2020-08-12 RX ORDER — ROSUVASTATIN CALCIUM 5 MG/1
5 TABLET, COATED ORAL DAILY
COMMUNITY

## 2020-08-12 NOTE — PROGRESS NOTES
"MT ENCOUNTER  SUBJECTIVE/OBJECTIVE:                           Erika Diaz is a 59 year old female called for an initial visit. She was referred to me from Karina Rivas/Shanice Coyne RN.      Patient consented to a telehealth visit: yes  Telemedicine Visit Details  Type of service:  Telephone visit  Start Time: 1:45 PM  End Time: 2:23 PM  Originating Location (pt. Location): Home  Distant Location (provider location):  [facility] MTM  Mode of Communication:  Telephone    Chief Complaint: initial medication review; discuss anxiety    Allergies/ADRs: Reviewed in EHR  Tobacco:  reports that she quit smoking about 11 years ago. Her smoking use included cigarettes. She has a 10.00 pack-year smoking history. She has never used smokeless tobacco.  Alcohol: not currently using  Caffeine: not discussed  Activity: not discussed  PMH: Reviewed in EHR    Medication Adherence/Access: no issues reported     Parkinson's Disease/RLS:  Current medication regimen is shown below. She was reportedly experiencing increased tremor so the dose of carbidopa-levodopa was increased from 1.5 tablets to 2 tablets per dose. Patient states that DBS had improved her tremor by about 80% so it was unusual for her to have more tremor. DBS did not help with rigidity as much but the medication does help with this. Patient reports an internal \"trembling\" that is not visible on the outside. She also feels the medication makes her feel warm and sweaty, especially when working. She also reports bothersome RLS but states the increase in gabapentin has helped somewhat. DBS is being considered for her left side.      7 am 12 pm 4 pm 9 pm PRN   Carb/levo  mg 2 2 2 2 1   Amantadine 100 mg 1  1     Pramipexole 0.5 mg    2    Gabapentin 100 mg    2      Anxiety: Taking fluoxetine 20 mg daily and buspirone 10 mg 3 times/day at 7 am, 12 pm, and 9 pm. She takes lorazepam as needed for sleep at night or during the night to get back to sleep, " "about 1-2 times/week. She does not take lorazepam during the day. Patient states she has been more anxious because of COVID19 and worrying about getting other people sick. She \"cries at the drop of a hat.\"     Hyperlipidemia: Current therapy includes rosuvastatin 5 mg once daily and aspirin 81 mg daily.  Pt reports no significant myalgias or other side effects.  Last lipid panel 1/7/20:    Trig 126  HDL 41  LDL 52    Hypertension: Current medications include hydrochlororthiazide 25 mg nightly.  Patient does not self-monitor BP.  Patient states the medication does make her urinate more frequently so she take it at night to avoid increased urination during work. However, she admits she does get up at night because of this.   BP Readings from Last 3 Encounters:   10/08/19 137/85   06/26/19 135/61   04/01/19 134/62       Nausea: Taking ondansetron 4 mg \"very seldom\" but states it does help. She has been waking up nauseous more mornings before taking medications. She will take medication with crackers and this helps. Tums are also helpful for upset stomach.     Today's Vitals: There were no vitals taken for this visit.  (Telemed visit)    ASSESSMENT:                            Medication Adherence: excellent, no issues identified    Parkinson's Disease/RLS:  Improving. Seems the higher dose of levodopa has helped somewhat with tremor but the internal tremor/anxiety is still not well controlled (see below).     Anxiety: needs improvement. Still may be too soon to assess the full benefits of a change in dose of buspirone. The dose of buspirone can be increased to a maximum of 60 mg/day. Fluoxetine is an activating antidepressant and can contribute to anxiety in some patients, so we could also consider an alternate selective serotonin reuptake inhibitor like sertraline or citalopram.  Additionally, I wonder if adding gabapentin during the day could help with anxiety and internal restlessness.     Hyperlipidemia: " stable.    Hypertension: needs improvement. Discussed that patient could take hydrochlorothiazide at 4 pm (after work) to have peak effect while she is still awake and not urinate so frequently overnight.    Nausea: appears stable.     PLAN:                            1. Move the hydrochlorothiazide to 4 pm.  2. No change to fluoxetine, buspirone, and gabapentin for now.   3. Future considerations: add gabapentin during the day, increase buspirone, or switch fluoxetine to sertraline.     I spent 38 minutes with this patient today. All changes were made via collaborative practice agreement with Karina Rivas. A copy of the visit note was provided to the patient's referring provider.    Will follow up in 3 weeks: 9/8/20    The patient declined a summary of these recommendations.     Verónica Lala, Pharm.D.  Medication Therapy Management Pharmacist  Phone: [phone]

## 2020-08-12 NOTE — TELEPHONE ENCOUNTER
Pt called to say she was able to enter the technology check in Epic but her speakers are not working. I will let Dr. Lala know that she should use Doximity or telephone instead for visit.      Shanice Coyne RN, MPH  Research Nurse, Movement Disorders

## 2020-08-12 NOTE — Clinical Note
Mick Collins and Shanice - I apologize for the delay in getting you my note. I am recommending that Erika continue on her current anti-anxiety regimen for another few weeks before we make any further changes. I did include some recommendations in my note that we can consider depending on how she is doing when I talk with her 9/8/20. Thanks!

## 2020-08-12 NOTE — TELEPHONE ENCOUNTER
Pt called to ask about her appointment with Dr. Lala to talk about medications. She thought her appointment was at 11:30 and hadn't received a call. I looked at her appointment log and clarified that his appointment was at 1:30.    Patient is wondering how she can connect via video as she had problems last time. I encouraged her to test her connection via [employer]. She was having trouble logging in so I walked her though resetting her password. She was able to navigate to her appointment with Dr. Lala and complete e-check in but was unable to test her connection. We think perhaps she is not able to do the technology check until it is closer to her appointment, around 1pm. I asked her to try it again at 1pm and then if she still has problems she can call the Video visit helpline at [phone].       Last time we attempted a video visit on 7/16 with Karina Rivas, her phone was not able to use video via [employer] and we had to use Doximity instead.   I will let Dr. Lala know that there may be an issue and that if so, she can try Doximity or convert to telephone visit.    Patient also shared that she has not made an appointment with a psychologist, she was unable to decide who to call from the list I provided. I said that I do not know any of the providers personally but asked her to try Dr. Rios as I have other patients who have had good results--contact number given and pt was encouraged to call ASAP as this is a priority step in treating her anxiety.    Shanice Coyne, RN, MPH  Research Nurse, Movement Disorders

## 2020-08-25 DIAGNOSIS — G25.81 RESTLESS LEGS SYNDROME (RLS): ICD-10-CM

## 2020-08-25 NOTE — TELEPHONE ENCOUNTER
Nurse received refill request for: gabapentin (NEURONTIN) 100 MG capsule     Pharmacy: Pt is out of town, visiting her mother in [place], [place]. [facility]. I attempted to confirm address but pt states there is only one [facility] in [facility] so was not sure of exact street address. Pharmacy selected in order.     Last re-ordered: 4/8/2020    Last appointment: 7/16/2020    Next appointment: 9/8/2020 with Verónica Lala, 10/13/2020 with Karina Rivas    Action taken: Pended and routed to GUILLERMINA Desir, CNP for signing. Patient states she only has one pill left. Dose updated. Pt states she is currently taking two capsules at 4pm.     Shanice Coyne, RN, MPH  Research Nurse, Movement Disorders

## 2020-08-26 RX ORDER — GABAPENTIN 100 MG/1
200 CAPSULE ORAL DAILY
Qty: 180 CAPSULE | Refills: 3 | Status: SHIPPED | OUTPATIENT
Start: 2020-08-26 | End: 2021-05-21

## 2020-08-26 NOTE — TELEPHONE ENCOUNTER
Pt called to say she called [facility] in [place], [place] and there was no record of her prescription. Per Epic, order was signed this morning and receipt confirmed by pharmacy. Called [facility] in [place], [place] and staff said gabapentin is ready for pickup. Called pt to let her know the address of pharmacy and that it should be ready for her. Pt appreciated call and will contact us with any further questions.    Shanice Coyne RN, MPH  Research Nurse, Movement Disorders

## 2020-09-02 DIAGNOSIS — G20.A1 PARKINSON'S DISEASE (H): Primary | ICD-10-CM

## 2020-09-08 ENCOUNTER — TELEPHONE (OUTPATIENT)
Dept: PHARMACY | Facility: CLINIC | Age: 60
End: 2020-09-08

## 2020-09-08 NOTE — TELEPHONE ENCOUNTER
Attempted to reach patient for our scheduled MTM visit at 2 pm today. Left her a voicemail requesting that she call me back for the appt or to reschedule if needed.    Verónica Lala, Pharm.D.  Medication Therapy Management Pharmacist  Phone: [phone]

## 2020-09-08 NOTE — TELEPHONE ENCOUNTER
Received voicemail from patient stating that she forgot about our appt today and she would like to reschedule. She prefers Monday to Thursday for appointments.     Called patient back to reschedule appt to tomorrow (9/9) at 1:30 pm.     Verónica Lala, Pharm.D.  Medication Therapy Management Pharmacist  Phone: [phone]

## 2020-09-09 ENCOUNTER — ALLIED HEALTH/NURSE VISIT (OUTPATIENT)
Dept: PHARMACY | Facility: CLINIC | Age: 60
End: 2020-09-09

## 2020-09-09 DIAGNOSIS — G47.00 INSOMNIA, UNSPECIFIED TYPE: ICD-10-CM

## 2020-09-09 DIAGNOSIS — F41.1 GAD (GENERALIZED ANXIETY DISORDER): ICD-10-CM

## 2020-09-09 DIAGNOSIS — G25.81 RESTLESS LEGS SYNDROME (RLS): ICD-10-CM

## 2020-09-09 DIAGNOSIS — G20.A1 PARKINSON'S DISEASE (H): Primary | ICD-10-CM

## 2020-09-09 PROCEDURE — 99606 MTMS BY PHARM EST 15 MIN: CPT | Performed by: PHARMACIST

## 2020-09-09 NOTE — Clinical Note
Mick Collins and Shanice -- Erika just came back from caring for her mom in [place] and it seems she was not very adherent to her medications while she was there. She is getting back into a routine now but it seems her tremor and anxiety did worsen so I will follow up with her in a couple weeks to reassess.

## 2020-09-09 NOTE — PROGRESS NOTES
MTM ENCOUNTER  SUBJECTIVE/OBJECTIVE:                           Erika Diaz is a 60 year old female called for a follow-up visit. She was referred to me from GUILLERMINA Desir.  Today's visit is a follow-up MTM visit from 8/12/20    Patient consented to a telehealth visit: yes  Telemedicine Visit Details  Type of service:  Telephone visit  Start Time: 1:31 PM  End Time: 1:38 PM  Originating Location (pt. Location): Home  Distant Location (provider location):  [facility] MTM  Mode of Communication:  Telephone    Chief Complaint: follow up on PD/anxiety    Allergies/ADRs: Reviewed in EHR  Tobacco: She reports that she quit smoking about 11 years ago. Her smoking use included cigarettes. She has a 10.00 pack-year smoking history. She has never used smokeless tobacco.  Alcohol: not currently using  PMH: Reviewed in EHR    Medication Adherence/Access: no issues reported    Patient states that she has been recently visiting her mother in [place] to care for her while her father went hunting. She admitted that her mother was doing more poorly than she expected and she needed a lot of assistance with cares. This made the patient feel more anxious and she did miss quite a few doses of medication because she was out of her usual routine and didn't have her cell phone alarm on her.     Parkinson's Disease/RLS:  Current medication regimen is shown below. As above, patient admits to missing more doses of medication recently so she thinks her worsened tremor is because of this. She is especially noticing more tremor in the mornings. Patient is planning to pursue work up for second side DBS. She asked when her MRI appt is scheduled.       7 am 12 pm 4 pm 9 pm PRN   Carb/levo  mg 2 2 2 2 1   Amantadine 100 mg 1   1       Pramipexole 0.5 mg       2     Gabapentin 100 mg       2        Anxiety/insomnia: Taking fluoxetine 20 mg daily and buspirone 10 mg 3 times/day at 7 am, 12 pm, and 9 pm. She takes lorazepam  as needed for sleep at night or during the night to get back to sleep, about 1-2 times/week. Patient admits to not sleeping well lately but she is hoping once she is back in her routine this will also improve. Patient has tried to reach Dr. Rios to schedule an appt for health psychology but has been unable to reach him; she stated she would try again this week.    Today's Vitals: There were no vitals taken for this visit.  (telemed visit)    ASSESSMENT:                            Medication Adherence: excellent, no issues identified    Parkinson's Disease/RLS: needs improvement. Patient would benefit from returning to her normal routine and taking her medications on time with an alarm. Will not make any changes to medications today given the recent change in schedule and increased stress.    Anxiety/insomnia: needs improvement. Likely secondary to caring for her ill mother and also missing some doses of medications. Also agree with the patient pursuing health psychology to manage her various stressors.     PLAN:                            1. Patient to settle into her normal routine of taking medications on a schedule (will use her cell phone alarm)  2. Provided patient with date/time for upcoming MRI on 9/23/20  3. Patient encouraged to try again to schedule a health psychology appt with Dr. Rios    I spent 7 minutes with this patient today. I offer these suggestions for consideration by Karina Rivas. A copy of the visit note was provided to the patient's referring provider.    Will follow up in 2 weeks:  9/23/20    The patient declined a summary of these recommendations.     Verónica Lala, Pharm.D.  Medication Therapy Management Pharmacist  Phone: [phone]

## 2020-09-23 ENCOUNTER — HOSPITAL ENCOUNTER (OUTPATIENT)
Dept: MRI IMAGING | Facility: CLINIC | Age: 60
Discharge: HOME | End: 2020-09-23
Attending: NURSE PRACTITIONER | Admitting: NURSE PRACTITIONER
Payer: MEDICARE

## 2020-09-23 ENCOUNTER — ALLIED HEALTH/NURSE VISIT (OUTPATIENT)
Dept: PHARMACY | Facility: CLINIC | Age: 60
End: 2020-09-23

## 2020-09-23 DIAGNOSIS — M19.90 ARTHRITIS: ICD-10-CM

## 2020-09-23 DIAGNOSIS — G47.00 INSOMNIA, UNSPECIFIED TYPE: ICD-10-CM

## 2020-09-23 DIAGNOSIS — F41.1 GAD (GENERALIZED ANXIETY DISORDER): ICD-10-CM

## 2020-09-23 DIAGNOSIS — G25.81 RESTLESS LEGS SYNDROME (RLS): ICD-10-CM

## 2020-09-23 DIAGNOSIS — G20.A1 PARKINSON'S DISEASE (H): ICD-10-CM

## 2020-09-23 DIAGNOSIS — G20.A1 PARKINSON'S DISEASE (H): Primary | ICD-10-CM

## 2020-09-23 DIAGNOSIS — G44.219 EPISODIC TENSION-TYPE HEADACHE, NOT INTRACTABLE: ICD-10-CM

## 2020-09-23 PROCEDURE — 25500064 ZZH RX 255 OP 636: Performed by: NURSE PRACTITIONER

## 2020-09-23 PROCEDURE — 99607 MTMS BY PHARM ADDL 15 MIN: CPT | Performed by: PHARMACIST

## 2020-09-23 PROCEDURE — 99606 MTMS BY PHARM EST 15 MIN: CPT | Performed by: PHARMACIST

## 2020-09-23 PROCEDURE — A9585 GADOBUTROL INJECTION: HCPCS | Performed by: NURSE PRACTITIONER

## 2020-09-23 PROCEDURE — 70553 MRI BRAIN STEM W/O & W/DYE: CPT

## 2020-09-23 RX ORDER — GADOBUTROL 604.72 MG/ML
10 INJECTION INTRAVENOUS ONCE
Status: COMPLETED | OUTPATIENT
Start: 2020-09-23 | End: 2020-09-23

## 2020-09-23 RX ADMIN — GADOBUTROL 10 ML: 604.72 INJECTION INTRAVENOUS at 09:40

## 2020-09-23 NOTE — PROGRESS NOTES
"MTM ENCOUNTER  SUBJECTIVE/OBJECTIVE:                           Erika Diaz is a 60 year old female called for a follow-up visit. She was referred to me from Karina Rivas CNP/Shanice Coyne RN.  Today's visit is a follow-up MTM visit from 9/9/20     Patient consented to a telehealth visit: yes  Telemedicine Visit Details  Type of service:  Telephone visit  Start Time: 2:03 PM  End Time: 2:19 PM  Originating Location (pt. Location): Home  Distant Location (provider location):  [facility] MTM  Mode of Communication:  Telephone    Chief Complaint: follow up on medications    Allergies/ADRs: Reviewed in chart  Tobacco: She reports that she quit smoking about 11 years ago. Her smoking use included cigarettes. She has a 10.00 pack-year smoking history. She has never used smokeless tobacco.  Alcohol: not currently using  Caffeine: not discussed  Activity: mostly on her feet at work  PMH: Reviewed in chart    Medication Adherence/Access: no issues reported    Parkinson's Disease/RLS: Current medication regimen is listed below. Patient states she is back on a better schedule of taking her medications but still noting more tremor on her right hand. She is looking forward to the work up for deep brain stimulation on the second side. Patient also endorses worsening of handwriting and muscle stiffness (her shoulders seem to be raised by her ears).      7 am 12 pm 4 pm 9 pm PRN   Carb/levo  mg 2 2 2 2 1   Amantadine 100 mg 1   1       Pramipexole 0.5 mg       2     Gabapentin 100 mg       2       Anxiety/insomnia: Taking fluoxetine 20 mg daily and buspirone 10 mg 3 times/day at 7 am, 12 pm, and 9 pm. She takes lorazepam as needed for sleep at night or during the night to get back to sleep, about 1-2 times/week.  States \"I can cry with a drop of a hat.\" She feels supported but has been busy taking care of her mom and her  amidst their illnesses.  She has tried scheduling with health psychology but " has not scheduled an appt.     Arthritis/headaches: Taking occasional Excedrin or acetaminophen. Reports her hips and ankle have been quite painful lately. She would prefer to not take any additional medications and is hoping she could get a hip injection for the pain. Patient is going to the chiropractor once every 2 weeks and this does help. She feels like her headaches are because of her glasses against her head where the deep brain stimulation wires are. Patient admits the worse pain started after a stretch of 5 days of work.     Today's Vitals: There were no vitals taken for this visit.  (telemed visit)    ASSESSMENT:                            Medication Adherence: No issues identified    Parkinson's Disease/RLS: will hold off on additional medication changes as the patient is undergoing work up for deep brain stimulation again.     Anxiety/insomnia: appears stable. Again encouraged the patient to schedule an appt with health psychology.     Arthritis/headaches: patient advised to see her PCP about the pain issues as they may not be related to Parkinson's disease.    PLAN:                            1. Please contact your primary care provider about your arthritis pain.     2. Consider making an appointment with Dr. Rios in health psychology as previously reccommended by Shanice/Karina. Dr. Rios can be reached at [phone].     3. No change to Parkinson's disease medications at this time given ongoing work up for deep brain stimulation.    I spent 16 minutes with this patient today. All changes were made via collaborative practice agreement with Karina Rivas. A copy of the visit note was provided to the patient's referring provider.    Will follow up in 3-6 months or sooner if needed.    The patient was sent via [employer] a summary of these recommendations.     Verónica Lala, Pharm.D.  Medication Therapy Management Pharmacist  Phone: [phone]

## 2020-09-23 NOTE — PATIENT INSTRUCTIONS
Recommendations from today's MTM visit:                                                      1. Please contact your primary care provider about your arthritis pain.     2. Consider making an appointment with Dr. Rios in health psychology as previously reccommended by Shanice/Karina. Dr. Rios can be reached at [phone].     3. No change to Parkinson's disease medications at this time given ongoing work up for deep brain stimulation.    It was great to speak with you today.  I value your experience and would be very thankful for your time with providing feedback on our clinic survey. You may receive a survey via email or text message in the next few days.     Next MTM visit: 3-6 months or sooner if needed    To schedule another MTM appointment, please call the clinic directly or you may call the MTM scheduling line at [phone] or toll-free at [phone].     My Clinical Pharmacist's contact information:                                                      It was a pleasure talking with you today!  Please feel free to contact me with any questions or concerns you have.      Verónica Lala, Pharm.D.  Medication Therapy Management Pharmacist  Phone: [phone]

## 2020-09-28 ENCOUNTER — TELEPHONE (OUTPATIENT)
Dept: NEUROLOGY | Facility: CLINIC | Age: 60
End: 2020-09-28

## 2020-09-28 NOTE — TELEPHONE ENCOUNTER
"Patient called to let the team know that her spouse Zane has been given one year to live after having imaging of a growth in his lung (previously had liver cancer). I expressed my condolences to both patient and spouse from myself and the neurology team.     Patient wonders if she should proceed with workup and surgery, or wait a year. I discussed with her that stress and anxiety management is something we have been concerned about and that it is even more critical now that she establish a relationship with a mental health provider. Patient says she has Dr. Rios's number and will call him ASAP. I explained that she will still need to go through neuropsych eval and meet with Dr. Concepcion, and then we will discuss her case with the entire team. I reiterated that we need to be sure that her mood and stress is managed. I also encouraged her to take some time to process this news with her family.    Patient later expressed that she would still like to move forward with surgery if possible \"It helped me so much the first time with the internal tremor. I know what to expect of the surgery. The surgery itself doesn't scare me.\"    I let patient know that I will update Karina with her message (appt 10/16) and that neurosurgery and neuropsychology should be reaching out to schedule the rest of her workup appts. She had no further questions.    Shanice Coyne RN, MPH  Research Nurse, Movement Disorders      "

## 2020-09-29 NOTE — TELEPHONE ENCOUNTER
Shanice,    I'm truly sorry to hear about this news.  I strongly urge her to think about her decision in light of the limited time she has with her .      Thank you for encouraging her to seek counseling.  I hope it will help her set her priorities in order so that she doesn't regret the decision she is going to make.

## 2020-10-05 NOTE — TELEPHONE ENCOUNTER
Pt called with some news that over the weekend her  Zane fell and experienced a concussion, needed stitches. Also, today he was brought to the ED and initial report is that he may have had a stroke. I expressed my condolences to patient for all of the recent events.    Patient has upcoming appointments next week for the 24 month clinical core study:   10/14 with Dr. Hernandez for neuropsych eval  10/16 with Karina Rivas for on/off testing    I offered patient the option of rescheduling these appointments as she is under lot of stress right now and these appointments are not urgent. I also explained that stress can affect her neuropsych and motor testing evaluation.  Pt is adamant about moving forward with her appointments and workup.  She states she has an appointment with a counselor, closer to her home, next Tuesday, and also has an appointment scheduled with Dr. Rios, health psychology, on 10/19/2020.     Pt's main concern now is that she is worried about not having transportation to her upcoming appointments.  Patient has used [employer] service in the past for research visits and so I will look into scheduling her rides for next week provided that Dr. Hernandez and Karina Rivas are OK with this plan.    Shanice Coyne, RN, MPH  Research Nurse, Movement Disorders

## 2020-10-05 NOTE — TELEPHONE ENCOUNTER
Plan per Dr. Hernandez and GUILLERMINA Desir, NP is to proceed with appointments next week for evaluations. Transportation reservation requested and I will follow up with pt once confirmed.     Shanice Coyne, RN, MPH  Research Nurse, Movement Disorders

## 2020-10-06 NOTE — TELEPHONE ENCOUNTER
Rides with [email or web address] ([email or web address] [phone])  confirmed, left voicemail for patient with pickup times:    Ride 1 Wednesday October 14th:  Pickup time 6:45-7:00am Patient's Apartment to [facility]     Ride 2 Wednesday, October 14th:   at [facility] at 12/noon same day  Drop off at Patient's apartment     ----------------    Ride 3 Friday, October 16th:  Pickup time 7:00am Patient's Apartment to [facility] for appt at 8:10am    Ride 4 Friday, October 16th:   at [facility] at 11:15 am   Drop off at Patient's apartment    Shanice Coyne RN, MPH  Research Nurse, Movement Disorders

## 2020-10-14 ENCOUNTER — OFFICE VISIT (OUTPATIENT)
Dept: NEUROPSYCHOLOGY | Facility: CLINIC | Age: 60
End: 2020-10-14
Payer: MEDICARE

## 2020-10-14 ENCOUNTER — TELEPHONE (OUTPATIENT)
Dept: NEUROLOGY | Facility: CLINIC | Age: 60
End: 2020-10-14

## 2020-10-14 DIAGNOSIS — F09 MENTAL DISORDER DUE TO GENERAL MEDICAL CONDITION: ICD-10-CM

## 2020-10-14 DIAGNOSIS — G20.A1 PARKINSON'S DISEASE (H): Primary | ICD-10-CM

## 2020-10-14 PROCEDURE — 96133 NRPSYC TST EVAL PHYS/QHP EA: CPT

## 2020-10-14 PROCEDURE — 96132 NRPSYC TST EVAL PHYS/QHP 1ST: CPT

## 2020-10-14 PROCEDURE — 96139 PSYCL/NRPSYC TST TECH EA: CPT

## 2020-10-14 PROCEDURE — 96138 PSYCL/NRPSYC TECH 1ST: CPT

## 2020-10-14 PROCEDURE — 96116 NUBHVL XM PHYS/QHP 1ST HR: CPT

## 2020-10-14 NOTE — TELEPHONE ENCOUNTER
Pt called at 10:17 to say she was done early with neuorpsych testing, is wondering if car service [employer] can get her early.    I confirmed with Dr. Hernandez that pt is done with testing.    Contacted car service to arrange ride for 11:15, pt updated.    Patient confirmed she will arrive OFF meds to Friday's appt and bring her completed consent form for Clinical Core. No further questions.    Shnaice Coyne, RN, MPH  Research Nurse, Movement Disorders

## 2020-10-14 NOTE — PROGRESS NOTES
NEUROPSYCHOLOGICAL EVALUATION    RELEVANT HISTORY AND REASON FOR REFERRAL    Erika Diaz is a 60 year old, right handed  with 12 years of formal education. Information was obtained via interview the patient and review of her medical records, including prior neuropsychological evaluations. Ms. Diaz has a history of Parkinson s disease with diagnosis around 2008, with a left-side onset. She also has a history of generalized anxiety disorder and panic attacks, and a self-reported history of carbon monoxide poisoning five times as she had tailpipe problems on her car and waited three hours to cross the border between [place] and the [place]. She is status post right STN DBS lead implantation in August, 2018, for management of her Parkinson s symptoms. She has done well with her surgery, and is interested in pursuing surgery for the second side. She was referred for neuropsychological evaluation by GUILLERMINA Desir CNP, in 24 month follow-up for the [facility], and as part of the standard protocol prior to surgery for the second side, for further characterization of any cognitive difficulties, to evaluate mood, and to assist with treatment planning.     Ms. Diaz first underwent a neuropsychological evaluation under my direction on 12/12/2017, as part of the presurgical protocol. Please refer to the report from that date for full details regarding her history and the findings of the evaluation. Results of her evaluation indicated variability in attention and memory, ranging from moderately impaired to superior, in some cases with greater difficulty on less complex tasks. Basic language, visual processing, and executive functioning fell within normal limits. Personality assessment was suggestive of somatization, and raised the possibility of manic episodes. She completed her one year follow up evaluation on 8/28/2019. Results of that evaluation indicated performance that fell within normal  "limits across cognitive domains.  She endorsed minimal depressive symptomatology, apathy, or anxiety.  Compared to her 2017 evaluation she had significant improvements across all language tasks and on tasks of verbal memory.  Visual memory had declined but remained well within normal limits.  Performance otherwise remained stable across cognitive domains.    Due to COVID-19 related precautions, the interview for this evaluation took place ove a video platform. with the patient in a clinic room and the neuropsychologist in nearby office. The testing took place with a psychometrist in the clinic room.    CLINICAL INTERVIEW FINDINGS    Upon interview, Ms. Diaz stated that she had good benefit from her first surgery, which reduced her \"inner tremor\" by 80%. She is hoping that surgery for the second side will help with the inner shakes, which wear on her. It is hard for her to rest her shoulders. She wants to be able to work and be active. She has a good understanding of the surgical procedure. She recalls doing fine with being awake during the first surgery, stating only that her restless legs were a problem. She understands that there can be bleeding, death, or other complications, but she feels confident about moving forward.     Ms. Diaz's  was recently diagnosed with cancer, and had a stroke about a month ago. They have been told that he has about 12 months to live. They have discussed the decision for her to proceed with DBS surgery, and she stated that he has encouraged her to move forward with it so that they can enjoy their time. She stated that the rest of her family is supportive as well. Someone else will stay with them to assist with her cares following surgery as necessary.    Ms. Diaz has not noticed significant problems with cognition. She described herself as forgetful at times, such as not remembering where she put her keys. She believes this is age related. At times, she may think one " thing and say another. She has not noticed problems with attention or concentration, decision making, or organization. She lives with her . They share the management of their finances apparently without difficulty. He had been managing her medications, but she is now managing her own medications, apparently without difficulty.  She drives and cooks without difficulty.  She handles her personal cares independently.    Ms. Diaz had stated that she saw a psychologist after her  was diagnosed with cancer, and she will be going again next week.  She has also [facility].  When asked to describe her mood, she stated that she gets irritated with her  because he still drinks and smokes, and she feels sad because he is not taking care of himself.  She has not had feelings of depression, hopelessness, helplessness, worthlessness, or guilt.  She worries particularly about her , but otherwise denied significant anxiety.  Sleep has always been a problem for her.  She has trouble falling asleep and also is restless when she does sleep.  She is used to waking up quite early, so she works at "[employer] at 5: 00 a.m.  She does not think that she has acted out her dreams.  She naps for an hour in the afternoon after work, and this is usually refreshing.  Her appetite has been strong.  With her ankle surgery, DBS, and more recently COVID, she has gained 30 pounds.  Her energy level is generally good.  She does not get formal exercise but she moves around quite a bit at work.  She enjoys spending time with her grandchildren.  Her brother has a resort up [place], and she is planning time there with her extended family to make memories for their grandchildren.  She denied visual or auditory hallucinations.  She denied suicidal ideation.    Ms. Diaz stated that she does not drink any alcohol.  She denied illicit drug use.  She quit smoking years ago.  She buys [employer] tickets, stating that  this is why she works at her job.  She stated that she tends to win more than lose.  She works 20 hours a week.    In the last year, Ms. Diaz had sudden loss of hearing in her right ear.  This has been unexplained.  Her balance has been good.  She has not fallen in 2 years.  She gets headaches which she attributes to wearing masks.  She has pain in her hip related to arthritis, for which she receives cortisone shots.  She has noticed that her handwriting is getting smaller.    PAST MEDICAL HISTORY: Medical records indicate a history of generalized anxiety disorder, depressive disorder, benign paroxysmal positional vertigo, diabetes mellitus type 2, migraine, degenerative joint disease, hypercholesterolemia, hypertension, obesity, pelvic inflammatory disease, pulmonary emboli, Parkinson's disease, osteoarthritis.    CURRENT MEDICATIONS:  Include acetaminophen, amantadine, aspirin 81 mg, buspirone, calcium carbonate, carbidopa-levodopa (Sinemet), fluoxetine, gabapentin, hydrochlorothiazide, lorazepam, pramipexole, rosuvastatin.    FAMILY MEDICAL HISTORY:  Significant for memory problems in both grandmothers, in their 80s. She denied any family history of Parkinson s disease or psychiatric illness.     BEHAVIORAL OBSERVATIONS    During the evaluation, Ms. Diaz was pleasant, cooperative, and seemed to understand the instructions.  She was alert and oriented to person, place, and time.  No tremors were observed clinically.  Mood was euthymic.  Speech was fluent, subtly dysarthric, with normal volume and rate.  Spontaneous conversation was present and appropriate.  Internal performance validity measures fell within normal limits.  The results are believed to accurately reflect her current level of functioning.    MEASURES ADMINISTERED    The following measures were administered by a trained psychometrist, under the direct supervision of a licensed psychologist.    Subtests of the Wechsler Adult Intelligence Scale-4;  Reading subtest of the Wide Range Achievement Test-4; subtests of the Wechsler Memory Scale-Revised; Harris Verbal Learning Test-Revised, Form 1; Brief Visual Memory Test - Revised, Form 1; Morton Naming Test; D-KEFS Verbal Fluency, Standard Form; Trail Making Test; Stroop; Wisconsin Card Sorting Test - 64; Fuentes Judgement of Line Orientation Form H; Clock Drawing; Dementia Rating Scale - 2 (DRS-2) Standard Form;  MoCA v. 7.1; Harman Depression Inventory-2 (BDI-2), Harman Anxiety Inventory (JAYLAN); HAM-D, HAM-A, Apathy Scale, RBDSQ; QUIP, PDQ-39, ESS.     RESULTS AND INTERPRETATION    Overall intellectual functioning was estimated to fall in the low average range, consistent with premorbid estimates based on single word reading abilities administered at a prior evaluation.  Performance on a screening measure of dementia was high average (DRS-2 Total Score = 142/144).  Performance on the MoCA also fell within normal limits (29/30).    Confrontation naming was high average for her age.  Verbal abstract reasoning was average.  Ability to comprehend and articulate responses to complex social situations was mildly impaired.  Letter fluency, generative naming to category, and switching fluency were all above average.    Attention span was low average for her age.  Divided attention was average.  Performance on a measure of distractibility was high average.  Psychomotor processing speed was high average.    Basic visual perception, including matching lines and angles, was high average for her age.  Construction of a clock fell within normal limits.  Nonverbal deductive reasoning was mildly impaired.    Novel problem-solving, including the ability to generate strategies and solutions, fell within normal limits for her age and level of education.    Immediate recall of verbal narrative material was mildly impaired, with mildly impaired recall following a 30-minute delay.  On a multiple trial list learning task, immediate recall  was mildly impaired, with mildly impaired retention (63%) following a 25-minute delay.  Recognition memory on this task was average.  Immediate recall of visual material was mildly impaired, with borderline impaired recall following a 25-minute delay.  Recognition memory on this task fell within normal limits, with no errors.    On the BDI-2,  A self-report questionnaire, Ms. Diaz endorsed a minimal level of depressive symptomatology.  She endorsed mild anxiety on the JAYLAN, and she also endorsed significant apathy on a scale.  She endorsed rare compulsive behaviors involving gambling, sex, buying, eating, performing tasks or hobbies, repeating simple activities, and taking her PD medications.    IMPRESSIONS AND RECOMMENDATIONS    Erika Diaz is a 60-year-old woman with a history of Parkinson's disease, who is status post right STN DBS lead implantation in August 2019, and who is being considered for left DBS lead implantation for management of her symptoms.  She completed this neuropsychological evaluation both as a 24-month follow-up for the [facility] clinical core, as well as a standard clinical component of the presurgical protocol prior to surgery for the second side.    Results indicate mild impairments in learning and retrieval of learned information.  Performance otherwise falls within normal limits across cognitive domains, generally in the average to above average range.  She endorses apathy, mild anxiety, and minimal depressive symptomatology, and rare compulsive behaviors across a variety of domains.    Compared to her 8/28/2019 evaluation, she has had declines in learning and retrieval.  Notably, her baseline evaluation on 12/12/2017 was characterized by variability in attention and memory.  Performance improved by the 2019 evaluation, and still remains above where it was at her baseline in 2017.    This pattern of performance does not reflect dementia at this time, and is only subtly abnormal.  It is  generally consistent with her history of Parkinson's disease, but variability across evaluations may also be attributable to underlying psychological factors including anxiety and apathy.  She has a history of generalized anxiety disorder and panic attacks.  Her  was recently diagnosed with lung cancer and was told that he has 1 year to live.  Unfortunately, he subsequently experienced a stroke.  He has recently returned home after inpatient rehabilitation.  They have had discussions about whether she should proceed with the surgery now, and she indicated that they are in agreement that she should proceed, so that she can be more active in the next year.  Although she is endorsing mild anxiety and apathy, she does not appear to be experiencing emotional problems currently that might interfere with her ability to actively participate in treatment.  She has established care with a psychologist.  She has a good support system in her extended family as well. She appears to have carefully considered the pros and cons of proceeding at this time. Provided that she maintains ongoing mental health support, as she appears to be an adequate candidate for surgery from a neurocognitive and psychosocial perspective.    Results may serve as an updated baseline of her neurocognitive functioning.  Repeated neuropsychological evaluation in 1 year may help to determine whether her cognitive difficulties progress, remit, or remain stable.      Amanda Hernandez, Ph.D., ABPP  Licensed Psychologist, [id number]  Board Certified in Clinical Neuropsychology    Time spent: 65 minutes neurobehavioral status exam including interview, clinical assessment by licensed and board-certified neuropsychologist (CPT 69729). 60 minutes (1 unit) neuropsychological testing evaluation by licensed and board-certified neuropsychologist, including integration of patient data, interpretation of standardized test results and clinical data, clinical  decision-making, treatment planning, report, and interactive feedback to the patient, first hour (CPT 54879). 95 minutes (2 units) of neuropsychological testing evaluation by licensed and board-certified neuropsychologist, including integration of patient data, interpretation of standardized test results and clinical data, clinical decision-making, treatment planning, report, and interactive feedback to the patient, subsequent hours (CPT 49524). 30 minutes of neuropsychological test administration and scoring by technician, first 30 minutes (CPT 64135). 155 additional minutes (5 units) neuropsychological test administration and scoring by technician, subsequent 30 minutes (CPT 37511). ICD-10 Diagnoses: G20; F06.8.

## 2020-10-14 NOTE — LETTER
10/14/2020      RE: Erika Diaz  [address] [place] [id number]       NEUROPSYCHOLOGICAL EVALUATION    RELEVANT HISTORY AND REASON FOR REFERRAL    Erika Diaz is a 60 year old, right handed  with 12 years of formal education. Information was obtained via interview the patient and review of her medical records, including prior neuropsychological evaluations. Ms. Diaz has a history of Parkinson s disease with diagnosis around 2008, with a left-side onset. She also has a history of generalized anxiety disorder and panic attacks, and a self-reported history of carbon monoxide poisoning five times as she had tailpipe problems on her car and waited three hours to cross the border between [place] and the [place]. She is status post right STN DBS lead implantation in August, 2018, for management of her Parkinson s symptoms. She has done well with her surgery, and is interested in pursuing surgery for the second side. She was referred for neuropsychological evaluation by GUILLERMINA Desir CNP, in 24 month follow-up for the [place] [facility], and as part of the standard protocol prior to surgery for the second side, for further characterization of any cognitive difficulties, to evaluate mood, and to assist with treatment planning.     Ms. Diaz first underwent a neuropsychological evaluation under my direction on 12/12/2017, as part of the presurgical protocol. Please refer to the report from that date for full details regarding her history and the findings of the evaluation. Results of her evaluation indicated variability in attention and memory, ranging from moderately impaired to superior, in some cases with greater difficulty on less complex tasks. Basic language, visual processing, and executive functioning fell within normal limits. Personality assessment was suggestive of somatization, and raised the possibility of manic episodes. She completed her one year follow up  "evaluation on 8/28/2019. Results of that evaluation indicated performance that fell within normal limits across cognitive domains.  She endorsed minimal depressive symptomatology, apathy, or anxiety.  Compared to her 2017 evaluation she had significant improvements across all language tasks and on tasks of verbal memory.  Visual memory had declined but remained well within normal limits.  Performance otherwise remained stable across cognitive domains.    Due to COVID-19 related precautions, the interview for this evaluation took place ove a video platform. with the patient in a clinic room and the neuropsychologist in nearby office. The testing took place with a psychometrist in the clinic room.    CLINICAL INTERVIEW FINDINGS    Upon interview, Ms. Diaz stated that she had good benefit from her first surgery, which reduced her \"inner tremor\" by 80%. She is hoping that surgery for the second side will help with the inner shakes, which wear on her. It is hard for her to rest her shoulders. She wants to be able to work and be active. She has a good understanding of the surgical procedure. She recalls doing fine with being awake during the first surgery, stating only that her restless legs were a problem. She understands that there can be bleeding, death, or other complications, but she feels confident about moving forward.     Ms. Diaz's  was recently diagnosed with cancer, and had a stroke about a month ago. They have been told that he has about 12 months to live. They have discussed the decision for her to proceed with DBS surgery, and she stated that he has encouraged her to move forward with it so that they can enjoy their time. She stated that the rest of her family is supportive as well. Someone else will stay with them to assist with her cares following surgery as necessary.    Ms. Diaz has not noticed significant problems with cognition. She described herself as forgetful at times, such as not " remembering where she put her keys. She believes this is age related. At times, she may think one thing and say another. She has not noticed problems with attention or concentration, decision making, or organization. She lives with her . They share the management of their finances apparently without difficulty. He had been managing her medications, but she is now managing her own medications, apparently without difficulty.  She drives and cooks without difficulty.  She handles her personal cares independently.    Ms. Diaz had stated that she saw a psychologist after her  was diagnosed with cancer, and she will be going again next week.  She has also seen Dr. Rios at the [facility].  When asked to describe her mood, she stated that she gets irritated with her  because he still drinks and smokes, and she feels sad because he is not taking care of himself.  She has not had feelings of depression, hopelessness, helplessness, worthlessness, or guilt.  She worries particularly about her , but otherwise denied significant anxiety.  Sleep has always been a problem for her.  She has trouble falling asleep and also is restless when she does sleep.  She is used to waking up quite early, so she works at [employer] at 5: 00 a.m.  She does not think that she has acted out her dreams.  She naps for an hour in the afternoon after work, and this is usually refreshing.  Her appetite has been strong.  With her ankle surgery, DBS, and more recently COVID, she has gained 30 pounds.  Her energy level is generally good.  She does not get formal exercise but she moves around quite a bit at work.  She enjoys spending time with her grandchildren.  Her brother has a resort up [place], and she is planning time there with her extended family to make memories for their grandchildren.  She denied visual or auditory hallucinations.  She denied suicidal ideation.    Ms. Diaz stated that she does not drink any alcohol.   She denied illicit drug use.  She quit smoking years ago.  She buys [employer] tickets, stating that this is why she works at her job.  She stated that she tends to win more than lose.  She works 20 hours a week.    In the last year, Ms. Diaz had sudden loss of hearing in her right ear.  This has been unexplained.  Her balance has been good.  She has not fallen in 2 years.  She gets headaches which she attributes to wearing masks.  She has pain in her hip related to arthritis, for which she receives cortisone shots.  She has noticed that her handwriting is getting smaller.    PAST MEDICAL HISTORY: Medical records indicate a history of generalized anxiety disorder, depressive disorder, benign paroxysmal positional vertigo, diabetes mellitus type 2, migraine, degenerative joint disease, hypercholesterolemia, hypertension, obesity, pelvic inflammatory disease, pulmonary emboli, Parkinson's disease, osteoarthritis.    CURRENT MEDICATIONS:  Include acetaminophen, amantadine, aspirin 81 mg, buspirone, calcium carbonate, carbidopa-levodopa (Sinemet), fluoxetine, gabapentin, hydrochlorothiazide, lorazepam, pramipexole, rosuvastatin.    FAMILY MEDICAL HISTORY:  Significant for memory problems in both grandmothers, in their 80s. She denied any family history of Parkinson s disease or psychiatric illness.     BEHAVIORAL OBSERVATIONS    During the evaluation, Ms. Diaz was pleasant, cooperative, and seemed to understand the instructions.  She was alert and oriented to person, place, and time.  No tremors were observed clinically.  Mood was euthymic.  Speech was fluent, subtly dysarthric, with normal volume and rate.  Spontaneous conversation was present and appropriate.  Internal performance validity measures fell within normal limits.  The results are believed to accurately reflect her current level of functioning.    MEASURES ADMINISTERED    The following measures were administered by a trained psychometrist, under the  direct supervision of a licensed psychologist.    Subtests of the Wechsler Adult Intelligence Scale-4; Reading subtest of the Wide Range Achievement Test-4; subtests of the Wechsler Memory Scale-Revised; Harris Verbal Learning Test-Revised, Form 1; Brief Visual Memory Test - Revised, Form 1; Rosamond Naming Test; D-KEFS Verbal Fluency, Standard Form; Trail Making Test; Stroop; Wisconsin Card Sorting Test - 64; Fuentes Judgement of Line Orientation Form H; Clock Drawing; Dementia Rating Scale - 2 (DRS-2) Standard Form;  MoCA v. 7.1; Harman Depression Inventory-2 (BDI-2), Harman Anxiety Inventory (JAYLAN); HAM-D, HAM-A, Apathy Scale, RBDSQ; QUIP, PDQ-39, ESS.     RESULTS AND INTERPRETATION    Overall intellectual functioning was estimated to fall in the low average range, consistent with premorbid estimates based on single word reading abilities administered at a prior evaluation.  Performance on a screening measure of dementia was high average (DRS-2 Total Score = 142/144).  Performance on the MoCA also fell within normal limits (29/30).    Confrontation naming was high average for her age.  Verbal abstract reasoning was average.  Ability to comprehend and articulate responses to complex social situations was mildly impaired.  Letter fluency, generative naming to category, and switching fluency were all above average.    Attention span was low average for her age.  Divided attention was average.  Performance on a measure of distractibility was high average.  Psychomotor processing speed was high average.    Basic visual perception, including matching lines and angles, was high average for her age.  Construction of a clock fell within normal limits.  Nonverbal deductive reasoning was mildly impaired.    Novel problem-solving, including the ability to generate strategies and solutions, fell within normal limits for her age and level of education.    Immediate recall of verbal narrative material was mildly impaired, with mildly  impaired recall following a 30-minute delay.  On a multiple trial list learning task, immediate recall was mildly impaired, with mildly impaired retention (63%) following a 25-minute delay.  Recognition memory on this task was average.  Immediate recall of visual material was mildly impaired, with borderline impaired recall following a 25-minute delay.  Recognition memory on this task fell within normal limits, with no errors.    On the BDI-2,  A self-report questionnaire, Ms. Diaz endorsed a minimal level of depressive symptomatology.  She endorsed mild anxiety on the JAYLAN, and she also endorsed significant apathy on a scale.  She endorsed rare compulsive behaviors involving gambling, sex, buying, eating, performing tasks or hobbies, repeating simple activities, and taking her PD medications.    IMPRESSIONS AND RECOMMENDATIONS    Erika Diaz is a 60-year-old woman with a history of Parkinson's disease, who is status post right STN DBS lead implantation in August 2019, and who is being considered for left DBS lead implantation for management of her symptoms.  She completed this neuropsychological evaluation both as a 24-month follow-up for the [place] clinical core, as well as a standard clinical component of the presurgical protocol prior to surgery for the second side.    Results indicate mild impairments in learning and retrieval of learned information.  Performance otherwise falls within normal limits across cognitive domains, generally in the average to above average range.  She endorses apathy, mild anxiety, and minimal depressive symptomatology, and rare compulsive behaviors across a variety of domains.    Compared to her 8/28/2019 evaluation, she has had declines in learning and retrieval.  Notably, her baseline evaluation on 12/12/2017 was characterized by variability in attention and memory.  Performance improved by the 2019 evaluation, and still remains above where it was at her baseline in  2017.    This pattern of performance does not reflect dementia at this time, and is only subtly abnormal.  It is generally consistent with her history of Parkinson's disease, but variability across evaluations may also be attributable to underlying psychological factors including anxiety and apathy.  She has a history of generalized anxiety disorder and panic attacks.  Her  was recently diagnosed with lung cancer and was told that he has 1 year to live.  Unfortunately, he subsequently experienced a stroke.  He has recently returned home after inpatient rehabilitation.  They have had discussions about whether she should proceed with the surgery now, and she indicated that they are in agreement that she should proceed, so that she can be more active in the next year.  Although she is endorsing mild anxiety and apathy, she does not appear to be experiencing emotional problems currently that might interfere with her ability to actively participate in treatment.  She has established care with a psychologist.  She has a good support system in her extended family as well. She appears to have carefully considered the pros and cons of proceeding at this time. Provided that she maintains ongoing mental health support, as she appears to be an adequate candidate for surgery from a neurocognitive and psychosocial perspective.    Results may serve as an updated baseline of her neurocognitive functioning.  Repeated neuropsychological evaluation in 1 year may help to determine whether her cognitive difficulties progress, remit, or remain stable.      Amanda Hernandez, Ph.D., ABPP  Licensed Psychologist, LP [id number]  Board Certified in Clinical Neuropsychology    Time spent: 65 minutes neurobehavioral status exam including interview, clinical assessment by licensed and board-certified neuropsychologist (CPT 91146). 60 minutes (1 unit) neuropsychological testing evaluation by licensed and board-certified neuropsychologist, including  integration of patient data, interpretation of standardized test results and clinical data, clinical decision-making, treatment planning, report, and interactive feedback to the patient, first hour (CPT 28299). 95 minutes (2 units) of neuropsychological testing evaluation by licensed and board-certified neuropsychologist, including integration of patient data, interpretation of standardized test results and clinical data, clinical decision-making, treatment planning, report, and interactive feedback to the patient, subsequent hours (CPT 22675). 30 minutes of neuropsychological test administration and scoring by technician, first 30 minutes (CPT 92951). 155 additional minutes (5 units) neuropsychological test administration and scoring by technician, subsequent 30 minutes (CPT 78818). ICD-10 Diagnoses: G20; F06.8.          Amanda Hernandez, PhD LP

## 2020-10-14 NOTE — NURSING NOTE
The patient was seen for neuropsychological evaluation at the request of Dr. Elver Stratton, for the purposes of diagnostic clarification and treatment planning. 185 minutes of test administration and scoring were provided by this writer, Zbigniew Rausch. Please see Dr. Amanda Hernandez's report for a full interpretation of the findings.

## 2020-10-16 ENCOUNTER — DOCUMENTATION ONLY (OUTPATIENT)
Dept: NEUROLOGY | Facility: CLINIC | Age: 60
End: 2020-10-16

## 2020-10-16 ENCOUNTER — OFFICE VISIT (OUTPATIENT)
Dept: NEUROLOGY | Facility: CLINIC | Age: 60
End: 2020-10-16
Payer: MEDICARE

## 2020-10-16 VITALS
BODY MASS INDEX: 40.1 KG/M2 | RESPIRATION RATE: 18 BRPM | TEMPERATURE: 98.2 F | HEART RATE: 64 BPM | SYSTOLIC BLOOD PRESSURE: 144 MMHG | DIASTOLIC BLOOD PRESSURE: 82 MMHG | WEIGHT: 241 LBS | OXYGEN SATURATION: 94 %

## 2020-10-16 DIAGNOSIS — F41.8 DEPRESSION WITH ANXIETY: ICD-10-CM

## 2020-10-16 DIAGNOSIS — Z96.89 S/P DEEP BRAIN STIMULATOR PLACEMENT: ICD-10-CM

## 2020-10-16 DIAGNOSIS — G20.A1 PARKINSON'S DISEASE (H): Primary | ICD-10-CM

## 2020-10-16 PROCEDURE — 99355 PR PROLONGED SERV,OFFICE,EA ADDL 30 MIN: CPT | Performed by: NURSE PRACTITIONER

## 2020-10-16 PROCEDURE — 99215 OFFICE O/P EST HI 40 MIN: CPT | Mod: 25 | Performed by: NURSE PRACTITIONER

## 2020-10-16 PROCEDURE — 95970 ALYS NPGT W/O PRGRMG: CPT | Performed by: NURSE PRACTITIONER

## 2020-10-16 PROCEDURE — 99354 PR PROLONGED SERV,OFFICE,1ST HR: CPT | Performed by: NURSE PRACTITIONER

## 2020-10-16 ASSESSMENT — UNIFIED PARKINSONS DISEASE RATING SCALE (UPDRS)
LEG_AGILITY_RIGHT: SLIGHT: ANY OF THE FOLLOWING: A) THE REGULAR RHYTHM IS BROKEN WITH ONE WITH ONE OR TWO INTERRUPTIONS OR HESITATIONS OF THE MOVEMENT B) SLIGHT SLOWING C) THE AMPLITUDE DECREMENTS NEAR THE END OF THE 10 MOVEMENTS.
TOTAL_SCORE_LEFT: 8
AMPLITUDE_LIP_JAW: NORMAL: NO TREMOR.
MOVEMENTS_INTERFERE_WITH_RATINGS: NO
FACIAL_EXPRESSION: MODERATE: MASKED FACIES WITH LIPS PARTED SOME OF THE TIME WHEN THE MOUTH IS AT REST.
MOVEMENTS_INTERFERE_WITH_RATINGS: NO
AMPLITUDE_LIP_JAW: NORMAL: NO TREMOR.
SPONTANEITY_OF_MOVEMENT: 2: MILD: MILD GLOBAL SLOWNESS AND POVERTY OF SPONTANEOUS MOVEMENTS.
AMPLITUDE_LLE: NORMAL: NO TREMOR.
GAIT: SLIGHT: INDEPENDENT WALKING WITH MINOR GAIT IMPAIRMENT.
POSTURE: 1 SLIGHT.  NOT QUITE ERECT BUT COULD BE NORMAL FOR OLDER PERSONS.
HANDMOVEMENTS_RIGHT: SLIGHT: ANY OF THE FOLLOWING: A) THE REGULAR RHYTHM IS BROKEN WITH ONE WITH ONE OR TWO INTERRUPTIONS OR HESITATIONS OF THE MOVEMENT B) SLIGHT SLOWING C) THE AMPLITUDE DECREMENTS NEAR THE END OF THE 10 MOVEMENTS.
RIGIDITY_LUE: SLIGHT: RIGIDITY ONLY DETECTED WITH ACTIVATION MANEUVER.
FINGER_TAPPING_LEFT: SLIGHT: ANY OF THE FOLLOWING: A) THE REGULAR RHYTHM IS BROKEN WITH ONE WITH ONE OR TWO INTERRUPTIONS OR HESITATIONS OF THE MOVEMENT B) SLIGHT SLOWING C) THE AMPLITUDE DECREMENTS NEAR THE END OF THE 10 MOVEMENTS.
FINGER_TAPPING_RIGHT: NORMAL
RIGIDITY_RUE: MILD: RIGIDITY DETECTED WITHOUT THE ACTIVATION MANEUVER.  FULL RANGE OF MOTION IS EASILY ACHIEVED.
CONSTANCY_TREMOR_ATREST: NORMAL: NO TREMOR.
HANDMOVEMENTS_RIGHT: SLIGHT: ANY OF THE FOLLOWING: A) THE REGULAR RHYTHM IS BROKEN WITH ONE WITH ONE OR TWO INTERRUPTIONS OR HESITATIONS OF THE MOVEMENT B) SLIGHT SLOWING C) THE AMPLITUDE DECREMENTS NEAR THE END OF THE 10 MOVEMENTS.
RIGIDITY_NECK: SLIGHT: RIGIDITY ONLY DETECTED WITH ACTIVATION MANEUVER.
AMPLITUDE_RLE: NORMAL: NO TREMOR.
FREEZING_GAIT: NORMAL
FINGER_TAPPING_RIGHT: NORMAL
DYSKINESIAS_PRESENT: NO
POSTURAL_STABILITY: MODERATE: STANDS SAFELY, BUT WITH ABSENCE OF POSTURAL RESPONSE,  FALLS IF NOT CAUGHT BY EXAMINER.
RIGIDITY_RUE: SLIGHT: RIGIDITY ONLY DETECTED WITH ACTIVATION MANEUVER.
LEG_AGILITY_RIGHT: SLIGHT: ANY OF THE FOLLOWING: A) THE REGULAR RHYTHM IS BROKEN WITH ONE WITH ONE OR TWO INTERRUPTIONS OR HESITATIONS OF THE MOVEMENT B) SLIGHT SLOWING C) THE AMPLITUDE DECREMENTS NEAR THE END OF THE 10 MOVEMENTS.
PRONATION_SUPINATION_LEFT: SLIGHT: ANY OF THE FOLLOWING: A) THE REGULAR RHYTHM IS BROKEN WITH ONE WITH ONE OR TWO INTERRUPTIONS OR HESITATIONS OF THE MOVEMENT B) SLIGHT SLOWING C) THE AMPLITUDE DECREMENTS NEAR THE END OF THE 10 MOVEMENTS.
SPONTANEITY_OF_MOVEMENT: 1: SLIGHT: SLIGHT GLOBAL SLOWNESS AND POVERTY OF SPONTANEOUS MOVEMENTS.
RIGIDITY_RLE: NORMAL
SPONTANEITY_OF_MOVEMENT: 2: MILD: MILD GLOBAL SLOWNESS AND POVERTY OF SPONTANEOUS MOVEMENTS.
HANDMOVEMENTS_LEFT: SLIGHT: ANY OF THE FOLLOWING: A) THE REGULAR RHYTHM IS BROKEN WITH ONE WITH ONE OR TWO INTERRUPTIONS OR HESITATIONS OF THE MOVEMENT B) SLIGHT SLOWING C) THE AMPLITUDE DECREMENTS NEAR THE END OF THE 10 MOVEMENTS.
MOVEMENTS_INTERFERE_WITH_RATINGS: NO
AXIAL_SCORE: 8
FINGER_TAPPING_RIGHT: MILD: ANY OF THE FOLLOWING: A) 3 TO 5 INTERRUPTIONS DURING TAPPING B) MILD SLOWING C) THE AMPLITUDE DECREMENTS MIDWAY IN THE 10-MOVEMENT SEQUENCE
POSTURE: 1 SLIGHT.  NOT QUITE ERECT BUT COULD BE NORMAL FOR OLDER PERSONS.
TOETAPPING_LEFT: NORMAL
SPEECH: SLIGHT: LOSS OF MODULATION, DICTION OR VOLUME, BUT STILL ALL WORDS EASY TO UNDERSTAND.
DYSKINESIAS_PRESENT: NO
AMPLITUDE_RLE: NORMAL: NO TREMOR.
RIGIDITY_NECK: SLIGHT: RIGIDITY ONLY DETECTED WITH ACTIVATION MANEUVER.
LEG_AGILITY_LEFT: SLIGHT: ANY OF THE FOLLOWING: A) THE REGULAR RHYTHM IS BROKEN WITH ONE WITH ONE OR TWO INTERRUPTIONS OR HESITATIONS OF THE MOVEMENT B) SLIGHT SLOWING C) THE AMPLITUDE DECREMENTS NEAR THE END OF THE 10 MOVEMENTS.
RIGIDITY_RLE: SLIGHT: RIGIDITY ONLY DETECTED WITH ACTIVATION MANEUVER.
TOTAL_SCORE: 4
SPEECH: SLIGHT: LOSS OF MODULATION, DICTION OR VOLUME, BUT STILL ALL WORDS EASY TO UNDERSTAND.
PRONATION_SUPINATION_LEFT: SLIGHT: ANY OF THE FOLLOWING: A) THE REGULAR RHYTHM IS BROKEN WITH ONE WITH ONE OR TWO INTERRUPTIONS OR HESITATIONS OF THE MOVEMENT B) SLIGHT SLOWING C) THE AMPLITUDE DECREMENTS NEAR THE END OF THE 10 MOVEMENTS.
ARISING_CHAIR: SLIGHT: ARISING IS SLOWER THAN NORMAL, OR MAY NEED MORE THAN ONE ATTEMPT, OR MAY NEED TO MOVE FORWARD IN THE CHAIR TO ARISE.  NO NEED TO USE THE ARMS OF THE CHAIR.
AMPLITUDE_LUE: NORMAL: NO TREMOR.
TOETAPPING_LEFT: SLIGHT: ANY OF THE FOLLOWING: A) THE REGULAR RHYTHM IS BROKEN WITH ONE WITH ONE OR TWO INTERRUPTIONS OR HESITATIONS OF THE MOVEMENT B) SLIGHT SLOWING C) THE AMPLITUDE DECREMENTS NEAR THE END OF THE 10 MOVEMENTS.
FINGER_TAPPING_LEFT: SLIGHT: ANY OF THE FOLLOWING: A) THE REGULAR RHYTHM IS BROKEN WITH ONE WITH ONE OR TWO INTERRUPTIONS OR HESITATIONS OF THE MOVEMENT B) SLIGHT SLOWING C) THE AMPLITUDE DECREMENTS NEAR THE END OF THE 10 MOVEMENTS.
AMPLITUDE_LLE: NORMAL: NO TREMOR.
FINGER_TAPPING_LEFT: SLIGHT: ANY OF THE FOLLOWING: A) THE REGULAR RHYTHM IS BROKEN WITH ONE WITH ONE OR TWO INTERRUPTIONS OR HESITATIONS OF THE MOVEMENT B) SLIGHT SLOWING C) THE AMPLITUDE DECREMENTS NEAR THE END OF THE 10 MOVEMENTS.
ARISING_CHAIR: SLIGHT: ARISING IS SLOWER THAN NORMAL, OR MAY NEED MORE THAN ONE ATTEMPT, OR MAY NEED TO MOVE FORWARD IN THE CHAIR TO ARISE.  NO NEED TO USE THE ARMS OF THE CHAIR.
POSTURAL_STABILITY: MODERATE: STANDS SAFELY, BUT WITH ABSENCE OF POSTURAL RESPONSE,  FALLS IF NOT CAUGHT BY EXAMINER.
AMPLITUDE_RLE: NORMAL: NO TREMOR.
TOETAPPING_RIGHT: NORMAL
LEG_AGILITY_LEFT: SLIGHT: ANY OF THE FOLLOWING: A) THE REGULAR RHYTHM IS BROKEN WITH ONE WITH ONE OR TWO INTERRUPTIONS OR HESITATIONS OF THE MOVEMENT B) SLIGHT SLOWING C) THE AMPLITUDE DECREMENTS NEAR THE END OF THE 10 MOVEMENTS.
RIGIDITY_LLE: SLIGHT: RIGIDITY ONLY DETECTED WITH ACTIVATION MANEUVER.
GAIT: SLIGHT: INDEPENDENT WALKING WITH MINOR GAIT IMPAIRMENT.
PRONATION_SUPINATION_RIGHT: SLIGHT: ANY OF THE FOLLOWING: A) THE REGULAR RHYTHM IS BROKEN WITH ONE WITH ONE OR TWO INTERRUPTIONS OR HESITATIONS OF THE MOVEMENT B) SLIGHT SLOWING C) THE AMPLITUDE DECREMENTS NEAR THE END OF THE 10 MOVEMENTS.
AXIAL_SCORE: 14
TOETAPPING_LEFT: SLIGHT: ANY OF THE FOLLOWING: A) THE REGULAR RHYTHM IS BROKEN WITH ONE WITH ONE OR TWO INTERRUPTIONS OR HESITATIONS OF THE MOVEMENT B) SLIGHT SLOWING C) THE AMPLITUDE DECREMENTS NEAR THE END OF THE 10 MOVEMENTS.
RIGIDITY_RUE: MILD: RIGIDITY DETECTED WITHOUT THE ACTIVATION MANEUVER.  FULL RANGE OF MOTION IS EASILY ACHIEVED.
CONSTANCY_TREMOR_ATREST: NORMAL: NO TREMOR.
LEG_AGILITY_RIGHT: SLIGHT: ANY OF THE FOLLOWING: A) THE REGULAR RHYTHM IS BROKEN WITH ONE WITH ONE OR TWO INTERRUPTIONS OR HESITATIONS OF THE MOVEMENT B) SLIGHT SLOWING C) THE AMPLITUDE DECREMENTS NEAR THE END OF THE 10 MOVEMENTS.
TOTAL_SCORE: 8
AXIAL_SCORE: 13
PRONATION_SUPINATION_RIGHT: SLIGHT: ANY OF THE FOLLOWING: A) THE REGULAR RHYTHM IS BROKEN WITH ONE WITH ONE OR TWO INTERRUPTIONS OR HESITATIONS OF THE MOVEMENT B) SLIGHT SLOWING C) THE AMPLITUDE DECREMENTS NEAR THE END OF THE 10 MOVEMENTS.
AMPLITUDE_RUE: NORMAL: NO TREMOR.
GAIT: SLIGHT: INDEPENDENT WALKING WITH MINOR GAIT IMPAIRMENT.
TOTAL_SCORE: 9
ARISING_CHAIR: NORMAL: ABLE TO ARISE QUICKLY WITHOUT HESITATION.
HANDMOVEMENTS_LEFT: SLIGHT: ANY OF THE FOLLOWING: A) THE REGULAR RHYTHM IS BROKEN WITH ONE WITH ONE OR TWO INTERRUPTIONS OR HESITATIONS OF THE MOVEMENT B) SLIGHT SLOWING C) THE AMPLITUDE DECREMENTS NEAR THE END OF THE 10 MOVEMENTS.
FACIAL_EXPRESSION: MODERATE: MASKED FACIES WITH LIPS PARTED SOME OF THE TIME WHEN THE MOUTH IS AT REST.
FINGER_TAPPING_RIGHT: SLIGHT: ANY OF THE FOLLOWING: A) THE REGULAR RHYTHM IS BROKEN WITH ONE WITH ONE OR TWO INTERRUPTIONS OR HESITATIONS OF THE MOVEMENT B) SLIGHT SLOWING C) THE AMPLITUDE DECREMENTS NEAR THE END OF THE 10 MOVEMENTS.
PARKINSONS_MEDS: OFF
AMPLITUDE_LLE: NORMAL: NO TREMOR.
HANDMOVEMENTS_RIGHT: MILD: ANY OF THE FOLLOWING: A) 3 TO 5 INTERRUPTIONS DURING TAPPING B) MILD SLOWING C) THE AMPLITUDE DECREMENTS MIDWAY IN THE 10-MOVEMENT SEQUENCE
SPEECH: MILD: LOSS OF MODULATION, DICTION OR VOLUME, WITH A FEW WORDS UNCLEAR, BUT THE OVERALL SENTENCES EASY TO FOLLOW.
TOETAPPING_LEFT: NORMAL
RIGIDITY_LUE: NORMAL
AMPLITUDE_LUE: NORMAL: NO TREMOR.
RIGIDITY_LLE: NORMAL
LEG_AGILITY_LEFT: SLIGHT: ANY OF THE FOLLOWING: A) THE REGULAR RHYTHM IS BROKEN WITH ONE WITH ONE OR TWO INTERRUPTIONS OR HESITATIONS OF THE MOVEMENT B) SLIGHT SLOWING C) THE AMPLITUDE DECREMENTS NEAR THE END OF THE 10 MOVEMENTS.
PRONATION_SUPINATION_RIGHT: SLIGHT: ANY OF THE FOLLOWING: A) THE REGULAR RHYTHM IS BROKEN WITH ONE WITH ONE OR TWO INTERRUPTIONS OR HESITATIONS OF THE MOVEMENT B) SLIGHT SLOWING C) THE AMPLITUDE DECREMENTS NEAR THE END OF THE 10 MOVEMENTS.
AMPLITUDE_RUE: NORMAL: NO TREMOR.
SPEECH: MILD: LOSS OF MODULATION, DICTION OR VOLUME, WITH A FEW WORDS UNCLEAR, BUT THE OVERALL SENTENCES EASY TO FOLLOW.
PRONATION_SUPINATION_RIGHT: SLIGHT: ANY OF THE FOLLOWING: A) THE REGULAR RHYTHM IS BROKEN WITH ONE WITH ONE OR TWO INTERRUPTIONS OR HESITATIONS OF THE MOVEMENT B) SLIGHT SLOWING C) THE AMPLITUDE DECREMENTS NEAR THE END OF THE 10 MOVEMENTS.
TOTAL_SCORE_LEFT: 5
FACIAL_EXPRESSION: MILD: IN ADDITION TO DECREASED EYE-BLINK FREQUENCY, MASKED FACIES PRESENT IN THE LOWER FACE AS WELL, NAMELY FEWER MOVEMENTS AROUND THE MOUTH, SUCH AS LESS SPONTANEOUS SMILING, BUT LIPS NOT PARTED.
CONSTANCY_TREMOR_ATREST: NORMAL: NO TREMOR.
FREEZING_GAIT: NORMAL
AMPLITUDE_LLE: NORMAL: NO TREMOR.
FACIAL_EXPRESSION: MODERATE: MASKED FACIES WITH LIPS PARTED SOME OF THE TIME WHEN THE MOUTH IS AT REST.
DYSKINESIAS_PRESENT: NO
RIGIDITY_RLE: SLIGHT: RIGIDITY ONLY DETECTED WITH ACTIVATION MANEUVER.
AMPLITUDE_LUE: NORMAL: NO TREMOR.
HANDMOVEMENTS_RIGHT: MILD: ANY OF THE FOLLOWING: A) 3 TO 5 INTERRUPTIONS DURING TAPPING B) MILD SLOWING C) THE AMPLITUDE DECREMENTS MIDWAY IN THE 10-MOVEMENT SEQUENCE
TOTAL_SCORE: 6
FREEZING_GAIT: NORMAL
CONSTANCY_TREMOR_ATREST: NORMAL: NO TREMOR.
PARKINSONS_MEDS: ON
TOTAL_SCORE: 22
FINGER_TAPPING_LEFT: MILD: ANY OF THE FOLLOWING: A) 3 TO 5 INTERRUPTIONS DURING TAPPING B) MILD SLOWING C) THE AMPLITUDE DECREMENTS MIDWAY IN THE 10-MOVEMENT SEQUENCE
TOTAL_SCORE_LEFT: 6
TOETAPPING_RIGHT: NORMAL
PRONATION_SUPINATION_LEFT: MILD: ANY OF THE FOLLOWING: A) 3 TO 5 INTERRUPTIONS DURING TAPPING B) MILD SLOWING C) THE AMPLITUDE DECREMENTS MIDWAY IN THE 10-MOVEMENT SEQUENCE
AMPLITUDE_RUE: NORMAL: NO TREMOR.
TOTAL_SCORE: 17
TOETAPPING_RIGHT: NORMAL
TOTAL_SCORE_LEFT: 11
RIGIDITY_LLE: SLIGHT: RIGIDITY ONLY DETECTED WITH ACTIVATION MANEUVER.
TOTAL_SCORE: 27
PARKINSONS_MEDS: OFF
FREEZING_GAIT: NORMAL
AMPLITUDE_LUE: NORMAL: NO TREMOR.
SPONTANEITY_OF_MOVEMENT: 1: SLIGHT: SLIGHT GLOBAL SLOWNESS AND POVERTY OF SPONTANEOUS MOVEMENTS.
LEG_AGILITY_RIGHT: SLIGHT: ANY OF THE FOLLOWING: A) THE REGULAR RHYTHM IS BROKEN WITH ONE WITH ONE OR TWO INTERRUPTIONS OR HESITATIONS OF THE MOVEMENT B) SLIGHT SLOWING C) THE AMPLITUDE DECREMENTS NEAR THE END OF THE 10 MOVEMENTS.
HANDMOVEMENTS_LEFT: SLIGHT: ANY OF THE FOLLOWING: A) THE REGULAR RHYTHM IS BROKEN WITH ONE WITH ONE OR TWO INTERRUPTIONS OR HESITATIONS OF THE MOVEMENT B) SLIGHT SLOWING C) THE AMPLITUDE DECREMENTS NEAR THE END OF THE 10 MOVEMENTS.
RIGIDITY_NECK: SLIGHT: RIGIDITY ONLY DETECTED WITH ACTIVATION MANEUVER.
RIGIDITY_LLE: SLIGHT: RIGIDITY ONLY DETECTED WITH ACTIVATION MANEUVER.
PARKINSONS_MEDS: ON
AMPLITUDE_LIP_JAW: NORMAL: NO TREMOR.
PRONATION_SUPINATION_LEFT: SLIGHT: ANY OF THE FOLLOWING: A) THE REGULAR RHYTHM IS BROKEN WITH ONE WITH ONE OR TWO INTERRUPTIONS OR HESITATIONS OF THE MOVEMENT B) SLIGHT SLOWING C) THE AMPLITUDE DECREMENTS NEAR THE END OF THE 10 MOVEMENTS.
RIGIDITY_RUE: MILD: RIGIDITY DETECTED WITHOUT THE ACTIVATION MANEUVER.  FULL RANGE OF MOTION IS EASILY ACHIEVED.
TOTAL_SCORE: 34
POSTURAL_STABILITY: NORMAL:  RECOVERS WITH ONE OR TWO STEPS.
RIGIDITY_LUE: SLIGHT: RIGIDITY ONLY DETECTED WITH ACTIVATION MANEUVER.
RIGIDITY_LUE: SLIGHT: RIGIDITY ONLY DETECTED WITH ACTIVATION MANEUVER.
MOVEMENTS_INTERFERE_WITH_RATINGS: NO
AXIAL_SCORE: 8
AMPLITUDE_RUE: NORMAL: NO TREMOR.
POSTURE: 1 SLIGHT.  NOT QUITE ERECT BUT COULD BE NORMAL FOR OLDER PERSONS.
HANDMOVEMENTS_LEFT: MILD: ANY OF THE FOLLOWING: A) 3 TO 5 INTERRUPTIONS DURING TAPPING B) MILD SLOWING C) THE AMPLITUDE DECREMENTS MIDWAY IN THE 10-MOVEMENT SEQUENCE
GAIT: SLIGHT: INDEPENDENT WALKING WITH MINOR GAIT IMPAIRMENT.
RIGIDITY_NECK: SLIGHT: RIGIDITY ONLY DETECTED WITH ACTIVATION MANEUVER.
DYSKINESIAS_PRESENT: NO
POSTURAL_STABILITY: NORMAL:  RECOVERS WITH ONE OR TWO STEPS.
TOETAPPING_RIGHT: NORMAL
AMPLITUDE_RLE: NORMAL: NO TREMOR.
POSTURE: 1 SLIGHT.  NOT QUITE ERECT BUT COULD BE NORMAL FOR OLDER PERSONS.
LEG_AGILITY_LEFT: SLIGHT: ANY OF THE FOLLOWING: A) THE REGULAR RHYTHM IS BROKEN WITH ONE WITH ONE OR TWO INTERRUPTIONS OR HESITATIONS OF THE MOVEMENT B) SLIGHT SLOWING C) THE AMPLITUDE DECREMENTS NEAR THE END OF THE 10 MOVEMENTS.
ARISING_CHAIR: NORMAL: ABLE TO ARISE QUICKLY WITHOUT HESITATION.
AMPLITUDE_LIP_JAW: NORMAL: NO TREMOR.
RIGIDITY_RLE: SLIGHT: RIGIDITY ONLY DETECTED WITH ACTIVATION MANEUVER.

## 2020-10-16 ASSESSMENT — PAIN SCALES - GENERAL: PAINLEVEL: NO PAIN (0)

## 2020-10-16 NOTE — PROGRESS NOTES
MOVEMENT DISORDERS CLINIC    PATIENT: Erika Diaz    : 1960    GINNY: 2020    REASON FOR VISIT:  Pre-second side DBS ON/OFF motor symptom evaluation.      HPI: Ms. Erika Diaz is a 60 year old right - handed  female who came to the [facility] neurology clinic by herself for ON/OFF motor evaluation as part of her 2nd side (left brain/ right body) Deep Brain Stimulation surgery work-up for management of Parkinson's disease.     She was diagnosed with Idiopathic Parkinson's disease in  - .    Patient was teary and crying on and off during today's visit. Crying occurred during off period.      About three weeks, her  was given a diagnosis of lung cancer.  He was diagnosed with liver cancer in this summer and had surgery.  He was told that with or without chemo, he had a year to live.  He was supposed to have a lung biopsy, but he fell last week due to a stoke and was hospitalized.  He is now at home.  Her  still drinks alcohol and smokes.     She takes Lorazepam 0.5 mg about every other day. She reports mood is manageable.  She has crying episodes in the morning almost daily.  She has a counseling appointment with Dr. Rios on 10/19 with and Dr. Lancaster on 10/20 per her PCP.  Dr. Lancaster's appointment was recommended due to the psychosocial stressor about her .     She hasn't been taking Zofran for nausea in a long time.  She doesn't recall the last time she took it.      She is getting a workup for diabetes.  She has a virtual visit with a rheumatologist for arthritis pain.  Yesterday, Oct 15, she got a steroid injection in her left hip due to pain - bursitis.     Events:   Dry cough is from pills. She has it occasionally.  BPPV -- resolved.  Right knee pain -- Not bothersome. Resolved.    Right lower extremities and ankle tightness present.      Current antiparkinsonian medication:     PD Medications 7 am 12 pm 4 pm 9 pm   Sinemet 25/100 mg  2 2 2 2  "  Amantadine 100 mg 1   1     Pramipexole 0.5 mg       2   Gabapentin 100 mg     2     Buspar 10 mg  1 1  1        Last dose of antiparkinsonian medication was taken:     Sinemet 25/100 mg -- 10/15 at 4 pm  Amantadine 100 mg --  10/14 4 am  Pramipexole 0.5 mg 2 tabs on 10/14 at 9 pm    Goal for DBS:  \"I don't like the shakes.\"  She reports morning shakes and anxiety are bothersome.   She reports her quality of life after the 1st surgery.  She wants to be there for the grandchildren kids after her  passes.     MEDICATION  Outpatient Medications Marked as Taking for the 10/16/20 encounter (Office Visit) with Arminda Rivas APRN CNP   Medication Sig     Acetaminophen (TYLENOL PO) Take 1,000 mg by mouth every 8 hours as needed for mild pain or fever     amantadine (SYMMETREL) 100 MG capsule 1 capsule orally @ 7am and 4pm     aspirin 81 MG EC tablet Take 81 mg by mouth At Bedtime      busPIRone (BUSPAR) 10 MG tablet Take 1 tablet (10 mg) by mouth 3 times daily     calcium carbonate (TUMS) 500 MG chewable tablet Take 2 chew tab by mouth as needed for heartburn     carbidopa-levodopa (SINEMET)  MG tablet 2 @ 7, 12 pm, 4 pm and 9 pm = 8 tabs per day     FLUoxetine (PROZAC) 20 MG capsule Take 20 mg by mouth every morning @ 7am     gabapentin (NEURONTIN) 100 MG capsule Take 2 capsules (200 mg) by mouth daily     hydrochlorothiazide (HYDRODIURIL) 25 MG tablet Take 25 mg by mouth At Bedtime @9 pm     LORazepam (ATIVAN) 0.5 MG tablet Take 1 tablet (0.5 mg) by mouth daily as needed for anxiety or sleep     pramipexole (MIRAPEX) 0.5 MG tablet 2 tabs orally @ 9 pm     rosuvastatin (CRESTOR) 5 MG tablet Take 5 mg by mouth daily         Physical Exam:    Vital Signs:  Blood pressure (!) 162/84, pulse 79, SpO2 95 %.      Repeat: Blood pressure (!) 144/82, pulse 64, temperature 98.2  F (36.8  C), resp. rate 18, weight 109.3 kg (241 lb), SpO2 94 %.      DBS Interrogation & Analysis:     Implanted generator:  Right " chest Infinity [id number]  Lead placement:  Right Globus Pallidus Internus (Gpi).     Right Brain   Lead placement date:  2018  Generator placement date:  2018     C +, 10abc -  Current:  3.80 mA  PW:  60 msec  Rate:  130 Hz     Therapy impedance:  912 Ohms     Battery Service Life:  OK.  2.96 volts.     Patient :  Upper Limit: 3.80 mA  Lower Limit: 3.00 mA     Electrode Impedance Check:   Right Lead: No Problems Found.       See scanned report for impedance details.    DBS was turned OFF at 8:33 am.    At 9:15 am, she took   Sinemet 25/100 mg 2 tablets  Amantadine 100 mg 1 tab  Pramipexole 0.5 mg 2 tabs  Gabapentin 100 mg 1 cap    DBS was turned ON at 10:10 am.      Videotaping Consent Obtained:  Yes.    MDS UPDRS Part I      -- Over the last week -- 0: Normal -- 1: Slight -- 2: Mild -- 3: Moderate -- 4: Severe  1.1 Cognitive Impairment: 0   1.2 Hallucinations and psychosis: 0   1.3 Depressed mood: 1   1.4 Anxious mood: 1   1.5 Apathy:  0   1.6 Features of DDS:  1   1.7 Sleep problems:  3   1.8 Daytime sleepiness:  2   1.9 Pain and other sensations:  1   1.10 Urinary problems:  3   1.11 Constipation problems:   2   1.12 Lightheadedness on standin   1.13 Fatigue:  0     Total:    14     MDS Part II  -- Total Score: 14     -- Over the last week -- 0: Normal -- 1: Slight -- 2: Mild -- 3: Moderate -- 4: Severe     2.1 Speech: 2   2.2 Saliva and droolin   2.3 Chewing and swallowin   2.4 Eating tasks: 0   2.5 Dressin   2.6 Hygiene:  0   2.7 Handwritin   2.8 Doing hobbies and other activities:  0   2.9 Turning in bed:  1   2.10 Tremor:  1   2.11 Getting out of bed/car/deep chair:   1   2.12 Walking and balance: 1   2.13 Freezin     Total:    8        MOTOR TEST: UPDRS Flowsheet was completed in Epic.  Exam was videotaped.   UPDRS Values 10/16/2020 10/16/2020 10/16/2020 10/16/2020   Time: 8:17 AM 9:03 AM 10:00 AM 10:18 AM   Medication Off Off On On   R Brain DBS: On Off Off On   L  Brain DBS: None None None None   Dyskinesia (LID) No No No No   Did LID interfere No No No No   Speech 2 2 1 1   Facial Expression 2 3 3 3   Rigidity Neck 1 1 1 1   Rigidity RUE 2 2 2 1   Rigidity LUE 1 1 1 0   Rigidity RLE 1 1 1 0   Rigidity LLE 1 1 1 0   Finger Taps R 1 2 0 0   Finger Taps L 1 2 1 1   Hand Mvt R 2 2 1 1   Hand Mvt L 1 2 1 1   Pron-/Supinate R 1 1 1 1   Pron-/Supinate L 1 2 1 1   Toe Tap R 0 0 0 0   Toe Tap L 0 1 1 0   Leg Agility R 1 1 1 1   Leg Agility L 1 1 1 1   Arise From Chair 1 1 0 0   Gait 1 1 1 1   Gait Freezing 0 0 0 0   Postural Stability 3 3 0 0   Posture 1 1 1 1   Global Spont Mvt 2 2 1 1   Postural Tremor RUE 0 0 0 0   Postural Tremor LUE 0 0 0 0   Kinetic Tremor RUE 0 0 0 0   Kinetic Tremor LUE 0 1 1 1   Rest Tremor RUE 0 0 0 0   Rest Tremor LUE 0 0 0 0   Rest Tremor RLE 0 0 0 0   Rest Tremor LLE 0 0 0 0   Rest Tremor Lip/Jaw 0 0 0 0   Rest Tremor Constancy 0 0 0 0   Total Right 8 9 6 4   Total Left 6 11 8 5   Axial Total 13 14 8 8   Total 27 34 22 17       Total OFF Medication & OFF Stimulation score = 34  Total OFF Medication & ON Stimulation score = 27    Percentage: 34 - 27 = 7 --> 7 ÷ 37 = 20.6 = 21 % motor improvement JUST from STIMULATION.     Total OFF Medication & OFF Stimulation score = 34  Total ON Medication & OFF Stimulation score = 22    Percentage: 34 - 22 = 12 --> 12 ÷ 34 = 35.3 = 35 % motor improvement JUST from MEDICATION.     Total OFF Medication & OFF Stimulation score = 34   Total ON Medication & ON Stimulation score = 14    Percentage: 34 - 14 = 20 --> 20 ÷ 34 = 58.8 = 59 % motor  improvement from BOTH; STIMULATION &       ASSESSMENT/PLAN:    Parkinson's Disease: Ms. Diaz is a 60 year old right - handed female with about 12 year history of Idiopathic Parkinson's disease who came to the clinic for ON/OFF motor evaluation as part of her second side Deep Brain Stimulation surgery work-up.    Depression/Anxiety:  Per patient, this is stable.  She has morning  crying episodes.     __  Pt had good knowledge about DBS.      __  Concerns about her 's cancer diagnosis and time he was given was discussed.  Patient is determined to have the 2nd side surgery to be there for her grandchildren also to respect his wishes.  All questions were answered.    __  She'll see Dr. Concepcion and the two psychologists next week.     __ She was informed that we'll contact her after we discuss her work-up at the DBS Consensus Meeting.  She understands the plan.    The total time spent with the patient in ON/OFF motor evaluation & discussing about mood & DBS surgery was 150 minutes.  20 minutes was for DBS interrogation and analysis.  Greater than 50% of the time was spent in counseling & coordination of care.    GUILLERMINA Krueger, CNP   [facility]    8:01 AM  10:31 AM

## 2020-10-16 NOTE — PROGRESS NOTES
Consent for Photo, Filming and Interviewing, was ,mailed to [address] [facility] [id number] [place] [address]

## 2020-10-16 NOTE — PATIENT INSTRUCTIONS
__  You have completed the ON/OFF Motor Assessment.    __  Continue with your DBS workup appointments.  __  Once you're done with your workup, we'll discuss you at the DBS Consensus meeting & will let you know the decision.  __  You may contact us with any question.

## 2020-10-19 ENCOUNTER — OFFICE VISIT (OUTPATIENT)
Dept: NEUROSURGERY | Facility: CLINIC | Age: 60
End: 2020-10-19
Payer: MEDICARE

## 2020-10-19 ENCOUNTER — TELEPHONE (OUTPATIENT)
Dept: NEUROLOGY | Facility: CLINIC | Age: 60
End: 2020-10-19

## 2020-10-19 ENCOUNTER — VIRTUAL VISIT (OUTPATIENT)
Dept: PSYCHOLOGY | Facility: CLINIC | Age: 60
End: 2020-10-19
Attending: NURSE PRACTITIONER
Payer: MEDICARE

## 2020-10-19 VITALS
DIASTOLIC BLOOD PRESSURE: 78 MMHG | SYSTOLIC BLOOD PRESSURE: 136 MMHG | OXYGEN SATURATION: 98 % | BODY MASS INDEX: 40.1 KG/M2 | WEIGHT: 241 LBS | RESPIRATION RATE: 20 BRPM | HEART RATE: 60 BPM

## 2020-10-19 DIAGNOSIS — Z96.89 S/P DEEP BRAIN STIMULATOR PLACEMENT: ICD-10-CM

## 2020-10-19 DIAGNOSIS — E66.01 MORBID OBESITY (H): ICD-10-CM

## 2020-10-19 DIAGNOSIS — G20.A1 PARKINSON'S DISEASE (H): Primary | ICD-10-CM

## 2020-10-19 PROCEDURE — 99215 OFFICE O/P EST HI 40 MIN: CPT | Performed by: NEUROLOGICAL SURGERY

## 2020-10-19 PROCEDURE — 99207 PR NO BILLABLE SERVICE THIS VISIT: CPT | Performed by: PSYCHOLOGIST

## 2020-10-19 NOTE — PROGRESS NOTES
Health Psychology                     Department of Medicine  Cris Campoverde, Ph.D., L.P. [phone]                          [facility] Carli Nesbitt, Ph.D., L.P. [phone]                   [place] Mail Code 480   Ayesha Shravan, Ph.D.  [phone]       [address] [place] [address] Darlene Starks, Ph.D.,  L.P. [phone]        [place] [address]           Regino Marshall, Ph.D. [phone]      Makenzie Cooley, Ph.D., L.P. [phone]    [facility]   Marcos Rios, Ph.D., A.B.P.P., L.P. [phone]  [address] [place] [address] Aimee Lowe, Ph.D., L.P. [phone]       Confidential Summary of Health Psychology Telemental Health Evaluation*    Referral Source:  Neurology    Reason for Referral: Ms. Diaz is considering having a second DBS surgery on the left side and has encountered significant stressors at home including the cancer diagnosis and her  and his recent stroke.      She recently underwent neuropsychological evaluation with Dr. Amanda Hernandez, the results of which are still pending.  She is unsure why she was referred to see me today.  We spent approximately 40 minutes trying to determine this.  She was unsure who referred her and what the nature of the referral was.  She thought it had to do with an evaluation of her candidacy for DBS.  This seemed somewhat atypical to me given that she is already being evaluated by Dr. Bautista for that purpose.  It does seem that there was concern by some member(s) of the DBS team with her potential need for additional support in light of the multiple stressors.    In reviewing her chart, it was evident that she had seen my colleague in the past, Aimee Lowe, PhD.  More recently, she had started to see a  at her primary care clini in [facility], Natalia Lancaster.  In light of this, I was uncertain what my role would be in meeting with her, or whether she was already covered by the services getting locally, or perhaps  she should see somebody else at the [facility], namely Dr. Lowe.  My efforts to contact Shanice Coyne and Dr. Lowe have not yet been successful.    Rather than ask Ms. Osorio reviewed to provide history and go through an evaluation, we determined that we should clarify the referral and the availability of Dr. Lowe to see her.  We will plan on recontacting her this week to provide greater clarity and develop prasanth focused  plan.    Diagnosis: No diagnosis    This telehealth service is appropriate and effective for delivering services in light of the necessity for social distancing to mitigate the COVID-19 epidemic and for conservation of PPE.     Patient has agreed to receiving telehealth services after being informed about it: Yes    Patient prefers video invitation/information to be sent by:   email    Time service started 8:03:  Time service ended:8:45     Mode of transmission: Reapplix    Location of originating:  Home of the patient    Distance site:  Home office of provider for [facility]    The patient has been notified that:  Video visits will be conducted via a call with their psychologist to provide the care they need with a video conversation. Video visits may be billed at different rates depending on insurance coverage.  Patients are advised to please contact their insurance provider with any questions about their health insurance coverage. If during the course of a call the psychologist feels a video visit is not appropriate, patients will not be charged for this service.    Recommendations/Plan:  We will ascertain the nature of the referral (i.e, evaluation for DBS vs. xounseling to deal with her multiple stressors)  and whether she should see Dr. Lowe, who she has seen before, or me, or whether the local therapist she recently established care with can meet her needs..     Marcos Rios, Ph.D., A.B.P.P., L.P.  Director, Health Psychology

## 2020-10-19 NOTE — TELEPHONE ENCOUNTER
"                                                      Deep Brain Stimulation Surgery Surgical Candidacy Form    Referring Provider: Dr. Stratton/Karina Rivas      Patient Information: Lives in [place], [place]  Name: Erika Diaz  YOB: 1960  Age: 60 year old  Height: 5'5\"  Weight: 241 lbs  BMI: 40.09  Blood Pressure: 136/78  Diagnosis: Parkinson's disease  Age of Onset of Symptoms:48 y.o. Year of Onset of Symptoms: 2008    S/p R Gpi 8/21/2018    Age of Diagnosis: 48 Year of Diagnosis: [id number]  Handedness: Right.  Side of Onset: Left upper extremity.     Disease Features: Tremor     Surgery Goals:      \"I don't like the shakes.\"  She reports morning shakes and anxiety are bothersome.   She reports her quality of life after the 1st surgery.  She wants to be there for the grandchildren kids after her  passes.      Medications: As of 10/19/20    PD Medications 7 am 12 pm 4 pm 9 pm   Sinemet 25/100 mg  2 2 2 2   Amantadine 100 mg 1   1     Pramipexole 0.5 mg       2   Gabapentin 100 mg      2      Buspar 10 mg  1 1   1        Current Outpatient Medications   Medication Sig Dispense Refill     Acetaminophen (TYLENOL PO) Take 1,000 mg by mouth every 8 hours as needed for mild pain or fever       amantadine (SYMMETREL) 100 MG capsule 1 capsule orally @ 7am and 4pm 180 capsule 3     aspirin 81 MG EC tablet Take 81 mg by mouth At Bedtime        busPIRone (BUSPAR) 10 MG tablet Take 1 tablet (10 mg) by mouth 3 times daily 270 tablet 3     calcium carbonate (TUMS) 500 MG chewable tablet Take 2 chew tab by mouth as needed for heartburn       carbidopa-levodopa (SINEMET)  MG tablet 2 @ 7, 12 pm, 4 pm and 9 pm = 8 tabs per day 720 tablet 3     FLUoxetine (PROZAC) 20 MG capsule Take 20 mg by mouth every morning @ 7am 90 capsule 3     gabapentin (NEURONTIN) 100 MG capsule Take 2 capsules (200 mg) by mouth daily 180 capsule 3     hydrochlorothiazide (HYDRODIURIL) 25 MG tablet Take 25 mg by mouth At " Bedtime @9 pm       LORazepam (ATIVAN) 0.5 MG tablet Take 1 tablet (0.5 mg) by mouth daily as needed for anxiety or sleep 30 tablet 5     pramipexole (MIRAPEX) 0.5 MG tablet 2 tabs orally @ 9 pm 180 tablet 3     rosuvastatin (CRESTOR) 5 MG tablet Take 5 mg by mouth daily          Motor Assessment:  Total OFF Medication & OFF Stimulation score = 34  Total OFF Medication & ON Stimulation score = 27     Percentage: 34 - 27 = 7 --> 7 ÷ 37 = 20.6 = 21 % motor improvement JUST from STIMULATION.      Total OFF Medication & OFF Stimulation score = 34  Total ON Medication & OFF Stimulation score = 22     Percentage: 34 - 22 = 12 --> 12 ÷ 34 = 35.3 = 35 % motor improvement JUST from MEDICATION.      Total OFF Medication & OFF Stimulation score = 34   Total ON Medication & ON Stimulation score = 14     Percentage: 34 - 14 = 20 --> 20 ÷ 34 = 58.8 = 59 % motor  improvement from BOTH; STIMULATION &       Neuropsychological Evaluation:    Cognitive Issues: Results indicate impairments in learning and retrieval of learned information. Performance otherwise falls within normal limits across cognitive domains, generally in the average to above average range. She has had declines in learning and retrieval since her 8/28/2019 evaluation. Notably, her baseline evaluation on 12/12/2017 was characterized by variability in attention and memory. Performance improved by the 2019 evaluation, and still remains above where it was at her baseline in 2017.    Psychiatric Issues: She has a history of generalized anxiety disorder and panic attacks. Her  was recently diagnosed with lung cancer, and was told that he has one year to live. Unfortunately, he subsequently experienced a stroke. He has recently returned home. They have had discussion about whether she should proceed with surgery now, and she indicated that they are in agreement that she should proceed, so that she can be more active in the next year. She endorses mild anxiety and  apathy, but does not endorse significant depressive symptomatology. She has recently established care with a psychologist, and has met with Dr. Rios in the past as well.    Depression: No depression.Mild anxiety and apathy    Cognitive Function: Mild memory difficulties or frontal deficits.    Psychosis: No hallucinations.     MRI Date: 9/23/2020    Impression:  1. Postsurgical changes of right frontal approach deep brain  stimulator placement with the tip near the right globus pallidus.   2. Limited imaging primarily for the purposes of stereotactic  localization.      PMH: -  Past Medical History:   Diagnosis Date     Benign paroxysmal positional vertigo of right ear      Degenerative joint disease 11/07/2017     Encounter for neuropsychological testing 12/20/2017    Current results indicate variability in attention and memory, ranging from moderately impaired to superior, in some cases with greater difficulty on less complex tasks. Basic language, visual processing, and executive functioning fall within normal limits. Personality assessment is suggestive of somatization, and also raises the possibility of a thought disorder as well as a history of manic episo     JASON (generalized anxiety disorder)      History of colonic polyps 11/07/2017     HTN (hypertension) 11/07/2017     Hypercholesterolemia 11/07/2017     Lactose intolerance in adult 11/07/2017     Migraines      Obesity 11/07/2017     Parkinson disease (H)      PID (pelvic inflammatory disease) due to IUD 11/07/2017     Pulmonary emboli (H) 01/20/2019    Post Right Ankel Surgery     Pulmonary emboli (H) - coumadin lovenox 11/07/2017    provoked after knee replacement     Sensorineural hearing loss (SNHL) of right ear 12/2018     Trochanteric bursitis of left hip      Past Surgical History:   Procedure Laterality Date     adhesioloysis       CHOLECYSTECTOMY       COLONOSCOPY       IMPLANT DEEP BRAIN STIMULATION GENERATOR / BATTERY Right 8/30/2018     Procedure: IMPLANT DEEP BRAIN STIMULATION GENERATOR / BATTERY;  Deep Brain Stimulator Placement, Phase II, Placement Of Deep Brain Stimulator Generator/Battery Over The Right Chest Wall;  Surgeon: Jerry Concepcion MD;  Location: [facility] OR     JOINT REPLACEMENT Right     right partial knee replacement     LAPAROSCOPY      adhesioloysis     LAPAROSCOPY      supracervical hysterectomy     LAPAROTOMY EXPLORATORY Left     partial removal of left ovary     OPTICAL TRACKING SYSTEM INSERTION DEEP BRAIN STIMULATION Right 8/21/2018    Procedure: OPTICAL TRACKING SYSTEM INSERTION DEEP BRAIN STIMULATION;  Stealth Assisted Right Deep Brain Stimulator Placement, Phase I, Placement Of Right Side Deep Brain Stimulator Electrode, Target Right Globus Pallidus Internus With Microelectrode Recording;  Surgeon: Jerry Concepcion MD;  Location: [facility] OR     REMOVE INTRAUTERINE DEVICE  2006     salpingoopherectomy       XR FOOT SURGERY SENG RIGHT Right 01/18/2019     Soc Hx: current  illness     Family Hx of Movement Disorders: unknown    Consult Dr. Elver Stratton/Jerry Concepcion     Patient discussed at conference on: 10/22/20       DBS Meeting Notes/Further Work-up Needed: -  1. Candidate for LGpi  2. [employer] Infinity 5  3. Research? - Dr. Concepcion to put in orders after Shanice determines what patient wants to participate in   4. We are treating bradykinesia, rigidity and inner tremor. She has predictable motor fluctuations and a medication side effect of nausea.

## 2020-10-19 NOTE — PROGRESS NOTES
"NEUROSURGERY    HISTORY AND PHYSICAL EXAM    Chief Complaint   Patient presents with     DBS Programming     [facility] RETURN DBS WORK UP     Parkinson       HISTORY OF PRESENT ILLNESS  Ms. Erika Diaz returns today for second side DBS workup.  Patient is s/p right side deep brain stimulator placement, phase I, placement of right side deep brain stimulator electrode placement, target right globus pallidus internus, with microelectrode recording on 8/21/2018 and s/p deep brain stimulator placement, phase II, placement of deep brain stimulator generator/battery over the right chest wall on 8/30/2018.  She also participated in our research protocol where she had her DBS system externalized between her phase I and phase II surgery.  She did well with the surgery and she had no complications.  She did well with the right side implantation and she is getting good therapeutic benefit from the device.  No issues with the device is reported.  Since her first side DBS surgery, she has had an ankle surgery.  She is again feeling her \"inside shakes\".  She is right handed and her handwriting is getting worse.  Her PD medications have been increased as well.  She is interested in having the left side implanted for right side body control.  Patient also reports that her  was recently diagnosed with liver cancer with metastasis to the lungs.        Past Medical History:   Diagnosis Date     Benign paroxysmal positional vertigo of right ear      Degenerative joint disease 11/07/2017     Encounter for neuropsychological testing 12/20/2017    Current results indicate variability in attention and memory, ranging from moderately impaired to superior, in some cases with greater difficulty on less complex tasks. Basic language, visual processing, and executive functioning fall within normal limits. Personality assessment is suggestive of somatization, and also raises the possibility of a thought disorder as well as a history of manic " episo     JASON (generalized anxiety disorder)      History of colonic polyps 11/07/2017     HTN (hypertension) 11/07/2017     Hypercholesterolemia 11/07/2017     Lactose intolerance in adult 11/07/2017     Migraines      Obesity 11/07/2017     Parkinson disease (H)      PID (pelvic inflammatory disease) due to IUD 11/07/2017     Pulmonary emboli (H) 01/20/2019    Post Right Ankel Surgery     Pulmonary emboli (H) - coumadin lovenox 11/07/2017    provoked after knee replacement     Sensorineural hearing loss (SNHL) of right ear 12/2018     Trochanteric bursitis of left hip        Past Surgical History:   Procedure Laterality Date     adhesioloysis       CHOLECYSTECTOMY       COLONOSCOPY       IMPLANT DEEP BRAIN STIMULATION GENERATOR / BATTERY Right 8/30/2018    Procedure: IMPLANT DEEP BRAIN STIMULATION GENERATOR / BATTERY;  Deep Brain Stimulator Placement, Phase II, Placement Of Deep Brain Stimulator Generator/Battery Over The Right Chest Wall;  Surgeon: Jerry Concepcion MD;  Location: [facility] OR     JOINT REPLACEMENT Right     right partial knee replacement     LAPAROSCOPY      adhesioloysis     LAPAROSCOPY      supracervical hysterectomy     LAPAROTOMY EXPLORATORY Left     partial removal of left ovary     OPTICAL TRACKING SYSTEM INSERTION DEEP BRAIN STIMULATION Right 8/21/2018    Procedure: OPTICAL TRACKING SYSTEM INSERTION DEEP BRAIN STIMULATION;  Stealth Assisted Right Deep Brain Stimulator Placement, Phase I, Placement Of Right Side Deep Brain Stimulator Electrode, Target Right Globus Pallidus Internus With Microelectrode Recording;  Surgeon: Jerry Concepcion MD;  Location: [facility] OR     REMOVE INTRAUTERINE DEVICE  2006     salpingoopherectomy       XR FOOT SURGERY SENG RIGHT Right 01/18/2019       Family History   Problem Relation Age of Onset     Breast Cancer Mother        Social History     Socioeconomic History     Marital status:      Spouse name: Not on file     Number of children: Not on file      Years of education: Not on file     Highest education level: Not on file   Occupational History     Not on file   Social Needs     Financial resource strain: Not on file     Food insecurity     Worry: Not on file     Inability: Not on file     Transportation needs     Medical: Not on file     Non-medical: Not on file   Tobacco Use     Smoking status: Former Smoker     Packs/day: 0.50     Years: 20.00     Pack years: 10.00     Types: Cigarettes     Quit date: 2009     Years since quittin.2     Smokeless tobacco: Never Used   Substance and Sexual Activity     Alcohol use: No     Drug use: No     Sexual activity: Not Currently     Partners: Male     Birth control/protection: None   Lifestyle     Physical activity     Days per week: Not on file     Minutes per session: Not on file     Stress: Not on file   Relationships     Social connections     Talks on phone: Not on file     Gets together: Not on file     Attends Mandaeism service: Not on file     Active member of club or organization: Not on file     Attends meetings of clubs or organizations: Not on file     Relationship status: Not on file     Intimate partner violence     Fear of current or ex partner: Not on file     Emotionally abused: Not on file     Physically abused: Not on file     Forced sexual activity: Not on file   Other Topics Concern     Parent/sibling w/ CABG, MI or angioplasty before 65F 55M? No   Social History Narrative    . lives in [place]. Zane Pattersonmarcial spouse          Allergies   Allergen Reactions     Atorvastatin Muscle Pain (Myalgia)     Other reaction(s): Myalgia  Per PCP note 18 - is to resume simvastatin - monitor for myalgia     Codeine Itching     Mold Unknown       Current Outpatient Medications   Medication     Acetaminophen (TYLENOL PO)     amantadine (SYMMETREL) 100 MG capsule     aspirin 81 MG EC tablet     busPIRone (BUSPAR) 10 MG tablet     calcium carbonate (TUMS) 500 MG chewable tablet      carbidopa-levodopa (SINEMET)  MG tablet     FLUoxetine (PROZAC) 20 MG capsule     gabapentin (NEURONTIN) 100 MG capsule     hydrochlorothiazide (HYDRODIURIL) 25 MG tablet     LORazepam (ATIVAN) 0.5 MG tablet     pramipexole (MIRAPEX) 0.5 MG tablet     rosuvastatin (CRESTOR) 5 MG tablet     No current facility-administered medications for this visit.        REVIEW OF SYSTEMS:  General: Negative for chills/sweats/fever, difficulty sleeping, headache, recent fatigue, or weight gain/loss.  Eyes: Negative for blurred vision, crossed eyes, double vision, recent eye infections, vision flashes, or vision halos.  Ears/Nose/Mouth/Throat: Negative for bleeding gums, difficulty swallowing, earache, ear discharge, hearing loss, hoarseness, nosebleeds, tinnitus, or sinus problems.  Respiratory:Negative for chronic cough, coughing blood, night sweats, shortness of breath, Tuberculosis, or wheezing.  Cardiovascular: Negative for chest pain, dyspnea at night, heart murmur, palpitations, pacemaker, pacemaker, poor circulation, swollen legs/feet, or varicose veins.  Gastrointestinal: Negative for melena, hematochezia, chronic diarrhea, heartburn, Hepatitis A/B/C, increasing constipation, Liver Disease, nausea, or vomiting.   Genitourinary: Negative for Urinary retention, genital discharge, urinary incontinence, prostate problems, urgency, or UTI.   Neurological: Negative for syncope, headaches, numbness of arms/legs, tingling in hands/arms/legs, memory problems, or seizures.  Psychological: Negative for anxiety, depression, panic attacks, or restlessness.  Skin: Negative for chronic skin itching, color changes in hand/feet when cold, poor scarring, non-healing ulcers, skin rashes/hives, unusual moles.  Musculoskeletal: Negative for arthritis, joint swelling in hands/wrists/hips/knees/joints, muscle tenderness in arms/legs, or osteoporosis.  Endocrine: Negative for excessive thirst/hunger, intolerance for warm rooms, loss of  libido, multiple broken bones, rapid weight gain/loss, galactorrhea, or thyroid issues.  Hematologic/Lymphatic: Negative for easy skin bruising, significant fatigue, prolonged bleeding, tender glands/lymph nodes.  Allergies: Negative for asthma or hay fever.      PHYSICAL EXAM  /78   Pulse 60   Resp 20   Wt 109.3 kg (241 lb)   SpO2 98%   BMI 40.10 kg/m      General: Awake, alert, oriented. Well nourished, well developed, no acute distress.  HEENT: Head normocephalic, atraumatic. No carotid bruit. Neck supple. Good range of motion. No palpable thyroid mass.  Heart: Regular rhythm and rate. No murmurs.  Lungs: Clear to auscultation and percussion bilaterally. No rhonchi, rales or wheeze.  Abdomen: Soft, non-tender, non-distended. No hepatosplenomegaly.  Extremity: Warm with no clubbing or cyanosis. No lower extremity edema.  Right frontal incision is well healed.  No exposed hardware.  No thinning skin over the hardware.  Right chest wall incision is well healed.  No exposed hardware.  No thinning skin over the hardware.    Neurological  Awake, alert and oriented to date, time, place and person. Speech fluent but rushed.   Pupils equal, round, reactive to light.  Extraocular movement intact.  Visual fields are full on confrontation.  Hearing is grossly normal to finger rub.   Facial sensation intact.  Face symmetric.  Tongue midline.  Uvula elevates equally.    Motor: full strength throughout.  Sensation: intact to light touch and pinpoint.  Deep tendon reflexes: Trace throughout. Negative for clonus. Negative for Ashraf's sign. No dysmetria.      ASSESSMENT   60 year old right handed female with a history of Parkinson's disease.  S/p right side deep brain stimulator placement, phase I, placement of right side deep brain stimulator electrode placement, target right globus pallidus internus, with microelectrode recording on 8/21/2018.  S/p deep brain stimulator placement, phase II, placement of deep brain  stimulator generator/battery over the right chest wall on 8/30/2020.    Patient presents for evaluation of DBS candidacy.  She has undergone placement of right sided DBS system.  She is now interested in getting her second side, left side, implanted in order to control her symptoms on her right side.    In terms of her first side or right side surgery, she tolerated the procedures well.  She did participate in our research protocol and her system was externalized between her phase I and phase II surgeries.    During today's visit, we discussed both phase I and II of DBS surgery.  We discussed that during phase I, we would place the DBS electrode on the left side under MAC and microelectrode recording.  She would then return one week later for the phase II with placement of the DBS generator/battery over the left chest wall under general anesthesia.     Risks, benefits, alternative therapies were discussed with the patient, including but not limited to infection and bleeding (intracranial), injury to the brain, stroke, death, hardware failure and possible need for more surgeries.  Surgical procedure was discussed in detail.  All questions were answered, and she expressed understanding.     Her case will be discussed at the Movement Disorder Consensus Group Meeting to determine whether she is a good candidate for DBS surgery.    Briefly, following topic was discussed.    Medtronic  MRI compatible.  Non-rechargeable and rechargeable generator/battery available.  4 contact electrode.  Communication method: have the  or patient controller on the skin directly over the generator/battery.    [employer]  MRI compatible.  Non-rechargeable generator/battery.  8 contact segmented/directional electrode.  Communication method: Wireless/bluetooth.    Epiphany  Rechargeable generator/battery is MRI compatible.    Non-rechargeable will never be MRI compatible.  Rechargeable and non-rechargeable generator/battery.  8  contact electrode.  Ring contacts or segmented contacts.  Independent current control at each contact.  Communication method: Wireless.    I did discuss that the implant technique for these devices are relatively the same which was discussed again.  They all have similar risks associated with the surgery.    Upon further discussion, she is Ok with a rechargeable system and she has no preference as to the device company/type.  She already has an [employer] system on the right side which is non-rechargeable.  It may be reasonable to stay with the same system.    When her case is discussed and if she is approved, we will discuss the option of participating in research with her.      PLAN:  1.  We will discuss her case at the Movement Disorder Consensus Group Meeting, and we will contact her regarding her DBS candidacy.       ADDENDUM  Patient's case was discussed at the Movement Disorder Consensus Group Meeting.  She was considered to be a good DBS candidate.  Recommendation is DBS, Left GPi, Abbott Infinity system, Infinity 5.  She will be asked about research.      ADDENDUM  Patient was informed that she was approved for DBS surgery.  She is interested and would like to participate in intraoperative recording and externalized protocol.

## 2020-10-19 NOTE — NURSING NOTE
Chief Complaint   Patient presents with     DBS Programming     [facility] RETURN DBS WORK UP       Jd Garcia, EMT

## 2020-10-19 NOTE — LETTER
"10/19/2020       RE: Erika Diaz  [address] [address] [place] [id number]     Dear Colleague,    Thank you for referring your patient, Erika Diaz, to the [facility] NEUROSURGERY CLINIC [place] at [facility]. Please see a copy of my visit note below.    NEUROSURGERY    HISTORY AND PHYSICAL EXAM    Chief Complaint   Patient presents with     DBS Programming     [facility] RETURN DBS WORK UP     Parkinson       HISTORY OF PRESENT ILLNESS  Ms. Erika Diaz returns today for second side DBS workup.  Patient is s/p right side deep brain stimulator placement, phase I, placement of right side deep brain stimulator electrode placement, target right globus pallidus internus, with microelectrode recording on 8/21/2018 and s/p deep brain stimulator placement, phase II, placement of deep brain stimulator generator/battery over the right chest wall on 8/30/2018.  She also participated in our research protocol where she had her DBS system externalized between her phase I and phase II surgery.  She did well with the surgery and she had no complications.  She did well with the right side implantation and she is getting good therapeutic benefit from the device.  No issues with the device is reported.  Since her first side DBS surgery, she has had an ankle surgery.  She is again feeling her \"inside shakes\".  She is right handed and her handwriting is getting worse.  Her PD medications have been increased as well.  She is interested in having the left side implanted for right side body control.  Patient also reports that her  was recently diagnosed with liver cancer with metastasis to the lungs.        Past Medical History:   Diagnosis Date     Benign paroxysmal positional vertigo of right ear      Degenerative joint disease 11/07/2017     Encounter for neuropsychological testing 12/20/2017    Current results indicate variability in attention and memory, ranging from " moderately impaired to superior, in some cases with greater difficulty on less complex tasks. Basic language, visual processing, and executive functioning fall within normal limits. Personality assessment is suggestive of somatization, and also raises the possibility of a thought disorder as well as a history of manic episo     JASON (generalized anxiety disorder)      History of colonic polyps 11/07/2017     HTN (hypertension) 11/07/2017     Hypercholesterolemia 11/07/2017     Lactose intolerance in adult 11/07/2017     Migraines      Obesity 11/07/2017     Parkinson disease (H)      PID (pelvic inflammatory disease) due to IUD 11/07/2017     Pulmonary emboli (H) 01/20/2019    Post Right Ankel Surgery     Pulmonary emboli (H) - coumadin lovenox 11/07/2017    provoked after knee replacement     Sensorineural hearing loss (SNHL) of right ear 12/2018     Trochanteric bursitis of left hip        Past Surgical History:   Procedure Laterality Date     adhesioloysis       CHOLECYSTECTOMY       COLONOSCOPY       IMPLANT DEEP BRAIN STIMULATION GENERATOR / BATTERY Right 8/30/2018    Procedure: IMPLANT DEEP BRAIN STIMULATION GENERATOR / BATTERY;  Deep Brain Stimulator Placement, Phase II, Placement Of Deep Brain Stimulator Generator/Battery Over The Right Chest Wall;  Surgeon: Jerry Concepcion MD;  Location: [facility] OR     JOINT REPLACEMENT Right     right partial knee replacement     LAPAROSCOPY      adhesioloysis     LAPAROSCOPY      supracervical hysterectomy     LAPAROTOMY EXPLORATORY Left     partial removal of left ovary     OPTICAL TRACKING SYSTEM INSERTION DEEP BRAIN STIMULATION Right 8/21/2018    Procedure: OPTICAL TRACKING SYSTEM INSERTION DEEP BRAIN STIMULATION;  Stealth Assisted Right Deep Brain Stimulator Placement, Phase I, Placement Of Right Side Deep Brain Stimulator Electrode, Target Right Globus Pallidus Internus With Microelectrode Recording;  Surgeon: Jerry Concepcion MD;  Location: [facility] OR     REMOVE  INTRAUTERINE DEVICE  2006     salpingoopherectomy       XR FOOT SURGERY SENG RIGHT Right 2019       Family History   Problem Relation Age of Onset     Breast Cancer Mother        Social History     Socioeconomic History     Marital status:      Spouse name: Not on file     Number of children: Not on file     Years of education: Not on file     Highest education level: Not on file   Occupational History     Not on file   Social Needs     Financial resource strain: Not on file     Food insecurity     Worry: Not on file     Inability: Not on file     Transportation needs     Medical: Not on file     Non-medical: Not on file   Tobacco Use     Smoking status: Former Smoker     Packs/day: 0.50     Years: 20.00     Pack years: 10.00     Types: Cigarettes     Quit date: 2009     Years since quittin.2     Smokeless tobacco: Never Used   Substance and Sexual Activity     Alcohol use: No     Drug use: No     Sexual activity: Not Currently     Partners: Male     Birth control/protection: None   Lifestyle     Physical activity     Days per week: Not on file     Minutes per session: Not on file     Stress: Not on file   Relationships     Social connections     Talks on phone: Not on file     Gets together: Not on file     Attends Denominational service: Not on file     Active member of club or organization: Not on file     Attends meetings of clubs or organizations: Not on file     Relationship status: Not on file     Intimate partner violence     Fear of current or ex partner: Not on file     Emotionally abused: Not on file     Physically abused: Not on file     Forced sexual activity: Not on file   Other Topics Concern     Parent/sibling w/ CABG, MI or angioplasty before 65F 55M? No   Social History Narrative    . lives in [place]. Zane Joe spouse          Allergies   Allergen Reactions     Atorvastatin Muscle Pain (Myalgia)     Other reaction(s): Myalgia  Per PCP note 18 - is to resume  simvastatin - monitor for myalgia     Codeine Itching     Mold Unknown       Current Outpatient Medications   Medication     Acetaminophen (TYLENOL PO)     amantadine (SYMMETREL) 100 MG capsule     aspirin 81 MG EC tablet     busPIRone (BUSPAR) 10 MG tablet     calcium carbonate (TUMS) 500 MG chewable tablet     carbidopa-levodopa (SINEMET)  MG tablet     FLUoxetine (PROZAC) 20 MG capsule     gabapentin (NEURONTIN) 100 MG capsule     hydrochlorothiazide (HYDRODIURIL) 25 MG tablet     LORazepam (ATIVAN) 0.5 MG tablet     pramipexole (MIRAPEX) 0.5 MG tablet     rosuvastatin (CRESTOR) 5 MG tablet     No current facility-administered medications for this visit.        REVIEW OF SYSTEMS:  General: Negative for chills/sweats/fever, difficulty sleeping, headache, recent fatigue, or weight gain/loss.  Eyes: Negative for blurred vision, crossed eyes, double vision, recent eye infections, vision flashes, or vision halos.  Ears/Nose/Mouth/Throat: Negative for bleeding gums, difficulty swallowing, earache, ear discharge, hearing loss, hoarseness, nosebleeds, tinnitus, or sinus problems.  Respiratory:Negative for chronic cough, coughing blood, night sweats, shortness of breath, Tuberculosis, or wheezing.  Cardiovascular: Negative for chest pain, dyspnea at night, heart murmur, palpitations, pacemaker, pacemaker, poor circulation, swollen legs/feet, or varicose veins.  Gastrointestinal: Negative for melena, hematochezia, chronic diarrhea, heartburn, Hepatitis A/B/C, increasing constipation, Liver Disease, nausea, or vomiting.   Genitourinary: Negative for Urinary retention, genital discharge, urinary incontinence, prostate problems, urgency, or UTI.   Neurological: Negative for syncope, headaches, numbness of arms/legs, tingling in hands/arms/legs, memory problems, or seizures.  Psychological: Negative for anxiety, depression, panic attacks, or restlessness.  Skin: Negative for chronic skin itching, color changes in  hand/feet when cold, poor scarring, non-healing ulcers, skin rashes/hives, unusual moles.  Musculoskeletal: Negative for arthritis, joint swelling in hands/wrists/hips/knees/joints, muscle tenderness in arms/legs, or osteoporosis.  Endocrine: Negative for excessive thirst/hunger, intolerance for warm rooms, loss of libido, multiple broken bones, rapid weight gain/loss, galactorrhea, or thyroid issues.  Hematologic/Lymphatic: Negative for easy skin bruising, significant fatigue, prolonged bleeding, tender glands/lymph nodes.  Allergies: Negative for asthma or hay fever.    PHYSICAL EXAM  /78   Pulse 60   Resp 20   Wt 109.3 kg (241 lb)   SpO2 98%   BMI 40.10 kg/m      General: Awake, alert, oriented. Well nourished, well developed, no acute distress.  HEENT: Head normocephalic, atraumatic. No carotid bruit. Neck supple. Good range of motion. No palpable thyroid mass.  Heart: Regular rhythm and rate. No murmurs.  Lungs: Clear to auscultation and percussion bilaterally. No rhonchi, rales or wheeze.  Abdomen: Soft, non-tender, non-distended. No hepatosplenomegaly.  Extremity: Warm with no clubbing or cyanosis. No lower extremity edema.  Right frontal incision is well healed.  No exposed hardware.  No thinning skin over the hardware.  Right chest wall incision is well healed.  No exposed hardware.  No thinning skin over the hardware.    Neurological  Awake, alert and oriented to date, time, place and person. Speech fluent but rushed.   Pupils equal, round, reactive to light.  Extraocular movement intact.  Visual fields are full on confrontation.  Hearing is grossly normal to finger rub.   Facial sensation intact.  Face symmetric.  Tongue midline.  Uvula elevates equally.    Motor: full strength throughout.  Sensation: intact to light touch and pinpoint.  Deep tendon reflexes: Trace throughout. Negative for clonus. Negative for Ashraf's sign. No dysmetria.      ASSESSMENT   60 year old right handed female with  a history of Parkinson's disease.  S/p right side deep brain stimulator placement, phase I, placement of right side deep brain stimulator electrode placement, target right globus pallidus internus, with microelectrode recording on 8/21/2018.  S/p deep brain stimulator placement, phase II, placement of deep brain stimulator generator/battery over the right chest wall on 8/30/2020.    Patient presents for evaluation of DBS candidacy.  She has undergone placement of right sided DBS system.  She is now interested in getting her second side, left side, implanted in order to control her symptoms on her right side.    In terms of her first side or right side surgery, she tolerated the procedures well.  She did participate in our research protocol and her system was externalized between her phase I and phase II surgeries.    During today's visit, we discussed both phase I and II of DBS surgery.  We discussed that during phase I, we would place the DBS electrode on the left side under MAC and microelectrode recording.  She would then return one week later for the phase II with placement of the DBS generator/battery over the left chest wall under general anesthesia.     Risks, benefits, alternative therapies were discussed with the patient, including but not limited to infection and bleeding (intracranial), injury to the brain, stroke, death, hardware failure and possible need for more surgeries.  Surgical procedure was discussed in detail.  All questions were answered, and she expressed understanding.     Her case will be discussed at the Movement Disorder Consensus Group Meeting to determine whether she is a good candidate for DBS surgery.    Briefly, following topic was discussed.    [employer]  MRI compatible.  Non-rechargeable and rechargeable generator/battery available.  4 contact electrode.  Communication method: have the  or patient controller on the skin directly over the generator/battery.    [facility]  compatible.  Non-rechargeable generator/battery.  8 contact segmented/directional electrode.  Communication method: Wireless/bluetooth.    [employer]  Rechargeable generator/battery is MRI compatible.    Non-rechargeable will never be MRI compatible.  Rechargeable and non-rechargeable generator/battery.  8 contact electrode.  Ring contacts or segmented contacts.  Independent current control at each contact.  Communication method: Wireless.    I did discuss that the implant technique for these devices are relatively the same which was discussed again.  They all have similar risks associated with the surgery.    Upon further discussion, she is Ok with a rechargeable system and she has no preference as to the device company/type.  She already has an Abbott system on the right side which is non-rechargeable.  It may be reasonable to stay with the same system.    When her case is discussed and if she is approved, we will discuss the option of participating in research with her.    PLAN:  1.  We will discuss her case at the Movement Disorder Consensus Group Meeting, and we will contact her regarding her DBS candidacy.     ADDENDUM  Patient's case was discussed at the Movement Disorder Consensus Group Meeting.  She was considered to be a good DBS candidate.  Recommendation is DBS, Left GPi, Abbott Infinity system, Infinity 5.  She will be asked about research.    ADDENDUM  Patient was informed that she was approved for DBS surgery.  She is interested and would like to participate in intraoperative recording and externalized protocol.    Again, thank you for allowing me to participate in the care of your patient.      Sincerely,    Jerry Concepcion MD

## 2020-10-20 ENCOUNTER — TELEPHONE (OUTPATIENT)
Dept: NEUROLOGY | Facility: CLINIC | Age: 60
End: 2020-10-20

## 2020-10-20 NOTE — TELEPHONE ENCOUNTER
Received a voicemail from Dr. David Rios yesterday 10/19/2020 with some questions regarding the plan for patient--at their meeting, pt had already met with another psychologist referred by primary care due to recent stress with 's diagnosis. I left a message for Dr. Rios to let him know that pt had been referred to health psychology in July due to anxiety but patient had not made an appointment until recently, resulting in two separate appointments. Patient does not need to continue with two psychologists.     Per Karina Rivas, this was the plan discussed at last week's visit with pt.     I left message for patient clarifying to her that she can continue with one psychologist, and I asked her to call me back with the plan so I can pass that information onto the DBS team.    Shanice Coyne, RN, MPH  Research Nurse, Movement Disorders

## 2020-10-21 NOTE — TELEPHONE ENCOUNTER
Spoke to patient today--she has seen her counselor/LICSW Natalia Lancaster, twice now, who is based in [place], closer to patient's home, so this is more convenient for her.  I encouraged her to continue meeting regularly with her mental health provider to manage her mood and stress, even if she is feeling better at the moment.     Update sent to Dr. Rios and GUILLERMINA Desir, WARREN.     I let pt know that we would be in touch with her regarding the DBS decision after we discuss her case at the consensus meeting.     Shanice Coyne, RN, MPH  Research Nurse, Movement Disorders

## 2020-10-23 NOTE — PROGRESS NOTES
Name: Erika Diaz MRN: [id number]  : 1960  RAMÍREZ: 10/14/2020  Staff: ROSALIO Tech: JOSÉ ANTONIO Age: 60  Sex: Female Hand: Right   Educ: 12  Occupation: [employer]  Vision:  ?with correction / ?without correction  Hearing:  ?with correction / ?without correction    WAIS-IV     Raw SSa    Similarities  25 10     Comprehension  15 6     Letter Num. Seq. 16 8     Digit Span  21 7     Matrix Reasoning 8 6    WMS-Revised  Raw    MAS     Info & Orientation 14       LM I   19 6     LM II   9 5       ACOSTA VERBAL LEARNING TEST - REVISED  Form   1  Raw T     Trial 1   5     Trial 2   7     Trial 3   8     Total 1-3  20 32     Learning  3     Delayed Recall  5 25     Percent Retention 63 30     True Positives  12     Discrimination Index 11 52     False Positives  1    BRIEF VISUAL MEMORY TEST - REVISED  Form   1  Raw                   T     Trial 1   4     Trial 2   4     Trial 3   4     Total 1-3  12 31     Learning  0 30     Delay   6 39     Percent Retention 100 >16th%ile     True Positives  6     Discrimination Index 6 >16th%ile     False Positives  0    BOSTON NAMING TEST   Score: 58 MAS: 13  84%ile                    [ 58   w/o cues        1   w/phonemic cues]     D-KEFS VERBAL FLUENCY Standard        Raw  Age Scaled     Letter Fluency  55                   16     Category Fluency 43                   13       Switching Fluency              Total Correct 17                   15              Switching [id number]            16    CLOCK DRAWING     Command   3/3             Copy     3/3    TRAIL MAKING TEST    Raw         Err  MAS  %ile   A 23            0               13             84   B 60        1               11             63    STROOP   Raw + Steven  =   Total          MAS %ile    Word 98 +     --  = 98 10 50   Color  69 +     --= 69 10 50       Color/Word  43 +    --= 43 12 75     WCST (64 cards)    Raw   T/%ile   Categories: 5 >16   #Persev. Err.: 4 64   Concept. Resp.: 58 67   FTMS:  0    VELAZQUEZ JUDGMENT OF LINE  ORIENTATION Form H   Raw: 24 MAS: 12  75%ile:    DEMENTIA RATING SCALE - 2 Standard       Raw       MAS            Raw      MAS  Attention  37  13        Concept   39           12  Init/Psv  37  11  Memory   23       9  Construct 6  10   Total     142/144     13     BDI-II:  6     JAYLAN:  14     APATHY SCALE:        22     [facility] = [facility] Older Adult Normative Study Age Adj. Scaled Score

## 2020-10-26 ENCOUNTER — TELEPHONE (OUTPATIENT)
Dept: NEUROLOGY | Facility: CLINIC | Age: 60
End: 2020-10-26

## 2020-10-26 ENCOUNTER — MYC MEDICAL ADVICE (OUTPATIENT)
Dept: NEUROLOGY | Facility: CLINIC | Age: 60
End: 2020-10-26

## 2020-10-26 NOTE — TELEPHONE ENCOUNTER
From the 10/22/20 Consensus meetin. Candidate for LGpi  2. Abbott Infinity 5  3. Research? - Dr. Concepcion to put in orders after Shanice determines what patient wants to participate in   4. We are treating bradykinesia, rigidity and inner tremor. She has predictable motor fluctuations and a medication side effect of nausea.     Left voice mail and sent [email or web address].

## 2020-10-27 NOTE — TELEPHONE ENCOUNTER
Consent and HIPAA forms sent to pt.    Shanice Coyne RN, MPH  Research Nurse, Movement Disorders

## 2020-10-27 NOTE — TELEPHONE ENCOUNTER
Patient called my direct line to let me know she received Zina's voicemail and that she is interested in research for her 2nd side surgery. I explained that she has the option of volunteering for the intraoperative recordings study and/or the externalization study and she responded that she is interested in both. I will send her the consent forms to review and then set up a time to discuss the details and consent.    Shanice Coyne RN, MPH  Research Nurse, Movement Disorders

## 2020-11-10 DIAGNOSIS — G47.9 SLEEP DISTURBANCES: ICD-10-CM

## 2020-11-10 DIAGNOSIS — F41.9 ANXIETY: ICD-10-CM

## 2020-11-10 DIAGNOSIS — G20.A1 PARKINSON'S DISEASE (H): ICD-10-CM

## 2020-11-10 RX ORDER — LORAZEPAM 0.5 MG/1
0.5 TABLET ORAL DAILY PRN
Qty: 30 TABLET | Refills: 5 | Status: SHIPPED | OUTPATIENT
Start: 2020-11-10 | End: 2021-05-21

## 2020-11-10 NOTE — TELEPHONE ENCOUNTER
Nurse received refill request via pt email for: LORazepam (ATIVAN) 0.5 MG tablet       Pharmacy: [facility]    Last re-ordered: 3/26/2020    Last appointment: 10/16/2020    Next appointment: will schedule    Action taken: Pended and routed to GUILLERMINA Desir, NP for signing.     Patient also states that her primary care increased her gabapentin to 300 mg twice daily for fibromyalgia (previously 200 mg once daily).     Shanice Coyne, RN, MPH  Research Nurse, Movement Disorders

## 2020-11-17 ENCOUNTER — TELEPHONE (OUTPATIENT)
Dept: NEUROSURGERY | Facility: CLINIC | Age: 60
End: 2020-11-17

## 2020-11-17 ENCOUNTER — HOSPITAL ENCOUNTER (OUTPATIENT)
Facility: CLINIC | Age: 60
End: 2020-11-17
Attending: NEUROLOGICAL SURGERY | Admitting: NEUROLOGICAL SURGERY
Payer: MEDICARE

## 2020-11-17 ENCOUNTER — DOCUMENTATION ONLY (OUTPATIENT)
Dept: NEUROSURGERY | Facility: CLINIC | Age: 60
End: 2020-11-17

## 2020-11-17 DIAGNOSIS — G20.A1 PARKINSON'S DISEASE (H): ICD-10-CM

## 2020-11-17 DIAGNOSIS — Z01.818 PREOPERATIVE EVALUATION TO RULE OUT SURGICAL CONTRAINDICATION: Primary | ICD-10-CM

## 2020-11-17 DIAGNOSIS — Z96.89 S/P DEEP BRAIN STIMULATOR PLACEMENT: Primary | ICD-10-CM

## 2020-11-17 NOTE — TELEPHONE ENCOUNTER
Patient is scheduled for surgery with Dr. Concepcion      Spoke or left message with: Patient    Date of Surgery: 1/22/21 and 01/29/21    Location [facility] OR    H&P: Scheduled with PCP locally    Post op: 02/15/21    Additional imaging/appointments: COVID test 01/19/21    Surgery packet sent: the nurse will mail out     Additional comments: The patient is aware they need a PRE-OP/PAC appt at least a couple of weeks before surgery date. We went over the patient needing a  and someone to stay with them for 24 hours after the surgery. The patient was given my direct number for any questions [phone]

## 2020-11-18 DIAGNOSIS — G20.A1 PARKINSON'S DISEASE (H): Primary | ICD-10-CM

## 2020-11-20 DIAGNOSIS — Z11.59 ENCOUNTER FOR SCREENING FOR OTHER VIRAL DISEASES: Primary | ICD-10-CM

## 2020-12-27 ENCOUNTER — HEALTH MAINTENANCE LETTER (OUTPATIENT)
Age: 60
End: 2020-12-27

## 2020-12-29 ENCOUNTER — TELEPHONE (OUTPATIENT)
Dept: NEUROLOGY | Facility: CLINIC | Age: 60
End: 2020-12-29

## 2020-12-29 NOTE — TELEPHONE ENCOUNTER
Pt called in inquire about her upcoming DBS surgery and research study participation. We discussed different options for her and her care partner during her stay at the clinical research unit, at home or at a hotel. She is thinking she would like to stay at a hotel the night prior to her lead surgery, but her care partner would prefer to stay at home while she is in the clinical research unit. Prior to the research unit stay she would prefer a car ride from home.      Participant would like to consent for the study. I explained the online consent process via [employer], and we made a plan to review the consent forms and discuss the study tomorrow 12/30/2020.    Patient had no further questions.    Shanice Coyne RN, MPH  Research Nurse, Movement Disorders

## 2021-01-12 DIAGNOSIS — G20.A1 PARKINSON'S DISEASE (H): Primary | ICD-10-CM

## 2021-01-13 DIAGNOSIS — G20.A1 PARKINSON'S DISEASE (H): Primary | ICD-10-CM

## 2021-01-15 ENCOUNTER — TELEPHONE (OUTPATIENT)
Dept: NEUROSURGERY | Facility: CLINIC | Age: 61
End: 2021-01-15

## 2021-01-15 ENCOUNTER — TELEPHONE (OUTPATIENT)
Dept: NEUROLOGY | Facility: CLINIC | Age: 61
End: 2021-01-15

## 2021-01-15 NOTE — TELEPHONE ENCOUNTER
"I spoke to patient and she had already rescheduled her 2nd COVID-19 testing appointment to the 25th in [place] (closer to her home). When I told her we can do the swab in the [facility] on the 27th as previously planned, she said she would prefer that option so it's not an extra trip for her on the 25th.     I just called the COVID test scheduling line that she used to make the switch ([phone]); they were able to cancel the [place] test on the 25th, but the  was getting an error \"RCOM\" (?)  when she tried to schedule at the [facility], and doesn't have ability to schedule at the [facility].    I am waiting on an answer from the [facility] Clinical Research Unit team to see if they need a scheduled appointment on the 27th or if they can pull Dr. Concepcion's order up for the swab in the [facility] while patient is staying there for a research study.    I've updated patient on the situation.    Shanice Coyne, RN, MPH  Research Nurse, Movement Disorders    "

## 2021-01-15 NOTE — TELEPHONE ENCOUNTER
Left pt another VM message with the covid scheduling number, as she left me a VM saying she didn't get the last digit on my my previous message.

## 2021-01-15 NOTE — TELEPHONE ENCOUNTER
Pt left me a VM saying she needs to reschedule one of her preop covid tests and requested the number to call. I left her a VM with the number to reschedule: [phone]. She is welcome to call with any other questions/concerns.

## 2021-01-19 ENCOUNTER — DOCUMENTATION ONLY (OUTPATIENT)
Dept: CARE COORDINATION | Facility: CLINIC | Age: 61
End: 2021-01-19

## 2021-01-19 ENCOUNTER — OFFICE VISIT (OUTPATIENT)
Dept: LAB | Facility: CLINIC | Age: 61
End: 2021-01-19
Payer: MEDICARE

## 2021-01-19 DIAGNOSIS — Z11.59 ENCOUNTER FOR SCREENING FOR OTHER VIRAL DISEASES: ICD-10-CM

## 2021-01-19 LAB
LABORATORY COMMENT REPORT: NORMAL
SARS-COV-2 RNA RESP QL NAA+PROBE: NEGATIVE
SARS-COV-2 RNA RESP QL NAA+PROBE: NORMAL
SPECIMEN SOURCE: NORMAL
SPECIMEN SOURCE: NORMAL

## 2021-01-19 PROCEDURE — U0003 INFECTIOUS AGENT DETECTION BY NUCLEIC ACID (DNA OR RNA); SEVERE ACUTE RESPIRATORY SYNDROME CORONAVIRUS 2 (SARS-COV-2) (CORONAVIRUS DISEASE [COVID-19]), AMPLIFIED PROBE TECHNIQUE, MAKING USE OF HIGH THROUGHPUT TECHNOLOGIES AS DESCRIBED BY CMS-2020-01-R: HCPCS | Performed by: PATHOLOGY

## 2021-01-19 PROCEDURE — U0005 INFEC AGEN DETEC AMPLI PROBE: HCPCS | Performed by: PATHOLOGY

## 2021-01-19 PROCEDURE — 99207 PR NO CHARGE LOS: CPT

## 2021-01-20 ENCOUNTER — TELEPHONE (OUTPATIENT)
Dept: NEUROSURGERY | Facility: CLINIC | Age: 61
End: 2021-01-20

## 2021-01-20 ENCOUNTER — TELEPHONE (OUTPATIENT)
Dept: NEUROLOGY | Facility: CLINIC | Age: 61
End: 2021-01-20

## 2021-01-20 ENCOUNTER — PATIENT OUTREACH (OUTPATIENT)
Dept: NEUROSURGERY | Facility: CLINIC | Age: 61
End: 2021-01-20

## 2021-01-20 NOTE — TELEPHONE ENCOUNTER
I called patient to speak with her about her canceled surgery and notified her that I will take care of rescheduling her hotel, transportation, clinical research unit reservation, and initial programming/CT.  Patient was disappointed but understands that the reason is for her safety.     Shanice Coyne, RN, MPH  Research Nurse, Movement Disorders

## 2021-01-20 NOTE — PROGRESS NOTES
Spoke with pt to let her know that we have to postpone her DBS surgery since she mistakenly took an aspirin on Monday. Pt expressed understanding and acknowledged that postponing will be safer. I let her know that Vijaya, our surgery scheduler will reach out with new dates. Once the pt has new dates, I will send out a new surgery packet. No further questions at this time.

## 2021-01-20 NOTE — TELEPHONE ENCOUNTER
I received a inbasket to help get this patient rescheduled for surgery because she took a Aspirin yesterday. I spoke to the patient she is rescheduled for 03/05 and 03/12. I let her know that at this time this is his first available OR time, but if anyone cancels I would be calling to see if she would like to move up. COVID testing couldn't be scheduled at this time due to the surgery being to far out for [facility]'s schedule. The COVID team did say they would call her closer to her surgery date. The patient confirmed talking to the research team and they are working on rescheduling all post-op and imaging appts that are needed for their research. Patient had no questions or concern at this time.

## 2021-02-02 ENCOUNTER — TELEPHONE (OUTPATIENT)
Dept: NEUROLOGY | Facility: CLINIC | Age: 61
End: 2021-02-02

## 2021-02-02 DIAGNOSIS — Z11.59 ENCOUNTER FOR SCREENING FOR OTHER VIRAL DISEASES: Primary | ICD-10-CM

## 2021-02-02 NOTE — TELEPHONE ENCOUNTER
I spoke to the patient she will use her old surgery packet and change the dates. I let her know that for COVID testing they usually don't call into 1 week before surgery, but If she would like it scheduled sooner she can call the [employer] testing line. She wants it scheduled sooner so she was given the COVID phone number [phone]. The patient had no further questions and has my direct number for any future surgery questions.

## 2021-02-02 NOTE — TELEPHONE ENCOUNTER
Patient left voicemail asking when she can expect to receive her surgery packet and if she needs to schedule COVID-19 tests herself. She states it is OK to respond via [facility].   I will check with Zakiya Matthews and Vijaya Lancaster, surgery scheduler.     Shanice Coyne, RN, MPH  Research Nurse, Movement Disorders

## 2021-02-03 NOTE — TELEPHONE ENCOUNTER
I sent patient a message to let her know she can have her COVID-19 test scheduled at [facility] and does not need to change it. I also let her know we are working on transportation from the [facility] to [facility] the morning of surgery and I will send her an updated research instructions sheet when this is confirmed.    Shanice Coyne RN, MPH  Research Nurse, Movement Disorders

## 2021-02-11 ENCOUNTER — TELEPHONE (OUTPATIENT)
Dept: NEUROLOGY | Facility: CLINIC | Age: 61
End: 2021-02-11

## 2021-02-11 NOTE — TELEPHONE ENCOUNTER
Patient called to let us know that she had her final cataract surgery (right eye) on 2/9/21, and she mistakenly forgot to turn her DBS back on until the following day (24 hours later).  Patient noticed she couldn't tap her fingers and felt her muscles were very stiff. She turned the DBS back ON and most of this was relieved within 45 minutes.    Patient woke up this morning with vertigo (has history of vertigo).  She called her chiropractor who has been treating her for this and was told they wouldn't recommend chiropractic treatment so soon after her surgery. She was directed to her primary care physician if this does not resolve in the next day. She feels this vertigo has mostly resolved over the course of today, although she still has mild pain behind her right ear. I agreed that she should follow up with her primary care for these symptoms if they do not resolve.    Patient also has questions regarding holding her amantadine before DBS surgery. Will send a message to the DBS team and respond to patient with instructions.    Shanice Coyne RN, MPH  Research Nurse, Movement Disorders

## 2021-02-12 NOTE — TELEPHONE ENCOUNTER
Patient called this morning to state she was having vertigo again (got better yesterday but is back). Her vertigo is not any worse than previous flares. She is wondering what we would recommend.     She was told that she could get in to see PT on 2/17.     She shares that she has numbness in her left hand, which started yesterday. She thinks it might be improving. She said it feels like when she lays on her arm wrong. She states she hasn't had this before.    Patient is drinking and eating normally, denies respiratory or GI symptoms, denies fever and signs of infection, denies chest pain. She denies changes in speech (she had some difficult with speech earlier this week when her DBS was OFF, but this has resolved with turning it back on).    I encouraged her to keep her appointment with PT as they have treated her before. I reminded her that PT had recommended doing BIG exercises and staying active to help with her vestibular function. I asked her to do these if she can do them safely. She states that she has not been active lately and not working due to her surgeries, so she thinks this could be one reason why her dizziness has worsened. She had a post op appt with her cataract surgeon and has checked with them about the vertigo, they told her it was unrelated to surgery.    Pt has also visited her chiropractor in the past for vertigo treatment, I reminded her to tell her provider that she has DBS and to avoid jerking motions of the neck and avoid any TENS units or electric stimulation devices on or near her entire DBS system. Her provider can call the  for specific details.    I also let patient know that Zakiya Matthews RN responded about amantadine question. Pt can take her amantadine as normal the day before surgery but should not take it the day of surgery. Patient should hold her other PD meds for 12 hours prior to surgery. Pt states that she feels OFF after 12 hours so I told her she does not need  to hold them any longer.    I asked patient to call us back if her numbness in her hand worsens. Will update GUILLERMINA Desir, NP.    Shanice Coyne RN, MPH  Research Nurse, Movement Disorders

## 2021-02-12 NOTE — TELEPHONE ENCOUNTER
Shanice,   I agree with the plan.  PT for vertigo and monitor hand numbness.  I don't have additional recommendations.    Thank you.

## 2021-02-26 ENCOUNTER — TELEPHONE (OUTPATIENT)
Dept: NEUROSURGERY | Facility: CLINIC | Age: 61
End: 2021-02-26

## 2021-02-26 NOTE — TELEPHONE ENCOUNTER
Telephone Pre-op Teaching            Pre-op folder with specific written instructions    DBS lead placement 3/5/21 at 8:00 a.m. Arrive on unit 3C at 6:00 a.m.  DBS battery pacement 3/12/21 at 8:30 a.m. Arrive on unit 3C at 6:30 a.m.  Dr. Concepcion    Discussed via telephone pre-op routine and requirements to include:  surgical procedure, post-op recovery and expectations, need for H&P/labs (3/3/21 at 11:20 a.m., labs at 12:30 p.m.), NPO prior to OR, pre-op antibacterial showers, pain control and importance of follow-up visits. Pt confirmed she has discontinued aspirin. She understands to stope her PD medications the evening before lead placement so her PD symptoms are evident in the operating room. Pt states she plans to stop these meds at 4:00 p.m. Surgery scheduling will coordinate OR time/date and update patient as appropriate.  3C will call with more instructions 24-48 hour pre-op.   Ample time was provided for patient questions and in-depth discussion of topics of heightened interest.  Pt confirms she has two  four ounce bottle of antibacterial soap; discussed  specific instructions for use.  Patient verbalized understanding of instructions.  Approximately 15 minutes spent on the telephone with patient discussing and reviewing.  Patient provided triage contact number for questions or concerns that may arise prior to surgery.

## 2021-03-02 ENCOUNTER — AMBULATORY - HEALTHEAST (OUTPATIENT)
Dept: LAB | Facility: CLINIC | Age: 61
End: 2021-03-02

## 2021-03-02 DIAGNOSIS — Z11.59 SCREENING FOR VIRAL DISEASE: ICD-10-CM

## 2021-03-03 ENCOUNTER — OFFICE VISIT (OUTPATIENT)
Dept: FAMILY MEDICINE | Facility: CLINIC | Age: 61
End: 2021-03-03
Payer: MEDICARE

## 2021-03-03 ENCOUNTER — APPOINTMENT (OUTPATIENT)
Dept: LAB | Facility: CLINIC | Age: 61
End: 2021-03-03
Payer: MEDICARE

## 2021-03-03 VITALS
HEIGHT: 65 IN | WEIGHT: 241 LBS | DIASTOLIC BLOOD PRESSURE: 84 MMHG | TEMPERATURE: 97.9 F | BODY MASS INDEX: 40.15 KG/M2 | SYSTOLIC BLOOD PRESSURE: 138 MMHG | RESPIRATION RATE: 16 BRPM | OXYGEN SATURATION: 99 % | HEART RATE: 66 BPM

## 2021-03-03 DIAGNOSIS — Z11.59 ENCOUNTER FOR SCREENING FOR OTHER VIRAL DISEASES: ICD-10-CM

## 2021-03-03 DIAGNOSIS — G20.A1 PARKINSON'S DISEASE (H): Primary | ICD-10-CM

## 2021-03-03 DIAGNOSIS — Z01.818 PRE-OP EXAM: ICD-10-CM

## 2021-03-03 LAB
HGB BLD-MCNC: 14.9 G/DL (ref 11.7–15.7)
POTASSIUM SERPL-SCNC: 3.8 MMOL/L (ref 3.4–5.3)
SARS-COV-2 PCR COMMENT: NORMAL
SARS-COV-2 RNA SPEC QL NAA+PROBE: NEGATIVE
SARS-COV-2 VIRUS SPECIMEN SOURCE: NORMAL

## 2021-03-03 PROCEDURE — 99214 OFFICE O/P EST MOD 30 MIN: CPT | Performed by: NURSE PRACTITIONER

## 2021-03-03 PROCEDURE — 85018 HEMOGLOBIN: CPT | Performed by: PATHOLOGY

## 2021-03-03 PROCEDURE — 36415 COLL VENOUS BLD VENIPUNCTURE: CPT | Performed by: PATHOLOGY

## 2021-03-03 PROCEDURE — 84132 ASSAY OF SERUM POTASSIUM: CPT | Performed by: PATHOLOGY

## 2021-03-03 ASSESSMENT — PAIN SCALES - GENERAL: PAINLEVEL: NO PAIN (0)

## 2021-03-03 ASSESSMENT — MIFFLIN-ST. JEOR: SCORE: 1654.05

## 2021-03-03 NOTE — PROGRESS NOTES
[facility] [address] [address]  Phone: [phone]  Fax: [phone]  Primary Provider: Ondina Edmondson  Pre-op Performing Provider: EDDA DE LA TORRE    PREOPERATIVE EVALUATION:  Today's date: 3/3/2021    Erika Diaz is a 60 year old female who presents for a preoperative evaluation.    Surgical Information:  Surgery/Procedure: Deep Brain Stimulator Placement, Phase 2, placement of deep brain stimulator generator/battery over the left chest wall  Surgery Location: [facility]  Surgeon: Dr. Concepcion  Surgery Date: 3/5/21   Time of Surgery: 6:00am  Where patient plans to recover: At home with family  Fax number for surgical facility: Note does not need to be faxed, will be available electronically in Epic.    Type of Anesthesia Anticipated: General    Subjective     HPI related to upcoming procedure: Diagnosed with Parkinson's in 2009, this is her second DBS, she is in a medical trial.     Preop Questions 3/3/2021   1. Have you ever had a heart attack or stroke? No   2. Have you ever had surgery on your heart or blood vessels, such as a stent placement, a coronary artery bypass, or surgery on an artery in your head, neck, heart, or legs? No   3. Do you have chest pain with activity? No   4. Do you have a history of  heart failure? No   5. Do you currently have a cold, bronchitis or symptoms of other infection? No   6. Do you have a cough, shortness of breath, or wheezing? No   7. Do you or anyone in your family have previous history of blood clots? YES -Self    8. Do you or does anyone in your family have a serious bleeding problem such as prolonged bleeding following surgeries or cuts? No   9. Have you ever had problems with anemia or been told to take iron pills? No   10. Have you had any abnormal blood loss such as black, tarry or bloody stools, or abnormal vaginal bleeding? No   11. Have you ever had a blood transfusion? YES -     11a. Have you ever had a transfusion reaction? No   12. Are you willing to have a blood transfusion if it is medically needed before, during, or after your surgery? Yes   13. Have you or any of your relatives ever had problems with anesthesia? No   14. Do you have sleep apnea, excessive snoring or daytime drowsiness? YES - Drowsiness   14a. Do you have a CPAP machine? No   15. Do you have any artifical heart valves or other implanted medical devices like a pacemaker, defibrillator, or continuous glucose monitor? YES - DBS   15a. What type of device do you have? DBS   15b. Name of the clinic that manages your device:  [facility]   16. Do you have artificial joints? YES - Right Knee Replacement   17. Are you allergic to latex? No       Health Care Directive:  Patient does not have a Health Care Directive or Living Will: Advance Directive received and scanned. Click on Code in the patient header to view.    Preoperative Review of :   reviewed - no record of controlled substances prescribed.      Status of Chronic Conditions:  See problem list for active medical problems.  Problems all longstanding and stable, except as noted/documented.  See ROS for pertinent symptoms related to these conditions.      Review of Systems  Constitutional, neuro, ENT, endocrine, pulmonary, cardiac, gastrointestinal, genitourinary, musculoskeletal, integument and psychiatric systems are negative, except as otherwise noted.    Patient Active Problem List    Diagnosis Date Noted     Parkinson's disease (H) 11/17/2020     Priority: Medium     Added automatically from request for surgery [id number]       Morbid obesity (H) 11/12/2020     Priority: Medium     Benign paroxysmal positional vertigo, unspecified laterality 04/09/2020     Priority: Medium     Diabetes mellitus type 2, uncontrolled, without complications 03/04/2019     Priority: Medium     IMO Regulatory Load OCT 2020       Anticoagulation monitoring, INR range 2-3 02/07/2019      Priority: Medium     On bridging treatment with enoxaparin 01/31/2019     Priority: Medium     Numbness of left hand 01/20/2019     Priority: Medium     Unstable ankle, right 01/20/2019     Priority: Medium     Overview:   Surgery 1/18/2019 [facility]       S/P deep brain stimulator placement 08/21/2018     Priority: Medium     Encounter for neuropsychological testing 12/20/2017     Priority: Medium     Current results indicate variability in attention and memory, ranging from moderately impaired to superior, in some cases with greater difficulty on less complex tasks. Basic language, visual processing, and executive functioning fall within normal limits. Personality assessment is suggestive of somatization, and also raises the possibility of a thought disorder as well as a history of manic episodes. Bipolar affective disorder or schizoaffective disorder could be considered.     This pattern of performance does not reflect dementia at this time. The marked variability in measures of attention and memory suggest an underlying psychiatric etiology; medications and nonrestorative sleep may also contribute. As noted, there is an indication of somatization on personality assessment, as well as other psychiatric symptoms including possible somatic delusions and hypomanic episodes. This does not preclude the presence of an underlying neurologic disorder, but she is likely to experience increases in physical symptoms during times of stress, and there may be a psychological component to her somatic symptoms. It will be particularly important to rely on objective medical data as much as possible when making decisions regarding diagnosis and treatment.     Given the suggestion of possible untreated psychiatric illness, there are concerns regarding her candidacy for surgery. Review of her records indicates that she has been diagnosed with generalized anxiety disorder and panic disorder. Given the concerns raised of this  evaluation, she may benefit from referral to psychiatry for further evaluation and treatment recommendations. These psychiatric concerns would need to be addressed prior to further consideration of surgery from a neurocognitive and psychosocial perspective. Records indicate that she has established care recently with a health psychologist. Additionally, she has received care at various institutions. She stated that she was diagnosed with parkinsonism at [facility]. These records are not in her electronic medical records, and it may be helpful to obtain them for additional history.     In terms of daily functioning, Ms. Diaz s cognitive inefficiencies are not likely to interfere with her ability to actively participate in treatment or to manage her instrument activities of daily living independently. Given her variability in memory and attention, however, she may find it helpful to carry a small notepad in which record important information, or for others to provide her with written reminders or checklists if possible. She may work best in environments that are relatively free from distractions, such as noises or other interruptions. She may find it helpful to complete one task before beginning another. Results may serve as a baseline of her neurocognitive functioning. Repeated neuropsychological evaluation in one year may help to determine whether her cognitive difficulties progress, remit, or remain stable.        Migraine syndrome 12/01/2017     Priority: Medium     Lactose intolerance in adult 11/07/2017     Priority: Medium     Degenerative joint disease 11/07/2017     Priority: Medium     History of colonic polyps 11/07/2017     Priority: Medium     Hypercholesterolemia 11/07/2017     Priority: Medium     Essential hypertension 11/07/2017     Priority: Medium     Obesity 11/07/2017     Priority: Medium     PID (pelvic inflammatory disease) due to IUD 11/07/2017     Priority: Medium     Pulmonary emboli (H)  - coumadin lovenox 11/07/2017     Priority: Medium     Former smoker quit 2009 11/07/2017     Priority: Medium     Cervical high risk HPV (human papillomavirus) test positive 07/01/2017     Priority: Medium     Overview:   Formatting of this note may be different from the original.  07/24/2017: NIL / HPV positive  Cervical Cancer Screening History 7/24/2017   HPV Result (Beaker) Positive (A)   HPV Type 16 Negative   HPV Type 18 Negative   Other High Risk Types Positive (A)     9/2018- NIL/HPV negative    Plan: repeat Pap/HPV in 3 years (due 9/2021)  Overview:   Formatting of this note may be different from the original.  07/24/2017: NIL / HPV positive  Cervical Cancer Screening History 7/24/2017   HPV Result (Beaker) Positive (A)   HPV Type 16 Negative   HPV Type 18 Negative   Other High Risk Types Positive (A)     PLAN: Cotest in 1 year (DUE: 07/2018)       JASON (generalized anxiety disorder) 03/20/2017     Priority: Medium     Hyperlipidemia, unspecified 03/20/2017     Priority: Medium     Pain medication agreement signed 03/20/2017     Priority: Medium     Overview:   Lorazepam-Torgersen       Parkinsonism (H) 03/20/2017     Priority: Medium     Depressive disorder 12/06/2016     Priority: Medium     Pulmonary embolism (H) 07/24/2015     Priority: Medium     Osteoarthritis of knee, unilateral 07/15/2015     Priority: Medium     Abdominal pain 12/30/2010     Priority: Medium      Past Medical History:   Diagnosis Date     Benign paroxysmal positional vertigo of right ear      Degenerative joint disease 11/07/2017     Encounter for neuropsychological testing 12/20/2017    Current results indicate variability in attention and memory, ranging from moderately impaired to superior, in some cases with greater difficulty on less complex tasks. Basic language, visual processing, and executive functioning fall within normal limits. Personality assessment is suggestive of somatization, and also raises the possibility of a  thought disorder as well as a history of manic episo     JASON (generalized anxiety disorder)      History of colonic polyps 11/07/2017     HTN (hypertension) 11/07/2017     Hypercholesterolemia 11/07/2017     Lactose intolerance in adult 11/07/2017     Migraines      Obesity 11/07/2017     Parkinson disease (H)      PID (pelvic inflammatory disease) due to IUD 11/07/2017     Pulmonary emboli (H) 01/20/2019    Post Right Ankel Surgery     Pulmonary emboli (H) - coumadin lovenox 11/07/2017    provoked after knee replacement     Sensorineural hearing loss (SNHL) of right ear 12/2018     Trochanteric bursitis of left hip      Past Surgical History:   Procedure Laterality Date     adhesioloysis       CHOLECYSTECTOMY       COLONOSCOPY       IMPLANT DEEP BRAIN STIMULATION GENERATOR / BATTERY Right 8/30/2018    Procedure: IMPLANT DEEP BRAIN STIMULATION GENERATOR / BATTERY;  Deep Brain Stimulator Placement, Phase II, Placement Of Deep Brain Stimulator Generator/Battery Over The Right Chest Wall;  Surgeon: Jerry Concepcion MD;  Location: [facility] OR     JOINT REPLACEMENT Right     right partial knee replacement     LAPAROSCOPY      adhesioloysis     LAPAROSCOPY      supracervical hysterectomy     LAPAROTOMY EXPLORATORY Left     partial removal of left ovary     OPTICAL TRACKING SYSTEM INSERTION DEEP BRAIN STIMULATION Right 8/21/2018    Procedure: OPTICAL TRACKING SYSTEM INSERTION DEEP BRAIN STIMULATION;  Stealth Assisted Right Deep Brain Stimulator Placement, Phase I, Placement Of Right Side Deep Brain Stimulator Electrode, Target Right Globus Pallidus Internus With Microelectrode Recording;  Surgeon: Jerry Concepcion MD;  Location: [facility] OR     REMOVE INTRAUTERINE DEVICE  2006     salpingoopherectomy       XR FOOT SURGERY SENG RIGHT Right 01/18/2019     Current Outpatient Medications   Medication Sig Dispense Refill     Acetaminophen (TYLENOL PO) Take 1,000 mg by mouth every 8 hours as needed for mild pain or fever        "amantadine (SYMMETREL) 100 MG capsule 1 capsule orally @ 7am and 4pm 180 capsule 3     aspirin 81 MG EC tablet Take 81 mg by mouth At Bedtime        busPIRone (BUSPAR) 10 MG tablet Take 1 tablet (10 mg) by mouth 3 times daily 270 tablet 3     calcium carbonate (TUMS) 500 MG chewable tablet Take 2 chew tab by mouth as needed for heartburn       carbidopa-levodopa (SINEMET)  MG tablet 2 @ 7, 12 pm, 4 pm and 9 pm = 8 tabs per day 720 tablet 3     FLUoxetine (PROZAC) 20 MG capsule Take 20 mg by mouth every morning @ 7am 90 capsule 3     gabapentin (NEURONTIN) 100 MG capsule Take 2 capsules (200 mg) by mouth daily 180 capsule 3     hydrochlorothiazide (HYDRODIURIL) 25 MG tablet Take 25 mg by mouth At Bedtime @9 pm       LORazepam (ATIVAN) 0.5 MG tablet Take 1 tablet (0.5 mg) by mouth daily as needed for anxiety or sleep 30 tablet 5     pramipexole (MIRAPEX) 0.5 MG tablet 2 tabs orally @ 9 pm 180 tablet 3     rosuvastatin (CRESTOR) 5 MG tablet Take 5 mg by mouth daily         Allergies   Allergen Reactions     Atorvastatin Muscle Pain (Myalgia)     Other reaction(s): Myalgia  Per PCP note 18 - is to resume simvastatin - monitor for myalgia     Codeine Itching     Mold Unknown        Social History     Tobacco Use     Smoking status: Former Smoker     Packs/day: 0.50     Years: 20.00     Pack years: 10.00     Types: Cigarettes     Quit date: 2009     Years since quittin.5     Smokeless tobacco: Never Used   Substance Use Topics     Alcohol use: No     Family History   Problem Relation Age of Onset     Breast Cancer Mother      History   Drug Use No         Objective     /84 (BP Location: Right arm, Patient Position: Sitting, Cuff Size: Adult Large)   Pulse 66   Temp 97.9  F (36.6  C) (Oral)   Resp 16   Ht 1.651 m (5' 5\")   Wt 109.3 kg (241 lb)   SpO2 99%   BMI 40.10 kg/m      Physical Exam     GENERAL APPEARANCE: healthy, alert and no distress     EYES: EOMI, PERRL     HENT: ear canals and " TM's normal and nose and mouth without ulcers or lesions     NECK: no adenopathy, no asymmetry, masses, or scars and thyroid normal to palpation     RESP: lungs clear to auscultation - no rales, rhonchi or wheezes     CV: regular rates and rhythm, normal S1 S2, no S3 or S4 and no murmur, click or rub     ABDOMEN:  soft, nontender, no HSM or masses and bowel sounds normal     MS: extremities normal- no gross deformities noted, no evidence of inflammation in joints, FROM in all extremities.     SKIN: no suspicious lesions or rashes     PSYCH: mentation appears baseline, and affect normal/bright     LYMPHATICS: No cervical adenopathy    No results for input(s): HGB, PLT, INR, NA, POTASSIUM, CR, A1C in the last [id number] hours.     Diagnostics:  Labs pending at this time.  Results will be reviewed when available.   No EKG required, no history of coronary heart disease, significant arrhythmia, peripheral arterial disease or other structural heart disease.    Revised Cardiac Risk Index (RCRI):  The patient has the following serious cardiovascular risks for perioperative complications:   - No serious cardiac risks = 0 points     RCRI Interpretation: 0 points: Class I (very low risk - 0.4% complication rate)    Assessment & Plan   Parkinson's disease  Pre Op Exam    The proposed surgical procedure is considered LOW risk.    Risks and Recommendations:  The patient has the following additional risks and recommendations for perioperative complications:   - No identified additional risk factors other than previously addressed    Medication Instructions:  Patient is to take scheduled medications on the day of surgery as previously discussed with surgeon.    RECOMMENDATION:  APPROVAL GIVEN to proceed with proposed procedure, without further diagnostic evaluation.    Signed Electronically by: GUILLERMINA Cheek CNP    Copy of this evaluation report is provided to requesting physician.    [facility]  Guidelines    Revised Cardiac Risk Index

## 2021-03-03 NOTE — PATIENT INSTRUCTIONS

## 2021-03-03 NOTE — LETTER
3/3/2021       RE: Erika Diaz  [address] [address]     Dear Colleague,    Thank you for referring your patient, Erika Diaz, to the [facility] [place] [facility]. Please see a copy of my visit note below.    [facility] [address]  5TH FLOOR  [facility] [address]  Phone: [phone]  Fax: [phone]  Primary Provider: Ondina Edmondson  Pre-op Performing Provider: EDDA DE LA TORRE    PREOPERATIVE EVALUATION:  Today's date: 3/3/2021    Erika Diaz is a 60 year old female who presents for a preoperative evaluation.    Surgical Information:  Surgery/Procedure: Deep Brain Stimulator Placement, Phase 2, placement of deep brain stimulator generator/battery over the left chest wall  Surgery Location: [facility]  Surgeon: Dr. Concepcion  Surgery Date: 3/5/21   Time of Surgery: 6:00am  Where patient plans to recover: At home with family  Fax number for surgical facility: Note does not need to be faxed, will be available electronically in Epic.    Type of Anesthesia Anticipated: General    Subjective     HPI related to upcoming procedure: Diagnosed with Parkinson's in 2009, this is her second DBS, she is in a medical trial.     Preop Questions 3/3/2021   1. Have you ever had a heart attack or stroke? No   2. Have you ever had surgery on your heart or blood vessels, such as a stent placement, a coronary artery bypass, or surgery on an artery in your head, neck, heart, or legs? No   3. Do you have chest pain with activity? No   4. Do you have a history of  heart failure? No   5. Do you currently have a cold, bronchitis or symptoms of other infection? No   6. Do you have a cough, shortness of breath, or wheezing? No   7. Do you or anyone in your family have previous history of blood clots? YES -Self    8. Do you or does anyone in your family have  a serious bleeding problem such as prolonged bleeding following surgeries or cuts? No   9. Have you ever had problems with anemia or been told to take iron pills? No   10. Have you had any abnormal blood loss such as black, tarry or bloody stools, or abnormal vaginal bleeding? No   11. Have you ever had a blood transfusion? YES -    11a. Have you ever had a transfusion reaction? No   12. Are you willing to have a blood transfusion if it is medically needed before, during, or after your surgery? Yes   13. Have you or any of your relatives ever had problems with anesthesia? No   14. Do you have sleep apnea, excessive snoring or daytime drowsiness? YES - Drowsiness   14a. Do you have a CPAP machine? No   15. Do you have any artifical heart valves or other implanted medical devices like a pacemaker, defibrillator, or continuous glucose monitor? YES - DBS   15a. What type of device do you have? DBS   15b. Name of the clinic that manages your device:  [facility]   16. Do you have artificial joints? YES - Right Knee Replacement   17. Are you allergic to latex? No       Health Care Directive:  Patient does not have a Health Care Directive or Living Will: Advance Directive received and scanned. Click on Code in the patient header to view.    Preoperative Review of :   reviewed - no record of controlled substances prescribed.      Status of Chronic Conditions:  See problem list for active medical problems.  Problems all longstanding and stable, except as noted/documented.  See ROS for pertinent symptoms related to these conditions.      Review of Systems  Constitutional, neuro, ENT, endocrine, pulmonary, cardiac, gastrointestinal, genitourinary, musculoskeletal, integument and psychiatric systems are negative, except as otherwise noted.    Patient Active Problem List    Diagnosis Date Noted     Parkinson's disease (H) 11/17/2020     Priority: Medium     Added automatically from request for surgery [id number]       Morbid  obesity (H) 11/12/2020     Priority: Medium     Benign paroxysmal positional vertigo, unspecified laterality 04/09/2020     Priority: Medium     Diabetes mellitus type 2, uncontrolled, without complications 03/04/2019     Priority: Medium     IMO Regulatory Load OCT 2020       Anticoagulation monitoring, INR range 2-3 02/07/2019     Priority: Medium     On bridging treatment with enoxaparin 01/31/2019     Priority: Medium     Numbness of left hand 01/20/2019     Priority: Medium     Unstable ankle, right 01/20/2019     Priority: Medium     Overview:   Surgery 1/18/2019 [facility]. [facility]       S/KARON deep brain stimulator placement 08/21/2018     Priority: Medium     Encounter for neuropsychological testing 12/20/2017     Priority: Medium     Current results indicate variability in attention and memory, ranging from moderately impaired to superior, in some cases with greater difficulty on less complex tasks. Basic language, visual processing, and executive functioning fall within normal limits. Personality assessment is suggestive of somatization, and also raises the possibility of a thought disorder as well as a history of manic episodes. Bipolar affective disorder or schizoaffective disorder could be considered.     This pattern of performance does not reflect dementia at this time. The marked variability in measures of attention and memory suggest an underlying psychiatric etiology; medications and nonrestorative sleep may also contribute. As noted, there is an indication of somatization on personality assessment, as well as other psychiatric symptoms including possible somatic delusions and hypomanic episodes. This does not preclude the presence of an underlying neurologic disorder, but she is likely to experience increases in physical symptoms during times of stress, and there may be a psychological component to her somatic symptoms. It will be particularly important to rely on objective medical data as much as possible  when making decisions regarding diagnosis and treatment.     Given the suggestion of possible untreated psychiatric illness, there are concerns regarding her candidacy for surgery. Review of her records indicates that she has been diagnosed with generalized anxiety disorder and panic disorder. Given the concerns raised of this evaluation, she may benefit from referral to psychiatry for further evaluation and treatment recommendations. These psychiatric concerns would need to be addressed prior to further consideration of surgery from a neurocognitive and psychosocial perspective. Records indicate that she has established care recently with a health psychologist. Additionally, she has received care at various institutions. She stated that she was diagnosed with parkinsonism at [facility]. These records are not in her electronic medical records, and it may be helpful to obtain them for additional history.     In terms of daily functioning, Ms. Diaz s cognitive inefficiencies are not likely to interfere with her ability to actively participate in treatment or to manage her instrument activities of daily living independently. Given her variability in memory and attention, however, she may find it helpful to carry a small notepad in which record important information, or for others to provide her with written reminders or checklists if possible. She may work best in environments that are relatively free from distractions, such as noises or other interruptions. She may find it helpful to complete one task before beginning another. Results may serve as a baseline of her neurocognitive functioning. Repeated neuropsychological evaluation in one year may help to determine whether her cognitive difficulties progress, remit, or remain stable.        Migraine syndrome 12/01/2017     Priority: Medium     Lactose intolerance in adult 11/07/2017     Priority: Medium     Degenerative joint disease 11/07/2017     Priority: Medium      History of colonic polyps 11/07/2017     Priority: Medium     Hypercholesterolemia 11/07/2017     Priority: Medium     Essential hypertension 11/07/2017     Priority: Medium     Obesity 11/07/2017     Priority: Medium     PID (pelvic inflammatory disease) due to IUD 11/07/2017     Priority: Medium     Pulmonary emboli (H) - coumadin lovenox 11/07/2017     Priority: Medium     Former smoker quit 2009 11/07/2017     Priority: Medium     Cervical high risk HPV (human papillomavirus) test positive 07/01/2017     Priority: Medium     Overview:   Formatting of this note may be different from the original.  07/24/2017: NIL / HPV positive  Cervical Cancer Screening History 7/24/2017   HPV Result (Beaker) Positive (A)   HPV Type 16 Negative   HPV Type 18 Negative   Other High Risk Types Positive (A)     9/2018- NIL/HPV negative    Plan: repeat Pap/HPV in 3 years (due 9/2021)  Overview:   Formatting of this note may be different from the original.  07/24/2017: NIL / HPV positive  Cervical Cancer Screening History 7/24/2017   HPV Result (Beaker) Positive (A)   HPV Type 16 Negative   HPV Type 18 Negative   Other High Risk Types Positive (A)     PLAN: Cotest in 1 year (DUE: 07/2018)       JASON (generalized anxiety disorder) 03/20/2017     Priority: Medium     Hyperlipidemia, unspecified 03/20/2017     Priority: Medium     Pain medication agreement signed 03/20/2017     Priority: Medium     Overview:   Lorazepam-Torgersen       Parkinsonism (H) 03/20/2017     Priority: Medium     Depressive disorder 12/06/2016     Priority: Medium     Pulmonary embolism (H) 07/24/2015     Priority: Medium     Osteoarthritis of knee, unilateral 07/15/2015     Priority: Medium     Abdominal pain 12/30/2010     Priority: Medium      Past Medical History:   Diagnosis Date     Benign paroxysmal positional vertigo of right ear      Degenerative joint disease 11/07/2017     Encounter for neuropsychological testing 12/20/2017    Current results  indicate variability in attention and memory, ranging from moderately impaired to superior, in some cases with greater difficulty on less complex tasks. Basic language, visual processing, and executive functioning fall within normal limits. Personality assessment is suggestive of somatization, and also raises the possibility of a thought disorder as well as a history of manic episo     JASON (generalized anxiety disorder)      History of colonic polyps 11/07/2017     HTN (hypertension) 11/07/2017     Hypercholesterolemia 11/07/2017     Lactose intolerance in adult 11/07/2017     Migraines      Obesity 11/07/2017     Parkinson disease (H)      PID (pelvic inflammatory disease) due to IUD 11/07/2017     Pulmonary emboli (H) 01/20/2019    Post Right Ankel Surgery     Pulmonary emboli (H) - coumadin lovenox 11/07/2017    provoked after knee replacement     Sensorineural hearing loss (SNHL) of right ear 12/2018     Trochanteric bursitis of left hip      Past Surgical History:   Procedure Laterality Date     adhesioloysis       CHOLECYSTECTOMY       COLONOSCOPY       IMPLANT DEEP BRAIN STIMULATION GENERATOR / BATTERY Right 8/30/2018    Procedure: IMPLANT DEEP BRAIN STIMULATION GENERATOR / BATTERY;  Deep Brain Stimulator Placement, Phase II, Placement Of Deep Brain Stimulator Generator/Battery Over The Right Chest Wall;  Surgeon: Jerry Concepcion MD;  Location: [facility] OR     JOINT REPLACEMENT Right     right partial knee replacement     LAPAROSCOPY      adhesioloysis     LAPAROSCOPY      supracervical hysterectomy     LAPAROTOMY EXPLORATORY Left     partial removal of left ovary     OPTICAL TRACKING SYSTEM INSERTION DEEP BRAIN STIMULATION Right 8/21/2018    Procedure: OPTICAL TRACKING SYSTEM INSERTION DEEP BRAIN STIMULATION;  Stealth Assisted Right Deep Brain Stimulator Placement, Phase I, Placement Of Right Side Deep Brain Stimulator Electrode, Target Right Globus Pallidus Internus With Microelectrode Recording;   Surgeon: Jerry Concepcion MD;  Location: [facility] OR     REMOVE INTRAUTERINE DEVICE  2006     salpingoopherectomy       XR FOOT SURGERY SENG RIGHT Right 2019     Current Outpatient Medications   Medication Sig Dispense Refill     Acetaminophen (TYLENOL PO) Take 1,000 mg by mouth every 8 hours as needed for mild pain or fever       amantadine (SYMMETREL) 100 MG capsule 1 capsule orally @ 7am and 4pm 180 capsule 3     aspirin 81 MG EC tablet Take 81 mg by mouth At Bedtime        busPIRone (BUSPAR) 10 MG tablet Take 1 tablet (10 mg) by mouth 3 times daily 270 tablet 3     calcium carbonate (TUMS) 500 MG chewable tablet Take 2 chew tab by mouth as needed for heartburn       carbidopa-levodopa (SINEMET)  MG tablet 2 @ 7, 12 pm, 4 pm and 9 pm = 8 tabs per day 720 tablet 3     FLUoxetine (PROZAC) 20 MG capsule Take 20 mg by mouth every morning @ 7am 90 capsule 3     gabapentin (NEURONTIN) 100 MG capsule Take 2 capsules (200 mg) by mouth daily 180 capsule 3     hydrochlorothiazide (HYDRODIURIL) 25 MG tablet Take 25 mg by mouth At Bedtime @9 pm       LORazepam (ATIVAN) 0.5 MG tablet Take 1 tablet (0.5 mg) by mouth daily as needed for anxiety or sleep 30 tablet 5     pramipexole (MIRAPEX) 0.5 MG tablet 2 tabs orally @ 9 pm 180 tablet 3     rosuvastatin (CRESTOR) 5 MG tablet Take 5 mg by mouth daily         Allergies   Allergen Reactions     Atorvastatin Muscle Pain (Myalgia)     Other reaction(s): Myalgia  Per PCP note 18 - is to resume simvastatin - monitor for myalgia     Codeine Itching     Mold Unknown        Social History     Tobacco Use     Smoking status: Former Smoker     Packs/day: 0.50     Years: 20.00     Pack years: 10.00     Types: Cigarettes     Quit date: 2009     Years since quittin.5     Smokeless tobacco: Never Used   Substance Use Topics     Alcohol use: No     Family History   Problem Relation Age of Onset     Breast Cancer Mother      History   Drug Use No         Objective  "    /84 (BP Location: Right arm, Patient Position: Sitting, Cuff Size: Adult Large)   Pulse 66   Temp 97.9  F (36.6  C) (Oral)   Resp 16   Ht 1.651 m (5' 5\")   Wt 109.3 kg (241 lb)   SpO2 99%   BMI 40.10 kg/m      Physical Exam     GENERAL APPEARANCE: healthy, alert and no distress     EYES: EOMI, PERRL     HENT: ear canals and TM's normal and nose and mouth without ulcers or lesions     NECK: no adenopathy, no asymmetry, masses, or scars and thyroid normal to palpation     RESP: lungs clear to auscultation - no rales, rhonchi or wheezes     CV: regular rates and rhythm, normal S1 S2, no S3 or S4 and no murmur, click or rub     ABDOMEN:  soft, nontender, no HSM or masses and bowel sounds normal     MS: extremities normal- no gross deformities noted, no evidence of inflammation in joints, FROM in all extremities.     SKIN: no suspicious lesions or rashes     PSYCH: mentation appears baseline, and affect normal/bright     LYMPHATICS: No cervical adenopathy    No results for input(s): HGB, PLT, INR, NA, POTASSIUM, CR, A1C in the last 63526 hours.     Diagnostics:  Labs pending at this time.  Results will be reviewed when available.   No EKG required, no history of coronary heart disease, significant arrhythmia, peripheral arterial disease or other structural heart disease.    Revised Cardiac Risk Index (RCRI):  The patient has the following serious cardiovascular risks for perioperative complications:   - No serious cardiac risks = 0 points     RCRI Interpretation: 0 points: Class I (very low risk - 0.4% complication rate)    Assessment & Plan   Parkinson's disease  Pre Op Exam    The proposed surgical procedure is considered LOW risk.    Risks and Recommendations:  The patient has the following additional risks and recommendations for perioperative complications:   - No identified additional risk factors other than previously addressed    Medication Instructions:  Patient is to take scheduled medications " on the day of surgery as previously discussed with surgeon.    RECOMMENDATION:  APPROVAL GIVEN to proceed with proposed procedure, without further diagnostic evaluation.    Signed Electronically by: GUILLERMINA Cheek CNP    Copy of this evaluation report is provided to requesting physician.    [facility] Guidelines    Revised Cardiac Risk Index         Again, thank you for allowing me to participate in the care of your patient.      Sincerely,    Malou Trotter NP

## 2021-03-03 NOTE — NURSING NOTE
Chief Complaint   Patient presents with     Pre-Op Exam     Patient comes in for a pre op exam.          Bandar Fish MA on 3/3/2021 at 11:16 AM

## 2021-03-03 NOTE — LETTER
3/3/2021      RE: Erika Diaz  [address] [address]       [facility] [address]  5TH FLOOR  [facility] [address]  Phone: [phone]  Fax: [phone]  Primary Provider: Ondina Edmondson  Pre-op Performing Provider: EDDA DE LA TORRE    PREOPERATIVE EVALUATION:  Today's date: 3/3/2021    Erika Diaz is a 60 year old female who presents for a preoperative evaluation.    Surgical Information:  Surgery/Procedure: Deep Brain Stimulator Placement, Phase 2, placement of deep brain stimulator generator/battery over the left chest wall  Surgery Location: [facility]  Surgeon: Dr. Concepcion  Surgery Date: 3/5/21   Time of Surgery: 6:00am  Where patient plans to recover: At home with family  Fax number for surgical facility: Note does not need to be faxed, will be available electronically in Epic.    Type of Anesthesia Anticipated: General    Subjective     HPI related to upcoming procedure: Diagnosed with Parkinson's in 2009, this is her second DBS, she is in a medical trial.     Preop Questions 3/3/2021   1. Have you ever had a heart attack or stroke? No   2. Have you ever had surgery on your heart or blood vessels, such as a stent placement, a coronary artery bypass, or surgery on an artery in your head, neck, heart, or legs? No   3. Do you have chest pain with activity? No   4. Do you have a history of  heart failure? No   5. Do you currently have a cold, bronchitis or symptoms of other infection? No   6. Do you have a cough, shortness of breath, or wheezing? No   7. Do you or anyone in your family have previous history of blood clots? YES -Self    8. Do you or does anyone in your family have a serious bleeding problem such as prolonged bleeding following surgeries or cuts? No   9. Have you ever had problems with anemia or been told to take iron pills? No   10. Have you had any abnormal blood loss such as black, tarry or bloody  stools, or abnormal vaginal bleeding? No   11. Have you ever had a blood transfusion? YES -    11a. Have you ever had a transfusion reaction? No   12. Are you willing to have a blood transfusion if it is medically needed before, during, or after your surgery? Yes   13. Have you or any of your relatives ever had problems with anesthesia? No   14. Do you have sleep apnea, excessive snoring or daytime drowsiness? YES - Drowsiness   14a. Do you have a CPAP machine? No   15. Do you have any artifical heart valves or other implanted medical devices like a pacemaker, defibrillator, or continuous glucose monitor? YES - DBS   15a. What type of device do you have? DBS   15b. Name of the clinic that manages your device:  [facility]   16. Do you have artificial joints? YES - Right Knee Replacement   17. Are you allergic to latex? No       Health Care Directive:  Patient does not have a Health Care Directive or Living Will: Advance Directive received and scanned. Click on Code in the patient header to view.    Preoperative Review of :   reviewed - no record of controlled substances prescribed.      Status of Chronic Conditions:  See problem list for active medical problems.  Problems all longstanding and stable, except as noted/documented.  See ROS for pertinent symptoms related to these conditions.      Review of Systems  Constitutional, neuro, ENT, endocrine, pulmonary, cardiac, gastrointestinal, genitourinary, musculoskeletal, integument and psychiatric systems are negative, except as otherwise noted.    Patient Active Problem List    Diagnosis Date Noted     Parkinson's disease (H) 11/17/2020     Priority: Medium     Added automatically from request for surgery [id number]       Morbid obesity (H) 11/12/2020     Priority: Medium     Benign paroxysmal positional vertigo, unspecified laterality 04/09/2020     Priority: Medium     Diabetes mellitus type 2, uncontrolled, without complications 03/04/2019     Priority: Medium      IMO Regulatory Load OCT 2020       Anticoagulation monitoring, INR range 2-3 02/07/2019     Priority: Medium     On bridging treatment with enoxaparin 01/31/2019     Priority: Medium     Numbness of left hand 01/20/2019     Priority: Medium     Unstable ankle, right 01/20/2019     Priority: Medium     Overview:   Surgery 1/18/2019 Sr. Lock       S/P deep brain stimulator placement 08/21/2018     Priority: Medium     Encounter for neuropsychological testing 12/20/2017     Priority: Medium     Current results indicate variability in attention and memory, ranging from moderately impaired to superior, in some cases with greater difficulty on less complex tasks. Basic language, visual processing, and executive functioning fall within normal limits. Personality assessment is suggestive of somatization, and also raises the possibility of a thought disorder as well as a history of manic episodes. Bipolar affective disorder or schizoaffective disorder could be considered.     This pattern of performance does not reflect dementia at this time. The marked variability in measures of attention and memory suggest an underlying psychiatric etiology; medications and nonrestorative sleep may also contribute. As noted, there is an indication of somatization on personality assessment, as well as other psychiatric symptoms including possible somatic delusions and hypomanic episodes. This does not preclude the presence of an underlying neurologic disorder, but she is likely to experience increases in physical symptoms during times of stress, and there may be a psychological component to her somatic symptoms. It will be particularly important to rely on objective medical data as much as possible when making decisions regarding diagnosis and treatment.     Given the suggestion of possible untreated psychiatric illness, there are concerns regarding her candidacy for surgery. Review of her records indicates that she has been diagnosed  with generalized anxiety disorder and panic disorder. Given the concerns raised of this evaluation, she may benefit from referral to psychiatry for further evaluation and treatment recommendations. These psychiatric concerns would need to be addressed prior to further consideration of surgery from a neurocognitive and psychosocial perspective. Records indicate that she has established care recently with a health psychologist. Additionally, she has received care at various institutions. She stated that she was diagnosed with parkinsonism at [facility]. These records are not in her electronic medical records, and it may be helpful to obtain them for additional history.     In terms of daily functioning, Ms. Diaz s cognitive inefficiencies are not likely to interfere with her ability to actively participate in treatment or to manage her instrument activities of daily living independently. Given her variability in memory and attention, however, she may find it helpful to carry a small notepad in which record important information, or for others to provide her with written reminders or checklists if possible. She may work best in environments that are relatively free from distractions, such as noises or other interruptions. She may find it helpful to complete one task before beginning another. Results may serve as a baseline of her neurocognitive functioning. Repeated neuropsychological evaluation in one year may help to determine whether her cognitive difficulties progress, remit, or remain stable.        Migraine syndrome 12/01/2017     Priority: Medium     Lactose intolerance in adult 11/07/2017     Priority: Medium     Degenerative joint disease 11/07/2017     Priority: Medium     History of colonic polyps 11/07/2017     Priority: Medium     Hypercholesterolemia 11/07/2017     Priority: Medium     Essential hypertension 11/07/2017     Priority: Medium     Obesity 11/07/2017     Priority: Medium     PID (pelvic  inflammatory disease) due to IUD 11/07/2017     Priority: Medium     Pulmonary emboli (H) - coumadin lovenox 11/07/2017     Priority: Medium     Former smoker quit 2009 11/07/2017     Priority: Medium     Cervical high risk HPV (human papillomavirus) test positive 07/01/2017     Priority: Medium     Overview:   Formatting of this note may be different from the original.  07/24/2017: NIL / HPV positive  Cervical Cancer Screening History 7/24/2017   HPV Result (Beaker) Positive (A)   HPV Type 16 Negative   HPV Type 18 Negative   Other High Risk Types Positive (A)     9/2018- NIL/HPV negative    Plan: repeat Pap/HPV in 3 years (due 9/2021)  Overview:   Formatting of this note may be different from the original.  07/24/2017: NIL / HPV positive  Cervical Cancer Screening History 7/24/2017   HPV Result (Beaker) Positive (A)   HPV Type 16 Negative   HPV Type 18 Negative   Other High Risk Types Positive (A)     PLAN: Cotest in 1 year (DUE: 07/2018)       JASON (generalized anxiety disorder) 03/20/2017     Priority: Medium     Hyperlipidemia, unspecified 03/20/2017     Priority: Medium     Pain medication agreement signed 03/20/2017     Priority: Medium     Overview:   Lorazepam-Torgersen       Parkinsonism (H) 03/20/2017     Priority: Medium     Depressive disorder 12/06/2016     Priority: Medium     Pulmonary embolism (H) 07/24/2015     Priority: Medium     Osteoarthritis of knee, unilateral 07/15/2015     Priority: Medium     Abdominal pain 12/30/2010     Priority: Medium      Past Medical History:   Diagnosis Date     Benign paroxysmal positional vertigo of right ear      Degenerative joint disease 11/07/2017     Encounter for neuropsychological testing 12/20/2017    Current results indicate variability in attention and memory, ranging from moderately impaired to superior, in some cases with greater difficulty on less complex tasks. Basic language, visual processing, and executive functioning fall within normal limits.  Personality assessment is suggestive of somatization, and also raises the possibility of a thought disorder as well as a history of manic episo     JASON (generalized anxiety disorder)      History of colonic polyps 11/07/2017     HTN (hypertension) 11/07/2017     Hypercholesterolemia 11/07/2017     Lactose intolerance in adult 11/07/2017     Migraines      Obesity 11/07/2017     Parkinson disease (H)      PID (pelvic inflammatory disease) due to IUD 11/07/2017     Pulmonary emboli (H) 01/20/2019    Post Right Ankel Surgery     Pulmonary emboli (H) - coumadin lovenox 11/07/2017    provoked after knee replacement     Sensorineural hearing loss (SNHL) of right ear 12/2018     Trochanteric bursitis of left hip      Past Surgical History:   Procedure Laterality Date     adhesioloysis       CHOLECYSTECTOMY       COLONOSCOPY       IMPLANT DEEP BRAIN STIMULATION GENERATOR / BATTERY Right 8/30/2018    Procedure: IMPLANT DEEP BRAIN STIMULATION GENERATOR / BATTERY;  Deep Brain Stimulator Placement, Phase II, Placement Of Deep Brain Stimulator Generator/Battery Over The Right Chest Wall;  Surgeon: Jerry Concepcion MD;  Location: [facility] OR     JOINT REPLACEMENT Right     right partial knee replacement     LAPAROSCOPY      adhesioloysis     LAPAROSCOPY      supracervical hysterectomy     LAPAROTOMY EXPLORATORY Left     partial removal of left ovary     OPTICAL TRACKING SYSTEM INSERTION DEEP BRAIN STIMULATION Right 8/21/2018    Procedure: OPTICAL TRACKING SYSTEM INSERTION DEEP BRAIN STIMULATION;  Stealth Assisted Right Deep Brain Stimulator Placement, Phase I, Placement Of Right Side Deep Brain Stimulator Electrode, Target Right Globus Pallidus Internus With Microelectrode Recording;  Surgeon: Jerry Concepcion MD;  Location: [facility] OR     REMOVE INTRAUTERINE DEVICE  2006     salpingoopherectomy       XR FOOT SURGERY SENG RIGHT Right 01/18/2019     Current Outpatient Medications   Medication Sig Dispense Refill      "Acetaminophen (TYLENOL PO) Take 1,000 mg by mouth every 8 hours as needed for mild pain or fever       amantadine (SYMMETREL) 100 MG capsule 1 capsule orally @ 7am and 4pm 180 capsule 3     aspirin 81 MG EC tablet Take 81 mg by mouth At Bedtime        busPIRone (BUSPAR) 10 MG tablet Take 1 tablet (10 mg) by mouth 3 times daily 270 tablet 3     calcium carbonate (TUMS) 500 MG chewable tablet Take 2 chew tab by mouth as needed for heartburn       carbidopa-levodopa (SINEMET)  MG tablet 2 @ 7, 12 pm, 4 pm and 9 pm = 8 tabs per day 720 tablet 3     FLUoxetine (PROZAC) 20 MG capsule Take 20 mg by mouth every morning @ 7am 90 capsule 3     gabapentin (NEURONTIN) 100 MG capsule Take 2 capsules (200 mg) by mouth daily 180 capsule 3     hydrochlorothiazide (HYDRODIURIL) 25 MG tablet Take 25 mg by mouth At Bedtime @9 pm       LORazepam (ATIVAN) 0.5 MG tablet Take 1 tablet (0.5 mg) by mouth daily as needed for anxiety or sleep 30 tablet 5     pramipexole (MIRAPEX) 0.5 MG tablet 2 tabs orally @ 9 pm 180 tablet 3     rosuvastatin (CRESTOR) 5 MG tablet Take 5 mg by mouth daily         Allergies   Allergen Reactions     Atorvastatin Muscle Pain (Myalgia)     Other reaction(s): Myalgia  Per PCP note 18 - is to resume simvastatin - monitor for myalgia     Codeine Itching     Mold Unknown        Social History     Tobacco Use     Smoking status: Former Smoker     Packs/day: 0.50     Years: 20.00     Pack years: 10.00     Types: Cigarettes     Quit date: 2009     Years since quittin.5     Smokeless tobacco: Never Used   Substance Use Topics     Alcohol use: No     Family History   Problem Relation Age of Onset     Breast Cancer Mother      History   Drug Use No         Objective     /84 (BP Location: Right arm, Patient Position: Sitting, Cuff Size: Adult Large)   Pulse 66   Temp 97.9  F (36.6  C) (Oral)   Resp 16   Ht 1.651 m (5' 5\")   Wt 109.3 kg (241 lb)   SpO2 99%   BMI 40.10 kg/m      Physical " Exam     GENERAL APPEARANCE: healthy, alert and no distress     EYES: EOMI, PERRL     HENT: ear canals and TM's normal and nose and mouth without ulcers or lesions     NECK: no adenopathy, no asymmetry, masses, or scars and thyroid normal to palpation     RESP: lungs clear to auscultation - no rales, rhonchi or wheezes     CV: regular rates and rhythm, normal S1 S2, no S3 or S4 and no murmur, click or rub     ABDOMEN:  soft, nontender, no HSM or masses and bowel sounds normal     MS: extremities normal- no gross deformities noted, no evidence of inflammation in joints, FROM in all extremities.     SKIN: no suspicious lesions or rashes     PSYCH: mentation appears baseline, and affect normal/bright     LYMPHATICS: No cervical adenopathy    No results for input(s): HGB, PLT, INR, NA, POTASSIUM, CR, A1C in the last 28537 hours.     Diagnostics:  Labs pending at this time.  Results will be reviewed when available.   No EKG required, no history of coronary heart disease, significant arrhythmia, peripheral arterial disease or other structural heart disease.    Revised Cardiac Risk Index (RCRI):  The patient has the following serious cardiovascular risks for perioperative complications:   - No serious cardiac risks = 0 points     RCRI Interpretation: 0 points: Class I (very low risk - 0.4% complication rate)    Assessment & Plan   Parkinson's disease  Pre Op Exam    The proposed surgical procedure is considered LOW risk.    Risks and Recommendations:  The patient has the following additional risks and recommendations for perioperative complications:   - No identified additional risk factors other than previously addressed    Medication Instructions:  Patient is to take scheduled medications on the day of surgery as previously discussed with surgeon.    RECOMMENDATION:  APPROVAL GIVEN to proceed with proposed procedure, without further diagnostic evaluation.    Signed Electronically by: GUILLERMINA Cheek, CNP    Copy of  this evaluation report is provided to requesting physician.    Preop [facility] Preop Guidelines    Revised Cardiac Risk Index         Malou Trotter NP

## 2021-03-04 ENCOUNTER — COMMUNICATION - HEALTHEAST (OUTPATIENT)
Dept: SCHEDULING | Facility: CLINIC | Age: 61
End: 2021-03-04

## 2021-03-04 ENCOUNTER — ANESTHESIA EVENT (OUTPATIENT)
Dept: SURGERY | Facility: CLINIC | Age: 61
DRG: 026 | End: 2021-03-04
Payer: MEDICARE

## 2021-03-05 ENCOUNTER — HOSPITAL ENCOUNTER (INPATIENT)
Facility: CLINIC | Age: 61
LOS: 1 days | Discharge: HOME | DRG: 026 | End: 2021-03-06
Attending: NEUROLOGICAL SURGERY | Admitting: NEUROLOGICAL SURGERY
Payer: MEDICARE

## 2021-03-05 ENCOUNTER — APPOINTMENT (OUTPATIENT)
Dept: CT IMAGING | Facility: CLINIC | Age: 61
DRG: 026 | End: 2021-03-05
Attending: NEUROLOGICAL SURGERY
Payer: MEDICARE

## 2021-03-05 ENCOUNTER — ANESTHESIA (OUTPATIENT)
Dept: SURGERY | Facility: CLINIC | Age: 61
DRG: 026 | End: 2021-03-05
Payer: MEDICARE

## 2021-03-05 ENCOUNTER — APPOINTMENT (OUTPATIENT)
Dept: GENERAL RADIOLOGY | Facility: CLINIC | Age: 61
DRG: 026 | End: 2021-03-05
Attending: NEUROLOGICAL SURGERY
Payer: MEDICARE

## 2021-03-05 ENCOUNTER — HOSPITAL ENCOUNTER (OUTPATIENT)
Dept: CT IMAGING | Facility: CLINIC | Age: 61
DRG: 026 | End: 2021-03-05
Attending: NEUROLOGICAL SURGERY | Admitting: NEUROLOGICAL SURGERY
Payer: MEDICARE

## 2021-03-05 DIAGNOSIS — E66.01 MORBID OBESITY (H): ICD-10-CM

## 2021-03-05 DIAGNOSIS — G20.A1 PARKINSON'S DISEASE (H): ICD-10-CM

## 2021-03-05 DIAGNOSIS — Z96.89 S/P DEEP BRAIN STIMULATOR PLACEMENT: ICD-10-CM

## 2021-03-05 LAB
GLUCOSE BLDC GLUCOMTR-MCNC: 142 MG/DL (ref 70–99)
GLUCOSE BLDC GLUCOMTR-MCNC: 179 MG/DL (ref 70–99)

## 2021-03-05 PROCEDURE — 272N000004 HC RX 272: Performed by: NEUROLOGICAL SURGERY

## 2021-03-05 PROCEDURE — G1004 CDSM NDSC: HCPCS | Mod: GC | Performed by: STUDENT IN AN ORGANIZED HEALTH CARE EDUCATION/TRAINING PROGRAM

## 2021-03-05 PROCEDURE — 250N000011 HC RX IP 250 OP 636: Performed by: NEUROLOGICAL SURGERY

## 2021-03-05 PROCEDURE — 258N000003 HC RX IP 258 OP 636: Performed by: NURSE ANESTHETIST, CERTIFIED REGISTERED

## 2021-03-05 PROCEDURE — 710N000010 HC RECOVERY PHASE 1, LEVEL 2, PER MIN: Performed by: NEUROLOGICAL SURGERY

## 2021-03-05 PROCEDURE — C1778 LEAD, NEUROSTIMULATOR: HCPCS | Performed by: NEUROLOGICAL SURGERY

## 2021-03-05 PROCEDURE — 258N000003 HC RX IP 258 OP 636: Performed by: STUDENT IN AN ORGANIZED HEALTH CARE EDUCATION/TRAINING PROGRAM

## 2021-03-05 PROCEDURE — 250N000009 HC RX 250: Performed by: NEUROLOGICAL SURGERY

## 2021-03-05 PROCEDURE — 250N000009 HC RX 250: Performed by: NURSE ANESTHETIST, CERTIFIED REGISTERED

## 2021-03-05 PROCEDURE — 70250 X-RAY EXAM OF SKULL: CPT | Mod: 26 | Performed by: RADIOLOGY

## 2021-03-05 PROCEDURE — 999N000141 HC STATISTIC PRE-PROCEDURE NURSING ASSESSMENT: Performed by: NEUROLOGICAL SURGERY

## 2021-03-05 PROCEDURE — 250N000025 HC SEVOFLURANE, PER MIN: Performed by: NEUROLOGICAL SURGERY

## 2021-03-05 PROCEDURE — 70250 X-RAY EXAM OF SKULL: CPT

## 2021-03-05 PROCEDURE — 120N000002 HC R&B MED SURG/OB UMMC

## 2021-03-05 PROCEDURE — 999N000179 XR SURGERY CARM FLUORO LESS THAN 5 MIN W STILLS: Mod: TC

## 2021-03-05 PROCEDURE — 999N001017 HC STATISTIC GLUCOSE BY METER IP

## 2021-03-05 PROCEDURE — 250N000011 HC RX IP 250 OP 636: Performed by: ANESTHESIOLOGY

## 2021-03-05 PROCEDURE — 278N000051 HC OR IMPLANT GENERAL: Performed by: NEUROLOGICAL SURGERY

## 2021-03-05 PROCEDURE — 272N000001 HC OR GENERAL SUPPLY STERILE: Performed by: NEUROLOGICAL SURGERY

## 2021-03-05 PROCEDURE — 70450 CT HEAD/BRAIN W/O DYE: CPT | Mod: 26 | Performed by: RADIOLOGY

## 2021-03-05 PROCEDURE — 360N000086 HC SURGERY LEVEL 6 W/ FLUORO, PER MIN: Performed by: NEUROLOGICAL SURGERY

## 2021-03-05 PROCEDURE — 250N000011 HC RX IP 250 OP 636: Performed by: NURSE ANESTHETIST, CERTIFIED REGISTERED

## 2021-03-05 PROCEDURE — 70450 CT HEAD/BRAIN W/O DYE: CPT | Mod: MG,77

## 2021-03-05 PROCEDURE — G1004 CDSM NDSC: HCPCS | Mod: GC | Performed by: RADIOLOGY

## 2021-03-05 PROCEDURE — 00H03MZ INSERTION OF NEUROSTIMULATOR LEAD INTO BRAIN, PERCUTANEOUS APPROACH: ICD-10-PCS | Performed by: NEUROLOGICAL SURGERY

## 2021-03-05 PROCEDURE — 250N000013 HC RX MED GY IP 250 OP 250 PS 637: Performed by: STUDENT IN AN ORGANIZED HEALTH CARE EDUCATION/TRAINING PROGRAM

## 2021-03-05 PROCEDURE — 70450 CT HEAD/BRAIN W/O DYE: CPT | Mod: 26 | Performed by: STUDENT IN AN ORGANIZED HEALTH CARE EDUCATION/TRAINING PROGRAM

## 2021-03-05 PROCEDURE — C1883 ADAPT/EXT, PACING/NEURO LEAD: HCPCS | Performed by: NEUROLOGICAL SURGERY

## 2021-03-05 PROCEDURE — 250N000011 HC RX IP 250 OP 636: Performed by: STUDENT IN AN ORGANIZED HEALTH CARE EDUCATION/TRAINING PROGRAM

## 2021-03-05 PROCEDURE — 370N000017 HC ANESTHESIA TECHNICAL FEE, PER MIN: Performed by: NEUROLOGICAL SURGERY

## 2021-03-05 PROCEDURE — 70450 CT HEAD/BRAIN W/O DYE: CPT | Mod: MG

## 2021-03-05 PROCEDURE — 8E09XBG COMPUTER ASSISTED PROCEDURE OF HEAD AND NECK REGION, WITH COMPUTERIZED TOMOGRAPHY: ICD-10-PCS | Performed by: NEUROLOGICAL SURGERY

## 2021-03-05 DEVICE — IMP BUR HOLE COVER GUARDIAN 6010ANS: Type: IMPLANTABLE DEVICE | Site: CRANIAL | Status: FUNCTIONAL

## 2021-03-05 DEVICE — IMPLANTABLE DEVICE: Type: IMPLANTABLE DEVICE | Site: CRANIAL | Status: FUNCTIONAL

## 2021-03-05 DEVICE — KIT DBS LEAD DBS 8CH 40CM 1-3,3-1 SPACE 0.5 BLACK 6172ANS: Type: IMPLANTABLE DEVICE | Site: CRANIAL | Status: FUNCTIONAL

## 2021-03-05 RX ORDER — NALOXONE HYDROCHLORIDE 0.4 MG/ML
0.4 INJECTION, SOLUTION INTRAMUSCULAR; INTRAVENOUS; SUBCUTANEOUS
Status: DISCONTINUED | OUTPATIENT
Start: 2021-03-05 | End: 2021-03-06 | Stop reason: HOSPADM

## 2021-03-05 RX ORDER — MEPERIDINE HYDROCHLORIDE 25 MG/ML
12.5 INJECTION INTRAMUSCULAR; INTRAVENOUS; SUBCUTANEOUS
Status: DISCONTINUED | OUTPATIENT
Start: 2021-03-05 | End: 2021-03-05 | Stop reason: HOSPADM

## 2021-03-05 RX ORDER — AMOXICILLIN 250 MG
1 CAPSULE ORAL 2 TIMES DAILY
Qty: 28 TABLET | Refills: 0 | Status: SHIPPED | OUTPATIENT
Start: 2021-03-05 | End: 2021-03-19

## 2021-03-05 RX ORDER — PROPOFOL 10 MG/ML
INJECTION, EMULSION INTRAVENOUS CONTINUOUS PRN
Status: DISCONTINUED | OUTPATIENT
Start: 2021-03-05 | End: 2021-03-05

## 2021-03-05 RX ORDER — SODIUM CHLORIDE, SODIUM LACTATE, POTASSIUM CHLORIDE, CALCIUM CHLORIDE 600; 310; 30; 20 MG/100ML; MG/100ML; MG/100ML; MG/100ML
INJECTION, SOLUTION INTRAVENOUS CONTINUOUS PRN
Status: DISCONTINUED | OUTPATIENT
Start: 2021-03-05 | End: 2021-03-05

## 2021-03-05 RX ORDER — ROSUVASTATIN CALCIUM 5 MG/1
5 TABLET, COATED ORAL EVERY EVENING
Status: DISCONTINUED | OUTPATIENT
Start: 2021-03-05 | End: 2021-03-06 | Stop reason: HOSPADM

## 2021-03-05 RX ORDER — SODIUM CHLORIDE 9 MG/ML
INJECTION, SOLUTION INTRAVENOUS CONTINUOUS
Status: ACTIVE | OUTPATIENT
Start: 2021-03-05 | End: 2021-03-06

## 2021-03-05 RX ORDER — LIDOCAINE 40 MG/G
CREAM TOPICAL
Status: DISCONTINUED | OUTPATIENT
Start: 2021-03-05 | End: 2021-03-05 | Stop reason: HOSPADM

## 2021-03-05 RX ORDER — SODIUM CHLORIDE, SODIUM LACTATE, POTASSIUM CHLORIDE, CALCIUM CHLORIDE 600; 310; 30; 20 MG/100ML; MG/100ML; MG/100ML; MG/100ML
INJECTION, SOLUTION INTRAVENOUS CONTINUOUS
Status: DISCONTINUED | OUTPATIENT
Start: 2021-03-05 | End: 2021-03-05 | Stop reason: HOSPADM

## 2021-03-05 RX ORDER — ACETAMINOPHEN 325 MG/1
975 TABLET ORAL ONCE
Status: DISCONTINUED | OUTPATIENT
Start: 2021-03-05 | End: 2021-03-05 | Stop reason: HOSPADM

## 2021-03-05 RX ORDER — LABETALOL HYDROCHLORIDE 5 MG/ML
10-40 INJECTION, SOLUTION INTRAVENOUS EVERY 10 MIN PRN
Status: DISCONTINUED | OUTPATIENT
Start: 2021-03-05 | End: 2021-03-06 | Stop reason: HOSPADM

## 2021-03-05 RX ORDER — AMOXICILLIN 250 MG
2 CAPSULE ORAL 2 TIMES DAILY
Status: DISCONTINUED | OUTPATIENT
Start: 2021-03-05 | End: 2021-03-06 | Stop reason: HOSPADM

## 2021-03-05 RX ORDER — NALOXONE HYDROCHLORIDE 0.4 MG/ML
0.2 INJECTION, SOLUTION INTRAMUSCULAR; INTRAVENOUS; SUBCUTANEOUS
Status: DISCONTINUED | OUTPATIENT
Start: 2021-03-05 | End: 2021-03-05 | Stop reason: HOSPADM

## 2021-03-05 RX ORDER — ONDANSETRON 2 MG/ML
4 INJECTION INTRAMUSCULAR; INTRAVENOUS EVERY 6 HOURS PRN
Status: DISCONTINUED | OUTPATIENT
Start: 2021-03-05 | End: 2021-03-06 | Stop reason: HOSPADM

## 2021-03-05 RX ORDER — NALOXONE HYDROCHLORIDE 0.4 MG/ML
0.2 INJECTION, SOLUTION INTRAMUSCULAR; INTRAVENOUS; SUBCUTANEOUS
Status: DISCONTINUED | OUTPATIENT
Start: 2021-03-05 | End: 2021-03-06 | Stop reason: HOSPADM

## 2021-03-05 RX ORDER — PROPOFOL 10 MG/ML
INJECTION, EMULSION INTRAVENOUS PRN
Status: DISCONTINUED | OUTPATIENT
Start: 2021-03-05 | End: 2021-03-05

## 2021-03-05 RX ORDER — BUSPIRONE HYDROCHLORIDE 10 MG/1
10 TABLET ORAL 3 TIMES DAILY
Status: DISCONTINUED | OUTPATIENT
Start: 2021-03-05 | End: 2021-03-06 | Stop reason: HOSPADM

## 2021-03-05 RX ORDER — DEXMEDETOMIDINE HYDROCHLORIDE 4 UG/ML
0.2-1.2 INJECTION, SOLUTION INTRAVENOUS CONTINUOUS
Status: DISCONTINUED | OUTPATIENT
Start: 2021-03-05 | End: 2021-03-05

## 2021-03-05 RX ORDER — NALOXONE HYDROCHLORIDE 0.4 MG/ML
0.4 INJECTION, SOLUTION INTRAMUSCULAR; INTRAVENOUS; SUBCUTANEOUS
Status: DISCONTINUED | OUTPATIENT
Start: 2021-03-05 | End: 2021-03-05 | Stop reason: HOSPADM

## 2021-03-05 RX ORDER — OXYCODONE HYDROCHLORIDE 5 MG/1
5-10 TABLET ORAL
Qty: 20 TABLET | Refills: 0 | Status: ON HOLD | OUTPATIENT
Start: 2021-03-05 | End: 2021-03-12

## 2021-03-05 RX ORDER — PROCHLORPERAZINE MALEATE 5 MG
10 TABLET ORAL EVERY 6 HOURS PRN
Status: DISCONTINUED | OUTPATIENT
Start: 2021-03-05 | End: 2021-03-06 | Stop reason: HOSPADM

## 2021-03-05 RX ORDER — DIAZEPAM 10 MG/2ML
2.5 INJECTION, SOLUTION INTRAMUSCULAR; INTRAVENOUS
Status: DISCONTINUED | OUTPATIENT
Start: 2021-03-05 | End: 2021-03-05 | Stop reason: HOSPADM

## 2021-03-05 RX ORDER — HYDROMORPHONE HYDROCHLORIDE 1 MG/ML
.3-.5 INJECTION, SOLUTION INTRAMUSCULAR; INTRAVENOUS; SUBCUTANEOUS EVERY 10 MIN PRN
Status: DISCONTINUED | OUTPATIENT
Start: 2021-03-05 | End: 2021-03-05 | Stop reason: HOSPADM

## 2021-03-05 RX ORDER — LABETALOL HYDROCHLORIDE 5 MG/ML
INJECTION, SOLUTION INTRAVENOUS PRN
Status: DISCONTINUED | OUTPATIENT
Start: 2021-03-05 | End: 2021-03-05

## 2021-03-05 RX ORDER — LIDOCAINE 4 G/G
1 PATCH TOPICAL
Status: DISCONTINUED | OUTPATIENT
Start: 2021-03-06 | End: 2021-03-05

## 2021-03-05 RX ORDER — AMANTADINE HYDROCHLORIDE 100 MG/1
100 CAPSULE, GELATIN COATED ORAL
Status: DISCONTINUED | OUTPATIENT
Start: 2021-03-05 | End: 2021-03-06 | Stop reason: HOSPADM

## 2021-03-05 RX ORDER — CEFAZOLIN SODIUM 2 G/100ML
2 INJECTION, SOLUTION INTRAVENOUS
Status: COMPLETED | OUTPATIENT
Start: 2021-03-05 | End: 2021-03-05

## 2021-03-05 RX ORDER — LORAZEPAM 0.5 MG/1
0.5 TABLET ORAL DAILY PRN
Status: DISCONTINUED | OUTPATIENT
Start: 2021-03-05 | End: 2021-03-06 | Stop reason: HOSPADM

## 2021-03-05 RX ORDER — FENTANYL CITRATE 50 UG/ML
INJECTION, SOLUTION INTRAMUSCULAR; INTRAVENOUS PRN
Status: DISCONTINUED | OUTPATIENT
Start: 2021-03-05 | End: 2021-03-05

## 2021-03-05 RX ORDER — AMOXICILLIN 250 MG
1 CAPSULE ORAL 2 TIMES DAILY
Status: DISCONTINUED | OUTPATIENT
Start: 2021-03-05 | End: 2021-03-06 | Stop reason: HOSPADM

## 2021-03-05 RX ORDER — ONDANSETRON 2 MG/ML
4 INJECTION INTRAMUSCULAR; INTRAVENOUS EVERY 30 MIN PRN
Status: DISCONTINUED | OUTPATIENT
Start: 2021-03-05 | End: 2021-03-05 | Stop reason: HOSPADM

## 2021-03-05 RX ORDER — LIDOCAINE HYDROCHLORIDE AND EPINEPHRINE 10; 10 MG/ML; UG/ML
INJECTION, SOLUTION INFILTRATION; PERINEURAL PRN
Status: DISCONTINUED | OUTPATIENT
Start: 2021-03-05 | End: 2021-03-05

## 2021-03-05 RX ORDER — CEFAZOLIN SODIUM 1 G/3ML
1 INJECTION, POWDER, FOR SOLUTION INTRAMUSCULAR; INTRAVENOUS SEE ADMIN INSTRUCTIONS
Status: DISCONTINUED | OUTPATIENT
Start: 2021-03-05 | End: 2021-03-05 | Stop reason: HOSPADM

## 2021-03-05 RX ORDER — HYDRALAZINE HYDROCHLORIDE 20 MG/ML
2.5-5 INJECTION INTRAMUSCULAR; INTRAVENOUS EVERY 10 MIN PRN
Status: DISCONTINUED | OUTPATIENT
Start: 2021-03-05 | End: 2021-03-05 | Stop reason: HOSPADM

## 2021-03-05 RX ORDER — ONDANSETRON 4 MG/1
4 TABLET, ORALLY DISINTEGRATING ORAL EVERY 30 MIN PRN
Status: DISCONTINUED | OUTPATIENT
Start: 2021-03-05 | End: 2021-03-05 | Stop reason: HOSPADM

## 2021-03-05 RX ORDER — EPHEDRINE SULFATE 50 MG/ML
INJECTION, SOLUTION INTRAMUSCULAR; INTRAVENOUS; SUBCUTANEOUS PRN
Status: DISCONTINUED | OUTPATIENT
Start: 2021-03-05 | End: 2021-03-05

## 2021-03-05 RX ORDER — ONDANSETRON 2 MG/ML
INJECTION INTRAMUSCULAR; INTRAVENOUS PRN
Status: DISCONTINUED | OUTPATIENT
Start: 2021-03-05 | End: 2021-03-05

## 2021-03-05 RX ORDER — OXYCODONE HYDROCHLORIDE 5 MG/1
5-10 TABLET ORAL
Status: DISCONTINUED | OUTPATIENT
Start: 2021-03-05 | End: 2021-03-06

## 2021-03-05 RX ORDER — FENTANYL CITRATE 50 UG/ML
25-50 INJECTION, SOLUTION INTRAMUSCULAR; INTRAVENOUS
Status: DISCONTINUED | OUTPATIENT
Start: 2021-03-05 | End: 2021-03-05 | Stop reason: HOSPADM

## 2021-03-05 RX ORDER — HYDROCHLOROTHIAZIDE 25 MG/1
25 TABLET ORAL AT BEDTIME
Status: DISCONTINUED | OUTPATIENT
Start: 2021-03-05 | End: 2021-03-06 | Stop reason: HOSPADM

## 2021-03-05 RX ORDER — LIDOCAINE 40 MG/G
CREAM TOPICAL
Status: DISCONTINUED | OUTPATIENT
Start: 2021-03-05 | End: 2021-03-06 | Stop reason: HOSPADM

## 2021-03-05 RX ORDER — POLYETHYLENE GLYCOL 3350 17 G/17G
17 POWDER, FOR SOLUTION ORAL DAILY
Qty: 510 G | Refills: 0 | Status: SHIPPED | OUTPATIENT
Start: 2021-03-05 | End: 2022-08-29

## 2021-03-05 RX ORDER — CALCIUM CARBONATE 500 MG/1
1000 TABLET, CHEWABLE ORAL 2 TIMES DAILY PRN
Status: DISCONTINUED | OUTPATIENT
Start: 2021-03-05 | End: 2021-03-06 | Stop reason: HOSPADM

## 2021-03-05 RX ORDER — PRAMIPEXOLE DIHYDROCHLORIDE 1 MG/1
1 TABLET ORAL AT BEDTIME
Status: DISCONTINUED | OUTPATIENT
Start: 2021-03-05 | End: 2021-03-06 | Stop reason: HOSPADM

## 2021-03-05 RX ORDER — ACETAMINOPHEN 325 MG/1
650 TABLET ORAL EVERY 4 HOURS PRN
Status: DISCONTINUED | OUTPATIENT
Start: 2021-03-05 | End: 2021-03-06 | Stop reason: HOSPADM

## 2021-03-05 RX ORDER — DEXAMETHASONE SODIUM PHOSPHATE 4 MG/ML
4 INJECTION, SOLUTION INTRA-ARTICULAR; INTRALESIONAL; INTRAMUSCULAR; INTRAVENOUS; SOFT TISSUE EVERY 4 HOURS PRN
Status: DISCONTINUED | OUTPATIENT
Start: 2021-03-05 | End: 2021-03-05 | Stop reason: HOSPADM

## 2021-03-05 RX ORDER — PROCHLORPERAZINE 25 MG
25 SUPPOSITORY, RECTAL RECTAL EVERY 12 HOURS PRN
Status: DISCONTINUED | OUTPATIENT
Start: 2021-03-05 | End: 2021-03-06 | Stop reason: HOSPADM

## 2021-03-05 RX ORDER — HYDRALAZINE HYDROCHLORIDE 20 MG/ML
INJECTION INTRAMUSCULAR; INTRAVENOUS PRN
Status: DISCONTINUED | OUTPATIENT
Start: 2021-03-05 | End: 2021-03-05

## 2021-03-05 RX ORDER — POLYETHYLENE GLYCOL 3350 17 G/17G
17 POWDER, FOR SOLUTION ORAL DAILY
Status: DISCONTINUED | OUTPATIENT
Start: 2021-03-05 | End: 2021-03-06 | Stop reason: HOSPADM

## 2021-03-05 RX ORDER — ONDANSETRON 4 MG/1
4 TABLET, ORALLY DISINTEGRATING ORAL EVERY 6 HOURS PRN
Status: DISCONTINUED | OUTPATIENT
Start: 2021-03-05 | End: 2021-03-06 | Stop reason: HOSPADM

## 2021-03-05 RX ORDER — HYDRALAZINE HYDROCHLORIDE 20 MG/ML
10-20 INJECTION INTRAMUSCULAR; INTRAVENOUS EVERY 30 MIN PRN
Status: DISCONTINUED | OUTPATIENT
Start: 2021-03-05 | End: 2021-03-06 | Stop reason: HOSPADM

## 2021-03-05 RX ORDER — CEFAZOLIN SODIUM 2 G/100ML
2 INJECTION, SOLUTION INTRAVENOUS EVERY 8 HOURS
Status: DISCONTINUED | OUTPATIENT
Start: 2021-03-05 | End: 2021-03-06 | Stop reason: HOSPADM

## 2021-03-05 RX ORDER — GABAPENTIN 100 MG/1
200 CAPSULE ORAL DAILY
Status: DISCONTINUED | OUTPATIENT
Start: 2021-03-05 | End: 2021-03-06 | Stop reason: HOSPADM

## 2021-03-05 RX ORDER — DEXAMETHASONE SODIUM PHOSPHATE 4 MG/ML
INJECTION, SOLUTION INTRA-ARTICULAR; INTRALESIONAL; INTRAMUSCULAR; INTRAVENOUS; SOFT TISSUE PRN
Status: DISCONTINUED | OUTPATIENT
Start: 2021-03-05 | End: 2021-03-05

## 2021-03-05 RX ORDER — CARBIDOPA AND LEVODOPA 25; 100 MG/1; MG/1
2 TABLET ORAL
Status: DISCONTINUED | OUTPATIENT
Start: 2021-03-05 | End: 2021-03-06 | Stop reason: HOSPADM

## 2021-03-05 RX ADMIN — SODIUM CHLORIDE, POTASSIUM CHLORIDE, SODIUM LACTATE AND CALCIUM CHLORIDE: 600; 310; 30; 20 INJECTION, SOLUTION INTRAVENOUS at 08:00

## 2021-03-05 RX ADMIN — PROCHLORPERAZINE EDISYLATE 10 MG: 5 INJECTION INTRAMUSCULAR; INTRAVENOUS at 22:01

## 2021-03-05 RX ADMIN — FENTANYL CITRATE 50 MCG: 50 INJECTION, SOLUTION INTRAMUSCULAR; INTRAVENOUS at 19:48

## 2021-03-05 RX ADMIN — LABETALOL HYDROCHLORIDE 5 MG: 5 INJECTION INTRAVENOUS at 13:27

## 2021-03-05 RX ADMIN — DEXMEDETOMIDINE HYDROCHLORIDE 8 MCG: 100 INJECTION, SOLUTION INTRAVENOUS at 08:05

## 2021-03-05 RX ADMIN — DOCUSATE SODIUM 50 MG AND SENNOSIDES 8.6 MG 2 TABLET: 8.6; 5 TABLET, FILM COATED ORAL at 21:12

## 2021-03-05 RX ADMIN — POLYETHYLENE GLYCOL 3350 17 G: 17 POWDER, FOR SOLUTION ORAL at 21:13

## 2021-03-05 RX ADMIN — HYDRALAZINE HYDROCHLORIDE 4 MG: 20 INJECTION INTRAMUSCULAR; INTRAVENOUS at 13:20

## 2021-03-05 RX ADMIN — DEXMEDETOMIDINE HYDROCHLORIDE 8 MCG: 100 INJECTION, SOLUTION INTRAVENOUS at 14:33

## 2021-03-05 RX ADMIN — Medication 1 G: at 15:55

## 2021-03-05 RX ADMIN — ROSUVASTATIN CALCIUM 5 MG: 5 TABLET, FILM COATED ORAL at 21:12

## 2021-03-05 RX ADMIN — SODIUM CHLORIDE: 9 INJECTION, SOLUTION INTRAVENOUS at 18:54

## 2021-03-05 RX ADMIN — Medication 5 MG: at 15:26

## 2021-03-05 RX ADMIN — PROPOFOL 50 MG: 10 INJECTION, EMULSION INTRAVENOUS at 08:15

## 2021-03-05 RX ADMIN — PROPOFOL 30 MG: 10 INJECTION, EMULSION INTRAVENOUS at 17:25

## 2021-03-05 RX ADMIN — Medication 5 MG: at 14:44

## 2021-03-05 RX ADMIN — HYDRALAZINE HYDROCHLORIDE 4 MG: 20 INJECTION INTRAMUSCULAR; INTRAVENOUS at 10:30

## 2021-03-05 RX ADMIN — PROPOFOL 20 MG: 10 INJECTION, EMULSION INTRAVENOUS at 14:33

## 2021-03-05 RX ADMIN — Medication 10 MG: at 15:46

## 2021-03-05 RX ADMIN — HYDRALAZINE HYDROCHLORIDE 2 MG: 20 INJECTION INTRAMUSCULAR; INTRAVENOUS at 10:37

## 2021-03-05 RX ADMIN — DEXMEDETOMIDINE HYDROCHLORIDE 8 MCG: 100 INJECTION, SOLUTION INTRAVENOUS at 08:20

## 2021-03-05 RX ADMIN — BUSPIRONE HYDROCHLORIDE 10 MG: 10 TABLET ORAL at 21:12

## 2021-03-05 RX ADMIN — FENTANYL CITRATE 25 MCG: 50 INJECTION, SOLUTION INTRAMUSCULAR; INTRAVENOUS at 14:29

## 2021-03-05 RX ADMIN — FENTANYL CITRATE 25 MCG: 50 INJECTION, SOLUTION INTRAMUSCULAR; INTRAVENOUS at 14:31

## 2021-03-05 RX ADMIN — DEXMEDETOMIDINE HYDROCHLORIDE 8 MCG: 100 INJECTION, SOLUTION INTRAVENOUS at 08:15

## 2021-03-05 RX ADMIN — HYDROCHLOROTHIAZIDE 25 MG: 25 TABLET ORAL at 21:13

## 2021-03-05 RX ADMIN — LORAZEPAM 0.5 MG: 0.5 TABLET ORAL at 23:44

## 2021-03-05 RX ADMIN — AMANTADINE HYDROCHLORIDE 100 MG: 100 CAPSULE ORAL at 21:12

## 2021-03-05 RX ADMIN — Medication 1 G: at 11:55

## 2021-03-05 RX ADMIN — ACETAMINOPHEN 650 MG: 325 TABLET, FILM COATED ORAL at 23:44

## 2021-03-05 RX ADMIN — Medication 2 G: at 09:55

## 2021-03-05 RX ADMIN — PROPOFOL 50 MG: 10 INJECTION, EMULSION INTRAVENOUS at 17:33

## 2021-03-05 RX ADMIN — PROPOFOL 120 MG: 10 INJECTION, EMULSION INTRAVENOUS at 15:20

## 2021-03-05 RX ADMIN — PROPOFOL 40 MG: 10 INJECTION, EMULSION INTRAVENOUS at 17:29

## 2021-03-05 RX ADMIN — ACETAMINOPHEN 650 MG: 325 TABLET, FILM COATED ORAL at 18:56

## 2021-03-05 RX ADMIN — Medication 1 G: at 13:55

## 2021-03-05 RX ADMIN — GABAPENTIN 200 MG: 100 CAPSULE ORAL at 21:13

## 2021-03-05 RX ADMIN — LABETALOL HYDROCHLORIDE 5 MG: 5 INJECTION INTRAVENOUS at 14:05

## 2021-03-05 RX ADMIN — PROPOFOL 30 MG: 10 INJECTION, EMULSION INTRAVENOUS at 08:25

## 2021-03-05 RX ADMIN — HYDRALAZINE HYDROCHLORIDE 2 MG: 20 INJECTION INTRAMUSCULAR; INTRAVENOUS at 13:28

## 2021-03-05 RX ADMIN — Medication 10 MG: at 16:31

## 2021-03-05 RX ADMIN — SODIUM CHLORIDE, POTASSIUM CHLORIDE, SODIUM LACTATE AND CALCIUM CHLORIDE: 600; 310; 30; 20 INJECTION, SOLUTION INTRAVENOUS at 13:35

## 2021-03-05 RX ADMIN — PROPOFOL 30 MG: 10 INJECTION, EMULSION INTRAVENOUS at 17:20

## 2021-03-05 RX ADMIN — SUGAMMADEX 200 MG: 100 INJECTION, SOLUTION INTRAVENOUS at 17:38

## 2021-03-05 RX ADMIN — OXYCODONE HYDROCHLORIDE 5 MG: 5 TABLET ORAL at 22:02

## 2021-03-05 RX ADMIN — LABETALOL HYDROCHLORIDE 5 MG: 5 INJECTION INTRAVENOUS at 10:25

## 2021-03-05 RX ADMIN — ROCURONIUM BROMIDE 50 MG: 10 INJECTION INTRAVENOUS at 15:20

## 2021-03-05 RX ADMIN — LABETALOL HYDROCHLORIDE 5 MG: 5 INJECTION INTRAVENOUS at 13:42

## 2021-03-05 RX ADMIN — Medication 5 MG: at 14:40

## 2021-03-05 RX ADMIN — DEXAMETHASONE SODIUM PHOSPHATE 6 MG: 4 INJECTION, SOLUTION INTRA-ARTICULAR; INTRALESIONAL; INTRAMUSCULAR; INTRAVENOUS; SOFT TISSUE at 15:31

## 2021-03-05 RX ADMIN — DEXMEDETOMIDINE 1 MCG/KG/HR: 100 INJECTION, SOLUTION, CONCENTRATE INTRAVENOUS at 14:24

## 2021-03-05 RX ADMIN — DEXMEDETOMIDINE HYDROCHLORIDE 8 MCG: 100 INJECTION, SOLUTION INTRAVENOUS at 14:24

## 2021-03-05 RX ADMIN — FENTANYL CITRATE 25 MCG: 50 INJECTION, SOLUTION INTRAMUSCULAR; INTRAVENOUS at 18:49

## 2021-03-05 RX ADMIN — PROPOFOL 50 MG: 10 INJECTION, EMULSION INTRAVENOUS at 08:20

## 2021-03-05 RX ADMIN — CEFAZOLIN SODIUM 2 G: 2 INJECTION, SOLUTION INTRAVENOUS at 21:13

## 2021-03-05 RX ADMIN — FENTANYL CITRATE 50 MCG: 50 INJECTION, SOLUTION INTRAMUSCULAR; INTRAVENOUS at 15:20

## 2021-03-05 RX ADMIN — ONDANSETRON 4 MG: 2 INJECTION INTRAMUSCULAR; INTRAVENOUS at 15:36

## 2021-03-05 RX ADMIN — DEXMEDETOMIDINE HYDROCHLORIDE 8 MCG: 100 INJECTION, SOLUTION INTRAVENOUS at 08:10

## 2021-03-05 RX ADMIN — PRAMIPEXOLE DIHYDROCHLORIDE 1 MG: 1 TABLET ORAL at 21:12

## 2021-03-05 RX ADMIN — HYDRALAZINE HYDROCHLORIDE 2 MG: 20 INJECTION INTRAMUSCULAR; INTRAVENOUS at 14:07

## 2021-03-05 RX ADMIN — PROPOFOL 100 MCG/KG/MIN: 10 INJECTION, EMULSION INTRAVENOUS at 08:11

## 2021-03-05 RX ADMIN — SODIUM CHLORIDE, POTASSIUM CHLORIDE, SODIUM LACTATE AND CALCIUM CHLORIDE: 600; 310; 30; 20 INJECTION, SOLUTION INTRAVENOUS at 17:32

## 2021-03-05 RX ADMIN — ONDANSETRON 4 MG: 2 INJECTION INTRAMUSCULAR; INTRAVENOUS at 19:28

## 2021-03-05 RX ADMIN — CARBIDOPA AND LEVODOPA 2 TABLET: 25; 100 TABLET ORAL at 21:13

## 2021-03-05 ASSESSMENT — VISUAL ACUITY: OU: NORMAL ACUITY

## 2021-03-05 ASSESSMENT — MIFFLIN-ST. JEOR: SCORE: 1670.88

## 2021-03-05 NOTE — ANESTHESIA PROCEDURE NOTES
Airway   Date/Time: 3/5/2021 3:22 PM   Patient location during procedure: OR  Staff -   CRNA: Zandra Stephen APRN CRNA  Performed By: CRNA    Consent for Airway   Urgency: elective    Indications and Patient Condition  Indications for airway management: kennedi-procedural  Induction type:intravenousMask difficulty assessment: 1 - vent by mask    Final Airway Details  Final airway type: endotracheal airway  Successful airway:ETT - single  Endotracheal Airway Details   ETT size (mm): 7.0  Cuffed: yes  Successful intubation technique: direct laryngoscopy  Adjucts: stylet  Measured from: gums/teeth  Secured at (cm): 22  Secured with: pink tape  Bite block used: Soft    Post intubation assessment   Placement verified by: capnometry, equal breath sounds and chest rise   Secured with:pink tape  Dentition: Intact and Unchanged

## 2021-03-05 NOTE — ANESTHESIA PREPROCEDURE EVALUATION
Anesthesia Pre-Procedure Evaluation    Patient: Erika Diaz   MRN: [id number] : 1960        Preoperative Diagnosis: Parkinson's disease (H) [G20]  S/P deep brain stimulator placement [Z96.89]  Morbid obesity (H) [E66.01]   Procedure : Procedure(s):  stealth assisted Left side deep brain stimulator placement, phase I, placement of left side deep brain stimulator electrode, target left globus pallidus internus, with microelectrode recording and placement of extension and externalization wires for UDALL research protocol     Past Medical History:   Diagnosis Date     Benign paroxysmal positional vertigo of right ear      Degenerative joint disease 2017     Encounter for neuropsychological testing 2017    Current results indicate variability in attention and memory, ranging from moderately impaired to superior, in some cases with greater difficulty on less complex tasks. Basic language, visual processing, and executive functioning fall within normal limits. Personality assessment is suggestive of somatization, and also raises the possibility of a thought disorder as well as a history of manic episo     JASON (generalized anxiety disorder)      History of colonic polyps 2017     HTN (hypertension) 2017     Hypercholesterolemia 2017     Lactose intolerance in adult 2017     Migraines      Obesity 2017     Parkinson disease (H)      PID (pelvic inflammatory disease) due to IUD 2017     Pulmonary emboli (H) 2019    Post Right Ankel Surgery     Pulmonary emboli (H) - coumadin lovenox 2017    provoked after knee replacement     Sensorineural hearing loss (SNHL) of right ear 2018     Trochanteric bursitis of left hip       Past Surgical History:   Procedure Laterality Date     adhesioloysis       CHOLECYSTECTOMY       COLONOSCOPY       IMPLANT DEEP BRAIN STIMULATION GENERATOR / BATTERY Right 2018    Procedure: IMPLANT DEEP BRAIN STIMULATION  GENERATOR / BATTERY;  Deep Brain Stimulator Placement, Phase II, Placement Of Deep Brain Stimulator Generator/Battery Over The Right Chest Wall;  Surgeon: Jerry Concepcion MD;  Location: [facility] OR     JOINT REPLACEMENT Right     right partial knee replacement     LAPAROSCOPY      adhesioloysis     LAPAROSCOPY      supracervical hysterectomy     LAPAROTOMY EXPLORATORY Left     partial removal of left ovary     OPTICAL TRACKING SYSTEM INSERTION DEEP BRAIN STIMULATION Right 2018    Procedure: OPTICAL TRACKING SYSTEM INSERTION DEEP BRAIN STIMULATION;  Stealth Assisted Right Deep Brain Stimulator Placement, Phase I, Placement Of Right Side Deep Brain Stimulator Electrode, Target Right Globus Pallidus Internus With Microelectrode Recording;  Surgeon: Jerry Concepcion MD;  Location: [facility] OR     REMOVE INTRAUTERINE DEVICE  2006     salpingoopherectomy       XR FOOT SURGERY SENG RIGHT Right 2019      Allergies   Allergen Reactions     Atorvastatin Muscle Pain (Myalgia)     Other reaction(s): Myalgia  Per PCP note 18 - is to resume simvastatin - monitor for myalgia     Codeine Itching     Mold Unknown      Social History     Tobacco Use     Smoking status: Former Smoker     Packs/day: 0.50     Years: 20.00     Pack years: 10.00     Types: Cigarettes     Quit date: 2009     Years since quittin.5     Smokeless tobacco: Never Used   Substance Use Topics     Alcohol use: No      Wt Readings from Last 1 Encounters:   21 109.3 kg (241 lb)        Anesthesia Evaluation            ROS/MED HX  ENT/Pulmonary:  - neg pulmonary ROS     Neurologic:     (+) Parkinson's disease,     Cardiovascular:     (+) hypertension----- (-) CAD   METS/Exercise Tolerance: 3 - Able to walk 1-2 blocks without stopping    Hematologic:  - neg hematologic  ROS     Musculoskeletal:  - neg musculoskeletal ROS     GI/Hepatic:  - neg GI/hepatic ROS     Renal/Genitourinary:  - neg Renal ROS     Endo:     (+) type I DM,  Obesity,     Psychiatric/Substance Use:  - neg psychiatric ROS     Infectious Disease:  - neg infectious disease ROS     Malignancy:  - neg malignancy ROS     Other:  - neg other ROS          Physical Exam    Airway        Mallampati: II   TM distance: > 3 FB   Neck ROM: full   Mouth opening: > 3 cm    Respiratory Devices and Support         Dental  no notable dental history         Cardiovascular   cardiovascular exam normal          Pulmonary   pulmonary exam normal                OUTSIDE LABS:  CBC:   Lab Results   Component Value Date    WBC 6.7 12/26/2018    WBC 5.2 08/21/2018    HGB 14.9 03/03/2021    HGB 15.1 12/26/2018    HCT 46.0 12/26/2018    HCT 45.7 08/21/2018     12/26/2018     08/21/2018     BMP:   Lab Results   Component Value Date     12/26/2018     08/21/2018    POTASSIUM 3.8 03/03/2021    POTASSIUM 3.6 12/26/2018    CHLORIDE 108 12/26/2018    CHLORIDE 106 08/21/2018    CO2 27 12/26/2018    CO2 28 08/21/2018    BUN 15 12/26/2018    BUN 17 08/21/2018    CR 0.80 12/26/2018    CR 0.75 08/30/2018    GLC 98 12/26/2018     (H) 08/21/2018     COAGS:   Lab Results   Component Value Date    PTT 28 08/21/2018    INR 1.05 08/30/2018     POC:   Lab Results   Component Value Date     (H) 03/05/2021     HEPATIC:   Lab Results   Component Value Date    ALBUMIN 3.5 12/26/2018    PROTTOTAL 6.9 12/26/2018    ALT 30 12/26/2018    AST 33 12/26/2018    ALKPHOS 100 12/26/2018    BILITOTAL 0.5 12/26/2018     OTHER:   Lab Results   Component Value Date    AVANI 8.4 (L) 12/26/2018       Anesthesia Plan    ASA Status:  2   NPO Status:  NPO Appropriate    Anesthesia Type: General.     - Airway: ETT   Induction: Propofol.   Maintenance: Balanced.   Techniques and Equipment:     - Airway: Video-Laryngoscope     - Lines/Monitors: 2nd IV     Consents    Anesthesia Plan(s) and associated risks, benefits, and realistic alternatives discussed. Questions answered and patient/representative(s)  expressed understanding.     - Discussed with:  Patient      - Extended Intubation/Ventilatory Support Discussed: No.      - Patient is DNR/DNI Status: No    Use of blood products discussed: No .     Postoperative Care    Pain management: IV analgesics.   PONV prophylaxis: Ondansetron (or other 5HT-3), Dexamethasone or Solumedrol     Comments:                Vignesh Thomas MD

## 2021-03-05 NOTE — ANESTHESIA CARE TRANSFER NOTE
Patient: Erika Diaz    Procedure(s):  stealth assisted Left side deep brain stimulator placement, phase I, placement of left side deep brain stimulator electrode, target left globus pallidus internus, with microelectrode recording and placement of extension and externalization wires for [place] research protocol    Diagnosis: Parkinson's disease (H) [G20]  S/P deep brain stimulator placement [Z96.89]  Morbid obesity (H) [E66.01]  Diagnosis Additional Information: No value filed.    Anesthesia Type:   General     Note:    Oropharynx: oropharynx clear of all foreign objects  Level of Consciousness: drowsy  Oxygen Supplementation: nasal cannula  Level of Supplemental Oxygen (L/min / FiO2): 3 LPM  Independent Airway: airway patency satisfactory and stable  Dentition: dentition unchanged  Vital Signs Stable: post-procedure vital signs reviewed and stable  Report to RN Given: handoff report given  Patient transferred to: PACU    Handoff Report: Identifed the Patient, Identified the Reponsible Provider, Reviewed the pertinent medical history, Discussed the surgical course, Reviewed Intra-OP anesthesia mangement and issues during anesthesia, Set expectations for post-procedure period and Allowed opportunity for questions and acknowledgement of understanding      Vitals: (Last set prior to Anesthesia Care Transfer)  CRNA VITALS  3/5/2021 1716 - 3/5/2021 1753      3/5/2021             Ht Rate:  63    SpO2:  97 %    Resp Rate (observed):  (!) 7        Electronically Signed By: GUILLERMINA Hummel CRNA  March 5, 2021  5:53 PM

## 2021-03-06 VITALS
RESPIRATION RATE: 16 BRPM | WEIGHT: 244.71 LBS | HEIGHT: 65 IN | DIASTOLIC BLOOD PRESSURE: 63 MMHG | SYSTOLIC BLOOD PRESSURE: 108 MMHG | TEMPERATURE: 96.5 F | OXYGEN SATURATION: 98 % | BODY MASS INDEX: 40.77 KG/M2 | HEART RATE: 61 BPM

## 2021-03-06 LAB
ANION GAP SERPL CALCULATED.3IONS-SCNC: 6 MMOL/L (ref 3–14)
BUN SERPL-MCNC: 12 MG/DL (ref 7–30)
CALCIUM SERPL-MCNC: 8.6 MG/DL (ref 8.5–10.1)
CHLORIDE SERPL-SCNC: 109 MMOL/L (ref 94–109)
CO2 SERPL-SCNC: 27 MMOL/L (ref 20–32)
CREAT SERPL-MCNC: 0.63 MG/DL (ref 0.52–1.04)
ERYTHROCYTE [DISTWIDTH] IN BLOOD BY AUTOMATED COUNT: 14.1 % (ref 10–15)
ERYTHROCYTE [DISTWIDTH] IN BLOOD BY AUTOMATED COUNT: NORMAL % (ref 10–15)
GFR SERPL CREATININE-BSD FRML MDRD: >90 ML/MIN/{1.73_M2}
GLUCOSE BLDC GLUCOMTR-MCNC: 172 MG/DL (ref 70–99)
GLUCOSE SERPL-MCNC: 167 MG/DL (ref 70–99)
HCT VFR BLD AUTO: 42.4 % (ref 35–47)
HCT VFR BLD AUTO: NORMAL % (ref 35–47)
HGB BLD-MCNC: 13.9 G/DL (ref 11.7–15.7)
HGB BLD-MCNC: NORMAL G/DL (ref 11.7–15.7)
MAGNESIUM SERPL-MCNC: 2 MG/DL (ref 1.6–2.3)
MAGNESIUM SERPL-MCNC: 2.2 MG/DL (ref 1.6–2.3)
MCH RBC QN AUTO: 29.1 PG (ref 26.5–33)
MCH RBC QN AUTO: NORMAL PG (ref 26.5–33)
MCHC RBC AUTO-ENTMCNC: 32.8 G/DL (ref 31.5–36.5)
MCHC RBC AUTO-ENTMCNC: NORMAL G/DL (ref 31.5–36.5)
MCV RBC AUTO: 89 FL (ref 78–100)
MCV RBC AUTO: NORMAL FL (ref 78–100)
PHOSPHATE SERPL-MCNC: 3 MG/DL (ref 2.5–4.5)
PHOSPHATE SERPL-MCNC: 3.4 MG/DL (ref 2.5–4.5)
PLATELET # BLD AUTO: 166 10E9/L (ref 150–450)
PLATELET # BLD AUTO: NORMAL 10E9/L (ref 150–450)
POTASSIUM SERPL-SCNC: 3.6 MMOL/L (ref 3.4–5.3)
POTASSIUM SERPL-SCNC: 3.6 MMOL/L (ref 3.4–5.3)
RBC # BLD AUTO: 4.78 10E12/L (ref 3.8–5.2)
RBC # BLD AUTO: NORMAL 10E12/L (ref 3.8–5.2)
SODIUM SERPL-SCNC: 142 MMOL/L (ref 133–144)
WBC # BLD AUTO: 12.2 10E9/L (ref 4–11)
WBC # BLD AUTO: NORMAL 10E9/L (ref 4–11)

## 2021-03-06 PROCEDURE — 84132 ASSAY OF SERUM POTASSIUM: CPT | Performed by: STUDENT IN AN ORGANIZED HEALTH CARE EDUCATION/TRAINING PROGRAM

## 2021-03-06 PROCEDURE — 250N000013 HC RX MED GY IP 250 OP 250 PS 637: Performed by: STUDENT IN AN ORGANIZED HEALTH CARE EDUCATION/TRAINING PROGRAM

## 2021-03-06 PROCEDURE — 85027 COMPLETE CBC AUTOMATED: CPT | Performed by: STUDENT IN AN ORGANIZED HEALTH CARE EDUCATION/TRAINING PROGRAM

## 2021-03-06 PROCEDURE — 36415 COLL VENOUS BLD VENIPUNCTURE: CPT | Performed by: STUDENT IN AN ORGANIZED HEALTH CARE EDUCATION/TRAINING PROGRAM

## 2021-03-06 PROCEDURE — 83735 ASSAY OF MAGNESIUM: CPT | Performed by: STUDENT IN AN ORGANIZED HEALTH CARE EDUCATION/TRAINING PROGRAM

## 2021-03-06 PROCEDURE — 250N000011 HC RX IP 250 OP 636: Performed by: STUDENT IN AN ORGANIZED HEALTH CARE EDUCATION/TRAINING PROGRAM

## 2021-03-06 PROCEDURE — 999N001017 HC STATISTIC GLUCOSE BY METER IP

## 2021-03-06 PROCEDURE — 80048 BASIC METABOLIC PNL TOTAL CA: CPT | Performed by: STUDENT IN AN ORGANIZED HEALTH CARE EDUCATION/TRAINING PROGRAM

## 2021-03-06 PROCEDURE — 84100 ASSAY OF PHOSPHORUS: CPT | Performed by: STUDENT IN AN ORGANIZED HEALTH CARE EDUCATION/TRAINING PROGRAM

## 2021-03-06 RX ORDER — CEPHALEXIN 500 MG/1
500 CAPSULE ORAL 3 TIMES DAILY
Qty: 42 CAPSULE | Refills: 0 | Status: ON HOLD | OUTPATIENT
Start: 2021-03-06 | End: 2021-03-12

## 2021-03-06 RX ORDER — OXYCODONE HYDROCHLORIDE 5 MG/1
5-10 TABLET ORAL EVERY 6 HOURS PRN
Status: DISCONTINUED | OUTPATIENT
Start: 2021-03-06 | End: 2021-03-06 | Stop reason: HOSPADM

## 2021-03-06 RX ADMIN — CARBIDOPA AND LEVODOPA 2 TABLET: 25; 100 TABLET ORAL at 08:20

## 2021-03-06 RX ADMIN — POLYETHYLENE GLYCOL 3350 17 G: 17 POWDER, FOR SOLUTION ORAL at 08:22

## 2021-03-06 RX ADMIN — OXYCODONE HYDROCHLORIDE 10 MG: 5 TABLET ORAL at 02:07

## 2021-03-06 RX ADMIN — OXYCODONE HYDROCHLORIDE 5 MG: 5 TABLET ORAL at 09:42

## 2021-03-06 RX ADMIN — AMANTADINE HYDROCHLORIDE 100 MG: 100 CAPSULE ORAL at 08:21

## 2021-03-06 RX ADMIN — ACETAMINOPHEN 650 MG: 325 TABLET, FILM COATED ORAL at 08:21

## 2021-03-06 RX ADMIN — ACETAMINOPHEN 650 MG: 325 TABLET, FILM COATED ORAL at 04:16

## 2021-03-06 RX ADMIN — OXYCODONE HYDROCHLORIDE 10 MG: 5 TABLET ORAL at 05:17

## 2021-03-06 RX ADMIN — BUSPIRONE HYDROCHLORIDE 10 MG: 10 TABLET ORAL at 08:15

## 2021-03-06 RX ADMIN — FLUOXETINE 20 MG: 20 CAPSULE ORAL at 08:15

## 2021-03-06 RX ADMIN — DOCUSATE SODIUM 50 MG AND SENNOSIDES 8.6 MG 2 TABLET: 8.6; 5 TABLET, FILM COATED ORAL at 08:15

## 2021-03-06 RX ADMIN — CEFAZOLIN SODIUM 2 G: 2 INJECTION, SOLUTION INTRAVENOUS at 05:17

## 2021-03-06 RX ADMIN — GABAPENTIN 200 MG: 100 CAPSULE ORAL at 08:15

## 2021-03-06 ASSESSMENT — VISUAL ACUITY: OU: NORMAL ACUITY

## 2021-03-06 ASSESSMENT — ACTIVITIES OF DAILY LIVING (ADL)
ADLS_ACUITY_SCORE: 13

## 2021-03-06 NOTE — PLAN OF CARE
Arrived from: PACU   Belongings/meds: 4 bags of belongings from PACU, pt declined offer for items in security.    2 RN Skin Assessment Completed by: Dena PAZ RN and Minna IRIZARRY RN   Non-intact findings documented (yes/no/NA):  -Dry, cracked callous on ball of L foot   -Abd incisions, covered w/ tegaderm and gauze.   -L chest incision, JUAN, erythema, no drainage  -L upper arm bruising  -2 anterior head incisions, covered w/ Primapore  -2 posterior head incisions, JUAN  -Top head incision, JUAN

## 2021-03-06 NOTE — ANESTHESIA POSTPROCEDURE EVALUATION
Patient: Erika Diaz    Procedure(s):  stealth assisted Left side deep brain stimulator placement, phase I, placement of left side deep brain stimulator electrode, target left globus pallidus internus, with microelectrode recording and placement of extension and externalization wires for [place] research protocol    Diagnosis:Parkinson's disease (H) [G20]  S/P deep brain stimulator placement [Z96.89]  Morbid obesity (H) [E66.01]  Diagnosis Additional Information: No value filed.    Anesthesia Type:  General    Note:  Disposition: Admission   Postop Pain Control: Uneventful            Sign Out: Well controlled pain   PONV: No   Neuro/Psych: Uneventful            Sign Out: Acceptable/Baseline neuro status   Airway/Respiratory: Uneventful            Sign Out: Acceptable/Baseline resp. status   CV/Hemodynamics: Uneventful            Sign Out: Acceptable CV status   Other NRE: NONE   DID A NON-ROUTINE EVENT OCCUR? No         Last vitals:  Vitals:    03/05/21 0700 03/05/21 1753 03/05/21 1800   BP: 137/70 (!) 87/61    Pulse: 78 64    Resp: 18 18 22   Temp: 36.7  C (98.1  F)  37.1  C (98.7  F)   SpO2: 99% 96%        Last vitals prior to Anesthesia Care Transfer:  CRNA VITALS  3/5/2021 1716 - 3/5/2021 1816      3/5/2021             Ht Rate:  63    SpO2:  97 %    Resp Rate (observed):  (!) 7          Electronically Signed By: Leonid Mcgregor MD  March 5, 2021  7:26 PM

## 2021-03-06 NOTE — PLAN OF CARE
Pt admitted for DBS placement. VSS on RA. Neuros unchanged - generalized weakness. Pain well controlled with Tylenol and Oxycodone x 1. Up with assist of 1, GV. Discharge instructions explained to pt with verbalization of understanding. Discharge meds retrieved from discharge pharmacy prior to leaving. Pt left at 1115 with all belongings in hand to return home with family .

## 2021-03-06 NOTE — BRIEF OP NOTE
[facility]    Brief Operative Note    Pre-operative diagnosis: Parkinson's disease (H) [G20]  S/P deep brain stimulator placement [Z96.89]  Morbid obesity (H) [E66.01]  Post-operative diagnosis Same as pre-operative diagnosis    Procedure: Procedure(s):  stealth assisted Left side deep brain stimulator placement, phase I, placement of left side deep brain stimulator electrode, target left globus pallidus internus, with microelectrode recording and placement of extension and externalization wires for [place] research protocol  Surgeon: Surgeon(s) and Role:     * Jerry Concepcion MD - Primary     * Greg Putnam MD - Resident - Assisting  Anesthesia: Combined MAC with Local   Estimated blood loss: 25 mL  Drains: None  Specimens: * No specimens in log *  Findings:   Placement of left-sided DBS electrode to GPi. Lead tunneled to left chest wall and externalized at upper middle abdomen.  All impedances normal throughout case..  Complications: None.  Implants:   Implant Name Type Inv. Item Serial No.  Lot No. LRB No. Used Action   IMP BUR HOLE COVER GUARDIAN [id number] Metallic Hardware/[place] IMP BUR HOLE COVER GUARDIAN 6010ANS  [employer] [id number] Left 1 Implanted   KIT DBS LEAD DBS 8CH 40CM 1-3,3-1 SPACE 0.5 BLACK 6172ANS Leads KIT DBS LEAD DBS 8CH 40CM 1-3,3-1 SPACE 0.5 BLACK [id number] [employer]  Left 1 Implanted   LEAD EXTENSION W/EXTEND TECHNOLOGY 60CM 6372ANS Leads LEAD EXTENSION W/EXTEND TECHNOLOGY 60CM [id number] [id number] [employer]  Left 1 Implanted   Extension, 30cm    [employer] [id number] Left 1 Implanted       Greg Putnam M.D.  Neurosurgery Resident, PGY-2    Please contact neurosurgery resident on call with questions.    Dial * * *[phone], enter [phone] when prompted.       NEUROSURGERY ATTENDING ATTESTATION: IJerry M.D., Ph.D., Neurosurgery Attending, was present and scrubbed for the  entire case and performed the key and critical portions of the case.

## 2021-03-06 NOTE — PLAN OF CARE
Status: transferred from PACU around 2100 s/p L side DBS Phase 1 placed today   Vitals: VSS on 2 L NC.    Neuros: AO x 4. Lethargic, arouses to voice/touch. Denies N/T. 5/5 all extremities, generalized weakness.   IV: PIV infusing NS at 100ml/hr.  Labs/Electrolytes: AM draw.   Resp/trach: LS clear, CAPNO present overnight.   Diet: Regular.   Bowel status: No BM this shift, BS present, BM meds given.   : Voiding spontaneously via toilet.   Skin: Abd incision, covered w/ gauze and Tegaderm. L chest incision, liquid bandage, redness around incision. 2 anterior head incisions, covered w/ primapore. Top of head incison, sutures and liquid bandage, JUAN. 2 posterior head incisions, JUAN.    Pain: PRN Tylenol and Oxy   Activity: A1 and GB.   Social: Independently updating family.   Plan: Continue to monitor and follow POC.

## 2021-03-06 NOTE — DISCHARGE SUMMARY
"[facility] Discharge Summary and Instructions    Erika Diaz MRN# [id number]   Age: 60 year old YOB: 1960     Date of Admission:  3/5/2021  Date of Discharge::  3/6/2021  Admitting Physician:  Jerry Concepcion MD  Discharge Physician:  Jerry Concepcion MD          Admission Diagnoses:   Parkinson's disease (H) [G20]  S/P deep brain stimulator placement [Z96.89]  Morbid obesity (H) [E66.01]          Discharge Diagnosis:     Parkinson's disease (H) [G20]  S/P deep brain stimulator placement [Z96.89]  Morbid obesity (H) [E66.01]          Procedures:   3/5/21 Stealth assisted Left side deep brain stimulator placement, phase I, placement of left side deep brain stimulator electrode, target left globus pallidus internus, with microelectrode recording and placement of extension and externalization wires for [place] research protocol           Brief History of Illness:   Ms. Erika Diaz returns today for second side DBS workup.  Patient is s/p right side deep brain stimulator placement, phase I, placement of right side deep brain stimulator electrode placement, target right globus pallidus internus, with microelectrode recording on 8/21/2018 and s/p deep brain stimulator placement, phase II, placement of deep brain stimulator generator/battery over the right chest wall on 8/30/2018.  She also participated in our research protocol where she had her DBS system externalized between her phase I and phase II surgery.  She did well with the surgery and she had no complications.  She did well with the right side implantation and she is getting good therapeutic benefit from the device.  No issues with the device is reported.  She is again feeling her \"inside shakes\".  She is right handed and her handwriting is getting worse.  Her PD medications have been increased as well.  She is interested in having the left side implanted for right side body control.  She elected to undergo the above " mentioned procedure after the risk and benefits were explained.           Hospital Course:   Patient underwent above-mentioned procedure on 3/5/21. The operation was uncomplicated and he/she was admitted to the Neurosurgical floor for routine post operative cares. Her postoperative skull XR and head CT demonstrated adequate electrode placement and no acute intracranial pathology. The patient completed her 24 hours ancef course. On post operative day 1 she was doing well, ambulating, voiding without a underwood, eating a regular diet, pain was well controlled and therefore she was discharged home with a 14 days course of Keflex antibiotics.    Gen: NAD  Pulm: breathing comfortably on RA  Neuro:  A&Ox3  EOMI  PERRL  Face symmetric, tongue midline  No pronator drift  Full strength x4          Discharge Medications:     Discharge Medication List as of 3/6/2021 10:00 AM      START taking these medications    Details   cephALEXin (KEFLEX) 500 MG capsule Take 1 capsule (500 mg) by mouth 3 times daily for 14 days, Disp-42 capsule, R-0, E-Prescribe      oxyCODONE (ROXICODONE) 5 MG tablet Take 1-2 tablets (5-10 mg) by mouth every 3 hours as needed, Disp-20 tablet, R-0, Local Print      polyethylene glycol (MIRALAX) 17 GM/Dose powder Take 17 g by mouth daily, Disp-510 g, R-0, E-Prescribe      senna-docusate (SENOKOT-S/PERICOLACE) 8.6-50 MG tablet Take 1 tablet by mouth 2 times daily for 14 days, Disp-28 tablet, R-0, E-Prescribe         CONTINUE these medications which have NOT CHANGED    Details   Acetaminophen (TYLENOL PO) Take 1,000 mg by mouth every 8 hours as needed for mild pain or fever, Historical      amantadine (SYMMETREL) 100 MG capsule 1 capsule orally @ 7am and 4pm, Disp-180 capsule,R-3, E-Prescribe      busPIRone (BUSPAR) 10 MG tablet Take 1 tablet (10 mg) by mouth 3 times daily, Disp-270 tablet,R-3, E-Prescribe      calcium carbonate (TUMS) 500 MG chewable tablet Take 2 chew tab by mouth as needed for heartburn,  Historical      carbidopa-levodopa (SINEMET)  MG tablet 2 @ 7, 12 pm, 4 pm and 9 pm = 8 tabs per day, Disp-720 tablet,R-3, E-Prescribe      FLUoxetine (PROZAC) 20 MG capsule Take 20 mg by mouth every morning @ 7am, Disp-90 capsule,R-3, Historical      gabapentin (NEURONTIN) 100 MG capsule Take 2 capsules (200 mg) by mouth daily, Disp-180 capsule,R-3, E-PrescribePatient is currently out of town and needing a refill. 90 day supply. Updated dose to 200 mg (previous 100 mg).      hydrochlorothiazide (HYDRODIURIL) 25 MG tablet Take 25 mg by mouth At Bedtime @9 pm, Historical      LORazepam (ATIVAN) 0.5 MG tablet Take 1 tablet (0.5 mg) by mouth daily as needed for anxiety or sleep, Disp-30 tablet, R-5, E-Prescribe      pramipexole (MIRAPEX) 0.5 MG tablet 2 tabs orally @ 9 pm, Disp-180 tablet,R-3, E-Prescribe      rosuvastatin (CRESTOR) 5 MG tablet Take 5 mg by mouth daily, Historical         STOP taking these medications       aspirin 81 MG EC tablet Comments:   Reason for Stopping:                       Discharge Instructions and Follow-Up:     Discharge diet: Regular   Discharge activity: You may advance activity as tolerated. No strenuous exercise or heay lifting greater than 10 lbs for 4 weeks or until seen and cleared in clinic.   Discharge follow-up: Follow-up with Dr. Jerry Concepcion MD on 3/29/21 in Neurosurgery clinic.   Wound care: Ok to shower,however no scrubbing of the wound and no soaking of the wound, meaning no bathtubs or swimming pools. Pat dry only. Leave wound open to air.  Sutures are absorbable and do not need to be removed in 2 weeks.     Please call if you have:  1. increased pain, redness, drainage, swelling at your incision  2. fevers > 101.5 F degrees  3. with any questions or concerns.  You may reach the Neurosurgery clinic at [phone] during regular work hours. ER at [phone].    and ask for the Neurosurgery Resident on call at [phone], during off hours or  weekends.         Discharge Disposition:     Discharged to home

## 2021-03-07 ENCOUNTER — PATIENT OUTREACH (OUTPATIENT)
Dept: CARE COORDINATION | Facility: CLINIC | Age: 61
End: 2021-03-07

## 2021-03-08 ENCOUNTER — AMBULATORY - HEALTHEAST (OUTPATIENT)
Dept: LAB | Facility: CLINIC | Age: 61
End: 2021-03-08

## 2021-03-08 ENCOUNTER — PATIENT OUTREACH (OUTPATIENT)
Dept: NEUROSURGERY | Facility: CLINIC | Age: 61
End: 2021-03-08

## 2021-03-08 DIAGNOSIS — Z11.59 SCREENING FOR VIRAL DISEASE: ICD-10-CM

## 2021-03-08 RX ORDER — CEFAZOLIN SODIUM 2 G/100ML
2 INJECTION, SOLUTION INTRAVENOUS SEE ADMIN INSTRUCTIONS
Status: CANCELLED | OUTPATIENT
Start: 2021-03-08

## 2021-03-08 RX ORDER — CEFAZOLIN SODIUM 2 G/100ML
2 INJECTION, SOLUTION INTRAVENOUS
Status: CANCELLED | OUTPATIENT
Start: 2021-03-08

## 2021-03-08 NOTE — PROGRESS NOTES
Left VM checking on pt's status following placement of left-sided DBS electrode to GPi. Lead tunneled to left chest wall and externalized at upper middle abdomen by Dr. Concepcion on 3/5/21. Left my direct number and requested pt call at her earliest convenience. Will follow-up if I do not hear back.

## 2021-03-09 LAB
SARS-COV-2 PCR COMMENT: NORMAL
SARS-COV-2 RNA SPEC QL NAA+PROBE: NEGATIVE
SARS-COV-2 VIRUS SPECIMEN SOURCE: NORMAL

## 2021-03-09 NOTE — PROGRESS NOTES
The patient was contacted by another RN for post-hospital follow up. Will close encounter at this time.    Tatiana Lin CMA, [facility]  Post Hospital Discharge Team

## 2021-03-10 ENCOUNTER — HOSPITAL ENCOUNTER (OUTPATIENT)
Dept: RESEARCH | Facility: CLINIC | Age: 61
Discharge: ANOTHER HOSPITAL | End: 2021-03-12
Attending: NEUROLOGICAL SURGERY | Admitting: NEUROLOGICAL SURGERY
Payer: MEDICARE

## 2021-03-10 ENCOUNTER — COMMUNICATION - HEALTHEAST (OUTPATIENT)
Dept: SCHEDULING | Facility: CLINIC | Age: 61
End: 2021-03-10

## 2021-03-11 ENCOUNTER — ANESTHESIA EVENT (OUTPATIENT)
Dept: SURGERY | Facility: CLINIC | Age: 61
End: 2021-03-11
Payer: MEDICARE

## 2021-03-11 VITALS
DIASTOLIC BLOOD PRESSURE: 67 MMHG | TEMPERATURE: 97.9 F | RESPIRATION RATE: 18 BRPM | SYSTOLIC BLOOD PRESSURE: 143 MMHG | HEART RATE: 66 BPM

## 2021-03-11 PROCEDURE — 510N000016 HC RESEARCH MEALS, PER MEAL

## 2021-03-12 ENCOUNTER — HOSPITAL ENCOUNTER (OUTPATIENT)
Facility: CLINIC | Age: 61
Discharge: HOME | End: 2021-03-12
Attending: NEUROLOGICAL SURGERY | Admitting: NEUROLOGICAL SURGERY
Payer: MEDICARE

## 2021-03-12 ENCOUNTER — PATIENT OUTREACH (OUTPATIENT)
Dept: CARE COORDINATION | Facility: CLINIC | Age: 61
End: 2021-03-12

## 2021-03-12 ENCOUNTER — ANESTHESIA (OUTPATIENT)
Dept: SURGERY | Facility: CLINIC | Age: 61
End: 2021-03-12
Payer: MEDICARE

## 2021-03-12 VITALS
RESPIRATION RATE: 22 BRPM | DIASTOLIC BLOOD PRESSURE: 73 MMHG | HEART RATE: 78 BPM | HEIGHT: 66 IN | SYSTOLIC BLOOD PRESSURE: 133 MMHG | WEIGHT: 238.98 LBS | BODY MASS INDEX: 38.41 KG/M2 | OXYGEN SATURATION: 97 % | TEMPERATURE: 97.7 F

## 2021-03-12 DIAGNOSIS — G20.A1 PARKINSON'S DISEASE (H): ICD-10-CM

## 2021-03-12 DIAGNOSIS — Z96.89 S/P DEEP BRAIN STIMULATOR PLACEMENT: ICD-10-CM

## 2021-03-12 DIAGNOSIS — E66.01 MORBID OBESITY (H): ICD-10-CM

## 2021-03-12 LAB
GLUCOSE BLDC GLUCOMTR-MCNC: 157 MG/DL (ref 70–99)
INTERPRETATION ECG - MUSE: NORMAL

## 2021-03-12 PROCEDURE — 93010 ELECTROCARDIOGRAM REPORT: CPT | Mod: 59 | Performed by: INTERNAL MEDICINE

## 2021-03-12 PROCEDURE — 250N000013 HC RX MED GY IP 250 OP 250 PS 637: Mod: GY | Performed by: NURSE ANESTHETIST, CERTIFIED REGISTERED

## 2021-03-12 PROCEDURE — 250N000009 HC RX 250: Performed by: NURSE ANESTHETIST, CERTIFIED REGISTERED

## 2021-03-12 PROCEDURE — 250N000009 HC RX 250: Performed by: NEUROLOGICAL SURGERY

## 2021-03-12 PROCEDURE — 250N000011 HC RX IP 250 OP 636: Performed by: NEUROLOGICAL SURGERY

## 2021-03-12 PROCEDURE — C1767 GENERATOR, NEURO NON-RECHARG: HCPCS | Performed by: NEUROLOGICAL SURGERY

## 2021-03-12 PROCEDURE — 258N000003 HC RX IP 258 OP 636: Performed by: NURSE ANESTHETIST, CERTIFIED REGISTERED

## 2021-03-12 PROCEDURE — 250N000011 HC RX IP 250 OP 636: Performed by: NURSE ANESTHETIST, CERTIFIED REGISTERED

## 2021-03-12 PROCEDURE — 999N000141 HC STATISTIC PRE-PROCEDURE NURSING ASSESSMENT: Performed by: NEUROLOGICAL SURGERY

## 2021-03-12 PROCEDURE — 360N000076 HC SURGERY LEVEL 3, PER MIN: Performed by: NEUROLOGICAL SURGERY

## 2021-03-12 PROCEDURE — 272N000001 HC OR GENERAL SUPPLY STERILE: Performed by: NEUROLOGICAL SURGERY

## 2021-03-12 PROCEDURE — 999N000054 HC STATISTIC EKG NON-CHARGEABLE

## 2021-03-12 PROCEDURE — 272N000002 HC OR SUPPLY OTHER OPNP: Performed by: NEUROLOGICAL SURGERY

## 2021-03-12 PROCEDURE — 710N000012 HC RECOVERY PHASE 2, PER MINUTE: Performed by: NEUROLOGICAL SURGERY

## 2021-03-12 PROCEDURE — 250N000025 HC SEVOFLURANE, PER MIN: Performed by: NEUROLOGICAL SURGERY

## 2021-03-12 PROCEDURE — 82962 GLUCOSE BLOOD TEST: CPT | Mod: GZ

## 2021-03-12 PROCEDURE — 710N000010 HC RECOVERY PHASE 1, LEVEL 2, PER MIN: Performed by: NEUROLOGICAL SURGERY

## 2021-03-12 PROCEDURE — 370N000017 HC ANESTHESIA TECHNICAL FEE, PER MIN: Performed by: NEUROLOGICAL SURGERY

## 2021-03-12 DEVICE — GENERATOR DBS SYSTEM INFINITY 5 IPG 6660ANS: Type: IMPLANTABLE DEVICE | Site: CHEST | Status: FUNCTIONAL

## 2021-03-12 RX ORDER — FENTANYL CITRATE 50 UG/ML
INJECTION, SOLUTION INTRAMUSCULAR; INTRAVENOUS PRN
Status: DISCONTINUED | OUTPATIENT
Start: 2021-03-12 | End: 2021-03-12

## 2021-03-12 RX ORDER — FENTANYL CITRATE 50 UG/ML
25-50 INJECTION, SOLUTION INTRAMUSCULAR; INTRAVENOUS
Status: DISCONTINUED | OUTPATIENT
Start: 2021-03-12 | End: 2021-03-12 | Stop reason: HOSPADM

## 2021-03-12 RX ORDER — CEFAZOLIN SODIUM 2 G/100ML
2 INJECTION, SOLUTION INTRAVENOUS SEE ADMIN INSTRUCTIONS
Status: DISCONTINUED | OUTPATIENT
Start: 2021-03-12 | End: 2021-03-12 | Stop reason: HOSPADM

## 2021-03-12 RX ORDER — CEPHALEXIN 500 MG/1
500 CAPSULE ORAL 4 TIMES DAILY
Qty: 28 CAPSULE | Refills: 0 | Status: SHIPPED | OUTPATIENT
Start: 2021-03-12 | End: 2021-03-19

## 2021-03-12 RX ORDER — DEXAMETHASONE SODIUM PHOSPHATE 4 MG/ML
INJECTION, SOLUTION INTRA-ARTICULAR; INTRALESIONAL; INTRAMUSCULAR; INTRAVENOUS; SOFT TISSUE PRN
Status: DISCONTINUED | OUTPATIENT
Start: 2021-03-12 | End: 2021-03-12

## 2021-03-12 RX ORDER — CEFAZOLIN SODIUM 2 G/100ML
2 INJECTION, SOLUTION INTRAVENOUS
Status: COMPLETED | OUTPATIENT
Start: 2021-03-12 | End: 2021-03-12

## 2021-03-12 RX ORDER — MEPERIDINE HYDROCHLORIDE 25 MG/ML
12.5 INJECTION INTRAMUSCULAR; INTRAVENOUS; SUBCUTANEOUS
Status: DISCONTINUED | OUTPATIENT
Start: 2021-03-12 | End: 2021-03-12 | Stop reason: HOSPADM

## 2021-03-12 RX ORDER — ONDANSETRON 4 MG/1
4 TABLET, ORALLY DISINTEGRATING ORAL EVERY 30 MIN PRN
Status: DISCONTINUED | OUTPATIENT
Start: 2021-03-12 | End: 2021-03-12 | Stop reason: HOSPADM

## 2021-03-12 RX ORDER — LIDOCAINE HYDROCHLORIDE 20 MG/ML
INJECTION, SOLUTION INFILTRATION; PERINEURAL PRN
Status: DISCONTINUED | OUTPATIENT
Start: 2021-03-12 | End: 2021-03-12

## 2021-03-12 RX ORDER — OXYCODONE HYDROCHLORIDE 5 MG/1
5 TABLET ORAL EVERY 6 HOURS PRN
Qty: 20 TABLET | Refills: 0 | Status: SHIPPED | OUTPATIENT
Start: 2021-03-12 | End: 2021-03-15

## 2021-03-12 RX ORDER — EPHEDRINE SULFATE 50 MG/ML
INJECTION, SOLUTION INTRAMUSCULAR; INTRAVENOUS; SUBCUTANEOUS PRN
Status: DISCONTINUED | OUTPATIENT
Start: 2021-03-12 | End: 2021-03-12

## 2021-03-12 RX ORDER — NALOXONE HYDROCHLORIDE 0.4 MG/ML
0.2 INJECTION, SOLUTION INTRAMUSCULAR; INTRAVENOUS; SUBCUTANEOUS
Status: DISCONTINUED | OUTPATIENT
Start: 2021-03-12 | End: 2021-03-12 | Stop reason: HOSPADM

## 2021-03-12 RX ORDER — PROPOFOL 10 MG/ML
INJECTION, EMULSION INTRAVENOUS PRN
Status: DISCONTINUED | OUTPATIENT
Start: 2021-03-12 | End: 2021-03-12

## 2021-03-12 RX ORDER — ONDANSETRON 2 MG/ML
INJECTION INTRAMUSCULAR; INTRAVENOUS PRN
Status: DISCONTINUED | OUTPATIENT
Start: 2021-03-12 | End: 2021-03-12

## 2021-03-12 RX ORDER — SODIUM CHLORIDE, SODIUM LACTATE, POTASSIUM CHLORIDE, CALCIUM CHLORIDE 600; 310; 30; 20 MG/100ML; MG/100ML; MG/100ML; MG/100ML
INJECTION, SOLUTION INTRAVENOUS CONTINUOUS PRN
Status: DISCONTINUED | OUTPATIENT
Start: 2021-03-12 | End: 2021-03-12

## 2021-03-12 RX ORDER — ALBUTEROL SULFATE 90 UG/1
AEROSOL, METERED RESPIRATORY (INHALATION) PRN
Status: DISCONTINUED | OUTPATIENT
Start: 2021-03-12 | End: 2021-03-12

## 2021-03-12 RX ORDER — SODIUM CHLORIDE, SODIUM LACTATE, POTASSIUM CHLORIDE, CALCIUM CHLORIDE 600; 310; 30; 20 MG/100ML; MG/100ML; MG/100ML; MG/100ML
INJECTION, SOLUTION INTRAVENOUS CONTINUOUS
Status: DISCONTINUED | OUTPATIENT
Start: 2021-03-12 | End: 2021-03-12 | Stop reason: HOSPADM

## 2021-03-12 RX ORDER — ONDANSETRON 2 MG/ML
4 INJECTION INTRAMUSCULAR; INTRAVENOUS EVERY 30 MIN PRN
Status: DISCONTINUED | OUTPATIENT
Start: 2021-03-12 | End: 2021-03-12 | Stop reason: HOSPADM

## 2021-03-12 RX ADMIN — PROPOFOL 50 MG: 10 INJECTION, EMULSION INTRAVENOUS at 09:16

## 2021-03-12 RX ADMIN — ONDANSETRON 4 MG: 2 INJECTION INTRAMUSCULAR; INTRAVENOUS at 10:03

## 2021-03-12 RX ADMIN — Medication 5 MG: at 09:54

## 2021-03-12 RX ADMIN — FENTANYL CITRATE 25 MCG: 50 INJECTION, SOLUTION INTRAMUSCULAR; INTRAVENOUS at 09:22

## 2021-03-12 RX ADMIN — SODIUM CHLORIDE, POTASSIUM CHLORIDE, SODIUM LACTATE AND CALCIUM CHLORIDE: 600; 310; 30; 20 INJECTION, SOLUTION INTRAVENOUS at 09:02

## 2021-03-12 RX ADMIN — LIDOCAINE HYDROCHLORIDE 100 MG: 20 INJECTION, SOLUTION INFILTRATION; PERINEURAL at 09:13

## 2021-03-12 RX ADMIN — ALBUTEROL SULFATE 6 PUFF: 108 INHALANT RESPIRATORY (INHALATION) at 10:01

## 2021-03-12 RX ADMIN — Medication 2 G: at 09:20

## 2021-03-12 RX ADMIN — DEXAMETHASONE SODIUM PHOSPHATE 8 MG: 4 INJECTION, SOLUTION INTRA-ARTICULAR; INTRALESIONAL; INTRAMUSCULAR; INTRAVENOUS; SOFT TISSUE at 09:20

## 2021-03-12 RX ADMIN — ONDANSETRON 4 MG: 2 INJECTION INTRAMUSCULAR; INTRAVENOUS at 09:20

## 2021-03-12 RX ADMIN — FENTANYL CITRATE 25 MCG: 50 INJECTION, SOLUTION INTRAMUSCULAR; INTRAVENOUS at 09:46

## 2021-03-12 RX ADMIN — PROPOFOL 50 MG: 10 INJECTION, EMULSION INTRAVENOUS at 09:15

## 2021-03-12 RX ADMIN — PHENYLEPHRINE HYDROCHLORIDE 50 MCG: 10 INJECTION INTRAVENOUS at 09:53

## 2021-03-12 RX ADMIN — PROPOFOL 50 MG: 10 INJECTION, EMULSION INTRAVENOUS at 09:14

## 2021-03-12 RX ADMIN — FENTANYL CITRATE 25 MCG: 50 INJECTION, SOLUTION INTRAMUSCULAR; INTRAVENOUS at 09:19

## 2021-03-12 RX ADMIN — FENTANYL CITRATE 25 MCG: 50 INJECTION, SOLUTION INTRAMUSCULAR; INTRAVENOUS at 09:25

## 2021-03-12 ASSESSMENT — MIFFLIN-ST. JEOR: SCORE: 1660.75

## 2021-03-12 NOTE — ANESTHESIA POSTPROCEDURE EVALUATION
Patient: Erika Diaz    Procedure(s):  Deep brain stimulator placement, phase II, placement of deep brain stimulator generator/battery over the left chest wall    Diagnosis:Parkinson's disease (H) [G20]  Morbid obesity (H) [E66.01]  S/P deep brain stimulator placement [Z96.89]  Diagnosis Additional Information: No value filed.    Anesthesia Type:  General    Note:  Disposition: Outpatient   Postop Pain Control: Uneventful            Sign Out: Well controlled pain   PONV: No   Neuro/Psych: Uneventful            Sign Out: Acceptable/Baseline neuro status   Airway/Respiratory: Uneventful            Sign Out: Acceptable/Baseline resp. status   CV/Hemodynamics: Uneventful            Sign Out: Acceptable CV status   Other NRE: NONE   DID A NON-ROUTINE EVENT OCCUR? No         Last vitals:  Vitals:    03/12/21 1030 03/12/21 1045 03/12/21 1100   BP: 131/63 117/60 127/62   Pulse: 68 61 68   Resp: 18 30 26   Temp: 36.6  C (97.8  F)  36.7  C (98  F)   SpO2: 99% 93% 92%       Last vitals prior to Anesthesia Care Transfer:  CRNA VITALS  3/12/2021 0957 - 3/12/2021 1057      3/12/2021             Resp Rate (observed):  16    EKG:  Sinus bradycardia          Electronically Signed By: Jerry Alves MD  March 12, 2021  11:20 AM

## 2021-03-12 NOTE — H&P
NEUROSURGERY    HISTORY AND PHYSICAL EXAM UPDATE    Chief Complaint   Patient presents with     DBS Surgery       Ongoing DBS surgery, Parkinson's disease, s/p left side deep brain stimulator placement, phase I, placement of left side deep brain stimulator electrode, target left globus pallidus internus, with microelectrode recording, [place] protocol, on 3/5/2021.     Parkinson       HISTORY OF PRESENT ILLNESS  Ms. Erika Diaz returns today for her ongoing second side DBS surgeries.  Recently, on 3/5/2021, she underwent left side deep brain stimulator placement, phase I, placement of left side deep brain stimulator electrode, target left globus pallidus internus, with microelectrode recording, [place] externalization protocol.  She tolerated the procedure well and there were no complications.  She was somewhat emotional throughout the case and the research component was shortened.  Her postoperative CT head was without any concerning findings and she was discharged to home on POD#1.  She then was in the Clinical Research Unit at the [facility] for the past two days, participating in research.  Patient reports that she is doing well.  She also noted that she was emotional during the last few days.  She has some major changes in her personal life and that may have been playing a role.  Otherwise, patient reports no pain.  She denies any fevers, chills, nausea, vomiting, dizziness, weakness, numbness or seizure like activity.  She reports that the incision is looking good and there is no redness, no drainage and no fluid collection.  The abdominal dressing, the location of the externalized wire, has gotten wet.  Patient stated that it got wet.        Briefly, patient is a 60 year old female with Parkinson's disease.  Patient is s/p right side deep brain stimulator placement, phase I, placement of right side deep brain stimulator electrode placement, target right globus pallidus internus, with  "microelectrode recording on 8/21/2018 and s/p deep brain stimulator placement, phase II, placement of deep brain stimulator generator/battery over the right chest wall on 8/30/2018.  She also participated in our research protocol where she had her DBS system externalized between her phase I and phase II surgery.  She did well with the surgery and she had no complications.  She did well with the right side implantation and she is getting good therapeutic benefit from the device.  No issues with the device is reported.  Since her first side DBS surgery, she has had an ankle surgery.  She is again feeling her \"inside shakes\".  She is right handed and her handwriting is getting worse.  Her PD medications have been increased as well.  She is interested in having the left side implanted for right side body control.  Patient also reports that her  was recently diagnosed with liver cancer with metastasis to the lungs.  Her case was again discussed at the Movement Disorder Consensus Group Meeting and she was considered to be a good candidate for her second side.  Recommendation was target left globus pallidus internus and Abbott device.    In terms of her Parkinson's disease, she has a a 12 to 15 year history of Parkinson s disease, being diagnosed in [id number].  She first developed stiffness in her left hand and shoulder that has since progressed to include tremors.  Her symptoms are worse on the left-side.  Her goals with Deep Brain Stimulation are to gain tremor control and reduce dyskinesias.  She wants to feel better, be more normal without taking the medications, improve wearing off stiffness, difficulty walking and foggy thinking.  Medication reduction is important.  She does have regular sessions with a chiropractor to relieve tension in her neck.  Her case was discussed at the Movement Disorder Consensus Group meeting and she was considered to be a good candidate.  Recommendation at the time was wait and see " strategy, target right globus pallidus internus and Abbott device.    Past Medical History:   Diagnosis Date     Benign paroxysmal positional vertigo of right ear      Degenerative joint disease 11/07/2017     Encounter for neuropsychological testing 12/20/2017    Current results indicate variability in attention and memory, ranging from moderately impaired to superior, in some cases with greater difficulty on less complex tasks. Basic language, visual processing, and executive functioning fall within normal limits. Personality assessment is suggestive of somatization, and also raises the possibility of a thought disorder as well as a history of manic episo     JASON (generalized anxiety disorder)      History of colonic polyps 11/07/2017     HTN (hypertension) 11/07/2017     Hypercholesterolemia 11/07/2017     Lactose intolerance in adult 11/07/2017     Migraines      Obesity 11/07/2017     Parkinson disease (H)      PID (pelvic inflammatory disease) due to IUD 11/07/2017     Pulmonary emboli (H) 01/20/2019    Post Right Ankel Surgery     Pulmonary emboli (H) - coumadin lovenox 11/07/2017    provoked after knee replacement     Sensorineural hearing loss (SNHL) of right ear 12/2018     Trochanteric bursitis of left hip      Past Surgical History:   Procedure Laterality Date     adhesioloysis       CHOLECYSTECTOMY       COLONOSCOPY       IMPLANT DEEP BRAIN STIMULATION GENERATOR / BATTERY Right 8/30/2018    Procedure: IMPLANT DEEP BRAIN STIMULATION GENERATOR / BATTERY;  Deep Brain Stimulator Placement, Phase II, Placement Of Deep Brain Stimulator Generator/Battery Over The Right Chest Wall;  Surgeon: Jerry Concepcion MD;  Location: [facility] OR     JOINT REPLACEMENT Right     right partial knee replacement     LAPAROSCOPY      adhesioloysis     LAPAROSCOPY      supracervical hysterectomy     LAPAROTOMY EXPLORATORY Left     partial removal of left ovary     OPTICAL TRACKING SYSTEM INSERTION DEEP BRAIN STIMULATION Right  2018    Procedure: OPTICAL TRACKING SYSTEM INSERTION DEEP BRAIN STIMULATION;  Stealth Assisted Right Deep Brain Stimulator Placement, Phase I, Placement Of Right Side Deep Brain Stimulator Electrode, Target Right Globus Pallidus Internus With Microelectrode Recording;  Surgeon: Jerry Concepcion MD;  Location: [facility] OR     OPTICAL TRACKING SYSTEM INSERTION DEEP BRAIN STIMULATION Left 3/5/2021    Procedure: stealth assisted Left side deep brain stimulator placement, phase I, placement of left side deep brain stimulator electrode, target left globus pallidus internus, with microelectrode recording and placement of extension and externalization wires for UDALL research protocol;  Surgeon: Jerry Concepcion MD;  Location: [facility] OR     REMOVE INTRAUTERINE DEVICE  2006     salpingoopherectomy       XR FOOT SURGERY SENG RIGHT Right 2019     Social History     Socioeconomic History     Marital status:      Spouse name: Not on file     Number of children: Not on file     Years of education: Not on file     Highest education level: Not on file   Occupational History     Not on file   Social Needs     Financial resource strain: Not on file     Food insecurity     Worry: Not on file     Inability: Not on file     Transportation needs     Medical: Not on file     Non-medical: Not on file   Tobacco Use     Smoking status: Former Smoker     Packs/day: 0.50     Years: 20.00     Pack years: 10.00     Types: Cigarettes     Quit date: 2009     Years since quittin.6     Smokeless tobacco: Never Used   Substance and Sexual Activity     Alcohol use: No     Drug use: No     Sexual activity: Not Currently     Partners: Male     Birth control/protection: None   Lifestyle     Physical activity     Days per week: Not on file     Minutes per session: Not on file     Stress: Not on file   Relationships     Social connections     Talks on phone: Not on file     Gets together: Not on file     Attends Christianity  service: Not on file     Active member of club or organization: Not on file     Attends meetings of clubs or organizations: Not on file     Relationship status: Not on file     Intimate partner violence     Fear of current or ex partner: Not on file     Emotionally abused: Not on file     Physically abused: Not on file     Forced sexual activity: Not on file   Other Topics Concern     Parent/sibling w/ CABG, MI or angioplasty before 65F 55M? No   Social History Narrative    . lives in [place]. Zane Diaz spouse     Family History   Problem Relation Age of Onset     Breast Cancer Mother         Allergies   Allergen Reactions     Atorvastatin Muscle Pain (Myalgia)     Other reaction(s): Myalgia  Per PCP note 7/24/18 - is to resume simvastatin - monitor for myalgia     Codeine Itching     Mold Unknown     Current Facility-Administered Medications   Medication     ceFAZolin (ANCEF) intermittent infusion 2 g in 100 mL dextrose PRE-MIX     ceFAZolin (ANCEF) intermittent infusion 2 g in 100 mL dextrose PRE-MIX       Current Outpatient Medications   Medication     Acetaminophen (TYLENOL PO)     amantadine (SYMMETREL) 100 MG capsule     aspirin 81 MG EC tablet - ON HOLD for surgery     busPIRone (BUSPAR) 10 MG tablet     calcium carbonate (TUMS) 500 MG chewable tablet     carbidopa-levodopa (SINEMET)  MG tablet     FLUoxetine (PROZAC) 20 MG capsule     gabapentin (NEURONTIN) 100 MG capsule     hydrochlorothiazide (HYDRODIURIL) 25 MG tablet     LORazepam (ATIVAN) 0.5 MG tablet     pramipexole (MIRAPEX) 0.5 MG tablet     rosuvastatin (CRESTOR) 5 MG tablet       REVIEW OF SYSTEMS:  General: Negative for chills/sweats/fever, difficulty sleeping, headache, recent fatigue, or weight gain/loss.  Eyes: Negative for blurred vision, crossed eyes, double vision, recent eye infections, vision flashes, or vision halos.  Ears/Nose/Mouth/Throat: Negative for bleeding gums, difficulty swallowing, earache, ear discharge,  "hearing loss, hoarseness, nosebleeds, tinnitus, or sinus problems.  Respiratory:Negative for chronic cough, coughing blood, night sweats, shortness of breath, Tuberculosis, or wheezing.  Cardiovascular: Negative for chest pain, dyspnea at night, heart murmur, palpitations, pacemaker, pacemaker, poor circulation, swollen legs/feet, or varicose veins.  Gastrointestinal: Negative for melena, hematochezia, chronic diarrhea, heartburn, Hepatitis A/B/C, increasing constipation, Liver Disease, nausea, or vomiting.   Genitourinary: Negative for Urinary retention, genital discharge, urinary incontinence, prostate problems, urgency, or UTI.   Neurological: POSITIVE for Parkinson's disease. Negative for syncope, headaches, numbness of arms/legs, tingling in hands/arms/legs, memory problems, or seizures.  Psychological: Negative for anxiety, depression, panic attacks, or restlessness.  Skin: Negative for chronic skin itching, color changes in hand/feet when cold, poor scarring, non-healing ulcers, skin rashes/hives, unusual moles.  Musculoskeletal: Negative for arthritis, joint swelling in hands/wrists/hips/knees/joints, muscle tenderness in arms/legs, or osteoporosis.  Endocrine: Negative for excessive thirst/hunger, intolerance for warm rooms, loss of libido, multiple broken bones, rapid weight gain/loss, galactorrhea, or thyroid issues.  Hematologic/Lymphatic: Negative for easy skin bruising, significant fatigue, prolonged bleeding, tender glands/lymph nodes.  Allergies: Negative for asthma or hay fever.      PHYSICAL EXAM  Vital signs:  Temp: 98.2  F (36.8  C) Temp src: Oral BP: (!) 142/94 Pulse: 86   Resp: 18 SpO2: 96 % O2 Device: None (Room air)   Height: 167.6 cm (5' 6\") Weight: 108.4 kg (238 lb 15.7 oz)  Estimated body mass index is 38.57 kg/m  as calculated from the following:    Height as of this encounter: 1.676 m (5' 6\").    Weight as of this encounter: 108.4 kg (238 lb 15.7 oz).    General: Awake, alert, oriented. " Well nourished, well developed, no acute distress.  HEENT: Head normocephalic, atraumatic. No carotid bruit. Neck supple. Good range of motion. No palpable thyroid mass.  Heart: Regular rhythm and rate. No murmurs.  Lungs: Clear to auscultation and percussion bilaterally. No rhonchi, rales or wheeze.  Abdomen: Soft, non-tender, non-distended. No hepatosplenomegaly.  Extremity: Warm with no clubbing or cyanosis. No lower extremity edema.  Right frontal incision is well healed.  No exposed hardware.  No thinning skin over the hardware.  Right chest wall incision is well healed.  No exposed hardware.  No thinning skin over the hardware.  Left frontal incision is healing well.  No redness.  No drainage.  No fluid collection.  Skin glue dressing is still intact.  Left parietal incision is healing well.  No redness.  No drainage.  No fluid collection.  Skin glue dressing is still intact.  Left chest wall incision is healing well.  No redness.  No drainage.  No fluid collection.  Skin glue dressing is still intact.  Midline upper abdomen dressing is wet.  There is no redness around the dressing or the skin underneath.    Neurological  Awake, alert and oriented to date, time, place and person. Speech fluent but rushed.   Pupils equal, round, reactive to light.  Extraocular movement intact.  Visual fields are full on confrontation.  Hearing is grossly normal to finger rub.   Facial sensation intact.  Face symmetric.  Tongue midline.  Uvula elevates equally.    Motor: full strength throughout.  Sensation: intact to light touch and pinpoint.  Deep tendon reflexes: Trace throughout. Negative for clonus. Negative for Ashraf's sign. No dysmetria.      ASSESSMENT   60 year old right handed female with a history of Parkinson's disease.  S/p right side deep brain stimulator placement, phase I, placement of right side deep brain stimulator electrode placement, target right globus pallidus internus, with microelectrode recording on  8/21/2018.  S/p deep brain stimulator placement, phase II, placement of deep brain stimulator generator/battery over the right chest wall on 8/30/2020.  S/p left side deep brain stimulator placement, phase I, placement of left side deep brain stimulator electrode, target left globus pallidus internus, with microelectrode recording, [place] externalization protocol on 3/5/2021.    Patient returns for her phase II surgery.  She has done well with phase I and the interim research sessions.  There are no concerns for infection or complications at this time.  She was reportedly emotional at times and patient also corroborated this statement.  It is due to events in her personal life.    We discussed phase II of DBS surgery, placement of the DBS generator/battery over the left chest wall.  We will likely due this with LMA.  Since she already has the tunneled extension wire, we will only need to remove the externalized wire and place the new generator/battery after making the proper connection.      Risks, benefits, alternative therapies were discussed with the patient, including but not limited to infection and bleeding, stroke, death, hardware failure and possible need for more surgeries.  Surgical procedure was discussed in detail.  She expressed understanding and all questions were answered.       PLAN:  1.  Deep brain stimulator placement, phase II, placement of deep brain stimulator generator/battery over the left chest wall.  2.  Study protocol - externalized wire will be removed.  3.  Continue postoperative antibiotics.

## 2021-03-12 NOTE — ANESTHESIA PREPROCEDURE EVALUATION
Anesthesia Pre-Procedure Evaluation    Patient: Erika Diaz   MRN: [id number] : 1960        Preoperative Diagnosis: Parkinson's disease (H) [G20]  Morbid obesity (H) [E66.01]  S/P deep brain stimulator placement [Z96.89]   Procedure : Procedure(s):  Deep brain stimulator placement, phase II, placement of deep brain stimulator generator/battery over the left chest wall     Past Medical History:   Diagnosis Date     Benign paroxysmal positional vertigo of right ear      Degenerative joint disease 2017     Encounter for neuropsychological testing 2017    Current results indicate variability in attention and memory, ranging from moderately impaired to superior, in some cases with greater difficulty on less complex tasks. Basic language, visual processing, and executive functioning fall within normal limits. Personality assessment is suggestive of somatization, and also raises the possibility of a thought disorder as well as a history of manic episo     JASON (generalized anxiety disorder)      History of colonic polyps 2017     HTN (hypertension) 2017     Hypercholesterolemia 2017     Lactose intolerance in adult 2017     Migraines      Obesity 2017     Parkinson disease (H)      PID (pelvic inflammatory disease) due to IUD 2017     Pulmonary emboli (H) 2019    Post Right Ankel Surgery     Pulmonary emboli (H) - coumadin lovenox 2017    provoked after knee replacement     Sensorineural hearing loss (SNHL) of right ear 2018     Trochanteric bursitis of left hip       Past Surgical History:   Procedure Laterality Date     adhesioloysis       CHOLECYSTECTOMY       COLONOSCOPY       IMPLANT DEEP BRAIN STIMULATION GENERATOR / BATTERY Right 2018    Procedure: IMPLANT DEEP BRAIN STIMULATION GENERATOR / BATTERY;  Deep Brain Stimulator Placement, Phase II, Placement Of Deep Brain Stimulator Generator/Battery Over The Right Chest Wall;  Surgeon: Carlene  Jerry Becerra MD;  Location: [facility] OR     JOINT REPLACEMENT Right     right partial knee replacement     LAPAROSCOPY      adhesioloysis     LAPAROSCOPY      supracervical hysterectomy     LAPAROTOMY EXPLORATORY Left     partial removal of left ovary     OPTICAL TRACKING SYSTEM INSERTION DEEP BRAIN STIMULATION Right 2018    Procedure: OPTICAL TRACKING SYSTEM INSERTION DEEP BRAIN STIMULATION;  Stealth Assisted Right Deep Brain Stimulator Placement, Phase I, Placement Of Right Side Deep Brain Stimulator Electrode, Target Right Globus Pallidus Internus With Microelectrode Recording;  Surgeon: Jerry Concepcion MD;  Location: [facility] OR     OPTICAL TRACKING SYSTEM INSERTION DEEP BRAIN STIMULATION Left 3/5/2021    Procedure: stealth assisted Left side deep brain stimulator placement, phase I, placement of left side deep brain stimulator electrode, target left globus pallidus internus, with microelectrode recording and placement of extension and externalization wires for UDALL research protocol;  Surgeon: Jerry Concepcion MD;  Location: [facility] OR     REMOVE INTRAUTERINE DEVICE  2006     salpingoopherectomy       XR FOOT SURGERY SENG RIGHT Right 2019      Allergies   Allergen Reactions     Atorvastatin Muscle Pain (Myalgia)     Other reaction(s): Myalgia  Per PCP note 18 - is to resume simvastatin - monitor for myalgia     Codeine Itching     Mold Unknown      Social History     Tobacco Use     Smoking status: Former Smoker     Packs/day: 0.50     Years: 20.00     Pack years: 10.00     Types: Cigarettes     Quit date: 2009     Years since quittin.6     Smokeless tobacco: Never Used   Substance Use Topics     Alcohol use: No      Wt Readings from Last 1 Encounters:   21 108.4 kg (238 lb 15.7 oz)              OUTSIDE LABS:  CBC:   Lab Results   Component Value Date    WBC 12.2 (H) 2021    WBC Canceled, Test credited 2021    HGB 13.9 2021    HGB Canceled, Test credited  03/06/2021    HCT 42.4 03/06/2021    HCT Canceled, Test credited 03/06/2021     03/06/2021    PLT Canceled, Test credited 03/06/2021     BMP:   Lab Results   Component Value Date     03/06/2021     12/26/2018    POTASSIUM 3.6 03/06/2021    POTASSIUM 3.6 03/06/2021    CHLORIDE 109 03/06/2021    CHLORIDE 108 12/26/2018    CO2 27 03/06/2021    CO2 27 12/26/2018    BUN 12 03/06/2021    BUN 15 12/26/2018    CR 0.63 03/06/2021    CR 0.80 12/26/2018     (H) 03/06/2021    GLC 98 12/26/2018     COAGS:   Lab Results   Component Value Date    PTT 28 08/21/2018    INR 1.05 08/30/2018     POC:   Lab Results   Component Value Date     (H) 03/12/2021     HEPATIC:   Lab Results   Component Value Date    ALBUMIN 3.5 12/26/2018    PROTTOTAL 6.9 12/26/2018    ALT 30 12/26/2018    AST 33 12/26/2018    ALKPHOS 100 12/26/2018    BILITOTAL 0.5 12/26/2018     OTHER:   Lab Results   Component Value Date    AVANI 8.6 03/06/2021    PHOS 3.4 03/06/2021    MAG 2.2 03/06/2021       Anesthesia Plan    ASA Status:  2      Anesthesia Type: General.     - Airway: LMA              Consents    Anesthesia Plan(s) and associated risks, benefits, and realistic alternatives discussed. Questions answered and patient/representative(s) expressed understanding.     - Discussed with:  Patient         Postoperative Care    Pain management: IV analgesics.   PONV prophylaxis: Ondansetron (or other 5HT-3)     Comments:    See preop eval dated 3/5 for recent DBS placement.  No interval changes.              Jerry Alves MD

## 2021-03-12 NOTE — BRIEF OP NOTE
[facility]    Brief Operative Note    Pre-operative diagnosis: Parkinson's disease (H) [G20]  Morbid obesity (H) [E66.01]  S/P deep brain stimulator placement [Z96.89]  Post-operative diagnosis Parkinson's disease (H) [G20]  Morbid obesity (H) [E66.01]  S/P deep brain stimulator placement [Z96.89]      Procedure: Procedure(s):  Deep brain stimulator placement, phase II, placement of deep brain stimulator generator/battery over the left chest wall  Surgeon: Surgeon(s) and Role:     * Jerry Concepcion MD - Primary  Anesthesia: General   Estimated blood loss: 2 mL  Drains: None  Specimens: * No specimens in log *  Findings:   No sign of infection.  No sign of hardware damage.  All impedance values within normal.  Complications: None.  Implants:   Implant Name Type Inv. Item Serial No.  Lot No. LRB No. Used Action   GENERATOR DBS SYSTEM INFINITY 5 IPG [id number] Neurology device GENERATOR DBS SYSTEM INFINITY 5 IPG [id number] [employer]  Left 1 Implanted

## 2021-03-12 NOTE — OR NURSING
Discharge instructions given to spouse, Zane. Confirmed that he would be taking care of patient after discharge. Verbalized understanding of instructions. Verified ride home with car service with both  and patient.

## 2021-03-12 NOTE — ANESTHESIA CARE TRANSFER NOTE
Patient: Erika Diaz    Procedure(s):  Deep brain stimulator placement, phase II, placement of deep brain stimulator generator/battery over the left chest wall    Diagnosis: Parkinson's disease (H) [G20]  Morbid obesity (H) [E66.01]  S/P deep brain stimulator placement [Z96.89]  Diagnosis Additional Information: No value filed.    Anesthesia Type:   No value filed.     Note:    Oropharynx: oropharynx clear of all foreign objects and spontaneously breathing  Level of Consciousness: drowsy  Oxygen Supplementation: nasal cannula  Level of Supplemental Oxygen (L/min / FiO2): 4  Independent Airway: airway patency satisfactory and stable  Dentition: dentition unchanged  Vital Signs Stable: post-procedure vital signs reviewed and stable  Report to RN Given: handoff report given  Patient transferred to: PACU    Handoff Report: Identifed the Patient, Identified the Reponsible Provider, Reviewed the pertinent medical history, Discussed the surgical course, Reviewed Intra-OP anesthesia mangement and issues during anesthesia, Set expectations for post-procedure period and Allowed opportunity for questions and acknowledgement of understanding      Vitals: (Last set prior to Anesthesia Care Transfer)  CRNA VITALS  3/12/2021 0957 - 3/12/2021 1031      3/12/2021             Ht Rate:  70    SpO2:  100 %        Electronically Signed By: GUILLERMINA Beckman CRNA  March 12, 2021  10:31 AM

## 2021-03-12 NOTE — DISCHARGE INSTRUCTIONS
[facility]  Same-Day Surgery   Adult Discharge Orders & Instructions     For 24 hours after surgery    1. Get plenty of rest.  A responsible adult must stay with you for at least 24 hours after you leave the hospital.   2. Do not drive or use heavy equipment.  If you have weakness or tingling, don't drive or use heavy equipment until this feeling goes away.  3. Do not drink alcohol.  4. Avoid strenuous or risky activities.  Ask for help when climbing stairs.   5. You may feel lightheaded.  IF so, sit for a few minutes before standing.  Have someone help you get up.   6. If you have nausea (feel sick to your stomach): Drink only clear liquids such as apple juice, ginger ale, broth or 7-Up.  Rest may also help.  Be sure to drink enough fluids.  Move to a regular diet as you feel able.  7. You may have a slight fever. Call the doctor if your fever is over 100 F (37.7 C) (taken under the tongue) or lasts longer than 24 hours.  8. You may have a dry mouth, a sore throat, muscle aches or trouble sleeping.  These should go away after 24 hours.  9. Do not make important or legal decisions.   Call your doctor for any of the followin.  Signs of infection (fever, growing tenderness at the surgery site, a large amount of drainage or bleeding, severe pain, foul-smelling drainage, redness, swelling).    2. It has been over 8 to 10 hours since surgery and you are still not able to urinate (pass water).    3.  Headache for over 24 hours.    4.  Numbness, tingling or weakness the day after surgery (if you had spinal anesthesia).  To contact Dr. Concepcion, call [phone] or:        [phone] and ask for the resident on call for   neurosurgery (answered 24 hours a day)      Emergency Department:    [facility]: [phone]       (TTY for hearing impaired: [phone])    [facility]: [phone]       (TTY for hearing impaired: [phone])

## 2021-03-12 NOTE — ANESTHESIA PROCEDURE NOTES
Airway    Patient location during procedure: OR  Staff -   Anesthesiologist:  Jerry Alves MD  CRNA: Renita eDlvalle APRN CRNA  Performed By: CRNA    Consent for Airway   Urgency: elective    Indications and Patient Condition  Indications for airway management: kennedi-procedural  Induction type:intravenousMask difficulty assessment: 1 - vent by mask    Final Airway Details  Final airway type: supraglottic airway    Endotracheal Airway Details   Secured with: pink tape    Post intubation assessment   Placement verified by: capnometry, equal breath sounds and chest rise   Number of attempts at approach: 1  Number of other approaches attempted: 0  Secured with:pink tape  Ease of procedure: easy  Dentition: Intact and Unchanged

## 2021-03-16 ENCOUNTER — PATIENT OUTREACH (OUTPATIENT)
Dept: NEUROSURGERY | Facility: CLINIC | Age: 61
End: 2021-03-16

## 2021-03-16 NOTE — PROGRESS NOTES
Pt s/p Deep brain stimulator placement, phase II, placement of deep brain stimulator generator/battery over the left chest wall on 3/12/21 by Dr. Concepcion. Left message checking on pt's postop status. Will follow-up with pt if I do not hear back from her.

## 2021-03-16 NOTE — PROGRESS NOTES
Attempted to contact the patient x3 for post-hospital call without answer. Will close encounter at this time.    Tatiana Lin CMA, [facility]  Post Hospital Discharge Team

## 2021-03-16 NOTE — OP NOTE
PATIENT NAME: DELIA AQUINO  YOB: 1960  MRN:   [id number]  ACCOUNT:  [id number]      DATE OF PROCEDURE:  03/12/2021    PREOPERATIVE DIAGNOSIS:    1.  Parkinson's disease  2.  Left side stiffness, dyskinesia and tremor  3.  S/p right side deep brain stimulator placement, phase I, placement of right side deep brain stimulator electrode, target right globus pallidus internus, with microelectrode recording, on 8/21/2018  4.  S/p deep brain stimulator placement, phase II, placement of deep brain stimulator generator/battery over the right chest wall on 8/30/2018  5.  S/p left side deep brain stimulator placement, phase I, placement of left side deep brain stimulator electrode, target left globus pallidus internus, with microelectrode recording, [place] externalization protocol, on 3/5/2021    POSTOPERATIVE DIAGNOSIS:    1.  Parkinson's disease  2.  Left side stiffness, dyskinesia and tremor  3.  S/p right side deep brain stimulator placement, phase I, placement of right side deep brain stimulator electrode, target right globus pallidus internus, with microelectrode recording, on 8/21/2018  4.  S/p deep brain stimulator placement, phase II, placement of deep brain stimulator generator/battery over the right chest wall on 8/30/2018  5.  S/p left side deep brain stimulator placement, phase I, placement of left side deep brain stimulator electrode, target left globus pallidus internus, with microelectrode recording, [place] externalization protocol, on 3/5/2021    PROCEDURES PERFORMED:   1.  Deep brain stimulator placement, phase II, placement of new deep brain stimulator generator/battery, model Infinity 5, over left chest wall.    2.  Connection of the left side deep brain stimulator electrode, one electrode array, to the new generator/battery.  3.  Electrical interrogation and analysis of deep brain stimulator system.  4.  Removal of the externalization wire.    ATTENDING SURGEON:  Jerry Concepcion MD,  PhD    RESIDENT SURGEON:  None.  No residents were available.    ANESTHESIA:  General anesthesia with LMA.    ESTIMATED BLOOD LOSS:  2 mL.    COMPLICATIONS:  None.     EXPLANTS:  [employer] 30 cm percutaneous extension wire, REF [id number], Lot# [id number].    IMPLANTS:  [employer] DBS generator/battery, Infinity 5, REF [id number], S/N [id number].     FINDINGS:  Normal impedance testing of the new device.  No problems found.    INDICATIONS FOR PROCEDURE:  Ms. Erika Diaz returns today for her ongoing second side DBS surgeries.  Recently, on 3/5/2021, she underwent left side deep brain stimulator placement, phase I, placement of left side deep brain stimulator electrode, target left globus pallidus internus, with microelectrode recording, [place] externalization protocol.  She tolerated the procedure well and there were no complications.  She was somewhat emotional throughout the case and the research component was shortened.  Her postoperative CT head was without any concerning findings and she was discharged to home on POD#1.  She then was in the Clinical Research Unit at the [facility] for the past two days, participating in research.  Patient reports that she is doing well.  She also noted that she was emotional during the last few days.  She has some major changes in her personal life and that may have been playing a role.  Otherwise, patient reports no pain.  She denies any fevers, chills, nausea, vomiting, dizziness, weakness, numbness or seizure like activity.  She reports that the incision is looking good and there is no redness, no drainage and no fluid collection.  The abdominal dressing, the location of the externalized wire, has gotten wet.  Patient stated that it got wet.  Briefly, patient is a 60 year old female with Parkinson's disease.  Patient is s/p right side deep brain stimulator placement, phase I, placement of right side deep brain stimulator electrode placement, target right globus pallidus  "internus, with microelectrode recording on 8/21/2018 and s/p deep brain stimulator placement, phase II, placement of deep brain stimulator generator/battery over the right chest wall on 8/30/2018.  She also participated in our research protocol where she had her DBS system externalized between her phase I and phase II surgery.  She did well with the surgery and she had no complications.  She did well with the right side implantation and she is getting good therapeutic benefit from the device.  No issues with the device is reported.  Since her first side DBS surgery, she has had an ankle surgery.  She is again feeling her \"inside shakes\".  She is right handed and her handwriting is getting worse.  Her PD medications have been increased as well.  She is interested in having the left side implanted for right side body control.  Patient also reports that her  was recently diagnosed with liver cancer with metastasis to the lungs.  Her case was again discussed at the Movement Disorder Consensus Group Meeting and she was considered to be a good candidate for her second side.  Recommendation was target left globus pallidus internus and [employer] device.  In terms of her Parkinson's disease, she has a a 12 to 15 year history of Parkinson s disease, being diagnosed in 8970-9017.  She first developed stiffness in her left hand and shoulder that has since progressed to include tremors.  Her symptoms are worse on the left-side.  Her goals with Deep Brain Stimulation are to gain tremor control and reduce dyskinesias.  She wants to feel better, be more normal without taking the medications, improve wearing off stiffness, difficulty walking and foggy thinking.  Medication reduction is important.  She does have regular sessions with a chiropractor to relieve tension in her neck.  Her case was discussed at the Movement Disorder Consensus Group meeting and she was considered to be a good candidate.  Recommendation at the time was " wait and see strategy, target right globus pallidus internus and Abbott device.  Patient returns for her phase II surgery.  She has done well with phase I and the interim research sessions.  There are no concerns for infection or complications at this time.  We discussed phase II of DBS surgery, placement of the DBS generator/battery over the left chest wall, under LMA.  Since she already has the tunneled extension wire, I will only need to remove the externalization wire and place the new generator/battery after making the proper connection.  Risks, benefits, alternative therapies were discussed with the patient, including but not limited to infection and bleeding, stroke, death, hardware failure and possible need for more surgeries.  Surgical procedure was discussed in detail.  She expressed understanding and all questions were answered.     DESCRIPTION OF PROCEDURE:  The patient was taken to the operating theater and positioned supine on the operating room table.  All pressure points were appropriately padded.  LMA was placed and general anesthesia was obtained.  She remained supine on the operative bed.  Attention was first turned to her middle upper abdomen area and the dressing for the externalization wire.  The dressing was removed, thus revealing the externalization wire exiting out of the skin.  The 4-0 Monocryl suture anchoring the wire was cut and removed.  The externalization wire was tagged with a hemostat.  I cleaned and prepped her left chest wall, her previous incision along with the externalized wire exit site.  Exofin skin glue was removed and cleaned.  Then, her left chest wall area, location of her previous incision, was cleaned and prepped and draped in routine surgical fashion.  The middle upper abdomen area where the wire is exiting out was also prepped but was draped out with the hemostat under the drapes.  The circulating nurse was notified of the location of the hemostat and was instructed  to pull it when given the go ahead.  Time out was then performed confirming the patient's identity, procedure to be performed, site and side of the surgery, imaging corresponding with the patient and the administration of preoperative prophylactic antibiotic.     The areas of intended incision, left chest wall incision, was then injected with local anesthetic. Total of 8 mL of 1% Lidocaine with epinephrine, 1:100,000, mixed with 1/4% Marcaine plain, 50:50 mix, was used for the case.     Attention was first turned to the patient's left chest wall incision.  Using a Metzenbaum scissors and blunt dissection technique, the previous incision was opened by cutting and removing the sutures.  The wound was opened up further.  The layer protecting the hardware or wire was also opened carefully to expose the hardware.  Some serous fluid was seen.  The area did not appear to be infected.  No signs of infection was noted.  The area was generously irrigated and the wire was carefully uncoiled out of the wound.    The one connector for the externalization wire was identified.  This was pulled out slightly from the deep pocket.  Then, under direct vision, the connector was cut at the distal end towards the exiting portion of the wire.  Then, the circulating nurse was instructed to pull the wire and the hemostat that was tagging the wire from below the drapes.  The wire was pulled without any difficulties.  The cut wire was visualized to confirm that all of the wire was removed.  Then the remaining connector was  from the extension wire.  The cut connector was removed.  Therefore, the externalization wire was removed entirely.  Only the extension wire connected to the left intracranial electrode remained.    At this time, a new [employer] 5 generator/battery was brought onto the field.  The extension wire was then cleaned at its contacts and inserted into the generator/battery portal.  The extension wire for the left sided  implant was placed into the anterior connector portal.  A plug was placed in the posterior connector portal.  These were secured with a screwdriver.  The pocket was inspected to be clean and no active bleeding was noted.  The pocket was generously irrigated and dried and meticulous hemostasis was obtained.  Then, the new generator/battery with the excess wire being placed posterior to the generator/battery was placed within the left chest wall pocket.  The entire apparatus fit into the pocket comfortably.  The generator/battery was anchored to the pocket using two 2-0 Ethibond sutures.  Therefore, one electrode array was connected to the generator/battery.    With proper placement of the connection of the deep brain stimulator system, wireless interrogation and analysis was performed.  All the impedance values were noted to be within normal.  No problems were found.      With the satisfactory placement of the new system, I began closing the wound.  The left chest incision was closed in the following manner.  The deep pocket was reapproximated using 3-0 Vicryl sutures in an inverted interrupted fashion.  The subcutaneous fat layer was reapproximated using 3-0 Vicryl sutures in an inverted interrupted fashion.  The dermal layer was reapproximated using 3-0 Vicryl sutures in an inverted interrupted fashion.  The skin was reapproximated using 4-0 Monocryl suture in a subcuticular fashion.  The wound was cleaned and dried.  ChoraPrep was used to clean the incision again.  Then, Exofin skin glue was applied.    The drapes were taken down.  The middle upper abdomen wire exit site was cleaned and dried.  Antibiotic ointment was applied.  Dry sterile dressing was placed.    I again performed wireless interrogation and analysis of the entire deep brain stimulator system.  All impedances were noted to be within normal and no problems were found.      At the end of the case, all counts including needle, sponge and instrument  counts were correct x 2.  There were no complications.  Patient was awakened and her LMA was removed.  She was taken to the recovery room in a stable condition.    ISherri M.D., Ph.D., Neurosurgery Attending, was present and scrubbed for the entire case and performed the entire case.      SHERRI ALBRIGHT MD, PHD

## 2021-03-16 NOTE — OP NOTE
PATIENT NAME: DELIA AQUINO  YOB: 1960  MRN:   [id number]  ACCOUNT:  [id number]      DATE OF PROCEDURE:  03/05/2021    PREOPERATIVE DIAGNOSIS:    1.  Parkinson's disease  2.  Left side stiffness, dyskinesia and tremor  3.  S/p right side deep brain stimulator placement, phase I, placement of right side deep brain stimulator electrode placement, target right globus pallidus internus, with microelectrode recording on 8/21/2018  4.  S/p deep brain stimulator placement, phase II, placement of deep brain stimulator generator/battery over the right chest wall on 8/30/2018    POSTOPERATIVE DIAGNOSIS:    1.  Parkinson's disease  2.  Left side stiffness, dyskinesia and tremor  3.  S/p right side deep brain stimulator placement, phase I, placement of right side deep brain stimulator electrode placement, target right globus pallidus internus, with microelectrode recording on 8/21/2018  4.  S/p deep brain stimulator placement, phase II, placement of deep brain stimulator generator/battery over the right chest wall on 8/30/2018    PROCEDURES PERFORMED:   1.  Placement of CRW stereotactic headframe  2.  Stereotactic neuronavigation planning and CT of head  3.  Stereotactic neuronavigation using the Thryve system for surgical planning, targeting and approach. The target is left globus pallidus internus (GPi)  4.  Left side deep brain stimulator placement, phase I, placement of left side deep brain stimulator electrode, target is left globus pallidus internus (GPi)  5.  Use of intraoperative microelectrode recording  6.  Use of intraoperative fluoroscopy  7.  Placement of one extension wire and connection to the left side deep brain stimulator electrode, tunneled to the left chest wall  8.  Placement of one externalization wire and connection to the extension wire, exiting at the middle upper abdomen    ATTENDING SURGEON:  Jerry Concepcion MD, PhD     RESIDENT SURGEON:  Greg Putnam MD    ANESTHESIA:    1.   "Monitored anesthesia care and local anesthetic  2.  General anesthesia    ESTIMATED BLOOD LOSS:  25 mL.     COMPLICATIONS:  None.    FINDINGS:  Final electrode location, 45 degrees clockwise rotation of the Scott Gun, then lateral Scott Gun position, 1.8 mm below target.    IMPLANTS:    1.  Zavala DBS electrode, Infinity 0.5, REF 6172, S/N [id number].  2.  Abbott DBS extension wire, 60 cm, REF 6372, S/N [id number].  3.  Abbott Guardian maxwell hole cover, REF 6010, Lot# [id number].  4.  Abbott [employer] 30 cm percutaneous extension wire, REF 3383, Lot# [id number].    INDICATIONS FOR PROCEDURE:  Ms. Erika Diaz presents today for second side DBS implantation.  Patient is s/p right side deep brain stimulator placement, phase I, placement of right side deep brain stimulator electrode placement, target right globus pallidus internus, with microelectrode recording on 8/21/2018 and s/p deep brain stimulator placement, phase II, placement of deep brain stimulator generator/battery over the right chest wall on 8/30/2018.  She also participated in our research protocol where she had her DBS system externalized between her phase I and phase II surgery.  She did well with the surgery and she had no complications.  She did well with the right side implantation and she is getting good therapeutic benefit from the device.  No issues with the device is reported.  Since her first side DBS surgery, she has had an ankle surgery.  She is again feeling her \"inside shakes\".  She is right handed and her handwriting is getting worse.  Her PD medications have been increased as well.  She is interested in having the left side implanted for right side body control.  Patient also reports that her  was recently diagnosed with liver cancer with metastasis to the lungs.  Patient completed her workup for DBS candidacy.  Her case was discussed at the Movement Disorder Consensus Group Meeting and she was considered to be a good candidate.  Recommendation " was left side DBS implantation, target left GPi and Abbott device.  Patient again consented for [facility] externalization protocol research.  We discussed both phase I and II of DBS surgery.  We discussed that during phase I, we would place the DBS electrode on the left side under MAC and microelectrode recording.  For the [facility] Parkinson's Research, she will undergo a modified phase I where extension wire will be placed to the left chest wall and additional extension wire will be placed exiting out for externalized recording.  She would then return one week later for the phase II with placement of the DBS generator/battery over the left chest wall.  Risks, benefits, alternative therapies were discussed with the patient, including but not limited to infection and bleeding.  Surgical procedure was discussed in detail.  All questions were answered, and she expressed understanding.    DESCRIPTION OF PROCEDURE:      CRW head frame placement and stereotactic neuronavigation.      Informed consent was obtained.  The patient was brought to the operating room and kept on the gurney in a sitting position.  She was identified and a brief timeout was performed for the placement of the CRW head frame.  She was given sedation to make her comfortable.  The intended pin sites, two in the front and two in the back of the head, were cleaned and were injected with local anesthetic, 1% lidocaine with epinephrine.  A total of 23 mL were used during this portion of the surgical case.  The CRW stereotactic head frame was placed onto her head with 4 pins for fixation.  Care was taken so that the fiducial box wound fit over the head and the frame.  Care was also taken to avoid the previously buried extension wire on the right posterior area with the right side posterior pin.  Once the head frame was on, the fiducial box was easily placed without interference from her forehead.  Samuels catheter was also placed while the patient was sedated.  The  patient tolerated the procedure well and her sedation was lightened and she was awakened.      After the CRW stereotactic head frame was placed, she was taken directly to the CT scanner, at which time a CT scan of the head stereotactic protocol was obtained.  Subsequently, the patient was taken back up to the operating room where she was placed supine on the operative bed with the CRW head frame affixed to the bed as well.  Patient was in a slight sitting up position with the neck in a neutral position and she was made comfortable.  The bed was positioned so that it would be a beach chair reclining position.  All pressure points were well padded.  Care was taken to make sure that the neck was in neutral position and that the Lincoln head carbajal device had room for manipulation in case more flexion or extension is needed throughout the case.     At this time, attention was turned to the neuro navigation imaging that was obtained.  The Stealth neuronavigation device was loaded with the CT head that had just been obtained.  The device also had an MRI of the head, stereotactic protocol, that was obtained prior to surgery.  CT of the head was merged with the previously obtained MRI of the brain.  The merge demonstrated good coherence/registration.  Then, using this merged image, neuronavigation was used to stereotactically target the left globus pallidus internus.  The technique used was the AC-PC line localization technique to target the GPi using stereotactic coordinates.  We utilized the T1, T2 and SWI sequence of the MRI to identify the GPi and target it.  We also had 7 [facility] MRI available as a reference to confirm our targeting.  The X, Y and Z as well as the ring and arc angles for the CRW head frame were obtained for the left side.  Then, we adjusted the target by 2 mm posteriorly so that the GPi target would correspond with the anterior Scott Gun position.  The entry into the skull, as well as the trajectory of  the electrode were checked with a probe's eye view to avoid any sulci, ventricle or vascular structures.     The stereotactic coordinates for the left side was then transferred to the [facility] stereotactic targeting apparatus as well as the phantom base.  The coordinates were confirmed and double checked for accuracy.  Accuracy was also confirmed using phantom base.  The coordinates were X = -20.6, Y = -7.7, Z = -3.4, ring angle = 47.2 degrees anterior, and arc angle = 2.3 degrees to the right (this was confirmed to be right side approach, as we went past vertical on the frame).     At this time, attention was turned back to the patient.  Using a hair clipper, her hair over the left frontal area was clipped.  A semicircular C-shaped incision was planned on the left side and this was marked.  Then, the surgical area was prepped and draped in routine surgical fashion.  We also prepped the area posterior to this as well as area towards the left parietal area.  The patient was also made comfortable.     Timeout was then performed confirming the patient's identity, procedure to be performed, side and site of surgery identified, imaging corresponding with the patient and administration of preoperative prophylactic antibiotic.    Left-sided electrode placement with microelectrode recording: Target left GPi.     The CRW targeting apparatus was attached to the head frame on top of the sterile drapes.  The entry point was marked on the scalp on the left side.  A C-shaped incision was made with a skin knife, after the area was injected with local anesthetic, 1% Lidocaine with epinephrine and 1/4% Marcaine plain, 50:50 mix.  The area posterior to the incision was also injected as a pocket would be created.  Area posterior lateral, towards the left parietal area was also injected as the electrode connector will be tunneled here later.  Total of 23 mL of the above mentioned local anesthetic was used.  The incision was then further  carried down to the pericranium.  Hemostasis was obtained using monopolar and bipolar cautery.  Thin layer of pericranial tissue was left behind on the scalp and the skin flap was reflected posteriorly.  Then, using a blunt dissection technique, a pocket was created posteriorly as well as a track that was made towards the left parietal area for later use.    At this time, the targeting apparatus again positioned and the entry point to the skull was marked.  Area of the intended maxwell hole was cleaned and pericranial tissue removed.  Then using an acorn drill, maxwell hole was created over this entry point to expose the dura.  The area was vigorously irrigated and bone wax used to control the bone bleeding.  A small curette followed by a Kerrison punch was used to clean up the remaining bone around the inner table of the maxwell hole.  Dura was coagulated with bipolar cautery for hemostasis, and again no active bleeding was noted.     At this time, the electrode fixation cover was fixed to the skull using two screws.  Care was taken to overlap the pericranium over the edge of the cover to provide a smooth tissue transition.  Then, the electrode drive was attached to the targeting apparatus.  The entry into the dura was again checked.  It appeared that all positions of the Scott Gun electrode carbajal were accessible without any interference from the bone edge.  Then using a Bovie cautery and a #4 Penfield instrument, opening was made into the dura, as well as a small corticectomy.  No active bleeding was noted.    Dr. Nolberto San and Ms. Dariela Aguilar of the Neurology Department participated in the recording and neurological testing.  During the microelectrode recording and testing, Dr. San and Ms. Aguilar took detailed notes of the electrophysiologic data and neurologic exam as well as any stimulation effects.  Patient also consented to be part of the [place] research project.  Therefore, intraoperative recording was  performed.    At this time, the electrode guide tubes were inserted in the anterior, lateral and posterior Scott Gun positions and they were advanced slowly until they were fully inserted at which point the tips would be well above the target.  No abnormal resistance was noted.  Duraseal was used to seal the entry site to provide a seal and to minimize cerebrospinal fluid leak and air entry.  Then, recording microelectrodes were brought in and placed within the guide tubes and they were connected for intraoperative recording.  The tips of the electrode were now 15 mm above target.  The patient was awakened.  Then, using a micro drive, the electrodes were slowly advanced towards the target, collecting microelectrode recording data for mapping the track.  Anterior track yielded approximately 7.5 to 8.2 mm of GPi with somatosensory response activity in hip, ankle, knee, shoulder and possibly elbow.  Optic tract was found with light.  Internal capsular effects were noted with ring stimulation, orolingual at 3.5 to 4 mA at 1.6 mm below the target and equivocal up to 6 mA at 2 mm above target.  Posterior track yielded approximately 5.6 mm of GPi with somatosensory response activity in shoulder and possibly elbow.  No optic tract was noted.  Internal capsular effect at the lip was noted with microstimulation at 50 microamps at 4.6 mm below target.  Lateral track yielded approximately 5.5 mm of GPi with somatosensory response in the leg and possibly elbow.  No optic tract was noted.      As per [facility] protocol, during the microelectrode recording, when the cells were isolated, patient's electrophysiological data was collected while the patient performed various tasks on the experimental apparatus.  However, patient displayed fatigue so research recording was stopped.    Based on the electrophysiological data collected, it was decided that the best location for the electrode would be lateral to the current anterior Scott Gun  position.  Decision was made to rotate the X-Y stage 45 degrees clockwise so that the new lateral Scott Gun position would be then 1.4 mm lateral and 0.6 mm posterior to the current anterior Scott Gun position.  The electrodes were pulled out of the guide tubes.  Then, the posterior electrode guide tube was pulled out of the brain.  Then, this guide tube was inserted in the center Scott Gun position and was inserted into the brain and advanced slowly until at which point the tip would be well above the target.  No abnormal resistance was noted.  Now with the brain stabilized again, the anterior and posterior guide tubes were pulled out of the brain.  Then, the X-Y stage was rotated 45 degrees clockwise, with the center Scott Gun guide tube as the axis of the turn.  Then, electrode guide tube was inserted into the now rotated lateral Scott Gun position and inserted into the brain and advanced slowly until at which point the tip would be well above the target.  No abnormal resistance was noted.  Duraseal was used to seal the entry site to provide a seal and to minimize cerebrospinal fluid leak and air entry.  Then, recording microelectrode was brought in and placed within the lateral Scott Gun guide tube and it was connected for intraoperative recording.  The tip of the electrode was now 15 mm above target.  The patient remained awake.  Then, using a micro drive, the electrode was slowly advanced towards the target, collecting microelectrode recording data for mapping the track.  The lateral track yielded approximately 6.3 mm of GPi with somatosensory response activity in the leg and elbow.  Optic tract was found with light.  Internal capsular effects were noted with ring stimulation, tongue and lip, at 4 to 5 mA at 0.5 mm below the target.    Based on the mapping data, it was decided that the current rotated lateral Scott Gun position may be the best placement.  The electrode drive was positioned to the depth of 1.8 mm below the  target level.  Then, the electrode was pulled out of the guide tube.  The permanent DBS electrode was brought into the field.  It was first tested in the normal saline and the impedance values were within normal.  Then, the electrode was placed in the lateral guide tube with the tip being placed at 1.8 mm below the target depth.  The electrode demonstrated good impedance values.  The electrode was tested.  With 1- and 3+ configuration, dysarthria was noted at 3.5 mA.  With 1+ and 3- configuration, patient may have had slight cheek/lip capsular effect at 6.0 mA.  Based on our electrophysiology data, we decided that the current position was satisfactory for placement of the stimulating electrode.    With the satisfactory testing of the electrode position and the stimulation parameters, the electrode was secured at this location.  The patient was again made comfortable.  The guide tubes were pulled out of the brain.  Duraseal was again used to seal any opening.  The area was generously irrigated.  Hemostasis was also obtained.  Fluoroscopy was brought into the field to test that the electrode did not move with each manipulation.  The electrode tip was seen to be above the target, as expected.  The electrode clamp was applied to hold the electrode.  Then, the electrode stylet was removed. The electrode was then removed from the electrode carbajal.  The cap cover was finally placed and secured in place.  Fluoroscopy confirmed that the tip of the electrode did not change in position with each manipulation.  The directional marker, on fluoroscopy, also demonstrated that the contact 2A is facing anteriorly.    The connecting portion of the electrode was covered with a protective covering/dead-end connector, and this apparatus was inserted into the subgaleal space/pocket towards the left parietal area that was created at the beginning of the case.  The excess wire was also buried posteriorly in the previously created pocket.   Fluoroscopy confirmed that the tip did not move.  The wound was then generously irrigated.    The galeal layer was reapproximated using 3-0 Vicryl sutures in an inverted interrupted fashion and the skin was reapproximated using 4-0 Monocryl suture in a running fashion.  The wound was cleaned and dried and prepped with ChoraPrep.  Then, Exofin was applied.    Removal of the head frame    First, the stereotactic targeting apparatus was removed.  Then, the sterile drapes were removed.  Subsequently, the patient was taken out of the CRW head frame.  The four pin sites were clean and dry.  We did notice bleeding coming from the right posterior pin site and two staples were placed here for hemostasis.  Antibiotic ointment was applied to the pin sites.  Primapore dressing was placed on the two frontal pin sites.      Placement of extension wire and connection to the left side deep brain stimulator electrode, tunneled to the left chest wall.    At this time, per [place] protocol, we began our modified phase I for the placement of the extension wire.  Patient was positioned supine on the operating room table.  All pressure points were properly padded.  With the placement of endotracheal tube, general endotracheal anesthesia was induced.  She remained supine on the operative bed.  Her head was turned to the right side, thus exposing the left parietal area of the head.  The previously implanted connector portion of the stimulating electrode from the left side could be palpated on the left side of the head.  Small area of the hair was removed over this palpable connector using a surgical hair clipper.  Then, the left side of the head, neck and the chest area, to include the upper abdomen, was prepped and draped in the normal sterile fashion.  Local anesthetic was injected in the areas of intended incision at the left scalp and left chest wall as well as along the path of the intended tunneling.  1% Lidocaine with epinephrine  mixed with 1/4% Marcaine plain, 50:50 mix, were used.  A timeout was performed confirming the patient's identity, procedure to be performed, site and side of the surgery and the administration of preoperative prophylactic antibiotic.    Attention was first turned to the head.  A #10 blade was used to make a 2 cm incision at the distal end of the connector that was palpated in her scalp.  Hemostasis was obtained with bipolar cautery.  The incision was carried down to the pericranium.  The buried connector protective cover tip was visible.  The mosquito was used to hold the protective cover and we gently pulled out the connector.  This was found to be fully intact.  At that point, a pocket was made using a Shanice clamp down toward behind the ear into the nuchal line.  This was in preparation for the tunneling of the extension wire.     At that point, attention was turned to the left chest area.  A #10 blade was used to make a 6 cm incision on the left chest wall.  The #10 blade scalpel was used to finish dissection down to the proper plane of about 1 cm below the skin.  We found a nice easily dissecting plane in the subcutaneous fat layer.  Then blunt dissection technique with fingers and mosquito was used to establish a subcutaneous pocket about 3 fingerbreadths wide and deep enough to encompass the full length of the fingers.  Hemostasis was obtained.  The pocket was generously irrigated and sponges were placed within the pocket.     At this point, a tunneler was brought onto the field.  A subcutaneous passage was tunneled from the head incision to the chest wall incision, careful not to be too superficial to the skin, but within the correct plane and taking a course over the clavicle.  The tunneler was removed, leaving behind a plastic sheath.  The left side extension wire was placed into the sheath from the head incision to the chest incision.  Then, the plastic sheath was pulled from the chest wound, leaving  behind the tunneled extension wire.    We then proceeded to make the connection between the intracranial lead and the extension wire.  The protective cover on the connector of the intracranial electrode was removed.  Then, the connector was inserted into the extension wire and secured using a fastening screws.  The extension wire was gently pulled down at the chest wall area in order to seat the extension wire connection correctly in the scalp pocket and not directly under the incision.  It was placed slightly superior to the incision.      The extension wire was connected to a test cable and tested.  All impedance values were noted to be within normal.  No problems were found.  The sponges were then taken out of the left chest pocket.    Placement of one externalization wire and connection to the extension wire, exiting at the middle upper abdomen.    Then, the tunneler was again brought in and a subcutaneous passage was tunneled from the left chest pocket incision to the middle upper abdomen, exiting out of the skin.  The exit site was previously injected with the above named local anesthetic.  The tunneler was removed, leaving behind a plastic sheath.  At this time extension wire for externalization was brought in and placed into the sheath from the chest incision to the chest exit site.  Then, the plastic sheath was pulled from the abdominal exit site, leaving behind the externalization wire.  We then proceeded to make the connection between the extension wire and the externalization wire.  The connector of the extension wire was inserted and secured to the externalization wire connector.  There was a cover and this was secured using a 2-0 silk tie.  Then, the externalization wire was gently pulled at the abdominal exit site until the connection was buried within the left chest pocket.    The externalization wire was then connected to a testing cable and tested.  All the impedance values were noted to be within  normal.  No problems were found.    The pocket was inspected for hemostasis.  The pocket was generously irrigated and dried.  The excess wire was placed in the pocket.     We began closing the wound.  The scalp incision was closed in the following manner.  It was first irrigated and dried.  Meticulous hemostasis was obtained.  A thin layer of tissue was reapproximated using 3-0 Vicryl sutures in an inverted interrupted fashion to cover the hardware and to reapproximate the galeal layer.  The skin was reapproximated using 4-0 Monocryl suture in a running fashion.  The left chest wound was closed in the following manner.  The pocket capsule layer was reapproximated with 3-0 Vicryl sutures in an inverted interrupted fashion.  We used a dyed suture for easier identification.  Subcutaneous tissue was reapproximated with 3-0 Vicryl sutures in an inverted interrupted fashion and the dermal layer was reapproximated with 3-0 Vicryl sutures in an inverted interrupted fashion.  The skin was reapproximated with 4-0 Monocryl suture in a subcuticular fashion.     Both incisions were cleaned with a wet and dry and reprepped with ChloraPrep.  Exofin skin glue was then placed on both incisions.     The middle upper abdomen wire exit site was prepared.  The exit site was cleaned with wet and dry and reprepped with ChloraPrep.  Using a 4-0 Monocryl suture, the exit site was closed and the exiting wire anchored.  Then, Biopatch was placed.  Primapore dressing was placed over the exit site.  Then, sponge was laid on top for cushion and the connector portion of the externalized wire was placed on top.  The excess wire was curled around and Ioban dressing was placed on top for dressing.    Patient was extubated and taken to the recovery room in a stable condition.  At the end of the case, all counts including needle, sponge and instrument counts were correct x 2.  There were no complications.    Jerry KC M.D., Ph.D.,  Neurosurgery Attending, was present and scrubbed for the entire case and performed the key and critical portions of the case.      SHERRI ALBRIGHT MD, PHD

## 2021-03-29 ENCOUNTER — OFFICE VISIT (OUTPATIENT)
Dept: NEUROSURGERY | Facility: CLINIC | Age: 61
End: 2021-03-29
Payer: MEDICARE

## 2021-03-29 VITALS
SYSTOLIC BLOOD PRESSURE: 157 MMHG | RESPIRATION RATE: 16 BRPM | DIASTOLIC BLOOD PRESSURE: 84 MMHG | OXYGEN SATURATION: 97 % | HEART RATE: 66 BPM

## 2021-03-29 DIAGNOSIS — Z96.89 S/P DEEP BRAIN STIMULATOR PLACEMENT: ICD-10-CM

## 2021-03-29 DIAGNOSIS — G20.A1 PARKINSON'S DISEASE (H): Primary | ICD-10-CM

## 2021-03-29 DIAGNOSIS — E66.01 MORBID OBESITY (H): ICD-10-CM

## 2021-03-29 PROCEDURE — 99024 POSTOP FOLLOW-UP VISIT: CPT | Performed by: NEUROLOGICAL SURGERY

## 2021-03-29 NOTE — LETTER
3/29/2021        RE: Erika Diaz  [address] [facility] [place] [id number]     Dear Colleague,    Thank you for referring your patient, Erika Diaz, to the [facility] [place] at [facility]. Please see a copy of my visit note below.    NEUROSURGERY    HISTORY AND PHYSICAL EXAM    Chief Complaint   Patient presents with     RECHECK     [facility] RETURN, WOUND CHECK, S/P LEFT SIDE DBS IMPLANTATION, PHASE I AND PHASE II       HISTORY OF PRESENT ILLNESS  Ms. Erika Diaz returns today for her follow-up after her second side, left side, DBS implantation surgeries.  She is s/p left side deep brain stimulator placement, phase I, placement of left side deep brain stimulator electrode, target left globus pallidus internus, with microelectrode recording, [place] externalization protocol.  She tolerated the procedure well and there were no complications.  She was somewhat emotional throughout the case and the research component was shortened.  Her postoperative CT head was without any concerning findings and she was discharged to home on POD#1.  She then was in the Clinical Research Unit at the [facility] for the two days, participating in research.  She then returned to the OR on 3/12/2021 for deep brain stimulator placement, phase II, placement of deep brain stimulator generator/battery over the left chest wall on 3/12/2021.  She tolerated that procedure well and there were no complications.  She returns today for her wound check.  Patient reports that she is doing well and there are no complaints.  She reports no pain.  She denies any fevers, chills, nausea, vomiting, dizziness, weakness, numbness or seizure like activity.  She reports that the incisions are looking good and there is no redness, no drainage and no fluid collection.  Overall, she is quite happy with the recent surgery.    She states that after her phase II  "surgery on 3/12/2021, her discharge documentation told her to discontinue her hydrochlorothiazide medication.  It is unclear as to why this was in the discharge instructions and there are no kennedi-operative documentation regarding this.  There was no clinical indication that I am aware of for stopping this medication.  Patient reported that she noticed her lower extremity to be edematous when she tried to put her shoes on.  She also stated that there were some sutures that she could feel, including the one in her abdomen.    In summary, patient is a 60 year old female with Parkinson's disease.  Patient is s/p right side deep brain stimulator placement, phase I, placement of right side deep brain stimulator electrode placement, target right globus pallidus internus, with microelectrode recording on 8/21/2018 and s/p deep brain stimulator placement, phase II, placement of deep brain stimulator generator/battery over the right chest wall on 8/30/2018.  She also participated in our research protocol where she had her DBS system externalized between her phase I and phase II surgery.  She did well with the surgery and she had no complications.  She did well with the right side implantation and she is getting good therapeutic benefit from the device.  No issues with the device is reported.  Since her first side DBS surgery, she has had an ankle surgery.  She is again feeling her \"inside shakes\".  She is right handed and her handwriting is getting worse.  Her PD medications have been increased as well.  She is interested in having the left side implanted for right side body control.  Patient also reports that her  was recently diagnosed with liver cancer with metastasis to the lungs.  Her case was again discussed at the Movement Disorder Consensus Group Meeting and she was considered to be a good candidate for her second side.  Recommendation was target left globus pallidus internus and Abbott device.    In terms of " her Parkinson's disease, prior to the recent workup, she has a a 12 to 15 year history of Parkinson s disease, being diagnosed in [id number].  She first developed stiffness in her left hand and shoulder that has since progressed to include tremors.  Her symptoms are worse on the left-side.  Her goals with Deep Brain Stimulation are to gain tremor control and reduce dyskinesias.  She wants to feel better, be more normal without taking the medications, improve wearing off stiffness, difficulty walking and foggy thinking.  Medication reduction is important.  She does have regular sessions with a chiropractor to relieve tension in her neck.  Her case was discussed at the Movement Disorder Consensus Group meeting and she was considered to be a good candidate.  Recommendation at the time was wait and see strategy, target right globus pallidus internus and Abbott device.      Past Medical History:   Diagnosis Date     Benign paroxysmal positional vertigo of right ear      Degenerative joint disease 11/07/2017     Encounter for neuropsychological testing 12/20/2017    Current results indicate variability in attention and memory, ranging from moderately impaired to superior, in some cases with greater difficulty on less complex tasks. Basic language, visual processing, and executive functioning fall within normal limits. Personality assessment is suggestive of somatization, and also raises the possibility of a thought disorder as well as a history of manic episo     JASON (generalized anxiety disorder)      History of colonic polyps 11/07/2017     HTN (hypertension) 11/07/2017     Hypercholesterolemia 11/07/2017     Lactose intolerance in adult 11/07/2017     Migraines      Obesity 11/07/2017     Parkinson disease (H)      PID (pelvic inflammatory disease) due to IUD 11/07/2017     Pulmonary emboli (H) 01/20/2019    Post Right Ankel Surgery     Pulmonary emboli (H) - coumadin lovenox 11/07/2017    provoked after knee  replacement     Sensorineural hearing loss (SNHL) of right ear 12/2018     Trochanteric bursitis of left hip        Past Surgical History:   Procedure Laterality Date     adhesioloysis       CHOLECYSTECTOMY       COLONOSCOPY       IMPLANT DEEP BRAIN STIMULATION GENERATOR / BATTERY Right 8/30/2018    Procedure: IMPLANT DEEP BRAIN STIMULATION GENERATOR / BATTERY;  Deep Brain Stimulator Placement, Phase II, Placement Of Deep Brain Stimulator Generator/Battery Over The Right Chest Wall;  Surgeon: Jerry Concepcion MD;  Location: [facility] OR     IMPLANT DEEP BRAIN STIMULATION GENERATOR / BATTERY Left 3/12/2021    Procedure: Deep brain stimulator placement, phase II, placement of deep brain stimulator generator/battery over the left chest wall;  Surgeon: Jerry Concepcion MD;  Location: [facility] OR     JOINT REPLACEMENT Right     right partial knee replacement     LAPAROSCOPY      adhesioloysis     LAPAROSCOPY      supracervical hysterectomy     LAPAROTOMY EXPLORATORY Left     partial removal of left ovary     OPTICAL TRACKING SYSTEM INSERTION DEEP BRAIN STIMULATION Right 8/21/2018    Procedure: OPTICAL TRACKING SYSTEM INSERTION DEEP BRAIN STIMULATION;  Stealth Assisted Right Deep Brain Stimulator Placement, Phase I, Placement Of Right Side Deep Brain Stimulator Electrode, Target Right Globus Pallidus Internus With Microelectrode Recording;  Surgeon: Jerry Concepcion MD;  Location: [facility] OR     OPTICAL TRACKING SYSTEM INSERTION DEEP BRAIN STIMULATION Left 3/5/2021    Procedure: stealth assisted Left side deep brain stimulator placement, phase I, placement of left side deep brain stimulator electrode, target left globus pallidus internus, with microelectrode recording and placement of extension and externalization wires for [facility] research protocol;  Surgeon: Jerry Concepcion MD;  Location: [facility] OR     REMOVE INTRAUTERINE DEVICE  2006     salpingoopherectomy       XR FOOT SURGERY SENG RIGHT Right 01/18/2019        Social History     Socioeconomic History     Marital status:      Spouse name: Not on file     Number of children: Not on file     Years of education: Not on file     Highest education level: Not on file   Occupational History     Not on file   Social Needs     Financial resource strain: Not on file     Food insecurity     Worry: Not on file     Inability: Not on file     Transportation needs     Medical: Not on file     Non-medical: Not on file   Tobacco Use     Smoking status: Former Smoker     Packs/day: 0.50     Years: 20.00     Pack years: 10.00     Types: Cigarettes     Quit date: 2009     Years since quittin.6     Smokeless tobacco: Never Used   Substance and Sexual Activity     Alcohol use: No     Drug use: No     Sexual activity: Not Currently     Partners: Male     Birth control/protection: None   Lifestyle     Physical activity     Days per week: Not on file     Minutes per session: Not on file     Stress: Not on file   Relationships     Social connections     Talks on phone: Not on file     Gets together: Not on file     Attends Scientologist service: Not on file     Active member of club or organization: Not on file     Attends meetings of clubs or organizations: Not on file     Relationship status: Not on file     Intimate partner violence     Fear of current or ex partner: Not on file     Emotionally abused: Not on file     Physically abused: Not on file     Forced sexual activity: Not on file   Other Topics Concern     Parent/sibling w/ CABG, MI or angioplasty before 65F 55M? No   Social History Narrative    . lives in [place]. Zane Laikristian spouse       Family History   Problem Relation Age of Onset     Breast Cancer Mother        Current Outpatient Medications   Medication     Acetaminophen (TYLENOL PO)     amantadine (SYMMETREL) 100 MG capsule     busPIRone (BUSPAR) 10 MG tablet     calcium carbonate (TUMS) 500 MG chewable tablet     carbidopa-levodopa (SINEMET)   MG tablet     FLUoxetine (PROZAC) 20 MG capsule     gabapentin (NEURONTIN) 100 MG capsule     LORazepam (ATIVAN) 0.5 MG tablet     polyethylene glycol (MIRALAX) 17 GM/Dose powder     pramipexole (MIRAPEX) 0.5 MG tablet     rosuvastatin (CRESTOR) 5 MG tablet     No current facility-administered medications for this visit.        Allergies   Allergen Reactions     Atorvastatin Muscle Pain (Myalgia)     Other reaction(s): Myalgia  Per PCP note 7/24/18 - is to resume simvastatin - monitor for myalgia     Codeine Itching     Mold Unknown       REVIEW OF SYSTEMS:  General: Negative for chills/sweats/fever, difficulty sleeping, headache, recent fatigue, or weight gain/loss.  Eyes: Negative for blurred vision, crossed eyes, double vision, recent eye infections, vision flashes, or vision halos.  Ears/Nose/Mouth/Throat: Negative for bleeding gums, difficulty swallowing, earache, ear discharge, hearing loss, hoarseness, nosebleeds, tinnitus, or sinus problems.  Respiratory:Negative for chronic cough, coughing blood, night sweats, shortness of breath, Tuberculosis, or wheezing.  Cardiovascular: Negative for chest pain, dyspnea at night, heart murmur, palpitations, pacemaker, pacemaker, poor circulation, swollen legs/feet, or varicose veins.  Gastrointestinal: Negative for melena, hematochezia, chronic diarrhea, heartburn, Hepatitis A/B/C, increasing constipation, Liver Disease, nausea, or vomiting.   Genitourinary: Negative for Urinary retention, genital discharge, urinary incontinence, prostate problems, urgency, or UTI.   Neurological: POSITIVE for Parkinson's disease. Negative for syncope, headaches, numbness of arms/legs, tingling in hands/arms/legs, memory problems, or seizures.  Psychological: Negative for anxiety, depression, panic attacks, or restlessness.  Skin: Negative for chronic skin itching, color changes in hand/feet when cold, poor scarring, non-healing ulcers, skin rashes/hives, unusual  moles.  Musculoskeletal: Negative for arthritis, joint swelling in hands/wrists/hips/knees/joints, muscle tenderness in arms/legs, or osteoporosis.  Endocrine: Negative for excessive thirst/hunger, intolerance for warm rooms, loss of libido, multiple broken bones, rapid weight gain/loss, galactorrhea, or thyroid issues.  Hematologic/Lymphatic: Negative for easy skin bruising, significant fatigue, prolonged bleeding, tender glands/lymph nodes.  Allergies: Negative for asthma or hay fever.      PHYSICAL EXAM  BP (!) 157/84   Pulse 66   Resp 16   SpO2 97%     General: Awake, alert, oriented. Well nourished, well developed, she is not in any acute distress.  HEENT: Head normocephalic, atraumatic. No carotid bruit. Neck supple. Good range of motion. No palpable thyroid mass.  Extremity: Warm with no clubbing or cyanosis. No lower extremity edema.    Incisions: Left frontal incision is healing well.  No redness.  No drainage.  No fluid collection.  Left parietal incision is healing well.  No redness.  No drainage.  No fluid collection.  Left chest wall incision is healing well.  No redness.  No drainage.  No fluid collection.  Mid upper abdomen is healing well.  No redness.  No drainage.  No fluid collection.  There were no remaining skin glue dressing at any of the sites.  I did remove the remaining Monocryl sutures from the left frontal, left parietal and mid upper abdomen incisions.    Neurological  Awake, alert and oriented to date, time, place and person.  Speech is fluent.   Face symmetric.    Motor: full strength throughout.  Sensation: intact to light touch and pinpoint.      ASSESSMENT   60 year old right handed female with a history of Parkinson's disease.  S/p right side deep brain stimulator placement, phase I, placement of right side deep brain stimulator electrode placement, target right globus pallidus internus, with microelectrode recording on 8/21/2018.  S/p deep brain stimulator placement, phase II,  placement of deep brain stimulator generator/battery over the right chest wall on 8/30/2020.  S/p left side deep brain stimulator placement, phase I, placement of left side deep brain stimulator electrode, target left globus pallidus internus, with microelectrode recording, [place] externalization protocol on 3/5/2021.  S/p deep brain stimulator placement, phase II, placement of deep brain stimulator generator/battery over the left chest wall on 3/12/2021.    Patient is recovering well from the recent DBS surgeries.  Her incisions are healing well with no signs of infection.  He is doing well overall.  She is scheduled to have her left side DBS programmed on 4/2/2021    As for her hydrochlorothiazide being discontinued after the procedure on 3/12/2021, there is no clinical documentation regarding this order.  Therefore, I told her to resume her preoperative hydrochlorothiazide medication.      PLAN:  1.  Follow up with neurosurgery as needed.      10 minutes were spent face to face/on video with the patient of which more than 50% of the time was spent counseling and discussing the above issues regarding diagnosis, differential, treatment options, and steps for further evaluation, including wound check and suture removal.  5 minutes were spent reviewing patient's chart.  10 minutes were spent on documentation for this encounter.  25 minutes total were spent on this encounter.      Again, thank you for allowing me to participate in the care of your patient.      Sincerely,    Jerry Concepcion MD

## 2021-03-29 NOTE — PROGRESS NOTES
NEUROSURGERY    HISTORY AND PHYSICAL EXAM    Chief Complaint   Patient presents with     RECHECK     [facility] RETURN, WOUND CHECK, S/P LEFT SIDE DBS IMPLANTATION, PHASE I AND PHASE II       HISTORY OF PRESENT ILLNESS  Ms. Erika Diaz returns today for her follow-up after her second side, left side, DBS implantation surgeries.  She is s/p left side deep brain stimulator placement, phase I, placement of left side deep brain stimulator electrode, target left globus pallidus internus, with microelectrode recording, [place] externalization protocol.  She tolerated the procedure well and there were no complications.  She was somewhat emotional throughout the case and the research component was shortened.  Her postoperative CT head was without any concerning findings and she was discharged to home on POD#1.  She then was in the Clinical Research Unit at the [facility] for the two days, participating in research.  She then returned to the OR on 3/12/2021 for deep brain stimulator placement, phase II, placement of deep brain stimulator generator/battery over the left chest wall on 3/12/2021.  She tolerated that procedure well and there were no complications.  She returns today for her wound check.  Patient reports that she is doing well and there are no complaints.  She reports no pain.  She denies any fevers, chills, nausea, vomiting, dizziness, weakness, numbness or seizure like activity.  She reports that the incisions are looking good and there is no redness, no drainage and no fluid collection.  Overall, she is quite happy with the recent surgery.    She states that after her phase II surgery on 3/12/2021, her discharge documentation told her to discontinue her hydrochlorothiazide medication.  It is unclear as to why this was in the discharge instructions and there are no kennedi-operative documentation regarding this.  There was no clinical indication that I am aware of for stopping this medication.  Patient  "reported that she noticed her lower extremity to be edematous when she tried to put her shoes on.  She also stated that there were some sutures that she could feel, including the one in her abdomen.    In summary, patient is a 60 year old female with Parkinson's disease.  Patient is s/p right side deep brain stimulator placement, phase I, placement of right side deep brain stimulator electrode placement, target right globus pallidus internus, with microelectrode recording on 8/21/2018 and s/p deep brain stimulator placement, phase II, placement of deep brain stimulator generator/battery over the right chest wall on 8/30/2018.  She also participated in our research protocol where she had her DBS system externalized between her phase I and phase II surgery.  She did well with the surgery and she had no complications.  She did well with the right side implantation and she is getting good therapeutic benefit from the device.  No issues with the device is reported.  Since her first side DBS surgery, she has had an ankle surgery.  She is again feeling her \"inside shakes\".  She is right handed and her handwriting is getting worse.  Her PD medications have been increased as well.  She is interested in having the left side implanted for right side body control.  Patient also reports that her  was recently diagnosed with liver cancer with metastasis to the lungs.  Her case was again discussed at the Movement Disorder Consensus Group Meeting and she was considered to be a good candidate for her second side.  Recommendation was target left globus pallidus internus and Abbott device.    In terms of her Parkinson's disease, prior to the recent workup, she has a a 12 to 15 year history of Parkinson s disease, being diagnosed in [id number].  She first developed stiffness in her left hand and shoulder that has since progressed to include tremors.  Her symptoms are worse on the left-side.  Her goals with Deep Brain " Stimulation are to gain tremor control and reduce dyskinesias.  She wants to feel better, be more normal without taking the medications, improve wearing off stiffness, difficulty walking and foggy thinking.  Medication reduction is important.  She does have regular sessions with a chiropractor to relieve tension in her neck.  Her case was discussed at the Movement Disorder Consensus Group meeting and she was considered to be a good candidate.  Recommendation at the time was wait and see strategy, target right globus pallidus internus and Abbott device.      Past Medical History:   Diagnosis Date     Benign paroxysmal positional vertigo of right ear      Degenerative joint disease 11/07/2017     Encounter for neuropsychological testing 12/20/2017    Current results indicate variability in attention and memory, ranging from moderately impaired to superior, in some cases with greater difficulty on less complex tasks. Basic language, visual processing, and executive functioning fall within normal limits. Personality assessment is suggestive of somatization, and also raises the possibility of a thought disorder as well as a history of manic episo     JASON (generalized anxiety disorder)      History of colonic polyps 11/07/2017     HTN (hypertension) 11/07/2017     Hypercholesterolemia 11/07/2017     Lactose intolerance in adult 11/07/2017     Migraines      Obesity 11/07/2017     Parkinson disease (H)      PID (pelvic inflammatory disease) due to IUD 11/07/2017     Pulmonary emboli (H) 01/20/2019    Post Right Ankel Surgery     Pulmonary emboli (H) - coumadin lovenox 11/07/2017    provoked after knee replacement     Sensorineural hearing loss (SNHL) of right ear 12/2018     Trochanteric bursitis of left hip        Past Surgical History:   Procedure Laterality Date     adhesioloysis       CHOLECYSTECTOMY       COLONOSCOPY       IMPLANT DEEP BRAIN STIMULATION GENERATOR / BATTERY Right 8/30/2018    Procedure: IMPLANT DEEP  BRAIN STIMULATION GENERATOR / BATTERY;  Deep Brain Stimulator Placement, Phase II, Placement Of Deep Brain Stimulator Generator/Battery Over The Right Chest Wall;  Surgeon: Jerry Concepcion MD;  Location: [facility] OR     IMPLANT DEEP BRAIN STIMULATION GENERATOR / BATTERY Left 3/12/2021    Procedure: Deep brain stimulator placement, phase II, placement of deep brain stimulator generator/battery over the left chest wall;  Surgeon: Jerry Concepcion MD;  Location: [facility] OR     JOINT REPLACEMENT Right     right partial knee replacement     LAPAROSCOPY      adhesioloysis     LAPAROSCOPY      supracervical hysterectomy     LAPAROTOMY EXPLORATORY Left     partial removal of left ovary     OPTICAL TRACKING SYSTEM INSERTION DEEP BRAIN STIMULATION Right 8/21/2018    Procedure: OPTICAL TRACKING SYSTEM INSERTION DEEP BRAIN STIMULATION;  Stealth Assisted Right Deep Brain Stimulator Placement, Phase I, Placement Of Right Side Deep Brain Stimulator Electrode, Target Right Globus Pallidus Internus With Microelectrode Recording;  Surgeon: Jerry Concepcion MD;  Location: [facility] OR     OPTICAL TRACKING SYSTEM INSERTION DEEP BRAIN STIMULATION Left 3/5/2021    Procedure: stealth assisted Left side deep brain stimulator placement, phase I, placement of left side deep brain stimulator electrode, target left globus pallidus internus, with microelectrode recording and placement of extension and externalization wires for UDALL research protocol;  Surgeon: Jerry Concepcion MD;  Location: [facility] OR     REMOVE INTRAUTERINE DEVICE  2006     salpingoopherectomy       XR FOOT SURGERY SENG RIGHT Right 01/18/2019       Social History     Socioeconomic History     Marital status:      Spouse name: Not on file     Number of children: Not on file     Years of education: Not on file     Highest education level: Not on file   Occupational History     Not on file   Social Needs     Financial resource strain: Not on file     Food  insecurity     Worry: Not on file     Inability: Not on file     Transportation needs     Medical: Not on file     Non-medical: Not on file   Tobacco Use     Smoking status: Former Smoker     Packs/day: 0.50     Years: 20.00     Pack years: 10.00     Types: Cigarettes     Quit date: 2009     Years since quittin.6     Smokeless tobacco: Never Used   Substance and Sexual Activity     Alcohol use: No     Drug use: No     Sexual activity: Not Currently     Partners: Male     Birth control/protection: None   Lifestyle     Physical activity     Days per week: Not on file     Minutes per session: Not on file     Stress: Not on file   Relationships     Social connections     Talks on phone: Not on file     Gets together: Not on file     Attends [employer] service: Not on file     Active member of club or organization: Not on file     Attends meetings of clubs or organizations: Not on file     Relationship status: Not on file     Intimate partner violence     Fear of current or ex partner: Not on file     Emotionally abused: Not on file     Physically abused: Not on file     Forced sexual activity: Not on file   Other Topics Concern     Parent/sibling w/ CABG, MI or angioplasty before 65F 55M? No   Social History Narrative    . lives in [place]. Zane Diaz spouse       Family History   Problem Relation Age of Onset     Breast Cancer Mother        Current Outpatient Medications   Medication     Acetaminophen (TYLENOL PO)     amantadine (SYMMETREL) 100 MG capsule     busPIRone (BUSPAR) 10 MG tablet     calcium carbonate (TUMS) 500 MG chewable tablet     carbidopa-levodopa (SINEMET)  MG tablet     FLUoxetine (PROZAC) 20 MG capsule     gabapentin (NEURONTIN) 100 MG capsule     LORazepam (ATIVAN) 0.5 MG tablet     polyethylene glycol (MIRALAX) 17 GM/Dose powder     pramipexole (MIRAPEX) 0.5 MG tablet     rosuvastatin (CRESTOR) 5 MG tablet     No current facility-administered medications for this  visit.        Allergies   Allergen Reactions     Atorvastatin Muscle Pain (Myalgia)     Other reaction(s): Myalgia  Per PCP note 7/24/18 - is to resume simvastatin - monitor for myalgia     Codeine Itching     Mold Unknown       REVIEW OF SYSTEMS:  General: Negative for chills/sweats/fever, difficulty sleeping, headache, recent fatigue, or weight gain/loss.  Eyes: Negative for blurred vision, crossed eyes, double vision, recent eye infections, vision flashes, or vision halos.  Ears/Nose/Mouth/Throat: Negative for bleeding gums, difficulty swallowing, earache, ear discharge, hearing loss, hoarseness, nosebleeds, tinnitus, or sinus problems.  Respiratory:Negative for chronic cough, coughing blood, night sweats, shortness of breath, Tuberculosis, or wheezing.  Cardiovascular: Negative for chest pain, dyspnea at night, heart murmur, palpitations, pacemaker, pacemaker, poor circulation, swollen legs/feet, or varicose veins.  Gastrointestinal: Negative for melena, hematochezia, chronic diarrhea, heartburn, Hepatitis A/B/C, increasing constipation, Liver Disease, nausea, or vomiting.   Genitourinary: Negative for Urinary retention, genital discharge, urinary incontinence, prostate problems, urgency, or UTI.   Neurological: POSITIVE for Parkinson's disease. Negative for syncope, headaches, numbness of arms/legs, tingling in hands/arms/legs, memory problems, or seizures.  Psychological: Negative for anxiety, depression, panic attacks, or restlessness.  Skin: Negative for chronic skin itching, color changes in hand/feet when cold, poor scarring, non-healing ulcers, skin rashes/hives, unusual moles.  Musculoskeletal: Negative for arthritis, joint swelling in hands/wrists/hips/knees/joints, muscle tenderness in arms/legs, or osteoporosis.  Endocrine: Negative for excessive thirst/hunger, intolerance for warm rooms, loss of libido, multiple broken bones, rapid weight gain/loss, galactorrhea, or thyroid  issues.  Hematologic/Lymphatic: Negative for easy skin bruising, significant fatigue, prolonged bleeding, tender glands/lymph nodes.  Allergies: Negative for asthma or hay fever.      PHYSICAL EXAM  BP (!) 157/84   Pulse 66   Resp 16   SpO2 97%     General: Awake, alert, oriented. Well nourished, well developed, she is not in any acute distress.  HEENT: Head normocephalic, atraumatic. No carotid bruit. Neck supple. Good range of motion. No palpable thyroid mass.  Extremity: Warm with no clubbing or cyanosis. No lower extremity edema.    Incisions: Left frontal incision is healing well.  No redness.  No drainage.  No fluid collection.  Left parietal incision is healing well.  No redness.  No drainage.  No fluid collection.  Left chest wall incision is healing well.  No redness.  No drainage.  No fluid collection.  Mid upper abdomen is healing well.  No redness.  No drainage.  No fluid collection.  There were no remaining skin glue dressing at any of the sites.  I did remove the remaining Monocryl sutures from the left frontal, left parietal and mid upper abdomen incisions.    Neurological  Awake, alert and oriented to date, time, place and person.  Speech is fluent.   Face symmetric.    Motor: full strength throughout.  Sensation: intact to light touch and pinpoint.      ASSESSMENT   60 year old right handed female with a history of Parkinson's disease.  S/p right side deep brain stimulator placement, phase I, placement of right side deep brain stimulator electrode placement, target right globus pallidus internus, with microelectrode recording on 8/21/2018.  S/p deep brain stimulator placement, phase II, placement of deep brain stimulator generator/battery over the right chest wall on 8/30/2020.  S/p left side deep brain stimulator placement, phase I, placement of left side deep brain stimulator electrode, target left globus pallidus internus, with microelectrode recording, [place] externalization protocol on  3/5/2021.  S/p deep brain stimulator placement, phase II, placement of deep brain stimulator generator/battery over the left chest wall on 3/12/2021.    Patient is recovering well from the recent DBS surgeries.  Her incisions are healing well with no signs of infection.  He is doing well overall.  She is scheduled to have her left side DBS programmed on 4/2/2021    As for her hydrochlorothiazide being discontinued after the procedure on 3/12/2021, there is no clinical documentation regarding this order.  Therefore, I told her to resume her preoperative hydrochlorothiazide medication.      PLAN:  1.  Follow up with neurosurgery as needed.      10 minutes were spent face to face/on video with the patient of which more than 50% of the time was spent counseling and discussing the above issues regarding diagnosis, differential, treatment options, and steps for further evaluation, including wound check and suture removal.  5 minutes were spent reviewing patient's chart.  10 minutes were spent on documentation for this encounter.  25 minutes total were spent on this encounter.

## 2021-04-02 ENCOUNTER — ANCILLARY PROCEDURE (OUTPATIENT)
Dept: CT IMAGING | Facility: CLINIC | Age: 61
End: 2021-04-02
Attending: NURSE PRACTITIONER
Payer: MEDICARE

## 2021-04-02 ENCOUNTER — OFFICE VISIT (OUTPATIENT)
Dept: NEUROLOGY | Facility: CLINIC | Age: 61
End: 2021-04-02
Payer: MEDICARE

## 2021-04-02 VITALS
DIASTOLIC BLOOD PRESSURE: 81 MMHG | BODY MASS INDEX: 38.25 KG/M2 | HEIGHT: 66 IN | SYSTOLIC BLOOD PRESSURE: 139 MMHG | WEIGHT: 238 LBS | HEART RATE: 71 BPM | OXYGEN SATURATION: 98 % | RESPIRATION RATE: 16 BRPM

## 2021-04-02 DIAGNOSIS — G25.81 RESTLESS LEGS SYNDROME (RLS): ICD-10-CM

## 2021-04-02 DIAGNOSIS — G20.A1 PARKINSON'S DISEASE (H): ICD-10-CM

## 2021-04-02 DIAGNOSIS — Z96.89 S/P DEEP BRAIN STIMULATOR PLACEMENT: Primary | ICD-10-CM

## 2021-04-02 DIAGNOSIS — F41.1 GAD (GENERALIZED ANXIETY DISORDER): ICD-10-CM

## 2021-04-02 PROCEDURE — 95984 ALYS BRN NPGT PRGRMG ADDL 15: CPT | Performed by: NURSE PRACTITIONER

## 2021-04-02 PROCEDURE — 95983 ALYS BRN NPGT PRGRMG 15 MIN: CPT | Performed by: NURSE PRACTITIONER

## 2021-04-02 PROCEDURE — G1004 CDSM NDSC: HCPCS | Performed by: RADIOLOGY

## 2021-04-02 PROCEDURE — 70450 CT HEAD/BRAIN W/O DYE: CPT | Mod: MG | Performed by: RADIOLOGY

## 2021-04-02 PROCEDURE — 99215 OFFICE O/P EST HI 40 MIN: CPT | Mod: 25 | Performed by: NURSE PRACTITIONER

## 2021-04-02 RX ORDER — HYDROCHLOROTHIAZIDE 25 MG/1
25 TABLET ORAL
COMMUNITY
Start: 2020-11-03

## 2021-04-02 ASSESSMENT — UNIFIED PARKINSONS DISEASE RATING SCALE (UPDRS)
LEG_AGILITY_RIGHT: SLIGHT: ANY OF THE FOLLOWING: A) THE REGULAR RHYTHM IS BROKEN WITH ONE WITH ONE OR TWO INTERRUPTIONS OR HESITATIONS OF THE MOVEMENT B) SLIGHT SLOWING C) THE AMPLITUDE DECREMENTS NEAR THE END OF THE 10 MOVEMENTS.
RIGIDITY_NECK: NORMAL
AMPLITUDE_LIP_JAW: NORMAL: NO TREMOR.
ARISING_CHAIR: SLIGHT: ARISING IS SLOWER THAN NORMAL, OR MAY NEED MORE THAN ONE ATTEMPT, OR MAY NEED TO MOVE FORWARD IN THE CHAIR TO ARISE.  NO NEED TO USE THE ARMS OF THE CHAIR.
MOVEMENTS_INTERFERE_WITH_RATINGS: NO
RIGIDITY_NECK: SLIGHT: RIGIDITY ONLY DETECTED WITH ACTIVATION MANEUVER.
MOVEMENTS_INTERFERE_WITH_RATINGS: NO
HANDMOVEMENTS_RIGHT: SLIGHT: ANY OF THE FOLLOWING: A) THE REGULAR RHYTHM IS BROKEN WITH ONE WITH ONE OR TWO INTERRUPTIONS OR HESITATIONS OF THE MOVEMENT B) SLIGHT SLOWING C) THE AMPLITUDE DECREMENTS NEAR THE END OF THE 10 MOVEMENTS.
AMPLITUDE_LUE: NORMAL: NO TREMOR.
PRONATION_SUPINATION_RIGHT: SLIGHT: ANY OF THE FOLLOWING: A) THE REGULAR RHYTHM IS BROKEN WITH ONE WITH ONE OR TWO INTERRUPTIONS OR HESITATIONS OF THE MOVEMENT B) SLIGHT SLOWING C) THE AMPLITUDE DECREMENTS NEAR THE END OF THE 10 MOVEMENTS.
DYSKINESIAS_PRESENT: NO
HANDMOVEMENTS_LEFT: SLIGHT: ANY OF THE FOLLOWING: A) THE REGULAR RHYTHM IS BROKEN WITH ONE WITH ONE OR TWO INTERRUPTIONS OR HESITATIONS OF THE MOVEMENT B) SLIGHT SLOWING C) THE AMPLITUDE DECREMENTS NEAR THE END OF THE 10 MOVEMENTS.
PARKINSONS_MEDS: ON
TOETAPPING_RIGHT: SLIGHT: ANY OF THE FOLLOWING: A) THE REGULAR RHYTHM IS BROKEN WITH ONE WITH ONE OR TWO INTERRUPTIONS OR HESITATIONS OF THE MOVEMENT B) SLIGHT SLOWING C) THE AMPLITUDE DECREMENTS NEAR THE END OF THE 10 MOVEMENTS.
PRONATION_SUPINATION_LEFT: SLIGHT: ANY OF THE FOLLOWING: A) THE REGULAR RHYTHM IS BROKEN WITH ONE WITH ONE OR TWO INTERRUPTIONS OR HESITATIONS OF THE MOVEMENT B) SLIGHT SLOWING C) THE AMPLITUDE DECREMENTS NEAR THE END OF THE 10 MOVEMENTS.
AMPLITUDE_LUE: NORMAL: NO TREMOR.
MOVEMENTS_INTERFERE_WITH_RATINGS: NO
HANDMOVEMENTS_LEFT: SLIGHT: ANY OF THE FOLLOWING: A) THE REGULAR RHYTHM IS BROKEN WITH ONE WITH ONE OR TWO INTERRUPTIONS OR HESITATIONS OF THE MOVEMENT B) SLIGHT SLOWING C) THE AMPLITUDE DECREMENTS NEAR THE END OF THE 10 MOVEMENTS.
TOETAPPING_RIGHT: NORMAL
SPEECH: SLIGHT: LOSS OF MODULATION, DICTION OR VOLUME, BUT STILL ALL WORDS EASY TO UNDERSTAND.
TOTAL_SCORE: 9
TOTAL_SCORE_LEFT: 9
TOTAL_SCORE_LEFT: 9
POSTURAL_STABILITY: NORMAL:  RECOVERS WITH ONE OR TWO STEPS.
FACIAL_EXPRESSION: MILD: IN ADDITION TO DECREASED EYE-BLINK FREQUENCY, MASKED FACIES PRESENT IN THE LOWER FACE AS WELL, NAMELY FEWER MOVEMENTS AROUND THE MOUTH, SUCH AS LESS SPONTANEOUS SMILING, BUT LIPS NOT PARTED.
RIGIDITY_LLE: NORMAL
POSTURAL_STABILITY: NORMAL:  RECOVERS WITH ONE OR TWO STEPS.
RIGIDITY_RUE: SLIGHT: RIGIDITY ONLY DETECTED WITH ACTIVATION MANEUVER.
AMPLITUDE_LIP_JAW: NORMAL: NO TREMOR.
FREEZING_GAIT: NORMAL
CONSTANCY_TREMOR_ATREST: NORMAL: NO TREMOR.
FREEZING_GAIT: NORMAL
CONSTANCY_TREMOR_ATREST: NORMAL: NO TREMOR.
FINGER_TAPPING_RIGHT: NORMAL
FINGER_TAPPING_LEFT: MILD: ANY OF THE FOLLOWING: A) 3 TO 5 INTERRUPTIONS DURING TAPPING B) MILD SLOWING C) THE AMPLITUDE DECREMENTS MIDWAY IN THE 10-MOVEMENT SEQUENCE
GAIT: SLIGHT: INDEPENDENT WALKING WITH MINOR GAIT IMPAIRMENT.
TOETAPPING_LEFT: SLIGHT: ANY OF THE FOLLOWING: A) THE REGULAR RHYTHM IS BROKEN WITH ONE WITH ONE OR TWO INTERRUPTIONS OR HESITATIONS OF THE MOVEMENT B) SLIGHT SLOWING C) THE AMPLITUDE DECREMENTS NEAR THE END OF THE 10 MOVEMENTS.
RIGIDITY_NECK: SLIGHT: RIGIDITY ONLY DETECTED WITH ACTIVATION MANEUVER.
FINGER_TAPPING_RIGHT: NORMAL
RIGIDITY_RLE: SLIGHT: RIGIDITY ONLY DETECTED WITH ACTIVATION MANEUVER.
RIGIDITY_RUE: NORMAL
AMPLITUDE_RUE: NORMAL: NO TREMOR.
AMPLITUDE_RUE: NORMAL: NO TREMOR.
TOETAPPING_LEFT: SLIGHT: ANY OF THE FOLLOWING: A) THE REGULAR RHYTHM IS BROKEN WITH ONE WITH ONE OR TWO INTERRUPTIONS OR HESITATIONS OF THE MOVEMENT B) SLIGHT SLOWING C) THE AMPLITUDE DECREMENTS NEAR THE END OF THE 10 MOVEMENTS.
SPEECH: SLIGHT: LOSS OF MODULATION, DICTION OR VOLUME, BUT STILL ALL WORDS EASY TO UNDERSTAND.
AMPLITUDE_LIP_JAW: NORMAL: NO TREMOR.
AXIAL_SCORE: 4
FREEZING_GAIT: NORMAL
RIGIDITY_RUE: NORMAL
AMPLITUDE_RUE: NORMAL: NO TREMOR.
LEG_AGILITY_LEFT: SLIGHT: ANY OF THE FOLLOWING: A) THE REGULAR RHYTHM IS BROKEN WITH ONE WITH ONE OR TWO INTERRUPTIONS OR HESITATIONS OF THE MOVEMENT B) SLIGHT SLOWING C) THE AMPLITUDE DECREMENTS NEAR THE END OF THE 10 MOVEMENTS.
CONSTANCY_TREMOR_ATREST: NORMAL: NO TREMOR.
PRONATION_SUPINATION_RIGHT: SLIGHT: ANY OF THE FOLLOWING: A) THE REGULAR RHYTHM IS BROKEN WITH ONE WITH ONE OR TWO INTERRUPTIONS OR HESITATIONS OF THE MOVEMENT B) SLIGHT SLOWING C) THE AMPLITUDE DECREMENTS NEAR THE END OF THE 10 MOVEMENTS.
AMPLITUDE_LUE: NORMAL: NO TREMOR.
TOTAL_SCORE: 5
PRONATION_SUPINATION_RIGHT: MILD: ANY OF THE FOLLOWING: A) 3 TO 5 INTERRUPTIONS DURING TAPPING B) MILD SLOWING C) THE AMPLITUDE DECREMENTS MIDWAY IN THE 10-MOVEMENT SEQUENCE
PARKINSONS_MEDS: OFF
AMPLITUDE_LLE: NORMAL: NO TREMOR.
TOTAL_SCORE: 14
HANDMOVEMENTS_RIGHT: MILD: ANY OF THE FOLLOWING: A) 3 TO 5 INTERRUPTIONS DURING TAPPING B) MILD SLOWING C) THE AMPLITUDE DECREMENTS MIDWAY IN THE 10-MOVEMENT SEQUENCE
RIGIDITY_LLE: SLIGHT: RIGIDITY ONLY DETECTED WITH ACTIVATION MANEUVER.
POSTURE: 1 SLIGHT.  NOT QUITE ERECT BUT COULD BE NORMAL FOR OLDER PERSONS.
FINGER_TAPPING_RIGHT: NORMAL
FACIAL_EXPRESSION: MODERATE: MASKED FACIES WITH LIPS PARTED SOME OF THE TIME WHEN THE MOUTH IS AT REST.
HANDMOVEMENTS_RIGHT: SLIGHT: ANY OF THE FOLLOWING: A) THE REGULAR RHYTHM IS BROKEN WITH ONE WITH ONE OR TWO INTERRUPTIONS OR HESITATIONS OF THE MOVEMENT B) SLIGHT SLOWING C) THE AMPLITUDE DECREMENTS NEAR THE END OF THE 10 MOVEMENTS.
FINGER_TAPPING_LEFT: MILD: ANY OF THE FOLLOWING: A) 3 TO 5 INTERRUPTIONS DURING TAPPING B) MILD SLOWING C) THE AMPLITUDE DECREMENTS MIDWAY IN THE 10-MOVEMENT SEQUENCE
ARISING_CHAIR: SLIGHT: ARISING IS SLOWER THAN NORMAL, OR MAY NEED MORE THAN ONE ATTEMPT, OR MAY NEED TO MOVE FORWARD IN THE CHAIR TO ARISE.  NO NEED TO USE THE ARMS OF THE CHAIR.
HANDMOVEMENTS_LEFT: SLIGHT: ANY OF THE FOLLOWING: A) THE REGULAR RHYTHM IS BROKEN WITH ONE WITH ONE OR TWO INTERRUPTIONS OR HESITATIONS OF THE MOVEMENT B) SLIGHT SLOWING C) THE AMPLITUDE DECREMENTS NEAR THE END OF THE 10 MOVEMENTS.
SPONTANEITY_OF_MOVEMENT: 1: SLIGHT: SLIGHT GLOBAL SLOWNESS AND POVERTY OF SPONTANEOUS MOVEMENTS.
TOTAL_SCORE: 21
FINGER_TAPPING_LEFT: MILD: ANY OF THE FOLLOWING: A) 3 TO 5 INTERRUPTIONS DURING TAPPING B) MILD SLOWING C) THE AMPLITUDE DECREMENTS MIDWAY IN THE 10-MOVEMENT SEQUENCE
DYSKINESIAS_PRESENT: NO
LEG_AGILITY_LEFT: SLIGHT: ANY OF THE FOLLOWING: A) THE REGULAR RHYTHM IS BROKEN WITH ONE WITH ONE OR TWO INTERRUPTIONS OR HESITATIONS OF THE MOVEMENT B) SLIGHT SLOWING C) THE AMPLITUDE DECREMENTS NEAR THE END OF THE 10 MOVEMENTS.
SPONTANEITY_OF_MOVEMENT: 0: NORMAL.  NO PROBLEMS.
RIGIDITY_RLE: SLIGHT: RIGIDITY ONLY DETECTED WITH ACTIVATION MANEUVER.
PRONATION_SUPINATION_LEFT: SLIGHT: ANY OF THE FOLLOWING: A) THE REGULAR RHYTHM IS BROKEN WITH ONE WITH ONE OR TWO INTERRUPTIONS OR HESITATIONS OF THE MOVEMENT B) SLIGHT SLOWING C) THE AMPLITUDE DECREMENTS NEAR THE END OF THE 10 MOVEMENTS.
FACIAL_EXPRESSION: MILD: IN ADDITION TO DECREASED EYE-BLINK FREQUENCY, MASKED FACIES PRESENT IN THE LOWER FACE AS WELL, NAMELY FEWER MOVEMENTS AROUND THE MOUTH, SUCH AS LESS SPONTANEOUS SMILING, BUT LIPS NOT PARTED.
POSTURAL_STABILITY: NORMAL:  RECOVERS WITH ONE OR TWO STEPS.
POSTURE: 0 NORMAL, NO PROBLEMS
AMPLITUDE_RLE: NORMAL: NO TREMOR.
TOTAL_SCORE: 27
ARISING_CHAIR: NORMAL: ABLE TO ARISE QUICKLY WITHOUT HESITATION.
LEG_AGILITY_LEFT: SLIGHT: ANY OF THE FOLLOWING: A) THE REGULAR RHYTHM IS BROKEN WITH ONE WITH ONE OR TWO INTERRUPTIONS OR HESITATIONS OF THE MOVEMENT B) SLIGHT SLOWING C) THE AMPLITUDE DECREMENTS NEAR THE END OF THE 10 MOVEMENTS.
RIGIDITY_RLE: NORMAL
AMPLITUDE_RLE: NORMAL: NO TREMOR.
RIGIDITY_LUE: SLIGHT: RIGIDITY ONLY DETECTED WITH ACTIVATION MANEUVER.
AXIAL_SCORE: 9
TOTAL_SCORE_LEFT: 7
LEG_AGILITY_RIGHT: NORMAL
LEG_AGILITY_RIGHT: SLIGHT: ANY OF THE FOLLOWING: A) THE REGULAR RHYTHM IS BROKEN WITH ONE WITH ONE OR TWO INTERRUPTIONS OR HESITATIONS OF THE MOVEMENT B) SLIGHT SLOWING C) THE AMPLITUDE DECREMENTS NEAR THE END OF THE 10 MOVEMENTS.
SPONTANEITY_OF_MOVEMENT: 0: NORMAL.  NO PROBLEMS.
RIGIDITY_LUE: NORMAL
AMPLITUDE_LLE: NORMAL: NO TREMOR.
SPEECH: SLIGHT: LOSS OF MODULATION, DICTION OR VOLUME, BUT STILL ALL WORDS EASY TO UNDERSTAND.
TOTAL_SCORE: 3
PRONATION_SUPINATION_LEFT: SLIGHT: ANY OF THE FOLLOWING: A) THE REGULAR RHYTHM IS BROKEN WITH ONE WITH ONE OR TWO INTERRUPTIONS OR HESITATIONS OF THE MOVEMENT B) SLIGHT SLOWING C) THE AMPLITUDE DECREMENTS NEAR THE END OF THE 10 MOVEMENTS.
RIGIDITY_LLE: SLIGHT: RIGIDITY ONLY DETECTED WITH ACTIVATION MANEUVER.
TOETAPPING_RIGHT: SLIGHT: ANY OF THE FOLLOWING: A) THE REGULAR RHYTHM IS BROKEN WITH ONE WITH ONE OR TWO INTERRUPTIONS OR HESITATIONS OF THE MOVEMENT B) SLIGHT SLOWING C) THE AMPLITUDE DECREMENTS NEAR THE END OF THE 10 MOVEMENTS.
GAIT: SLIGHT: INDEPENDENT WALKING WITH MINOR GAIT IMPAIRMENT.
RIGIDITY_LUE: SLIGHT: RIGIDITY ONLY DETECTED WITH ACTIVATION MANEUVER.
AMPLITUDE_LLE: NORMAL: NO TREMOR.
TOETAPPING_LEFT: SLIGHT: ANY OF THE FOLLOWING: A) THE REGULAR RHYTHM IS BROKEN WITH ONE WITH ONE OR TWO INTERRUPTIONS OR HESITATIONS OF THE MOVEMENT B) SLIGHT SLOWING C) THE AMPLITUDE DECREMENTS NEAR THE END OF THE 10 MOVEMENTS.
POSTURE: 1 SLIGHT.  NOT QUITE ERECT BUT COULD BE NORMAL FOR OLDER PERSONS.
GAIT: SLIGHT: INDEPENDENT WALKING WITH MINOR GAIT IMPAIRMENT.
AXIAL_SCORE: 7
AMPLITUDE_RLE: NORMAL: NO TREMOR.
DYSKINESIAS_PRESENT: NO
PARKINSONS_MEDS: OFF

## 2021-04-02 ASSESSMENT — PAIN SCALES - GENERAL: PAINLEVEL: NO PAIN (0)

## 2021-04-02 ASSESSMENT — MIFFLIN-ST. JEOR: SCORE: 1656.31

## 2021-04-02 NOTE — NURSING NOTE
Chief Complaint   Patient presents with     DBS Programming     [facility] Deep Brain Stimulation     Jose Floyd

## 2021-04-02 NOTE — PROGRESS NOTES
"MOVEMENT DISORDERS CLINIC    PATIENT: Erika Diaz    : 1960    GINNY: 2021    REASON FOR VISIT: Left hemisphere monopolar review & initial deep brain stimulation (DBS) programming for the treatment of Parkinson's disease.     HPI:  Ms. Erika Diaz is a 60 year old right - handed  female who came to the [facility] neurology clinic accompanied by her daughter for DBS initial programming. She is s/p right Globus Pallidus Internus (Gpi) DBS surgery on 2018 and recently underwent Left Gpi DBS lead placement on 3/5/2021 and left chest infraclavicular Infinity 6660 generator placement on 3/21/2021 by Dr. Concepcion.     Records Reviewed:  Surgical notes dated above.    Lesion effect:  She reports feeling and moving better after her surgery. She was forgetting to take her medications. It lasted a week or so.    Side effect post DBS lead placement: No side effect, including speech, gait, mood, or memory issues.      Medications 7 am 12 pm 4 pm 9 pm   Sinemet 25/100 mg  2 2 2 2   Amantadine 100 mg 1   1     Pramipexole 0.5 mg       2   Gabapentin 100 mg   1     1   Buspar 10 mg  1 1   1      Last dose of antiparkinsonian medication was taken:      Sinemet 25/100 mg --  at 4 pm  Amantadine 100 mg --  3/30 at 4 pm  Pramipexole 0.5 mg 2 tabs -- 3/31 at 9 pm     Follow up for participation in neural recordings research:    1) \"Have you experienced any issues following surgery, other than your usual symptoms?\" No.     2) \"Have you seen a health care provider, for any problem that is new or worse since your surgery?\" No    3) \"Have you started any new treatment or changed any old one (including medication) since your surgery?\" No    Patient reports that the bilateral cataract surgery in 2021 went well without complication.     She was see by rheumatologist on 10/23/2020 and was diagnosed with fibromyalgia.  She reports experiencing pain time to time and she taking Tylenol as needed.    Anxiety is " "manageable most of the time. She has some breakthrough anxiety. Her  is in hospice care. \"I can cry at the drop of a hat.\"  She takes Lorazepam to help her sleep and not for anxiety.     MEDICATIONS:  Outpatient Medications Marked as Taking for the 21 encounter (Office Visit) with Arminda Rivas APRN CNP   Medication Sig     Acetaminophen (TYLENOL PO) Take 1,000 mg by mouth every 8 hours as needed for mild pain or fever     amantadine (SYMMETREL) 100 MG capsule 1 capsule orally @ 7am and 4pm     busPIRone (BUSPAR) 10 MG tablet Take 1 tablet (10 mg) by mouth 3 times daily     calcium carbonate (TUMS) 500 MG chewable tablet Take 2 chew tab by mouth as needed for heartburn     carbidopa-levodopa (SINEMET)  MG tablet 2 @ 7, 12 pm, 4 pm and 9 pm = 8 tabs per day     FLUoxetine (PROZAC) 20 MG capsule Take 20 mg by mouth every morning @ 7am     gabapentin (NEURONTIN) 100 MG capsule Take 2 capsules (200 mg) by mouth daily     hydrochlorothiazide (HYDRODIURIL) 25 MG tablet Take 25 mg by mouth     LORazepam (ATIVAN) 0.5 MG tablet Take 1 tablet (0.5 mg) by mouth daily as needed for anxiety or sleep     polyethylene glycol (MIRALAX) 17 GM/Dose powder Take 17 g by mouth daily     pramipexole (MIRAPEX) 0.5 MG tablet 2 tabs orally @ 9 pm     rosuvastatin (CRESTOR) 5 MG tablet Take 5 mg by mouth daily       PHYSICAL EXAM:    Vital Signs:  Blood pressure 139/81, pulse 71, resp. rate 16, height 1.676 m (5' 6\"), weight 108 kg (238 lb), SpO2 98 %. Body mass index is 38.41 kg/m .    General:  Ms. Diaz is a pleasant  female who is well-groomed, well-developed and overweight sitting comfortably in the exam room without any distress. Affect is appropriate.    Incision Site:  Left scalp and chest incisions are dry, intact and has healed nicely.    MDS Part II  -- Over the last week -- 0: Normal -- 1: Slight -- 2: Mild -- 3: Moderate -- 4: Severe     2.1 Speech: 2   2.2 Saliva and droolin   2.3 Chewing " and swallowin   2.4 Eating tasks: 0   2.5 Dressin   2.6 Hygiene:  0   2.7 Handwritin   2.8 Doing hobbies and other activities:  0   2.9 Turning in bed:  Didn't sleep in bed due to left chest pain/discomfort due to recent surgery for DBS generator placement   2.10 Tremor:  1 (inner tremor)   2.11 Getting out of bed/car/deep chair:   2   2.12 Walking and balance: 1   2.13 Freezin     Total:    10           MOVEMENT DISORDERS ASSESSMENT:     MDS - UPDRS III   UPDRS Values 2021   Time: 7:49 AM 8:27 AM 9:00 AM   Medication Off Off On   R Brain DBS: On On On   L Brain DBS: Off On On   Dyskinesia (LID) No No No   Did LID interfere No No No   Speech 1 1 1   Facial Expression 3 2 2   Rigidity Neck 1 1 0   Rigidity RUE 1 0 0   Rigidity LUE 1 1 0   Rigidity RLE 1 1 0   Rigidity LLE 1 1 0   Finger Taps R 0 0 0   Finger Taps L 2 2 2   Hand Mvt R 2 1 1   Hand Mvt L 1 1 1   Pron-/Supinate R 2 1 1   Pron-/Supinate L 1 1 1   Toe Tap R 0 1 1   Toe Tap L 1 1 1   Leg Agility R 1 1 0   Leg Agility L 1 1 1   Arise From Chair 1 1 0   Gait 1 1 1   Gait Freezing 0 0 0   Postural Stability 0 0 0   Posture 1 1 0   Global Spont Mvt 1 0 0   Postural Tremor RUE 1 0 0   Postural Tremor LUE 0 0 0   Kinetic Tremor RUE 1 0 0   Kinetic Tremor LUE 1 1 1   Rest Tremor RUE 0 0 0   Rest Tremor LUE 0 0 0   Rest Tremor RLE 0 0 0   Rest Tremor LLE 0 0 0   Rest Tremor Lip/Jaw 0 0 0   Rest Tremor Constancy 0 0 0   Total Right 9 5 3   Total Left 9 9 7   Axial Total 9 7 4   Total 27 21 14     DBS Interrogation & Analysis:    Implanted generator:  Bilateral chest Infinity 6660  Lead target:  Bilateral Globus Pallidus Internus (Gpi)    Left Brain  Lead placement date:  3/5/2021  Generator placement date:  3/12/2021    Battery Service Life:  >3.00 volts.    Electrode Impedance Check:   Left Lead: No Problems Found.      Right Brain   Lead placement date:  2018  Generator placement date:   "08/30/2018    C +, 10abc -  Current:  3.80 mA  PW:  60 msec  Rate:  130 Hz     Therapy impedance:  862 Ohms     Battery Service Life:  OK.  2.92 volts.     Patient :  Upper Limit: 3.80 mA  Lower Limit: 3.00 mA     Electrode Impedance Check:  Right Lead: No Problems Found.    See scanned report for impedance details.    MONOPOLAR REVIEW    Clinical Core Study Programming Form  Please complete upon initial monopolar review and any subsequent \"mini-monopolar reviews\".    Please avoid any extra spaces at the end of cells.    Generator Serial No.  ex: [id number] Lead Hemisphere  Ex: Right Lead Region  Ex: STN  Initials  Ex: LS, KD   [id number] Left Gpi THO         Enter value only with  change No blank cells No blank cells   Contacts  Cpos, 2bcneg Ampl  0.5 Ampl Unit  V or mA Pulse Width (ms)  60   Rate (Hz)  130 Effect on Symptoms   NA not assessed  NC no change from setting above  NN none noted Side Effects  NA  NC  NN     Cpos, 2abcneg 0.0 mA 60 130 Right hand opening and closing = 2. Hand pronation and supination = 2.   Rigidity in RUE = 2.  NN    1.0    NC NN    1.5    Right hand opening and closing = 1. Hand pronation and supination = 2.   Rigidity in RUE = 1.  NN    2.0    Rigidity in RUE = 0. No other changes.   NN    2.5    Right hand opening and closing = 1. Hand pronation and supination = 1.  NN    3.0    NC NN    3.5    Walking is improved. Rigidity in RUE = 0. Right hand opening and closing = 1. Hand pronation and supination = 1.  NN    4.0    NC NN    4.5    NN Persistent left cheek twitching.    4.1    NN Resolved. NN       __  Monopolar survey using C+ 2ABC- showed improvement in bradykinesia and rigidity in right body. The therapeutic window is good.  Based on the monopolar review, patient was programmed as below: -       Left Gpi    C +, 2ABC -  Amplitude:  2.5 mA  PW:  60  s  Rate:  130 Hz    Therapy impedance:  1037 Ohms     Patient :  Upper Limit: 3.00 mA  Lower Limit: 0.00 mA   "       __ After Monopolar Review patient took Sinemet 25/100 mg 2 tab and Amantadine 100 mg at 8:35 am.at 8:35 am. She waited 30 minutes.  No side effects.    __ I went over the patient controller with pt & her daughter.  They were shown how to turn DBS on & off and adjust voltage.  Pt was able to demonstrate independently.    __  Will stay on the same antiparkinsonian medications for now.  If starting to forget taking medications, may take 1 - 1.5 tablets instead of 2 tables.  Encouraged to send a [employer] message if she has questions.     __ Instructed to call if there is any side effect from today's programming or worsening symptoms.     __ Will return for DBS programming optimization follow up in 1 month.     __  For next programming visit, patient will come ON medication.     Today I spent 140 minutes caring for the patient. 90 minutes was spent in DBS programming.  50 minutes was spent with reviewing records, meeting with the patient, answering questions, examining, refilling/ordering medication, and documentation.     GUILLERMINA Krueger, CNP  [facility]

## 2021-04-02 NOTE — PATIENT INSTRUCTIONS
Dear Ms. Erika NIELSON Joe,    Thank you for coming today.  During your visit, we have discussed the following:     __ Your DBS electrical system function (impedance) is normal. The battery life is also good.  __ The Left Brain (Right Body) DBS has been turned ON And programmed.    Left Gpi    Amplitude:  2.5 mA       Patient :  Upper Limit: 3.00 mA  Lower Limit: 0.00 mA         __  Stay on your antiparkinsonian medications for now.  If you start to forget taking your medications, you may take 1 - 1.5 tablets instead of 2 tables.  You can send a [employer] message if you have questions.     __ Instructed to call if there is any side effect from today's programming or worsening symptoms.     __ Will return for DBS programming optimization follow up on May 21 at 7 am.      __ For your subsequent DBS programming visits, come ON medication.     To contact our clinic, you may call [phone] or use [employer] message.     It's good to see you!   GUILLERMINA Krueger, CNP  [facility]

## 2021-04-06 ENCOUNTER — TELEPHONE (OUTPATIENT)
Dept: NEUROLOGY | Facility: CLINIC | Age: 61
End: 2021-04-06

## 2021-04-06 NOTE — TELEPHONE ENCOUNTER
"Patient called to say she forgot to ask Dr. Concepcion for a letter to allow her to return to work at her follow up appointment. She is wondering if she could get this letter now. She feels she is ready to go back.    She stated she no longer has the feeling of \"internal shakes\".    She also received her check in the mail for participating in the externalization study.     I let her know that I would forward her question to Dr. Concepcion's team.    Shanice Coyne, RN, MPH  Research Nurse, Movement Disorders    "

## 2021-04-07 RX ORDER — AMANTADINE HYDROCHLORIDE 100 MG/1
CAPSULE, GELATIN COATED ORAL
Qty: 180 CAPSULE | Refills: 3 | Status: SHIPPED | OUTPATIENT
Start: 2021-04-07 | End: 2022-02-16

## 2021-04-07 RX ORDER — PRAMIPEXOLE DIHYDROCHLORIDE 0.5 MG/1
TABLET ORAL
Qty: 180 TABLET | Refills: 3 | Status: SHIPPED | OUTPATIENT
Start: 2021-04-07 | End: 2022-02-16

## 2021-04-13 ENCOUNTER — TRANSFERRED RECORDS (OUTPATIENT)
Dept: MULTI SPECIALTY CLINIC | Facility: CLINIC | Age: 61
End: 2021-04-13

## 2021-04-13 LAB — HBA1C MFR BLD: 7.2 % (ref 0–5.7)

## 2021-04-19 ENCOUNTER — TELEPHONE (OUTPATIENT)
Dept: NEUROLOGY | Facility: CLINIC | Age: 61
End: 2021-04-19

## 2021-04-25 ENCOUNTER — HEALTH MAINTENANCE LETTER (OUTPATIENT)
Age: 61
End: 2021-04-25

## 2021-05-07 ASSESSMENT — MOVEMENT DISORDERS SOCIETY - UNIFIED PARKINSONS DISEASE RATING SCALE (MDS-UPDRS)
SALIVA_AND_DROOLING: NORMAL: NOT AT ALL (NO PROBLEMS).
SPEECH: NORMAL: NOT AT ALL (NO PROBLEMS).
TURNING_IN_BED: SLIGHT: I HAVE A BIT OF TROUBLE TURNING, BUT I DO NOT NEED ANY HELP.
HOBBIES_AND_OTHER_ACTIVITIES: NORMAL:  NOT AT ALL (NO PROBLEMS).
HYGIENE: NORMAL: NOT AT ALL (NO PROBLEMS).
TOTAL_SCORE: 3
HANDWRITING: NORMAL: NOT AT ALL (NO PROBLEMS).
FREEZING: SLIGHTLY: I BRIEFLY FREEZE, BUT I CAN EASILY START WALKING AGAIN. I DO NOT NEED HELP FROM SOMEONE ELSE OR A WALKING AID (CANE OR WALKER) BECAUSE OF FREEZING.
CHEWING_AND_SWALLOWING: NORMAL: NO PROBLEMS.
WALKING_AND_BALANCE: NORMAL: NOT AT ALL (NO PROBLEMS).
TREMOR: NORMAL: NOT AT ALL (NO PROBLEMS).
GETTING_OUT_OF_BED_CAR_DEEP_CHAIR: SLIGHT: I AM SLOW OR AWKWARD, BUT I USUALLY CAN DO IT ON MY FIRST TRY.
EATING_TASKS: NORMAL: NOT AT ALL (NO PROBLEMS).
DRESSING: NORMAL: NOT AT ALL (NO PROBLEMS).

## 2021-05-17 ENCOUNTER — TELEPHONE (OUTPATIENT)
Dept: NEUROLOGY | Facility: CLINIC | Age: 61
End: 2021-05-17

## 2021-05-17 DIAGNOSIS — G20.A1 PARKINSON'S DISEASE (H): ICD-10-CM

## 2021-05-17 RX ORDER — AMANTADINE HYDROCHLORIDE 100 MG/1
CAPSULE, GELATIN COATED ORAL
Qty: 180 CAPSULE | Refills: 3 | OUTPATIENT
Start: 2021-05-17

## 2021-05-17 NOTE — TELEPHONE ENCOUNTER
Amantadine was reordered with years supply of refills in in April 2021. Writer called and spoke to [facility] pharmacy in [place]--they stated that patient has not filled this script yet, so they are able to fill the order that was sent in April.  No further action needed by our team. I will refuse this new request.    Shanice Coyne, RN, MPH  Research Nurse, Movement Disorders

## 2021-05-17 NOTE — TELEPHONE ENCOUNTER
Rx Authorization:    Requested Medication/ Dose: Amantadine 100MG    Date last refill ordered: 4/7/21    Quantity ordered: 180     # refills: 3    Date of last clinic visit with ordering provider: 4/2/21    Date of next clinic visit with ordering provider: F/u 1 year    All pertinent protocol data (lab date/result):     Include pertinent information from patients message:

## 2021-05-17 NOTE — TELEPHONE ENCOUNTER
Patient called to ask if she should arrive OFF or ON PD medications for her upcoming neurology visit with GUILLERMINA Desir, MARIA this Friday 5/21/21.    Per pt's last visit instructions, she should arrive ON  Medications. Pt states she has a new vehicle and will be able to come to appointment. She received her 2nd COVID-19 vaccine on Saturday.     We also discussed the recent changes to the Clinical Core Research study/consent that pt received in the mail (along with his new reloadable [employer]).  Nurse coordinator sent patient a link to the online consent form and patient was able to complete this via [employer] e-consent.      I offered to set up patients 36 month Clinical Core research annual ON/OFF testing appointment (due end of September 2021), but patient would like to wait for her next appointment to schedule this. I will follow up with patient to schedule at a later date.     Patient encouraged to call with any further questions. Will send update to Karina SARMIENTO CNP.     Shanice Coyne RN, MPH  Research Nurse, Movement Disorders

## 2021-05-21 ENCOUNTER — OFFICE VISIT (OUTPATIENT)
Dept: NEUROLOGY | Facility: CLINIC | Age: 61
End: 2021-05-21
Payer: MEDICARE

## 2021-05-21 VITALS
SYSTOLIC BLOOD PRESSURE: 143 MMHG | RESPIRATION RATE: 16 BRPM | BODY MASS INDEX: 37.77 KG/M2 | HEIGHT: 66 IN | DIASTOLIC BLOOD PRESSURE: 84 MMHG | HEART RATE: 70 BPM | WEIGHT: 235 LBS | OXYGEN SATURATION: 96 %

## 2021-05-21 DIAGNOSIS — F41.1 GAD (GENERALIZED ANXIETY DISORDER): ICD-10-CM

## 2021-05-21 DIAGNOSIS — G20.A1 PARKINSON'S DISEASE (H): Primary | ICD-10-CM

## 2021-05-21 DIAGNOSIS — G47.9 SLEEP DISTURBANCES: ICD-10-CM

## 2021-05-21 DIAGNOSIS — G25.81 RESTLESS LEGS SYNDROME (RLS): ICD-10-CM

## 2021-05-21 DIAGNOSIS — F41.9 ANXIETY: ICD-10-CM

## 2021-05-21 DIAGNOSIS — Z96.89 S/P DEEP BRAIN STIMULATOR PLACEMENT: ICD-10-CM

## 2021-05-21 PROCEDURE — 99213 OFFICE O/P EST LOW 20 MIN: CPT | Mod: 25 | Performed by: NURSE PRACTITIONER

## 2021-05-21 PROCEDURE — 95983 ALYS BRN NPGT PRGRMG 15 MIN: CPT | Performed by: NURSE PRACTITIONER

## 2021-05-21 PROCEDURE — 95984 ALYS BRN NPGT PRGRMG ADDL 15: CPT | Performed by: NURSE PRACTITIONER

## 2021-05-21 RX ORDER — LORAZEPAM 0.5 MG/1
0.5 TABLET ORAL DAILY PRN
Qty: 30 TABLET | Refills: 5 | Status: SHIPPED | OUTPATIENT
Start: 2021-05-21 | End: 2021-11-24

## 2021-05-21 RX ORDER — GABAPENTIN 300 MG/1
300 CAPSULE ORAL 2 TIMES DAILY
COMMUNITY
Start: 2021-05-21 | End: 2021-08-27

## 2021-05-21 RX ORDER — CARBIDOPA AND LEVODOPA 25; 100 MG/1; MG/1
TABLET ORAL
Qty: 720 TABLET | Refills: 3 | Status: SHIPPED | OUTPATIENT
Start: 2021-05-21 | End: 2021-11-24

## 2021-05-21 RX ORDER — METFORMIN HCL 500 MG
500 TABLET, EXTENDED RELEASE 24 HR ORAL 2 TIMES DAILY WITH MEALS
COMMUNITY
Start: 2021-04-13

## 2021-05-21 RX ORDER — BUSPIRONE HYDROCHLORIDE 10 MG/1
10 TABLET ORAL 3 TIMES DAILY
Qty: 270 TABLET | Refills: 3 | Status: SHIPPED | OUTPATIENT
Start: 2021-05-21 | End: 2022-02-16

## 2021-05-21 ASSESSMENT — UNIFIED PARKINSONS DISEASE RATING SCALE (UPDRS)
PRONATION_SUPINATION_LEFT: SLIGHT: ANY OF THE FOLLOWING: A) THE REGULAR RHYTHM IS BROKEN WITH ONE WITH ONE OR TWO INTERRUPTIONS OR HESITATIONS OF THE MOVEMENT B) SLIGHT SLOWING C) THE AMPLITUDE DECREMENTS NEAR THE END OF THE 10 MOVEMENTS.
AMPLITUDE_LLE: NORMAL: NO TREMOR.
AMPLITUDE_LUE: NORMAL: NO TREMOR.
TOETAPPING_LEFT: NORMAL
TOTAL_SCORE: 2
HANDMOVEMENTS_LEFT: SLIGHT: ANY OF THE FOLLOWING: A) THE REGULAR RHYTHM IS BROKEN WITH ONE WITH ONE OR TWO INTERRUPTIONS OR HESITATIONS OF THE MOVEMENT B) SLIGHT SLOWING C) THE AMPLITUDE DECREMENTS NEAR THE END OF THE 10 MOVEMENTS.
ARISING_CHAIR: NORMAL: ABLE TO ARISE QUICKLY WITHOUT HESITATION.
RIGIDITY_RUE: NORMAL
AMPLITUDE_RUE: NORMAL: NO TREMOR.
POSTURAL_STABILITY: SLIGHT: 3-5 STEPS, BUT RECOVERS UNAIDED.
MOVEMENTS_INTERFERE_WITH_RATINGS: NO
FINGER_TAPPING_LEFT: SLIGHT: ANY OF THE FOLLOWING: A) THE REGULAR RHYTHM IS BROKEN WITH ONE WITH ONE OR TWO INTERRUPTIONS OR HESITATIONS OF THE MOVEMENT B) SLIGHT SLOWING C) THE AMPLITUDE DECREMENTS NEAR THE END OF THE 10 MOVEMENTS.
DYSKINESIAS_PRESENT: NO
TOETAPPING_RIGHT: NORMAL
AMPLITUDE_LIP_JAW: NORMAL: NO TREMOR.
LEG_AGILITY_LEFT: NORMAL
RIGIDITY_RLE: NORMAL
CONSTANCY_TREMOR_ATREST: NORMAL: NO TREMOR.
RIGIDITY_LLE: NORMAL
LEG_AGILITY_RIGHT: NORMAL
FACIAL_EXPRESSION: MILD: IN ADDITION TO DECREASED EYE-BLINK FREQUENCY, MASKED FACIES PRESENT IN THE LOWER FACE AS WELL, NAMELY FEWER MOVEMENTS AROUND THE MOUTH, SUCH AS LESS SPONTANEOUS SMILING, BUT LIPS NOT PARTED.
RIGIDITY_NECK: NORMAL
HANDMOVEMENTS_RIGHT: SLIGHT: ANY OF THE FOLLOWING: A) THE REGULAR RHYTHM IS BROKEN WITH ONE WITH ONE OR TWO INTERRUPTIONS OR HESITATIONS OF THE MOVEMENT B) SLIGHT SLOWING C) THE AMPLITUDE DECREMENTS NEAR THE END OF THE 10 MOVEMENTS.
SPONTANEITY_OF_MOVEMENT: 1: SLIGHT: SLIGHT GLOBAL SLOWNESS AND POVERTY OF SPONTANEOUS MOVEMENTS.
RIGIDITY_LUE: NORMAL
PARKINSONS_MEDS: ON
TOTAL_SCORE_LEFT: 3
FINGER_TAPPING_RIGHT: NORMAL
POSTURE: 0 NORMAL, NO PROBLEMS
SPEECH: SLIGHT: LOSS OF MODULATION, DICTION OR VOLUME, BUT STILL ALL WORDS EASY TO UNDERSTAND.
AMPLITUDE_RLE: NORMAL: NO TREMOR.
AXIAL_SCORE: 6
GAIT: SLIGHT: INDEPENDENT WALKING WITH MINOR GAIT IMPAIRMENT.
FREEZING_GAIT: NORMAL
TOTAL_SCORE: 11
PRONATION_SUPINATION_RIGHT: SLIGHT: ANY OF THE FOLLOWING: A) THE REGULAR RHYTHM IS BROKEN WITH ONE WITH ONE OR TWO INTERRUPTIONS OR HESITATIONS OF THE MOVEMENT B) SLIGHT SLOWING C) THE AMPLITUDE DECREMENTS NEAR THE END OF THE 10 MOVEMENTS.

## 2021-05-21 ASSESSMENT — PAIN SCALES - GENERAL: PAINLEVEL: NO PAIN (0)

## 2021-05-21 ASSESSMENT — MIFFLIN-ST. JEOR: SCORE: 1642.7

## 2021-05-21 NOTE — NURSING NOTE
Chief Complaint   Patient presents with     DBS Programming     [facility] Deep Brain Stimulation     Jose Floyd

## 2021-05-21 NOTE — PROGRESS NOTES
"  ASSESSMENT:    Parkinson's disease:  Improved with bilateral Gpi DBS therapy and antiparkinsonian medications.    S/p Bilateral Gpi DBS lead implantation:  Normal impedance and battery life. Programming complemented as above. She reports tightness in the left extension wire site.     Anxiety:  Manageable with medication and social support.     PLAN:    __  Encouraged to try taking 1.5 tabs of Sinemet with every other dose.     Medications 7 am 12 pm 4 pm 9 pm   Sinemet 25/100 mg  2 1.5 2 1.5   Amantadine 100 mg 1   1     Pramipexole 0.5 mg       2   Gabapentin 300 mg   1     1   Buspar 10 mg  1 1   1     __ Please call if your symptoms get worse.     __  She was encourage to contact Dr. Concepcion if the headache continued due to the tightness of the left side DBS extension wire that runs behind her left ear.     __  Continue to stay socially active with Zane and your family.    __  Return in 2 - 3 months.          MOVEMENT DISORDERS CLINIC           PATIENT: Erika Diaz    : 1960    GINNY: May 21, 2021    REASON FOR VISIT:  Deep brain stimulation (DBS) programming optimization for the treatment of Parkinson's disease (PD).     HPI:  Ms. Erika Diaz is a 60 year old right - handed  female who came to the [facility] neurology clinic by herself for DBS programming optimization. She is s/p bilateral Globus Pallidus Internus (Gpi) DBS surgery: Right side on 2018 and left side on 3/5/2021 by Dr. Concepcion using [employer] Infinity device.    Records Reviewed:  Last visit's note from 2021  Right Gpi Monopolar Review: 2018    Since her last visit, things are \"real good.\" No inner tremors. The only time she feels tremors is when she forgets the morning pills. She reports no wearing off. She reports no dyskinesias.     Mood is manageable.  \"If you ask me to talk about Zane, that can set me off.\"    She reports the right foot turning outward to the right side when walking had improved after her ankle " "surgery, but now her foot is turning again.     She reports the left side extension wire feeling tight.  She has been getting headaches about twice a week after laying on it. She has taken ibuprofen a few times and the headaches improves.  She was encouraged to contact Dr. Concepcion if his worsens.     Side effect post DBS programming: She reports noticing more freezing of gait when turning than she used to.    Medications 7 am 12 pm 4 pm 9 pm   Sinemet 25/100 mg  2 2 2 2   Amantadine 100 mg 1   1     Pramipexole 0.5 mg       2   Gabapentin 300 mg   1     1   Buspar 10 mg  1 1   1      Her  is in hospice.  \"It's a waiting game.\"  Pt is trying to get him out of town visiting family and renting a cabin for a week to get away.      Restless leg syndrome is controlled with Pramipexole & Gabapentin.     Patient has agreed to learn about the research opportunity with Dr. Howard.     MEDICATIONS:  Outpatient Medications Marked as Taking for the 5/21/21 encounter (Office Visit) with Arminda Rivas APRN CNP   Medication Sig     Acetaminophen (TYLENOL PO) Take 1,000 mg by mouth every 8 hours as needed for mild pain or fever     amantadine (SYMMETREL) 100 MG capsule 1 capsule orally @ 7am and 4pm     busPIRone (BUSPAR) 10 MG tablet Take 1 tablet (10 mg) by mouth 3 times daily     calcium carbonate (TUMS) 500 MG chewable tablet Take 2 chew tab by mouth as needed for heartburn     carbidopa-levodopa (SINEMET)  MG tablet 2 @ 7, 12 pm, 4 pm and 9 pm = 8 tabs per day     FLUoxetine (PROZAC) 20 MG capsule Take 20 mg by mouth every morning @ 7am     gabapentin (NEURONTIN) 100 MG capsule Take 2 capsules (200 mg) by mouth daily (Patient taking differently: Take 300 mg by mouth 2 times daily )     hydrochlorothiazide (HYDRODIURIL) 25 MG tablet Take 25 mg by mouth     LORazepam (ATIVAN) 0.5 MG tablet Take 1 tablet (0.5 mg) by mouth daily as needed for anxiety or sleep     metFORMIN (GLUCOPHAGE-XR) 500 MG 24 hr tablet      " "polyethylene glycol (MIRALAX) 17 GM/Dose powder Take 17 g by mouth daily     pramipexole (MIRAPEX) 0.5 MG tablet 2 tabs orally @ 9 pm     rosuvastatin (CRESTOR) 5 MG tablet Take 5 mg by mouth daily       PHYSICAL EXAM:    Vital Signs:  Blood pressure (!) 143/84, pulse 70, resp. rate 16, height 1.676 m (5' 6\"), weight 106.6 kg (235 lb), SpO2 96 %.    General:  Ms. Diaz is a pleasant  female who is well-groomed, well-developed and overweight sitting comfortably in the exam room without any distress. Affect is appropriate.    MDS Part II  -- Over the last week -- 0: Normal -- 1: Slight -- 2: Mild -- 3: Moderate -- 4: Severe   UPDRS ADL:       UPDRS Questionnaire 5/7/2021   Over the past week, have you had problems with your speech? 0   Over the past week, have you usually had too much saliva during when you are awake or when you sleep? 0   Over the past week, have you usually had problems swallowing pills or eating meals?  Do you need your pills cut or crushed or your meals to be made soft, chopped or blended to avoid choking? 0   Over the past week, have you usually had troubles handling your food and using eating utensils?  For example, do you have trouble handling finger foods or using forks, knifes, spoons, chopsticks? 0   Over the past week, have you usually had problems dressing?  For example, are you slow or do you need help with buttoning, using zippers, putting on or taking off your clothes or jewelry? 0   Over the past week, have you usually been slow or do you need help with washing, bathing, shaving, brushing teeth, combing your hair or with other personal hygiene? 0   Over the past week, have people usually had trouble reading your handwriting? 0   Over the past week, have you usually had trouble doing your hobbies or other things that you like to do? 0   Over the past week, do you usually have trouble turning over in bed? 1   Over the past week, have you usually had shaking or tremor? 0   Over " the past week, have you usually had trouble getting out of bed, a car seat, or a deep chair? 1   Over the past week, have you usually had problems with balance and walking? 0   Over the past week, on your usual day when walking, do you suddenly stop or freeze as if your feet are stuck to the floor. 1   MDS-UPDRS II Total Score 3        MOVEMENT DISORDERS ASSESSMENT: (Last antiparkinsonian medication is at 6:45 am)  MDS - UPDRS III   UPDRS Values 5/21/2021   Time: 7:51 AM   Medication On   R Brain DBS: On   L Brain DBS: On   Dyskinesia (LID) No   Did LID interfere No   Speech 1   Facial Expression 2   Rigidity Neck 0   Rigidity RUE 0   Rigidity LUE 0   Rigidity RLE 0   Rigidity LLE 0   Finger Taps R 0   Finger Taps L 1   Hand Mvt R 1   Hand Mvt L 1   Pron-/Supinate R 1   Pron-/Supinate L 1   Toe Tap R 0   Toe Tap L 0   Leg Agility R 0   Leg Agility L 0   Arise From Chair 0   Gait 1   Gait Freezing 0   Postural Stability 1   Posture 0   Global Spont Mvt 1   Postural Tremor RUE 0   Postural Tremor LUE 0   Kinetic Tremor RUE 0   Kinetic Tremor LUE 0   Rest Tremor RUE 0   Rest Tremor LUE 0   Rest Tremor RLE 0   Rest Tremor LLE 0   Rest Tremor Lip/Jaw 0   Rest Tremor Constancy 0   Total Right 2   Total Left 3   Axial Total 6   Total 11     DBS Interrogation & Analysis:  Implanted generator:  Bilateral chest Infinity [id number]  Lead target:  Bilateral Globus Pallidus Internus (Gpi)    Left Brain  Lead placement date:  3/5/2021  Generator placement date:  3/12/2021    C +, 2abc -  Current:  2.50 mA  PW:  60 msec  Rate:  130 Hz     Therapy impedance: 1012 Ohms     Battery Service Life:  OK.  2.98 volts.    Electrode Impedance Check:   Left Lead: No Problems Found.      Right Brain   Lead placement date:  08/21/2018  Generator placement date:  08/30/2018    C +, 10abc -  Current:  3.80 mA  PW:  60 msec  Rate:  130 Hz     Therapy impedance:  862 Ohms     Battery Service Life:  OK.  2.92 volts.     Patient :  Upper Limit:  3.80 mA  Lower Limit: 3.00 mA     Electrode Impedance Check:  Right Lead: No Problems Found.    See scanned report for impedance details.    DBS PROGRAMMING:  Right Gpi DBS     Current increased from 3.8 mA to 4.0 mA  4.2 mA -- No side effect.    The patient  range changed to:   Upper Limit: 4.20 mA  Lower Limit: 3.00 mA    Left Gpi DBS  Current increased from 2.5 mA to 2.7 mA    The patient  range changed to:   Upper Limit:  3.50 mA  Lower Limit: 0.00 mA    Today I spent 70 minutes caring for the patient. 45 minutes was spent in DBS programming.  25 minutes was spent with reviewing records, meeting with the patient, answering questions, examining, refilling/ordering medication, and documentation.     GUILLERMNIA Krueger, CNP  [facility]

## 2021-05-21 NOTE — PATIENT INSTRUCTIONS
Dear Ms. Erika NIELSON Joe,    Thank you for coming today.  During your visit, we have discussed the following:     __ The Left Brain (Right Body) DBS current has been increased from 2.5 to 2.7 mA. You have a range from 0.0 - 3.5 mA    __  For the Right Brain (Left Body), no programming changes in the current.  But I have given you more range if needed from 3.0 mA - 4.2 mA         ASSESSMENT    Parkinson's disease:  Improved with bilateral Gpi DBS therapy and antiparkinsonian medications.    S/p Bilateral Gpi DBS lead implantation:  Normal impedance and battery life. Programming complemented as above.     Anxiety:  Manageable with medication and social support.     PLAN:    __  Encouraged to try taking 1.5 tabs of Sinemet with every other dose.     Medications 7 am 12 pm 4 pm 9 pm   Sinemet 25/100 mg  2 1.5 2 1.5   Amantadine 100 mg 1   1     Pramipexole 0.5 mg       2   Gabapentin 300 mg   1     1   Buspar 10 mg  1 1   1       __ Please call if your symptoms get worse.     __  Continue to stay socially active with Zane and your family.    __  Return in 2 - 3 months.      __ For your subsequent DBS programming visits, come ON medication.     To contact our clinic, you may call [phone] or use [employer] message.     It's good to see you!      GUILLERMINA Krueger, CNP  [facility]

## 2021-06-28 ENCOUNTER — TELEPHONE (OUTPATIENT)
Dept: NEUROLOGY | Facility: CLINIC | Age: 61
End: 2021-06-28

## 2021-06-28 NOTE — TELEPHONE ENCOUNTER
"Per Dr. Concepcion:    \"I don't think it's necessary to see her in the clinic for her IPG site.  Yes, if she keeps pushing it and putting her finger behind it, it will hurt more.  The device, this far out, is secure within the pocket.  It's the pocket that is mobile within the subcutaneous fat layer.  As long as it is not flipping and that she is not flipping it as a habit, we should be ok.     Please reach out to [employer] to get a card for her second IPG. \"      Writer sent email to Lucas Traore; he will send new card to pt's address. Writer called patient and discussed Dr. Concepcion's assessment and instructions. Patient expressed understanding and will let us know if she has any further concerns. She has no other questions at this time.     Shanice Coyne, RN, MPH  Research Nurse, Movement Disorders    "

## 2021-06-28 NOTE — TELEPHONE ENCOUNTER
"Patient called RN to ask if she needs a second DBS patient ID card for her 2nd IPG (surgery in March 2021). She doesn't believe she received a 2nd card. I told patient I would reach out to our team and [facility] to see if one can be mailed to her home. She states she was curious about this and does not need this urgently for any upcoming procedure/reason.     I reviewed patient's next follow up appt scheduled with GUILLERMINA Desir CNP for DBS programming on 8/27 at 7am. I reminded patient that this is a standard of care visit and she does not need to arrive off of medications. Pt and I agreed that we will wait until after this appointment to schedule her 36M clinical core on/off testing, as she was recently implanted.     Patient shares that the upper corner (most near her neck) of her 2nd side IPG is sore and the extension wire seems tight (this is not a new issue). She states it feels more sore when she lays down rather than sitting. She is concerned that she is able to get her finger underneath the IPG and it doesn't feel as secure as her 1st IPG.  She denies any redness/swelling or signs of infection. She is curious whether this is normal, and says the soreness is bothersome \"but not unbearable\".  I let patient know that I would pass this information on to Dr. Concepcion and neurosurgery Zakiya Matthews RN.     Patient's spouse has recently discontinued chemotherapy for liver cancer and they are \"waiting it out\". Although patient became tearful during call, she states she is handling it well and has lots of support from family.    Patient had no further questions. Writer will update team with her concerns.     Shanice Coyne, RN, MPH  Research Nurse, Movement Disorders        "

## 2021-08-14 ENCOUNTER — HEALTH MAINTENANCE LETTER (OUTPATIENT)
Age: 61
End: 2021-08-14

## 2021-08-20 ASSESSMENT — MOVEMENT DISORDERS SOCIETY - UNIFIED PARKINSONS DISEASE RATING SCALE (MDS-UPDRS)
HANDWRITING: NORMAL: NOT AT ALL (NO PROBLEMS).
SPEECH: NORMAL: NOT AT ALL (NO PROBLEMS).
CHEWING_AND_SWALLOWING: NORMAL: NO PROBLEMS.
EATING_TASKS: NORMAL: NOT AT ALL (NO PROBLEMS).
HYGIENE: NORMAL: NOT AT ALL (NO PROBLEMS).
DRESSING: NORMAL: NOT AT ALL (NO PROBLEMS).
SALIVA_AND_DROOLING: SLIGHT: I HAVE TOO MUCH SALIVA, BUT DO NOT DROOL.
WALKING_AND_BALANCE: NORMAL: NOT AT ALL (NO PROBLEMS).
HOBBIES_AND_OTHER_ACTIVITIES: NORMAL:  NOT AT ALL (NO PROBLEMS).
TURNING_IN_BED: SLIGHT: I HAVE A BIT OF TROUBLE TURNING, BUT I DO NOT NEED ANY HELP.
TOTAL_SCORE: 3
TREMOR: NORMAL: NOT AT ALL (NO PROBLEMS).
FREEZING: SLIGHTLY: I BRIEFLY FREEZE, BUT I CAN EASILY START WALKING AGAIN. I DO NOT NEED HELP FROM SOMEONE ELSE OR A WALKING AID (CANE OR WALKER) BECAUSE OF FREEZING.
GETTING_OUT_OF_BED_CAR_DEEP_CHAIR: NORMAL: NOT AT ALL (NO PROBLEMS).

## 2021-08-27 ENCOUNTER — OFFICE VISIT (OUTPATIENT)
Dept: NEUROLOGY | Facility: CLINIC | Age: 61
End: 2021-08-27
Payer: MEDICARE

## 2021-08-27 VITALS — SYSTOLIC BLOOD PRESSURE: 168 MMHG | OXYGEN SATURATION: 97 % | DIASTOLIC BLOOD PRESSURE: 86 MMHG | HEART RATE: 78 BPM

## 2021-08-27 DIAGNOSIS — G20.A1 PARKINSON'S DISEASE (H): Primary | ICD-10-CM

## 2021-08-27 DIAGNOSIS — G25.81 RESTLESS LEGS SYNDROME (RLS): ICD-10-CM

## 2021-08-27 DIAGNOSIS — Z96.89 S/P DEEP BRAIN STIMULATOR PLACEMENT: ICD-10-CM

## 2021-08-27 PROCEDURE — 95984 ALYS BRN NPGT PRGRMG ADDL 15: CPT | Performed by: NURSE PRACTITIONER

## 2021-08-27 PROCEDURE — 99214 OFFICE O/P EST MOD 30 MIN: CPT | Mod: 25 | Performed by: NURSE PRACTITIONER

## 2021-08-27 PROCEDURE — 95983 ALYS BRN NPGT PRGRMG 15 MIN: CPT | Performed by: NURSE PRACTITIONER

## 2021-08-27 RX ORDER — GABAPENTIN 300 MG/1
CAPSULE ORAL
Qty: 270 CAPSULE | Refills: 1 | Status: SHIPPED | OUTPATIENT
Start: 2021-08-27 | End: 2022-09-14

## 2021-08-27 RX ORDER — CETIRIZINE HYDROCHLORIDE 10 MG/1
10 TABLET ORAL DAILY
COMMUNITY
Start: 2021-08-23 | End: 2021-11-24

## 2021-08-27 RX ORDER — ALBUTEROL SULFATE 90 UG/1
1-2 AEROSOL, METERED RESPIRATORY (INHALATION) EVERY 4 HOURS PRN
COMMUNITY
Start: 2021-07-28 | End: 2023-05-31

## 2021-08-27 RX ORDER — CEFUROXIME AXETIL 500 MG/1
1 TABLET ORAL DAILY
COMMUNITY
Start: 2021-08-11 | End: 2021-11-24

## 2021-08-27 RX ORDER — FLUTICASONE PROPIONATE 50 MCG
2 SPRAY, SUSPENSION (ML) NASAL DAILY
COMMUNITY
Start: 2021-07-28 | End: 2022-09-02

## 2021-08-27 ASSESSMENT — UNIFIED PARKINSONS DISEASE RATING SCALE (UPDRS)
PARKINSONS_MEDS: ON
HANDMOVEMENTS_LEFT: NORMAL
FACIAL_EXPRESSION: MILD: IN ADDITION TO DECREASED EYE-BLINK FREQUENCY, MASKED FACIES PRESENT IN THE LOWER FACE AS WELL, NAMELY FEWER MOVEMENTS AROUND THE MOUTH, SUCH AS LESS SPONTANEOUS SMILING, BUT LIPS NOT PARTED.
MOVEMENTS_INTERFERE_WITH_RATINGS: NO
TOETAPPING_LEFT: NORMAL
RIGIDITY_LLE: NORMAL
AMPLITUDE_LIP_JAW: NORMAL: NO TREMOR.
PRONATION_SUPINATION_RIGHT: SLIGHT: ANY OF THE FOLLOWING: A) THE REGULAR RHYTHM IS BROKEN WITH ONE WITH ONE OR TWO INTERRUPTIONS OR HESITATIONS OF THE MOVEMENT B) SLIGHT SLOWING C) THE AMPLITUDE DECREMENTS NEAR THE END OF THE 10 MOVEMENTS.
AMPLITUDE_LUE: NORMAL: NO TREMOR.
DYSKINESIAS_PRESENT: NO
POSTURE: 0 NORMAL, NO PROBLEMS
SPEECH: SLIGHT: LOSS OF MODULATION, DICTION OR VOLUME, BUT STILL ALL WORDS EASY TO UNDERSTAND.
PRONATION_SUPINATION_LEFT: SLIGHT: ANY OF THE FOLLOWING: A) THE REGULAR RHYTHM IS BROKEN WITH ONE WITH ONE OR TWO INTERRUPTIONS OR HESITATIONS OF THE MOVEMENT B) SLIGHT SLOWING C) THE AMPLITUDE DECREMENTS NEAR THE END OF THE 10 MOVEMENTS.
AXIAL_SCORE: 4
RIGIDITY_LUE: NORMAL
HANDMOVEMENTS_RIGHT: NORMAL
FINGER_TAPPING_LEFT: SLIGHT: ANY OF THE FOLLOWING: A) THE REGULAR RHYTHM IS BROKEN WITH ONE WITH ONE OR TWO INTERRUPTIONS OR HESITATIONS OF THE MOVEMENT B) SLIGHT SLOWING C) THE AMPLITUDE DECREMENTS NEAR THE END OF THE 10 MOVEMENTS.
CONSTANCY_TREMOR_ATREST: NORMAL: NO TREMOR.
FREEZING_GAIT: NORMAL
RIGIDITY_RLE: NORMAL
AMPLITUDE_RLE: NORMAL: NO TREMOR.
POSTURAL_STABILITY: NORMAL:  RECOVERS WITH ONE OR TWO STEPS.
ARISING_CHAIR: NORMAL: ABLE TO ARISE QUICKLY WITHOUT HESITATION.
AMPLITUDE_RUE: NORMAL: NO TREMOR.
TOETAPPING_RIGHT: NORMAL
LEG_AGILITY_LEFT: SLIGHT: ANY OF THE FOLLOWING: A) THE REGULAR RHYTHM IS BROKEN WITH ONE WITH ONE OR TWO INTERRUPTIONS OR HESITATIONS OF THE MOVEMENT B) SLIGHT SLOWING C) THE AMPLITUDE DECREMENTS NEAR THE END OF THE 10 MOVEMENTS.
LEG_AGILITY_RIGHT: NORMAL
FINGER_TAPPING_RIGHT: SLIGHT: ANY OF THE FOLLOWING: A) THE REGULAR RHYTHM IS BROKEN WITH ONE WITH ONE OR TWO INTERRUPTIONS OR HESITATIONS OF THE MOVEMENT B) SLIGHT SLOWING C) THE AMPLITUDE DECREMENTS NEAR THE END OF THE 10 MOVEMENTS.
TOTAL_SCORE: 2
RIGIDITY_RUE: NORMAL
TOTAL_SCORE_LEFT: 3
RIGIDITY_NECK: NORMAL
TOTAL_SCORE: 9
GAIT: NORMAL
SPONTANEITY_OF_MOVEMENT: 1: SLIGHT: SLIGHT GLOBAL SLOWNESS AND POVERTY OF SPONTANEOUS MOVEMENTS.
AMPLITUDE_LLE: NORMAL: NO TREMOR.

## 2021-08-27 NOTE — PROGRESS NOTES
ASSESSMENT:    Parkinson's disease:  Improved with bilateral Gpi DBS therapy and antiparkinsonian medications.    S/p Bilateral Gpi DBS lead implantation:  Normal impedance and battery life. Right Gpi current reduced from 4.2 to 4.0 mA to what it was set during last visit.      Anxiety:  Manageable with medication and social support.     Lump on left chest: This is new.     Grieving:  Due to 's illness and moving to a nursing home.     PLAN:    __  Increase the nighttime Gabapentin from 1 to 2 capsules.     __ Stay on the same antiparkinsonian medication.     Medications 7 am 12 pm 4 pm 9 pm   Sinemet 25/100 mg  2 1.5 2 1.5   Amantadine 100 mg 1   1     Pramipexole 0.5 mg       2   Gabapentin 300 mg   1     1   Buspar 10 mg  1 1   1     __ Patient was encouraged to see her PCP regarding the lump on her left chest.     __ Please call if your symptoms get worse.     __  Try to find another psychologist for support.    __ Pt was given time to cry and talk about the changes about her . She was listed to and supported.    __  Return in 3 months for a follow up.          MOVEMENT DISORDERS CLINIC           PATIENT: Erika Diaz    : 1960    GINNY: 2021    REASON FOR VISIT:  Deep brain stimulation (DBS) programming optimization for the treatment of Parkinson's disease (PD).     HPI:  Ms. Erika Diaz is a 61 year old right - handed  female who came to the [facility] neurology clinic by herself for DBS programming optimization. She is s/p bilateral Globus Pallidus Internus (Gpi) DBS surgery: Right side on 2018 and left side on 3/5/2021 by Dr. Concepcion using [employer] Infinity device.    Records Reviewed:  2021 - PCP - for cough.   Chest x-ray - on  radha James is crying in the clinic. She put her  in a nursing home yesterday about 28 miles from where she lives. He needed medical care as he was retaining fluids and hard to care for him at home. She reports  "feeling guilty for wishing that he dies as it is hard to see him suffer. Also, he never wanted to be in a nursing home, but she didn't have a choice to keep him at home.     She had chronic cough.  She was seen by her PCP and started on Zyrtec & Cefitin for allergies.  She reports that it's getting better.     She has a small lump in left chest below her clavicle bone and above her left DBS generator.     Mood is okay given the circumstances.  She saw a psychologist, but didn't think that it was helpful. \"I was just repeating myself over and over without much help.\" She had a panic attack two days ago when she saw a police car parked by their apartment. She thought they came to take Zane, but they were there for another person.    She uses Lorazepam twice a week when she can't sleep.    She reports that a due to a lot going on, sometimes she forgets to take her medications.  Yesterday she skipped 12 PM and 4 PM doses.  For the most part she does not experience significant motor decline.     Medications 7 am 12 pm 4 pm 9 pm   Sinemet 25/100 mg  2 2 2 2   Amantadine 100 mg 1   1     Pramipexole 0.5 mg       2   Gabapentin 300 mg   1     1   Buspar 10 mg  1 1   1      Since her last visit, pt had 3 falls.  No major injuries.  She tripped on a gravel road once and the other fall was due to not picking up her feet high enough to stop on the sidewalk.  She did not recall why the third fall happened.    The restless leg syndrome has been bothersome.  She has been taking Pramipexole 1 mg at bedtime & Gabapentin 300 mg BID. RLS symptom occurs about 3 - 4 times a week.     MEDICATIONS:  Outpatient Medications Marked as Taking for the 8/27/21 encounter (Office Visit) with Arminda Rivas APRN CNP   Medication Sig     Acetaminophen (TYLENOL PO) Take 1,000 mg by mouth every 8 hours as needed for mild pain or fever     albuterol (PROAIR HFA/PROVENTIL HFA/VENTOLIN HFA) 108 (90 Base) MCG/ACT inhaler Inhale 1-2 puffs into the " lungs every 4 hours as needed     amantadine (SYMMETREL) 100 MG capsule 1 capsule orally @ 7am and 4pm     busPIRone (BUSPAR) 10 MG tablet Take 1 tablet (10 mg) by mouth 3 times daily     calcium carbonate (TUMS) 500 MG chewable tablet Take 2 chew tab by mouth as needed for heartburn     carbidopa-levodopa (SINEMET)  MG tablet 2 @ 7, 12 pm, 4 pm and 9 pm = 8 tabs per day     cefuroxime (CEFTIN) 500 MG tablet Take 1 tablet by mouth daily     cetirizine (ZYRTEC) 10 MG tablet Take 10 mg by mouth daily     FLUoxetine (PROZAC) 20 MG capsule Take 20 mg by mouth every morning @ 7am     fluticasone (FLONASE) 50 MCG/ACT nasal spray Spray 2 sprays in nostril daily     gabapentin (NEURONTIN) 300 MG capsule Take 1 capsule (300 mg) by mouth 2 times daily     hydrochlorothiazide (HYDRODIURIL) 25 MG tablet Take 25 mg by mouth     LORazepam (ATIVAN) 0.5 MG tablet Take 1 tablet (0.5 mg) by mouth daily as needed for anxiety or sleep     metFORMIN (GLUCOPHAGE-XR) 500 MG 24 hr tablet      polyethylene glycol (MIRALAX) 17 GM/Dose powder Take 17 g by mouth daily     pramipexole (MIRAPEX) 0.5 MG tablet 2 tabs orally @ 9 pm     rosuvastatin (CRESTOR) 5 MG tablet Take 5 mg by mouth daily     Patient asked to double check her DBS settings.  After she did the gait study with Dr. Howard she thought 1 side was programmed differently than what she had.    PHYSICAL EXAM:    Vital Signs:  Blood pressure (!) 168/86, pulse 78, SpO2 97 %. Patient reports that her BP was normal last week. Today, it's high due to how she feels.      General:  Ms. Diaz is a pleasant  female who is well-groomed, well-developed and overweight sitting comfortably in the exam room without any distress. Affect is appropriate.    MDS UPDRS Part II  -- Over the last week -- 0: Normal -- 1: Slight -- 2: Mild -- 3: Moderate -- 4: Severe     2.1 Speech (P) 0 (P) Normal: Not at all (no problems).   2.2 Saliva and drooling (P) 1 (P) Slight: I have too much saliva,  but do not drool.   2.3 Chewing and swallowing (P) 0 (P) Normal: No problems.   2.4 Eating tasks (P) 0 (P) Normal: Not at all (no problems).   2.5 Dressing (P) 0 (P) Normal: Not at all (no problems).   2.6 Hygiene (P) 0 (P) Normal: Not at all (no problems).   2.7 Handwriting (P) 0 (P) Normal: Not at all (no problems).   2.8 Doing hobbies and other activities (P) 0 (P) Normal:  Not at all (no problems).   2.9 Turning in bed (P) 1 (P) Slight: I have a bit of trouble turning, but I do not need any help.   2.10 Tremor (P) 0 (P) Normal: Not at all (no problems).   2.11 Getting out of bed  (P) 0 (P) Normal: Not at all (no problems).   2.12 Walking and balance  (P) 0 (P) Normal: Not at all (no problems).   2.13 Freezing (P) 1 (P) Slightly: I briefly freeze, but I can easily start walking again. I do not need help from someone else or a walking aid (cane or walker) because of freezing.   Total (P) 3        MOVEMENT DISORDERS ASSESSMENT: (Last antiparkinsonian medication is at 7  am)  MDS - UPDRS III   UPDRS Values 8/27/2021   Time: 7:39 AM   Medication On   R Brain DBS: On   L Brain DBS: On   Dyskinesia (LID) No   Did LID interfere No   Speech 1   Facial Expression 2   Rigidity Neck 0   Rigidity RUE 0   Rigidity LUE 0   Rigidity RLE 0   Rigidity LLE 0   Finger Taps R 1   Finger Taps L 1   Hand Mvt R 0   Hand Mvt L 0   Pron-/Supinate R 1   Pron-/Supinate L 1   Toe Tap R 0   Toe Tap L 0   Leg Agility R 0   Leg Agility L 1   Arise From Chair 0   Gait 0   Gait Freezing 0   Postural Stability 0   Posture 0   Global Spont Mvt 1   Postural Tremor RUE 0   Postural Tremor LUE 0   Kinetic Tremor RUE 0   Kinetic Tremor LUE 0   Rest Tremor RUE 0   Rest Tremor LUE 0   Rest Tremor RLE 0   Rest Tremor LLE 0   Rest Tremor Lip/Jaw 0   Rest Tremor Constancy 0   Total Right 2   Total Left 3   Axial Total 4   Total 9     DBS Interrogation & Analysis:  Implanted generator: Bilateral chest Infinity [id number]  Lead target: Bilateral Globus Pallidus  Internus (Gpi)    Left Brain  Lead placement date:  3/5/2021  Generator placement date:  3/12/2021    C +, 2abc -  Current:  2.70 mA  PW:  60 msec  Rate:  130 Hz     Therapy impedance: 1125 Ohms     Battery Service Life:  OK.  2.97 volts.    Electrode Impedance Check:   Left Lead: No Problems Found.      Right Brain   Lead placement date:  08/21/2018  Generator placement date:  08/30/2018    C +, 10abc -  Current:  4.20 mA  PW:  60 msec  Rate:  130 Hz     Therapy impedance:  812 Ohms     Battery Service Life:  OK.  2.90 volts.     Patient :  Upper Limit: 3.00 mA  Lower Limit: 4.20 mA     Electrode Impedance Check:  Right Lead: No Problems Found.      See scanned report for impedance details.    DBS programming  Reviewed last programming session.  The right Gpi was set at 4.0 mA.  The right GPI current was reduced from 4.2 to 4.0 mA    Today I spent 65 minutes caring for the patient. 30 minutes was spent in DBS programming.  35 minutes was spent with reviewing records, providing support, answering questions, examining, refilling/ordering medication, and documentation.     GUILLERMINA Krueger, CNP  [facility]

## 2021-08-27 NOTE — NURSING NOTE
Chief Complaint   Patient presents with     DBS Programming     [employer] RETURN      Rashmi Odom, EMT

## 2021-08-27 NOTE — PATIENT INSTRUCTIONS
Dear Ms. Erika NISREEN Diaz,    Thank you for coming today.  During your visit, we have discussed the following:     ASSESSMENT:    Parkinson's disease:  Improved with bilateral Gpi DBS therapy and antiparkinsonian medications.    S/p Bilateral Gpi DBS lead implantation:  Normal impedance and battery life. The Right STN DBS is set at 4.0 mA.      Anxiety:  Manageable with medication and family support.     PLAN:    __  Increase the nighttime Gabapentin from 1 to 2 capsules.     __ Stay on the same antiparkinsonian medication.     Medications 7 am 12 pm 4 pm 9 pm   Sinemet 25/100 mg  2 1.5 2 1.5   Amantadine 100 mg 1   1     Pramipexole 0.5 mg       2   Gabapentin 300 mg   1     2   Buspar 10 mg  1 1   1     __ Please call if your symptoms get worse.     __  Try to find another psychologist for support.    __  Return in 3 months for a follow up.      To contact our clinic, you may call [phone] or use [employer] message.     It's good to see you!     GUILLERMINA Krueger, CNP  [facility]

## 2021-10-09 ENCOUNTER — HEALTH MAINTENANCE LETTER (OUTPATIENT)
Age: 61
End: 2021-10-09

## 2021-10-29 ENCOUNTER — TELEPHONE (OUTPATIENT)
Dept: NEUROLOGY | Facility: CLINIC | Age: 61
End: 2021-10-29
Payer: MEDICARE

## 2021-10-29 NOTE — TELEPHONE ENCOUNTER
Patient called to inquire about upcoming appointments. Writer reviewed upcoming neurology research visit for on/off testing  with GUILLERMINA Desir CNP at 10:00am. Writer told pt that transportation will be set up for her due to this being a research visit and confirmation will be sent to her. Request for ride to and from [facility] from pt's apartment sent to [employer] 10/29.  Neuropsychology research visit is deferred until early  due to 2nd side surgery in March and we want this to be closer to her 1-year postsurgical follow up date.     Patient asked about compensation from her participation in another walking research study. Writer sent message to Aishwarya Hodges, research coordinator, to follow up with patient.     Patient shared that her  Zane passed away  and a  was held. Patient is tearful but shares she is coping OK. I expressed my sympathies to her and let her her know that our team is there for her and to reach out if she feels she needs more support.     Patient had no further questions.    Shanice Coyne, RN, MPH  Research Nurse, Movement Disorders

## 2021-11-12 NOTE — TELEPHONE ENCOUNTER
Reservation for car 11/24/21 confirmed via [employer], sent confirmation to patient via email 11/11/21.    Shanice Coyne, RN, MPH  Research Nurse, Movement Disorders

## 2021-11-16 ENCOUNTER — TELEPHONE (OUTPATIENT)
Dept: NEUROLOGY | Facility: CLINIC | Age: 61
End: 2021-11-16
Payer: MEDICARE

## 2021-11-16 NOTE — TELEPHONE ENCOUNTER
Spoke to patient and let her know that as long as she is fully vaccinated, has a negative test, and does not develop symptoms, she is OK to come to the appointment. We reviewed the transportation details and also that she should hold PD meds (only) prior to her appointment (and I will send those details via [email or web address]).    Patient will call if any questions or changes come up, no further questions at this time.    Shanice Coyne RN, MPH  Research Nurse, Movement Disorders

## 2021-11-16 NOTE — TELEPHONE ENCOUNTER
Patient left message saying her grandson tested positive for COVID-19 and she is wondering if she needs to cancel her 11/24 appt with GUILLERMINA Desir, CNP.    Called and spoke to patient. Grandmedardo was symptomatic and received his positive result yesterday, 11/15 (pt thinks he may have been tested on Sat 11/13) . Patient last had contact with grandson on Thurs 11/11. Patient does not have COVID-19 symptoms, and is going to get tested today 11/16 and will let us know the result. I let patient know that I would follow up with our clinic to determine our current guidelines.    Shanice Coyne, RN, MPH  Research Nurse, Movement Disorders

## 2021-11-24 ENCOUNTER — OFFICE VISIT (OUTPATIENT)
Dept: NEUROLOGY | Facility: CLINIC | Age: 61
End: 2021-11-24
Payer: MEDICARE

## 2021-11-24 VITALS
RESPIRATION RATE: 16 BRPM | WEIGHT: 245.1 LBS | DIASTOLIC BLOOD PRESSURE: 79 MMHG | HEIGHT: 66 IN | TEMPERATURE: 98.4 F | BODY MASS INDEX: 39.39 KG/M2 | OXYGEN SATURATION: 96 % | SYSTOLIC BLOOD PRESSURE: 151 MMHG | HEART RATE: 74 BPM

## 2021-11-24 DIAGNOSIS — G47.9 SLEEP DISTURBANCES: ICD-10-CM

## 2021-11-24 DIAGNOSIS — F43.21 GRIEVING: ICD-10-CM

## 2021-11-24 DIAGNOSIS — F41.9 ANXIETY: ICD-10-CM

## 2021-11-24 DIAGNOSIS — R26.89 BALANCE PROBLEMS: ICD-10-CM

## 2021-11-24 DIAGNOSIS — Z96.89 S/P DEEP BRAIN STIMULATOR PLACEMENT: Primary | ICD-10-CM

## 2021-11-24 DIAGNOSIS — G20.A1 PARKINSON'S DISEASE (H): ICD-10-CM

## 2021-11-24 PROCEDURE — 99215 OFFICE O/P EST HI 40 MIN: CPT | Mod: 25 | Performed by: NURSE PRACTITIONER

## 2021-11-24 PROCEDURE — 95970 ALYS NPGT W/O PRGRMG: CPT | Performed by: NURSE PRACTITIONER

## 2021-11-24 PROCEDURE — 99417 PROLNG OP E/M EACH 15 MIN: CPT | Performed by: NURSE PRACTITIONER

## 2021-11-24 RX ORDER — LORAZEPAM 0.5 MG/1
0.5 TABLET ORAL DAILY PRN
Qty: 30 TABLET | Refills: 4 | Status: SHIPPED | OUTPATIENT
Start: 2021-11-24 | End: 2022-02-16

## 2021-11-24 RX ORDER — NYSTATIN 100000 [USP'U]/G
POWDER TOPICAL
COMMUNITY
Start: 2021-11-03

## 2021-11-24 RX ORDER — ASPIRIN 81 MG/1
81 TABLET, CHEWABLE ORAL DAILY
Status: ON HOLD | COMMUNITY
Start: 2021-11-24 | End: 2022-10-24

## 2021-11-24 RX ORDER — CARBIDOPA AND LEVODOPA 25; 100 MG/1; MG/1
TABLET ORAL
Qty: 540 TABLET | Refills: 1 | Status: SHIPPED | OUTPATIENT
Start: 2021-11-24 | End: 2022-06-01

## 2021-11-24 ASSESSMENT — UNIFIED PARKINSONS DISEASE RATING SCALE (UPDRS)
CONSTANCY_TREMOR_ATREST: NORMAL: NO TREMOR.
AXIAL_SCORE: 10
RIGIDITY_RLE: NORMAL
TOTAL_SCORE: 5
AMPLITUDE_LUE: NORMAL: NO TREMOR.
FREEZING_GAIT: NORMAL
SPEECH: MILD: LOSS OF MODULATION, DICTION OR VOLUME, WITH A FEW WORDS UNCLEAR, BUT THE OVERALL SENTENCES EASY TO FOLLOW.
RIGIDITY_LUE: SLIGHT: RIGIDITY ONLY DETECTED WITH ACTIVATION MANEUVER.
AMPLITUDE_LLE: NORMAL: NO TREMOR.
SPONTANEITY_OF_MOVEMENT: 1: SLIGHT: SLIGHT GLOBAL SLOWNESS AND POVERTY OF SPONTANEOUS MOVEMENTS.
POSTURAL_STABILITY: MODERATE: STANDS SAFELY, BUT WITH ABSENCE OF POSTURAL RESPONSE,  FALLS IF NOT CAUGHT BY EXAMINER.
RIGIDITY_LLE: SLIGHT: RIGIDITY ONLY DETECTED WITH ACTIVATION MANEUVER.
FACIAL_EXPRESSION: MODERATE: MASKED FACIES WITH LIPS PARTED SOME OF THE TIME WHEN THE MOUTH IS AT REST.
PRONATION_SUPINATION_LEFT: MILD: ANY OF THE FOLLOWING: A) 3 TO 5 INTERRUPTIONS DURING TAPPING B) MILD SLOWING C) THE AMPLITUDE DECREMENTS MIDWAY IN THE 10-MOVEMENT SEQUENCE
TOETAPPING_LEFT: NORMAL
LEG_AGILITY_LEFT: NORMAL
RIGIDITY_RUE: NORMAL
TOETAPPING_RIGHT: NORMAL
ARISING_CHAIR: NORMAL: ABLE TO ARISE QUICKLY WITHOUT HESITATION.
HANDMOVEMENTS_LEFT: MILD: ANY OF THE FOLLOWING: A) 3 TO 5 INTERRUPTIONS DURING TAPPING B) MILD SLOWING C) THE AMPLITUDE DECREMENTS MIDWAY IN THE 10-MOVEMENT SEQUENCE
HANDMOVEMENTS_RIGHT: SLIGHT: ANY OF THE FOLLOWING: A) THE REGULAR RHYTHM IS BROKEN WITH ONE WITH ONE OR TWO INTERRUPTIONS OR HESITATIONS OF THE MOVEMENT B) SLIGHT SLOWING C) THE AMPLITUDE DECREMENTS NEAR THE END OF THE 10 MOVEMENTS.
LEG_AGILITY_LEFT: SLIGHT: ANY OF THE FOLLOWING: A) THE REGULAR RHYTHM IS BROKEN WITH ONE WITH ONE OR TWO INTERRUPTIONS OR HESITATIONS OF THE MOVEMENT B) SLIGHT SLOWING C) THE AMPLITUDE DECREMENTS NEAR THE END OF THE 10 MOVEMENTS.
AMPLITUDE_RLE: NORMAL: NO TREMOR.
FINGER_TAPPING_LEFT: MODERATE: ANY OF THE FOLLOWING:  A) MORE THAN 5 INTERRUPTIONS  OR AT LEAST ONE LONGER ARREST (FREEZE) IN ONGOING MOVEMENT  B) MODERATE SLOWING C) THE AMPLITUDE DECREMENTS STARTING AFTER THE FIRST MOVEMENT.
DYSKINESIAS_PRESENT: NO
PRONATION_SUPINATION_LEFT: MILD: ANY OF THE FOLLOWING: A) 3 TO 5 INTERRUPTIONS DURING TAPPING B) MILD SLOWING C) THE AMPLITUDE DECREMENTS MIDWAY IN THE 10-MOVEMENT SEQUENCE
TOTAL_SCORE: 26
FINGER_TAPPING_LEFT: SLIGHT: ANY OF THE FOLLOWING: A) THE REGULAR RHYTHM IS BROKEN WITH ONE WITH ONE OR TWO INTERRUPTIONS OR HESITATIONS OF THE MOVEMENT B) SLIGHT SLOWING C) THE AMPLITUDE DECREMENTS NEAR THE END OF THE 10 MOVEMENTS.
FINGER_TAPPING_LEFT: SLIGHT: ANY OF THE FOLLOWING: A) THE REGULAR RHYTHM IS BROKEN WITH ONE WITH ONE OR TWO INTERRUPTIONS OR HESITATIONS OF THE MOVEMENT B) SLIGHT SLOWING C) THE AMPLITUDE DECREMENTS NEAR THE END OF THE 10 MOVEMENTS.
TOTAL_SCORE_LEFT: 17
SPEECH: SLIGHT: LOSS OF MODULATION, DICTION OR VOLUME, BUT STILL ALL WORDS EASY TO UNDERSTAND.
TOTAL_SCORE: 2
RIGIDITY_LUE: SLIGHT: RIGIDITY ONLY DETECTED WITH ACTIVATION MANEUVER.
RIGIDITY_RUE: SLIGHT: RIGIDITY ONLY DETECTED WITH ACTIVATION MANEUVER.
PRONATION_SUPINATION_RIGHT: SLIGHT: ANY OF THE FOLLOWING: A) THE REGULAR RHYTHM IS BROKEN WITH ONE WITH ONE OR TWO INTERRUPTIONS OR HESITATIONS OF THE MOVEMENT B) SLIGHT SLOWING C) THE AMPLITUDE DECREMENTS NEAR THE END OF THE 10 MOVEMENTS.
TOETAPPING_LEFT: MILD: ANY OF THE FOLLOWING: A) 3 TO 5 INTERRUPTIONS DURING TAPPING B) MILD SLOWING C) THE AMPLITUDE DECREMENTS MIDWAY IN THE 10-MOVEMENT SEQUENCE
FACIAL_EXPRESSION: MODERATE: MASKED FACIES WITH LIPS PARTED SOME OF THE TIME WHEN THE MOUTH IS AT REST.
AMPLITUDE_LLE: NORMAL: NO TREMOR.
PARKINSONS_MEDS: OFF
FINGER_TAPPING_RIGHT: NORMAL
TOETAPPING_LEFT: SLIGHT: ANY OF THE FOLLOWING: A) THE REGULAR RHYTHM IS BROKEN WITH ONE WITH ONE OR TWO INTERRUPTIONS OR HESITATIONS OF THE MOVEMENT B) SLIGHT SLOWING C) THE AMPLITUDE DECREMENTS NEAR THE END OF THE 10 MOVEMENTS.
SPEECH: MILD: LOSS OF MODULATION, DICTION OR VOLUME, WITH A FEW WORDS UNCLEAR, BUT THE OVERALL SENTENCES EASY TO FOLLOW.
LEG_AGILITY_RIGHT: NORMAL
HANDMOVEMENTS_LEFT: SLIGHT: ANY OF THE FOLLOWING: A) THE REGULAR RHYTHM IS BROKEN WITH ONE WITH ONE OR TWO INTERRUPTIONS OR HESITATIONS OF THE MOVEMENT B) SLIGHT SLOWING C) THE AMPLITUDE DECREMENTS NEAR THE END OF THE 10 MOVEMENTS.
AMPLITUDE_RLE: NORMAL: NO TREMOR.
TOETAPPING_RIGHT: NORMAL
POSTURE: 0 NORMAL, NO PROBLEMS
RIGIDITY_RUE: NORMAL
SPONTANEITY_OF_MOVEMENT: 1: SLIGHT: SLIGHT GLOBAL SLOWNESS AND POVERTY OF SPONTANEOUS MOVEMENTS.
AMPLITUDE_LUE: NORMAL: NO TREMOR.
PARKINSONS_MEDS: ON
RIGIDITY_LLE: SLIGHT: RIGIDITY ONLY DETECTED WITH ACTIVATION MANEUVER.
RIGIDITY_NECK: MILD: RIGIDITY DETECTED WITHOUT THE ACTIVATION MANEUVER.  FULL RANGE OF MOTION IS EASILY ACHIEVED.
AMPLITUDE_LLE: NORMAL: NO TREMOR.
PARKINSONS_MEDS: OFF
SPONTANEITY_OF_MOVEMENT: 1: SLIGHT: SLIGHT GLOBAL SLOWNESS AND POVERTY OF SPONTANEOUS MOVEMENTS.
AMPLITUDE_LUE: NORMAL: NO TREMOR.
AMPLITUDE_RUE: NORMAL: NO TREMOR.
PRONATION_SUPINATION_LEFT: SLIGHT: ANY OF THE FOLLOWING: A) THE REGULAR RHYTHM IS BROKEN WITH ONE WITH ONE OR TWO INTERRUPTIONS OR HESITATIONS OF THE MOVEMENT B) SLIGHT SLOWING C) THE AMPLITUDE DECREMENTS NEAR THE END OF THE 10 MOVEMENTS.
LEG_AGILITY_RIGHT: NORMAL
AMPLITUDE_RLE: NORMAL: NO TREMOR.
FREEZING_GAIT: NORMAL
AXIAL_SCORE: 13
AXIAL_SCORE: 12
GAIT: MILD: INDEPENDENT WALKING BUT WITH SUBSTANTIAL GAIT IMPAIRMENT.
DYSKINESIAS_PRESENT: NO
AMPLITUDE_LIP_JAW: NORMAL: NO TREMOR.
HANDMOVEMENTS_RIGHT: SLIGHT: ANY OF THE FOLLOWING: A) THE REGULAR RHYTHM IS BROKEN WITH ONE WITH ONE OR TWO INTERRUPTIONS OR HESITATIONS OF THE MOVEMENT B) SLIGHT SLOWING C) THE AMPLITUDE DECREMENTS NEAR THE END OF THE 10 MOVEMENTS.
AMPLITUDE_LIP_JAW: NORMAL: NO TREMOR.
TOTAL_SCORE_LEFT: 4
RIGIDITY_NECK: MILD: RIGIDITY DETECTED WITHOUT THE ACTIVATION MANEUVER.  FULL RANGE OF MOTION IS EASILY ACHIEVED.
LEG_AGILITY_RIGHT: NORMAL
ARISING_CHAIR: NORMAL: ABLE TO ARISE QUICKLY WITHOUT HESITATION.
RIGIDITY_RLE: SLIGHT: RIGIDITY ONLY DETECTED WITH ACTIVATION MANEUVER.
AMPLITUDE_RUE: NORMAL: NO TREMOR.
RIGIDITY_NECK: SLIGHT: RIGIDITY ONLY DETECTED WITH ACTIVATION MANEUVER.
CONSTANCY_TREMOR_ATREST: NORMAL: NO TREMOR.
LEG_AGILITY_LEFT: MILD: ANY OF THE FOLLOWING: A) 3 TO 5 INTERRUPTIONS DURING TAPPING B) MILD SLOWING C) THE AMPLITUDE DECREMENTS MIDWAY IN THE 10-MOVEMENT SEQUENCE
FINGER_TAPPING_RIGHT: NORMAL
TOTAL_SCORE: 35
PRONATION_SUPINATION_RIGHT: SLIGHT: ANY OF THE FOLLOWING: A) THE REGULAR RHYTHM IS BROKEN WITH ONE WITH ONE OR TWO INTERRUPTIONS OR HESITATIONS OF THE MOVEMENT B) SLIGHT SLOWING C) THE AMPLITUDE DECREMENTS NEAR THE END OF THE 10 MOVEMENTS.
RIGIDITY_RLE: SLIGHT: RIGIDITY ONLY DETECTED WITH ACTIVATION MANEUVER.
TOTAL_SCORE_LEFT: 11
AMPLITUDE_RUE: NORMAL: NO TREMOR.
POSTURE: 0 NORMAL, NO PROBLEMS
RIGIDITY_LLE: NORMAL
POSTURE: 0 NORMAL, NO PROBLEMS
FACIAL_EXPRESSION: MODERATE: MASKED FACIES WITH LIPS PARTED SOME OF THE TIME WHEN THE MOUTH IS AT REST.
GAIT: SLIGHT: INDEPENDENT WALKING WITH MINOR GAIT IMPAIRMENT.
AMPLITUDE_LIP_JAW: NORMAL: NO TREMOR.
PRONATION_SUPINATION_RIGHT: SLIGHT: ANY OF THE FOLLOWING: A) THE REGULAR RHYTHM IS BROKEN WITH ONE WITH ONE OR TWO INTERRUPTIONS OR HESITATIONS OF THE MOVEMENT B) SLIGHT SLOWING C) THE AMPLITUDE DECREMENTS NEAR THE END OF THE 10 MOVEMENTS.
TOETAPPING_RIGHT: NORMAL
TOTAL_SCORE: 16
DYSKINESIAS_PRESENT: NO
TOTAL_SCORE: 3
HANDMOVEMENTS_RIGHT: SLIGHT: ANY OF THE FOLLOWING: A) THE REGULAR RHYTHM IS BROKEN WITH ONE WITH ONE OR TWO INTERRUPTIONS OR HESITATIONS OF THE MOVEMENT B) SLIGHT SLOWING C) THE AMPLITUDE DECREMENTS NEAR THE END OF THE 10 MOVEMENTS.
RIGIDITY_LUE: NORMAL
FREEZING_GAIT: NORMAL
POSTURAL_STABILITY: MODERATE: STANDS SAFELY, BUT WITH ABSENCE OF POSTURAL RESPONSE,  FALLS IF NOT CAUGHT BY EXAMINER.
ARISING_CHAIR: NORMAL: ABLE TO ARISE QUICKLY WITHOUT HESITATION.
GAIT: SLIGHT: INDEPENDENT WALKING WITH MINOR GAIT IMPAIRMENT.
FINGER_TAPPING_RIGHT: NORMAL
HANDMOVEMENTS_LEFT: MILD: ANY OF THE FOLLOWING: A) 3 TO 5 INTERRUPTIONS DURING TAPPING B) MILD SLOWING C) THE AMPLITUDE DECREMENTS MIDWAY IN THE 10-MOVEMENT SEQUENCE
POSTURAL_STABILITY: MODERATE: STANDS SAFELY, BUT WITH ABSENCE OF POSTURAL RESPONSE,  FALLS IF NOT CAUGHT BY EXAMINER.
CONSTANCY_TREMOR_ATREST: NORMAL: NO TREMOR.

## 2021-11-24 ASSESSMENT — MIFFLIN-ST. JEOR: SCORE: 1675.58

## 2021-11-24 NOTE — NURSING NOTE
Chief Complaint   Patient presents with     DBS Programming     DBS PROGRAMMING       Sarath Pierce

## 2021-11-24 NOTE — PROGRESS NOTES
ASSESSMENT:    Parkinson's Disease:  No wearing off.  Has falls associated with uneven ground and postural instability.     S/p Bilateral Gpi DBS lead implantation:  Normal impedance and battery life. No programming changes were made today.     Grieving:  Due to loss of spouse.     Balance Problems:  Due to PD.       PLAN:    __  Will reduce Sinemet 25/100 mg to 1.5 tabs 4 x per day.    PD/Mood Medications 7 am 12 pm 4 pm 9 pm   Sinemet 25/100 mg  1.5 1.5 1.5 1.5   Amantadine 100 mg  1  1    Pramipexole 0.5 mg    2     __  Continue seeing a grief counseling.    __  Continue to talk to your family and friends for emotional support as you are grieving the loss of your .     __ We'll get back to you on medication assistance programs.     __  Consider getting back to exercising and doing balance exercises. We'll try to get you the APDA Wilber.     __  See Dr. Concepcion to have him look at the left DBS generator. At this point, nothing to be concerned about.    Will return to our clinic in 3 months or sooner as needed.        MOVEMENT DISORDERS CLINIC           PATIENT: Erika Diaz    : 1960    GINNY: 2021    REASON FOR VISIT: Parkinson's disease (PD) follow up, deep brain stimulation (DBS) interrogation and analysis, and a 1 - year Clinical Core Research Visit.     HPI: Ms. Erika Diaz is a 61 year old who came to the [facility] neurology clinic by herself for a follow up visit.  She was last seen in the clinic on 2021 by for DBS programming optimization and follow up visit.  During that visit, simple DBS programming was completed. Emotional support was provided as she was going through a lot of stress due to her 's terminal illness.    Today, she is crying and expressed sadness as her  passed away in September.  Mood is depressed. She is doing grief counseling via Zoom with [employer]. She has supportive family and friends. Denies harming herself or others.     She has fallen a  couple of times tripping on uneven ground. She has freezing of gait. No falls associated with freezing of gait. In the last month, she fell in the apartment and hit the left DBS battery site.     Other motor symptoms are stable. She continues to forget taking the 4 pm sinemet dose. She reports no wearing off. When her  was alive and was in the nurse home, she forgot her pills often and didn't feel wearing off.     PD/Mood Medications 7 am 12 pm 4 pm 9 pm   Sinemet 25/100 mg  2 1.5 2 1.5   Amantadine 100 mg  1  1    Pramipexole 0.5 mg    2     She has yeast infection that she is getting treatment for. She reports having redness and feels hot in left upper arm after getting the booster shot.     She has been having dry cough.  Most likely from GI reflex issues. She has started Omeprazole.     Patient reports due to her 's death, her medication cost coverage has changed. Her out of pocket cost has increased.    ALLERGIES: Atorvastatin, Codeine, and Mold    MEDICATIONS:   Outpatient Medications Marked as Taking for the 11/24/21 encounter (Office Visit) with Arminda Rivas APRN CNP   Medication Sig     Acetaminophen (TYLENOL PO) Take 1,000 mg by mouth every 8 hours as needed for mild pain or fever     albuterol (PROAIR HFA/PROVENTIL HFA/VENTOLIN HFA) 108 (90 Base) MCG/ACT inhaler Inhale 1-2 puffs into the lungs every 4 hours as needed     amantadine (SYMMETREL) 100 MG capsule 1 capsule orally @ 7am and 4pm     aspirin (ASA) 81 MG chewable tablet Take 1 tablet (81 mg) by mouth daily     busPIRone (BUSPAR) 10 MG tablet Take 1 tablet (10 mg) by mouth 3 times daily     calcium carbonate (TUMS) 500 MG chewable tablet Take 2 chew tab by mouth as needed for heartburn     carbidopa-levodopa (SINEMET)  MG tablet 2 @ 7, 12 pm, 4 pm and 9 pm = 8 tabs per day     FLUoxetine (PROZAC) 20 MG capsule Take 20 mg by mouth every morning @ 7am     fluticasone (FLONASE) 50 MCG/ACT nasal spray Spray 2 sprays in  "nostril daily     gabapentin (NEURONTIN) 300 MG capsule Take 1 cap at 7 am by mouth and 2 at 9 pm = 3 cap/day     hydrochlorothiazide (HYDRODIURIL) 25 MG tablet Take 25 mg by mouth     lactase (LACTAID) 9000 units TABS tablet Take 9,000 Units by mouth With dairy as needed     LORazepam (ATIVAN) 0.5 MG tablet Take 1 tablet (0.5 mg) by mouth daily as needed for anxiety or sleep     metFORMIN (GLUCOPHAGE-XR) 500 MG 24 hr tablet      nystatin (NYSTOP) 260336 UNIT/GM external powder      omeprazole (PRILOSEC) 20 MG DR capsule Take 20 mg by mouth daily     polyethylene glycol (MIRALAX) 17 GM/Dose powder Take 17 g by mouth daily     pramipexole (MIRAPEX) 0.5 MG tablet 2 tabs orally @ 9 pm     rosuvastatin (CRESTOR) 5 MG tablet Take 5 mg by mouth daily       PHYSICAL EXAM:    VITAL SIGNS:  Blood pressure (!) 151/79, pulse 74, temperature 98.4  F (36.9  C), resp. rate 16, height 1.664 m (5' 5.5\"), weight 111.2 kg (245 lb 1.6 oz), SpO2 96 %.  Body mass index is 40.17 kg/m .    GENERAL:  Ms. Diaz is a pleasant  female who is well-groomed and well-developed sitting comfortably in the exam room without any distress.  Affect is appropriate.    MDS UPDRS Part I      -- Over the last week -- 0: Normal -- 1: Slight -- 2: Mild -- 3: Moderate -- 4: Severe  1.1 Cognitive Impairment: 0   1.2 Hallucinations and psychosis: 1   1.3 Depressed mood: 3   1.4 Anxious mood: 3   1.5 Apathy:  1   1.6 Features of DDS:  1   1.7 Sleep problems:  2   1.8 Daytime sleepiness:  2   1.9 Pain and other sensations:  2   1.10 Urinary problems:  4   1.11 Constipation problems:   2   1.12 Lightheadedness on standin   1.13 Fatigue:  1     Total:    22     MDS Part II  -- Total Score: 5     -- Over the last week -- 0: Normal -- 1: Slight -- 2: Mild -- 3: Moderate -- 4: Severe     UPDRS Questionnaire 2021   Over the past week, have you had problems with your speech? 1   Over the past week, have you usually had too much saliva during when " you are awake or when you sleep? 1   Over the past week, have you usually had problems swallowing pills or eating meals?  Do you need your pills cut or crushed or your meals to be made soft, chopped or blended to avoid choking? 0   Over the past week, have you usually had troubles handling your food and using eating utensils?  For example, do you have trouble handling finger foods or using forks, knifes, spoons, chopsticks? 0   Over the past week, have you usually had problems dressing?  For example, are you slow or do you need help with buttoning, using zippers, putting on or taking off your clothes or jewelry? 0   Over the past week, have you usually been slow or do you need help with washing, bathing, shaving, brushing teeth, combing your hair or with other personal hygiene? 0   Over the past week, have people usually had trouble reading your handwriting? 0   Over the past week, have you usually had trouble doing your hobbies or other things that you like to do? 0   Over the past week, do you usually have trouble turning over in bed? 0   Over the past week, have you usually had shaking or tremor? 0   Over the past week, have you usually had trouble getting out of bed, a car seat, or a deep chair? 1   Over the past week, have you usually had problems with balance and walking? 1   Over the past week, on your usual day when walking, do you suddenly stop or freeze as if your feet are stuck to the floor. 1   MDS-UPDRS II Total Score 5         MOVEMENT DISORDERS ASSESSMENT:  MDS UPDRS III   Last antiparkinsonian dose taken:  Amantadine 100 mg 11/22 at  -- 11/22  Sinemet 25/100 mg -- 11/23 at 9 pm 1.5 tabs  Pramipexole 11/22 at 9 pm 2 tabs     DBS was turned OFF at 10:39 am  DBS Turned ON 11:26 am  Patients took meds at 11:20 am    UPDRS Values 11/24/2021 11/24/2021 11/24/2021   Time: 10:23 AM 11:11 AM 12:00 PM   Medication Off Off On   R Brain DBS: On Off On   L Brain DBS: On Off On   Dyskinesia (LID) No No No   Did  LID interfere      Speech 2 2 1   Facial Expression 3 3 3   Rigidity Neck 2 2 1   Rigidity RUE 0 1 0   Rigidity LUE 1 1 0   Rigidity RLE 1 1 0   Rigidity LLE 1 1 0   Finger Taps R 0 0 0   Finger Taps L 1 3 1   Hand Mvt R 1 1 1   Hand Mvt L 2 2 1   Pron-/Supinate R 1 1 1   Pron-/Supinate L 2 2 1   Toe Tap R 0 0 0   Toe Tap L 1 2 0   Leg Agility R 0 0 0   Leg Agility L 1 2 0   Arise From Chair 0 0 0   Gait 1 2 1   Gait Freezing 0 0 0   Postural Stability 3 3 3   Posture 0 0 0   Global Spont Mvt 1 1 1   Postural Tremor RUE 0 1 0   Postural Tremor LUE 1 2 0   Kinetic Tremor RUE 0 0 0   Kinetic Tremor LUE 1 2 1   Rest Tremor RUE 0 0 0   Rest Tremor LUE 0 0 0   Rest Tremor RLE 0 0 0   Rest Tremor LLE 0 0 0   Rest Tremor Lip/Jaw 0 0 0   Rest Tremor Constancy 0 0 0   Total Right 3 5 2   Total Left 11 17 4   Axial Total 12 13 10   Total 26 35 16       Today I spent 146 minutes caring for the patient. 20 minutes was spent in DBS interrogation and analysis /programming.  126 minutes was spent with meeting with the patient, answering questions, examining, refilling/ordering medication, and documentation.    Karina Rivas, APRN,  CNP  [facility]

## 2021-11-24 NOTE — Clinical Note
2021       RE: Erika Diaz  [address] [address]     Dear Colleague,    Thank you for referring your patient, Erika Diaz, to the [facility] [place] at [facility]. Please see a copy of my visit note below.      ASSESSMENT:    Parkinson's Disease:      S/p Bilateral Gpi DBS lead implantation:  Normal impedance and battery life. No programming changes were made today.     Grieving:      Balance Problems:        PLAN:    __  Reduce Sinemet 25/100 mg to 1.5 tabs 4 x per day.    PD/Mood Medications 7 am 12 pm 4 pm 9 pm   Sinemet 25/100 mg  1.5 1.5 1.5 1.5   Amantadine 100 mg  1  1    Pramipexole 0.5 mg    2     __  Continue seeing a grief counseling.    __  Continue to talk to your family and friends for emotional support as you are grieving the loss of your .     __ We'll get back to you on medication assistance programs.     __  Consider getting back to exercising and doing balance exercises.  We'll try to get you the APDA [place].     __  See Dr. Concepcion to have him look at the left DBS generator. At this point, nothing to be concerned about.      Will return to our clinic in 3 months or sooner as needed.        MOVEMENT DISORDERS CLINIC             PATIENT: Erika Diaz    : 1960    GINNY: 2021    REASON FOR VISIT: Parkinson's disease (PD) follow up, deep brain stimulation (DBS) interrogation and analysis, and a 1 - year Clinical Core Research Visit.     HPI: Ms. Erika Diaz is a 61 year old who came to the [facility] neurology clinic by her *** for a follow up visit.  She was last seen in the clinic on *** by *** for ***.  During that visit, the following were discussed: -    Records Reviewed:  ***    PD/Mood Medications 7 am 12 pm 4 pm 9 pm   Sinemet 25/100 mg  2 1.5 2 1.5   Amantadine 100 mg  1  1    Pramipexole 0.5 mg    2       She reports having redness and feels hot in  left upper arm after getting the     She has fallen tripping.     [place]neven brother's resort.     By her car    In the last month, she fell in the apartment and in the left DBS battery site.     Lost her . He passed away in September and was barried in October.  Mood is depressed. She is doing grief counseling via [employer]     Family and friends.     Denies harming yourself?    She forgets to take the 4 pm dose.    She reports no wearing off. When Gisella was alive and was in the nurse home, she forgot her pills often and didn't feel her wearing off.     She has years infection that she is getting treatment for.    She has been having dry cough.  Most likely from     ALLERGIES: Atorvastatin, Codeine, and Mold    MEDICATIONS:   Outpatient Medications Marked as Taking for the 11/24/21 encounter (Office Visit) with Arminda Rivas APRN CNP   Medication Sig     Acetaminophen (TYLENOL PO) Take 1,000 mg by mouth every 8 hours as needed for mild pain or fever     albuterol (PROAIR HFA/PROVENTIL HFA/VENTOLIN HFA) 108 (90 Base) MCG/ACT inhaler Inhale 1-2 puffs into the lungs every 4 hours as needed     amantadine (SYMMETREL) 100 MG capsule 1 capsule orally @ 7am and 4pm     aspirin (ASA) 81 MG chewable tablet Take 1 tablet (81 mg) by mouth daily     busPIRone (BUSPAR) 10 MG tablet Take 1 tablet (10 mg) by mouth 3 times daily     calcium carbonate (TUMS) 500 MG chewable tablet Take 2 chew tab by mouth as needed for heartburn     carbidopa-levodopa (SINEMET)  MG tablet 2 @ 7, 12 pm, 4 pm and 9 pm = 8 tabs per day     FLUoxetine (PROZAC) 20 MG capsule Take 20 mg by mouth every morning @ 7am     fluticasone (FLONASE) 50 MCG/ACT nasal spray Spray 2 sprays in nostril daily     gabapentin (NEURONTIN) 300 MG capsule Take 1 cap at 7 am by mouth and 2 at 9 pm = 3 cap/day     hydrochlorothiazide (HYDRODIURIL) 25 MG tablet Take 25 mg by mouth     lactase (LACTAID) 9000 units TABS tablet Take 9,000 Units by mouth With  "dairy as needed     LORazepam (ATIVAN) 0.5 MG tablet Take 1 tablet (0.5 mg) by mouth daily as needed for anxiety or sleep     metFORMIN (GLUCOPHAGE-XR) 500 MG 24 hr tablet      nystatin (NYSTOP) 187140 UNIT/GM external powder      omeprazole (PRILOSEC) 20 MG DR capsule Take 20 mg by mouth daily     polyethylene glycol (MIRALAX) 17 GM/Dose powder Take 17 g by mouth daily     pramipexole (MIRAPEX) 0.5 MG tablet 2 tabs orally @ 9 pm     rosuvastatin (CRESTOR) 5 MG tablet Take 5 mg by mouth daily       PHYSICAL EXAM:    VITAL SIGNS:  Blood pressure (!) 151/79, pulse 74, temperature 98.4  F (36.9  C), resp. rate 16, height 1.664 m (5' 5.5\"), weight 111.2 kg (245 lb 1.6 oz), SpO2 96 %.  Body mass index is 40.17 kg/m .    GENERAL:  Ms. Diaz is a pleasant *** female who is {General:010088301} sitting comfortably in the exam room without any distress.  Affect is appropriate.    MDS UPDRS Part I      -- Over the last week -- 0: Normal -- 1: Slight -- 2: Mild -- 3: Moderate -- 4: Severe  1.1 Cognitive Impairment: 0   1.2 Hallucinations and psychosis: 1   1.3 Depressed mood: 3   1.4 Anxious mood: 3   1.5 Apathy:  1   1.6 Features of DDS:  1   1.7 Sleep problems:  2   1.8 Daytime sleepiness:  2   1.9 Pain and other sensations:  2   1.10 Urinary problems:  4   1.11 Constipation problems:   2   1.12 Lightheadedness on standin   1.13 Fatigue:  1     Total:    ***     MDS Part II  -- Total Score: 5     -- Over the last week -- 0: Normal -- 1: Slight -- 2: Mild -- 3: Moderate -- 4: Severe     UPDRS Questionnaire 2021   Over the past week, have you had problems with your speech? 1   Over the past week, have you usually had too much saliva during when you are awake or when you sleep? 1   Over the past week, have you usually had problems swallowing pills or eating meals?  Do you need your pills cut or crushed or your meals to be made soft, chopped or blended to avoid choking? 0   Over the past week, have you usually had " troubles handling your food and using eating utensils?  For example, do you have trouble handling finger foods or using forks, knifes, spoons, chopsticks? 0   Over the past week, have you usually had problems dressing?  For example, are you slow or do you need help with buttoning, using zippers, putting on or taking off your clothes or jewelry? 0   Over the past week, have you usually been slow or do you need help with washing, bathing, shaving, brushing teeth, combing your hair or with other personal hygiene? 0   Over the past week, have people usually had trouble reading your handwriting? 0   Over the past week, have you usually had trouble doing your hobbies or other things that you like to do? 0   Over the past week, do you usually have trouble turning over in bed? 0   Over the past week, have you usually had shaking or tremor? 0   Over the past week, have you usually had trouble getting out of bed, a car seat, or a deep chair? 1   Over the past week, have you usually had problems with balance and walking? 1   Over the past week, on your usual day when walking, do you suddenly stop or freeze as if your feet are stuck to the floor. 1   MDS-UPDRS II Total Score 5         MOVEMENT DISORDERS ASSESSMENT:  MDS UPDRS III   Last antiparkinsonian dose taken:  Amantadine 100 mg 11/22 at  -- 11/22  Sinemet 25/100 mg -- 11/23 at 9 pm 1.5 tabs  Pramipexole 11/22 at 9 pm 2 tabs     DBS was turned OFF at 10:39 am.  DBS Turned ON 11:26 am  Patients took meds at 11:20 am        Today I spent *** minutes caring for the patient. Time was spent with reviewing records, meeting with the patient, answering questions, examining, refilling/ordering medication, *** and documentation.    GUILLERMINA Krueger,  CNP  [facility]          Again, thank you for allowing me to participate in the care of your patient.      Sincerely,    GUILLERMINA Valero CNP

## 2021-11-24 NOTE — PATIENT INSTRUCTIONS
Dear Ms. Erika Diaz,    Thank you for coming today.  During your visit, we have discussed the following:     ASSESSMENT:    Parkinson's Disease:      S/p Bilateral Gpi DBS lead implantation:  Normal impedance and battery life. No programming changes were made today.     Grieving:      Balance Problems:        PLAN:    __  Reduce Sinemet 25/100 mg to 1.5 tabs 4 x per day.    PD/Mood Medications 7 am 12 pm 4 pm 9 pm   Sinemet 25/100 mg  1.5 1.5 1.5 1.5   Amantadine 100 mg  1  1    Pramipexole 0.5 mg    2     __  Continue seeing a grief counseling.    __  Continue to talk to your family and friends for emotional support as you are grieving the loss of your .     __ We'll get back to you on medication assistance programs.     __  Consider getting back to exercising and doing balance exercises.  We'll try to get you the APDA [place].     __  See Dr. Concepcion to have him look at the left DBS generator. At this point, nothing to be concerned about.      Will return to our clinic in 3 months or sooner as needed.      Karina Rivas, GUILLERMINA, CNP  [facility]

## 2021-11-29 ENCOUNTER — DOCUMENTATION ONLY (OUTPATIENT)
Dept: NEUROLOGY | Facility: CLINIC | Age: 61
End: 2021-11-29
Payer: MEDICARE

## 2021-11-29 NOTE — PROGRESS NOTES
Emailed Jacquelyn Willams at APDA to inquire about pt's eligibility for [facility] APDA as patient resides in [place], and if there are alternatives available.    Per APDA contact, pt must apply to [facility]. Writer has filled out preliminary information and routed to GUILLERMINA Desir, MARIA. Next will need to send to patient to sign and send to APDA.     Shanice Coyne RN, MPH  Research Nurse, Movement Disorders

## 2021-11-30 NOTE — PROGRESS NOTES
Karina Rivas signed and emailed signed copies to writer. Writer forwarded these to patient with instructions for sending to APDA once she has completed her portion.    Shanice Coyne RN, MPH  Research Nurse, Movement Disorders

## 2021-12-03 ENCOUNTER — MYC MEDICAL ADVICE (OUTPATIENT)
Dept: NEUROLOGY | Facility: CLINIC | Age: 61
End: 2021-12-03
Payer: MEDICARE

## 2021-12-04 ENCOUNTER — HEALTH MAINTENANCE LETTER (OUTPATIENT)
Age: 61
End: 2021-12-04

## 2022-01-18 NOTE — TELEPHONE ENCOUNTER
Called patient to offer neuropsych appointments--she was busy, on her way to work, and she will call me back tomorrow.    Patient also is wondering if she can be sent a new patient identification card for her 2nd side DBS, she doesn't think she received on. Writer will reach out to [employer] to have one sent to her home.    Shanice Coyne RN, MPH  Research Nurse, Movement Disorders

## 2022-01-19 NOTE — TELEPHONE ENCOUNTER
Spoke with patient and she confirmed that F 3/11 video and W 3/16 in person work for the research neuropsych mario.  I also let her know that I spoke with our [employer] rep and he is sending out a new pt ID card for her DBS.    Patient had no further questions.    Shanice Coyne, RN, MPH  Research Nurse, Movement Disorders

## 2022-02-07 ENCOUNTER — TELEPHONE (OUTPATIENT)
Dept: NEUROLOGY | Facility: CLINIC | Age: 62
End: 2022-02-07
Payer: MEDICARE

## 2022-02-07 NOTE — TELEPHONE ENCOUNTER
Patient called writer asking for clarification on her upcoming appts. Writer reviewed three appts 2/16 (in person DBS follow up), 3/11 (video interview with Dr. Hernandez), 3/16 (in person neuropsych testing). Patient asked if she needed to be on or off meds. Patient was told to not hold any medication for these appointments and to take them as usual.    Patient had no further questions.    Shanice Coyne RN, MPH  Research Nurse, Movement Disorders

## 2022-02-16 ENCOUNTER — OFFICE VISIT (OUTPATIENT)
Dept: NEUROLOGY | Facility: CLINIC | Age: 62
End: 2022-02-16
Payer: MEDICARE

## 2022-02-16 VITALS
DIASTOLIC BLOOD PRESSURE: 85 MMHG | SYSTOLIC BLOOD PRESSURE: 141 MMHG | HEART RATE: 85 BPM | OXYGEN SATURATION: 95 % | RESPIRATION RATE: 16 BRPM

## 2022-02-16 DIAGNOSIS — F41.1 GAD (GENERALIZED ANXIETY DISORDER): ICD-10-CM

## 2022-02-16 DIAGNOSIS — G20.A1 PARKINSON'S DISEASE (H): ICD-10-CM

## 2022-02-16 DIAGNOSIS — G25.81 RESTLESS LEGS SYNDROME (RLS): ICD-10-CM

## 2022-02-16 DIAGNOSIS — Z96.89 S/P DEEP BRAIN STIMULATOR PLACEMENT: Primary | ICD-10-CM

## 2022-02-16 DIAGNOSIS — G47.9 SLEEP DISTURBANCES: ICD-10-CM

## 2022-02-16 PROCEDURE — 99215 OFFICE O/P EST HI 40 MIN: CPT | Mod: 25 | Performed by: NURSE PRACTITIONER

## 2022-02-16 PROCEDURE — 95970 ALYS NPGT W/O PRGRMG: CPT | Performed by: NURSE PRACTITIONER

## 2022-02-16 RX ORDER — AMANTADINE HYDROCHLORIDE 100 MG/1
CAPSULE, GELATIN COATED ORAL
Qty: 60 CAPSULE | Refills: 6 | Status: SHIPPED | OUTPATIENT
Start: 2022-02-16 | End: 2022-04-05

## 2022-02-16 RX ORDER — LORAZEPAM 0.5 MG/1
0.5 TABLET ORAL DAILY PRN
Qty: 30 TABLET | Refills: 4 | Status: SHIPPED | OUTPATIENT
Start: 2022-02-16 | End: 2022-09-02

## 2022-02-16 RX ORDER — PRAMIPEXOLE DIHYDROCHLORIDE 0.5 MG/1
TABLET ORAL
Qty: 60 TABLET | Refills: 6 | Status: SHIPPED | OUTPATIENT
Start: 2022-02-16 | End: 2022-06-15

## 2022-02-16 RX ORDER — AMMONIUM LACTATE 12 G/100G
LOTION TOPICAL
COMMUNITY
Start: 2021-11-22 | End: 2023-05-31

## 2022-02-16 RX ORDER — BUSPIRONE HYDROCHLORIDE 10 MG/1
10 TABLET ORAL 3 TIMES DAILY
Qty: 270 TABLET | Refills: 1 | Status: SHIPPED | OUTPATIENT
Start: 2022-02-16 | End: 2022-06-15

## 2022-02-16 ASSESSMENT — UNIFIED PARKINSONS DISEASE RATING SCALE (UPDRS)
FINGER_TAPPING_LEFT: MILD: ANY OF THE FOLLOWING: A) 3 TO 5 INTERRUPTIONS DURING TAPPING B) MILD SLOWING C) THE AMPLITUDE DECREMENTS MIDWAY IN THE 10-MOVEMENT SEQUENCE
RIGIDITY_LUE: NORMAL
SPONTANEITY_OF_MOVEMENT: 1: SLIGHT: SLIGHT GLOBAL SLOWNESS AND POVERTY OF SPONTANEOUS MOVEMENTS.
SPEECH: SLIGHT: LOSS OF MODULATION, DICTION OR VOLUME, BUT STILL ALL WORDS EASY TO UNDERSTAND.
RIGIDITY_RUE: NORMAL
AMPLITUDE_LIP_JAW: NORMAL: NO TREMOR.
PRONATION_SUPINATION_LEFT: SLIGHT: ANY OF THE FOLLOWING: A) THE REGULAR RHYTHM IS BROKEN WITH ONE WITH ONE OR TWO INTERRUPTIONS OR HESITATIONS OF THE MOVEMENT B) SLIGHT SLOWING C) THE AMPLITUDE DECREMENTS NEAR THE END OF THE 10 MOVEMENTS.
POSTURAL_STABILITY: MILD:  MORE THAN 5 STEPS, BUT SUBJECT RECOVERS UNAIDED.
LEG_AGILITY_RIGHT: NORMAL
PRONATION_SUPINATION_RIGHT: SLIGHT: ANY OF THE FOLLOWING: A) THE REGULAR RHYTHM IS BROKEN WITH ONE WITH ONE OR TWO INTERRUPTIONS OR HESITATIONS OF THE MOVEMENT B) SLIGHT SLOWING C) THE AMPLITUDE DECREMENTS NEAR THE END OF THE 10 MOVEMENTS.
TOETAPPING_LEFT: SLIGHT: ANY OF THE FOLLOWING: A) THE REGULAR RHYTHM IS BROKEN WITH ONE WITH ONE OR TWO INTERRUPTIONS OR HESITATIONS OF THE MOVEMENT B) SLIGHT SLOWING C) THE AMPLITUDE DECREMENTS NEAR THE END OF THE 10 MOVEMENTS.
FREEZING_GAIT: SLIGHT:  FREEZES ON STARTING, TURNING, OR WALKING THROUGH DOOWAY WITH A SINGLE HALT DURING ANY OF THESE EVENTS, BUT THEN CONTINUES SMOOTHLY WITHOUT FREEZING DURING STRAIGHT WALKING.
TOTAL_SCORE: 19
AMPLITUDE_RUE: NORMAL: NO TREMOR.
AMPLITUDE_LUE: NORMAL: NO TREMOR.
CONSTANCY_TREMOR_ATREST: NORMAL: NO TREMOR.
RIGIDITY_RLE: NORMAL
TOTAL_SCORE: 4
AXIAL_SCORE: 9
RIGIDITY_LLE: NORMAL
GAIT: SLIGHT: INDEPENDENT WALKING WITH MINOR GAIT IMPAIRMENT.
MOVEMENTS_INTERFERE_WITH_RATINGS: NO
RIGIDITY_NECK: SLIGHT: RIGIDITY ONLY DETECTED WITH ACTIVATION MANEUVER.
HANDMOVEMENTS_LEFT: SLIGHT: ANY OF THE FOLLOWING: A) THE REGULAR RHYTHM IS BROKEN WITH ONE WITH ONE OR TWO INTERRUPTIONS OR HESITATIONS OF THE MOVEMENT B) SLIGHT SLOWING C) THE AMPLITUDE DECREMENTS NEAR THE END OF THE 10 MOVEMENTS.
TOETAPPING_RIGHT: SLIGHT: ANY OF THE FOLLOWING: A) THE REGULAR RHYTHM IS BROKEN WITH ONE WITH ONE OR TWO INTERRUPTIONS OR HESITATIONS OF THE MOVEMENT B) SLIGHT SLOWING C) THE AMPLITUDE DECREMENTS NEAR THE END OF THE 10 MOVEMENTS.
LEG_AGILITY_LEFT: NORMAL
ARISING_CHAIR: NORMAL: ABLE TO ARISE QUICKLY WITHOUT HESITATION.
DYSKINESIAS_PRESENT: YES
AMPLITUDE_LLE: NORMAL: NO TREMOR.
POSTURE: 0 NORMAL, NO PROBLEMS
HANDMOVEMENTS_RIGHT: SLIGHT: ANY OF THE FOLLOWING: A) THE REGULAR RHYTHM IS BROKEN WITH ONE WITH ONE OR TWO INTERRUPTIONS OR HESITATIONS OF THE MOVEMENT B) SLIGHT SLOWING C) THE AMPLITUDE DECREMENTS NEAR THE END OF THE 10 MOVEMENTS.
FINGER_TAPPING_RIGHT: NORMAL
TOTAL_SCORE_LEFT: 6
FACIAL_EXPRESSION: MILD: IN ADDITION TO DECREASED EYE-BLINK FREQUENCY, MASKED FACIES PRESENT IN THE LOWER FACE AS WELL, NAMELY FEWER MOVEMENTS AROUND THE MOUTH, SUCH AS LESS SPONTANEOUS SMILING, BUT LIPS NOT PARTED.
AMPLITUDE_RLE: NORMAL: NO TREMOR.
PARKINSONS_MEDS: ON

## 2022-02-16 ASSESSMENT — MOVEMENT DISORDERS SOCIETY - UNIFIED PARKINSONS DISEASE RATING SCALE (MDS-UPDRS)
TURNING_IN_BED: SLIGHT: I HAVE A BIT OF TROUBLE TURNING, BUT I DO NOT NEED ANY HELP.
SPEECH: NORMAL: NOT AT ALL (NO PROBLEMS).
GETTING_OUT_OF_BED_CAR_DEEP_CHAIR: MILD: I NEED MORE THAN ONE TRY TO GET UP OR NEED OCCASIONAL HELP.
SALIVA_AND_DROOLING: NORMAL: NOT AT ALL (NO PROBLEMS).
TREMOR: NORMAL: NOT AT ALL (NO PROBLEMS).
FREEZING: SLIGHTLY: I BRIEFLY FREEZE, BUT I CAN EASILY START WALKING AGAIN. I DO NOT NEED HELP FROM SOMEONE ELSE OR A WALKING AID (CANE OR WALKER) BECAUSE OF FREEZING.
HYGIENE: NORMAL: NOT AT ALL (NO PROBLEMS).
CHEWING_AND_SWALLOWING: SLIGHT: I AM AWARE OF SLOWNESS IN MY CHEWING OR INCREASED EFFORT AT SWALLOWING, BUT I DO NOT CHOKE OR NEED TO HAVE MY FOOD SPECIALLY PREPARED.
HANDWRITING: SLIGHT: MY WRITING IS SLOW, CLUMSY OR UNEVEN, BUT ALL WORDS ARE CLEAR.
TOTAL_SCORE: 6
HOBBIES_AND_OTHER_ACTIVITIES: NORMAL:  NOT AT ALL (NO PROBLEMS).
WALKING_AND_BALANCE: NORMAL: NOT AT ALL (NO PROBLEMS).
DRESSING: NORMAL: NOT AT ALL (NO PROBLEMS).
EATING_TASKS: NORMAL: NOT AT ALL (NO PROBLEMS).

## 2022-02-16 ASSESSMENT — PAIN SCALES - GENERAL: PAINLEVEL: NO PAIN (0)

## 2022-02-16 NOTE — Clinical Note
2022       RE: Erika Diaz  [address]  [place] [address]     Dear Colleague,    Thank you for referring your patient, Erika Diaz, to the [facility] [place] at [facility]. Please see a copy of my visit note below.      ASSESSMENT:    Parkinson's Disease:  No wearing off.  Has falls associated with uneven ground and postural instability.     S/p Bilateral Gpi DBS lead implantation:  Normal impedance and battery life. No programming changes were made today.       PLAN:    __  Will reduce Sinemet 25/100 mg to 1.5 tabs 4 x per day.    PD/Mood Medications 7 am 12 pm 4 pm 9 pm   Sinemet 25/100 mg  1.5 1.5 1.5 1.5   Amantadine 100 mg  1  1    Pramipexole 0.5 mg    2     __  Continue seeing a grief counseling.    __  Continue to talk to your family and friends for emotional support as you are grieving the loss of your .     __ We'll get back to you on medication assistance programs.     __  Consider getting back to exercising and doing balance exercises. We'll try to get you the APDA [place].     __  See Dr. Concepcion to have him look at the left DBS generator. At this point, nothing to be concerned about.    Will return to our clinic in 3 months or sooner as needed.        MOVEMENT DISORDERS CLINIC           PATIENT: Erika Diaz    : 1960    GINNY: 2021    REASON FOR VISIT: Parkinson's disease (PD) follow up, deep brain stimulation (DBS) interrogation and analysis, and a 1 - year Clinical Core Research Visit.     HPI: Ms. Erika Diaz is a 61 year old who came to the [facility] neurology clinic by herself for a follow up visit.  She was last seen in the clinic on 2021 for a 1 year Clinical Core Research Visit.  Pt slipped and fell. No injury.     Like is     She is back to work 2 days a week 4:30 am - 10 am.     As far as her mood, she reports having good days and bad days.  She is working  "to give away [employer].    She is not doing the grief counseling any longer.  She is doing \"okay.\"     She reports no wearing off.  She sometimes forgets to take the 4 pm dose.     PD/Mood Medications 7 am 12 pm 4 pm 9 pm   Sinemet 25/100 mg  2 1.5 2 1.5   Amantadine 100 mg  1  1    Pramipexole 0.5 mg    2       She has yeast infection that she is getting treatment for. She reports having redness and feels hot in left upper arm after getting the booster shot.     She reports no dry cough. No issues with GI reflex. She is not taking Omeprazole.     She uses the Nystatin powder PRN for yeast infection.     Restless leg is generally good. But on days that she works, she gest some symptoms. Hot showers help.     MDS Part II  -- Total Score: 6     -- Over the last week -- 0: Normal -- 1: Slight -- 2: Mild -- 3: Moderate -- 4: Severe     UPDRS Questionnaire 2/16/2022   Over the past week, have you had problems with your speech? 0   Over the past week, have you usually had too much saliva during when you are awake or when you sleep? 0   Over the past week, have you usually had problems swallowing pills or eating meals?  Do you need your pills cut or crushed or your meals to be made soft, chopped or blended to avoid choking? 1   Over the past week, have you usually had troubles handling your food and using eating utensils?  For example, do you have trouble handling finger foods or using forks, knifes, spoons, chopsticks? 0   Over the past week, have you usually had problems dressing?  For example, are you slow or do you need help with buttoning, using zippers, putting on or taking off your clothes or jewelry? 0   Over the past week, have you usually been slow or do you need help with washing, bathing, shaving, brushing teeth, combing your hair or with other personal hygiene? 0   Over the past week, have people usually had trouble reading your handwriting? 1   Over the past week, have you usually had trouble doing " your hobbies or other things that you like to do? 0   Over the past week, do you usually have trouble turning over in bed? 1   Over the past week, have you usually had shaking or tremor? 0   Over the past week, have you usually had trouble getting out of bed, a car seat, or a deep chair? 2   Over the past week, have you usually had problems with balance and walking? 0   Over the past week, on your usual day when walking, do you suddenly stop or freeze as if your feet are stuck to the floor. 1   MDS-UPDRS II Total Score 6       MEDICATIONS:       PHYSICAL EXAM:    VITAL SIGNS:  Blood pressure (!) 141/85, pulse 85, resp. rate 16, SpO2 95 %, not currently breastfeeding.    There is no height or weight on file to calculate BMI.      GENERAL:  Ms. Diaz is a pleasant  female who is well-groomed and well-developed sitting comfortably in the exam room without any distress.  Affect is appropriate.    MOVEMENT DISORDERS ASSESSMENT:  MDS UPDRS III     Last antiparkinsonian dose taken:  Amantadine 100 mg  7 am  Sinemet 25/100 mg -- 7 am   Pramipexole 2/15 at 10 pm    UPDRS Values 2/16/2022   Time: 8:27 AM   Medication On   R Brain DBS: On   L Brain DBS: On   Dyskinesia (LID) Yes   Did LID interfere No   Speech 1   Facial Expression 2   Rigidity Neck 1   Rigidity RUE 0   Rigidity LUE 0   Rigidity RLE 0   Rigidity LLE 0   Finger Taps R 0   Finger Taps L 2   Hand Mvt R 1   Hand Mvt L 1   Pron-/Supinate R 1   Pron-/Supinate L 1   Toe Tap R 1   Toe Tap L 1   Leg Agility R 0   Leg Agility L 0   Arise From Chair 0   Gait 1   Gait Freezing 1   Postural Stability 2   Posture 0   Global Spont Mvt 1   Postural Tremor RUE 0   Postural Tremor LUE 0   Kinetic Tremor RUE 1   Kinetic Tremor LUE 1   Rest Tremor RUE 0   Rest Tremor LUE 0   Rest Tremor RLE 0   Rest Tremor LLE 0   Rest Tremor Lip/Jaw 0   Rest Tremor Constancy 0   Total Right 4   Total Left 6   Axial Total 9   Total 19       DBS Interrogation & Analysis:  Implanted  generator: Bilateral chest Infinity [id number]  Lead target: Bilateral Globus Pallidus Internus (Gpi)     Left Brain  Lead placement date:  3/5/2021  Generator placement date:  3/12/2021     C +, 2abc -  Current:  2.70 mA  PW:  60 msec  Rate:  130 Hz     Therapy impedance: 1050 Ohms     Battery Service Life:  OK.  2.97 volts.     Patient :  Upper Limit: 0.00 mA  Lower Limit: 3.50 mA    Electrode Impedance Check:   Left Lead: No Problems Found.      Right Brain   Lead placement date:  08/21/2018  Generator placement date:  08/30/2018     C +, 10abc -  Current:  4.00 mA  PW:  60 msec  Rate:  130 Hz     Therapy impedance:  812 Ohms     Battery Service Life:  OK.  2.84 volts.     Patient :  Upper Limit: 3.00 mA  Lower Limit: 4.20 mA     Electrode Impedance Check:  Right Lead: No Problems Found.       See scanned report for impedance details    Today I spent *** minutes caring for the patient. 20 minutes was spent in DBS interrogation and analysis /programming.  126 minutes was spent with meeting with the patient, answering questions, examining, refilling/ordering medication, and documentation.    GUILLERMINA Krueger,  CNP  [facility]        Again, thank you for allowing me to participate in the care of your patient.      Sincerely,    GUILLERMINA Valero CNP

## 2022-02-16 NOTE — PROGRESS NOTES
"  ASSESSMENT:    Parkinson's Disease:  No wearing off.  Had a fall associated with slipping on ice. No injury.      S/p Bilateral Gpi DBS lead implantation:  Normal impedance and battery life. No programming changes were made today.     Anxiety: Manageable with current medication.    Restless Leg Syndrome: Controlled except for days she works. Managing it with hot shower.       PLAN:    __  Will stay on the same antiparkinsonian medication.     PD/Mood Medications 7 am 12 pm 4 pm 9 pm   Sinemet 25/100 mg  1.5 1.5 1.5 1.5   Amantadine 100 mg  1  1    Pramipexole 0.5 mg    2     __ Rx refilled.    __ Shanice Coyne RN research coordinator will contact patient for her next visit. Will return sooner as needed.        MOVEMENT DISORDERS CLINIC           PATIENT: Erika Diaz    : 1960    GINNY: 2021    REASON FOR VISIT: Parkinson's disease (PD) follow up and deep brain stimulation (DBS) interrogation and analysis.     HPI: Ms. Erika Diaz is a 61 year old who came to the [facility] neurology clinic by herself for a follow up visit.  She was last seen in the clinic on 2021 for a 1 year Clinical Core Research Visit. During that visit, she was grieving her 's loss and she was encouraged to continue getting counseling and family support.    Since her last visit, she reports feeling \"okay.\"  She is not doing the grief counseling any longer.  \"I'm doing okay.\" As far as her mood, she reports having good days and bad days.  She is working to give away her 's clothing.    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     PD motor symptoms are stable. She reports no wearing off.  She sometimes forgets to take the 4 pm dose.     PD/Mood Medications 7 am 12 pm 4 pm 9 pm   Sinemet 25/100 mg  1.5 1.5 1.5 1.5   Amantadine 100 mg  1  1    Pramipexole 0.5 mg    2     Yesterday, she slipped and fell. No injury.    She reports no dry cough. No issues with GI reflex. " She is not taking Omeprazole.     She uses the Nystatin powder PRN when yeast infection occurs.    Restless leg is generally good. But on days that she works, she gest some symptoms. Hot showers help.     MDS Part II  -- Total Score: 6     -- Over the last week -- 0: Normal -- 1: Slight -- 2: Mild -- 3: Moderate -- 4: Severe     UPDRS Questionnaire 2/16/2022   Over the past week, have you had problems with your speech? 0   Over the past week, have you usually had too much saliva during when you are awake or when you sleep? 0   Over the past week, have you usually had problems swallowing pills or eating meals?  Do you need your pills cut or crushed or your meals to be made soft, chopped or blended to avoid choking? 1   Over the past week, have you usually had troubles handling your food and using eating utensils?  For example, do you have trouble handling finger foods or using forks, knifes, spoons, chopsticks? 0   Over the past week, have you usually had problems dressing?  For example, are you slow or do you need help with buttoning, using zippers, putting on or taking off your clothes or jewelry? 0   Over the past week, have you usually been slow or do you need help with washing, bathing, shaving, brushing teeth, combing your hair or with other personal hygiene? 0   Over the past week, have people usually had trouble reading your handwriting? 1   Over the past week, have you usually had trouble doing your hobbies or other things that you like to do? 0   Over the past week, do you usually have trouble turning over in bed? 1   Over the past week, have you usually had shaking or tremor? 0   Over the past week, have you usually had trouble getting out of bed, a car seat, or a deep chair? 2   Over the past week, have you usually had problems with balance and walking? 0   Over the past week, on your usual day when walking, do you suddenly stop or freeze as if your feet are stuck to the floor. 1   MDS-UPDRS II Total  Score 6       MEDICATIONS:   Outpatient Medications Marked as Taking for the 2/16/22 encounter (Office Visit) with Arminda Rivas APRN CNP   Medication Sig     Acetaminophen (TYLENOL PO) Take 1,000 mg by mouth every 8 hours as needed for mild pain or fever     albuterol (PROAIR HFA/PROVENTIL HFA/VENTOLIN HFA) 108 (90 Base) MCG/ACT inhaler Inhale 1-2 puffs into the lungs every 4 hours as needed     amantadine (SYMMETREL) 100 MG capsule 1 capsule orally @ 7 am and 4 pm     ammonium lactate (LAC-HYDRIN) 12 % external lotion      aspirin (ASA) 81 MG chewable tablet Take 1 tablet (81 mg) by mouth daily     busPIRone (BUSPAR) 10 MG tablet Take 1 tablet (10 mg) by mouth 3 times daily     calcium carbonate (TUMS) 500 MG chewable tablet Take 2 chew tab by mouth as needed for heartburn     carbidopa-levodopa (SINEMET)  MG tablet 1.5 4 x per day at 7 am, 12 pm, 4 pm and 9 pm = 6 tabs per day     FLUoxetine (PROZAC) 20 MG capsule Take 20 mg by mouth every morning @ 7am     fluticasone (FLONASE) 50 MCG/ACT nasal spray Spray 2 sprays in nostril daily     gabapentin (NEURONTIN) 300 MG capsule Take 1 cap at 7 am by mouth and 2 at 9 pm = 3 cap/day     hydrochlorothiazide (HYDRODIURIL) 25 MG tablet Take 25 mg by mouth     lactase (LACTAID) 9000 units TABS tablet Take 9,000 Units by mouth With dairy as needed     LORazepam (ATIVAN) 0.5 MG tablet Take 1 tablet (0.5 mg) by mouth daily as needed for anxiety or sleep     metFORMIN (GLUCOPHAGE-XR) 500 MG 24 hr tablet      nystatin (NYSTOP) 160556 UNIT/GM external powder      polyethylene glycol (MIRALAX) 17 GM/Dose powder Take 17 g by mouth daily     pramipexole (MIRAPEX) 0.5 MG tablet 2 tabs orally @ 9 pm     rosuvastatin (CRESTOR) 5 MG tablet Take 5 mg by mouth daily       PHYSICAL EXAM:    VITAL SIGNS:  Blood pressure (!) 141/85, pulse 85, resp. rate 16, SpO2 95 %.    GENERAL:  Ms. Diaz is a pleasant  female who is well-groomed and well-developed sitting  comfortably in the exam room without any distress.  Affect is appropriate.    MOVEMENT DISORDERS ASSESSMENT:  MDS UPDRS III     Last antiparkinsonian dose taken:  Amantadine 100 mg today at 7 am  Sinemet 25/100 mg today at  7 am   Pramipexole on 2/15 at 10 pm    UPDRS Values 2/16/2022   Time: 8:27 AM   Medication On   R Brain DBS: On   L Brain DBS: On   Dyskinesia (LID) Yes   Did LID interfere No   Speech 1   Facial Expression 2   Rigidity Neck 1   Rigidity RUE 0   Rigidity LUE 0   Rigidity RLE 0   Rigidity LLE 0   Finger Taps R 0   Finger Taps L 2   Hand Mvt R 1   Hand Mvt L 1   Pron-/Supinate R 1   Pron-/Supinate L 1   Toe Tap R 1   Toe Tap L 1   Leg Agility R 0   Leg Agility L 0   Arise From Chair 0   Gait 1   Gait Freezing 1   Postural Stability 2   Posture 0   Global Spont Mvt 1   Postural Tremor RUE 0   Postural Tremor LUE 0   Kinetic Tremor RUE 1   Kinetic Tremor LUE 1   Rest Tremor RUE 0   Rest Tremor LUE 0   Rest Tremor RLE 0   Rest Tremor LLE 0   Rest Tremor Lip/Jaw 0   Rest Tremor Constancy 0   Total Right 4   Total Left 6   Axial Total 9   Total 19       DBS Interrogation & Analysis:  Implanted generator: Bilateral chest Infinity [id number]  Lead target: Bilateral Globus Pallidus Internus (Gpi)     Left Brain  Lead placement date:  3/5/2021  Generator placement date:  3/12/2021     C +, 2abc -  Current:  2.70 mA  PW:  60 msec  Rate:  130 Hz     Therapy impedance: 1050 Ohms     Battery Service Life:  OK.  2.97 volts.     Patient :  Upper Limit: 0.00 mA  Lower Limit: 3.50 mA    Electrode Impedance Check:   Left Lead: No Problems Found.      Right Brain   Lead placement date:  08/21/2018  Generator placement date:  08/30/2018     C +, 10abc -  Current:  4.00 mA  PW:  60 msec  Rate:  130 Hz     Therapy impedance:  812 Ohms     Battery Service Life:  OK.  2.84 volts.     Patient :  Upper Limit: 3.00 mA  Lower Limit: 4.20 mA     Electrode Impedance Check:  Right Lead: No Problems Found.       See  scanned report for impedance details    Today I spent 44 minutes caring for the patient. 20 minutes was spent in DBS interrogation and analysis.  24 minutes was spent with meeting with the patient, answering questions, examining, refilling/ordering medication, and documentation.    GUILLERMINA Krueger,  CNP  [facility]

## 2022-02-17 PROBLEM — E11.65 TYPE 2 DIABETES MELLITUS WITH HYPERGLYCEMIA (H): Status: ACTIVE | Noted: 2019-03-04

## 2022-02-18 NOTE — PATIENT INSTRUCTIONS
Dear Ms. Erika NISREEN Diaz,    Thank you for coming today.  During your visit, we have discussed the following:     PLAN:    __  Will stay on the same antiparkinsonian medications.     PD/Mood Medications 7 am 12 pm 4 pm 9 pm   Sinemet 25/100 mg  1.5 1.5 1.5 1.5   Amantadine 100 mg  1  1    Pramipexole 0.5 mg    2     __ Rx refilled.    __ Shanice Coyne RN research coordinator will contact patient for her next visit. Will return sooner as needed.    GUILLERMINA Krueger, CNP  [facility]

## 2022-03-11 ENCOUNTER — TELEPHONE (OUTPATIENT)
Dept: NEUROLOGY | Facility: CLINIC | Age: 62
End: 2022-03-11
Payer: MEDICARE

## 2022-03-11 ENCOUNTER — VIRTUAL VISIT (OUTPATIENT)
Dept: NEUROPSYCHOLOGY | Facility: CLINIC | Age: 62
End: 2022-03-11
Payer: MEDICARE

## 2022-03-11 DIAGNOSIS — Z00.6 EXAMINATION OF PARTICIPANT IN CLINICAL TRIAL: Primary | ICD-10-CM

## 2022-03-11 PROCEDURE — 99207 PR NO CHARGE LOS: CPT

## 2022-03-11 NOTE — PROGRESS NOTES
Ms. Diaz completed the interview portion of the 36 month [place] Clinical Core neuropsychological evaluation. A video visit was scheduled, but she had trouble with video so it was converted to telephone. She is scheduled to complete the testing portion of the evaluation on 3/16/2022 in the clinic.    She is not endorsing significant depressed mood or anxiety. She is grieving the loss of her  in September 2021, but has good support. Sleep has been poor; she tends to wake up in the middle of the night or very early in the morning. She is not noticing significant changes in cognition. She has joined the [employer] and is doing pool exercises three times a week. She has had increasing falls recently, sometimes when she freezes while turning. Her neurology nurse practitioner was notified.

## 2022-03-11 NOTE — TELEPHONE ENCOUNTER
Left message for patient asking if she would prefer video or face-to-face visit with GUILLERMINA Desir, MARIA to discuss tools to manage freezing of gait and fall prevention.    Shanice Coyne RN, MPH  Research Nurse, Movement Disorders

## 2022-03-14 NOTE — TELEPHONE ENCOUNTER
"Patient called writer, requested Monday March 28th 1pm video visit with GUILLERMINA Desir, MARIA. I told her we will send her the number for technical support for video visit that she an call beforehand and troubleshoot.    Emailed to patient the contact info for [facility] video visit support taken from website:  \"For questions about your video visit, contact a support agent at [phone] (7:00 a.m. - 11:30 p.m., 7 days a week).\"    Shanice Coyne, RN, MPH  Research Nurse, Movement Disorders    "

## 2022-03-14 NOTE — TELEPHONE ENCOUNTER
"Patient responded:     \"A video call would be best if I can figure it out. I m going to [place] the [id number] to visit with a friend of mine I ve known for 44 yrs. But you can still try and call me then.\"    I let patient know that Rusty Saldivar will reach out to her to schedule a video visit.      Shanice Coyne, RN, MPH  Research Nurse, Movement Disorders    "

## 2022-03-15 ENCOUNTER — TELEPHONE (OUTPATIENT)
Dept: NEUROLOGY | Facility: CLINIC | Age: 62
End: 2022-03-15
Payer: MEDICARE

## 2022-03-15 NOTE — TELEPHONE ENCOUNTER
Patient called writer to say she has a conflict for tomorrow's neuropsychological testing. Patient is wondering if any earlier appointments are available, or options for rescheduling at a later date.    Writer updated Dr. Hernandez and will determine plan.    Shanice Coyne RN, MPH  Research Nurse, Movement Disorders

## 2022-03-16 ENCOUNTER — OFFICE VISIT (OUTPATIENT)
Dept: NEUROPSYCHOLOGY | Facility: CLINIC | Age: 62
End: 2022-03-16
Payer: MEDICARE

## 2022-03-16 DIAGNOSIS — Z00.6 EXAMINATION OF PARTICIPANT IN CLINICAL TRIAL: Primary | ICD-10-CM

## 2022-03-16 PROCEDURE — 96138 PSYCL/NRPSYC TECH 1ST: CPT

## 2022-03-16 PROCEDURE — 96139 PSYCL/NRPSYC TST TECH EA: CPT

## 2022-03-16 PROCEDURE — 90791 PSYCH DIAGNOSTIC EVALUATION: CPT

## 2022-03-17 NOTE — PROGRESS NOTES
Pt was seen for neuropsychological evaluation at the request of GUILLERMINA Vallecillo CNP as a [place] Clinical Core research visit. 163 minutes of test administration and scoring were provided by this writer.    Teetee Fermin  Psychometrist

## 2022-03-18 NOTE — PROGRESS NOTES
The patient completed the 36 month neuropsychological evaluation for the [place] Clinical Core. There were 163 minutes of psychometrist time (21751: 1 unit; 38610: 4 units) and one unit of neuropsychologist profession time (3/11/2022; 73926). OnCore ID: [id number]     Measures administered:    Dementia Rating Scale - 2 Alternate Form: 136/144  WAIS-IV: Similarities, Comprehension, Matrix Reasoning, Letter Number Sequencing, Digit Span  WMS-R Information & Orientation, Logical Memory I and II  D-KEFS Verbal Fluency - Alternate  [place] Naming Test  Clock Drawing  Trail Making Test  Stroop  [place] Card Sorting Test - 64 items  Fuentes Judgment of Line Orientation Form V  Harris Verbal Learning Test - Revised Form 3  Brief Visual Memory Test - Revised Form 2  MoCA v 7.2 (Score = 20/30)    BDI-2: 8  QUIP  ESS  RBDSQ  PDQ-39  Apathy Scale: 13  HAM-D (Administered 3/11/2022 over video platform)  HAM-A (Administered 3/11/2022 over video platform)    She is not endorsing significant depressed mood or anxiety. She is grieving the loss of her  in September 2021, but has good support. Sleep has been poor; she tends to wake up in the middle of the night or very early in the morning. She is not noticing significant changes in cognition. She has joined the [employer] and is doing pool exercises three times a week. She has had increasing falls recently, sometimes when she freezes while turning. Her neurology nurse practitioner was notified.     Memory is variable, ranging from exceptionally low on one task, to average on other tasks. Performance otherwise falls within normal limits across cognitive domains.    Amanda Hernandez, Ph.D., ABPP  Licensed Psychologist, LP [id number]  Board Certified in Clinical Neuropsychology

## 2022-03-21 ENCOUNTER — TELEPHONE (OUTPATIENT)
Dept: NEUROLOGY | Facility: CLINIC | Age: 62
End: 2022-03-21
Payer: MEDICARE

## 2022-03-21 NOTE — TELEPHONE ENCOUNTER
Called patient and informed her that I am unable to find the letter of approval for her emotional support animal within the chart. Patient believes her psychologist at the time, Aimee Lowe, PhD, may have written then letter, perhaps around 2018. Patient is also checking with daughter to see if she can find the letter. I let patient know that I've sent a message to Aimee Lowe and that I will call her back if I am able to locate it, but there is a chance it was not saved to the chart.     Patient had no further questions.    Shanice Coyne, RN, MPH  Research Nurse, Movement Disorders

## 2022-03-21 NOTE — TELEPHONE ENCOUNTER
[facility] Call Center    Phone Message    May a detailed message be left on voicemail: yes     Reason for Call:    Pt called she is with her friend, whose  is in the ER right now. Patient is asking a copy of the letter of necessity from her psychologist allowing her to have her support dog with her. Per pt ok to call or message her via muchart.     Action Taken: Message routed to:  [facility]    Travel Screening: Not Applicable

## 2022-03-22 NOTE — TELEPHONE ENCOUNTER
Patient called to notify writer that she located the emotional support animal letter in her home and no longer needs us to send a copy.     Patient had no further questions.     Shanice Coyne RN, MPH  Research Nurse, Movement Disorders

## 2022-03-26 ENCOUNTER — HEALTH MAINTENANCE LETTER (OUTPATIENT)
Age: 62
End: 2022-03-26

## 2022-03-28 ENCOUNTER — VIRTUAL VISIT (OUTPATIENT)
Dept: NEUROLOGY | Facility: CLINIC | Age: 62
End: 2022-03-28
Payer: MEDICARE

## 2022-03-28 DIAGNOSIS — G20.A1 PARKINSON'S DISEASE (H): Primary | ICD-10-CM

## 2022-03-28 PROCEDURE — 99207 PR NO BILLABLE SERVICE THIS VISIT: CPT | Performed by: NURSE PRACTITIONER

## 2022-03-28 NOTE — LETTER
Date:April 6, 2022      Provider requested that no letter be sent. Do not send.       [facility]

## 2022-03-28 NOTE — PROGRESS NOTES
"Erika is a 61 year old who is being evaluated via a billable video visit.      How would you like to obtain your AVS? [employer]  If the video visit is dropped, the invitation should be resent by: Send to e-mail at: [email or web address]  Will anyone else be joining your video visit? No        Video-Visit Details    Type of service:  Video Visit    Video Time: Unable to connect.   Start: 03/28/2022 01:02 pm  Stop: 03/28/2022 01:05 pm    Originating Location (pt. Location): Home    Distant Location (provider location):  [facility] NEUROLOGY [facility]     Platform used for Video Visit: F2G      --------------------------------------------------------------------------------------------------------------------------------------------------------    Patient was unable to connect through [employer] video visit.    I called her, and she was in [place], [place] visiting a friend who is ill.  She stated that her falls weren't related to freezing of gait. She slipped on black ice. She was being clumsy. \"I'm a klutz.\" She didn't think she needed today's appointment.    1) I instructed her to contact me or Shanice Coyne RN, the research nurse, anytime if she needs to be seen for PD related gait and balance issues.  2) Offered a visit anytime or return at her scheduled research visit.    Pt requested that Shanice called back next week after patient returns home from [place].  Shanice was updated with today's conversation.    No billable service today.       "

## 2022-03-28 NOTE — LETTER
"3/28/2022       RE: Erika Diaz  [address] [address]     Dear Colleague,    Thank you for referring your patient, Erika Diaz, to the [facility] NEUROLOGY CLINIC [place] at [facility]. Please see a copy of my visit note below.    Erika is a 61 year old who is being evaluated via a billable video visit.      How would you like to obtain your AVS? [employer]  If the video visit is dropped, the invitation should be resent by: Send to e-mail at: [email or web address]  Will anyone else be joining your video visit? No        Video-Visit Details    Type of service:  Video Visit    Video Time: Unable to connect.   Start: 03/28/2022 01:02 pm  Stop: 03/28/2022 01:05 pm    Originating Location (pt. Location): Home    Distant Location (provider location):  [facility] NEUROLOGY [facility]     Platform used for Video Visit: [employer]      --------------------------------------------------------------------------------------------------------------------------------------------------------    Patient was unable to connect through [employer] video visit.    I called her, and she was in [place], [place] visiting a friend who is ill.  She stated that her falls weren't related to freezing of gait. She slipped on black ice. She was being clumsy. \"I'm a klutz.\" She didn't think she needed today's appointment.    1) I instructed her to contact me or Shanice Coyne RN, the research nurse, anytime if she needs to be seen for PD related gait and balance issues.  2) Offered a visit anytime or return at her scheduled research visit.    Pt requested that Shanice called back next week after patient returns home from [place].  Shanice was updated with today's conversation.    No billable service today.           Again, thank you for allowing me to participate in the care of your patient.      Sincerely,    GUILLERMINA Valero CNP      "

## 2022-03-30 ENCOUNTER — TELEPHONE (OUTPATIENT)
Dept: NEUROLOGY | Facility: CLINIC | Age: 62
End: 2022-03-30
Payer: MEDICARE

## 2022-03-30 NOTE — TELEPHONE ENCOUNTER
Patient called to say she has not received her second DBS identification card.  I called patient back and confirmed the address to which this should be sent. I sent a request to Olegario Beavers [employer] device rep and confirmed patient's address. This will take about 2-3 weeks. Patient expressed understanding.    I discussed follow-up appointments with pt, and let her know that she is not due until September for her annual on/off testing visit for the clinical core study. I asked patient if she would like to schedule a follow up before that time, perhaps June, and patient declined this. Pt states she will call me if she would like to be seen prior to September.    Patient had no further questions.    Shanice Coyne, RN, MPH  Research Nurse, Movement Disorders

## 2022-04-05 DIAGNOSIS — G20.A1 PARKINSON'S DISEASE (H): ICD-10-CM

## 2022-04-05 RX ORDER — AMANTADINE HYDROCHLORIDE 100 MG/1
CAPSULE, GELATIN COATED ORAL
Qty: 180 CAPSULE | Refills: 3 | Status: SHIPPED | OUTPATIENT
Start: 2022-04-05 | End: 2022-07-12

## 2022-04-05 NOTE — TELEPHONE ENCOUNTER
Pended and routed to GUILLERMINA Desir, CNP for signing.    Shanice Coyne RN, MPH  Research Nurse, Movement Disorders

## 2022-04-05 NOTE — TELEPHONE ENCOUNTER
Rx Authorization:  Requested Medication/ Dose AMANTADINE 100MG CAPSULES  Date last refill ordered: 2/16/22  Quantity ordered: 60 caps  # refills: 6  Date of last clinic visit with ordering provider: 3/28/22  Date of next clinic visit with ordering provider:   All pertinent protocol data (lab date/result):   Include pertinent information from patients message:

## 2022-04-06 DIAGNOSIS — G20.A1 PARKINSON'S DISEASE (H): ICD-10-CM

## 2022-04-06 DIAGNOSIS — G25.81 RESTLESS LEGS SYNDROME (RLS): ICD-10-CM

## 2022-04-06 RX ORDER — PRAMIPEXOLE DIHYDROCHLORIDE 0.5 MG/1
TABLET ORAL
Qty: 180 TABLET | Status: CANCELLED | OUTPATIENT
Start: 2022-04-06

## 2022-04-06 RX ORDER — PRAMIPEXOLE DIHYDROCHLORIDE 0.5 MG/1
TABLET ORAL
Qty: 180 TABLET | OUTPATIENT
Start: 2022-04-06

## 2022-04-06 NOTE — TELEPHONE ENCOUNTER
Rx Authorization:  Requested Medication/ Dose: Pramipexole 0.5MG tabs  Date last refill ordered: 2/16/22  Quantity ordered: 60 tabs  # refills:   Date of last clinic visit with ordering provider: 3/28/22  Date of next clinic visit with ordering provider: F/U 1 year  All pertinent protocol data (lab date/result):   Include pertinent information from patients message:

## 2022-04-06 NOTE — TELEPHONE ENCOUNTER
Writer called [facility] pharmacy and explained that this was just called in Feb 2022--they are able to fill 90 day supply.    Shanice Coyne RN, MPH  Research Nurse, Movement Disorders

## 2022-05-12 DIAGNOSIS — F41.1 GAD (GENERALIZED ANXIETY DISORDER): ICD-10-CM

## 2022-05-12 RX ORDER — BUSPIRONE HYDROCHLORIDE 10 MG/1
TABLET ORAL
Qty: 270 TABLET | Refills: 1 | OUTPATIENT
Start: 2022-05-12

## 2022-05-12 NOTE — TELEPHONE ENCOUNTER
Writer called pharmacy as it appears patient should have one refill left. Pharmacy staff stated they have buspirone prescription filled and ready for patient pickup. Pharmacy staff was unable to see if this refill request today was an automatic request or patient request. Writer told pharmacy that I will delete this current request as she will be picking up a 90 day script, and patient can reach out when another refill is needed.    Shanice Coyne, RN, MPH  Research Nurse, Movement Disorders

## 2022-05-12 NOTE — TELEPHONE ENCOUNTER
Rx Authorization:  Requested Medication/ Dos: Buspirone 10MG tabs  Date last refill ordered:2/1tabs6/22  Quantity ordered: 270  # refills: 1  Date of last clinic visit with ordering provider: 3/28/22  Date of next clinic visit with ordering provider: F/U 6 months  All pertinent protocol data (lab date/result):   Include pertinent information from patients message:

## 2022-05-21 ENCOUNTER — HEALTH MAINTENANCE LETTER (OUTPATIENT)
Age: 62
End: 2022-05-21

## 2022-06-01 ENCOUNTER — TELEPHONE (OUTPATIENT)
Dept: NEUROLOGY | Facility: CLINIC | Age: 62
End: 2022-06-01
Payer: COMMERCIAL

## 2022-06-01 DIAGNOSIS — G20.A1 PARKINSON'S DISEASE (H): ICD-10-CM

## 2022-06-01 RX ORDER — CARBIDOPA AND LEVODOPA 25; 100 MG/1; MG/1
TABLET ORAL
Qty: 630 TABLET | Refills: 3 | Status: SHIPPED | OUTPATIENT
Start: 2022-06-01 | End: 2022-06-15

## 2022-06-01 NOTE — LETTER
Erika Diaz  [address]  [place] [address]    June 13, 2022        To Whom It May Concern,    Mrs. Erika Diaz has been a patient at [facility] for management of Parkinson's disease since November 2017.  Currently, she has deep brain stimulation, a programmable implant to manage her symptoms in addition to taking medications.  Her last clinic evaluation (February 2022) has shown gait difficulty, balance problems, and freezing of gait putting her at a high risk for falls. Falling must be prevented as it can damage the implanted device and worsen her Parkinson's disease symptoms.  Please reach out to me if you have questions regarding her Parkinson's disease or treatments.     Sincerely,     GUILLERMINA Krueger, CNP  [facility]

## 2022-06-01 NOTE — TELEPHONE ENCOUNTER
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 She is taking time to think over this decision.     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 When she is not working, she is attending the [employer] water exercise classes three times per week.      Patient is wondering what her options are if she were to relocate, she estimates it would be at least a 4 hour drive to appointments. I explained that if she continues to volunteer in the clinical core study, we would see her twice a year (one day for neuropsych testing, one day for on/off testing). The research study may be able to cover her transportation for research visits, but she would still need to be seen occasionally for clinical visits. Patient does have [employer] DBS so I explained that remote programming could be an option, as well as video visits for follow up (she will be residing in [place]).      Patient was also wondering if there are any doctors or neurology teams that we would recommend should she need to be seen in [place], possibly [place]. I told patient I would check with her provider and get back to her with some options.     Also discussed her Sinemet IR  refill needed (see refill encounter).     Patient had no further questions.    Shanice Coyne, RN, MPH  Research Nurse, Movement Disorders

## 2022-06-03 ENCOUNTER — TELEPHONE (OUTPATIENT)
Dept: NEUROLOGY | Facility: CLINIC | Age: 62
End: 2022-06-03
Payer: COMMERCIAL

## 2022-06-03 NOTE — TELEPHONE ENCOUNTER
Spoke to patient, I asked her some additional questions about her tremor she reported. She states that her tremors are whole body/both sides, and that she notices them the most when she wakes up, and in the afternoon, sometimes when she is at work. She is not sure if there are other times of the day that it is worse. She has not paid attention to whether her tremor occurs more or her meds wear off after she takes 1.5 tablets vs 2 tablets. Since she's turned up her DBS device, her tremors have been more controlled today.    Patient was busy at work and had to end the phone call. She will call back later, after 4pm.    Shanice Coyne RN, MPH  Research Nurse, Movement Disorders

## 2022-06-03 NOTE — TELEPHONE ENCOUNTER
Spoke to patient, let her know that she can keep her DBS at her new adjusted level, per Karina SARMIENTO CNP. I let patient know that remote programming would be an option if she decides to relocate, and that she can continue in the research study if she wishes and visit in-person annually for those. We are checking to see if she needs to be signed up for [employer] remote programming, and if so, we may be able to send her a form in the mail. I also let her know that Dr. Ayesha Cool is a general neurologist that could be a resource for her in [facility]) should she relocate, even if she continues most of her care with us.    Patient had no further questions.  Shanice Coyne, RN, MPH  Research Nurse, Movement Disorders

## 2022-06-03 NOTE — TELEPHONE ENCOUNTER
"Patient left voicemail with update saying she has noticed in the morning and sometimes at work that she's \"a little shaky\". She reports she has turned up her DBS and is wondering if this is OK. \"Left side is 4.2 and right side is 2.9\".  Patient is requesting a call back to let her know if she needs to turn her DBS back down.    From last office visit, patient settings were LGPi 2.7 mA (range 0-3.5) and RGPi 4.00 mA (range 3.00-4.2).    Will call patient back for more information in her symptoms and update her provider GUILLERMINA Desir, CNP.    Shanice Coyne, RN, MPH  Research Nurse, Movement Disorders    "

## 2022-06-08 NOTE — TELEPHONE ENCOUNTER
Sent message to Olegario Beavers, [employer] device rep requesting that patient be registered for [employer]-remote programming.    Shanice Coyne, RN, MPH  Research Nurse, Movement Disorders

## 2022-06-13 NOTE — TELEPHONE ENCOUNTER
Patient requested letter from her PD care provider, GUILLERMINA Desir CNP, describing her mobility limitations due to PD. 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 Patient is also concerned about relocating due to limited movement disorder specialists in the area.    Letter written by GUILLERMINA Desir CNP printed by writer and mailed to patient 6/13/2022.    Shanice Coyne RN, MPH  Research Nurse, Movement Disorders

## 2022-06-15 DIAGNOSIS — G20.A1 PARKINSON'S DISEASE (H): ICD-10-CM

## 2022-06-15 DIAGNOSIS — G25.81 RESTLESS LEGS SYNDROME (RLS): ICD-10-CM

## 2022-06-15 DIAGNOSIS — F41.1 GAD (GENERALIZED ANXIETY DISORDER): ICD-10-CM

## 2022-06-15 RX ORDER — CARBIDOPA AND LEVODOPA 25; 100 MG/1; MG/1
TABLET ORAL
Qty: 630 TABLET | Refills: 3 | Status: SHIPPED | OUTPATIENT
Start: 2022-06-15 | End: 2023-05-17

## 2022-06-15 RX ORDER — PRAMIPEXOLE DIHYDROCHLORIDE 0.5 MG/1
TABLET ORAL
Qty: 60 TABLET | Refills: 6 | Status: SHIPPED | OUTPATIENT
Start: 2022-06-15 | End: 2022-11-08

## 2022-06-15 RX ORDER — BUSPIRONE HYDROCHLORIDE 10 MG/1
10 TABLET ORAL 3 TIMES DAILY
Qty: 270 TABLET | Refills: 1 | Status: SHIPPED | OUTPATIENT
Start: 2022-06-15 | End: 2022-08-29

## 2022-06-15 NOTE — TELEPHONE ENCOUNTER
Patient changing to mail order pharmacy. Pended and routed to GUILLERMINA Desir CNP for signing. Next appt with provider scheduled for 8/29/22.     Shanice Coyne RN, MPH  Research Nurse, Movement Disorders

## 2022-06-15 NOTE — TELEPHONE ENCOUNTER
Rx Authorization:    Requested Medication/ Dose: Carbodopa/Levodopa  tabs    Date last refill ordered: 6/1/22    Quantity ordered: 630 tabs    # refills: 3    Date of last clinic visit with ordering provider: 3/28/22    Date of next clinic visit with ordering provider: F/U 1 year    All pertinent protocol data (lab date/result):     Include pertinent information from patients message:       Rx Authorization:    Requested Medication/ Dose: Buspirone 10MG tabs     Date last refill ordered: 2/19/22    Quantity ordered: 270 tabs    # refills: 1    Date of last clinic visit with ordering provider: 3/28/22    Date of next clinic visit with ordering provider: F/U 1 year    All pertinent protocol data (lab date/result):     Include pertinent information from patients message:     Rx Authorization:    Requested Medication/ Dose:Pramipexole 0.5MG tabs Carbodopa/Levodopa 0.5 mg    Date last refill ordered: 2/16/22    Quantity ordered: 60 tabs    # refills: 6    Date of last clinic visit with ordering provider: 3/28/22    Date of next clinic visit with ordering provider: F/U 1 year    All pertinent protocol data (lab date/result):     Include pertinent information from patients message:

## 2022-07-12 DIAGNOSIS — G20.A1 PARKINSON'S DISEASE (H): ICD-10-CM

## 2022-07-12 RX ORDER — AMANTADINE HYDROCHLORIDE 100 MG/1
CAPSULE, GELATIN COATED ORAL
Qty: 60 CAPSULE | Refills: 3 | Status: SHIPPED | OUTPATIENT
Start: 2022-07-12 | End: 2022-08-29

## 2022-07-12 NOTE — TELEPHONE ENCOUNTER
Nurse received refill request for: Amantadine 100 mg    Pharmacy: [facility], [place]     Last re-ordered: 4/5/2022    Last appointment: 2/16/2022    Next appointment: 8/29/20222    Action taken: Pended and routed to Karina SARMIENTO CNP for signing. Patient recently had amantadine filled by [facility] mail order pharmacy but it came with a large copay that she was not prepared to pay for. Patient is hoping to return this medication. Patient is requesting 30 day supply from [facility].     Shanice Coyne, RN, MPH  Research Nurse, Movement Disorders

## 2022-08-23 ENCOUNTER — TELEPHONE (OUTPATIENT)
Dept: NEUROLOGY | Facility: CLINIC | Age: 62
End: 2022-08-23

## 2022-08-23 NOTE — TELEPHONE ENCOUNTER
Patient called to review upcoming schedule of appointments. Writer reviewed with patient that she is scheduled to see GUILLERMINA Desir CNP on Monday August 29th at 8:10 for ON/OFF testing for the clinical core study.  We also reviewed neuropsych appts coming up 9/9 and 9/21.    Patient requested a ride for 8/29/22 due to requirement of being OFF medications for her research visit and cannot drive safely. Writer is requesting ride from [employer] for pickup at 7am at patients residence and another ride to bring her home after the appointment.    We reviewed medication instructions (hold carbidopa-levodopa and pramipexole for 12 hours, amantadine for 24 hours). I reminded patient to bring these medications to the appointment and that she can take all other medication as usual.    Patient had no further questions.     Shanice Coyne RN, MPH  Research Nurse, Movement Disorders

## 2022-08-29 ENCOUNTER — OFFICE VISIT (OUTPATIENT)
Dept: NEUROLOGY | Facility: CLINIC | Age: 62
End: 2022-08-29
Payer: COMMERCIAL

## 2022-08-29 VITALS
OXYGEN SATURATION: 97 % | DIASTOLIC BLOOD PRESSURE: 85 MMHG | SYSTOLIC BLOOD PRESSURE: 128 MMHG | BODY MASS INDEX: 37.61 KG/M2 | HEART RATE: 70 BPM | HEIGHT: 66 IN | TEMPERATURE: 98.4 F | RESPIRATION RATE: 16 BRPM | WEIGHT: 234 LBS

## 2022-08-29 DIAGNOSIS — G20.A1 PARKINSON'S DISEASE (H): ICD-10-CM

## 2022-08-29 DIAGNOSIS — Z96.89 S/P DEEP BRAIN STIMULATOR PLACEMENT: Primary | ICD-10-CM

## 2022-08-29 DIAGNOSIS — F41.1 GAD (GENERALIZED ANXIETY DISORDER): ICD-10-CM

## 2022-08-29 PROCEDURE — 99417 PROLNG OP E/M EACH 15 MIN: CPT | Performed by: NURSE PRACTITIONER

## 2022-08-29 PROCEDURE — 95970 ALYS NPGT W/O PRGRMG: CPT | Performed by: NURSE PRACTITIONER

## 2022-08-29 PROCEDURE — 99215 OFFICE O/P EST HI 40 MIN: CPT | Mod: 25 | Performed by: NURSE PRACTITIONER

## 2022-08-29 RX ORDER — OMEGA-3 FATTY ACIDS/FISH OIL 300-1000MG
200 CAPSULE ORAL EVERY 4 HOURS PRN
Status: ON HOLD | COMMUNITY
End: 2022-10-24

## 2022-08-29 RX ORDER — BUSPIRONE HYDROCHLORIDE 10 MG/1
10 TABLET ORAL 3 TIMES DAILY
Qty: 270 TABLET | Refills: 1 | Status: SHIPPED | OUTPATIENT
Start: 2022-08-29 | End: 2022-09-14

## 2022-08-29 RX ORDER — AMANTADINE HYDROCHLORIDE 100 MG/1
CAPSULE, GELATIN COATED ORAL
Qty: 180 CAPSULE | Refills: 1 | Status: SHIPPED | OUTPATIENT
Start: 2022-08-29 | End: 2023-04-14

## 2022-08-29 RX ORDER — DULAGLUTIDE 0.75 MG/.5ML
INJECTION, SOLUTION SUBCUTANEOUS
COMMUNITY
Start: 2022-08-15

## 2022-08-29 ASSESSMENT — UNIFIED PARKINSONS DISEASE RATING SCALE (UPDRS)
TOETAPPING_LEFT: SLIGHT: ANY OF THE FOLLOWING: A) THE REGULAR RHYTHM IS BROKEN WITH ONE WITH ONE OR TWO INTERRUPTIONS OR HESITATIONS OF THE MOVEMENT B) SLIGHT SLOWING C) THE AMPLITUDE DECREMENTS NEAR THE END OF THE 10 MOVEMENTS.
FINGER_TAPPING_LEFT: NORMAL
HANDMOVEMENTS_LEFT: MODERATE: ANY OF THE FOLLOWING:  A) MORE THAN 5 INTERRUPTIONS  OR AT LEAST ONE LONGER ARREST (FREEZE) IN ONGOING MOVEMENT  B) MODERATE SLOWING C) THE AMPLITUDE DECREMENTS STARTING AFTER THE FIRST MOVEMENT.
PRONATION_SUPINATION_RIGHT: SLIGHT: ANY OF THE FOLLOWING: A) THE REGULAR RHYTHM IS BROKEN WITH ONE WITH ONE OR TWO INTERRUPTIONS OR HESITATIONS OF THE MOVEMENT B) SLIGHT SLOWING C) THE AMPLITUDE DECREMENTS NEAR THE END OF THE 10 MOVEMENTS.
LEG_AGILITY_RIGHT: SLIGHT: ANY OF THE FOLLOWING: A) THE REGULAR RHYTHM IS BROKEN WITH ONE WITH ONE OR TWO INTERRUPTIONS OR HESITATIONS OF THE MOVEMENT B) SLIGHT SLOWING C) THE AMPLITUDE DECREMENTS NEAR THE END OF THE 10 MOVEMENTS.
TOTAL_SCORE: 5
LEG_AGILITY_RIGHT: SLIGHT: ANY OF THE FOLLOWING: A) THE REGULAR RHYTHM IS BROKEN WITH ONE WITH ONE OR TWO INTERRUPTIONS OR HESITATIONS OF THE MOVEMENT B) SLIGHT SLOWING C) THE AMPLITUDE DECREMENTS NEAR THE END OF THE 10 MOVEMENTS.
TOETAPPING_LEFT: MILD: ANY OF THE FOLLOWING: A) 3 TO 5 INTERRUPTIONS DURING TAPPING B) MILD SLOWING C) THE AMPLITUDE DECREMENTS MIDWAY IN THE 10-MOVEMENT SEQUENCE
DYSKINESIAS_PRESENT: NO
ARISING_CHAIR: SLIGHT: ARISING IS SLOWER THAN NORMAL, OR MAY NEED MORE THAN ONE ATTEMPT, OR MAY NEED TO MOVE FORWARD IN THE CHAIR TO ARISE.  NO NEED TO USE THE ARMS OF THE CHAIR.
RIGIDITY_RUE: SLIGHT: RIGIDITY ONLY DETECTED WITH ACTIVATION MANEUVER.
CONSTANCY_TREMOR_ATREST: NORMAL: NO TREMOR.
HANDMOVEMENTS_LEFT: SLIGHT: ANY OF THE FOLLOWING: A) THE REGULAR RHYTHM IS BROKEN WITH ONE WITH ONE OR TWO INTERRUPTIONS OR HESITATIONS OF THE MOVEMENT B) SLIGHT SLOWING C) THE AMPLITUDE DECREMENTS NEAR THE END OF THE 10 MOVEMENTS.
RIGIDITY_NECK: MILD: RIGIDITY DETECTED WITHOUT THE ACTIVATION MANEUVER.  FULL RANGE OF MOTION IS EASILY ACHIEVED.
AMPLITUDE_LLE: NORMAL: NO TREMOR.
LEG_AGILITY_LEFT: SLIGHT: ANY OF THE FOLLOWING: A) THE REGULAR RHYTHM IS BROKEN WITH ONE WITH ONE OR TWO INTERRUPTIONS OR HESITATIONS OF THE MOVEMENT B) SLIGHT SLOWING C) THE AMPLITUDE DECREMENTS NEAR THE END OF THE 10 MOVEMENTS.
FINGER_TAPPING_RIGHT: NORMAL
TOTAL_SCORE_LEFT: 15
FINGER_TAPPING_LEFT: MILD: ANY OF THE FOLLOWING: A) 3 TO 5 INTERRUPTIONS DURING TAPPING B) MILD SLOWING C) THE AMPLITUDE DECREMENTS MIDWAY IN THE 10-MOVEMENT SEQUENCE
POSTURE: 1 SLIGHT.  NOT QUITE ERECT BUT COULD BE NORMAL FOR OLDER PERSONS.
PRONATION_SUPINATION_LEFT: SLIGHT: ANY OF THE FOLLOWING: A) THE REGULAR RHYTHM IS BROKEN WITH ONE WITH ONE OR TWO INTERRUPTIONS OR HESITATIONS OF THE MOVEMENT B) SLIGHT SLOWING C) THE AMPLITUDE DECREMENTS NEAR THE END OF THE 10 MOVEMENTS.
POSTURE: 0 NORMAL, NO PROBLEMS
ARISING_CHAIR: SLIGHT: ARISING IS SLOWER THAN NORMAL, OR MAY NEED MORE THAN ONE ATTEMPT, OR MAY NEED TO MOVE FORWARD IN THE CHAIR TO ARISE.  NO NEED TO USE THE ARMS OF THE CHAIR.
RIGIDITY_NECK: NORMAL
DYSKINESIAS_PRESENT: NO
SPONTANEITY_OF_MOVEMENT: 1: SLIGHT: SLIGHT GLOBAL SLOWNESS AND POVERTY OF SPONTANEOUS MOVEMENTS.
RIGIDITY_RUE: NORMAL
TOTAL_SCORE: 19
RIGIDITY_LUE: NORMAL
DYSKINESIAS_PRESENT: NO
TOETAPPING_RIGHT: SLIGHT: ANY OF THE FOLLOWING: A) THE REGULAR RHYTHM IS BROKEN WITH ONE WITH ONE OR TWO INTERRUPTIONS OR HESITATIONS OF THE MOVEMENT B) SLIGHT SLOWING C) THE AMPLITUDE DECREMENTS NEAR THE END OF THE 10 MOVEMENTS.
AMPLITUDE_LUE: NORMAL: NO TREMOR.
AMPLITUDE_RUE: NORMAL: NO TREMOR.
POSTURAL_STABILITY: MODERATE: STANDS SAFELY, BUT WITH ABSENCE OF POSTURAL RESPONSE,  FALLS IF NOT CAUGHT BY EXAMINER.
SPONTANEITY_OF_MOVEMENT: 0: NORMAL.  NO PROBLEMS.
RIGIDITY_LLE: MILD: RIGIDITY DETECTED WITHOUT THE ACTIVATION MANEUVER.  FULL RANGE OF MOTION IS EASILY ACHIEVED.
HANDMOVEMENTS_RIGHT: SLIGHT: ANY OF THE FOLLOWING: A) THE REGULAR RHYTHM IS BROKEN WITH ONE WITH ONE OR TWO INTERRUPTIONS OR HESITATIONS OF THE MOVEMENT B) SLIGHT SLOWING C) THE AMPLITUDE DECREMENTS NEAR THE END OF THE 10 MOVEMENTS.
RIGIDITY_RUE: NORMAL
RIGIDITY_RLE: SLIGHT: RIGIDITY ONLY DETECTED WITH ACTIVATION MANEUVER.
AXIAL_SCORE: 9
PRONATION_SUPINATION_RIGHT: SLIGHT: ANY OF THE FOLLOWING: A) THE REGULAR RHYTHM IS BROKEN WITH ONE WITH ONE OR TWO INTERRUPTIONS OR HESITATIONS OF THE MOVEMENT B) SLIGHT SLOWING C) THE AMPLITUDE DECREMENTS NEAR THE END OF THE 10 MOVEMENTS.
AMPLITUDE_LLE: NORMAL: NO TREMOR.
TOETAPPING_LEFT: SLIGHT: ANY OF THE FOLLOWING: A) THE REGULAR RHYTHM IS BROKEN WITH ONE WITH ONE OR TWO INTERRUPTIONS OR HESITATIONS OF THE MOVEMENT B) SLIGHT SLOWING C) THE AMPLITUDE DECREMENTS NEAR THE END OF THE 10 MOVEMENTS.
AMPLITUDE_RLE: NORMAL: NO TREMOR.
RIGIDITY_LLE: SLIGHT: RIGIDITY ONLY DETECTED WITH ACTIVATION MANEUVER.
AMPLITUDE_RLE: NORMAL: NO TREMOR.
TOTAL_SCORE: 39
SPONTANEITY_OF_MOVEMENT: 1: SLIGHT: SLIGHT GLOBAL SLOWNESS AND POVERTY OF SPONTANEOUS MOVEMENTS.
ARISING_CHAIR: NORMAL: ABLE TO ARISE QUICKLY WITHOUT HESITATION.
LEG_AGILITY_RIGHT: SLIGHT: ANY OF THE FOLLOWING: A) THE REGULAR RHYTHM IS BROKEN WITH ONE WITH ONE OR TWO INTERRUPTIONS OR HESITATIONS OF THE MOVEMENT B) SLIGHT SLOWING C) THE AMPLITUDE DECREMENTS NEAR THE END OF THE 10 MOVEMENTS.
POSTURAL_STABILITY: MODERATE: STANDS SAFELY, BUT WITH ABSENCE OF POSTURAL RESPONSE,  FALLS IF NOT CAUGHT BY EXAMINER.
TOTAL_SCORE_LEFT: 7
FREEZING_GAIT: NORMAL
AXIAL_SCORE: 16
TOTAL_SCORE: 3
FINGER_TAPPING_LEFT: SLIGHT: ANY OF THE FOLLOWING: A) THE REGULAR RHYTHM IS BROKEN WITH ONE WITH ONE OR TWO INTERRUPTIONS OR HESITATIONS OF THE MOVEMENT B) SLIGHT SLOWING C) THE AMPLITUDE DECREMENTS NEAR THE END OF THE 10 MOVEMENTS.
FACIAL_EXPRESSION: MILD: IN ADDITION TO DECREASED EYE-BLINK FREQUENCY, MASKED FACIES PRESENT IN THE LOWER FACE AS WELL, NAMELY FEWER MOVEMENTS AROUND THE MOUTH, SUCH AS LESS SPONTANEOUS SMILING, BUT LIPS NOT PARTED.
POSTURE: 0 NORMAL, NO PROBLEMS
PARKINSONS_MEDS: OFF
FREEZING_GAIT: SLIGHT:  FREEZES ON STARTING, TURNING, OR WALKING THROUGH DOOWAY WITH A SINGLE HALT DURING ANY OF THESE EVENTS, BUT THEN CONTINUES SMOOTHLY WITHOUT FREEZING DURING STRAIGHT WALKING.
RIGIDITY_RLE: NORMAL
FACIAL_EXPRESSION: MODERATE: MASKED FACIES WITH LIPS PARTED SOME OF THE TIME WHEN THE MOUTH IS AT REST.
LEG_AGILITY_LEFT: SLIGHT: ANY OF THE FOLLOWING: A) THE REGULAR RHYTHM IS BROKEN WITH ONE WITH ONE OR TWO INTERRUPTIONS OR HESITATIONS OF THE MOVEMENT B) SLIGHT SLOWING C) THE AMPLITUDE DECREMENTS NEAR THE END OF THE 10 MOVEMENTS.
GAIT: MILD: INDEPENDENT WALKING BUT WITH SUBSTANTIAL GAIT IMPAIRMENT.
AMPLITUDE_LIP_JAW: NORMAL: NO TREMOR.
AMPLITUDE_LLE: NORMAL: NO TREMOR.
TOTAL_SCORE: 18
GAIT: SLIGHT: INDEPENDENT WALKING WITH MINOR GAIT IMPAIRMENT.
FINGER_TAPPING_RIGHT: NORMAL
AMPLITUDE_RUE: NORMAL: NO TREMOR.
HANDMOVEMENTS_LEFT: SLIGHT: ANY OF THE FOLLOWING: A) THE REGULAR RHYTHM IS BROKEN WITH ONE WITH ONE OR TWO INTERRUPTIONS OR HESITATIONS OF THE MOVEMENT B) SLIGHT SLOWING C) THE AMPLITUDE DECREMENTS NEAR THE END OF THE 10 MOVEMENTS.
HANDMOVEMENTS_RIGHT: MILD: ANY OF THE FOLLOWING: A) 3 TO 5 INTERRUPTIONS DURING TAPPING B) MILD SLOWING C) THE AMPLITUDE DECREMENTS MIDWAY IN THE 10-MOVEMENT SEQUENCE
LEG_AGILITY_LEFT: NORMAL
PRONATION_SUPINATION_LEFT: MILD: ANY OF THE FOLLOWING: A) 3 TO 5 INTERRUPTIONS DURING TAPPING B) MILD SLOWING C) THE AMPLITUDE DECREMENTS MIDWAY IN THE 10-MOVEMENT SEQUENCE
AMPLITUDE_RLE: NORMAL: NO TREMOR.
POSTURAL_STABILITY: MODERATE: STANDS SAFELY, BUT WITH ABSENCE OF POSTURAL RESPONSE,  FALLS IF NOT CAUGHT BY EXAMINER.
GAIT: SLIGHT: INDEPENDENT WALKING WITH MINOR GAIT IMPAIRMENT.
AMPLITUDE_RUE: NORMAL: NO TREMOR.
FREEZING_GAIT: NORMAL
RIGIDITY_RLE: MILD: RIGIDITY DETECTED WITHOUT THE ACTIVATION MANEUVER.  FULL RANGE OF MOTION IS EASILY ACHIEVED.
RIGIDITY_LLE: NORMAL
PRONATION_SUPINATION_RIGHT: SLIGHT: ANY OF THE FOLLOWING: A) THE REGULAR RHYTHM IS BROKEN WITH ONE WITH ONE OR TWO INTERRUPTIONS OR HESITATIONS OF THE MOVEMENT B) SLIGHT SLOWING C) THE AMPLITUDE DECREMENTS NEAR THE END OF THE 10 MOVEMENTS.
CONSTANCY_TREMOR_ATREST: NORMAL: NO TREMOR.
SPEECH: MILD: LOSS OF MODULATION, DICTION OR VOLUME, WITH A FEW WORDS UNCLEAR, BUT THE OVERALL SENTENCES EASY TO FOLLOW.
TOETAPPING_RIGHT: SLIGHT: ANY OF THE FOLLOWING: A) THE REGULAR RHYTHM IS BROKEN WITH ONE WITH ONE OR TWO INTERRUPTIONS OR HESITATIONS OF THE MOVEMENT B) SLIGHT SLOWING C) THE AMPLITUDE DECREMENTS NEAR THE END OF THE 10 MOVEMENTS.
TOTAL_SCORE: 8
SPEECH: SLIGHT: LOSS OF MODULATION, DICTION OR VOLUME, BUT STILL ALL WORDS EASY TO UNDERSTAND.
FACIAL_EXPRESSION: MODERATE: MASKED FACIES WITH LIPS PARTED SOME OF THE TIME WHEN THE MOUTH IS AT REST.
RIGIDITY_LUE: NORMAL
AMPLITUDE_LUE: NORMAL: NO TREMOR.
SPEECH: SLIGHT: LOSS OF MODULATION, DICTION OR VOLUME, BUT STILL ALL WORDS EASY TO UNDERSTAND.
CONSTANCY_TREMOR_ATREST: NORMAL: NO TREMOR.
TOTAL_SCORE_LEFT: 4
AXIAL_SCORE: 9
PRONATION_SUPINATION_LEFT: SLIGHT: ANY OF THE FOLLOWING: A) THE REGULAR RHYTHM IS BROKEN WITH ONE WITH ONE OR TWO INTERRUPTIONS OR HESITATIONS OF THE MOVEMENT B) SLIGHT SLOWING C) THE AMPLITUDE DECREMENTS NEAR THE END OF THE 10 MOVEMENTS.
RIGIDITY_LUE: MILD: RIGIDITY DETECTED WITHOUT THE ACTIVATION MANEUVER.  FULL RANGE OF MOTION IS EASILY ACHIEVED.
AMPLITUDE_LUE: NORMAL: NO TREMOR.
HANDMOVEMENTS_RIGHT: SLIGHT: ANY OF THE FOLLOWING: A) THE REGULAR RHYTHM IS BROKEN WITH ONE WITH ONE OR TWO INTERRUPTIONS OR HESITATIONS OF THE MOVEMENT B) SLIGHT SLOWING C) THE AMPLITUDE DECREMENTS NEAR THE END OF THE 10 MOVEMENTS.
PARKINSONS_MEDS: OFF
TOETAPPING_RIGHT: NORMAL
RIGIDITY_NECK: SLIGHT: RIGIDITY ONLY DETECTED WITH ACTIVATION MANEUVER.
FINGER_TAPPING_RIGHT: NORMAL
AMPLITUDE_LIP_JAW: NORMAL: NO TREMOR.
AMPLITUDE_LIP_JAW: NORMAL: NO TREMOR.
PARKINSONS_MEDS: ON

## 2022-08-29 ASSESSMENT — PAIN SCALES - GENERAL: PAINLEVEL: NO PAIN (0)

## 2022-08-29 NOTE — PATIENT INSTRUCTIONS
Dear Ms. Erika NIELSON Joe,    Thank you for coming today.  During your visit, we have discussed the following:     __ Your DBS electrical system function (impedance) is normal. The battery life is also good.    [employer] Infinity 5 and 7  At 2.8 it is recommended the patient connect monthly to see if indicator has come on  2.73 is ANGUS (Elective replacement indicated) - 20% power left - Schedule consult for replacement  2.7 battery depleted    ASSESSMENT:    Parkinson's Disease:  No wearing off.  Had a fall associated with slipping on ice. No injury.      S/p Bilateral Gpi DBS lead implantation:  Normal impedance. The Right Chest battery is nearing end of life and needs to be replaced. No programming changes were made today.     Anxiety: Manageable with current medication.    Restless Leg Syndrome: Controlled.     Chocking: Due to dentures and mechanical issues and not chewing food.       PLAN:    __  Will stay on the same antiparkinsonian and anxiet medications      PD/Mood Medications 7 am 12 pm 4 pm 9 pm PRN   Sinemet 25/100 mg  2 1.5 2 1.5    Amantadine 100 mg  1  1     Pramipexole 0.5 mg    2    Buspirone 10 mg 1 1 1     Lorazepam 0.5 mg     None in the last week.     __ Referral to Neurosurgery.     __ Rx refilled.    __ Return in 6 months. You may return sooner as needed.        To contact our clinic, you may call [phone] or use [employer] message.     GUILLERMINA Krueger, CNP  [facility]

## 2022-08-29 NOTE — PROGRESS NOTES
"  ASSESSMENT:    Parkinson's Disease:  No wearing off.  Had a fall associated with slipping on ice. No injury.      S/p Bilateral Gpi DBS lead implantation:  Normal impedance.  The right chest DBS battery/generator is nearing the end of life. No programming changes were made today.     Anxiety: Manageable with current medication.    Restless Leg Syndrome: Controlled.     Chocking: Due to dentures and mechanical issues and not chewing food.       PLAN:    __  Will stay on the same antiparkinsonian and anxiety medications    PD/Mood Medications 7 am 12 pm 4 pm 9 pm PRN   Sinemet 25/100 mg  2 1.5 2 1.5    Amantadine 100 mg  1  1     Pramipexole 0.5 mg    2    Buspirone 10 mg 1 1 1     Lorazepam 0.5 mg     None in the last week.     __ Rx refilled.    __Recommended pureeing her food to prevent choking.    __Referral to neurosurgery for right chest DBS generator/battery replacement.    __Patient will contact us in November to [facility].    __ Return in 6 months. You may return sooner as needed.         MOVEMENT DISORDERS CLINIC           PATIENT: Erika Diaz    : 1960    GINNY: 2022    REASON FOR VISIT: Parkinson's disease (PD) follow up and deep brain stimulation (DBS) interrogation and analysis.     HPI: Ms. Erika Diaz is a 62 year old who came to the [facility] neurology clinic by herself for a 48-month Clinical Core Research Visit.    She reports not sleeping well at night. She might get about 3 hours. She used to use Lorazepam to help her sleep regularly. But lately, she uses \"weed\" that is eatable or smokes it, which helps her sleep.     She has difficulty chewing due to dentures that is not fitted well. She has difficulty with swallowing.    Restless leg syndrome is controlled as long as she takes her medication.     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    She denies depression or anxiety. She continues to miss her "  .  She has the grief counselor to call as needed.     Right big toe fungus is getting better    Motor symptoms are stable on current medication regimen.  She asked for the APDA jovita, which she had last year in 2021.    PD/Mood Medications 7 am 12 pm 4 pm 9 pm PRN   Sinemet 25/100 mg  2 1.5 2 1.5    Amantadine 100 mg  1  1     Pramipexole 0.5 mg    2    Buspirone 10 mg 1 1 1     Lorazepam 0.5 mg     None in the last week.     MDS UPDRS Part I      -- Over the last week -- 0: Normal -- 1: Slight -- 2: Mild -- 3: Moderate -- 4: Severe  1.1 Cognitive Impairment: 0   1.2 Hallucinations and psychosis: 0   1.3 Depressed mood: 0   1.4 Anxious mood: 0   1.5 Apathy:  2   1.6 Features of DDS:  0   1.7 Sleep problems:  4   1.8 Daytime sleepiness:  2   1.9 Pain and other sensations:  0   1.10 Urinary problems:  3   1.11 Constipation problems:   2   1.12 Lightheadedness on standin   1.13 Fatigue:  2     Total:    15         MDS Part II  -- Total Score: 11  UPDRS Questionnaire 2022   Over the past week, have you had problems with your speech? 1   Over the past week, have you usually had too much saliva during when you are awake or when you sleep? 1   Over the past week, have you usually had problems swallowing pills or eating meals?  Do you need your pills cut or crushed or your meals to be made soft, chopped or blended to avoid choking? 1   Over the past week, have you usually had troubles handling your food and using eating utensils?  For example, do you have trouble handling finger foods or using forks, knifes, spoons, chopsticks? 1   Over the past week, have you usually had problems dressing?  For example, are you slow or do you need help with buttoning, using zippers, putting on or taking off your clothes or jewelry? 1   Over the past week, have you usually been slow or do you need help with washing, bathing, shaving, brushing teeth, combing your hair or with other personal hygiene? 1   Over  the past week, have people usually had trouble reading your handwriting? 1   Over the past week, have you usually had trouble doing your hobbies or other things that you like to do? 0   Over the past week, do you usually have trouble turning over in bed? 1   Over the past week, have you usually had shaking or tremor? 0   Over the past week, have you usually had trouble getting out of bed, a car seat, or a deep chair? 1   Over the past week, have you usually had problems with balance and walking? 1   Over the past week, on your usual day when walking, do you suddenly stop or freeze as if your feet are stuck to the floor. 1   MDS-UPDRS II Total Score 11       MEDICATIONS:   Outpatient Medications Marked as Taking for the 8/29/22 encounter (Office Visit) with Arminda Rivas APRN CNP   Medication Sig     Acetaminophen (TYLENOL PO) Take 1,000 mg by mouth every 8 hours as needed for mild pain or fever     albuterol (PROAIR HFA/PROVENTIL HFA/VENTOLIN HFA) 108 (90 Base) MCG/ACT inhaler Inhale 1-2 puffs into the lungs every 4 hours as needed     amantadine (SYMMETREL) 100 MG capsule TAKE 1 CAPSULE BY MOUTH AT 7 AM AND AT 4 PM     ammonium lactate (LAC-HYDRIN) 12 % external lotion      aspirin (ASA) 81 MG chewable tablet Take 1 tablet (81 mg) by mouth daily     busPIRone (BUSPAR) 10 MG tablet Take 1 tablet (10 mg) by mouth 3 times daily     calcium carbonate (TUMS) 500 MG chewable tablet Take 2 chew tab by mouth as needed for heartburn     carbidopa-levodopa (SINEMET)  MG tablet TAKE 2 TABLETS BY MOUTH AT 7AM, 1.5 TABLETS at 12PM, 2 TABLETS AT 4PM, AND 1.5 TABLETS AT 9PM FOR A TOTAL OF 7 TABLETS PER DAY.     FLUoxetine (PROZAC) 20 MG capsule Take 20 mg by mouth every morning @ 7am     fluticasone (FLONASE) 50 MCG/ACT nasal spray Spray 2 sprays in nostril daily     gabapentin (NEURONTIN) 300 MG capsule Take 1 cap at 7 am by mouth and 2 at 9 pm = 3 cap/day     hydrochlorothiazide (HYDRODIURIL) 25 MG tablet Take 25  "mg by mouth     lactase (LACTAID) 9000 units TABS tablet Take 9,000 Units by mouth With dairy as needed     LORazepam (ATIVAN) 0.5 MG tablet Take 1 tablet (0.5 mg) by mouth daily as needed for anxiety or sleep     metFORMIN (GLUCOPHAGE-XR) 500 MG 24 hr tablet      nystatin (MYCOSTATIN) 422344 UNIT/GM external powder      polyethylene glycol (MIRALAX) 17 GM/Dose powder Take 17 g by mouth daily     pramipexole (MIRAPEX) 0.5 MG tablet 2 tabs orally @ 9 pm     rosuvastatin (CRESTOR) 5 MG tablet Take 5 mg by mouth daily     TRULICITY 0.75 MG/0.5ML pen ADMINISTER 0.75 MG UNDER THE SKIN 1 TIME WEEKLY       PHYSICAL EXAM:    VITAL SIGNS:  Blood pressure 128/85, pulse 70, temperature 98.4  F (36.9  C), resp. rate 16, height 1.676 m (5' 6\"), weight 106.1 kg (234 lb), SpO2 97 %.     GENERAL:  Ms. Diaz is a pleasant  female who is well-groomed and well-developed sitting comfortably in the exam room without any distress.  Affect is appropriate.    MOVEMENT DISORDERS ASSESSMENT:  MDS UPDRS III    Last antiparkinsonian dose taken:  Amantadine 100 mg on 8/27 at 8:10 pm  Sinemet 25/100 mg on 8/29 at 8:10 pm took 1.5 tab    Pramipexole on 8/27 at 8:10 pm    Bilateral GPi DBS turned OFF - 9:00 am  Bilateral GPi DBS turned ON -- 9:46 am  Took Amantadine 100 mg 1 tab, Sinemet 25/100 mg 2 tabs at 9:49 am    Procedure: DBS Interrogation & Analysis:    Lead(s):    Left Right   DBS Target GPi GPi   DBS Lead Type Abbott 1-3-3-1 Abbott 1-3-3-1   Lead Placement Date 3/5/2021 08/21/2018     IPG(s):   1 2   IPG Infinity 5  6660 Infinity 5  6660   IPG Implant Date 03/12/2021 08/30/2018   Location Left chest Right chest   Battery (V) 2.96 2.74 volts        Left GPi     C +, 2abc -  Current:  2.90 mA  PW:  60 msec  Rate:  130 Hz     Therapy impedance: 1062 Ohms     Patient :  Upper Limit: 0.00 mA  Lower Limit: 3.50 mA    Electrode Impedance Check:   Left Lead: No Problems Found.      Right GPi     C +, 10abc -  Current:  4.20 " mA  PW:  60 msec  Rate:  130 Hz     Therapy impedance: 812 Ohms     Patient :  Upper Limit: 3.00 mA  Lower Limit: 4.20 mA     Electrode Impedance Check:  Right Lead: No Problems Found.       See scanned report for impedance details    Today I spent 142 minutes caring for the patient. 30 minutes was spent in DBS interrogation and analysis.  112 minutes was spent with reviewing records, meeting with the patient, answering questions, examining, refilling/ordering medication, placing referral, and documentation.    GUILLERMINA Krueger, CNP  [facility]]

## 2022-08-29 NOTE — LETTER
"2022       RE: Erika Diaz  [address] [place] [id number]     Dear Colleague,    Thank you for referring your patient, Erika Diaz, to the [facility] NEUROLOGY CLINIC [place] at [facility]. Please see a copy of my visit note below.      ASSESSMENT:    Parkinson's Disease:  No wearing off.  Had a fall associated with slipping on ice. No injury.      S/p Bilateral Gpi DBS lead implantation:  Normal impedance.  The right chest DBS battery/generator is nearing the end of life. No programming changes were made today.     Anxiety: Manageable with current medication.    Restless Leg Syndrome: Controlled.     Chocking: Due to dentures and mechanical issues and not chewing food.       PLAN:    __  Will stay on the same antiparkinsonian and anxiety medications    PD/Mood Medications 7 am 12 pm 4 pm 9 pm PRN   Sinemet 25/100 mg  2 1.5 2 1.5    Amantadine 100 mg  1  1     Pramipexole 0.5 mg    2    Buspirone 10 mg 1 1 1     Lorazepam 0.5 mg     None in the last week.     __ Rx refilled.    __Recommended pureeing her food to prevent choking.    __Referral to neurosurgery for right chest DBS generator/battery replacement.    __Patient will contact us in November to write a letter for the APDA to get the yearly jovita.    __ Return in 6 months. You may return sooner as needed.         MOVEMENT DISORDERS CLINIC           PATIENT: Erika Diaz    : 1960    GINNY: 2022    REASON FOR VISIT: Parkinson's disease (PD) follow up and deep brain stimulation (DBS) interrogation and analysis.     HPI: Ms. Erika Diza is a 62 year old who came to the [facility] neurology clinic by herself for a 48-month Clinical Core Research Visit.    She reports not sleeping well at night. She might get about 3 hours. She used to use Lorazepam to help her sleep regularly. But lately, she uses \"weed\" that is eatable or smokes it, which helps her sleep. "     She has difficulty chewing due to dentures that is not fitted well. She has difficulty with swallowing.    Restless leg syndrome is controlled as long as she takes her medication.     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    She denies depression or anxiety. She continues to miss her  .  She has the grief counselor to call as needed.     Right big toe fungus is getting better    Motor symptoms are stable on current medication regimen.  She asked for the SynthoxA jovita, which she had last year in 2021.    PD/Mood Medications 7 am 12 pm 4 pm 9 pm PRN   Sinemet 25/100 mg  2 1.5 2 1.5    Amantadine 100 mg  1  1     Pramipexole 0.5 mg    2    Buspirone 10 mg 1 1 1     Lorazepam 0.5 mg     None in the last week.     MDS UPDRS Part I      -- Over the last week -- 0: Normal -- 1: Slight -- 2: Mild -- 3: Moderate -- 4: Severe  1.1 Cognitive Impairment: 0   1.2 Hallucinations and psychosis: 0   1.3 Depressed mood: 0   1.4 Anxious mood: 0   1.5 Apathy:  2   1.6 Features of DDS:  0   1.7 Sleep problems:  4   1.8 Daytime sleepiness:  2   1.9 Pain and other sensations:  0   1.10 Urinary problems:  3   1.11 Constipation problems:   2   1.12 Lightheadedness on standin   1.13 Fatigue:  2     Total:    15         MDS Part II  -- Total Score: 11  UPDRS Questionnaire 2022   Over the past week, have you had problems with your speech? 1   Over the past week, have you usually had too much saliva during when you are awake or when you sleep? 1   Over the past week, have you usually had problems swallowing pills or eating meals?  Do you need your pills cut or crushed or your meals to be made soft, chopped or blended to avoid choking? 1   Over the past week, have you usually had troubles handling your food and using eating utensils?  For example, do you have trouble handling finger foods or using forks, knifes, spoons, chopsticks? 1   Over the past week, have you usually had  problems dressing?  For example, are you slow or do you need help with buttoning, using zippers, putting on or taking off your clothes or jewelry? 1   Over the past week, have you usually been slow or do you need help with washing, bathing, shaving, brushing teeth, combing your hair or with other personal hygiene? 1   Over the past week, have people usually had trouble reading your handwriting? 1   Over the past week, have you usually had trouble doing your hobbies or other things that you like to do? 0   Over the past week, do you usually have trouble turning over in bed? 1   Over the past week, have you usually had shaking or tremor? 0   Over the past week, have you usually had trouble getting out of bed, a car seat, or a deep chair? 1   Over the past week, have you usually had problems with balance and walking? 1   Over the past week, on your usual day when walking, do you suddenly stop or freeze as if your feet are stuck to the floor. 1   MDS-UPDRS II Total Score 11       MEDICATIONS:   Outpatient Medications Marked as Taking for the 8/29/22 encounter (Office Visit) with Arminda Rivas APRN CNP   Medication Sig     Acetaminophen (TYLENOL PO) Take 1,000 mg by mouth every 8 hours as needed for mild pain or fever     albuterol (PROAIR HFA/PROVENTIL HFA/VENTOLIN HFA) 108 (90 Base) MCG/ACT inhaler Inhale 1-2 puffs into the lungs every 4 hours as needed     amantadine (SYMMETREL) 100 MG capsule TAKE 1 CAPSULE BY MOUTH AT 7 AM AND AT 4 PM     ammonium lactate (LAC-HYDRIN) 12 % external lotion      aspirin (ASA) 81 MG chewable tablet Take 1 tablet (81 mg) by mouth daily     busPIRone (BUSPAR) 10 MG tablet Take 1 tablet (10 mg) by mouth 3 times daily     calcium carbonate (TUMS) 500 MG chewable tablet Take 2 chew tab by mouth as needed for heartburn     carbidopa-levodopa (SINEMET)  MG tablet TAKE 2 TABLETS BY MOUTH AT 7AM, 1.5 TABLETS at 12PM, 2 TABLETS AT 4PM, AND 1.5 TABLETS AT 9PM FOR A TOTAL OF 7 TABLETS  "PER DAY.     FLUoxetine (PROZAC) 20 MG capsule Take 20 mg by mouth every morning @ 7am     fluticasone (FLONASE) 50 MCG/ACT nasal spray Spray 2 sprays in nostril daily     gabapentin (NEURONTIN) 300 MG capsule Take 1 cap at 7 am by mouth and 2 at 9 pm = 3 cap/day     hydrochlorothiazide (HYDRODIURIL) 25 MG tablet Take 25 mg by mouth     lactase (LACTAID) 9000 units TABS tablet Take 9,000 Units by mouth With dairy as needed     LORazepam (ATIVAN) 0.5 MG tablet Take 1 tablet (0.5 mg) by mouth daily as needed for anxiety or sleep     metFORMIN (GLUCOPHAGE-XR) 500 MG 24 hr tablet      nystatin (MYCOSTATIN) 890962 UNIT/GM external powder      polyethylene glycol (MIRALAX) 17 GM/Dose powder Take 17 g by mouth daily     pramipexole (MIRAPEX) 0.5 MG tablet 2 tabs orally @ 9 pm     rosuvastatin (CRESTOR) 5 MG tablet Take 5 mg by mouth daily     TRULICITY 0.75 MG/0.5ML pen ADMINISTER 0.75 MG UNDER THE SKIN 1 TIME WEEKLY       PHYSICAL EXAM:    VITAL SIGNS:  Blood pressure 128/85, pulse 70, temperature 98.4  F (36.9  C), resp. rate 16, height 1.676 m (5' 6\"), weight 106.1 kg (234 lb), SpO2 97 %.     GENERAL:  Ms. Diaz is a pleasant  female who is well-groomed and well-developed sitting comfortably in the exam room without any distress.  Affect is appropriate.    MOVEMENT DISORDERS ASSESSMENT:  MDS UPDRS III    Last antiparkinsonian dose taken:  Amantadine 100 mg on 8/27 at 8:10 pm  Sinemet 25/100 mg on 8/29 at 8:10 pm took 1.5 tab    Pramipexole on 8/27 at 8:10 pm    Bilateral GPi DBS turned OFF - 9:00 am  Bilateral GPi DBS turned ON -- 9:46 am  Took Amantadine 100 mg 1 tab, Sinemet 25/100 mg 2 tabs at 9:49 am    Procedure: DBS Interrogation & Analysis:    Lead(s):    Left Right   DBS Target GPi GPi   DBS Lead Type Abbott 1-3-3-1 Abbott 1-3-3-1   Lead Placement Date 3/5/2021 08/21/2018     IPG(s):   1 2   IPG Infinity 5  6660 Infinity 5  6660   IPG Implant Date 03/12/2021 08/30/2018   Location Left chest Right " chest   Battery (V) 2.96 2.74 volts        Left GPi     C +, 2abc -  Current:  2.90 mA  PW:  60 msec  Rate:  130 Hz     Therapy impedance: 1062 Ohms     Patient :  Upper Limit: 0.00 mA  Lower Limit: 3.50 mA    Electrode Impedance Check:   Left Lead: No Problems Found.      Right GPi     C +, 10abc -  Current:  4.20 mA  PW:  60 msec  Rate:  130 Hz     Therapy impedance: 812 Ohms     Patient :  Upper Limit: 3.00 mA  Lower Limit: 4.20 mA     Electrode Impedance Check:  Right Lead: No Problems Found.       See scanned report for impedance details    Today I spent 142 minutes caring for the patient. 30 minutes was spent in DBS interrogation and analysis.  112 minutes was spent with reviewing records, meeting with the patient, answering questions, examining, refilling/ordering medication, placing referral, and documentation.      Karina Rivas APRN, CNP  [facility]

## 2022-09-01 DIAGNOSIS — G47.9 SLEEP DISTURBANCES: ICD-10-CM

## 2022-09-01 DIAGNOSIS — F41.1 GAD (GENERALIZED ANXIETY DISORDER): ICD-10-CM

## 2022-09-01 NOTE — TELEPHONE ENCOUNTER
FUTURE VISIT INFORMATION      FUTURE VISIT INFORMATION:    Date: 9/6/2022    Time: 830am    Location: [facility]  REFERRAL INFORMATION:    Referring provider:  Karina SARMIENTO CNP     Referring providers clinic:  [facility]     Reason for visit/diagnosis  DBS     RECORDS REQUESTED FROM:       Clinic name Comments Records Status Imaging Status   Internal [facility]ENN Rivas-8/29/2022, 3/28/2022    CT Head-4/5/2021, 3/2/2021    MR Brain-9/23/2020, 12/27/2018 Epic PACS

## 2022-09-01 NOTE — TELEPHONE ENCOUNTER
Rx Authorization:  Requested Medication/ Dose: Lorazepam 0.5mg  Date last refill ordered: 2/16/22  Quantity ordered: 30 tabs  # refills: 4  Date of last clinic visit with ordering provider: 8/9/22  Date of next clinic visit with ordering provider: F/U 1 year  All pertinent protocol data (lab date/result):   Include pertinent information from patients message:

## 2022-09-02 RX ORDER — LORAZEPAM 0.5 MG/1
TABLET ORAL
Qty: 30 TABLET | Refills: 3 | Status: SHIPPED | OUTPATIENT
Start: 2022-09-02 | End: 2023-03-15

## 2022-09-06 ENCOUNTER — VIRTUAL VISIT (OUTPATIENT)
Dept: NEUROSURGERY | Facility: CLINIC | Age: 62
End: 2022-09-06
Payer: COMMERCIAL

## 2022-09-06 ENCOUNTER — PRE VISIT (OUTPATIENT)
Dept: NEUROSURGERY | Facility: CLINIC | Age: 62
End: 2022-09-06

## 2022-09-06 DIAGNOSIS — G20.A1 PARKINSON DISEASE (H): Primary | ICD-10-CM

## 2022-09-06 PROCEDURE — 99214 OFFICE O/P EST MOD 30 MIN: CPT | Mod: 95 | Performed by: NEUROLOGICAL SURGERY

## 2022-09-06 NOTE — LETTER
9/6/2022       RE: Erika Diaz  [address] [place] [phone]     Dear Colleague,    Thank you for referring your patient, Erika Diaz, to the [facility] NEUROSURGERY CLINIC [place] at [facility]. Please see a copy of my visit note below.    Erika is a 62 year old who is being evaluated via a billable video visit.      How would you like to obtain your AVS? MyChart  If the video visit is dropped, the invitation should be resent by: Text to cell phone: [phone]  Will anyone else be joining your video visit? No        Video-Visit Details    Video Start Time: 8:20 AM    Type of service:  Video Visit    Video End Time:8:35 AM    Originating Location (pt. Location): Home    Distant Location (provider location):  [facility] NEUROSURGERY [facility]     Platform used for Video Visit: CapableBits     HISTORY AND PHYSICAL EXAM    Chief Complaint   Patient presents with     New Patient     DBS battery replacement        HISTORY OF PRESENT ILLNESS  Erika Diaz is a 61 y/o woman with idiopathic PD s/p bilateral GPi DBS (Abbott Infinity 5) who presents for neurosurgical consultation regarding battery replacement. She has bilateral IPGs as her right GPi implant was performed 08/2018 and her left GPi implant was performed 03/2021. She recently saw Karina Rivas NP for her 48 month Clinical Core research f/u visit who determined that her right IPG is nearing end of service. Her motor symptoms are stable on her current medication and stimulation regimen. She takes ASA81 daily but no other antiplatelets or anticoagulants. She does note that her left IPG is still quite mobile, but is willing to wait until planned elective replacement to revise the pocket and make it less mobile.      Past Medical History:   Diagnosis Date     Benign paroxysmal positional vertigo of right ear      Degenerative joint disease 11/07/2017     Encounter  for neuropsychological testing 12/20/2017    Current results indicate variability in attention and memory, ranging from moderately impaired to superior, in some cases with greater difficulty on less complex tasks. Basic language, visual processing, and executive functioning fall within normal limits. Personality assessment is suggestive of somatization, and also raises the possibility of a thought disorder as well as a history of manic episo     JASON (generalized anxiety disorder)      History of colonic polyps 11/07/2017     HTN (hypertension) 11/07/2017     Hypercholesterolemia 11/07/2017     Lactose intolerance in adult 11/07/2017     Migraines      Obesity 11/07/2017     Parkinson disease (H)      PID (pelvic inflammatory disease) due to IUD 11/07/2017     Pulmonary emboli (H) 01/20/2019    Post Right Ankel Surgery     Pulmonary emboli (H) - coumadin lovenox 11/07/2017    provoked after knee replacement     Sensorineural hearing loss (SNHL) of right ear 12/2018     Trochanteric bursitis of left hip        Past Surgical History:   Procedure Laterality Date     adhesioloysis       CHOLECYSTECTOMY       COLONOSCOPY       IMPLANT DEEP BRAIN STIMULATION GENERATOR / BATTERY Right 8/30/2018    Procedure: IMPLANT DEEP BRAIN STIMULATION GENERATOR / BATTERY;  Deep Brain Stimulator Placement, Phase II, Placement Of Deep Brain Stimulator Generator/Battery Over The Right Chest Wall;  Surgeon: Jerry Concepcion MD;  Location: [facility] OR     IMPLANT DEEP BRAIN STIMULATION GENERATOR / BATTERY Left 3/12/2021    Procedure: Deep brain stimulator placement, phase II, placement of deep brain stimulator generator/battery over the left chest wall;  Surgeon: Jerry Concepcion MD;  Location: [facility] OR     JOINT REPLACEMENT Right     right partial knee replacement     LAPAROSCOPY      adhesioloysis     LAPAROSCOPY      supracervical hysterectomy     LAPAROTOMY EXPLORATORY Left     partial removal of left ovary     OPTICAL TRACKING  SYSTEM INSERTION DEEP BRAIN STIMULATION Right 2018    Procedure: OPTICAL TRACKING SYSTEM INSERTION DEEP BRAIN STIMULATION;  Stealth Assisted Right Deep Brain Stimulator Placement, Phase I, Placement Of Right Side Deep Brain Stimulator Electrode, Target Right Globus Pallidus Internus With Microelectrode Recording;  Surgeon: Jerry Concepcion MD;  Location: [facility] OR     OPTICAL TRACKING SYSTEM INSERTION DEEP BRAIN STIMULATION Left 3/5/2021    Procedure: stealth assisted Left side deep brain stimulator placement, phase I, placement of left side deep brain stimulator electrode, target left globus pallidus internus, with microelectrode recording and placement of extension and externalization wires for UDALL research protocol;  Surgeon: Jerry Concepcion MD;  Location: [facility] OR     REMOVE INTRAUTERINE DEVICE  2006     salpingoopherectomy       XR FOOT SURGERY SENG RIGHT Right 2019       Family History   Problem Relation Age of Onset     Breast Cancer Mother        Social History     Socioeconomic History     Marital status:      Spouse name: Not on file     Number of children: Not on file     Years of education: Not on file     Highest education level: Not on file   Occupational History     Not on file   Tobacco Use     Smoking status: Former Smoker     Packs/day: 0.50     Years: 20.00     Pack years: 10.00     Types: Cigarettes     Quit date: 2009     Years since quittin.0     Smokeless tobacco: Never Used   Substance and Sexual Activity     Alcohol use: No     Drug use: No     Sexual activity: Not Currently     Partners: Male     Birth control/protection: None   Other Topics Concern     Parent/sibling w/ CABG, MI or angioplasty before 65F 55M? No   Social History Narrative    . lives in [place]. Zane Lairud spouse     Social Determinants of Health     Financial Resource Strain: Not on file   Food Insecurity: Not on file   Transportation Needs: Not on file   Physical  Activity: Not on file   Stress: Not on file   Social Connections: Not on file   Intimate Partner Violence: Not on file   Housing Stability: Not on file          Allergies   Allergen Reactions     Atorvastatin Muscle Pain (Myalgia)     Other reaction(s): Myalgia  Per PCP note 7/24/18 - is to resume simvastatin - monitor for myalgia     Codeine Itching     Mold Unknown       Current Outpatient Medications   Medication     Acetaminophen (TYLENOL PO)     albuterol (PROAIR HFA/PROVENTIL HFA/VENTOLIN HFA) 108 (90 Base) MCG/ACT inhaler     amantadine (SYMMETREL) 100 MG capsule     ammonium lactate (LAC-HYDRIN) 12 % external lotion     aspirin (ASA) 81 MG chewable tablet     busPIRone (BUSPAR) 10 MG tablet     calcium carbonate (TUMS) 500 MG chewable tablet     carbidopa-levodopa (SINEMET)  MG tablet     FLUoxetine (PROZAC) 20 MG capsule     gabapentin (NEURONTIN) 300 MG capsule     glycerin (LAXATIVE) 1.2 g suppository     hydrochlorothiazide (HYDRODIURIL) 25 MG tablet     ibuprofen (ADVIL/MOTRIN) 200 MG capsule     lactase (LACTAID) 9000 units TABS tablet     LORazepam (ATIVAN) 0.5 MG tablet     metFORMIN (GLUCOPHAGE-XR) 500 MG 24 hr tablet     nystatin (MYCOSTATIN) 306717 UNIT/GM external powder     pramipexole (MIRAPEX) 0.5 MG tablet     rosuvastatin (CRESTOR) 5 MG tablet     TRULICITY 0.75 MG/0.5ML pen     No current facility-administered medications for this visit.         PHYSICAL EXAM (Video visit)  There were no vitals taken for this visit.  Awake, alert, oriented x 3  Attends, follows, fluent      ASSESSMENT   62 year old female with a history of idiopathic PD. s/p bilateral GPi DBS .  Depleted DBS battery/generator over the right chest wall.    During today's visit, we discussed the surgical procedure for replacing the DBS generator/battery over the right chest wall. She currently has an Infinity 5 generator/battery, and this will be replaced during the upcoming procedure. Risks, benefits, alternative  therapies were discussed with the patient, including but not limited to infection and bleeding. This surgical procedure will be performed under MAC with local anesthetic. The thinned part of the wound/pocket may be corrected with mobilization of the tissue under the incision. The pocket may need to be enlarged to minimize the stress on the closure.  Surgical procedure was discussed in detail. All questions were answered, and she expressed understanding      PLAN:  1. She will undergo replacement of the DBS generator/battery over the right chest wall under MAC with local anesthetic.       Vignesh Hewitt MD

## 2022-09-06 NOTE — NURSING NOTE
Chief Complaint   Patient presents with     New Patient     DBS battery replacement      Allergies and medications reviewed with patient.  She just recently had reactions to Ozempic and Trulicity.   Leyla Gaines, PRETTYF

## 2022-09-06 NOTE — PROGRESS NOTES
Erika is a 62 year old who is being evaluated via a billable video visit.      How would you like to obtain your AVS? MyChart  If the video visit is dropped, the invitation should be resent by: Text to cell phone: [phone]  Will anyone else be joining your video visit? No        Video-Visit Details    Video Start Time: 8:20 AM    Type of service:  Video Visit    Video End Time:8:35 AM    Originating Location (pt. Location): Home    Distant Location (provider location):  [facility] [place]     Platform used for Video Visit: [employer]     HISTORY AND PHYSICAL EXAM    Chief Complaint   Patient presents with     New Patient     DBS battery replacement        HISTORY OF PRESENT ILLNESS  Erika Diaz is a 63 y/o woman with idiopathic PD s/p bilateral GPi DBS ([employer]ott Infinity 5) who presents for neurosurgical consultation regarding battery replacement. She has bilateral IPGs as her right GPi implant was performed 08/2018 and her left GPi implant was performed 03/2021. She recently saw Karina Rivas NP for her 48 month Clinical Core research f/u visit who determined that her right IPG is nearing end of service. Her motor symptoms are stable on her current medication and stimulation regimen. She takes ASA81 daily but no other antiplatelets or anticoagulants. She does note that her left IPG is still quite mobile, but is willing to wait until planned elective replacement to revise the pocket and make it less mobile.      Past Medical History:   Diagnosis Date     Benign paroxysmal positional vertigo of right ear      Degenerative joint disease 11/07/2017     Encounter for neuropsychological testing 12/20/2017    Current results indicate variability in attention and memory, ranging from moderately impaired to superior, in some cases with greater difficulty on less complex tasks. Basic language, visual processing, and executive functioning fall within normal limits. Personality assessment is  suggestive of somatization, and also raises the possibility of a thought disorder as well as a history of manic episo     JASON (generalized anxiety disorder)      History of colonic polyps 11/07/2017     HTN (hypertension) 11/07/2017     Hypercholesterolemia 11/07/2017     Lactose intolerance in adult 11/07/2017     Migraines      Obesity 11/07/2017     Parkinson disease (H)      PID (pelvic inflammatory disease) due to IUD 11/07/2017     Pulmonary emboli (H) 01/20/2019    Post Right Ankel Surgery     Pulmonary emboli (H) - coumadin lovenox 11/07/2017    provoked after knee replacement     Sensorineural hearing loss (SNHL) of right ear 12/2018     Trochanteric bursitis of left hip        Past Surgical History:   Procedure Laterality Date     adhesioloysis       CHOLECYSTECTOMY       COLONOSCOPY       IMPLANT DEEP BRAIN STIMULATION GENERATOR / BATTERY Right 8/30/2018    Procedure: IMPLANT DEEP BRAIN STIMULATION GENERATOR / BATTERY;  Deep Brain Stimulator Placement, Phase II, Placement Of Deep Brain Stimulator Generator/Battery Over The Right Chest Wall;  Surgeon: Jerry Concepcion MD;  [facility]: [facility]     IMPLANT DEEP BRAIN STIMULATION GENERATOR / BATTERY Left 3/12/2021    Procedure: Deep brain stimulator placement, phase II, placement of deep brain stimulator generator/battery over the left chest wall;  Surgeon: Jerry Concepcion MD;  Location: [facility] OR     JOINT REPLACEMENT Right     right partial knee replacement     LAPAROSCOPY      adhesioloysis     LAPAROSCOPY      supracervical hysterectomy     LAPAROTOMY EXPLORATORY Left     partial removal of left ovary     OPTICAL TRACKING SYSTEM INSERTION DEEP BRAIN STIMULATION Right 8/21/2018    Procedure: OPTICAL TRACKING SYSTEM INSERTION DEEP BRAIN STIMULATION;  Stealth Assisted Right Deep Brain Stimulator Placement, Phase I, Placement Of Right Side Deep Brain Stimulator Electrode, Target Right Globus Pallidus Internus With Microelectrode Recording;  Surgeon:  Jerry Concepcion MD;  Location: [facility] OR     OPTICAL TRACKING SYSTEM INSERTION DEEP BRAIN STIMULATION Left 3/5/2021    Procedure: stealth assisted Left side deep brain stimulator placement, phase I, placement of left side deep brain stimulator electrode, target left globus pallidus internus, with microelectrode recording and placement of extension and externalization wires for UDALL research protocol;  Surgeon: Jerry Concepcion MD;  Location: [facility] OR     REMOVE INTRAUTERINE DEVICE       salpingoopherectomy       XR FOOT SURGERY SENG RIGHT Right 2019       Family History   Problem Relation Age of Onset     Breast Cancer Mother        Social History     Socioeconomic History     Marital status:      Spouse name: Not on file     Number of children: Not on file     Years of education: Not on file     Highest education level: Not on file   Occupational History     Not on file   Tobacco Use     Smoking status: Former Smoker     Packs/day: 0.50     Years: 20.00     Pack years: 10.00     Types: Cigarettes     Quit date: 2009     Years since quittin.0     Smokeless tobacco: Never Used   Substance and Sexual Activity     Alcohol use: No     Drug use: No     Sexual activity: Not Currently     Partners: Male     Birth control/protection: None   Other Topics Concern     Parent/sibling w/ CABG, MI or angioplasty before 65F 55M? No   Social History Narrative    . lives in [place]. Zane Diaz spouse     Social Determinants of Health     Financial Resource Strain: Not on file   Food Insecurity: Not on file   Transportation Needs: Not on file   Physical Activity: Not on file   Stress: Not on file   Social Connections: Not on file   Intimate Partner Violence: Not on file   Housing Stability: Not on file          Allergies   Allergen Reactions     Atorvastatin Muscle Pain (Myalgia)     Other reaction(s): Myalgia  Per PCP note 18 - is to resume simvastatin - monitor for myalgia      Codeine Itching     Mold Unknown       Current Outpatient Medications   Medication     Acetaminophen (TYLENOL PO)     albuterol (PROAIR HFA/PROVENTIL HFA/VENTOLIN HFA) 108 (90 Base) MCG/ACT inhaler     amantadine (SYMMETREL) 100 MG capsule     ammonium lactate (LAC-HYDRIN) 12 % external lotion     aspirin (ASA) 81 MG chewable tablet     busPIRone (BUSPAR) 10 MG tablet     calcium carbonate (TUMS) 500 MG chewable tablet     carbidopa-levodopa (SINEMET)  MG tablet     FLUoxetine (PROZAC) 20 MG capsule     gabapentin (NEURONTIN) 300 MG capsule     glycerin (LAXATIVE) 1.2 g suppository     hydrochlorothiazide (HYDRODIURIL) 25 MG tablet     ibuprofen (ADVIL/MOTRIN) 200 MG capsule     lactase (LACTAID) 9000 units TABS tablet     LORazepam (ATIVAN) 0.5 MG tablet     metFORMIN (GLUCOPHAGE-XR) 500 MG 24 hr tablet     nystatin (MYCOSTATIN) 091156 UNIT/GM external powder     pramipexole (MIRAPEX) 0.5 MG tablet     rosuvastatin (CRESTOR) 5 MG tablet     TRULICITY 0.75 MG/0.5ML pen     No current facility-administered medications for this visit.         PHYSICAL EXAM (Video visit)  There were no vitals taken for this visit.  Awake, alert, oriented x 3  Attends, follows, fluent      ASSESSMENT   62 year old female with a history of idiopathic PD. s/p bilateral GPi DBS .  Depleted DBS battery/generator over the right chest wall.    During today's visit, we discussed the surgical procedure for replacing the DBS generator/battery over the right chest wall. She currently has an Infinity 5 generator/battery, and this will be replaced during the upcoming procedure. Risks, benefits, alternative therapies were discussed with the patient, including but not limited to infection and bleeding. This surgical procedure will be performed under MAC with local anesthetic. The thinned part of the wound/pocket may be corrected with mobilization of the tissue under the incision. The pocket may need to be enlarged to minimize the stress on the  closure.  Surgical procedure was discussed in detail. All questions were answered, and she expressed understanding      PLAN:  1. She will undergo replacement of the DBS generator/battery over the right chest wall under MAC with local anesthetic.       Vignesh Hewitt MD

## 2022-09-09 ENCOUNTER — VIRTUAL VISIT (OUTPATIENT)
Dept: NEUROPSYCHOLOGY | Facility: CLINIC | Age: 62
End: 2022-09-09
Payer: COMMERCIAL

## 2022-09-09 DIAGNOSIS — Z00.6 EXAMINATION OF PARTICIPANT IN CLINICAL TRIAL: Primary | ICD-10-CM

## 2022-09-09 PROCEDURE — 99207 PR NO CHARGE LOS: CPT

## 2022-09-09 NOTE — PROGRESS NOTES
She completed the interview portion of the 48 month [place] Clinical Core neuropsychology research visit, via video. The HAM-D and HAM-A were also administered. Testing is scheduled for 9/21/22. She is not reporting significant depressive symptomatology or anxiety, and is not noticing difficulty with cognition.

## 2022-09-13 DIAGNOSIS — F41.1 GAD (GENERALIZED ANXIETY DISORDER): ICD-10-CM

## 2022-09-13 DIAGNOSIS — G25.81 RESTLESS LEGS SYNDROME (RLS): ICD-10-CM

## 2022-09-13 NOTE — TELEPHONE ENCOUNTER
Writer left  for patient asking for clarification--Buspar was refilled two weeks ago and wondering if this is a duplicate request. Left my direct call back number.    Shanice Coyne, RN, MPH  Research Nurse, Movement Disorders

## 2022-09-14 RX ORDER — GABAPENTIN 300 MG/1
CAPSULE ORAL
Qty: 270 CAPSULE | Refills: 3 | Status: SHIPPED | OUTPATIENT
Start: 2022-09-14 | End: 2023-06-02

## 2022-09-14 RX ORDER — BUSPIRONE HYDROCHLORIDE 10 MG/1
TABLET ORAL
Qty: 270 TABLET | Refills: 3 | Status: SHIPPED | OUTPATIENT
Start: 2022-09-14 | End: 2023-06-02

## 2022-09-14 NOTE — TELEPHONE ENCOUNTER
Pt called to clarify that she needs most medications now refilled through [facility] mail order pharmacy. The only medication she needs from [facility] is amantadine. Pt reports she did not  the Buspar refill sent 8/29/22 to [facility], and would like refills sent to [facility] instead. Will update GUILLERMINA Desir, CNP and request new order.    Addendum:    Pt also requesting refill of gabapentin--will pend and send order for this.     Shanice Coyne, RN, MPH  Research Nurse, Movement Disorders

## 2022-09-17 ENCOUNTER — HEALTH MAINTENANCE LETTER (OUTPATIENT)
Age: 62
End: 2022-09-17

## 2022-09-21 ENCOUNTER — TELEPHONE (OUTPATIENT)
Dept: NEUROSURGERY | Facility: CLINIC | Age: 62
End: 2022-09-21

## 2022-09-21 ENCOUNTER — OFFICE VISIT (OUTPATIENT)
Dept: NEUROPSYCHOLOGY | Facility: CLINIC | Age: 62
End: 2022-09-21
Payer: COMMERCIAL

## 2022-09-21 DIAGNOSIS — G20.A1 PARKINSON DISEASE (H): Primary | ICD-10-CM

## 2022-09-21 DIAGNOSIS — Z00.6 EXAMINATION OF PARTICIPANT IN CLINICAL TRIAL: Primary | ICD-10-CM

## 2022-09-21 PROCEDURE — 96139 PSYCL/NRPSYC TST TECH EA: CPT

## 2022-09-21 PROCEDURE — 90791 PSYCH DIAGNOSTIC EVALUATION: CPT

## 2022-09-21 PROCEDURE — 96138 PSYCL/NRPSYC TECH 1ST: CPT

## 2022-09-21 NOTE — TELEPHONE ENCOUNTER
I called patient in regards to surgery with Dr. Hewitt, but there was no answer. I was able to leave a voicemail and a call back number.        Isamar Bateman on 9/21/2022 at 3:21 PM'

## 2022-09-21 NOTE — NURSING NOTE
The patient was seen for neuropsychological evaluation at the request of GUILLERMINA Wilson CNP as a [place] Clinical Core research visit. 221 minutes of test administration and scoring were provided by this writer.     Noemy Ortiz  Psychometrist

## 2022-09-21 NOTE — TELEPHONE ENCOUNTER
I called the patient in regards to scheduling surgery with Dr. Hewitt. Patient has a covid test scheduled at At home test and pre op has been taken care of with PCP. Patient is aware that the nursing team will be reaching out within the next few days. I did tell patient that a nurse will reach out within 2-3 days prior to surgery with arrival time and instructions.    Surgeon: Dr. Hewitt  Date of Surgery: 10/24/2022  Location of surgery: [facility]  Pre-Op H&P: TBD  Post-Op Appt Date: 11/7/11 @ 1000   Imaging needed:  No  Discussed COVID-19 testing:  Yes  Pre-cert/Authorization completed:  Yes  Patient aware that pre-op RN will call 2-3 days prior to surgery with arrival time and instructions Yes  Packet sent out: No 09/21/22  Patient was instructed to review packet and call back with any questions or concerns.           Isamar Bateman on 9/21/2022 at 4:03 PM

## 2022-09-26 ENCOUNTER — PATIENT OUTREACH (OUTPATIENT)
Dept: CARE COORDINATION | Facility: CLINIC | Age: 62
End: 2022-09-26

## 2022-09-26 NOTE — PROGRESS NOTES
Social Work Intervention  [facility]    Data/Intervention:    Patient Name:  Erika Diaz  /Age:  1960 (62 year old)    Visit Type: telephone  Referral Source: Zakiya Matthews RN  Reason for Referral:  Transportation to procedure    Collaborated With:    -Erika    Psychosocial Information/Concerns:  Pt lives in [facility] [place] [facility]. She is working part time.  She reported that her grand dtr is now planning to take a day off work and drive her to her procedure.     Intervention/Education/Resources Provided:  Also reviewed a Medicaid program for employed persons with disabilities and encouraged her to call the [facility] to discuss whether she might be eligible. She would then have access to transportation benefits for medical appts.     Assessment/Plan:  Pt has transportation arranged. No further f/u planned.     Provided patient/family with contact information and availability.    Tri Servin MSW, [facility]    [facility]  [phone]

## 2022-09-29 NOTE — PROGRESS NOTES
The patient completed the 48 month neuropsychological evaluation for the [place] Clinical Core. There were 221 minutes of psychometrist time (90146: 1 unit; 83846: 6 units) and one unit of neuropsychologist profession time (9/9/2022; 12585). OnCore ID: [id number]     Measures administered:    Dementia Rating Scale - 2 Standard Form: 138/144  WAIS-IV: Similarities, Comprehension, Matrix Reasoning, Letter Number Sequencing, Digit Span  WMS-R Information & Orientation, WMS-4 Logical Memory I and II  D-KEFS Verbal Fluency - Standard  [place] Naming Test  Clock Drawing  Trail Making Test  Stroop  [place] Card Sorting Test - 64 items  Fuentes Judgment of Line Orientation Form H  Harris Verbal Learning Test - Revised Form 4  Brief Visual Memory Test - Revised Form 3  MoCA v 7.3 (Score = 29/30)    BDI-2: 11  QUIP  ESS  RBDSQ  PDQ-39  Apathy Scale: 19  HAM-D (Administered 9/9/2022 over video platform)  HAM-A (Administered 9/9/2022 over video platform)    During the interview, she did not report significant depressive symptomatology or anxiety.    Amanda Hernandez, Ph.D., ABPP  Licensed Psychologist, LP [id number]  Board Certified in Clinical Neuropsychology

## 2022-10-05 ENCOUNTER — TELEPHONE (OUTPATIENT)
Dept: NEUROLOGY | Facility: CLINIC | Age: 62
End: 2022-10-05

## 2022-10-05 NOTE — TELEPHONE ENCOUNTER
Patient left  for writer stating she needs a refill of her amantadine. Writer noticed that a prescription had been sent 8/29/22 for 90 day supply with one refill. Writer called [facility] and they confirmed that patient has not filled that script yet, so they will fill that one. Writer called patient to let her know that amantadine is being filled at [facility]. Patient requested a 30 day supply rather than 90 day supply, due to cost. Writer called [facility] and asked if they can fill amantadine instead as 30 days or do they need a new prescription? Pharmacy staff stated they can fill it for 30 days and do not need a new prescription sent.      No further action needed.    Shanice Coyne, RN, MPH  Research Nurse, Movement Disorders

## 2022-10-23 ENCOUNTER — ANESTHESIA EVENT (OUTPATIENT)
Dept: SURGERY | Facility: CLINIC | Age: 62
End: 2022-10-23
Payer: COMMERCIAL

## 2022-10-24 ENCOUNTER — ANESTHESIA (OUTPATIENT)
Dept: SURGERY | Facility: CLINIC | Age: 62
End: 2022-10-24
Payer: COMMERCIAL

## 2022-10-24 ENCOUNTER — HOSPITAL ENCOUNTER (OUTPATIENT)
Facility: CLINIC | Age: 62
Discharge: HOME | End: 2022-10-24
Attending: NEUROLOGICAL SURGERY | Admitting: NEUROLOGICAL SURGERY
Payer: COMMERCIAL

## 2022-10-24 VITALS
TEMPERATURE: 98.8 F | WEIGHT: 234.79 LBS | RESPIRATION RATE: 14 BRPM | HEIGHT: 66 IN | OXYGEN SATURATION: 94 % | BODY MASS INDEX: 37.73 KG/M2 | SYSTOLIC BLOOD PRESSURE: 119 MMHG | DIASTOLIC BLOOD PRESSURE: 65 MMHG | HEART RATE: 77 BPM

## 2022-10-24 DIAGNOSIS — Z96.89 S/P DEEP BRAIN STIMULATOR PLACEMENT: Primary | ICD-10-CM

## 2022-10-24 LAB
ANION GAP SERPL CALCULATED.3IONS-SCNC: 11 MMOL/L (ref 7–15)
APTT PPP: 33 SECONDS (ref 22–38)
BUN SERPL-MCNC: 16.2 MG/DL (ref 8–23)
CALCIUM SERPL-MCNC: 9.2 MG/DL (ref 8.8–10.2)
CHLORIDE SERPL-SCNC: 100 MMOL/L (ref 98–107)
CREAT SERPL-MCNC: 0.73 MG/DL (ref 0.51–0.95)
DEPRECATED HCO3 PLAS-SCNC: 25 MMOL/L (ref 22–29)
ERYTHROCYTE [DISTWIDTH] IN BLOOD BY AUTOMATED COUNT: 14 % (ref 10–15)
GFR SERPL CREATININE-BSD FRML MDRD: >90 ML/MIN/1.73M2
GLUCOSE BLDC GLUCOMTR-MCNC: 134 MG/DL (ref 70–99)
GLUCOSE BLDC GLUCOMTR-MCNC: 162 MG/DL (ref 70–99)
GLUCOSE SERPL-MCNC: 152 MG/DL (ref 70–99)
HCT VFR BLD AUTO: 41.9 % (ref 35–47)
HGB BLD-MCNC: 13.8 G/DL (ref 11.7–15.7)
INR PPP: 1.1 (ref 0.85–1.15)
MCH RBC QN AUTO: 29.4 PG (ref 26.5–33)
MCHC RBC AUTO-ENTMCNC: 32.9 G/DL (ref 31.5–36.5)
MCV RBC AUTO: 89 FL (ref 78–100)
PLATELET # BLD AUTO: 247 10E3/UL (ref 150–450)
POTASSIUM SERPL-SCNC: 3.6 MMOL/L (ref 3.4–5.3)
RBC # BLD AUTO: 4.69 10E6/UL (ref 3.8–5.2)
SODIUM SERPL-SCNC: 136 MMOL/L (ref 136–145)
WBC # BLD AUTO: 7.4 10E3/UL (ref 4–11)

## 2022-10-24 PROCEDURE — 85027 COMPLETE CBC AUTOMATED: CPT

## 2022-10-24 PROCEDURE — 258N000003 HC RX IP 258 OP 636: Performed by: ANESTHESIOLOGY

## 2022-10-24 PROCEDURE — 85610 PROTHROMBIN TIME: CPT

## 2022-10-24 PROCEDURE — C1767 GENERATOR, NEURO NON-RECHARG: HCPCS | Performed by: NEUROLOGICAL SURGERY

## 2022-10-24 PROCEDURE — 272N000001 HC OR GENERAL SUPPLY STERILE: Performed by: NEUROLOGICAL SURGERY

## 2022-10-24 PROCEDURE — 710N000012 HC RECOVERY PHASE 2, PER MINUTE: Performed by: NEUROLOGICAL SURGERY

## 2022-10-24 PROCEDURE — 250N000009 HC RX 250: Performed by: NEUROLOGICAL SURGERY

## 2022-10-24 PROCEDURE — 250N000011 HC RX IP 250 OP 636: Performed by: NEUROLOGICAL SURGERY

## 2022-10-24 PROCEDURE — 999N000141 HC STATISTIC PRE-PROCEDURE NURSING ASSESSMENT: Performed by: NEUROLOGICAL SURGERY

## 2022-10-24 PROCEDURE — 370N000017 HC ANESTHESIA TECHNICAL FEE, PER MIN: Performed by: NEUROLOGICAL SURGERY

## 2022-10-24 PROCEDURE — 61886 IMPLANT NEUROSTIM ARRAYS: CPT | Mod: RT | Performed by: NEUROLOGICAL SURGERY

## 2022-10-24 PROCEDURE — 258N000003 HC RX IP 258 OP 636: Performed by: NURSE ANESTHETIST, CERTIFIED REGISTERED

## 2022-10-24 PROCEDURE — 360N000076 HC SURGERY LEVEL 3, PER MIN: Performed by: NEUROLOGICAL SURGERY

## 2022-10-24 PROCEDURE — 82962 GLUCOSE BLOOD TEST: CPT | Mod: 91

## 2022-10-24 PROCEDURE — 250N000011 HC RX IP 250 OP 636: Performed by: NURSE ANESTHETIST, CERTIFIED REGISTERED

## 2022-10-24 PROCEDURE — 82310 ASSAY OF CALCIUM: CPT

## 2022-10-24 PROCEDURE — 250N000011 HC RX IP 250 OP 636

## 2022-10-24 PROCEDURE — 85730 THROMBOPLASTIN TIME PARTIAL: CPT | Mod: GZ

## 2022-10-24 PROCEDURE — 36415 COLL VENOUS BLD VENIPUNCTURE: CPT

## 2022-10-24 DEVICE — GENERATOR DBS SYSTEM INFINITY 5 IPG 6660ANS: Type: IMPLANTABLE DEVICE | Site: CHEST | Status: FUNCTIONAL

## 2022-10-24 RX ORDER — HYDROMORPHONE HCL IN WATER/PF 6 MG/30 ML
0.2 PATIENT CONTROLLED ANALGESIA SYRINGE INTRAVENOUS EVERY 5 MIN PRN
Status: CANCELLED | OUTPATIENT
Start: 2022-10-24

## 2022-10-24 RX ORDER — FENTANYL CITRATE 50 UG/ML
INJECTION, SOLUTION INTRAMUSCULAR; INTRAVENOUS PRN
Status: DISCONTINUED | OUTPATIENT
Start: 2022-10-24 | End: 2022-10-24

## 2022-10-24 RX ORDER — LIDOCAINE 40 MG/G
CREAM TOPICAL
Status: DISCONTINUED | OUTPATIENT
Start: 2022-10-24 | End: 2022-10-24 | Stop reason: HOSPADM

## 2022-10-24 RX ORDER — CEPHALEXIN 500 MG/1
500 CAPSULE ORAL 4 TIMES DAILY
Qty: 28 CAPSULE | Refills: 0 | Status: SHIPPED | OUTPATIENT
Start: 2022-10-24 | End: 2022-10-31

## 2022-10-24 RX ORDER — CEFAZOLIN SODIUM/WATER 2 G/20 ML
2 SYRINGE (ML) INTRAVENOUS
Status: COMPLETED | OUTPATIENT
Start: 2022-10-24 | End: 2022-10-24

## 2022-10-24 RX ORDER — MEPERIDINE HYDROCHLORIDE 25 MG/ML
12.5 INJECTION INTRAMUSCULAR; INTRAVENOUS; SUBCUTANEOUS
Status: CANCELLED | OUTPATIENT
Start: 2022-10-24

## 2022-10-24 RX ORDER — SODIUM CHLORIDE, SODIUM LACTATE, POTASSIUM CHLORIDE, CALCIUM CHLORIDE 600; 310; 30; 20 MG/100ML; MG/100ML; MG/100ML; MG/100ML
INJECTION, SOLUTION INTRAVENOUS CONTINUOUS
Status: CANCELLED | OUTPATIENT
Start: 2022-10-24

## 2022-10-24 RX ORDER — ASPIRIN 81 MG/1
81 TABLET, CHEWABLE ORAL DAILY
COMMUNITY
Start: 2022-10-28

## 2022-10-24 RX ORDER — OXYCODONE HYDROCHLORIDE 5 MG/1
2.5 TABLET ORAL EVERY 6 HOURS PRN
Qty: 5 TABLET | Refills: 0 | Status: SHIPPED | OUTPATIENT
Start: 2022-10-24 | End: 2022-10-27

## 2022-10-24 RX ORDER — LIDOCAINE HYDROCHLORIDE AND EPINEPHRINE 10; 10 MG/ML; UG/ML
INJECTION, SOLUTION INFILTRATION; PERINEURAL PRN
Status: DISCONTINUED | OUTPATIENT
Start: 2022-10-24 | End: 2022-10-24 | Stop reason: HOSPADM

## 2022-10-24 RX ORDER — BUPIVACAINE HYDROCHLORIDE 2.5 MG/ML
INJECTION, SOLUTION INFILTRATION; PERINEURAL PRN
Status: DISCONTINUED | OUTPATIENT
Start: 2022-10-24 | End: 2022-10-24 | Stop reason: HOSPADM

## 2022-10-24 RX ORDER — PROPOFOL 10 MG/ML
INJECTION, EMULSION INTRAVENOUS PRN
Status: DISCONTINUED | OUTPATIENT
Start: 2022-10-24 | End: 2022-10-24

## 2022-10-24 RX ORDER — SODIUM CHLORIDE, SODIUM LACTATE, POTASSIUM CHLORIDE, CALCIUM CHLORIDE 600; 310; 30; 20 MG/100ML; MG/100ML; MG/100ML; MG/100ML
INJECTION, SOLUTION INTRAVENOUS CONTINUOUS
Status: DISCONTINUED | OUTPATIENT
Start: 2022-10-24 | End: 2022-10-24 | Stop reason: HOSPADM

## 2022-10-24 RX ORDER — PROPOFOL 10 MG/ML
INJECTION, EMULSION INTRAVENOUS CONTINUOUS PRN
Status: DISCONTINUED | OUTPATIENT
Start: 2022-10-24 | End: 2022-10-24

## 2022-10-24 RX ORDER — FENTANYL CITRATE 50 UG/ML
25 INJECTION, SOLUTION INTRAMUSCULAR; INTRAVENOUS EVERY 5 MIN PRN
Status: CANCELLED | OUTPATIENT
Start: 2022-10-24

## 2022-10-24 RX ORDER — OXYCODONE HYDROCHLORIDE 5 MG/1
5 TABLET ORAL EVERY 4 HOURS PRN
Status: CANCELLED | OUTPATIENT
Start: 2022-10-24

## 2022-10-24 RX ORDER — FENTANYL CITRATE 50 UG/ML
25 INJECTION, SOLUTION INTRAMUSCULAR; INTRAVENOUS
Status: DISCONTINUED | OUTPATIENT
Start: 2022-10-24 | End: 2022-10-24 | Stop reason: HOSPADM

## 2022-10-24 RX ORDER — OMEGA-3 FATTY ACIDS/FISH OIL 300-1000MG
200 CAPSULE ORAL EVERY 4 HOURS PRN
COMMUNITY
Start: 2022-10-31

## 2022-10-24 RX ORDER — ONDANSETRON 2 MG/ML
4 INJECTION INTRAMUSCULAR; INTRAVENOUS EVERY 30 MIN PRN
Status: CANCELLED | OUTPATIENT
Start: 2022-10-24

## 2022-10-24 RX ORDER — ACETAMINOPHEN 500 MG
500-1000 TABLET ORAL EVERY 6 HOURS PRN
Qty: 60 TABLET | Refills: 0 | Status: SHIPPED | OUTPATIENT
Start: 2022-10-24

## 2022-10-24 RX ORDER — ONDANSETRON 4 MG/1
4 TABLET, ORALLY DISINTEGRATING ORAL EVERY 30 MIN PRN
Status: CANCELLED | OUTPATIENT
Start: 2022-10-24

## 2022-10-24 RX ORDER — DEXAMETHASONE SODIUM PHOSPHATE 4 MG/ML
INJECTION, SOLUTION INTRA-ARTICULAR; INTRALESIONAL; INTRAMUSCULAR; INTRAVENOUS; SOFT TISSUE PRN
Status: DISCONTINUED | OUTPATIENT
Start: 2022-10-24 | End: 2022-10-24

## 2022-10-24 RX ORDER — CEFAZOLIN SODIUM/WATER 2 G/20 ML
2 SYRINGE (ML) INTRAVENOUS SEE ADMIN INSTRUCTIONS
Status: DISCONTINUED | OUTPATIENT
Start: 2022-10-24 | End: 2022-10-24 | Stop reason: HOSPADM

## 2022-10-24 RX ORDER — ONDANSETRON 2 MG/ML
INJECTION INTRAMUSCULAR; INTRAVENOUS PRN
Status: DISCONTINUED | OUTPATIENT
Start: 2022-10-24 | End: 2022-10-24

## 2022-10-24 RX ADMIN — SODIUM CHLORIDE, POTASSIUM CHLORIDE, SODIUM LACTATE AND CALCIUM CHLORIDE: 600; 310; 30; 20 INJECTION, SOLUTION INTRAVENOUS at 10:09

## 2022-10-24 RX ADMIN — PROPOFOL 30 MG: 10 INJECTION, EMULSION INTRAVENOUS at 10:30

## 2022-10-24 RX ADMIN — PROPOFOL 20 MG: 10 INJECTION, EMULSION INTRAVENOUS at 10:35

## 2022-10-24 RX ADMIN — PHENYLEPHRINE HYDROCHLORIDE 200 MCG: 10 INJECTION INTRAVENOUS at 10:51

## 2022-10-24 RX ADMIN — PHENYLEPHRINE HYDROCHLORIDE 100 MCG: 10 INJECTION INTRAVENOUS at 10:33

## 2022-10-24 RX ADMIN — DEXAMETHASONE SODIUM PHOSPHATE 4 MG: 4 INJECTION, SOLUTION INTRA-ARTICULAR; INTRALESIONAL; INTRAMUSCULAR; INTRAVENOUS; SOFT TISSUE at 10:22

## 2022-10-24 RX ADMIN — PROPOFOL 125 MCG/KG/MIN: 10 INJECTION, EMULSION INTRAVENOUS at 10:15

## 2022-10-24 RX ADMIN — FENTANYL CITRATE 50 MCG: 50 INJECTION, SOLUTION INTRAMUSCULAR; INTRAVENOUS at 10:18

## 2022-10-24 RX ADMIN — PHENYLEPHRINE HYDROCHLORIDE 100 MCG: 10 INJECTION INTRAVENOUS at 11:05

## 2022-10-24 RX ADMIN — Medication 2 G: at 10:20

## 2022-10-24 RX ADMIN — ONDANSETRON 4 MG: 2 INJECTION INTRAMUSCULAR; INTRAVENOUS at 10:16

## 2022-10-24 ASSESSMENT — VISUAL ACUITY
OU: GLASSES

## 2022-10-24 ASSESSMENT — ACTIVITIES OF DAILY LIVING (ADL)
ADLS_ACUITY_SCORE: 35

## 2022-10-24 NOTE — ANESTHESIA CARE TRANSFER NOTE
Patient: Erika Diaz    Procedure: Procedure(s):  Replacement of deep brain stimulator generator/battery over right chest wall       Diagnosis: Parkinson disease (H) [G20]  Diagnosis Additional Information: No value filed.    Anesthesia Type:   MAC     Note:    Oropharynx: oropharynx clear of all foreign objects and spontaneously breathing  Level of Consciousness: awake  Oxygen Supplementation: face mask  Level of Supplemental Oxygen (L/min / FiO2): 6  Independent Airway: airway patency satisfactory and stable  Dentition: dentition unchanged  Vital Signs Stable: post-procedure vital signs reviewed and stable  Report to RN Given: handoff report given  Patient transferred to: Phase II    Handoff Report: Identifed the Patient, Identified the Reponsible Provider, Reviewed the pertinent medical history, Discussed the surgical course, Reviewed Intra-OP anesthesia mangement and issues during anesthesia, Set expectations for post-procedure period and Allowed opportunity for questions and acknowledgement of understanding      Vitals:  Vitals Value Taken Time   /52 10/24/22 1131   Temp     Pulse 66 10/24/22 1131   Resp     SpO2 99 % 10/24/22 1133   Vitals shown include unvalidated device data.    Electronically Signed By: GUILLERMINA Parr CRNA  October 24, 2022  11:33 AM

## 2022-10-24 NOTE — ANESTHESIA PREPROCEDURE EVALUATION
Anesthesia Pre-Procedure Evaluation    Patient: Erika Diaz   MRN: [id number] : 1960        Procedure : Procedure(s):  Replacement of deep brain stimulator generator/battery over right chest wall          Past Medical History:   Diagnosis Date     Benign paroxysmal positional vertigo of right ear      Degenerative joint disease 2017     Encounter for neuropsychological testing 2017    Current results indicate variability in attention and memory, ranging from moderately impaired to superior, in some cases with greater difficulty on less complex tasks. Basic language, visual processing, and executive functioning fall within normal limits. Personality assessment is suggestive of somatization, and also raises the possibility of a thought disorder as well as a history of manic episo     JASON (generalized anxiety disorder)      History of colonic polyps 2017     HTN (hypertension) 2017     Hypercholesterolemia 2017     Lactose intolerance in adult 2017     Migraines      Obesity 2017     Parkinson disease (H)      PID (pelvic inflammatory disease) due to IUD 2017     Pulmonary emboli (H) 2019    Post Right Ankel Surgery     Pulmonary emboli (H) - coumadin lovenox 2017    provoked after knee replacement     Sensorineural hearing loss (SNHL) of right ear 2018     Trochanteric bursitis of left hip       Past Surgical History:   Procedure Laterality Date     adhesioloysis       CHOLECYSTECTOMY       COLONOSCOPY       IMPLANT DEEP BRAIN STIMULATION GENERATOR / BATTERY Right 2018    Procedure: IMPLANT DEEP BRAIN STIMULATION GENERATOR / BATTERY;  Deep Brain Stimulator Placement, Phase II, Placement Of Deep Brain Stimulator Generator/Battery Over The Right Chest Wall;  Surgeon: Jerry Concepcion MD;  Location: [facility] OR     IMPLANT DEEP BRAIN STIMULATION GENERATOR / BATTERY Left 3/12/2021    Procedure: Deep brain stimulator placement, phase II,  placement of deep brain stimulator generator/battery over the left chest wall;  Surgeon: Jerry Concepcion MD;  Location: [facility] OR     JOINT REPLACEMENT Right     right partial knee replacement     LAPAROSCOPY      adhesioloysis     LAPAROSCOPY      supracervical hysterectomy     LAPAROTOMY EXPLORATORY Left     partial removal of left ovary     OPTICAL TRACKING SYSTEM INSERTION DEEP BRAIN STIMULATION Right 2018    Procedure: OPTICAL TRACKING SYSTEM INSERTION DEEP BRAIN STIMULATION;  Stealth Assisted Right Deep Brain Stimulator Placement, Phase I, Placement Of Right Side Deep Brain Stimulator Electrode, Target Right Globus Pallidus Internus With Microelectrode Recording;  Surgeon: Jerry Concepcion MD;  Location[facility]     OPTICAL TRACKING SYSTEM INSERTION DEEP BRAIN STIMULATION Left 3/5/2021    Procedure: stealth assisted Left side deep brain stimulator placement, phase I, placement of left side deep brain stimulator electrode, target left globus pallidus internus, with microelectrode recording and placement of extension and externalization wires for UDALL research protocol;  Surgeon: Jerry Concepcion MD;  [facility]     REMOVE INTRAUTERINE DEVICE  2006     salpingoopherectomy       XR FOOT SURGERY SENG RIGHT Right 2019      Allergies   Allergen Reactions     Atorvastatin Muscle Pain (Myalgia)     Other reaction(s): Myalgia  Per PCP note 18 - is to resume simvastatin - monitor for myalgia     Codeine Itching     Mold Unknown      Social History     Tobacco Use     Smoking status: Former     Packs/day: 0.50     Years: 20.00     Pack years: 10.00     Types: Cigarettes     Quit date: 2009     Years since quittin.2     Smokeless tobacco: Never   Substance Use Topics     Alcohol use: No      Wt Readings from Last 1 Encounters:   22 106.1 kg (234 lb)        Anesthesia Evaluation   Pt has had prior anesthetic. Type: General.        ROS/MED HX  ENT/Pulmonary:        Neurologic:       Cardiovascular:     (+) hypertension-----    METS/Exercise Tolerance:     Hematologic:       Musculoskeletal:       GI/Hepatic:       Renal/Genitourinary:       Endo:     (+) type I DM, Obesity,     Psychiatric/Substance Use:       Infectious Disease:       Malignancy:       Other:            Physical Exam    Airway        Mallampati: II    Neck ROM: limited     Respiratory Devices and Support         Dental       (+) upper dentures and lower dentures      Cardiovascular   cardiovascular exam normal          Pulmonary   pulmonary exam normal                OUTSIDE LABS:  CBC:   Lab Results   Component Value Date    WBC 7.4 10/24/2022    WBC 12.2 (H) 03/06/2021    HGB 13.8 10/24/2022    HGB 13.9 03/06/2021    HCT 41.9 10/24/2022    HCT 42.4 03/06/2021     10/24/2022     03/06/2021     BMP:   Lab Results   Component Value Date     10/24/2022     03/06/2021    POTASSIUM 3.6 10/24/2022    POTASSIUM 3.6 03/06/2021    CHLORIDE 100 10/24/2022    CHLORIDE 109 03/06/2021    CO2 25 10/24/2022    CO2 27 03/06/2021    BUN 16.2 10/24/2022    BUN 12 03/06/2021    CR 0.73 10/24/2022    CR 0.63 03/06/2021     (H) 10/24/2022     (H) 10/24/2022     COAGS:   Lab Results   Component Value Date    PTT 33 10/24/2022    INR 1.10 10/24/2022     POC:   Lab Results   Component Value Date     (H) 03/12/2021     HEPATIC:   Lab Results   Component Value Date    ALBUMIN 3.5 12/26/2018    PROTTOTAL 6.9 12/26/2018    ALT 30 12/26/2018    AST 33 12/26/2018    ALKPHOS 100 12/26/2018    BILITOTAL 0.5 12/26/2018     OTHER:   Lab Results   Component Value Date    A1C 7.2 (A) 04/13/2021    AVANI 9.2 10/24/2022    PHOS 3.4 03/06/2021    MAG 2.2 03/06/2021       Anesthesia Plan    ASA Status:  3   NPO Status:  NPO Appropriate    Anesthesia Type: MAC.     - Reason for MAC: straight local not clinically adequate   Induction: Intravenous.   Maintenance: TIVA.        Consents    Anesthesia  Plan(s) and associated risks, benefits, and realistic alternatives discussed. Questions answered and patient/representative(s) expressed understanding.     - Discussed: Risks, Benefits and Alternatives for BOTH SEDATION and the PROCEDURE were discussed     - Discussed with:  Patient    Use of blood products discussed: Yes.     - Discussed with: Patient.     Postoperative Care    Pain management: IV analgesics.   PONV prophylaxis: Ondansetron (or other 5HT-3), Dexamethasone or Solumedrol     Comments:                Ermias Flannery MD

## 2022-10-24 NOTE — ANESTHESIA POSTPROCEDURE EVALUATION
Patient: Erika Diaz    Procedure: Procedure(s):  Replacement of deep brain stimulator generator/battery over right chest wall       Anesthesia Type:  MAC    Note:  Disposition: Outpatient   Postop Pain Control: Uneventful            Sign Out: Well controlled pain   PONV: No   Neuro/Psych: Uneventful            Sign Out: Acceptable/Baseline neuro status   Airway/Respiratory: Uneventful            Sign Out: Acceptable/Baseline resp. status   CV/Hemodynamics: Uneventful            Sign Out: Acceptable CV status; No obvious hypovolemia; No obvious fluid overload   Other NRE: NONE   DID A NON-ROUTINE EVENT OCCUR? No           Last vitals:  Vitals Value Taken Time   /62 10/24/22 1217   Temp 36.6  C (97.9  F) 10/24/22 1131   Pulse 70 10/24/22 1215   Resp 14 10/24/22 1135   SpO2 94 % 10/24/22 1227   Vitals shown include unvalidated device data.    Electronically Signed By: Ermias Flannery MD  October 24, 2022  12:28 PM

## 2022-10-24 NOTE — OP NOTE
DATE OF PROCEDURE:  10/24/2022    PREOPERATIVE DIAGNOSIS:  1.  Parkinson Disease  2.  S/p Deep Brain Stimulator placement  3.  Depleted deep brain stimulator generator/battery over the right chest wall    POSTOPERATIVE DIAGNOSIS:  1.  Parkinson Disease  2.  S/p Deep Brain Stimulator placement  3.  Depleted deep brain stimulator generator/battery over the right chest wall    PROCEDURES PERFORMED:  1.  Replacement of deep brain stimulator generator/battery over the right chest wall  2.  Electrical interrogation and analysis of the right side deep brain stimulator system    ATTENDING SURGEON:  Vignesh Hewitt MD    ASSISTANT RESIDENT:  Melonie Berrios MD, PhD    ANESTHESIA: Monitored Anesthesia Care and local anesthetic    ESTIMATED BLOOD LOSS: 1 mL    COMPLICATIONS: None.    FINDINGS: No sign of infection. No sign of hardware breakage or damage. With new generator/battery connected, all the impedance values were within normal. No problems were found.    Implants:           Implant Name Type Inv. Item Serial No.  Lot No. LRB No. Used Action   GENERATOR DBS SYSTEM INFINITY 5 IPG 6660ANS - [id number] Neurology device GENERATOR DBS SYSTEM INFINITY 5 IPG 6660ANS [id number] [employer] N/A Right 1 Implanted   Infinity 5 Implantable Pulse Generator Neurology device GENERATOR DBS SYSTEM INFINITY 5 IPG 6660ANS [id number] [employer]   Right 1 Explanted         INDICATIONS FOR PROCEDURE:    Erika Diaz is a 61 y/o woman with idiopathic PD s/p bilateral GPi DBS (Abbott Infinity 5) who presents for neurosurgical consultation regarding battery replacement. She has bilateral IPGs as her right GPi implant was performed 08/2018 and her left GPi implant was performed 03/2021. She recently saw Karina Rivas NP for her 48 month Clinical Core research f/u visit who determined that her right IPG is nearing end of service. Her motor symptoms are stable on her current medication and stimulation regimen. She takes  ASA81 daily but no other antiplatelets or anticoagulants. She does note that her left IPG is still quite mobile, but is willing to wait until planned elective replacement to revise the pocket and make it less mobile. After discussion of risks and benefits she agreed to move ahead with above procedures.    DESCRIPTION OF PROCEDURE:  Informed consent was obtained from the patient and the previous incision was marked. She was brought to the operating room and was laid in a supine position.  Her arms were tucked, and all pressure points were padded appropriately. Monitored anesthesia care was induced and maintained throughout the entire case. Her scar and previous incision over the right chest wall was prepped and draped in the usual sterile fashion.  A time out was performed confirming the patient's identity, procedure to be performed, site and side of the surgery, allergies, and the administration of prophylactic preoperative antibiotic.  Lidocaine 1% with 1:100,000 epinephrine mixed with Marcaine 1/4% plain, 50:50 mix, was injected in the subcutaneous space at the intended incision site, which was the previously well healed incision.  Total of 10 mL was used.  The DBS system was turned to the surgery mode.     We turned our attention to the right side. A #10 blade scalpel was used to make the skin incision, going over the previously well healed scar.  We carried our dissection through the dermal layer until we reached the subcutaneous fat and then the generator/battery capsule. The monopolar cautery was used to dissect the subcutaneous tissue and to open the capsule, taking great care not to damage the hardware.  We gently opened up the fibrous capsule surrounding the generator/battery to expose it.  Care was taken to open the capsule while lifting the capsule itself away from the generator/battery, taking great care not to come in contact with the metal casing or the wire.  The generator/battery was mobile, and we  were able to remove it easily, taking care to attend to the location of the wires.  The scar surrounding and anchoring the extension wires was carefully dissected and wires freed. The extension wires were examined and were found to be without any damage or erosion or defect.  There were no obvious signs of infection.     No modification of the pocket was necessary. The entire cavity was copiously irrigated and meticulous hemostasis was obtained, and the pocket was dried.       The old generator/battery was disconnected. The connector contacts were cleaned.  The new generator/battery was connected to the extension wires in the same sequence with the extension wires being inserted into the same corresponding portals, which were secured. The new generator/battery was placed back into the pocket along with the excess extension wires being placed posteriorly and around the periphery of the generator/battery.       The right side DBS system was tested and interrogated electrically and was found to be working well, and impedance values were noted to be within normal limits.  No problems were found.       With the satisfactory placement of the new system, we began closing the wound.  The deep pocket was reapproximated using 3-0 Vicryl sutures in an inverted interrupted fashion.  The subcutaneous fat layer was then reapproximated using 3-0 Vicryl sutures in an inverted interrupted fashion to provide padding to the skin for the closure.  The dermal layer was reapproximated using 3-0 Vicryl sutures in an inverted interrupted fashion.  The skin was reapproximated using 4-0 Monocryl suture in a subcuticular fashion.  The incision was then cleaned with wet and dry sponges followed by ChloraPrep. Next steri strips were laid across the incision, followed by telfa and a Tegaderm dressing.     The DBS system was turned back on to the therapeutic mode.     At the end of the case, all counts including needle, sponge and instrument  counts were correct x 2.  There were no complications.  Patient was awakened and taken to the recovery room in a stable condition.      Dr. Vignesh Hewitt was present and available for all critical portions of the case.        Melonie Berrios MD, PhD  PGY-2 Neurosurgery    Physician Attestation   I was present for the key portions of the procedure and I was immediately available for the entire procedure between opening and closing.    Vignesh Hewitt MD  Date of Service (when I saw the patient): 10/24/2022

## 2022-10-24 NOTE — DISCHARGE INSTRUCTIONS
[facility]  Same-Day Surgery   Adult Discharge Orders & Instructions     For 24 hours after surgery    Get plenty of rest.  A responsible adult must stay with you for at least 24 hours after you leave the hospital.   Do not drive or use heavy equipment.  If you have weakness or tingling, don't drive or use heavy equipment until this feeling goes away.  Do not drink alcohol.  Avoid strenuous or risky activities.  Ask for help when climbing stairs.   You may feel lightheaded.  IF so, sit for a few minutes before standing.  Have someone help you get up.   If you have nausea (feel sick to your stomach): Drink only clear liquids such as apple juice, ginger ale, broth or 7-Up.  Rest may also help.  Be sure to drink enough fluids.  Move to a regular diet as you feel able.  You may have a slight fever. Call the doctor if your fever is over 100 F (37.7 C) (taken under the tongue) or lasts longer than 24 hours.  You may have a dry mouth, a sore throat, muscle aches or trouble sleeping.  These should go away after 24 hours.  Do not make important or legal decisions.   Call your doctor for any of the followin.  Signs of infection (fever, growing tenderness at the surgery site, a large amount of drainage or bleeding, severe pain, foul-smelling drainage, redness, swelling).    2. It has been over 8 to 10 hours since surgery and you are still not able to urinate (pass water).    3.  Headache for over 24 hours.    4.  Numbness, tingling or weakness the day after surgery (if you had spinal anesthesia).  To contact a doctor, call Dr. Hewitt at [phone] or:    '   [phone] and ask for the resident on call for neurosurgery  (answered 24 hours a day)  '   Emergency Department:    [facility]: [phone]       (TTY for hearing impaired: [phone])    [facility]: [phone]       (TTY for hearing impaired: [phone])

## 2022-10-24 NOTE — OR NURSING
Patient meets discharge criteria. All VSS, denies any pain/nausea. No s/s of complications. Surgical site clean, dry, and intact. Patient was able to void in discharge and has stable gait. Discharge instructions and medications reviewed with patient and granddaughter, Rocío. All questions and concerns were addressed at this time. Patient currently waiting for transport to [facility] for discharge.

## 2022-10-24 NOTE — BRIEF OP NOTE
[facility]    Brief Operative Note    Pre-operative diagnosis: Parkinson disease (H) [G20]  Post-operative diagnosis Same as pre-operative diagnosis    Procedure: Procedure(s):  Replacement of deep brain stimulator generator/battery over right chest wall  Surgeon: Surgeon(s) and Role:     * Vignesh Hewitt MD - Primary     * Melonie Berrios MD - Resident - Assisting  Anesthesia: MAC with Local   Estimated Blood Loss: 1ml    Drains: None  Specimens: * No specimens in log *  Findings:  Old generator identified and removed intact. Wire was intact. Generator replaced with good hemostasis achieved, and all impedance checks were within normal limits.    Complications: None.  Implants:   Implant Name Type Inv. Item Serial No.  Lot No. LRB No. Used Action   GENERATOR DBS SYSTEM INFINITY 5 IPG 6660ANS - [id number] Neurology device GENERATOR DBS SYSTEM INFINITY 5 IPG 6660ANS [id number] [employer] N/A Right 1 Implanted   Infinity 5 Implantable Pulse Generator Neurology device GENERATOR DBS SYSTEM INFINITY 5 IPG 6660ANS [id number] [employer]  Right 1 Explanted       Melonie Berrios MD, PhD  PGY-2 Neurosurgery  Please page NSGY on call with questions

## 2022-10-25 ENCOUNTER — PATIENT OUTREACH (OUTPATIENT)
Dept: NEUROSURGERY | Facility: CLINIC | Age: 62
End: 2022-10-25

## 2022-10-25 ENCOUNTER — TELEPHONE (OUTPATIENT)
Dept: NEUROLOGY | Facility: CLINIC | Age: 62
End: 2022-10-25

## 2022-10-25 NOTE — PROGRESS NOTES
"[facility]: Post-Discharge Note  SITUATION                                                      Admission:    Admission Date: 10/24/22   Reason for Admission: Replacement of deep brain stimulator generator/battery over the right chest wall  Discharge:   Discharge Date: 10/24/22  Discharge Diagnosis: Depleted right side DBS generator/battery    BACKGROUND                                                      Per hospital discharge summary and inpatient provider notes:  Neurosurgery Discharge Coordination Note     Attending physician: Dr. Hewitt  Discharge to: Home     Current status: Patient states she woke up this morning and noticed numbness in her right thumb and hand when at rest. When she holds or grabs something, she feels a pins and needles tingling sensation. I asked pt to turn DBS stimulation off to see if there is any connection between the new battery and the numbness/tingling. She started with the new right side generator and said the numbness/tingling decreased somewhat when off. She then turned off the left side generator (both sides off) and said the numbness/tingling decreased further, but it still didn't feel \"normal.\" Numbness/tingling returned when both batteries were turned back on. Will follow-up with neurology team and Dr. Hewitt.    Pt's dressing is in place and is dry with minimal shadow drainage from yesterday. She will take the dressing off tomorrow and understands to leave the steri strips in place. She may get the incision wet on 10/27 and understands not to soak/submerge the incision. She will call if she notes  redness, swelling, increased tenderness, drainage, incision opening or elevated temp. Reports Incision CDI without signs of infection.  Denies current bowel or bladder issues.    Discharge instructions and medications reviewed with patient.  Follow up appointments/imaging/tests needed: 2 week post op with WARREN Wooten on 11/9/22.     RN triage/on call number given: " [phone]/ [phone]        ASSESSMENT           Discharge Assessment  How are your symptoms? (Red Flag symptoms escalate to triage hotline per guidelines): Improved  Do you feel your condition is stable enough to be safe at home until your provider visit?: Yes  Does the patient have their discharge instructions? : Yes  Does the patient have questions regarding their discharge instructions? : No  Were you started on any new medications or were there changes to any of your previous medications? : Yes  Does the patient have all of their medications?: Yes  Do you have questions regarding any of your medications? : No  Discharge follow-up appointment scheduled within 14 calendar days? : Yes  Discharge Follow Up Appointment Date: 11/09/22  Discharge Follow Up Appointment Scheduled with?: Specialty Care Provider    Post-op ([facility] Only)  If the patient had a surgery or procedure, do they have any questions for a nurse?: Yes (see comment) (tingling/numbness in right thumb/hand)    Post-op (Clinicians Only)  Did the patient have surgery or a procedure: Yes  Incision: closed;healing  Drainage: No  Bleeding: none  Fever: No  Chills: No  Redness:  (Dressing in place)  Warmth:  (Dressing in place)  Swelling:  (Dressing in place)  Incision site pain: Yes  Closure: suture;dissolving  Eating & Drinking: eating and drinking without complaints/concerns  PO Intake: regular diet  Bowel Function: normal  Urinary Status: voiding without complaint/concerns        PLAN                                                      Outpatient Plan:  Routine postop follow-up      Future Appointments   Date Time Provider Department Center   11/9/2022 10:00 AM Kristal Wooten APRN [facility]   2/28/2023  9:40 AM Arminda Rivas APRN [facility]         For any urgent concerns, please contact our 24 hour nurse triage line: [phone] ([phone])         KRYSTAL GONZALEZ RN

## 2022-10-25 NOTE — TELEPHONE ENCOUNTER
[facility]    Phone Message    May a detailed message be left on voicemail: yes     Reason for Call: Other: Pt is calling because she had her DBS programming done yesterday. Pt says since then she has noticed tingling in her right thumb. She says she can barely feel anything there. Wondering ifthis is something she should be worried about. Pt would like a call back to discuss.     Action Taken: Message routed to:  [facility] ([facility]): NEUROLOGY    Travel Screening: Not Applicable

## 2022-10-25 NOTE — TELEPHONE ENCOUNTER
Erika had her RIGHT chest Abbott Infinity 5 battery replaced on 10/24/22. She calls today regarding tingling in her right thumb post procedure.

## 2022-10-26 NOTE — TELEPHONE ENCOUNTER
Writer left voicemail message requesting call back regarding tingling in her hands after IPG replacement and to discuss DBS settings. Writer's direct phone number provided, will try again later this afternoon.    Deanna Sweet RN (Niecy) on October 26, 2022 at 1:02 PM

## 2022-10-26 NOTE — TELEPHONE ENCOUNTER
Writer received return call from patient, patient reports that her tingling in both hands has completely resolved. Writer had patient check her DBS settings, patient reported that she is on Program 1, her right body is set at 2.9 mA and left body is at 4.2 mA which is the same as her last office visit on 08/29/2022.    Patient does not want to schedule a follow up visit with neurology provider at this time. Writer explained to patient that this information will be forwarded to the movement disorder team and to call or send a Gabstr message if there are any other questions or concerns.    Deanna Sweet RN (Niecy) on October 26, 2022 at 1:32 PM

## 2022-10-27 ENCOUNTER — TELEPHONE (OUTPATIENT)
Dept: NEUROSURGERY | Facility: CLINIC | Age: 62
End: 2022-10-27

## 2022-11-07 DIAGNOSIS — G25.81 RESTLESS LEGS SYNDROME (RLS): ICD-10-CM

## 2022-11-07 DIAGNOSIS — G20.A1 PARKINSON'S DISEASE (H): ICD-10-CM

## 2022-11-08 NOTE — TELEPHONE ENCOUNTER
Writer spoke with patients, states she has 1 bottle of her pramipexole left and about 25 tablets of her hydrochlorothiazide. Patient verbalized that she understands that hydrochlorothiazide refills come from per PCP.    Pramipexole pended and routed to neurology provider.    Deanna Sweet RN (Niecy) on November 8, 2022 at 4:44 PM

## 2022-11-08 NOTE — TELEPHONE ENCOUNTER
"Writer spoke with patient, but patient explained that she didn't have time to talk as she was on another phone call. Writer had a brief conversation asking her to clarify which medication was needed, pramipexole or hydrochlorothiazide, as there is a conflicting request. Pt. stated \"I don't think I need either of them.\" Writer requested that patient verify if she needs any refills on medications and writer will call back later in the evening.    Deanna Sweet RN (Niecy) on November 8, 2022 at 2:41 PM  "

## 2022-11-08 NOTE — TELEPHONE ENCOUNTER
Rx Authorization:  Requested Medication/ Dose hydrochlorothiazide (HYDRODIURIL) 25 MG tablet  Date last refill ordered:   Quantity ordered:   # refills:   Date of last clinic visit with ordering provider: 8/29/22  Date of next clinic visit with ordering provider: 2/28/23  All pertinent protocol data (lab date/result):   Include pertinent information from patients message:

## 2022-11-09 ENCOUNTER — OFFICE VISIT (OUTPATIENT)
Dept: NEUROSURGERY | Facility: CLINIC | Age: 62
End: 2022-11-09
Payer: COMMERCIAL

## 2022-11-09 VITALS
HEART RATE: 60 BPM | BODY MASS INDEX: 37.79 KG/M2 | OXYGEN SATURATION: 96 % | SYSTOLIC BLOOD PRESSURE: 144 MMHG | WEIGHT: 234 LBS | DIASTOLIC BLOOD PRESSURE: 84 MMHG

## 2022-11-09 DIAGNOSIS — G20.A1 PARKINSON'S DISEASE (H): Primary | ICD-10-CM

## 2022-11-09 PROCEDURE — 99024 POSTOP FOLLOW-UP VISIT: CPT | Performed by: NURSE PRACTITIONER

## 2022-11-09 RX ORDER — PRAMIPEXOLE DIHYDROCHLORIDE 0.5 MG/1
TABLET ORAL
Qty: 180 TABLET | Refills: 3 | Status: SHIPPED | OUTPATIENT
Start: 2022-11-09

## 2022-11-09 NOTE — LETTER
2022       RE: Erika Diaz  [address] [place] [id number]     Dear Colleague,    Thank you for referring your patient, Erika Diaz, to the [facility] [place] at [facility]. Please see a copy of my visit note below.    [facility]  Department of Neurosurgery      Name: Erika Diaz  MRN: [id number]  Age: 62 year old  : 1960  Referring provider: Vignesh Hewitt  2022      Chief Complaint:   Parkinson's disease  S/p Replacement of deep brain stimulator generator/battery over the right chest wall on 10/24/2022  Post op follow up    History of Present Illness:   Erika Diaz is a 62 year old female with a history of Parkinson's disease, s/p Replacement of deep brain stimulator generator/battery over the right chest wall on 10/24/2022 by Dr. Hewitt who is seen today for 2 weeks post op follow up. Patient presents to the evaluation alone. She denies any fever or chills since hospital discharge. She had some numbness and tingling in her right hand post operatively. This lasted for a day and then completely resolved on its own. No concerns with right chest wall incision.       Review of Systems:   Pertinent items are noted in HPI or as in patient entered ROS below, remainder of complete ROS is negative.   No flowsheet data found.     Physical Exam:   BP (!) 144/84 (BP Location: Right arm, Patient Position: Sitting, Cuff Size: Adult Large)   Pulse 60   Wt 106.1 kg (234 lb)   SpO2 96%   BMI 37.79 kg/m     General: No acute distress.    Neuro: The patient is fully oriented.   Psych: Normal mood and affect. Behavior is normal.    Incision: Right chest wall incision with steri strips in place. Healing well.          Procedures:  Steri strips removed from right chest wall incision.     Assessment:  Parkinson's disease  S/p Replacement of deep brain stimulator generator/battery  over the right chest wall on 10/24/2022  Post op follow up    Plan:  Recovering well since her recent surgery. She is scheduled to follow up with Movement Disorder team in 2/2023.        I spent 20 minutes on patient care activities related to this encounter on the date of service, including time spent reviewing the chart, obtaining history and examination and in counseling the patient, and in documentation in the electronic medical record.      Kristal SARMIENTO CNP  Department of Neurosurgery

## 2022-11-09 NOTE — NURSING NOTE
Chief Complaint   Patient presents with     RECHECK     Incision Check (Oct 24th, 2022)     Selena Bellamy CMA at 9:29 AM on 11/9/2022.

## 2022-11-09 NOTE — PROGRESS NOTES
[facility]  Department of Neurosurgery      Name: Erika Diaz  MRN: [id number]  Age: 62 year old  : 1960  Referring provider: Vignesh Hewitt  2022      Chief Complaint:   Parkinson's disease  S/p Replacement of deep brain stimulator generator/battery over the right chest wall on 10/24/2022  Post op follow up    History of Present Illness:   Erika Diaz is a 62 year old female with a history of Parkinson's disease, s/p Replacement of deep brain stimulator generator/battery over the right chest wall on 10/24/2022 by Dr. Hewitt who is seen today for 2 weeks post op follow up. Patient presents to the evaluation alone. She denies any fever or chills since hospital discharge. She had some numbness and tingling in her right hand post operatively. This lasted for a day and then completely resolved on its own. No concerns with right chest wall incision.       Review of Systems:   Pertinent items are noted in HPI or as in patient entered ROS below, remainder of complete ROS is negative.   No flowsheet data found.     Physical Exam:   BP (!) 144/84 (BP Location: Right arm, Patient Position: Sitting, Cuff Size: Adult Large)   Pulse 60   Wt 106.1 kg (234 lb)   SpO2 96%   BMI 37.79 kg/m     General: No acute distress.    Neuro: The patient is fully oriented.   Psych: Normal mood and affect. Behavior is normal.    Incision: Right chest wall incision with steri strips in place. Healing well.          Procedures:  Steri strips removed from right chest wall incision.     Assessment:  Parkinson's disease  S/p Replacement of deep brain stimulator generator/battery over the right chest wall on 10/24/2022  Post op follow up    Plan:  Recovering well since her recent surgery. She is scheduled to follow up with Movement Disorder team in 2023.        I spent 20 minutes on patient care activities related to this encounter on the date of service, including time spent reviewing the chart,  obtaining history and examination and in counseling the patient, and in documentation in the electronic medical record.      Kristal SARMIENTO CNP  Department of Neurosurgery

## 2022-11-18 ENCOUNTER — TELEPHONE (OUTPATIENT)
Dept: NEUROLOGY | Facility: CLINIC | Age: 62
End: 2022-11-18

## 2022-11-18 NOTE — TELEPHONE ENCOUNTER
Patient called writer to say she is most likely planning to move near [place], [place] in early 2023 to be closer to family (her mom, dad, and sisters). This is 4.5-5 hours from [place]. Pt is wondering what her options are for care after moving. She asks if there are any neurologists in that area or if she can continue with video visits. She also asks who she should see for a PCP.     I explained that she will need to decide if she wants to continue DBS/PD care with our neurology team; she will need to be seen once or twice a year to check her DBS device and manage medications. Because pt has the Abbott DBS system, there may be an option for remote programming. There are some restrictions with video/remote visits depending on the state she is in and the providers license that we will need to check on.    I told pt we can find out if there are movement disorder specialists that manage DBS in that area.  I explained that pt will have to find a PCP who is local to help her manage her non-PD/DBS care.     I also explained that pt will need to decide if she wants to continue with the optional clinical core research study or withdraw (visits on two days per year, one visit can be combined with her regular clinic visit). She can wait to decide on this, as her next visit isn't due until September 2023.     Will update GUILLERMINA Desir, CNP and respond to patient with additional information.       Shanice Coyne RN, MPH  Research Nurse, Movement Disorders

## 2022-11-18 NOTE — TELEPHONE ENCOUNTER
Thank you for the update, Shanice.    I am not licensed in [place].  So, I wouldn't be able to do a video visit or virtual programming.    I have a handful pts who live outside of [place]. They generally come 1 - 2 x a year for their DBS management.  I have convicted all of them to establish care with a local neurologist/movement disorders specialist, which has worked out well. If possible, seeing a movement disorders specialist is ideal, if not seeing a general neurologist is better than not having a care provider locally.

## 2022-11-22 NOTE — TELEPHONE ENCOUNTER
Called and spoke to patient. I explained that due to medical licensure, she would not be able to be seen by our clinic via virtual visit in [place], but has the option to come back 1-2x a year for DBS management. I emphasized that pt should establish care locally, and fortunately there is a Deep Brain stimulation program at [facility]. [email or web address]    Patient will review website and then let us know if she'd like us to send records so she can become established.    Shanice Coyne, RN, MPH  Research Nurse, Movement Disorders

## 2022-12-06 ENCOUNTER — MEDICAL CORRESPONDENCE (OUTPATIENT)
Dept: HEALTH INFORMATION MANAGEMENT | Facility: CLINIC | Age: 62
End: 2022-12-06

## 2022-12-06 NOTE — TELEPHONE ENCOUNTER
Received voicemail message from patient, she is wondering if she needs referral to [facility] from us.  She will be moving 12/28/22.     Writer called [facility] and they confirmed they are a referral based clinic. Writer called patient with this information. Patient does not have a preference at this time yet on a DBS provider. Patient is still considering coming back 2/28/22 for her upcoming appointment with GUILLERMINA Desir, MARIA. Patient at this time wants to continue in the [place] [facility] study and continue annual research visits.    Will start process for referring patient to [facility].    Shanice Coyne, RN, MPH  Research Nurse, Movement Disorders

## 2022-12-06 NOTE — TELEPHONE ENCOUNTER
Writer called [facility] neurology clinic ([phone]) to obtain fax number for referral, and their staff took initial information for patient. They will send a fax with request for records today or tomorrow to clinic.     Clinic team updated.     Shanice Coyne RN, MPH  Research Nurse, Movement Disorders

## 2022-12-08 NOTE — TELEPHONE ENCOUNTER
No need to place referral in [facility].    The [facility] Neurology department has their own referral form, which has been received, completed, and given to Angi Augustin CMA to fax.

## 2022-12-09 NOTE — TELEPHONE ENCOUNTER
Received call from [facility], they are requesting a referral be faxed. They have not received the form as of 12/9. I explained that the referral has been completed by the provider and I expect it to be sent today or Monday. I reassured them this is not an urgent referral as patient will not be moving to [place] until the end of the month. Their staff will follow up with me if they have not received anything by middle of next week.    Shanice Coyne, RN, MPH  Research Nurse, Movement Disorders

## 2022-12-13 ENCOUNTER — DOCUMENTATION ONLY (OUTPATIENT)
Dept: NEUROLOGY | Facility: CLINIC | Age: 62
End: 2022-12-13

## 2022-12-13 ENCOUNTER — TELEPHONE (OUTPATIENT)
Dept: NEUROLOGY | Facility: CLINIC | Age: 62
End: 2022-12-13

## 2022-12-13 NOTE — PROGRESS NOTES
Referral was signed by provider and fax to [phone], and sent to be scanned into patient chart

## 2022-12-13 NOTE — TELEPHONE ENCOUNTER
Nurse received refill request via patient voicemail for: lorazepam 0.5 mg    Pharmacy: [facility]    Last re-ordered: 9/2/2022 30 tabs with 3 refills    Last appointment: 8/29/2022    Next appointment: 2/28/2023    Action taken: Called [facility] pharmacy to inquire if patient had any refills remaining. Pharmacy stated she had three refills remaining. Called patient back and gave her number to pharmacy and asked her to call for her refill. [phone]    Shanice Coyne, RN, MPH  Research Nurse, Movement Disorders

## 2023-01-03 ENCOUNTER — TELEPHONE (OUTPATIENT)
Dept: NEUROLOGY | Facility: CLINIC | Age: 63
End: 2023-01-03

## 2023-01-03 NOTE — TELEPHONE ENCOUNTER
Received VM from patient 1/3/23 stating she fell and bruised her shin. She asked for a call back. Called patient back and gathered more information.     Patient states she fell in her apartment on 12/26 and hit her shin. She did not hit her head and did not lose consciousness. She states she has a large bruise. She says she thinks she tripped over something on the floor and did not fall due to dizziness. This was in the process of packing/moving her home to [place]. Patient confirmed she checked her DBS recently and both sides are ON. She is keeping her patient  charged and in a safe place after moving. Patient thinks she may have missed a few doses while she was busy moving, and this could have contributed to her fall.     Patient moved to her new home in [place] on 12/27, has been there a week and is happy to be in her new place, close to her sister and other family. She has an appointment with [facility] neurology on 1/31.    Patient also states she has been feeling more lightheaded, hungry, and nauseated lately. Pt states she started a new medication, Trulicity, for diabetes, one month ago (after not tolerating Ozempic). She has now been keeping a closer eye on her blood sugars and states she is eating sweet things, like grapes, to bring it back up when low. I advised her to contact her PCP or whoever is managing her diabetes to address her dosages and to continue to monitor her blood sugars. She will reach out to her PCP in [place] now while she finds a new PCP in [place].     Patient had no further questions and will reach out to us after her visit with Francis. She says she plans to keep her appointment on 2/28/23 and stay with family in [place].    Shanice Coyne, RN, MPH  Research Nurse, Movement Disorders

## 2023-01-11 ENCOUNTER — TELEPHONE (OUTPATIENT)
Dept: NEUROLOGY | Facility: CLINIC | Age: 63
End: 2023-01-11

## 2023-01-11 NOTE — TELEPHONE ENCOUNTER
Patient left  stating she might need refills of medication (she received a call from [facility]), but she is not sure which medications. She doesn't see [facility] neurology provider until end of the month.    I looked at her med list and it appears she should have refills remaining of pramipexole, buspar, gabapentin, and Sinemet, which we prescribe to [facility]. Fluoxetine appears to be prescribed by her PCP.     Called and spoke to patient and I asked her to clarify with [facility] if they are asking her if she wants her medications filled, or if she's truly out of refills and needs a doctor's order, which we can help with. Patient states she sent a message to her PCP about fluoxetine. Pt stated she will call back if she is requesting any refills be re-sent.    Shanice Coyne, RN, MPH  Research Nurse, Movement Disorders

## 2023-01-23 ENCOUNTER — HEALTH MAINTENANCE LETTER (OUTPATIENT)
Age: 63
End: 2023-01-23

## 2023-02-01 ENCOUNTER — TELEPHONE (OUTPATIENT)
Dept: NEUROLOGY | Facility: CLINIC | Age: 63
End: 2023-02-01
Payer: COMMERCIAL

## 2023-02-01 NOTE — TELEPHONE ENCOUNTER
Pt called with an update that she had her first neurology appointment at the [facility]. Her care team is recommending PT, speech, sleep study, etc.  Patient is wondering if at her next appointment with Karina Rivas (2/28, pt still plans to attend this) if she can be sent with a letter documenting her DBS stimulator settings--her care team is requesting this as they did not receive DBS records. Her next appt with [facility] is not until September (Tristin Houston MD), so she does not need this letter urgently.     Shanice Coyne, RN, MPH  Research Nurse, Movement Disorders

## 2023-02-02 NOTE — TELEPHONE ENCOUNTER
"Sent patient information on how to request her records:    \"You can request your medical records via [facility]:  Go to \"Messaging\" -- \"Ask Customer Service,\" -- and from the drop down menu, select \"Release of Medical Records Questions.\"     Or, you can call [phone].\"    Shanice Coyne RN, MPH  Research Nurse, Movement Disorders    "

## 2023-02-02 NOTE — TELEPHONE ENCOUNTER
Spoke with patient. Because she was just seen by Francis this week, she would like to reschedule her upcoming standard of care DBS follow up with Karina from 2/28 to instead at 5/31 at 10:50am.  She does not need to hold medications for this appointment, and is not a research visit.    Patient had no further questions.    Shanice Coyne RN, MPH  Research Nurse, Movement Disorders

## 2023-02-28 ENCOUNTER — TELEPHONE (OUTPATIENT)
Dept: NEUROLOGY | Facility: CLINIC | Age: 63
End: 2023-02-28
Payer: COMMERCIAL

## 2023-02-28 NOTE — TELEPHONE ENCOUNTER
Patient called to ask where she should be getting her medications refilled now. I explained that if [facility] is now acting as her primary neurologist, she should get her medications and refills through them. Patient stated she called her new clinic and was told to contact her pharmacy directly. I encouraged her to call [facility] to see if they can transfer her old prescriptions to her local pharmacy.     Patient has a bottle and a half of carbidopa-levodopa left. She has about 1 week supply of pramipexole, amantadine, and buspirone. She will work on getting these prescriptions from her new clinic. I encouraged her to call us back if she has trouble getting these prescriptions and is at risk of running out.    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 She is considering becoming a  instead.     Patient had no further questions.    Shanice Coyne RN, MPH  Research Nurse, Movement Disorders

## 2023-03-15 DIAGNOSIS — F41.1 GAD (GENERALIZED ANXIETY DISORDER): ICD-10-CM

## 2023-03-15 DIAGNOSIS — G47.9 SLEEP DISTURBANCES: ICD-10-CM

## 2023-03-15 NOTE — CONFIDENTIAL NOTE
Rx Authorization:    Requested Medication/ Dose: Lorazepam 0.5MG tabs    Date last refill ordered: 9/2/22    Quantity ordered: 30 tabs    # refills: 3    Date of last clinic visit with ordering provider: 8/29/22    Date of next clinic visit with ordering provider: F/U 6 months    All pertinent protocol data (lab date/result):     Include pertinent information from patients message:

## 2023-03-16 RX ORDER — LORAZEPAM 0.5 MG/1
TABLET ORAL
Qty: 30 TABLET | Refills: 3 | Status: SHIPPED | OUTPATIENT
Start: 2023-03-16 | End: 2023-05-31

## 2023-03-24 ENCOUNTER — DOCUMENTATION ONLY (OUTPATIENT)
Dept: NEUROLOGY | Facility: CLINIC | Age: 63
End: 2023-03-24
Payer: COMMERCIAL

## 2023-03-24 NOTE — PROGRESS NOTES
Received note from [facility]   Records Date of service: 1960  Copy has been sent to scanning and encounter routed to specialty nurse for review.

## 2023-04-12 DIAGNOSIS — G20.A1 PARKINSON'S DISEASE (H): ICD-10-CM

## 2023-04-12 NOTE — TELEPHONE ENCOUNTER
Sent message to patient asking her to request amantadine refill from her neurology team at [facility], now that they will be managing medications. (Patient will continue to be seen by GUILLERMINA Desir, CNP at [facility] Neurology for research study visits.)    Shanice Coyne RN, MPH  Research Nurse, Movement Disorders

## 2023-04-12 NOTE — TELEPHONE ENCOUNTER
Rx Authorization:  Requested Medication/ Dose AMANTADINE  100MG  CAP  Date last refill ordered: 8/29/22  Quantity ordered: 180 caps  # refills: 1  Date of last clinic visit with ordering provider: 8/29/22  Date of next clinic visit with ordering provider:   All pertinent protocol data (lab date/result):   Include pertinent information from patients message:

## 2023-04-13 NOTE — TELEPHONE ENCOUNTER
Patient responded saying she will reach out to [facility] to get amantadine refilled. She will let us know if she has trouble with this. Patient shared that her mother passed away this month and it has been hard. Pt plans to see GUILLERMINA Desir CNP in May for follow up visit.    Shanice Coyne RN, MPH  Research Nurse, Movement Disorders

## 2023-04-14 RX ORDER — AMANTADINE HYDROCHLORIDE 100 MG/1
CAPSULE, GELATIN COATED ORAL
Qty: 60 CAPSULE | Refills: 11 | Status: SHIPPED | OUTPATIENT
Start: 2023-04-14 | End: 2023-04-20

## 2023-04-19 ENCOUNTER — TELEPHONE (OUTPATIENT)
Dept: NEUROLOGY | Facility: CLINIC | Age: 63
End: 2023-04-19
Payer: COMMERCIAL

## 2023-04-19 NOTE — TELEPHONE ENCOUNTER
Writer left voicemail, explaining to pt. that her upcoming visit with WARREN Collins is SOC so she should arrive on her medications. Since this is a follow-up visit and she is now being followed by the [facility], coming to this appointment is optional. Call back number provided.    Writer immediately received a call back from pt. Pt. stated she would like to continue to Karina as well as her provider at the [facility]. Pt. reported she has been out of her Amantadine for 3 days and has not been able to contact her neurology provider at the [facility]. Pt. became tearful and expressed her frustrations about not receiving her medications and also reported that her mother had recently passed away and it's hard to have patience with the [facility] about her medications. Writer will try to follow up about medication refill.    Deanna Sweet RN ([facility]) on April 19, 2023 at 3:46 PM

## 2023-04-19 NOTE — TELEPHONE ENCOUNTER
[facility] Call Center    Phone Message    May a detailed message be left on voicemail: yes     Reason for Call: Other: Pt is calling because she has an appt with Arminda at the end of the May and she is wondering if she needs to be on or off her meds for that appt. Please call Pt to discuss    Action Taken: Message routed to:  [facility] Neurology    Travel Screening: Not Applicable

## 2023-04-20 DIAGNOSIS — G20.A1 PARKINSON'S DISEASE (H): ICD-10-CM

## 2023-04-20 RX ORDER — AMANTADINE HYDROCHLORIDE 100 MG/1
CAPSULE, GELATIN COATED ORAL
Qty: 60 CAPSULE | Refills: 11 | Status: SHIPPED | OUTPATIENT
Start: 2023-04-20 | End: 2024-04-25

## 2023-04-20 NOTE — TELEPHONE ENCOUNTER
"Amantadine prescription refill sent by Karina Rivas on 4/14 to [facility].    Writer called [facility] 4/20/23 to check on status and they noted that prescription had not been sent yet, as they were waiting on verification from patient on new [place] address. I explained that patient is currently out of medication and we will need to send a refill to her local [facility]. Representative \"reversed the claim\" on the current prescription so that [facility] will be able to fill this. Representative asked me to notify patient that she needs to call [facility] to verify her address so that she can have future refills shipped out.    Writer will send new prescription to Karina Rivas for amantadine to be filled at pt's local [facility] ([place], [place]) and will update patient.    Shanice Coyne, RN, MPH  Research Nurse, Movement Disorders    "

## 2023-04-20 NOTE — TELEPHONE ENCOUNTER
Spoke to patient. She will call [facility] to verify her new address for future ordres. Writer updated pt's new address in [facility]. I explained to patient that we will send a new prescription to [facility], most likely later today but could be tomorrow.    Patient appreciated the call.     Shanice Coyne, RN, MPH  Research Nurse, Movement Disorders

## 2023-05-06 ENCOUNTER — HEALTH MAINTENANCE LETTER (OUTPATIENT)
Age: 63
End: 2023-05-06

## 2023-05-17 DIAGNOSIS — G20.A1 PARKINSON'S DISEASE (H): ICD-10-CM

## 2023-05-17 RX ORDER — CARBIDOPA AND LEVODOPA 25; 100 MG/1; MG/1
TABLET ORAL
Qty: 630 TABLET | Refills: 3 | Status: SHIPPED | OUTPATIENT
Start: 2023-05-17 | End: 2024-04-25

## 2023-05-17 NOTE — TELEPHONE ENCOUNTER
Rx Authorization:  Requested Medication/ Dose: Carbidopa/Levodopa  Date last refill ordered:   Quantity ordered:   # refills:   Date of last clinic visit with ordering provider:   Date of next clinic visit with ordering provider:   All pertinent protocol data (lab date/result):   Include pertinent information from patients message:

## 2023-05-17 NOTE — TELEPHONE ENCOUNTER
Rx Authorization:    Requested Medication/ Dose: Carbidopa/Levodopa    Date last refill ordered: 6/15/2022    Quantity ordered: 630    # refills: 3    Date of last clinic visit with ordering provider: 8/29/2022    Date of next clinic visit with ordering provider: 5/31/2022    All pertinent protocol data (lab date/result):     Include pertinent information from patients message:       Medication refill request pended and routed to neurology provider to sign.    Deanna Sweet RN (N[place]cy) on May 17, 2023 at 4:04 PM

## 2023-05-31 ENCOUNTER — OFFICE VISIT (OUTPATIENT)
Dept: NEUROLOGY | Facility: CLINIC | Age: 63
End: 2023-05-31
Payer: COMMERCIAL

## 2023-05-31 VITALS
SYSTOLIC BLOOD PRESSURE: 126 MMHG | OXYGEN SATURATION: 95 % | HEART RATE: 79 BPM | DIASTOLIC BLOOD PRESSURE: 81 MMHG | RESPIRATION RATE: 16 BRPM

## 2023-05-31 DIAGNOSIS — F41.1 GAD (GENERALIZED ANXIETY DISORDER): ICD-10-CM

## 2023-05-31 DIAGNOSIS — G25.81 RESTLESS LEGS SYNDROME (RLS): ICD-10-CM

## 2023-05-31 DIAGNOSIS — Z96.89 S/P DEEP BRAIN STIMULATOR PLACEMENT: ICD-10-CM

## 2023-05-31 DIAGNOSIS — G20.A1 PARKINSON'S DISEASE (H): Primary | ICD-10-CM

## 2023-05-31 PROCEDURE — 99214 OFFICE O/P EST MOD 30 MIN: CPT | Mod: 25 | Performed by: NURSE PRACTITIONER

## 2023-05-31 PROCEDURE — 95970 ALYS NPGT W/O PRGRMG: CPT | Performed by: NURSE PRACTITIONER

## 2023-05-31 ASSESSMENT — UNIFIED PARKINSONS DISEASE RATING SCALE (UPDRS)
AMPLITUDE_RUE: (0) NORMAL: NO TREMOR.
TOTAL_SCORE_LEFT: 9
FREEZING_GAIT: (2) MILD: FREEZES ON STARTING, TURNING, OR WALKING THROUGH DOORWAY WITH MORE THAN ONE HALT DURING ANY OF THESE ACTIVITIES, BUT CONTINUES SMOOTHLY WITHOUT FREEZING DURING STRAIGHT WALKING.
HANDMOVEMENTS_LEFT: (2) MILD: ANY OF THE FOLLOWING: A) 3 TO 5 INTERRUPTIONS DURING TAPPING  B) MILD SLOWING  C) THE AMPLITUDE DECREMENTS MIDWAY IN THE 10-MOVEMENT SEQUENCE.
AMPLITUDE_LUE: (0) NORMAL: NO TREMOR.
AMPLITUDE_LIP_JAW: (0) NORMAL: NO TREMOR.
MOVEMENTS_INTERFERE_WITH_RATINGS: NO
HANDMOVEMENTS_RIGHT: (1) SLIGHT: ANY OF THE FOLLOWING: A) THE REGULAR RHYTHM IS BROKEN WITH ONE WITH ONE OR TWO INTERRUPTIONS OR HESITATIONS OF THE MOVEMENT  B) SLIGHT SLOWING  C) THE AMPLITUDE DECREMENTS NEAR THE END OF THE 10 MOVEMENTS.
LEG_AGILITY_LEFT: (1) SLIGHT: ANY OF THE FOLLOWING: A) THE REGULAR RHYTHM IS BROKEN WITH ONE WITH ONE OR TWO INTERRUPTIONS OR HESITATIONS OF THE MOVEMENT  B) SLIGHT SLOWING  C) THE AMPLITUDE DECREMENTS NEAR THE END OF THE 10 MOVEMENTS.
RIGIDITY_LUE: (0) NORMAL: NO RIGIDITY.
ARISING_CHAIR: (1) SLIGHT: ARISING IS SLOWER THAN NORMAL, OR MAY NEED MORE THAN ONE ATTEMPT, OR MAY NEED TO MOVE FORWARD IN THE CHAIR TO ARISE. NO NEED TO USE THE ARMS OF THE CHAIR.
AMPLITUDE_LLE: (0) NORMAL: NO TREMOR.
AXIAL_SCORE: 11
PRONATION_SUPINATION_RIGHT: (1) SLIGHT: ANY OF THE FOLLOWING: A) THE REGULAR RHYTHM IS BROKEN WITH ONE WITH ONE OR TWO INTERRUPTIONS OR HESITATIONS OF THE MOVEMENT  B) SLIGHT SLOWING  C) THE AMPLITUDE DECREMENTS NEAR THE END OF THE 10 MOVEMENTS.
PRONATION_SUPINATION_LEFT: (1) SLIGHT: ANY OF THE FOLLOWING: A) THE REGULAR RHYTHM IS BROKEN WITH ONE WITH ONE OR TWO INTERRUPTIONS OR HESITATIONS OF THE MOVEMENT  B) SLIGHT SLOWING  C) THE AMPLITUDE DECREMENTS NEAR THE END OF THE 10 MOVEMENTS.
RIGIDITY_NECK: (1) SLIGHT: RIGIDITY ONLY DETECTED WITH ACTIVATION MANEUVER.
PARKINSONS_MEDS: ON
TOETAPPING_RIGHT: (1) SLIGHT: ANY OF THE FOLLOWING: A) THE REGULAR RHYTHM IS BROKEN WITH ONE WITH ONE OR TWO INTERRUPTIONS OR HESITATIONS OF THE MOVEMENT  B) SLIGHT SLOWING  C) THE AMPLITUDE DECREMENTS NEAR THE END OF THE 10 MOVEMENTS.
FACIAL_EXPRESSION: (2) MILD: IN ADDITION TO DECREASED EYE-BLINK FREQUENCY, MASKED FACIES PRESENT IN THE LOWER FACE AS WELL, NAMELY FEWER MOVEMENTS AROUND THE MOUTH, SUCH AS LESS SPONTANEOUS SMILING, BUT LIPS NOT PARTED.
FINGER_TAPPING_LEFT: (1) SLIGHT: ANY OF THE FOLLOWING: A) THE REGULAR RHYTHM IS BROKEN WITH ONE WITH ONE OR TWO INTERRUPTIONS OR HESITATIONS OF THE MOVEMENT  B) SLIGHT SLOWING  C) THE AMPLITUDE DECREMENTS NEAR THE END OF THE 10 MOVEMENTS.
RIGIDITY_RUE: (0) NORMAL: NO RIGIDITY.
POSTURE: (0) NORMAL: NO PROBLEMS.
LEG_AGILITY_RIGHT: (0) NORMAL: NO PROBLEMS.
DYSKINESIAS_PRESENT: NO
TOTAL_SCORE: 24
TOETAPPING_LEFT: (1) SLIGHT: ANY OF THE FOLLOWING: A) THE REGULAR RHYTHM IS BROKEN WITH ONE WITH ONE OR TWO INTERRUPTIONS OR HESITATIONS OF THE MOVEMENT  B) SLIGHT SLOWING  C) THE AMPLITUDE DECREMENTS NEAR THE END OF THE 10 MOVEMENTS.
CONSTANCY_TREMOR_ATREST: (0) NORMAL: NO TREMOR.
SPEECH: (1) SLIGHT: LOSS OF MODULATION, DICTION OR VOLUME, BUT STILL ALL WORDS EASY TO UNDERSTAND.
RIGIDITY_LLE: (1) SLIGHT: RIGIDITY ONLY DETECTED WITH ACTIVATION MANEUVER.
FINGER_TAPPING_RIGHT: (0) NORMAL: NO PROBLEMS.
RIGIDITY_RLE: (1) SLIGHT: RIGIDITY ONLY DETECTED WITH ACTIVATION MANEUVER.
AMPLITUDE_RLE: (0) NORMAL: NO TREMOR.
SPONTANEITY_OF_MOVEMENT: (0) NORMAL: NO PROBLEMS.
POSTURAL_STABILITY: (3) MODERATE: STANDS SAFELY, BUT WITH ABSENCE OF POSTURAL RESPONSE, FALLS IF NOT CAUGHT BY EXAMINER.
GAIT: (1) SLIGHT: INDEPENDENT WALKING WITH MINOR GAIT IMPAIRMENT.
TOTAL_SCORE: 4

## 2023-05-31 ASSESSMENT — PAIN SCALES - GENERAL: PAINLEVEL: NO PAIN (0)

## 2023-05-31 NOTE — PATIENT INSTRUCTIONS
Dear Ms. Erika NIELSON Joe,    Thank you for coming today.  During your visit, we have discussed the following:     __ Your DBS electrical system function (impedance) is normal. The battery life is also good.    __ During today's visit, no DBS programming changes were made.     __  Continue to follow up with your local neurologist.    __  Stay on the same antiparkinsonian medications.    PD/Mood Medications 7 am 12 pm 4 pm 9 pm   Sinemet 25/100 mg  2 1.5 2 1.5   Amantadine 100 mg  1   1     Pramipexole 0.5 mg       2   Buspirone 10 mg 1 1 1       __  Do the PT that was ordered for you.    __  If the Left Leg arthritis gets worse, consider seeing an orthopedic provider to explore treatment options.     __  One of the research nurses will call you for your next visit. You may return or contact us sooner as needed.     To contact our clinic, you may call [phone] or use [employer] message.     To make an appointment with one of our pharmacists, (Verónica Patel, Pharm.D. or Zay JaimesD), call [phone].    GUILLERMINA Krueger, CNP  [facility]

## 2023-05-31 NOTE — LETTER
2023       RE: Erika Diaz  [address] [address]     Dear Colleague,    Thank you for referring your patient, Erika Diaz, to the [facility] NEUROLOGY [facility]INIC [place] at [facility]. Please see a copy of my visit note below.      ASSESSMENT:    Parkinson's Disease:     S/p Bilateral GPi DBS lead implantation:  Normal impedance and battery life. No programming changes were made today.      RLS: Controlled.      Mood: Stable.        PLAN:    __  The DBS printout was given to pt to give a copy to Dr. Houston. The following patient instructions provided: -     __ Your DBS electrical system function (impedance) is normal. The battery life is also good.    __ During today's visit, no DBS programming changes were made.     __  Continue to follow up with your local neurologist.    __  Stay on the same antiparkinsonian medications.    PD/Mood Medications 7 am 12 pm 4 pm 9 pm   Sinemet 25/100 mg  2 1.5 2 1.5   Amantadine 100 mg  1   1     Pramipexole 0.5 mg       2     __  Do the PT that was ordered for you.    __  If the Left Leg arthritis gets worse, consider seeing an orthopedic provider to explore treatment options.     __  One of the research nurses will call you for your next visit. You may return or contact us sooner as needed.             MOVEMENT DISORDERS CLINIC           PATIENT: Erika Diaz    : 1960    GINNY: May 31, 2023    REASON FOR VISIT: Parkinson's disease (PD) and restless leg syndrome (RLS) follow up, and deep brain stimulation (DBS) interrogation and analysis.    HPI: Ms. Erika Diaz is a 62 year old who came to the [facility] neurology clinic by herself for a follow up visit.  I saw her last on 2022 for a routine f/u visit. During that visit, the following were discussed: -    Patient has a history of PD since .  She is status post bilateral Globus Pallidus Internus (Gpi) DBS implantation;  right  08/21/2018, and left on 3/5/2021 by Dr. Concepcion.  Her motor symptoms have been fairly stable except in the last couple of years she has had a few falls and freezing of gait.    Patient has moved to [place] on Dec 27th to be close to her family including her sister.  She has great support.       Tremors and dyskinesias are controlled. Since January she had about 6 - 8 falls. Sometimes, falls are related to freezing of gait, but other times, the cause is unclear. She generally falls forward. When she is tried, she shuffles, drags her left leg, and freezes. Her PCP has ordered PT, which she hasn't started yet.      PD Medications 7 am 12 pm 4 pm 9 pm   Sinemet 25/100 mg  2 1.5 2 1.5   Amantadine 100 mg  1   1     Pramipexole 0.5 mg       2     She started working 3 times a day as [employer] as a . She works 15 hrs/week.    She has established care with Dr. Houston, a neurologist, at the [facility]. She is off the Lorazepam.  Her neurologist has ordered a sleep and swallow study.  She has completed the sleep study; however, has not scheduled the swallow study.     Patient is asking for her DBS printouts for her neurologist.    Memory is not as sharp. Mood is good.    RLS symptoms are controlled.    She reports no hospitalization.    MDS UPDRS PART II   5/30/2023  11:12 PM   UPDRS EDL Scale    2.1 Speech 1    2.2 Salivation 1    2.3 Chew & Swallow 1    2.4 Eating  0    2.5 Dressing  0    2.6 Hygiene  0    2.7 Handwriting  0    2.8 Hobbies etc.  0    2.9 Turn in bed  1    2.10 Tremor  1    2.11 Stand from chair  1    2.12 Walking & Balance  1    2.13 Freezing  1    MDS-UPDRS II Total Score 8        MEDICATIONS:   Outpatient Medications Marked as Taking for the 5/31/23 encounter (Office Visit) with Arminda Rivas APRN CNP   Medication Sig    Acetaminophen (TYLENOL PO) Take 1,000 mg by mouth every 8 hours as needed for mild pain or fever    amantadine (SYMMETREL) 100 MG capsule TAKE 1 CAPSULE BY MOUTH  AT  7AM AND AT 4PM    aspirin (ASA) 81 MG chewable tablet Take 1 tablet (81 mg) by mouth daily    busPIRone (BUSPAR) 10 MG tablet TAKE 1 TABLET BY MOUTH 3  TIMES DAILY    calcium carbonate (TUMS) 500 MG chewable tablet Take 2 chew tab by mouth as needed for heartburn    carbidopa-levodopa (SINEMET)  MG tablet TAKE 2 TABLETS BY MOUTH AT  7AM AND 4PM, AND 1 AND 1/2  TABLETS AT 12 PM AND 9 PM  FOR A TOTAL OF 7 TABLETS  DAILY    FLUoxetine (PROZAC) 20 MG capsule Take 20 mg by mouth every morning @ 7am    gabapentin (NEURONTIN) 300 MG capsule Take 1 cap at 7 am by mouth and 2 at 9 pm = 3 cap/day    hydrochlorothiazide (HYDRODIURIL) 25 MG tablet Take 25 mg by mouth    ibuprofen (ADVIL/MOTRIN) 200 MG capsule Take 1 capsule (200 mg) by mouth every 4 hours as needed for fever    lactase (LACTAID) 9000 units TABS tablet Take 9,000 Units by mouth With dairy as needed    metFORMIN (GLUCOPHAGE-XR) 500 MG 24 hr tablet     nystatin (MYCOSTATIN) 064152 UNIT/GM external powder     pramipexole (MIRAPEX) 0.5 MG tablet TAKE 2 TABLETS BY MOUTH AT  9 PM    rosuvastatin (CRESTOR) 5 MG tablet Take 5 mg by mouth daily    TRULICITY 0.75 MG/0.5ML pen      Procedure: DBS Interrogation & Analysis:     Lead(s):     Left Right   DBS Target GPi GPi   DBS Lead Type Abbott 1-3-3-1  Contacts: 1-8  Lead Model: 8 CH, 40 cm (6172) Abbott 1-3-3-1  Contacts: 9-16  Lead Model: 8 CH, 40 cm (6172)   Lead Placement Date 3/5/2021 08/21/2018      IPG(s):    1 2   IPG Infinity 5  6660 Infinity 5  6660  S/N: [id number]   IPG Implant Date 03/12/2021 10/24/2022   Location Left chest Right chest   Battery (V) 2.95 V 2.95 V         Left GPi     C +, 2abc -  Current:  2.90 mA  PW:  60 msec  Rate:  130 Hz     Therapy impedance: 1012 Ohms     Patient :  Upper Limit: 0.00 mA  Lower Limit: 3.50 mA     Electrode Impedance Check:   Left Lead: No Problems Found.      Right GPi     C +, 10abc -  Current:  4.20 mA  PW:  60 msec  Rate:  130 Hz     Therapy impedance:  637 Ohms     Patient :  Upper Limit: 3.00 mA  Lower Limit: 4.20 mA     Electrode Impedance Check:  Right Lead: No Problems Found.       See scanned report for impedance details      PHYSICAL EXAM:    VITAL SIGNS:  Blood pressure 126/81, pulse 79, resp. rate 16, SpO2 95 %.     GENERAL:  Ms. Diaz is a pleasant Caucasians female who is well-groomed, well-developed and overweight sitting comfortably in the exam room without any distress.  Affect is appropriate.    MOVEMENT DISORDERS ASSESSMENT:  MDS UPDRS III   Last dose of Sinemet & Amantadine was at 7 am.    5/31/2023  11:00 AM   UPDRS Motor Scale    Time: 11:22 AM    Medication On    R Brain DBS: On    L Brain DBS: On    Dyskinesia (LID) No    Did LID interfere No    Speech 1    Facial Expression 2    Rigidity Neck 1    Rigidity RUE 0    Rigidity LUE 0    Rigidity RLE 1    Rigidity LLE 1    Finger Taps R 0    Finger Taps L 1    Hand Mvt R 1    Hand Mvt L 2    Pron-/Supinate R 1    Pron-/Supinate L 1    Toe Tap R 1    Toe Tap L 1    Leg Agility R 0    Leg Agility L 1    Arise From Chair 1    Gait 1    Gait Freezing 2    Postural Stability 3    Posture 0    Global Spont Mvt 0    Postural Tremor RUE 0    Postural Tremor LUE 1    Kinetic Tremor RUE 0    Kinetic Tremor LUE 1    Rest Tremor RUE 0    Rest Tremor LUE 0    Rest Tremor RLE 0    Rest Tremor LLE 0    Rest Tremor Lip/Jaw 0    Rest Tremor Constancy 0    Total Right 4    Total Left 9    Axial Total 11    Total 24      Today I spent 50 minutes caring for the patient. 20 minutes was spent in DBS interrogation and analysis.  30 minutes was spent with reviewing records, meeting with the patient, answering questions, examining, refilling/ordering medication, and documentation.    Karina Rivas, APRN,  CNP  [facility]

## 2023-05-31 NOTE — PROGRESS NOTES
ASSESSMENT:    Parkinson's Disease:     S/p Bilateral GPi DBS lead implantation:  Normal impedance and battery life. No programming changes were made today.      RLS: Controlled.      Mood: Stable.        PLAN:    __  The DBS printout was given to pt to give a copy to Dr. Houston. The following patient instructions provided: -     __ Your DBS electrical system function (impedance) is normal. The battery life is also good.    __ During today's visit, no DBS programming changes were made.     __  Continue to follow up with your local neurologist.    __  Stay on the same antiparkinsonian medications.    PD/Mood Medications 7 am 12 pm 4 pm 9 pm   Sinemet 25/100 mg  2 1.5 2 1.5   Amantadine 100 mg  1   1     Pramipexole 0.5 mg       2     __  Do the PT that was ordered for you.    __  If the Left Leg arthritis gets worse, consider seeing an orthopedic provider to explore treatment options.     __  One of the research nurses will call you for your next visit. You may return or contact us sooner as needed.             MOVEMENT DISORDERS CLINIC           PATIENT: Erika Diaz    : 1960    GINNY: May 31, 2023    REASON FOR VISIT: Parkinson's disease (PD) and restless leg syndrome (RLS) follow up, and deep brain stimulation (DBS) interrogation and analysis.    HPI: Ms. Erika Diaz is a 62 year old who came to the [facility] neurology clinic by herself for a follow up visit.  I saw her last on 2022 for a routine f/u visit. During that visit, the following were discussed: -    Patient has a history of PD since .  She is status post bilateral Globus Pallidus Internus (Gpi) DBS implantation; right  2018, and left on 3/5/2021 by Dr. Concepcion.  Her motor symptoms have been fairly stable except in the last couple of years she has had a few falls and freezing of gait.    Patient has moved to [place] on Dec 27th to be close to her family including her sister.  She has great support.       Tremors and dyskinesias  are controlled. Since January she had about 6 - 8 falls. Sometimes, falls are related to freezing of gait, but other times, the cause is unclear. She generally falls forward. When she is tried, she shuffles, drags her left leg, and freezes. Her PCP has ordered PT, which she hasn't started yet.      PD Medications 7 am 12 pm 4 pm 9 pm   Sinemet 25/100 mg  2 1.5 2 1.5   Amantadine 100 mg  1   1     Pramipexole 0.5 mg       2     She started working 3 times a day as [employer] as a . She works 15 hrs/week.    She has established care with Dr. Houston, a neurologist, at the [facility]. She is off the Lorazepam.  Her neurologist has ordered a sleep and swallow study.  She has completed the sleep study; however, has not scheduled the swallow study.     Patient is asking for her DBS printouts for her neurologist.    Memory is not as sharp. Mood is good.    RLS symptoms are controlled.    She reports no hospitalization.    MDS UPDRS PART II   5/30/2023  11:12 PM   UPDRS EDL Scale    2.1 Speech 1    2.2 Salivation 1    2.3 Chew & Swallow 1    2.4 Eating  0    2.5 Dressing  0    2.6 Hygiene  0    2.7 Handwriting  0    2.8 Hobbies etc.  0    2.9 Turn in bed  1    2.10 Tremor  1    2.11 Stand from chair  1    2.12 Walking & Balance  1    2.13 Freezing  1    MDS-UPDRS II Total Score 8        MEDICATIONS:   Outpatient Medications Marked as Taking for the 5/31/23 encounter (Office Visit) with Arminda Rivas APRN CNP   Medication Sig     Acetaminophen (TYLENOL PO) Take 1,000 mg by mouth every 8 hours as needed for mild pain or fever     amantadine (SYMMETREL) 100 MG capsule TAKE 1 CAPSULE BY MOUTH AT  7AM AND AT 4PM     aspirin (ASA) 81 MG chewable tablet Take 1 tablet (81 mg) by mouth daily     busPIRone (BUSPAR) 10 MG tablet TAKE 1 TABLET BY MOUTH 3  TIMES DAILY     calcium carbonate (TUMS) 500 MG chewable tablet Take 2 chew tab by mouth as needed for heartburn     carbidopa-levodopa (SINEMET)  MG tablet  TAKE 2 TABLETS BY MOUTH AT  7AM AND 4PM, AND 1 AND 1/2  TABLETS AT 12 PM AND 9 PM  FOR A TOTAL OF 7 TABLETS  DAILY     FLUoxetine (PROZAC) 20 MG capsule Take 20 mg by mouth every morning @ 7am     gabapentin (NEURONTIN) 300 MG capsule Take 1 cap at 7 am by mouth and 2 at 9 pm = 3 cap/day     hydrochlorothiazide (HYDRODIURIL) 25 MG tablet Take 25 mg by mouth     ibuprofen (ADVIL/MOTRIN) 200 MG capsule Take 1 capsule (200 mg) by mouth every 4 hours as needed for fever     lactase (LACTAID) 9000 units TABS tablet Take 9,000 Units by mouth With dairy as needed     metFORMIN (GLUCOPHAGE-XR) 500 MG 24 hr tablet      nystatin (MYCOSTATIN) 410082 UNIT/GM external powder      pramipexole (MIRAPEX) 0.5 MG tablet TAKE 2 TABLETS BY MOUTH AT  9 PM     rosuvastatin (CRESTOR) 5 MG tablet Take 5 mg by mouth daily     TRULICITY 0.75 MG/0.5ML pen      Procedure: DBS Interrogation & Analysis:     Lead(s):     Left Right   DBS Target GPi GPi   DBS Lead Type Abbott 1-3-3-1  Contacts: 1-8  Lead Model: 8 CH, 40 cm (6172) Abbott 1-3-3-1  Contacts: 9-16  Lead Model: 8 CH, 40 cm (6172)   Lead Placement Date 3/5/2021 08/21/2018      IPG(s):    1 2   IPG Infinity 5  6660 Infinity 5  6660  S/N: [id number]   IPG Implant Date 03/12/2021 10/24/2022   Location Left chest Right chest   Battery (V) 2.95 V 2.95 V         Left GPi     C +, 2abc -  Current:  2.90 mA  PW:  60 msec  Rate:  130 Hz     Therapy impedance: 1012 Ohms     Patient :  Upper Limit: 0.00 mA  Lower Limit: 3.50 mA     Electrode Impedance Check:   Left Lead: No Problems Found.      Right GPi     C +, 10abc -  Current:  4.20 mA  PW:  60 msec  Rate:  130 Hz     Therapy impedance: 637 Ohms     Patient :  Upper Limit: 3.00 mA  Lower Limit: 4.20 mA     Electrode Impedance Check:  Right Lead: No Problems Found.       See scanned report for impedance details      PHYSICAL EXAM:    VITAL SIGNS:  Blood pressure 126/81, pulse 79, resp. rate 16, SpO2 95 %.     GENERAL:  Ms.  Joe is a pleasant Caucasians female who is well-groomed, well-developed and overweight sitting comfortably in the exam room without any distress.  Affect is appropriate.    MOVEMENT DISORDERS ASSESSMENT:  MDS UPDRS III   Last dose of Sinemet & Amantadine was at 7 am.    5/31/2023  11:00 AM   UPDRS Motor Scale    Time: 11:22 AM    Medication On    R Brain DBS: On    L Brain DBS: On    Dyskinesia (LID) No    Did LID interfere No    Speech 1    Facial Expression 2    Rigidity Neck 1    Rigidity RUE 0    Rigidity LUE 0    Rigidity RLE 1    Rigidity LLE 1    Finger Taps R 0    Finger Taps L 1    Hand Mvt R 1    Hand Mvt L 2    Pron-/Supinate R 1    Pron-/Supinate L 1    Toe Tap R 1    Toe Tap L 1    Leg Agility R 0    Leg Agility L 1    Arise From Chair 1    Gait 1    Gait Freezing 2    Postural Stability 3    Posture 0    Global Spont Mvt 0    Postural Tremor RUE 0    Postural Tremor LUE 1    Kinetic Tremor RUE 0    Kinetic Tremor LUE 1    Rest Tremor RUE 0    Rest Tremor LUE 0    Rest Tremor RLE 0    Rest Tremor LLE 0    Rest Tremor Lip/Jaw 0    Rest Tremor Constancy 0    Total Right 4    Total Left 9    Axial Total 11    Total 24      Today I spent 50 minutes caring for the patient. 20 minutes was spent in DBS interrogation and analysis.  30 minutes was spent with reviewing records, meeting with the patient, answering questions, examining, refilling/ordering medication, and documentation.    Karina Rivas APRN,  CNP  [facility]

## 2023-06-02 RX ORDER — BUSPIRONE HYDROCHLORIDE 10 MG/1
10 TABLET ORAL 3 TIMES DAILY
Qty: 270 TABLET | Refills: 3 | Status: SHIPPED | OUTPATIENT
Start: 2023-06-02

## 2023-06-02 RX ORDER — GABAPENTIN 300 MG/1
CAPSULE ORAL
Qty: 270 CAPSULE | Refills: 3 | Status: SHIPPED | OUTPATIENT
Start: 2023-06-02 | End: 2024-04-25

## 2023-06-21 ENCOUNTER — TELEPHONE (OUTPATIENT)
Dept: NEUROLOGY | Facility: CLINIC | Age: 63
End: 2023-06-21
Payer: COMMERCIAL

## 2023-06-21 NOTE — TELEPHONE ENCOUNTER
Writer left a message for the patient to call back and sent her a [employer] message as well.

## 2023-06-21 NOTE — TELEPHONE ENCOUNTER
Per Dr. Hernandez, to combine both interview and testing in one visit since patient is traveling from [place].    Called patient back and she stated she will schedule but she moved from [place] to [place] recently and she may be switching to a new neurologist. The patient stated she would like to still schedule and keep her research appointments for now, but she would like a call from Shanice Coyne, Research RN to discuss her current care at [facility]. The patient stated she also left Shanice a message asking about getting help for Parkinson's Disease medications and she wanted to discuss this as well.    The patient was scheduled for her neuropsychological evaluation on 9/5/23 at 12:30 PM.

## 2023-06-21 NOTE — TELEPHONE ENCOUNTER
Spoke to patient. I let her know that I reached out to the APDA [place] chapter and am waiting to hear back if they have PD grants and how to apply since it varies from state to state.    Patient would prefer to come in for two consecutive days for her Clinical Core study appts (ON/OFF testing and neuropsych testing). I explained that we will check to see if we can reschedule neuropsych to a Wed or Fri around her upcoming ON/OFF testing November. She said 11/1 or 11/3 would work. She is working Thurs/Fri this week but we can send confirmation in a [email or web address] message.    Patient said she has an upcoming visit scheduled with [facility]. She did a sleep study but there were some issues with cost and billing, so she is reluctant to do more tests. She is exercising with water therapy and states this helps her walking. For the moment, pt is declining to do the swallow study and PT that was recommended by [facility].     Patient had no further questions.     Shanice Coyne, RN, MPH  Research Nurse, Movement Disorders

## 2023-06-22 NOTE — TELEPHONE ENCOUNTER
The patient was rescheduled for her neuropsychological evaluation on 11/3/23 at 8 AM to be coordinated with her on/off testing on 11/2/23.

## 2023-06-23 NOTE — TELEPHONE ENCOUNTER
Emailed [place] APDA jovita application to patient with instructions to send it directly.    Zina Hidalgo is  for [facility]: [email or web address]    Shanice Coyne RN, MPH  Research Nurse, Movement Disorders

## 2023-07-29 ENCOUNTER — HEALTH MAINTENANCE LETTER (OUTPATIENT)
Age: 63
End: 2023-07-29

## 2023-09-25 ENCOUNTER — TELEPHONE (OUTPATIENT)
Dept: NEUROLOGY | Facility: CLINIC | Age: 63
End: 2023-09-25
Payer: COMMERCIAL

## 2023-09-25 NOTE — TELEPHONE ENCOUNTER
"Patient called to ask about upcoming appointments and provide update:    1) At 4am on Sunday morning (2 nights ago) pt fell in the doorway in the bathroom and hit the back of her head and left hip and made a hole in the drywall. She said , \"Nothing hurt when I got up\". Her father came to see her at 7am and she was feeling OK.     No loss of consciousness  Landed on left hip  Two days later she now has tenderness on left hip and gets a cramp in left buttock when she stands up.   She does have a slight/mild headache, hasn't taken anything for it, and she comments she thinks it's from her glasses/needs new prescription.  No dizziness  No nausea/vomiting  No changes in alertness  No changes in speech  No swelling or bump on the head  No abrasions or cut    She did not seek medical attention at the time. I explained that with that hard of a fall it is best to be evaluated but patient declines this at this time. She says she did not hit any components of her DBS system on her head or neck. I reviewed with her if she has any changes in alertness, memory, vision, nausea/vomiting, severe headache, dizziness/vertigo, she should go to the emergency room immediately to be evaluated.     If she continues to have pain in her hip/buttock, she should see her primary care doctor.     2) We reviewed upcoming research appointments:11/2 w/Karina for ON/OFF testing and 11/3 w/Dr. Hernandez for neuropsych testing.  She will stay with her granddaughter in [place] and we will provide a car for transportation for her ON/OFF testing visit to the clinic and back for research. She will be on meds for neuropsych on 11/3 and will drive herself to that appt.     Patient also received a letter regarding a research study 11/21/23, and she is wondering what this is for. She will contact me with more details when she finds the letter. I told her I don't have that date listed for research studies, and it may be for a separate study, but I'm happy to look " into it if she finds more information.    3) She [facility] Houston [facility] a few weeks ago and there were no changes to her DBS or meds. I told her it is good that she has established care locally for her neurology/DBS care and we are happy to see her for her annual research appointments.    Patient had no further questions. Will update GUILLERMINA Desir, CNP.    Shanice Coyne, RN, MPH  Research Nurse, Movement Disorders

## 2023-10-14 NOTE — TELEPHONE ENCOUNTER
Called pt to follow up re tingling in right hand following DBS battery replacement. In reviewing pt's chart, it sounds like the issue has resolved, and pt confirmed that it has. I let pt know that Dr. Hewitt thought it was probably related to positioning during the surgery. Pt expressed understanding and has no further questions.   
persistent lack of appetite

## 2023-11-02 ENCOUNTER — OFFICE VISIT (OUTPATIENT)
Dept: NEUROLOGY | Facility: CLINIC | Age: 63
End: 2023-11-02
Payer: COMMERCIAL

## 2023-11-02 VITALS
HEIGHT: 63 IN | RESPIRATION RATE: 18 BRPM | SYSTOLIC BLOOD PRESSURE: 133 MMHG | HEART RATE: 83 BPM | WEIGHT: 228 LBS | DIASTOLIC BLOOD PRESSURE: 84 MMHG | OXYGEN SATURATION: 94 % | TEMPERATURE: 98.1 F | BODY MASS INDEX: 40.4 KG/M2

## 2023-11-02 DIAGNOSIS — Z96.89 S/P DEEP BRAIN STIMULATOR PLACEMENT: ICD-10-CM

## 2023-11-02 DIAGNOSIS — G20.A1 PARKINSON'S DISEASE WITHOUT DYSKINESIA OR FLUCTUATING MANIFESTATIONS (H): Primary | ICD-10-CM

## 2023-11-02 PROCEDURE — 99417 PROLNG OP E/M EACH 15 MIN: CPT | Performed by: NURSE PRACTITIONER

## 2023-11-02 PROCEDURE — 99207 PR NON-BILLABLE SERV PER CHARTING: CPT | Performed by: NURSE PRACTITIONER

## 2023-11-02 PROCEDURE — 95970 ALYS NPGT W/O PRGRMG: CPT | Performed by: NURSE PRACTITIONER

## 2023-11-02 PROCEDURE — 99215 OFFICE O/P EST HI 40 MIN: CPT | Mod: 25 | Performed by: NURSE PRACTITIONER

## 2023-11-02 RX ORDER — BLOOD SUGAR DIAGNOSTIC
STRIP MISCELLANEOUS
COMMUNITY
Start: 2023-08-17

## 2023-11-02 RX ORDER — BLOOD SUGAR DIAGNOSTIC
STRIP MISCELLANEOUS
COMMUNITY

## 2023-11-02 RX ORDER — BLOOD-GLUCOSE METER
EACH MISCELLANEOUS
COMMUNITY
Start: 2023-08-17

## 2023-11-02 ASSESSMENT — UNIFIED PARKINSONS DISEASE RATING SCALE (UPDRS)
AMPLITUDE_RUE: (0) NORMAL: NO TREMOR.
TOETAPPING_LEFT: (1) SLIGHT: ANY OF THE FOLLOWING: A) THE REGULAR RHYTHM IS BROKEN WITH ONE WITH ONE OR TWO INTERRUPTIONS OR HESITATIONS OF THE MOVEMENT  B) SLIGHT SLOWING  C) THE AMPLITUDE DECREMENTS NEAR THE END OF THE 10 MOVEMENTS.
SPEECH: (1) SLIGHT: LOSS OF MODULATION, DICTION OR VOLUME, BUT STILL ALL WORDS EASY TO UNDERSTAND.
FINGER_TAPPING_LEFT: (2) MILD: ANY OF THE FOLLOWING: A) 3 TO 5 INTERRUPTIONS DURING TAPPING  B) MILD SLOWING  C) THE AMPLITUDE DECREMENTS MIDWAY IN THE 10-MOVEMENT SEQUENCE.
MOVEMENTS_INTERFERE_WITH_RATINGS: NO
POSTURAL_STABILITY: (4) SEVERE: VERY UNSTABLE, TENDS TO LOSE BALANCE SPONTANEOUSLY OR WITH JUST A GENTLE PULL ON THE SHOULDERS.
TOETAPPING_LEFT: (2) MILD: ANY OF THE FOLLOWING: A) 3 TO 5 INTERRUPTIONS DURING TAPPING  B) MILD SLOWING  C) THE AMPLITUDE DECREMENTS MIDWAY IN THE 10-MOVEMENT SEQUENCE.
CONSTANCY_TREMOR_ATREST: (0) NORMAL: NO TREMOR.
MOVEMENTS_INTERFERE_WITH_RATINGS: NO
AMPLITUDE_RUE: (0) NORMAL: NO TREMOR.
AMPLITUDE_LLE: (0) NORMAL: NO TREMOR.
FINGER_TAPPING_RIGHT: (0) NORMAL: NO PROBLEMS.
RIGIDITY_RLE: (2) MILD: RIGIDITY DETECTED WITHOUT THE ACTIVATION MANEUVER. FULL RANGE OF MOTION IS EASILY ACHIEVED.
RIGIDITY_LLE: (1) SLIGHT: RIGIDITY ONLY DETECTED WITH ACTIVATION MANEUVER.
AMPLITUDE_LLE: (0) NORMAL: NO TREMOR.
HANDMOVEMENTS_LEFT: (1) SLIGHT: ANY OF THE FOLLOWING: A) THE REGULAR RHYTHM IS BROKEN WITH ONE WITH ONE OR TWO INTERRUPTIONS OR HESITATIONS OF THE MOVEMENT  B) SLIGHT SLOWING  C) THE AMPLITUDE DECREMENTS NEAR THE END OF THE 10 MOVEMENTS.
RIGIDITY_LUE: (0) NORMAL: NO RIGIDITY.
FREEZING_GAIT: (0) NORMAL: NO FREEZING.
HANDMOVEMENTS_LEFT: (2) MILD: ANY OF THE FOLLOWING: A) 3 TO 5 INTERRUPTIONS DURING TAPPING  B) MILD SLOWING  C) THE AMPLITUDE DECREMENTS MIDWAY IN THE 10-MOVEMENT SEQUENCE.
FACIAL_EXPRESSION: (2) MILD: IN ADDITION TO DECREASED EYE-BLINK FREQUENCY, MASKED FACIES PRESENT IN THE LOWER FACE AS WELL, NAMELY FEWER MOVEMENTS AROUND THE MOUTH, SUCH AS LESS SPONTANEOUS SMILING, BUT LIPS NOT PARTED.
RIGIDITY_RLE: (1) SLIGHT: RIGIDITY ONLY DETECTED WITH ACTIVATION MANEUVER.
FINGER_TAPPING_RIGHT: (1) SLIGHT: ANY OF THE FOLLOWING: A) THE REGULAR RHYTHM IS BROKEN WITH ONE WITH ONE OR TWO INTERRUPTIONS OR HESITATIONS OF THE MOVEMENT  B) SLIGHT SLOWING  C) THE AMPLITUDE DECREMENTS NEAR THE END OF THE 10 MOVEMENTS.
LEG_AGILITY_LEFT: (1) SLIGHT: ANY OF THE FOLLOWING: A) THE REGULAR RHYTHM IS BROKEN WITH ONE WITH ONE OR TWO INTERRUPTIONS OR HESITATIONS OF THE MOVEMENT  B) SLIGHT SLOWING  C) THE AMPLITUDE DECREMENTS NEAR THE END OF THE 10 MOVEMENTS.
SPONTANEITY_OF_MOVEMENT: (1) SLIGHT: SLIGHT GLOBAL SLOWNESS AND POVERTY OF SPONTANEOUS MOVEMENTS.
FINGER_TAPPING_LEFT: (2) MILD: ANY OF THE FOLLOWING: A) 3 TO 5 INTERRUPTIONS DURING TAPPING  B) MILD SLOWING  C) THE AMPLITUDE DECREMENTS MIDWAY IN THE 10-MOVEMENT SEQUENCE.
RIGIDITY_LLE: (1) SLIGHT: RIGIDITY ONLY DETECTED WITH ACTIVATION MANEUVER.
AMPLITUDE_LUE: (0) NORMAL: NO TREMOR.
LEG_AGILITY_RIGHT: (0) NORMAL: NO PROBLEMS.
DYSKINESIAS_PRESENT: NO
PRONATION_SUPINATION_RIGHT: (2) MILD: ANY OF THE FOLLOWING: A) 3 TO 5 INTERRUPTIONS DURING TAPPING  B) MILD SLOWING  C) THE AMPLITUDE DECREMENTS MIDWAY IN THE 10-MOVEMENT SEQUENCE.
PARKINSONS_MEDS: OFF
AMPLITUDE_RLE: (0) NORMAL: NO TREMOR.
AMPLITUDE_RLE: (0) NORMAL: NO TREMOR.
RIGIDITY_RUE: (1) SLIGHT: RIGIDITY ONLY DETECTED WITH ACTIVATION MANEUVER.
POSTURAL_STABILITY: (3) MODERATE: STANDS SAFELY, BUT WITH ABSENCE OF POSTURAL RESPONSE, FALLS IF NOT CAUGHT BY EXAMINER.
LEG_AGILITY_LEFT: (0) NORMAL: NO PROBLEMS.
DYSKINESIAS_PRESENT: NO
POSTURE: (0) NORMAL: NO PROBLEMS.
AMPLITUDE_LIP_JAW: (0) NORMAL: NO TREMOR.
FINGER_TAPPING_LEFT: (1) SLIGHT: ANY OF THE FOLLOWING: A) THE REGULAR RHYTHM IS BROKEN WITH ONE WITH ONE OR TWO INTERRUPTIONS OR HESITATIONS OF THE MOVEMENT  B) SLIGHT SLOWING  C) THE AMPLITUDE DECREMENTS NEAR THE END OF THE 10 MOVEMENTS.
PRONATION_SUPINATION_LEFT: (2) MILD: ANY OF THE FOLLOWING: A) 3 TO 5 INTERRUPTIONS DURING TAPPING  B) MILD SLOWING  C) THE AMPLITUDE DECREMENTS MIDWAY IN THE 10-MOVEMENT SEQUENCE.
ARISING_CHAIR: (1) SLIGHT: ARISING IS SLOWER THAN NORMAL, OR MAY NEED MORE THAN ONE ATTEMPT, OR MAY NEED TO MOVE FORWARD IN THE CHAIR TO ARISE. NO NEED TO USE THE ARMS OF THE CHAIR.
TOETAPPING_RIGHT: (1) SLIGHT: ANY OF THE FOLLOWING: A) THE REGULAR RHYTHM IS BROKEN WITH ONE WITH ONE OR TWO INTERRUPTIONS OR HESITATIONS OF THE MOVEMENT  B) SLIGHT SLOWING  C) THE AMPLITUDE DECREMENTS NEAR THE END OF THE 10 MOVEMENTS.
FACIAL_EXPRESSION: (3) MASKED FACIES WITH LIPS PARTED SOME OF THE TIME WHEN THE MOUTH IS AT REST.
TOTAL_SCORE: 41
AMPLITUDE_RLE: (0) NORMAL: NO TREMOR.
AMPLITUDE_LLE: (0) NORMAL: NO TREMOR.
ARISING_CHAIR: (1) SLIGHT: ARISING IS SLOWER THAN NORMAL, OR MAY NEED MORE THAN ONE ATTEMPT, OR MAY NEED TO MOVE FORWARD IN THE CHAIR TO ARISE. NO NEED TO USE THE ARMS OF THE CHAIR.
TOETAPPING_RIGHT: (1) SLIGHT: ANY OF THE FOLLOWING: A) THE REGULAR RHYTHM IS BROKEN WITH ONE WITH ONE OR TWO INTERRUPTIONS OR HESITATIONS OF THE MOVEMENT  B) SLIGHT SLOWING  C) THE AMPLITUDE DECREMENTS NEAR THE END OF THE 10 MOVEMENTS.
DYSKINESIAS_PRESENT: NO
AMPLITUDE_LUE: (0) NORMAL: NO TREMOR.
GAIT: (1) SLIGHT: INDEPENDENT WALKING WITH MINOR GAIT IMPAIRMENT.
TOETAPPING_LEFT: (1) SLIGHT: ANY OF THE FOLLOWING: A) THE REGULAR RHYTHM IS BROKEN WITH ONE WITH ONE OR TWO INTERRUPTIONS OR HESITATIONS OF THE MOVEMENT  B) SLIGHT SLOWING  C) THE AMPLITUDE DECREMENTS NEAR THE END OF THE 10 MOVEMENTS.
SPEECH: (2) MILD: LOSS OF MODULATION, DICTION OR VOLUME, WITH A FEW WORDS UNCLEAR, BUT THE OVERALL SENTENCES EASY TO FOLLOW.
LEG_AGILITY_RIGHT: (2) MILD: ANY OF THE FOLLOWING: A) 3 TO 5 INTERRUPTIONS DURING TAPPING  B) MILD SLOWING  C) THE AMPLITUDE DECREMENTS MIDWAY IN THE 10-MOVEMENT SEQUENCE.
AXIAL_SCORE: 11
FACIAL_EXPRESSION: (3) MASKED FACIES WITH LIPS PARTED SOME OF THE TIME WHEN THE MOUTH IS AT REST.
RIGIDITY_RUE: (1) SLIGHT: RIGIDITY ONLY DETECTED WITH ACTIVATION MANEUVER.
TOTAL_SCORE: 7
PRONATION_SUPINATION_RIGHT: (1) SLIGHT: ANY OF THE FOLLOWING: A) THE REGULAR RHYTHM IS BROKEN WITH ONE WITH ONE OR TWO INTERRUPTIONS OR HESITATIONS OF THE MOVEMENT  B) SLIGHT SLOWING  C) THE AMPLITUDE DECREMENTS NEAR THE END OF THE 10 MOVEMENTS.
POSTURE: (0) NORMAL: NO PROBLEMS.
RIGIDITY_NECK: (1) SLIGHT: RIGIDITY ONLY DETECTED WITH ACTIVATION MANEUVER.
TOTAL_SCORE: 27
RIGIDITY_LUE: (0) NORMAL: NO RIGIDITY.
CONSTANCY_TREMOR_ATREST: (0) NORMAL: NO TREMOR.
TOTAL_SCORE_LEFT: 16
RIGIDITY_RLE: (1) SLIGHT: RIGIDITY ONLY DETECTED WITH ACTIVATION MANEUVER.
RIGIDITY_LUE: (1) SLIGHT: RIGIDITY ONLY DETECTED WITH ACTIVATION MANEUVER.
FREEZING_GAIT: (0) NORMAL: NO FREEZING.
HANDMOVEMENTS_LEFT: (3) MODERATE: ANY OF THE FOLLOWING: A) MORE THAN 5 INTERRUPTIONS OR AT LEAST ONE LONGER ARREST (FREEZE) IN ONGOING MOVEMENT  B) MODERATE SLOWING  C) THE AMPLITUDE DECREMENTS STARTING AFTER THE FIRST MOVEMENT.
RIGIDITY_NECK: (1) SLIGHT: RIGIDITY ONLY DETECTED WITH ACTIVATION MANEUVER.
PRONATION_SUPINATION_LEFT: (3) MODERATE: ANY OF THE FOLLOWING: A) MORE THAN 5 INTERRUPTIONS OR AT LEAST ONE LONGER ARREST (FREEZE) IN ONGOING MOVEMENT  B) MODERATE SLOWING  C) THE AMPLITUDE DECREMENTS STARTING AFTER THE FIRST MOVEMENT.
PRONATION_SUPINATION_RIGHT: (1) SLIGHT: ANY OF THE FOLLOWING: A) THE REGULAR RHYTHM IS BROKEN WITH ONE WITH ONE OR TWO INTERRUPTIONS OR HESITATIONS OF THE MOVEMENT  B) SLIGHT SLOWING  C) THE AMPLITUDE DECREMENTS NEAR THE END OF THE 10 MOVEMENTS.
LEG_AGILITY_RIGHT: (0) NORMAL: NO PROBLEMS.
TOTAL_SCORE: 12
TOTAL_SCORE_LEFT: 8
PRONATION_SUPINATION_LEFT: (2) MILD: ANY OF THE FOLLOWING: A) 3 TO 5 INTERRUPTIONS DURING TAPPING  B) MILD SLOWING  C) THE AMPLITUDE DECREMENTS MIDWAY IN THE 10-MOVEMENT SEQUENCE.
AMPLITUDE_RUE: (0) NORMAL: NO TREMOR.
AMPLITUDE_LIP_JAW: (0) NORMAL: NO TREMOR.
RIGIDITY_NECK: (2) MILD: RIGIDITY DETECTED WITHOUT THE ACTIVATION MANEUVER. FULL RANGE OF MOTION IS EASILY ACHIEVED.
GAIT: (1) SLIGHT: INDEPENDENT WALKING WITH MINOR GAIT IMPAIRMENT.
SPEECH: (2) MILD: LOSS OF MODULATION, DICTION OR VOLUME, WITH A FEW WORDS UNCLEAR, BUT THE OVERALL SENTENCES EASY TO FOLLOW.
TOTAL_SCORE: 4
SPONTANEITY_OF_MOVEMENT: (1) SLIGHT: SLIGHT GLOBAL SLOWNESS AND POVERTY OF SPONTANEOUS MOVEMENTS.
TOTAL_SCORE_LEFT: 8
TOTAL_SCORE: 23
POSTURAL_STABILITY: (3) MODERATE: STANDS SAFELY, BUT WITH ABSENCE OF POSTURAL RESPONSE, FALLS IF NOT CAUGHT BY EXAMINER.
HANDMOVEMENTS_RIGHT: (1) SLIGHT: ANY OF THE FOLLOWING: A) THE REGULAR RHYTHM IS BROKEN WITH ONE WITH ONE OR TWO INTERRUPTIONS OR HESITATIONS OF THE MOVEMENT  B) SLIGHT SLOWING  C) THE AMPLITUDE DECREMENTS NEAR THE END OF THE 10 MOVEMENTS.
RIGIDITY_RUE: (2) MILD: RIGIDITY DETECTED WITHOUT THE ACTIVATION MANEUVER. FULL RANGE OF MOTION IS EASILY ACHIEVED.
SPONTANEITY_OF_MOVEMENT: (1) SLIGHT: SLIGHT GLOBAL SLOWNESS AND POVERTY OF SPONTANEOUS MOVEMENTS.
AXIAL_SCORE: 12
FINGER_TAPPING_RIGHT: (1) SLIGHT: ANY OF THE FOLLOWING: A) THE REGULAR RHYTHM IS BROKEN WITH ONE WITH ONE OR TWO INTERRUPTIONS OR HESITATIONS OF THE MOVEMENT  B) SLIGHT SLOWING  C) THE AMPLITUDE DECREMENTS NEAR THE END OF THE 10 MOVEMENTS.
LEG_AGILITY_LEFT: (2) MILD: ANY OF THE FOLLOWING: A) 3 TO 5 INTERRUPTIONS DURING TAPPING  B) MILD SLOWING  C) THE AMPLITUDE DECREMENTS MIDWAY IN THE 10-MOVEMENT SEQUENCE.
RIGIDITY_LLE: (2) MILD: RIGIDITY DETECTED WITHOUT THE ACTIVATION MANEUVER. FULL RANGE OF MOTION IS EASILY ACHIEVED.
TOETAPPING_RIGHT: (0) NORMAL: NO PROBLEMS.
PARKINSONS_MEDS: ON
AMPLITUDE_LIP_JAW: (0) NORMAL: NO TREMOR.
AMPLITUDE_LUE: (0) NORMAL: NO TREMOR.
AXIAL_SCORE: 13
GAIT: (1) SLIGHT: INDEPENDENT WALKING WITH MINOR GAIT IMPAIRMENT.
MOVEMENTS_INTERFERE_WITH_RATINGS: NO
HANDMOVEMENTS_RIGHT: (2) MILD: ANY OF THE FOLLOWING: A) 3 TO 5 INTERRUPTIONS DURING TAPPING  B) MILD SLOWING  C) THE AMPLITUDE DECREMENTS MIDWAY IN THE 10-MOVEMENT SEQUENCE.
FREEZING_GAIT: (0) NORMAL: NO FREEZING.
ARISING_CHAIR: (1) SLIGHT: ARISING IS SLOWER THAN NORMAL, OR MAY NEED MORE THAN ONE ATTEMPT, OR MAY NEED TO MOVE FORWARD IN THE CHAIR TO ARISE. NO NEED TO USE THE ARMS OF THE CHAIR.
HANDMOVEMENTS_RIGHT: (1) SLIGHT: ANY OF THE FOLLOWING: A) THE REGULAR RHYTHM IS BROKEN WITH ONE WITH ONE OR TWO INTERRUPTIONS OR HESITATIONS OF THE MOVEMENT  B) SLIGHT SLOWING  C) THE AMPLITUDE DECREMENTS NEAR THE END OF THE 10 MOVEMENTS.
PARKINSONS_MEDS: OFF
CONSTANCY_TREMOR_ATREST: (0) NORMAL: NO TREMOR.
POSTURE: (0) NORMAL: NO PROBLEMS.

## 2023-11-02 ASSESSMENT — PAIN SCALES - GENERAL: PAINLEVEL: NO PAIN (0)

## 2023-11-02 NOTE — PROGRESS NOTES
ASSESSMENT:    Parkinson's Disease:      S/p Bilateral GPi DBS lead implantation:      Dizziness:      PLAN:    __  The following patient instructions provided: -     __   Your DBS electrical system function (impedance) is normal. The battery life is also good. On your patient , the left body lower limit was adjusted to 0.0 mA incase you need to reduce the amplitude. No other programming changes were made today.     __  To prevent falls associated with dizziness when you stand up, increase fluids and try to stand up slowly.     __  Due to balance problems, I would highly recommend doing Physical Therapy that Dr. Houston ordered. Today's exam did show worsening balance.   Please be careful when walking on uneven ground.    Avoid holding things with both hands.   Use railing when using stairs.   Avoid backing up or walking backwards.    Also, use your cane or walker to support your balance.    __  Do the Video Swallowing Study that Dr. Houston ordered.    __  Other than your headaches from this week that has been improving with Tylenol, you don't have other neurological problems that indicate head bleeding like facial drooping, garbled speech, confusion, disorientation, or weakness in one side of your body. So no Head MRI or CT is needed.    __  Consider buying a Commode to use at night to make it easier to due to urinary urgency and increased risk for falls.    __  Return to our clinic in 1 year for a research visit.  Continue to follow up with Dr. Houston for PD management.           MOVEMENT DISORDERS CLINIC           PATIENT: Erika Diaz    : 1960    GINNY:  2023    REASON FOR VISIT: Parkinson's disease (PD) follow up, deep brain stimulation (DBS) interrogation and analysis, and 60-Month Clinical Core Research Visit.    HPI: Ms. Erika Diaz is a 63 year old who came to the [facility] neurology clinic by herself for a 60-month Clinical Core Research Visit. Research  coordinators, Shanice Coyne RN, MPH and STEVE Navarro were present.     At the beginning of the visit, pt was crying hard. She was unable to stop. In the past, she has had episodes of crying and by the end of the visit, she is usually fine.     Pt had a couple of falls in September in the bathroom. Her falls used to be forward falls; however, this time, she had backward falls. After opening the bathroom door, she backed up to get to the toilet and fell. Her balance is off. She was seen by her PCP, Dr. Main after her fall.     She was seen by her local Neurologist, Dr. Houston on 9/12 before her falls.  He has ordered PT; however, the pt has done this due to distance. The closest PT facility is 30 minutes away from her house.     Other than the balance and falling issues, other PD symptoms are stable on current DBS Therapy and antiparkinsonian medications. Dr. Houston is managing her PD and she is coming to us for a research follow up visit.     PD Medications 7 am 12 pm 4 pm 9 pm PRN   Sinemet 25/100 mg  2 1.5 2 1.5    Amantadine 100 mg  1  1     Pramipexole 0.5 mg    2      She continues living alone independently. Her sister and brother-in-law are her neighbors. They have been helping pt with various tasks including, mowing and snow blowing.      Since she had the flu shot she has been having nausea.    Also, she has had headaches this week and waking up with headaches. Headaches are improved with OTC Tylenol/Excedrin HA medication. Pt reports no other neurological problems, including weakness, facial drooping, speech changes (other than speech affected by PD), or disorientation.    Pt repots no ED visit.     Pt continues to work at [employer] as a [employer].      MDS UPDRS Part I      -- Over the last week -- 0: Normal -- 1: Slight -- 2: Mild -- 3: Moderate -- 4: Severe  1.1 Cognitive Impairment: 2   1.2 Hallucinations and psychosis: 1   1.3 Depressed mood: 0   1.4 Anxious mood: 1   1.5 Apathy:  2   1.6  Features of DDS:  0   1.7 Sleep problems:  3   1.8 Daytime sleepiness:  2   1.9 Pain and other sensations:  1   1.10 Urinary problems:  4   1.11 Constipation problems:   0   1.12 Lightheadedness on standing: 3   1.13 Fatigue:  1     Total:    20       MDS Part II  -- Total Score: 11      10/31/2023     5:02 PM   UPDRS EDL Scale   2.1  Speech 2   2.2  Salivation 1   2.3  Chew & Swallow 3   2.4  Eating                  0   2.5  Dressing                0   2.6  Hygiene                 0   2.7  Handwriting             0   2.8  Hobbies etc.            0   2.9  Turn in bed             1   2.10 Tremor                  0   2.11 Stand from chair        1   2.12 Walking & Balance  2   2.13 Freezing                1   MDS-UPDRS II Total Score 11           MEDICATIONS:   Outpatient Medications Marked as Taking for the 11/2/23 encounter (Office Visit) with Arminda Rivas APRN CNP   Medication Sig    acetaminophen (TYLENOL) 500 MG tablet Take 1-2 tablets (500-1,000 mg) by mouth every 6 hours as needed for pain or other (headache, incisional pain)    amantadine (SYMMETREL) 100 MG capsule TAKE 1 CAPSULE BY MOUTH AT  7AM AND AT 4PM    aspirin (ASA) 81 MG chewable tablet Take 1 tablet (81 mg) by mouth daily    blood glucose (ONETOUCH VERIO IQ) test strip Use to test blood glucose daily.    Blood Glucose Monitoring Suppl (ONETOUCH VERIO FLEX SYSTEM) w/Device KIT USE TO TEST BLOOD GLUCOSE EVERY DAY.    busPIRone (BUSPAR) 10 MG tablet Take 1 tablet (10 mg) by mouth 3 times daily    calcium carbonate (TUMS) 500 MG chewable tablet Take 2 chew tab by mouth as needed for heartburn    carbidopa-levodopa (SINEMET)  MG tablet TAKE 2 TABLETS BY MOUTH AT  7AM AND 4PM, AND 1 AND 1/2  TABLETS AT 12 PM AND 9 PM  FOR A TOTAL OF 7 TABLETS  DAILY    FLUoxetine (PROZAC) 20 MG capsule Take 20 mg by mouth every morning @ 7am    gabapentin (NEURONTIN) 300 MG capsule Take 1 cap at 7 am by mouth and 2 at 9 pm = 3 cap/day    hydrochlorothiazide  "(HYDRODIURIL) 25 MG tablet Take 25 mg by mouth    ibuprofen (ADVIL/MOTRIN) 200 MG capsule Take 1 capsule (200 mg) by mouth every 4 hours as needed for fever    lactase (LACTAID) 9000 units TABS tablet Take 9,000 Units by mouth With dairy as needed    metFORMIN (GLUCOPHAGE-XR) 500 MG 24 hr tablet Take 500 mg by mouth 2 times daily (with meals)    nystatin (MYCOSTATIN) 259591 UNIT/GM external powder     ONETOUCH VERIO IQ test strip USE TO TEST BLOOD GLUCOSE EVERY DAY.    pramipexole (MIRAPEX) 0.5 MG tablet TAKE 2 TABLETS BY MOUTH AT  9 PM    rosuvastatin (CRESTOR) 5 MG tablet Take 5 mg by mouth daily    TRULICITY 0.75 MG/0.5ML pen        PHYSICAL EXAM:    VITAL SIGNS:            11/2/2023 9:45 AM 11/2/2023 11:56 AM 11/2/2023 11:59 AM    /108 138/81 133/84   BP Location Right arm Right arm Right arm   Patient Position Sitting Sitting Standing   Cuff Size  Adult Large Adult Large   Pulse 57 74 83   Resp 18     Temp 98.1  F (36.7  C)     SpO2 94 %     Weight 103.4 kg (228 lb)     Height 1.6 m (5' 3\")     Pain Score No Pain (0)         GENERAL:  Ms. Diaz is a pleasant  female who is well-groomed and well-developed sitting comfortably in the exam room without any distress.  Affect is appropriate.    MOVEMENT DISORDERS ASSESSMENT:  MDS UPDRS III   11/2/2023  10:00 AM 11/2/2023  11:00 AM 11/2/2023  12:00 PM   UPDRS Motor Scale      Time: 10:26  11:28  12:20    Medication Off  Off  On    R Brain DBS: On  Off  On    L Brain DBS: On  Off  On    Dyskinesia (LID) No  No  No    Did LID interfere No  No  No    Speech 1  2  2    Facial Expression 3  3  2    Rigidity Neck 1  2  1    Rigidity RUE 1  2  1    Rigidity LUE 0  1  0    Rigidity RLE 1  2  1    Rigidity LLE 1  2  1    Finger Taps R 1  1  0    Finger Taps L 1  2  2    Hand Mvt R 1  2  1    Hand Mvt L 2  3  1    Pron-/Supinate R 1  2  1    Pron-/Supinate L 2  3  2    Toe Tap R 1  1  0    Toe Tap L 1  2  1    Leg Agility R 0  2  0    Leg Agility L 0  2  1  "   Arise From Chair 1  1  1    Gait 1  1  1    Gait Freezing 0  0  0    Postural Stability 4  3  3    Posture 0  0  0    Global Spont Mvt 1  1  1    Postural Tremor RUE 1  0  0    Postural Tremor LUE 0  1  0    Kinetic Tremor RUE 0  0  0    Kinetic Tremor LUE 1  0  0    Rest Tremor RUE 0  0  0    Rest Tremor LUE 0  0  0    Rest Tremor RLE 0  0  0    Rest Tremor LLE 0  0  0    Rest Tremor Lip/Jaw 0  0  0    Rest Tremor Constancy 0  0  0    Total Right 7  12  4    Total Left 8  16  8    Axial Total 12  13  11    Total 27  41  23        Last antiparkinsonian dose taken:  Amantadine 100 mg on 10/31 at 4 pm  Sinemet 25/100 mg on this morning at 4 am  Pramipexole on 10/31 at 4 pm    Bilateral GPi DBS turned OFF - at 10:53 am  Bilateral GPi DBS turned ON -- at 11:35 am    Took Amantadine 100 mg 1 tab and Sinemet 25/100 mg 2 tabs at 11:36 am    Procedure: DBS Interrogation & Analysis:    Lead(s):    Left Right   DBS Target GPi GPi   DBS Lead Type Company: Abbott  Model: 8 CH, 40 cm (6172)  Contacts: 1-8 Company: Abbott  Model: 8 CH, 40 cm (6172)  Contacts: 9-16   Lead Placement Date 3/5/2021 08/21/2018     IPG(s):   1 2   IPG Company: Abbott  Model: Infinity 6660  S/N: [id number] Company: Abbott  Model: Infinity 6660  S/N: [id number]   IPG Implant Date 03/12/2021 08/30/2018   Location Left chest Right chest   Battery (V) 2.94 V 2.95 V        Left GPi     C +, 2abc -  Amplitude:  2.90 mA  PW:  60 msec  Rate:  130 Hz     Therapy impedance: 975 Ohms     Patient :  Lower Limit: 0.00 mA  Upper Limit: 3.50 mA    Electrode Impedance Check:   Left Lead: No Problems Found.      Right GPi     C +, 10abc -  Amplitude:  4.20 mA  PW:  60 msec  Rate:  130 Hz     Therapy impedance: 700 Ohms     Patient :  Lower Limit: 3.00 mA  Upper  Limit: 4.20 mA     Electrode Impedance Check:  Right Lead: No Problems Found.      DBS PROGRAMMING:  For the Right GPi, the patient's lower limit was reduced from 3.0 to 0.0 mA to give pt room to  turn down amplitude if needed.      See scanned report for impedance details    Today I spent 120 minutes caring for the patient. 25 minutes was spent in DBS programming.  95 minutes was spent with reviewing records, meeting with the patient, answering questions, examining, completing questionnaire, and documentation.    Karina Rivas DNP, APRN  [facility]

## 2023-11-02 NOTE — LETTER
2023       RE: Erika Diaz  [address] [address]     Dear Colleague,    Thank you for referring your patient, Erika Diaz, to the [facility] NEUROLOGY CLINIC [place] at [facility]. Please see a copy of my visit note below.      ASSESSMENT:    Parkinson's Disease:      S/p Bilateral GPi DBS lead implantation:      Dizziness:      PLAN:    __  The following patient instructions provided: -     __   Your DBS electrical system function (impedance) is normal. The battery life is also good. On your patient , the left body lower limit was adjusted to 0.0 mA incase you need to reduce the amplitude. No other programming changes were made today.     __  To prevent falls associated with dizziness when you stand up, increase fluids and try to stand up slowly.     __  Due to balance problems, I would highly recommend doing Physical Therapy that Dr. Houston ordered. Today's exam did show worsening balance.   Please be careful when walking on uneven ground.    Avoid holding things with both hands.   Use railing when using stairs.   Avoid backing up or walking backwards.    Also, use your cane or walker to support your balance.    __  Do the Video Swallowing Study that Dr. Houston ordered.    __  Other than your headaches from this week that has been improving with Tylenol, you don't have other neurological problems that indicate head bleeding like facial drooping, garbled speech, confusion, disorientation, or weakness in one side of your body. So no Head MRI or CT is needed.    __  Consider buying a Commode to use at night to make it easier to due to urinary urgency and increased risk for falls.    __  Return to our clinic in 1 year for a research visit.  Continue to follow up with Dr. Houston for PD management.           MOVEMENT DISORDERS CLINIC           PATIENT: Erika Diaz    : 1960    GINNY:    2023    REASON FOR VISIT: Parkinson's disease (PD) follow up, deep brain stimulation (DBS) interrogation and analysis, and 60-Month Clinical Core Research Visit.    HPI: Ms. Erika Diaz is a 63 year old who came to the [facility] neurology clinic by herself for a 60-month Clinical Core Research Visit. Research coordinators, Shanice Coyne RN, MPH and Ayesha Cotto BS were present.     At the beginning of the visit, pt was crying hard. She was unable to stop. In the past, she has had episodes of crying and by the end of the visit, she is usually fine.     Pt had a couple of falls in September in the bathroom. Her falls used to be forward falls; however, this time, she had backward falls. After opening the bathroom door, she backed up to get to the toilet and fell. Her balance is off. She was seen by her PCP, Dr. Main after her fall.     She was seen by her local Neurologist, Dr. Houston on 9/12 before her falls.  He has ordered PT; however, the pt has done this due to distance. The closest PT facility is 30 minutes away from her house.     Other than the balance and falling issues, other PD symptoms are stable on current DBS Therapy and antiparkinsonian medications. Dr. Houston is managing her PD and she is coming to us for a research follow up visit.     PD Medications 7 am 12 pm 4 pm 9 pm PRN   Sinemet 25/100 mg  2 1.5 2 1.5    Amantadine 100 mg  1  1     Pramipexole 0.5 mg    2      She continues living alone independently. Her sister and brother-in-law are her neighbors. They have been helping pt with various tasks including, mowing and snow blowing.      Since she had the flu shot she has been having nausea.    Also, she has had headaches this week and waking up with headaches. Headaches are improved with OTC Tylenol/Excedrin HA medication. Pt reports no other neurological problems, including weakness, facial drooping, speech changes (other than speech affected by PD), or disorientation.    Pt repots no  ED visit.     Pt continues to work at [employer] as a [employer].      MDS UPDRS Part I      -- Over the last week -- 0: Normal -- 1: Slight -- 2: Mild -- 3: Moderate -- 4: Severe  1.1 Cognitive Impairment: 2   1.2 Hallucinations and psychosis: 1   1.3 Depressed mood: 0   1.4 Anxious mood: 1   1.5 Apathy:  2   1.6 Features of DDS:  0   1.7 Sleep problems:  3   1.8 Daytime sleepiness:  2   1.9 Pain and other sensations:  1   1.10 Urinary problems:  4   1.11 Constipation problems:   0   1.12 Lightheadedness on standing: 3   1.13 Fatigue:  1     Total:    20       MDS Part II  -- Total Score: 11      10/31/2023     5:02 PM   UPDRS EDL Scale   2.1  Speech 2   2.2  Salivation 1   2.3  Chew & Swallow 3   2.4  Eating                  0   2.5  Dressing                0   2.6  Hygiene                 0   2.7  Handwriting             0   2.8  Hobbies etc.            0   2.9  Turn in bed             1   2.10 Tremor                  0   2.11 Stand from chair        1   2.12 Walking & Balance  2   2.13 Freezing                1   MDS-UPDRS II Total Score 11           MEDICATIONS:   Outpatient Medications Marked as Taking for the 11/2/23 encounter (Office Visit) with Arminda Rivas APRN CNP   Medication Sig    acetaminophen (TYLENOL) 500 MG tablet Take 1-2 tablets (500-1,000 mg) by mouth every 6 hours as needed for pain or other (headache, incisional pain)    amantadine (SYMMETREL) 100 MG capsule TAKE 1 CAPSULE BY MOUTH AT  7AM AND AT 4PM    aspirin (ASA) 81 MG chewable tablet Take 1 tablet (81 mg) by mouth daily    blood glucose (ONETOUCH VERIO IQ) test strip Use to test blood glucose daily.    Blood Glucose Monitoring Suppl (ONETOUCH VERIO FLEX SYSTEM) w/Device KIT USE TO TEST BLOOD GLUCOSE EVERY DAY.    busPIRone (BUSPAR) 10 MG tablet Take 1 tablet (10 mg) by mouth 3 times daily    calcium carbonate (TUMS) 500 MG chewable tablet Take 2 chew tab by mouth as needed for heartburn    carbidopa-levodopa (SINEMET)  MG tablet  "TAKE 2 TABLETS BY MOUTH AT  7AM AND 4PM, AND 1 AND 1/2  TABLETS AT 12 PM AND 9 PM  FOR A TOTAL OF 7 TABLETS  DAILY    FLUoxetine (PROZAC) 20 MG capsule Take 20 mg by mouth every morning @ 7am    gabapentin (NEURONTIN) 300 MG capsule Take 1 cap at 7 am by mouth and 2 at 9 pm = 3 cap/day    hydrochlorothiazide (HYDRODIURIL) 25 MG tablet Take 25 mg by mouth    ibuprofen (ADVIL/MOTRIN) 200 MG capsule Take 1 capsule (200 mg) by mouth every 4 hours as needed for fever    lactase (LACTAID) 9000 units TABS tablet Take 9,000 Units by mouth With dairy as needed    metFORMIN (GLUCOPHAGE-XR) 500 MG 24 hr tablet Take 500 mg by mouth 2 times daily (with meals)    nystatin (MYCOSTATIN) 427251 UNIT/GM external powder     ONETOUCH VERIO IQ test strip USE TO TEST BLOOD GLUCOSE EVERY DAY.    pramipexole (MIRAPEX) 0.5 MG tablet TAKE 2 TABLETS BY MOUTH AT  9 PM    rosuvastatin (CRESTOR) 5 MG tablet Take 5 mg by mouth daily    TRULICITY 0.75 MG/0.5ML pen        PHYSICAL EXAM:    VITAL SIGNS:            11/2/2023 9:45 AM 11/2/2023 11:56 AM 11/2/2023 11:59 AM    /108 138/81 133/84   BP Location Right arm Right arm Right arm   Patient Position Sitting Sitting Standing   Cuff Size  Adult Large Adult Large   Pulse 57 74 83   Resp 18     Temp 98.1  F (36.7  C)     SpO2 94 %     Weight 103.4 kg (228 lb)     Height 1.6 m (5' 3\")     Pain Score No Pain (0)         GENERAL:  Ms. Diaz is a pleasant  female who is well-groomed and well-developed sitting comfortably in the exam room without any distress.  Affect is appropriate.    MOVEMENT DISORDERS ASSESSMENT:  MDS UPDRS III   11/2/2023  10:00 AM 11/2/2023  11:00 AM 11/2/2023  12:00 PM   UPDRS Motor Scale      Time: 10:26  11:28  12:20    Medication Off  Off  On    R Brain DBS: On  Off  On    L Brain DBS: On  Off  On    Dyskinesia (LID) No  No  No    Did LID interfere No  No  No    Speech 1  2  2    Facial Expression 3  3  2    Rigidity Neck 1  2  1    Rigidity RUE 1  2  1  "   Rigidity LUE 0  1  0    Rigidity RLE 1  2  1    Rigidity LLE 1  2  1    Finger Taps R 1  1  0    Finger Taps L 1  2  2    Hand Mvt R 1  2  1    Hand Mvt L 2  3  1    Pron-/Supinate R 1  2  1    Pron-/Supinate L 2  3  2    Toe Tap R 1  1  0    Toe Tap L 1  2  1    Leg Agility R 0  2  0    Leg Agility L 0  2  1    Arise From Chair 1  1  1    Gait 1  1  1    Gait Freezing 0  0  0    Postural Stability 4  3  3    Posture 0  0  0    Global Spont Mvt 1  1  1    Postural Tremor RUE 1  0  0    Postural Tremor LUE 0  1  0    Kinetic Tremor RUE 0  0  0    Kinetic Tremor LUE 1  0  0    Rest Tremor RUE 0  0  0    Rest Tremor LUE 0  0  0    Rest Tremor RLE 0  0  0    Rest Tremor LLE 0  0  0    Rest Tremor Lip/Jaw 0  0  0    Rest Tremor Constancy 0  0  0    Total Right 7  12  4    Total Left 8  16  8    Axial Total 12  13  11    Total 27  41  23        Last antiparkinsonian dose taken:  Amantadine 100 mg on 10/31 at 4 pm  Sinemet 25/100 mg on this morning at 4 am  Pramipexole on 10/31 at 4 pm    Bilateral GPi DBS turned OFF - at 10:53 am  Bilateral GPi DBS turned ON -- at 11:35 am    Took Amantadine 100 mg 1 tab and Sinemet 25/100 mg 2 tabs at 11:36 am    Procedure: DBS Interrogation & Analysis:    Lead(s):    Left Right   DBS Target GPi GPi   DBS Lead Type Company: [employer]  Model: 8 CH, 40 cm (6172)  Contacts: 1-8 Company: [employer]  Model: 8 CH, 40 cm (6172)  Contacts: 9-16   Lead Placement Date 3/5/2021 08/21/2018     IPG(s):   1 2   IPG Company: [employer]  Model: Aircareity 6660  S/N: [id number] Company: [employer]  Model: Home-Account 6660  S/N: [id number]   IPG Implant Date 03/12/2021 08/30/2018   Location Left chest Right chest   Battery (V) 2.94 V 2.95 V        Left GPi     C +, 2abc -  Amplitude:  2.90 mA  PW:  60 msec  Rate:  130 Hz     Therapy impedance: 975 Ohms     Patient :  Lower Limit: 0.00 mA  Upper Limit: 3.50 mA    Electrode Impedance Check:   Left Lead: No Problems Found.      Right GPi     C +, 10abc -  Amplitude:  4.20  mA  PW:  60 msec  Rate:  130 Hz     Therapy impedance: 700 Ohms     Patient :  Lower Limit: 3.00 mA  Upper  Limit: 4.20 mA     Electrode Impedance Check:  Right Lead: No Problems Found.      DBS PROGRAMMING:  For the Right GPi, the patient's lower limit was reduced from 3.0 to 0.0 mA to give pt room to turn down amplitude if needed.      See scanned report for impedance details    Today I spent 120 minutes caring for the patient. 25 minutes was spent in DBS programming.  95 minutes was spent with reviewing records, meeting with the patient, answering questions, examining, completing questionnaire, and documentation.        Again, thank you for allowing me to participate in the care of your patient.      Sincerely,    GUILLERMINA Valero CNP

## 2023-11-02 NOTE — NURSING NOTE
Chief Complaint   Patient presents with    DBS Evaluation     Return Motor Testing     .Elis Medellin NRP

## 2023-11-02 NOTE — PATIENT INSTRUCTIONS
Dear Ms. Erika NIELSON Mingestrellamarcial,    Thank you for coming today.  During your visit, we have discussed the following:     __   Your DBS electrical system function (impedance) is normal. The battery life is also good. On your patient , the left body lower limit was adjusted to 0.0 mA incase you need to reduce the amplitude. No other programming changes were made today.     __  To prevent falls associated with dizziness when you stand up, increase fluids and try to stand up slowly.     __  Due to balance problems, I would highly recommend doing Physical Therapy that Dr. Houston ordered. Today's exam did show worsening balance.   Please be careful when walking on uneven ground.    Avoid holding things with both hands.   Use railing when using stairs.   Avoid backing up or walking backwards.    Also, use your cane or walker to support your balance.    __  Do the Video Swallowing Study that Dr. Houston ordered.    __  Other than your headaches from this week that has been improving with Tylenol, you don't have other neurological problems that indicate head bleeding like facial drooping, garbled speech, confusion, disorientation, or weakness in one side of your body. So no Head MRI or CT is needed.    __  Consider buying a Commode to use at night to make it easier to due to urinary urgency and increased risk for falls.    __  Return to our clinic in 1 year for a research visit.  Continue to follow up with Dr. Houston for PD management.     To contact our clinic, you may call [phone] or use [employer] message.     To make an appointment with one of our pharmacists, (Verónica Patel, Pharm.D. or Daisy Aviles PharmD), call [phone].    Karina Rivas, AFSANEH, APRN  [facility] [[email or web address]] Neurology Clinic

## 2023-11-03 ENCOUNTER — TELEPHONE (OUTPATIENT)
Dept: NEUROLOGY | Facility: CLINIC | Age: 63
End: 2023-11-03
Payer: COMMERCIAL

## 2023-11-03 ENCOUNTER — OFFICE VISIT (OUTPATIENT)
Dept: NEUROPSYCHOLOGY | Facility: CLINIC | Age: 63
End: 2023-11-03

## 2023-11-03 DIAGNOSIS — Z00.6 EXAMINATION OF PARTICIPANT IN CLINICAL TRIAL: Primary | ICD-10-CM

## 2023-11-03 PROCEDURE — 96139 PSYCL/NRPSYC TST TECH EA: CPT

## 2023-11-03 PROCEDURE — 90791 PSYCH DIAGNOSTIC EVALUATION: CPT

## 2023-11-03 PROCEDURE — 96138 PSYCL/NRPSYC TECH 1ST: CPT

## 2023-11-03 RX ORDER — CHLORHEXIDINE GLUCONATE 4 %
1 LIQUID (ML) TOPICAL AT BEDTIME
COMMUNITY

## 2023-11-03 NOTE — TELEPHONE ENCOUNTER
Patient called writer and left voicemail. She wanted to let us know that the melatonin she is taking is 12 mg. No questions.  Will update provider and research coordinator.    Shanice Coyne RN, MPH  Research Nurse, Movement Disorders

## 2023-11-03 NOTE — NURSING NOTE
The patient was seen for neuropsychological evaluation at the request of GUILLERMINA Wilson, for the purposes of diagnostic clarification and treatment planning. 191 minutes of test administration and scoring were provided by this writer, Zbigniew Rausch. Please see Dr. Amanda Hernandez's report for a full interpretation of the findings.

## 2023-11-05 NOTE — CONFIDENTIAL NOTE
The patient completed the 60 month neuropsychological evaluation for the [place] Clinical Core. There were 191 minutes of psychometrist time (14150: 1 unit; 80277: 5 units) and one unit of neuropsychologist professional time (08710). OnCore ID: [id number]     Measures administered:    Dementia Rating Scale - 2 Alternate Form: 140/144  WAIS-IV: Similarities, Comprehension, Matrix Reasoning, Letter Number Sequencing, Digit Span  WMS-R Information & Orientation, WMS-4 Logical Memory I and II  D-KEFS Verbal Fluency - Alternate  [place] Naming Test  Clock Drawing  Trail Making Test  Stroop  [place] Card Sorting Test - 64 items  Fuentes Judgment of Line Orientation Form V  Harris Verbal Learning Test - Revised Form 1  Brief Visual Memory Test - Revised Form 1  MoCA v 7.1 (Score = 29/30)    BDI-2: 1  QUIP  ESS  RBDSQ  PDQ-39  Apathy Scale: 20  HAM-D   HAM-A       Results indicate difficulty with learning and retrieval of learned information, while verbal learning and memory fall within normal limits. Switching fluency is impaired, and other aspects of verbal fluency fall within normal limits. Compared to her September 2022 research visit, she has had significant improvements in language abilities, some aspects of executive functioning, and visual processing. She has had declines in switching fluency and psychomotor processing speed. Verbal memory is variable, with improvements in some areas and declines in other. She does not report significant depressive symptomatology or anxiety, but does report significant apathy.      Amanda Hernandez, Ph.D., ABPP  Licensed Psychologist, LP 4336  Board Certified in Clinical Neuropsychology

## 2023-12-16 ENCOUNTER — HEALTH MAINTENANCE LETTER (OUTPATIENT)
Age: 63
End: 2023-12-16

## 2024-01-02 ENCOUNTER — TELEPHONE (OUTPATIENT)
Dept: NEUROLOGY | Facility: CLINIC | Age: 64
End: 2024-01-02
Payer: COMMERCIAL

## 2024-01-02 NOTE — TELEPHONE ENCOUNTER
Pt called writer and left VM message saying she lost her DBS  ([employer]). She was wondering who she should call about replacement. Writer was out of office at the time. Pt called the following day saying she found the DBS  in her car and no longer needed replacement. No further questions.    Shanice Coyne, RN, MPH  Research Nurse, Movement Disorders

## 2024-04-25 ENCOUNTER — TELEPHONE (OUTPATIENT)
Dept: NEUROLOGY | Facility: CLINIC | Age: 64
End: 2024-04-25
Payer: COMMERCIAL

## 2024-04-25 DIAGNOSIS — G20.A1 PARKINSON'S DISEASE (H): ICD-10-CM

## 2024-04-25 DIAGNOSIS — G25.81 RESTLESS LEGS SYNDROME (RLS): ICD-10-CM

## 2024-04-25 NOTE — TELEPHONE ENCOUNTER
RX Authorization    Medication: amantadine (SYMMETREL) 100 MG capsule     Date last refill ordered: 4/20/23    Quantity ordered: 60    # refills: 11    Date of last clinic visit with ordering provider: 11/2/23    Date of next clinic visit with ordering provider: None scheduled

## 2024-04-25 NOTE — CONFIDENTIAL NOTE
Prior Authorization Retail Medication Request    Medication/Dose: Amantadine 100 MG   Diagnosis and ICD code: G20.A  New/renewal/insurance change PA/secondary ins. PA:  Previously Tried and Failed:    Rationale:      Insurance   Primary:[employer]   Insurance ID: [id number]     Secondary:  Insurance ID:      Pharmacy Information   Name:    [id number] - [place], [place] - [address]     Phone: [phone]  Fax: [phone]

## 2024-04-25 NOTE — TELEPHONE ENCOUNTER
RX Authorization    Medication: gabapentin (NEURONTIN) 300 MG capsule     Date last refill ordered: 6/2/23    Quantity ordered: 270    # refills: 3    Date of last clinic visit with ordering provider: 11/2/23    Date of next clinic visit with ordering provider: None scheduled                                            RX Authorization    Medication: carbidopa-levodopa (SINEMET)  MG tablet     Date last refill ordered: 5/17/23    Quantity ordered: 630    # refills: 3

## 2024-04-25 NOTE — TELEPHONE ENCOUNTER
[facility] Call Center    Phone Message    May a detailed message be left on voicemail: yes     Reason for Call: Medication Refill Request    Has the patient contacted the pharmacy for the refill? Yes   Name of medication being requested: amantadine (SYMMETREL) 100 MG capsule  Provider who prescribed the medication: GUILLERMINA Wilson CNP  [facility] - [address] AT Portal to [facility] Sites  Date medication is needed: 04/26/2024    May call Pharmacy at #[phone] option #2       Action Taken: Message routed to:  [facility] Neurology    Travel Screening: Not Applicable

## 2024-04-29 RX ORDER — AMANTADINE HYDROCHLORIDE 100 MG/1
CAPSULE, GELATIN COATED ORAL
Qty: 60 CAPSULE | Refills: 11 | Status: SHIPPED | OUTPATIENT
Start: 2024-04-29

## 2024-05-02 ENCOUNTER — TELEPHONE (OUTPATIENT)
Dept: NEUROLOGY | Facility: CLINIC | Age: 64
End: 2024-05-02
Payer: COMMERCIAL

## 2024-05-02 RX ORDER — CARBIDOPA AND LEVODOPA 25; 100 MG/1; MG/1
TABLET ORAL
Qty: 630 TABLET | Refills: 3 | Status: SHIPPED | OUTPATIENT
Start: 2024-05-02

## 2024-05-02 RX ORDER — GABAPENTIN 300 MG/1
CAPSULE ORAL
Qty: 270 CAPSULE | Refills: 3 | Status: SHIPPED | OUTPATIENT
Start: 2024-05-02

## 2024-05-02 NOTE — TELEPHONE ENCOUNTER
Writer left voicemail requesting a call back about Sinemet refill. Pt. is now under the care of Dr. Houston at the [facility] and should have him refill PD medications. Pt. only comes to the [facility] for research visits. Writer provided call back number for questions.    Deanna Sweet RN ([place]) on May 2, 2024 at 10:47 AM

## 2024-05-02 NOTE — TELEPHONE ENCOUNTER
.[facility] Call Center    Phone Message    May a detailed message be left on voicemail: yes     Reason for Call: Medication Refill Request    Has the patient contacted the pharmacy for the refill? Yes   Name of medication being requested: Gabapentin 300 mg tab   Provider who prescribed the medication: Arminda Rivas  Pharmacy: [facility]  Order number   Date medication is needed: ASAP       Action Taken: [facility] Neurology    Travel Screening: Not Applicable

## 2024-05-02 NOTE — TELEPHONE ENCOUNTER
[facility] Call Center    Phone Message    May a detailed message be left on voicemail: yes     Reason for Call: Medication Question or concern regarding medication   Prescription Clarification  Name of Medication: carbidopa-levodopa (SINEMET)  MG tablet    Prescribing Provider: Karina Rivas      [facility] - [address] AT PORTAL TO REGISTERED [facility] SITES   What on the order needs clarification?  [facility] called to req a prescription renewal for refills      Action Taken: Message routed to:  [facility] neurology    Travel Screening: Not Applicable

## 2024-05-03 ENCOUNTER — TELEPHONE (OUTPATIENT)
Dept: NEUROLOGY | Facility: CLINIC | Age: 64
End: 2024-05-03
Payer: COMMERCIAL

## 2024-05-03 NOTE — TELEPHONE ENCOUNTER
The patient was called to schedule her research visits for a neuropsychological evaluation. The patient stated she lives in [place] now and would like to coordinate her visits together (the neuropsychological evaluation and the motor testing) and if they can be done Monday-Wednesday but she will work with the team on any day.     Informed the patient of possible dates on 10/2 at 12:30 PM and 10/3 at 8 AM. Informed the patient the writer will need to verify with the research team first before scheduling the appointments.

## 2024-05-04 ENCOUNTER — HEALTH MAINTENANCE LETTER (OUTPATIENT)
Age: 64
End: 2024-05-04

## 2024-05-08 NOTE — TELEPHONE ENCOUNTER
The patient was scheduled for her neuropsychological evaluation on 10/2/24 at 12:30 PM and her research on/off testing on 10/3/24 at 8 AM.

## 2024-05-09 NOTE — TELEPHONE ENCOUNTER
Retail Pharmacy Prior Authorization Team   Phone: [phone]    PA Initiation    Medication: AMANTADINE  MG PO CAPS  Insurance Company: [employer] - Phone [phone] Fax [phone]  Pharmacy Filling the Rx: [facility] - [place] - [address] AT PORTAL TO REGISTERED [facility] SITES  Filling Pharmacy Phone: [phone]  Filling Pharmacy Fax:    Start Date: 5/9/2024

## 2024-05-16 NOTE — TELEPHONE ENCOUNTER
Prior Authorization Not Needed per Insurance    Medication: AMANTADINE  MG PO CAPS  Insurance Company: Pedro Pablohannah - Phone [phone] Fax [phone]  Expected CoPay: $    [facility] - [address] AT [address] TO [facility]  Pharmacy Notified: Yes  Patient Notified: **Instructed pharmacy to notify patient when script is ready to /ship.**

## 2024-07-13 ENCOUNTER — HEALTH MAINTENANCE LETTER (OUTPATIENT)
Age: 64
End: 2024-07-13

## 2024-08-16 ENCOUNTER — TELEPHONE (OUTPATIENT)
Dept: NEUROLOGY | Facility: CLINIC | Age: 64
End: 2024-08-16
Payer: COMMERCIAL

## 2024-08-16 NOTE — TELEPHONE ENCOUNTER
Erika called to let the team know she is in the hospital following a fall while working in the garden which resulted in a dislocated right shoulder. She states she fell into a chain link fence and also hit her face on the cement, no loss of consciousness. She spent a night in the emergency department and then had a reduction procedure in the operating room on 8/14.    They did the CT which did not show any bleeds or other issues. Patient did have her DBS  and was able to turn it off for the procedure and turn back on after the procedure.  She is not sure where her DBS controller is located at the moment, she thinks her sister may have it. I stressed the importance of locating the  before she leaves the hospital.     Patient states her right arm is in a brace and pretty much does not have use of it. Patient states she was able to function enough that she did not qualify for a rehab stay--will probably be discharged tomorrow and will have home care and family support nearby.    I let patient know that I will notify Karina Rivas DNP of what happened. I sent patient our best and hoping her a smooth recovery and asked her to check in with us in 3-4 weeks to let us know how she's doing, and if she needs to reschedule her upcoming research appointments with us in October.    Patient had no further questions and just wanted to notify us of what happened.    Shanice Coyne RN, MPH  Research Nurse, Movement Disorders

## 2024-09-05 ENCOUNTER — TELEPHONE (OUTPATIENT)
Dept: NEUROLOGY | Facility: CLINIC | Age: 64
End: 2024-09-05
Payer: COMMERCIAL

## 2024-09-05 NOTE — TELEPHONE ENCOUNTER
Writer received a voicemail from patient requesting to reschedule her upcoming research appts with neurology and neuropsychology in October.    I called and spoke with patient: she needs to reschedule her upcoming 72M clinical core appts (Wed 10/2 neuropsych interview w/testing, and Thurs 10/3 ON/OFF testing w/Karina Rivas DNP). Patient reminded me that 3 weeks ago she fell and broke and dislocated her shoulder and is having surgery next week. She states she won't be able to drive for 6-8 weeks (unclear if that was from date of first injury or from surgery).     Will check with team on options for rescheduling appointments. Patient will  need to see Karina Rivas DNP on a Thursday and since patient is coming from [place], the neuropsych would need to be on a Wed or Fri.  (The research study visit window for 72-months goes until March 2025.)    I let patient know that I would check with the team and would get back to her with some other options for dates.    Shanice Coyne, RN, MPH  Research Nurse, Movement Disorders

## 2024-09-09 NOTE — TELEPHONE ENCOUNTER
Called patient and spoke with her regarding new dates for 72-month clinical core research visits. Will ask Rusty Saldivar to cancel the October dates and the new rescheduled dates will be:    Wed Jan 8th 12:30 neuropsych (testing and interview)  Thurs Jan 9th 9:00am ON/OFF testing w/Karina Rivas DNP    I expressed our teams best wishes on her surgery on Wednesday and let her know that we will be in touch as it gets closer to her January appointments with instructions. Patient had no further questions.      Shanice Coyne, RN, MPH  Research Nurse, Movement Disorders

## 2024-09-16 ENCOUNTER — TELEPHONE (OUTPATIENT)
Dept: NEUROLOGY | Facility: CLINIC | Age: 64
End: 2024-09-16
Payer: COMMERCIAL

## 2024-09-16 NOTE — TELEPHONE ENCOUNTER
"Writer received return phone call from patient. She reports she had surgery on her right shoulder last Wednesday and is currently in the hospital. She stated \"I feel like I had a stroke,\" because she is having difficulties using her right arm. She is scheduled to have an MRI today. Pt. also expressed concern that she is not receiving her Sinemet on time which causes her tremors to increase. She reports when she does take her Sinemet, she is doing \"ok.\" Writer verified that her stimulation is on. However, her PD medications and device is now being managed by the [facility].    Lesiar had the opportunity to speak with her nurse at the hospital. Writer asked if her inpatient care team could come up with a plan to ensure pt. is receiving her PD medications on time. Her nurse explained that it is listed as a \"Critical\" medication in her MAR, meaning the window if administration is 30 minutes. Writer explained to hospital nurse that when she goes in for her MRI, her device needs to be placed in MRI mode and if they have any questions they can call the number on the back of the 's card.    No further action needed as of note.    Deanna Sweet RN (Niecy) on September 16, 2024 at 10:26 AM     "

## 2024-09-16 NOTE — TELEPHONE ENCOUNTER
Nursing staff received a voicemail from patient, requesting a call back regarding increased in PD symptoms. Writer returned phone call, but received voicemail. Writer left message with call back number and availability.    Deanna Sweet RN (Niecy) on September 16, 2024 at 9:24 AM

## 2024-09-18 NOTE — TELEPHONE ENCOUNTER
Patient called writer to provide update-she feels her PD symptoms are worse (specifically, tremor) and her medications are wearing off within 2 hours. She is still hospitalized after her shoulder surgery and expects to transfer to rehab in the coming days. Patient states she was told that she would need to work with her neurology clinic to increase PD meds, rather than inpatient team.     Patient is calling [facility] team to see if there is anything that can be done to help control her symptoms (either change in medication or DBS) to help her feel more comfortable. I stressed the importance of having her local neurologist in the role of managing medications and DBS, especially in time like these where she is hospitalized in the [facility] system, and that the [facility] team will be seeing her annually for research visits. Patient shares that she had an appointment with [facility] next week, that she canceled (presumably because she expects to be in rehab), but when she went to reschedule, was told the earliest opening was June 2025. Patient is wondering if we can help advise her.     I did leave a message with the [facility] Neurology clinic team [phone] to let them know pt is hospitalized after surgery and experiencing exacerbation of PD symptoms, and requesting a sooner appointment. Will also update Karina Rivas DNP on our team to see if she would like to advise any changes in the meantime.    Shanice Coyne, RN, MPH  Research Nurse, Movement Disorders

## 2024-09-19 NOTE — TELEPHONE ENCOUNTER
Spoke with patient and advised that she speak with her hospital team to get in contact with her [facility] neurology clinic for any medication adjustments. Patient said that her sister already tried calling the [facility] clinic. I emphasized that because she is currently hospitalized, the best way to get in contact with her [facility] team is for her current hospital team to contact the [facility] clinic directly. I asked pt to notify her hospital nurse that she needs an adjustment of her PD meds, and if the hospital team needs direction on this, they should contact Dr. Houston at [facility]--her hospital provider should contact the neurology clinic directly. Patient expressed understanding.    We reviewed pt's upcoming research appointments that had been rescheduled to January 2025.    Patient had no further questions.    Shanice Coyne, RN, MPH  Research Nurse, Movement Disorders

## 2024-09-19 NOTE — TELEPHONE ENCOUNTER
Received call from nurse at [facility] clinic. They will notify Dr. Houston of situation and assist if hospital team needs recommendation for medication adjustment.They are aware that [facility] will not be managing medications/DBS.     Shanice Coyne RN, MPH  Research Nurse, Movement Disorders

## 2024-09-19 NOTE — TELEPHONE ENCOUNTER
Generally, when pts are hospitalized, their PD and other meds are managed by the hospital team.  If the hospital team needs help, then they contact the responsible provider.     It might be best that the hospital providers contact her local neurologist to get input instead of the pt calling her neurologist. I don't know how things work in their health system, but generally, she has a better chance if the communication occurs is between providers.

## 2024-09-21 ENCOUNTER — HEALTH MAINTENANCE LETTER (OUTPATIENT)
Age: 64
End: 2024-09-21

## 2025-01-08 ENCOUNTER — OFFICE VISIT (OUTPATIENT)
Dept: NEUROPSYCHOLOGY | Facility: CLINIC | Age: 65
End: 2025-01-08
Payer: COMMERCIAL

## 2025-01-08 DIAGNOSIS — Z00.6 EXAMINATION OF PARTICIPANT IN CLINICAL TRIAL: Primary | ICD-10-CM

## 2025-01-08 ASSESSMENT — SLEEP AND FATIGUE QUESTIONNAIRES
HOW LIKELY ARE YOU TO NOD OFF OR FALL ASLEEP WHILE SITTING INACTIVE IN A PUBLIC PLACE: WOULD NEVER DOZE
HOW LIKELY ARE YOU TO NOD OFF OR FALL ASLEEP WHILE SITTING AND READING: SLIGHT CHANCE OF DOZING
HOW LIKELY ARE YOU TO NOD OFF OR FALL ASLEEP WHILE WATCHING TV: SLIGHT CHANCE OF DOZING
HOW LIKELY ARE YOU TO NOD OFF OR FALL ASLEEP IN A CAR, WHILE STOPPED FOR A FEW MINUTES IN TRAFFIC: WOULD NEVER DOZE
HOW LIKELY ARE YOU TO NOD OFF OR FALL ASLEEP WHILE LYING DOWN TO REST IN THE AFTERNOON WHEN CIRCUMSTANCES PERMIT: SLIGHT CHANCE OF DOZING
HOW LIKELY ARE YOU TO NOD OFF OR FALL ASLEEP WHILE SITTING AND TALKING TO SOMEONE: WOULD NEVER DOZE
HOW LIKELY ARE YOU TO NOD OFF OR FALL ASLEEP WHEN YOU ARE A PASSENGER IN A CAR FOR AN HOUR WITHOUT A BREAK: SLIGHT CHANCE OF DOZING
HOW LIKELY ARE YOU TO NOD OFF OR FALL ASLEEP WHILE SITTING QUIETLY AFTER LUNCH WITHOUT ALCOHOL: SLIGHT CHANCE OF DOZING

## 2025-01-09 ENCOUNTER — OFFICE VISIT (OUTPATIENT)
Dept: NEUROLOGY | Facility: CLINIC | Age: 65
End: 2025-01-09
Payer: COMMERCIAL

## 2025-01-09 VITALS
TEMPERATURE: 97.6 F | OXYGEN SATURATION: 96 % | WEIGHT: 209 LBS | HEART RATE: 75 BPM | SYSTOLIC BLOOD PRESSURE: 115 MMHG | HEIGHT: 63 IN | RESPIRATION RATE: 16 BRPM | BODY MASS INDEX: 37.03 KG/M2 | DIASTOLIC BLOOD PRESSURE: 59 MMHG

## 2025-01-09 DIAGNOSIS — G20.B2 PARKINSON'S DISEASE WITH DYSKINESIA AND FLUCTUATING MANIFESTATIONS (H): Primary | ICD-10-CM

## 2025-01-09 DIAGNOSIS — Z96.89 S/P DEEP BRAIN STIMULATOR PLACEMENT: ICD-10-CM

## 2025-01-09 DIAGNOSIS — R26.89 BALANCE PROBLEMS: ICD-10-CM

## 2025-01-09 DIAGNOSIS — K11.7 SIALORRHEA: ICD-10-CM

## 2025-01-09 DIAGNOSIS — F39 MOOD DISORDER: ICD-10-CM

## 2025-01-09 ASSESSMENT — UNIFIED PARKINSONS DISEASE RATING SCALE (UPDRS)
HANDMOVEMENTS_RIGHT: (2) MILD: ANY OF THE FOLLOWING: A) 3 TO 5 INTERRUPTIONS DURING TAPPING  B) MILD SLOWING  C) THE AMPLITUDE DECREMENTS MIDWAY IN THE 10-MOVEMENT SEQUENCE.
LEG_AGILITY_RIGHT: (1) SLIGHT: ANY OF THE FOLLOWING: A) THE REGULAR RHYTHM IS BROKEN WITH ONE WITH ONE OR TWO INTERRUPTIONS OR HESITATIONS OF THE MOVEMENT  B) SLIGHT SLOWING  C) THE AMPLITUDE DECREMENTS NEAR THE END OF THE 10 MOVEMENTS.
SPONTANEITY_OF_MOVEMENT: (1) SLIGHT: SLIGHT GLOBAL SLOWNESS AND POVERTY OF SPONTANEOUS MOVEMENTS.
GAIT: (1) SLIGHT: INDEPENDENT WALKING WITH MINOR GAIT IMPAIRMENT.
RIGIDITY_RUE: (1) SLIGHT: RIGIDITY ONLY DETECTED WITH ACTIVATION MANEUVER.
AMPLITUDE_LIP_JAW: (0) NORMAL: NO TREMOR.
AMPLITUDE_LUE: (0) NORMAL: NO TREMOR.
AMPLITUDE_RLE: (0) NORMAL: NO TREMOR.
PRONATION_SUPINATION_RIGHT: (2) MILD: ANY OF THE FOLLOWING: A) 3 TO 5 INTERRUPTIONS DURING TAPPING  B) MILD SLOWING  C) THE AMPLITUDE DECREMENTS MIDWAY IN THE 10-MOVEMENT SEQUENCE.
ARISING_CHAIR: (1) SLIGHT: ARISING IS SLOWER THAN NORMAL, OR MAY NEED MORE THAN ONE ATTEMPT, OR MAY NEED TO MOVE FORWARD IN THE CHAIR TO ARISE. NO NEED TO USE THE ARMS OF THE CHAIR.
TOETAPPING_LEFT: (1) SLIGHT: ANY OF THE FOLLOWING: A) THE REGULAR RHYTHM IS BROKEN WITH ONE WITH ONE OR TWO INTERRUPTIONS OR HESITATIONS OF THE MOVEMENT  B) SLIGHT SLOWING  C) THE AMPLITUDE DECREMENTS NEAR THE END OF THE 10 MOVEMENTS.
AMPLITUDE_LLE: (0) NORMAL: NO TREMOR.
POSTURE: (1) SLIGHT: NOT QUITE ERECT, BUT COULD BE NORMAL FOR OLDER PERSONS.
PARKINSONS_MEDS: ON
POSTURE: (1) SLIGHT: NOT QUITE ERECT, BUT COULD BE NORMAL FOR OLDER PERSONS.
PRONATION_SUPINATION_RIGHT: (2) MILD: ANY OF THE FOLLOWING: A) 3 TO 5 INTERRUPTIONS DURING TAPPING  B) MILD SLOWING  C) THE AMPLITUDE DECREMENTS MIDWAY IN THE 10-MOVEMENT SEQUENCE.
HANDMOVEMENTS_LEFT: (2) MILD: ANY OF THE FOLLOWING: A) 3 TO 5 INTERRUPTIONS DURING TAPPING  B) MILD SLOWING  C) THE AMPLITUDE DECREMENTS MIDWAY IN THE 10-MOVEMENT SEQUENCE.
FACIAL_EXPRESSION: (3) MASKED FACIES WITH LIPS PARTED SOME OF THE TIME WHEN THE MOUTH IS AT REST.
AMPLITUDE_LIP_JAW: (0) NORMAL: NO TREMOR.
RIGIDITY_RUE: (0) NORMAL: NO RIGIDITY.
TOETAPPING_RIGHT: (0) NORMAL: NO PROBLEMS.
FREEZING_GAIT: (0) NORMAL: NO FREEZING.
FREEZING_GAIT: (0) NORMAL: NO FREEZING.
GAIT: (1) SLIGHT: INDEPENDENT WALKING WITH MINOR GAIT IMPAIRMENT.
POSTURAL_STABILITY: (1) SLIGHT: 3-5 STEPS, BUT RECOVERS UNAIDED.
TOTAL_SCORE_LEFT: 14
MOVEMENTS_INTERFERE_WITH_RATINGS: NO
LEG_AGILITY_LEFT: (1) SLIGHT: ANY OF THE FOLLOWING: A) THE REGULAR RHYTHM IS BROKEN WITH ONE WITH ONE OR TWO INTERRUPTIONS OR HESITATIONS OF THE MOVEMENT  B) SLIGHT SLOWING  C) THE AMPLITUDE DECREMENTS NEAR THE END OF THE 10 MOVEMENTS.
RIGIDITY_LLE: (0) NORMAL: NO RIGIDITY.
TOTAL_SCORE_LEFT: 11
PRONATION_SUPINATION_LEFT: (3) MODERATE: ANY OF THE FOLLOWING: A) MORE THAN 5 INTERRUPTIONS OR AT LEAST ONE LONGER ARREST (FREEZE) IN ONGOING MOVEMENT  B) MODERATE SLOWING  C) THE AMPLITUDE DECREMENTS STARTING AFTER THE FIRST MOVEMENT.
HANDMOVEMENTS_RIGHT: (2) MILD: ANY OF THE FOLLOWING: A) 3 TO 5 INTERRUPTIONS DURING TAPPING  B) MILD SLOWING  C) THE AMPLITUDE DECREMENTS MIDWAY IN THE 10-MOVEMENT SEQUENCE.
AMPLITUDE_LLE: (0) NORMAL: NO TREMOR.
AMPLITUDE_LLE: (0) NORMAL: NO TREMOR.
DYSKINESIAS_PRESENT: NO
LEG_AGILITY_LEFT: (1) SLIGHT: ANY OF THE FOLLOWING: A) THE REGULAR RHYTHM IS BROKEN WITH ONE WITH ONE OR TWO INTERRUPTIONS OR HESITATIONS OF THE MOVEMENT  B) SLIGHT SLOWING  C) THE AMPLITUDE DECREMENTS NEAR THE END OF THE 10 MOVEMENTS.
FACIAL_EXPRESSION: (3) MASKED FACIES WITH LIPS PARTED SOME OF THE TIME WHEN THE MOUTH IS AT REST.
SPEECH: (1) SLIGHT: LOSS OF MODULATION, DICTION OR VOLUME, BUT STILL ALL WORDS EASY TO UNDERSTAND.
RIGIDITY_NECK: (2) MILD: RIGIDITY DETECTED WITHOUT THE ACTIVATION MANEUVER. FULL RANGE OF MOTION IS EASILY ACHIEVED.
RIGIDITY_NECK: (1) SLIGHT: RIGIDITY ONLY DETECTED WITH ACTIVATION MANEUVER.
DYSKINESIAS_PRESENT: NO
CONSTANCY_TREMOR_ATREST: (0) NORMAL: NO TREMOR.
AMPLITUDE_RLE: (0) NORMAL: NO TREMOR.
CONSTANCY_TREMOR_ATREST: (0) NORMAL: NO TREMOR.
AMPLITUDE_LUE: (0) NORMAL: NO TREMOR.
AXIAL_SCORE: 15
RIGIDITY_RLE: (1) SLIGHT: RIGIDITY ONLY DETECTED WITH ACTIVATION MANEUVER.
RIGIDITY_LLE: (1) SLIGHT: RIGIDITY ONLY DETECTED WITH ACTIVATION MANEUVER.
HANDMOVEMENTS_LEFT: (2) MILD: ANY OF THE FOLLOWING: A) 3 TO 5 INTERRUPTIONS DURING TAPPING  B) MILD SLOWING  C) THE AMPLITUDE DECREMENTS MIDWAY IN THE 10-MOVEMENT SEQUENCE.
AMPLITUDE_RUE: (0) NORMAL: NO TREMOR.
LEG_AGILITY_RIGHT: (1) SLIGHT: ANY OF THE FOLLOWING: A) THE REGULAR RHYTHM IS BROKEN WITH ONE WITH ONE OR TWO INTERRUPTIONS OR HESITATIONS OF THE MOVEMENT  B) SLIGHT SLOWING  C) THE AMPLITUDE DECREMENTS NEAR THE END OF THE 10 MOVEMENTS.
RIGIDITY_RLE: (0) NORMAL: NO RIGIDITY.
FINGER_TAPPING_LEFT: (2) MILD: ANY OF THE FOLLOWING: A) 3 TO 5 INTERRUPTIONS DURING TAPPING  B) MILD SLOWING  C) THE AMPLITUDE DECREMENTS MIDWAY IN THE 10-MOVEMENT SEQUENCE.
AMPLITUDE_LUE: (0) NORMAL: NO TREMOR.
AMPLITUDE_RLE: (0) NORMAL: NO TREMOR.
TOETAPPING_RIGHT: (1) SLIGHT: ANY OF THE FOLLOWING: A) THE REGULAR RHYTHM IS BROKEN WITH ONE WITH ONE OR TWO INTERRUPTIONS OR HESITATIONS OF THE MOVEMENT  B) SLIGHT SLOWING  C) THE AMPLITUDE DECREMENTS NEAR THE END OF THE 10 MOVEMENTS.
SPEECH: (1) SLIGHT: LOSS OF MODULATION, DICTION OR VOLUME, BUT STILL ALL WORDS EASY TO UNDERSTAND.
HANDMOVEMENTS_LEFT: (1) SLIGHT: ANY OF THE FOLLOWING: A) THE REGULAR RHYTHM IS BROKEN WITH ONE WITH ONE OR TWO INTERRUPTIONS OR HESITATIONS OF THE MOVEMENT  B) SLIGHT SLOWING  C) THE AMPLITUDE DECREMENTS NEAR THE END OF THE 10 MOVEMENTS.
FINGER_TAPPING_RIGHT: (0) NORMAL: NO PROBLEMS.
PARKINSONS_MEDS: OFF
TOTAL_SCORE: 23
RIGIDITY_LUE: (0) NORMAL: NO RIGIDITY.
MOVEMENTS_INTERFERE_WITH_RATINGS: NO
RIGIDITY_RUE: (2) MILD: RIGIDITY DETECTED WITHOUT THE ACTIVATION MANEUVER. FULL RANGE OF MOTION IS EASILY ACHIEVED.
FINGER_TAPPING_LEFT: (1) SLIGHT: ANY OF THE FOLLOWING: A) THE REGULAR RHYTHM IS BROKEN WITH ONE WITH ONE OR TWO INTERRUPTIONS OR HESITATIONS OF THE MOVEMENT  B) SLIGHT SLOWING  C) THE AMPLITUDE DECREMENTS NEAR THE END OF THE 10 MOVEMENTS.
HANDMOVEMENTS_RIGHT: (1) SLIGHT: ANY OF THE FOLLOWING: A) THE REGULAR RHYTHM IS BROKEN WITH ONE WITH ONE OR TWO INTERRUPTIONS OR HESITATIONS OF THE MOVEMENT  B) SLIGHT SLOWING  C) THE AMPLITUDE DECREMENTS NEAR THE END OF THE 10 MOVEMENTS.
TOETAPPING_LEFT: (2) MILD: ANY OF THE FOLLOWING: A) 3 TO 5 INTERRUPTIONS DURING TAPPING  B) MILD SLOWING  C) THE AMPLITUDE DECREMENTS MIDWAY IN THE 10-MOVEMENT SEQUENCE.
PRONATION_SUPINATION_LEFT: (2) MILD: ANY OF THE FOLLOWING: A) 3 TO 5 INTERRUPTIONS DURING TAPPING  B) MILD SLOWING  C) THE AMPLITUDE DECREMENTS MIDWAY IN THE 10-MOVEMENT SEQUENCE.
AXIAL_SCORE: 10
TOETAPPING_RIGHT: (1) SLIGHT: ANY OF THE FOLLOWING: A) THE REGULAR RHYTHM IS BROKEN WITH ONE WITH ONE OR TWO INTERRUPTIONS OR HESITATIONS OF THE MOVEMENT  B) SLIGHT SLOWING  C) THE AMPLITUDE DECREMENTS NEAR THE END OF THE 10 MOVEMENTS.
RIGIDITY_LUE: (1) SLIGHT: RIGIDITY ONLY DETECTED WITH ACTIVATION MANEUVER.
ARISING_CHAIR: (1) SLIGHT: ARISING IS SLOWER THAN NORMAL, OR MAY NEED MORE THAN ONE ATTEMPT, OR MAY NEED TO MOVE FORWARD IN THE CHAIR TO ARISE. NO NEED TO USE THE ARMS OF THE CHAIR.
AMPLITUDE_RUE: (0) NORMAL: NO TREMOR.
POSTURAL_STABILITY: (3) MODERATE: STANDS SAFELY, BUT WITH ABSENCE OF POSTURAL RESPONSE, FALLS IF NOT CAUGHT BY EXAMINER.
POSTURAL_STABILITY: (3) MODERATE: STANDS SAFELY, BUT WITH ABSENCE OF POSTURAL RESPONSE, FALLS IF NOT CAUGHT BY EXAMINER.
LEG_AGILITY_LEFT: (1) SLIGHT: ANY OF THE FOLLOWING: A) THE REGULAR RHYTHM IS BROKEN WITH ONE WITH ONE OR TWO INTERRUPTIONS OR HESITATIONS OF THE MOVEMENT  B) SLIGHT SLOWING  C) THE AMPLITUDE DECREMENTS NEAR THE END OF THE 10 MOVEMENTS.
CONSTANCY_TREMOR_ATREST: (0) NORMAL: NO TREMOR.
AMPLITUDE_RUE: (0) NORMAL: NO TREMOR.
TOETAPPING_LEFT: (1) SLIGHT: ANY OF THE FOLLOWING: A) THE REGULAR RHYTHM IS BROKEN WITH ONE WITH ONE OR TWO INTERRUPTIONS OR HESITATIONS OF THE MOVEMENT  B) SLIGHT SLOWING  C) THE AMPLITUDE DECREMENTS NEAR THE END OF THE 10 MOVEMENTS.
TOTAL_SCORE: 10
PARKINSONS_MEDS: OFF
FINGER_TAPPING_RIGHT: (0) NORMAL: NO PROBLEMS.
TOTAL_SCORE: 42
FACIAL_EXPRESSION: (3) MASKED FACIES WITH LIPS PARTED SOME OF THE TIME WHEN THE MOUTH IS AT REST.
PRONATION_SUPINATION_RIGHT: (2) MILD: ANY OF THE FOLLOWING: A) 3 TO 5 INTERRUPTIONS DURING TAPPING  B) MILD SLOWING  C) THE AMPLITUDE DECREMENTS MIDWAY IN THE 10-MOVEMENT SEQUENCE.
TOTAL_SCORE: 33
MOVEMENTS_INTERFERE_WITH_RATINGS: NO
TOTAL_SCORE: 5
ARISING_CHAIR: (1) SLIGHT: ARISING IS SLOWER THAN NORMAL, OR MAY NEED MORE THAN ONE ATTEMPT, OR MAY NEED TO MOVE FORWARD IN THE CHAIR TO ARISE. NO NEED TO USE THE ARMS OF THE CHAIR.
SPEECH: (2) MILD: LOSS OF MODULATION, DICTION OR VOLUME, WITH A FEW WORDS UNCLEAR, BUT THE OVERALL SENTENCES EASY TO FOLLOW.
TOTAL_SCORE: 13
LEG_AGILITY_RIGHT: (0) NORMAL: NO PROBLEMS.
SPONTANEITY_OF_MOVEMENT: (2) MILD: MILD GLOBAL SLOWNESS AND POVERTY OF SPONTANEOUS MOVEMENTS.
RIGIDITY_RLE: (2) MILD: RIGIDITY DETECTED WITHOUT THE ACTIVATION MANEUVER. FULL RANGE OF MOTION IS EASILY ACHIEVED.
TOTAL_SCORE_LEFT: 8
AMPLITUDE_LIP_JAW: (0) NORMAL: NO TREMOR.
FINGER_TAPPING_LEFT: (2) MILD: ANY OF THE FOLLOWING: A) 3 TO 5 INTERRUPTIONS DURING TAPPING  B) MILD SLOWING  C) THE AMPLITUDE DECREMENTS MIDWAY IN THE 10-MOVEMENT SEQUENCE.
RIGIDITY_LLE: (1) SLIGHT: RIGIDITY ONLY DETECTED WITH ACTIVATION MANEUVER.
RIGIDITY_LUE: (0) NORMAL: NO RIGIDITY.
GAIT: (1) SLIGHT: INDEPENDENT WALKING WITH MINOR GAIT IMPAIRMENT.
POSTURE: (1) SLIGHT: NOT QUITE ERECT, BUT COULD BE NORMAL FOR OLDER PERSONS.
FREEZING_GAIT: (0) NORMAL: NO FREEZING.
RIGIDITY_NECK: (1) SLIGHT: RIGIDITY ONLY DETECTED WITH ACTIVATION MANEUVER.
PRONATION_SUPINATION_LEFT: (2) MILD: ANY OF THE FOLLOWING: A) 3 TO 5 INTERRUPTIONS DURING TAPPING  B) MILD SLOWING  C) THE AMPLITUDE DECREMENTS MIDWAY IN THE 10-MOVEMENT SEQUENCE.
SPONTANEITY_OF_MOVEMENT: (1) SLIGHT: SLIGHT GLOBAL SLOWNESS AND POVERTY OF SPONTANEOUS MOVEMENTS.
FINGER_TAPPING_RIGHT: (0) NORMAL: NO PROBLEMS.
DYSKINESIAS_PRESENT: NO
AXIAL_SCORE: 12

## 2025-01-09 ASSESSMENT — PAIN SCALES - GENERAL: PAINLEVEL_OUTOF10: MILD PAIN (3)

## 2025-01-09 NOTE — PATIENT INSTRUCTIONS
Dear Ms. Erika Diza,    Thank you for coming today.  During your visit, we have discussed the following:     __ Your DBS electrical system function (impedance) is normal.     The batteries look good.  Most likely, your DBS was off as the battery wasn't used much since we last saw you in Nov 2023.  Please check your DBS battery at least monthly to make sure that they are ON all the time.    Location Left chest Right chest   Battery (V) 2.95 V  3.00 V     __ During today's visit: No DBS Programming changes made.     __  Stay on the same antiparkinsonian medications.  See Dr. Houston for PD management.     PD Medications 7 am 12 pm 4 pm 9 pm   Sinemet 25/100 mg  2 1.5 2 1.5   Amantadine 100 mg  1  1    Pramipexole 0.5 mg    2     __ For Anxiety and Panic attack, in addition to your medication, consider seeing a psychologist regularly.    __ Avoid food that you have difficulty to swallowing.        DROOLING    Drooling is the inability to control saliva in the oropharynx. This symptom affects up to 70% of individuals with Parkinson's disease (PD). Some people may report a slight excess of saliva, often noted when they wake up to a wet pillow. Others experience marked drooling requiring constant use of tissues or a towel. Patients and families often express embarrassment, resulting in a decrease in socialization and quality of life. In addition, a buildup of saliva in the mouth may increase the risk of aspiration pneumonia.    The cause of drooling in PD is not fully understood but is thought to be related to reduced movement of the swallowing musculature, which results in less swallowing. This decrease in spontaneous or reflexive swallowing results in an excess of saliva in the mouth. A tendency to have lips open and /or lean forward increases the amount of saliva that leaves the mouth.    Nonpharmacological Management  Consider seeing a speech and language pathologist.  Chew food slowly and carefully,  swallowing frequently.  Use (sugar-free) chewing gum or suck on hard candy (lozenges) to increase the swallowing reflex.  Sometimes carrying a handkerchief or small towel at all times is the best strategy.    __  Decide what you want to do for your Neurology Care and let us know. Return in 1 year for Research Visit.     To contact our clinic, you may call [phone] or use [employer] message.       Karina Rivas, AFSANEH, APRN  [facility] Neurology [facility]

## 2025-01-09 NOTE — LETTER
2025       RE: Erika Diaz  [address] [address]     Dear Colleague,    Thank you for referring your patient, Erika Diaz, to the [facility] NEUROLOGY CLINIC [place] at [facility]. Please see a copy of my visit note below.      ASSESSMENT:    Parkinson's Disease:      S/p Bilateral GPi DBS lead implantation:      Sialorrhea:     Anxiety:         PLAN:    __  The following patient instructions provided: -       __ Your DBS electrical system function (impedance) is normal.     The batteries look good.  Most likely, your DBS was off as the battery wasn't used much since we last saw you in 2023.  Please check your DBS battery at least monthly to make sure that they are ON all the time.    Location Left chest Right chest   Battery (V) 2.95 V  3.00 V     __ During today's visit: No DBS Programming changes made.     __  Stay on the same antiparkinsonian medications.  See Dr. Houston for PD management.     PD Medications 7 am 12 pm 4 pm 9 pm   Sinemet 25/100 mg  2 1.5 2 1.5   Amantadine 100 mg  1  1    Pramipexole 0.5 mg    2     __ For Anxiety and Panic attack, in addition to your medication, consider seeing a psychologist regularly.    __ Avoid food that you have difficulty to swallowing.      __  Education and non-pharmacological management for drooling provided.     __  Decide what you want to do for your Neurology Care and let us know. Return in 1 year for Research Visit.         MOVEMENT DISORDERS CLINIC           PATIENT: Erika Diaz    : 1960    GINNY:  2025    REASON FOR VISIT: Parkinson's disease (PD) follow up, deep brain stimulation (DBS) interrogation and analysis, and 72-Month Clinical Core Research Visit.    HPI: Ms. Erika Diaz is a 64 year old who came to the [facility] neurology clinic accompanied by her sister, for a 72-month Clinical Core Research Visit. Research coordinator, Ayesha  STEVE Cotto, was present.     Her sister drove her from [place]. She continues living alone independently. Her other sister and brother-in-law are her neighbors and very involved in her care.    Yesterday, she had neuropsych evaluation.  When her DBS was checked, it was found to be off.  Per patient and her sister, her DBS have been turned on and off for imaging and surgery. It's not clear how long it has been turned off.     Since she moved to [place] and established care with a local Neurologist, Dr. Houston, she was only seen once on 9/12/23.  Her last appointment was canceled due to hospitalization for Rt should surgery.  Next appointment in June 2025.    Pt continues to have limited ROM in her Rt shoulder. Unable to drive. Unable to go back to work.    She is having more panic attacks about once every two weeks. Usually she has anxiety attacks, when a decision has to be made, when she doesn't like what she needs to do. While in the hospital, the attacks lasted an hour, at home, she is able to manage it better. PCP is managing meds. Has not seen a psychologist.     Drooling has gotten worse.     PD Medications 7 am 12 pm 4 pm 9 pm PRN   Sinemet 25/100 mg  2 1.5 2 1.5    Amantadine 100 mg  1  1     Pramipexole 0.5 mg    2      Her sister asked:   - What is the max dose of Sinemet? Erika has told her that she could take up to 12 tabs/day.  Erika used to take more before DBS, but has been stable on 7 tabs/day for a long time.  - How [facility] needs to [facility] if Erika decides to be seen here, if she needs a neurologist, and who she could see for her PD/DBS management.       Events Since Last Seen:     - Fell on 11/19/23, presented to urgent care on 11/21/23 with bruising and swelling of left elbow. X-ray completed, negative for fracture. Clinic reports a significant bruise. Recommended gentle movement as tolerated, Tylenol and/or ibuprofen as needed.     - Fall on 08/13/24, presented to ED with right  "shoulder pain after falling in her garden at home. Right anterior shoulder was dislocated and proximal end of humerus was fractured. ER staff tried to reduce shoulder twice with no success, transferred to OR on 08/14/24 for closed reduction of shoulder. CT unremarkable, pt denied loss of consciousness after fall. Home health care for physical and occupational therapy ordered. Patient discharged on 08/16/24 in a sling. Patient's sisters noted that they have installed bed rails, stair railings, and various structures to help with balance issues and fall prevention.     - Right shoulder surgery for humerus fracture on 9/11/24. Procedure went well. In a sling for ~8 weeks.     - Presented to ED on 9/14/24 for declining condition following surgery. Family reports that pt \"appeared altered.\" They endorse slurred speech, weakness, and unstable gait. They are concerned she had a stroke and that she is not taking her Sinemet as prescribed. Pt admitted to hospital on 9/15/24. PT, OT, SLP evals and swallow tests done. MRI completed, found unremarkable. Pt discharged on 9/16/24. Pt transferred to nursing facility on 9/23/24, stayed until 10/22/24. Resumed home health care after nursing facility discharge, until December 2024. Now doing outpatient therapy.     - Pt experiencing more frequent panic attacks since hospital and nursing facility stay in the fall. Pt reports that these occur about once every 2 weeks, for about 15 minutes. Pt reports that \"they will last until I cry and it goes away.\" Attacks get triggered by family conflict or during \"big\" decision-making.       MDS UPDRS Part I      -- Over the last week -- 0: Normal -- 1: Slight -- 2: Mild -- 3: Moderate -- 4: Severe    1.1 Cognitive Impairment: 0   1.2 Hallucinations and psychosis: 1   1.3 Depressed mood: 0   1.4 Anxious mood: 1   1.5 Apathy:  0   1.6 Features of DDS:  0   1.7 Sleep problems:  2   1.8 Daytime sleepiness:  2   1.9 Pain and other sensations:  1 "   1.10 Urinary problems:  1   1.11 Constipation problems:   1   1.12 Lightheadedness on standin   1.13 Fatigue:  1     Total:  11       MDS Part II  -- Total Score: 11      2025     1:59 PM   UPDRS EDL Scale   2.1  Speech 2   2.2  Salivation 3   2.3  Chew & Swallow 2   2.4  Eating                  2   2.5  Dressing                2   2.6  Hygiene                 1   2.7  Handwriting             2   2.8  Hobbies etc.            2   2.9  Turn in bed             1   2.10 Tremor                  2   2.11 Stand from chair        1   2.12 Walking & Balance  2   2.13 Freezing                1   MDS-UPDRS II Total Score 23        Patient-reported       MEDICATIONS:   Current Outpatient Medications   Medication Sig Dispense Refill     amantadine (SYMMETREL) 100 MG capsule TAKE 1 CAPSULE BY MOUTH AT  7AM AND AT 4PM 60 capsule 11     aspirin (ASA) 81 MG chewable tablet Take 1 tablet (81 mg) by mouth daily       blood glucose (ONETOUCH VERIO IQ) test strip Use to test blood glucose daily.       Blood Glucose Monitoring Suppl (ONETOUCH VERIO FLEX SYSTEM) w/Device KIT USE TO TEST BLOOD GLUCOSE EVERY DAY.       busPIRone (BUSPAR) 10 MG tablet Take 1 tablet (10 mg) by mouth 3 times daily 270 tablet 3     calcium carbonate (TUMS) 500 MG chewable tablet Take 2 chew tab by mouth as needed for heartburn       carbidopa-levodopa (SINEMET)  MG tablet TAKE 2 TABLETS BY MOUTH AT  7AM AND 4PM, AND 1 AND 1/2  TABLETS AT 12 PM AND 9 PM  FOR A TOTAL OF 7 TABLETS  DAILY 630 tablet 3     FLUoxetine (PROZAC) 20 MG capsule Take 20 mg by mouth every morning @ 7am 90 capsule 3     gabapentin (NEURONTIN) 300 MG capsule Take 1 cap at 7 am by mouth and 2 at 9 pm = 3 cap/day 270 capsule 3     hydrochlorothiazide (HYDRODIURIL) 25 MG tablet Take 25 mg by mouth       ibuprofen (ADVIL/MOTRIN) 200 MG capsule Take 1 capsule (200 mg) by mouth every 4 hours as needed for fever       lactase (LACTAID) 9000 units TABS tablet Take 9,000 Units by  "mouth With dairy as needed       Melatonin 12 MG TABS Take 2 tablets by mouth at bedtime.       metFORMIN (GLUCOPHAGE-XR) 500 MG 24 hr tablet Take 500 mg by mouth daily (with dinner).       nystatin (MYCOSTATIN) 957648 UNIT/GM external powder Apply topically as needed.       pramipexole (MIRAPEX) 0.5 MG tablet TAKE 2 TABLETS BY MOUTH AT  9  tablet 3     rosuvastatin (CRESTOR) 5 MG tablet Take 5 mg by mouth daily       TRULICITY 0.75 MG/0.5ML pen        No current facility-administered medications for this visit.       PHYSICAL EXAM:    VITAL SIGNS: Blood pressure 115/59, pulse 75, temperature 97.6  F (36.4  C), resp. rate 16, height 1.6 m (5' 2.99\"), weight 94.8 kg (209 lb), SpO2 96%.   Body mass index is 37.03 kg/m .     GENERAL:  Ms. Diaz is a pleasant  female who is well-groomed and well-developed sitting comfortably in the exam room without any distress.  Affect is appropriate.    MOVEMENT DISORDERS ASSESSMENT:  MDS UPDRS III    Last antiparkinsonian dose taken:  Sinemet 25/100 mg 2 tabs 1/8 at 4 pm  Amantadine 100 mg 1 tab on 1/7 at 7 am  Pramipexole 0.05 mg 2 tabs on 1/7 at 10 pm    DBS turned OFF    Left GPi at 8:49 am  Right GPi at 8:51 am    DBS turned ON    Left GPi at 9:42 am  Right GPi at 9:45 am    Took Amantadine 100 mg 1 tab and Sinemet 25/100 mg 2 tabs at 9:39 am        1/9/2025     8:00 AM 1/9/2025     9:00 AM 1/9/2025    10:00 AM   UPDRS Motor Scale   Time: 08:30 09:23 10:41   Medication Off Off On   R Brain DBS: On Off On   L Brain DBS: On Off On   Dyskinesia (LID) No No No   Did LID interfere No No No   Speech 1 2 1   Facial Expression 3 3 3   Rigidity Neck 1 2 1   Rigidity RUE 1 2 0   Rigidity LUE 0 1 0   Rigidity RLE 1 2 0   Rigidity LLE 1 1 0   Finger Taps R 0 0 0   Finger Taps L 2 2 1   Hand Mvt R 2 2 1   Hand Mvt L 2 2 1   Pron-/Supinate R 2 2 2   Pron-/Supinate L 2 3 2   Toe Tap R 1 1 0   Toe Tap L 1 2 1   Leg Agility R 1 1 0   Leg Agility L 1 1 1   Arise From Chair 1 1 1 "   Gait 1 1 1   Gait Freezing 0 0 0   Postural Stability 3 3 1   Posture 1 1 1   Global Spont Mvt 1 2 1   Postural Tremor RUE 1 1 1   Postural Tremor LUE 1 1 1   Kinetic Tremor RUE 1 2 1   Kinetic Tremor LUE 1 1 1   Rest Tremor RUE 0 0 0   Rest Tremor LUE 0 0 0   Rest Tremor RLE 0 0 0   Rest Tremor LLE 0 0 0   Rest Tremor Lip/Jaw 0 0 0   Rest Tremor Constancy 0 0 0   Total Right 10 13 5   Total Left 11 14 8   Axial Total 12 15 10   Total 33 42 23         Procedure: DBS Interrogation & Analysis:    Lead(s):    Left Right   DBS Target GPi GPi   DBS Lead Type Company: [employer]  Model: 8 CH, 40 cm (6172)  Contacts: 1-8 Company: [employer]  Model: 8 CH, 40 cm (6172)  Contacts: 9-16   Lead Placement Date 3/5/2021 08/21/2018     IPG(s):   1 2   IPG Company: [employer]  Model: Infinity 6660  S/N: [id number] Company: [employer]  Model: Infinity 6660  S/N: [id number]   IPG Implant Date 03/12/2021 10/24/2022   Location Left chest Right chest   Battery (V) 2.95 V (2.94 V) 3.00 V (2.95 V)        Left GPi     C +, 2abc -  Amplitude:  2.90 mA  PW:  60 msec  Rate:  130 Hz     Therapy impedance: 912 Ohms     Patient :  Lower Limit: 0.00 mA  Upper Limit: 3.50 mA    Electrode Impedance Check:   Left Lead: No Problems Found.      Right GPi     C +, 10abc -  Amplitude:  4.20 mA  PW:  60 msec  Rate:  130 Hz     Therapy impedance: 612 Ohms     Patient :  Lower Limit: 3.00 mA  Upper  Limit: 4.20 mA     Electrode Impedance Check:  Right Lead: No Problems Found.       See scanned report for impedance details    Today I spent 120 minutes caring for the patient. 20 minutes was spent in DBS programming.  100 minutes was spent with reviewing records, meeting with the patient, answering questions, examining, completing questionnaire, and documentation.    Karina Rivas, AFSANEH, APRN  [facility]      Again, thank you for allowing me to participate in the care of your patient.      Sincerely,    GUILLERMINA Valero CNP

## 2025-01-09 NOTE — PROGRESS NOTES
ASSESSMENT:    Parkinson's Disease:      S/p Bilateral GPi DBS lead implantation:      Sialorrhea:     Anxiety:         PLAN:    __  The following patient instructions provided: -       __ Your DBS electrical system function (impedance) is normal.     The batteries look good.  Most likely, your DBS was off as the battery wasn't used much since we last saw you in 2023.  Please check your DBS battery at least monthly to make sure that they are ON all the time.    Location Left chest Right chest   Battery (V) 2.95 V  3.00 V     __ During today's visit: No DBS Programming changes made.     __  Stay on the same antiparkinsonian medications.  See Dr. Houston for PD management.     PD Medications 7 am 12 pm 4 pm 9 pm   Sinemet 25/100 mg  2 1.5 2 1.5   Amantadine 100 mg  1  1    Pramipexole 0.5 mg    2     __ For Anxiety and Panic attack, in addition to your medication, consider seeing a psychologist regularly.    __ Avoid food that you have difficulty to swallowing.      __  Education and non-pharmacological management for drooling provided.     __  Decide what you want to do for your Neurology Care and let us know. Return in 1 year for Research Visit.         MOVEMENT DISORDERS CLINIC           PATIENT: Erika Diaz    : 1960    GINNY:  2025    REASON FOR VISIT: Parkinson's disease (PD) follow up, deep brain stimulation (DBS) interrogation and analysis, and 72-Month Clinical Core Research Visit.    HPI: Ms. Erika Diaz is a 64 year old who came to the [facility] neurology clinic accompanied by her sister, for a 72-month Clinical Core Research Visit. Research coordinator, STEVE Navarro, was present.     Her sister drove her from [place]. She continues living alone independently. Her other sister and brother-in-law are her neighbors and very involved in her care.    Yesterday, she had neuropsych evaluation.  When her DBS was checked, it was found to be off.  Per patient and her sister, her DBS  have been turned on and off for imaging and surgery. It's not clear how long it has been turned off.     Since she moved to [place] and established care with a local Neurologist, Dr. Houston, she was only seen once on 9/12/23.  Her last appointment was canceled due to hospitalization for Rt should surgery.  Next appointment in June 2025.    Pt continues to have limited ROM in her Rt shoulder. Unable to drive. Unable to go back to work.    She is having more panic attacks about once every two weeks. Usually she has anxiety attacks, when a decision has to be made, when she doesn't like what she needs to do. While in the hospital, the attacks lasted an hour, at home, she is able to manage it better. PCP is managing meds. Has not seen a psychologist.     Drooling has gotten worse.     PD Medications 7 am 12 pm 4 pm 9 pm PRN   Sinemet 25/100 mg  2 1.5 2 1.5    Amantadine 100 mg  1  1     Pramipexole 0.5 mg    2      Her sister asked:   - What is the max dose of Sinemet? Erika has told her that she could take up to 12 tabs/day.  Erika used to take more before DBS, but has been stable on 7 tabs/day for a long time.  - How often she needs to be seen at [facility] if Erika decides to be seen here, if she needs a neurologist, and who she could see for her PD/DBS management.       Events Since Last Seen:     - Fell on 11/19/23, presented to urgent care on 11/21/23 with bruising and swelling of left elbow. X-ray completed, negative for fracture. Clinic reports a significant bruise. Recommended gentle movement as tolerated, Tylenol and/or ibuprofen as needed.     - Fall on 08/13/24, presented to ED with right shoulder pain after falling in her garden at home. Right anterior shoulder was dislocated and proximal end of humerus was fractured. ER staff tried to reduce shoulder twice with no success, transferred to OR on 08/14/24 for closed reduction of shoulder. CT unremarkable, pt denied loss of consciousness after fall. Home  "health care for physical and occupational therapy ordered. Patient discharged on 24 in a sling. Patient's sisters noted that they have installed bed rails, stair railings, and various structures to help with balance issues and fall prevention.     - Right shoulder surgery for humerus fracture on 24. Procedure went well. In a sling for ~8 weeks.     - Presented to ED on 24 for declining condition following surgery. Family reports that pt \"appeared altered.\" They endorse slurred speech, weakness, and unstable gait. They are concerned she had a stroke and that she is not taking her Sinemet as prescribed. Pt admitted to hospital on 9/15/24. PT, OT, SLP evals and swallow tests done. MRI completed, found unremarkable. Pt discharged on 24. Pt transferred to nursing facility on 24, stayed until 10/22/24. Resumed home health care after nursing facility discharge, until 2024. Now doing outpatient therapy.     - Pt experiencing more frequent panic attacks since hospital and nursing facility stay in the fall. Pt reports that these occur about once every 2 weeks, for about 15 minutes. Pt reports that \"they will last until I cry and it goes away.\" Attacks get triggered by family conflict or during \"big\" decision-making.       MDS UPDRS Part I      -- Over the last week -- 0: Normal -- 1: Slight -- 2: Mild -- 3: Moderate -- 4: Severe    1.1 Cognitive Impairment: 0   1.2 Hallucinations and psychosis: 1   1.3 Depressed mood: 0   1.4 Anxious mood: 1   1.5 Apathy:  0   1.6 Features of DDS:  0   1.7 Sleep problems:  2   1.8 Daytime sleepiness:  2   1.9 Pain and other sensations:  1   1.10 Urinary problems:  1   1.11 Constipation problems:   1   1.12 Lightheadedness on standin   1.13 Fatigue:  1     Total:  11       MDS Part II  -- Total Score: 11      2025     1:59 PM   UPDRS EDL Scale   2.1  Speech 2   2.2  Salivation 3   2.3  Chew & Swallow 2   2.4  Eating                  2   2.5  Dressing  "               2   2.6  Hygiene                 1   2.7  Handwriting             2   2.8  Hobbies etc.            2   2.9  Turn in bed             1   2.10 Tremor                  2   2.11 Stand from chair        1   2.12 Walking & Balance  2   2.13 Freezing                1   MDS-UPDRS II Total Score 23        Patient-reported       MEDICATIONS:   Current Outpatient Medications   Medication Sig Dispense Refill    amantadine (SYMMETREL) 100 MG capsule TAKE 1 CAPSULE BY MOUTH AT  7AM AND AT 4PM 60 capsule 11    aspirin (ASA) 81 MG chewable tablet Take 1 tablet (81 mg) by mouth daily      blood glucose (ONETOUCH VERIO IQ) test strip Use to test blood glucose daily.      Blood Glucose Monitoring Suppl (ONETOUCH VERIO FLEX SYSTEM) w/Device KIT USE TO TEST BLOOD GLUCOSE EVERY DAY.      busPIRone (BUSPAR) 10 MG tablet Take 1 tablet (10 mg) by mouth 3 times daily 270 tablet 3    calcium carbonate (TUMS) 500 MG chewable tablet Take 2 chew tab by mouth as needed for heartburn      carbidopa-levodopa (SINEMET)  MG tablet TAKE 2 TABLETS BY MOUTH AT  7AM AND 4PM, AND 1 AND 1/2  TABLETS AT 12 PM AND 9 PM  FOR A TOTAL OF 7 TABLETS  DAILY 630 tablet 3    FLUoxetine (PROZAC) 20 MG capsule Take 20 mg by mouth every morning @ 7am 90 capsule 3    gabapentin (NEURONTIN) 300 MG capsule Take 1 cap at 7 am by mouth and 2 at 9 pm = 3 cap/day 270 capsule 3    hydrochlorothiazide (HYDRODIURIL) 25 MG tablet Take 25 mg by mouth      ibuprofen (ADVIL/MOTRIN) 200 MG capsule Take 1 capsule (200 mg) by mouth every 4 hours as needed for fever      lactase (LACTAID) 9000 units TABS tablet Take 9,000 Units by mouth With dairy as needed      Melatonin 12 MG TABS Take 2 tablets by mouth at bedtime.      metFORMIN (GLUCOPHAGE-XR) 500 MG 24 hr tablet Take 500 mg by mouth daily (with dinner).      nystatin (MYCOSTATIN) 205590 UNIT/GM external powder Apply topically as needed.      pramipexole (MIRAPEX) 0.5 MG tablet TAKE 2 TABLETS BY MOUTH AT  9 PM  "180 tablet 3    rosuvastatin (CRESTOR) 5 MG tablet Take 5 mg by mouth daily      TRULICITY 0.75 MG/0.5ML pen        No current facility-administered medications for this visit.       PHYSICAL EXAM:    VITAL SIGNS: Blood pressure 115/59, pulse 75, temperature 97.6  F (36.4  C), resp. rate 16, height 1.6 m (5' 2.99\"), weight 94.8 kg (209 lb), SpO2 96%.   Body mass index is 37.03 kg/m .     GENERAL:  Ms. Diaz is a pleasant  female who is well-groomed and well-developed sitting comfortably in the exam room without any distress.  Affect is appropriate.    MOVEMENT DISORDERS ASSESSMENT:  MDS UPDRS III    Last antiparkinsonian dose taken:  Sinemet 25/100 mg 2 tabs 1/8 at 4 pm  Amantadine 100 mg 1 tab on 1/7 at 7 am  Pramipexole 0.05 mg 2 tabs on 1/7 at 10 pm    DBS turned OFF    Left GPi at 8:49 am  Right GPi at 8:51 am    DBS turned ON    Left GPi at 9:42 am  Right GPi at 9:45 am    Took Amantadine 100 mg 1 tab and Sinemet 25/100 mg 2 tabs at 9:39 am        1/9/2025     8:00 AM 1/9/2025     9:00 AM 1/9/2025    10:00 AM   UPDRS Motor Scale   Time: 08:30 09:23 10:41   Medication Off Off On   R Brain DBS: On Off On   L Brain DBS: On Off On   Dyskinesia (LID) No No No   Did LID interfere No No No   Speech 1 2 1   Facial Expression 3 3 3   Rigidity Neck 1 2 1   Rigidity RUE 1 2 0   Rigidity LUE 0 1 0   Rigidity RLE 1 2 0   Rigidity LLE 1 1 0   Finger Taps R 0 0 0   Finger Taps L 2 2 1   Hand Mvt R 2 2 1   Hand Mvt L 2 2 1   Pron-/Supinate R 2 2 2   Pron-/Supinate L 2 3 2   Toe Tap R 1 1 0   Toe Tap L 1 2 1   Leg Agility R 1 1 0   Leg Agility L 1 1 1   Arise From Chair 1 1 1   Gait 1 1 1   Gait Freezing 0 0 0   Postural Stability 3 3 1   Posture 1 1 1   Global Spont Mvt 1 2 1   Postural Tremor RUE 1 1 1   Postural Tremor LUE 1 1 1   Kinetic Tremor RUE 1 2 1   Kinetic Tremor LUE 1 1 1   Rest Tremor RUE 0 0 0   Rest Tremor LUE 0 0 0   Rest Tremor RLE 0 0 0   Rest Tremor LLE 0 0 0   Rest Tremor Lip/Jaw 0 0 0   Rest " Tremor Constancy 0 0 0   Total Right 10 13 5   Total Left 11 14 8   Axial Total 12 15 10   Total 33 42 23         Procedure: DBS Interrogation & Analysis:    Lead(s):    Left Right   DBS Target GPi GPi   DBS Lead Type Company: [employer]  Model: 8 CH, 40 cm (6172)  Contacts: 1-8 Company: [employer]  Model: 8 CH, 40 cm (6172)  Contacts: 9-16   Lead Placement Date 3/5/2021 08/21/2018     IPG(s):   1 2   IPG Company: [employer]  Model: Infinity 6660  S/N: [id number] Company: [employer]  Model: Infinity 6660  S/N: [id number]   IPG Implant Date 03/12/2021 10/24/2022   Location Left chest Right chest   Battery (V) 2.95 V (2.94 V) 3.00 V (2.95 V)        Left GPi     C +, 2abc -  Amplitude:  2.90 mA  PW:  60 msec  Rate:  130 Hz     Therapy impedance: 912 Ohms     Patient :  Lower Limit: 0.00 mA  Upper Limit: 3.50 mA    Electrode Impedance Check:   Left Lead: No Problems Found.      Right GPi     C +, 10abc -  Amplitude:  4.20 mA  PW:  60 msec  Rate:  130 Hz     Therapy impedance: 612 Ohms     Patient :  Lower Limit: 3.00 mA  Upper  Limit: 4.20 mA     Electrode Impedance Check:  Right Lead: No Problems Found.       See scanned report for impedance details    Today I spent 120 minutes caring for the patient. 20 minutes was spent in DBS programming.  100 minutes was spent with reviewing records, meeting with the patient, answering questions, examining, completing questionnaire, and documentation.    Karina Rivas, AFSANEH, APRN  [facility]

## 2025-01-09 NOTE — NURSING NOTE
Chief Complaint   Patient presents with    RECHECK      Sarath Pierce      Depression Response    Patient completed the PHQ-9 assessment for depression and scored >9? Yes  Question 9 on the PHQ-9 was positive for suicidality? No  Does patient have current mental health provider? No    Is this a virtual visit? No    I personally notified the following: visit provider    Patient says that she does not struggle with depression but does with anxiety and panic attacks. Patient would like a referral placed to see a mental health counselor.

## 2025-01-12 ENCOUNTER — HEALTH MAINTENANCE LETTER (OUTPATIENT)
Age: 65
End: 2025-01-12

## 2025-01-13 NOTE — PROGRESS NOTES
Pt was seen for neuropsychological evaluation for the purposes of ALL research. 259 minutes of test administration and scoring were provided by this writer. Please see Dr. Amanda Hernandez's report for a full interpretation of the findings.    Landon Mosley MA

## 2025-01-16 NOTE — CONFIDENTIAL NOTE
The patient completed the 72 month neuropsychological evaluation for the [place] Clinical Core. There were 259 minutes of psychometrist time (97248: 1 unit; 61930: 8 units) and one unit of neuropsychologist professional time (78128). OnCore ID: [id number]     Measures administered:    Dementia Rating Scale - 2 Standard Form: 134/144  WAIS-IV: Similarities, Comprehension, Matrix Reasoning, Letter Number Sequencing, Digit Span  WMS-R Information & Orientation, WMS-4 Logical Memory I and II  D-KEFS Verbal Fluency - Standard  [place] Naming Test  Clock Drawing  Trail Making Test  Stroop  [place] Card Sorting Test - 64 items  Fuentes Judgment of Line Orientation Form H  Harris Verbal Learning Test - Revised Form 3  Brief Visual Memory Test - Revised Form 2  MoCA v 7.2 (Score = 17/30)    BDI-2: 7  QUIP  ESS  RBDSQ  PDQ-39  Apathy Scale: 16  HAM-D   HAM-A     Cognitively, she has subtle executive dysfunction and variable memory. Executive functioning and visual processing have declined since November 2023. Memory has improved in some cases, and declined in others.    She endorses minimal depressive symptomatology,  but significant apathy, as well as recent anxiety. She has had what she described as panic attacks a few times in the last few weeks in particular, where she gets short of breath and starts crying, and then she feels better. She is feeling very discouraged after her shoulder injury in August. She feels like her PD symptoms have been worse since her shoulder injury in August. She has not established care with a psychiatrist or psychologist in [place], and she was encouraged to do so.         Amanda Hernandez, Ph.D., ABPP  Licensed Psychologist, [employer] [id number]  Board Certified in Clinical Neuropsychology

## 2025-04-26 ENCOUNTER — HEALTH MAINTENANCE LETTER (OUTPATIENT)
Age: 65
End: 2025-04-26

## 2025-05-01 NOTE — TELEPHONE ENCOUNTER
According to telephone encounter visit notes from 5/2/24, Erika gets her parkinson medications refilled by a neurologist in [place]:       Tristin Houston MD   [address] [place] [address]   [phone] (Work)   [phone] (Fax)     I called [facility] and requested that all future medication refill requests be forwarded to Dr. Houston's office. No further action needed from us at this time.

## 2025-06-07 ENCOUNTER — HEALTH MAINTENANCE LETTER (OUTPATIENT)
Age: 65
End: 2025-06-07

## 2025-07-04 DIAGNOSIS — G25.81 RESTLESS LEGS SYNDROME (RLS): ICD-10-CM

## 2025-07-07 DIAGNOSIS — G25.81 RESTLESS LEGS SYNDROME (RLS): ICD-10-CM

## 2025-07-07 NOTE — TELEPHONE ENCOUNTER
RX Authorization    Medication: Gabapentin (NEURONTIN) 300 MG capsule    Date last refill ordered: 5/2/2024    Quantity ordered: 270    # refills: 3    Date of last clinic visit with ordering provider: 1/9/2025    Date of next clinic visit with ordering provider: 10/2/2025    All pertinent protocol data (lab date/result):    Include pertinent information from patients message:

## 2025-07-08 RX ORDER — GABAPENTIN 300 MG/1
CAPSULE ORAL
Qty: 270 CAPSULE | Refills: 0 | OUTPATIENT
Start: 2025-07-08

## 2025-07-08 RX ORDER — GABAPENTIN 300 MG/1
CAPSULE ORAL
Qty: 270 CAPSULE | Refills: 3 | Status: SHIPPED | OUTPATIENT
Start: 2025-07-08

## 2025-07-21 ENCOUNTER — MYC REFILL (OUTPATIENT)
Dept: NEUROLOGY | Facility: CLINIC | Age: 65
End: 2025-07-21
Payer: COMMERCIAL

## 2025-07-21 DIAGNOSIS — G25.81 RESTLESS LEGS SYNDROME (RLS): ICD-10-CM

## 2025-07-21 DIAGNOSIS — G20.A1 PARKINSON'S DISEASE (H): ICD-10-CM

## 2025-07-21 DIAGNOSIS — F41.1 GAD (GENERALIZED ANXIETY DISORDER): ICD-10-CM

## 2025-07-21 RX ORDER — GABAPENTIN 300 MG/1
CAPSULE ORAL
Qty: 270 CAPSULE | Refills: 3 | Status: CANCELLED | OUTPATIENT
Start: 2025-07-21

## 2025-07-22 NOTE — TELEPHONE ENCOUNTER
Neuroscience Clinic Task Note    TASK    Neurology Rx Refill  Medication carbidopa-levodopa (SINEMET)  MG tablet    Dose    Last refill ordered (m/d/y) 5/2/24   Last quantity ordered 630   Last # refills 3   Last clinic visit with ordering provider (m/d/y) 1/9/25   Next clinic visit with ordering provider (m/d/y) 10/2/25   All pertinent protocol data (lab date/result)    Pertinent information from patient's message      Neurology Rx Refill  Medication amantadine (SYMMETREL) 100 MG capsule    Dose    Last refill ordered (m/d/y) 4/29/24   Last quantity ordered 60   Last # refills 11   Last clinic visit with ordering provider (m/d/y)    Next clinic visit with ordering provider (m/d/y)    All pertinent protocol data (lab date/result)    Pertinent information from patient's message        Denied refills (too early for refill, correct pharmacy):     Neurology Rx Refill  Medication gabapentin (NEURONTIN) 300 MG capsule    Dose    Last refill ordered (m/d/y) 7/8/25   Last quantity ordered 270   Last # refills 3   Last clinic visit with ordering provider (m/d/y) 1/9/25   Next clinic visit with ordering provider (m/d/y) 10/2/25   All pertinent protocol data (lab date/result)    Pertinent information from patient's message        MARCELL PATINO

## 2025-07-22 NOTE — TELEPHONE ENCOUNTER
Neuroscience Clinic Task Note    TASK:  Refill and pharmacy change    Neurology Rx Refill  Medication pramipexole (MIRAPEX) 0.5 MG tablet    Dose    Last refill ordered (m/d/y) 11/9/22   Last quantity ordered 180   Last # refills 3   Last clinic visit with ordering provider (m/d/y) 1/9/25   Next clinic visit with ordering provider (m/d/y) 10/2/25   All pertinent protocol data (lab date/result)    Pertinent information from patient's message      Neurology Rx Refill  Medication busPIRone (BUSPAR) 10 MG tablet    Dose    Last refill ordered (m/d/y) 6/2/23   Last quantity ordered 270   Last # refills 3   Last clinic visit with ordering provider (m/d/y)    Next clinic visit with ordering provider (m/d/y)    All pertinent protocol data (lab date/result)    Pertinent information from patient's message        FOLLOW-UP      ADDITIONAL COMMENTS      MARCELL PATINO

## 2025-07-23 RX ORDER — AMANTADINE HYDROCHLORIDE 100 MG/1
CAPSULE, GELATIN COATED ORAL
Qty: 60 CAPSULE | Refills: 11 | Status: SHIPPED | OUTPATIENT
Start: 2025-07-23

## 2025-07-23 RX ORDER — PRAMIPEXOLE DIHYDROCHLORIDE 0.5 MG/1
TABLET ORAL
Qty: 180 TABLET | Refills: 3 | Status: SHIPPED | OUTPATIENT
Start: 2025-07-23

## 2025-07-23 RX ORDER — CARBIDOPA AND LEVODOPA 25; 100 MG/1; MG/1
TABLET ORAL
Qty: 630 TABLET | Refills: 3 | Status: SHIPPED | OUTPATIENT
Start: 2025-07-23

## 2025-07-23 RX ORDER — BUSPIRONE HYDROCHLORIDE 10 MG/1
10 TABLET ORAL 3 TIMES DAILY
Qty: 270 TABLET | Refills: 3 | Status: SHIPPED | OUTPATIENT
Start: 2025-07-23

## 2025-08-09 ENCOUNTER — HEALTH MAINTENANCE LETTER (OUTPATIENT)
Age: 65
End: 2025-08-09

## 2025-08-21 DIAGNOSIS — G25.81 RESTLESS LEGS SYNDROME (RLS): ICD-10-CM

## 2025-08-21 DIAGNOSIS — G20.A1 PARKINSON'S DISEASE (H): ICD-10-CM

## 2025-08-21 RX ORDER — PRAMIPEXOLE DIHYDROCHLORIDE 0.5 MG/1
TABLET ORAL
Qty: 200 TABLET | Refills: 2 | Status: SHIPPED | OUTPATIENT
Start: 2025-08-21

## 2025-09-04 ENCOUNTER — TELEPHONE (OUTPATIENT)
Dept: NEUROLOGY | Facility: CLINIC | Age: 65
End: 2025-09-04
Payer: COMMERCIAL

## (undated) DEVICE — TOOL INSERTION LEAD/EXTENSION 1803ANS

## (undated) DEVICE — PREP POVIDONE IODINE SCRUB 7.5% 4OZ APL82212

## (undated) DEVICE — SU MONOCRYL 4-0 PS-2 18" UND Y496G

## (undated) DEVICE — COVER CAMERA IN-LIGHT DISP LT-C02

## (undated) DEVICE — LABEL MEDICATION SYSTEM 3303-P

## (undated) DEVICE — SYR 10ML FINGER CONTROL W/O NDL 309695

## (undated) DEVICE — JELLY LUBRICATING SURGILUBE 2OZ TUBE

## (undated) DEVICE — SU MONOCRYL 4-0 PS-2 27" UND Y426H

## (undated) DEVICE — RX BACITRACIN OINTMENT 0.9G 1/32OZ CUR001109

## (undated) DEVICE — SU VICRYL 3-0 SH 8X18" UND J864D

## (undated) DEVICE — ESU GROUND PAD ADULT W/CORD E7507

## (undated) DEVICE — ESU GROUND PAD ADULT REM W/15' CORD E7507DB

## (undated) DEVICE — STRAP KNEE/BODY 31143004

## (undated) DEVICE — LINEN TOWEL PACK X6 WHITE 5487

## (undated) DEVICE — SOL NACL 0.9% IRRIG 1000ML BOTTLE 2F7124

## (undated) DEVICE — SPONGE COTTONOID 1/2X1/2" 20-04S

## (undated) DEVICE — DRAPE C-ARM W/STRAPS 42X72" 07-CA104

## (undated) DEVICE — NDL 25GA 2"  8881200441

## (undated) DEVICE — SU ETHIBOND 2-0 SHDA 30" X563H

## (undated) DEVICE — BLADE CLIPPER SGL USE 9680

## (undated) DEVICE — LIGHT HANDLE X1 31140133

## (undated) DEVICE — PACK SET-UP STD 9102

## (undated) DEVICE — Device

## (undated) DEVICE — COVER CAMERA VIDEO LASER 9X96" 04-CC227

## (undated) DEVICE — CATH TRAY FOLEY SURESTEP 16FR W/URNE MTR STLK LATEX A303316A

## (undated) DEVICE — PATIENT CONTROLLER DBS

## (undated) DEVICE — NDL BLUNT 18GA 1" W/O FILTER 305181

## (undated) DEVICE — ESU PENCIL W/ROCKER SWITCH BLADE HOLSTER E2350HDB

## (undated) DEVICE — GLOVE PROTEXIS BLUE W/NEU-THERA 8.0  2D73EB80

## (undated) DEVICE — SYR 10ML LL W/O NDL

## (undated) DEVICE — LINEN TOWEL PACK X30 5481

## (undated) DEVICE — DRSG STERI STRIP 1/2X4" R1547

## (undated) DEVICE — CABLE MULTI-LEAD TRIAL 3014ANS

## (undated) DEVICE — INFINITY PATIENT MANUAL AND MAGNET

## (undated) DEVICE — SPONGE SURGIFOAM 100 1974

## (undated) DEVICE — DECANTER VIAL 2006S

## (undated) DEVICE — PREP POVIDONE IODINE SOLUTION 10% 4OZ

## (undated) DEVICE — DRSG BIOPATCH GERMICIDAL SPLIT SPONGE 4MM MED 4150

## (undated) DEVICE — STPL SKIN 35W ROTATING HEAD PRW35

## (undated) DEVICE — SUCTION MANIFOLD DORNOCH ULTRA CART UL-CL500

## (undated) DEVICE — BLADE KNIFE SURG 10 371110

## (undated) DEVICE — PREP CHLORAPREP CLEAR 3ML 260400

## (undated) DEVICE — DRAPE MAYO STAND 23X54 8337

## (undated) DEVICE — DRSG GAUZE 4X4" TRAY 6939

## (undated) DEVICE — TUBE GUIDE ALPHA OMEGA SYSTEM STR-007721-00

## (undated) DEVICE — DRSG XEROFORM 5X9" CUR253590W

## (undated) DEVICE — SOL WATER IRRIG 1000ML BOTTLE 2F7114

## (undated) DEVICE — ADH SKIN CLOSURE PREMIERPRO EXOFIN 1.0ML 3470

## (undated) DEVICE — LINEN TOWEL PACK X5 5464

## (undated) DEVICE — PACK NEURO MINOR UMMC SNE32MNMU4

## (undated) DEVICE — STRAP UNIVERSAL POSITIONING 2-PIECE 4X47X76" 91-287

## (undated) DEVICE — DRSG PRIMAPORE 02X3" 7133

## (undated) DEVICE — GLOVE PROTEXIS MICRO 7.5  2D73PM75

## (undated) DEVICE — PAD CHUX UNDERPAD 23X24" 7136

## (undated) DEVICE — ELEC MICROPHONICS-FREE ALPHA OMEGA STR-009080-00

## (undated) DEVICE — DRAPE IOBAN ISOLATION VERTICAL 320X21CM 6617

## (undated) DEVICE — PREP CHLORAPREP ORANGE 3ML  260415

## (undated) DEVICE — DRSG TELFA 3X8" 1238

## (undated) DEVICE — ESU ELEC BLADE 2.75" COATED/INSULATED E1455

## (undated) DEVICE — DRSG TEGADERM 4X4 3/4" 1626W

## (undated) DEVICE — BASIN EMESIS STERILE  SSK9005A

## (undated) DEVICE — SU VICRYL 3-0 SH CR 8X18" J774

## (undated) DEVICE — SPONGE RAY-TEC 4X8" 7318

## (undated) DEVICE — HEADRING POINTS STEREOTACTIC DHRSL

## (undated) DEVICE — SOL ADH LIQUID BENZOIN SWAB 0.6ML C1544

## (undated) DEVICE — DRAPE SHEET REV FOLD 3/4 9349

## (undated) DEVICE — PREP CHLORAPREP 26ML TINTED ORANGE  260815

## (undated) DEVICE — PREP SKIN SCRUB TRAY 4461A

## (undated) DEVICE — BUR STRK PRECISION ACORN 7.5MM 5220-030-575

## (undated) DEVICE — WIPES FOLEY CARE SURESTEP PROVON DFC100

## (undated) DEVICE — PREP CHLORAPREP 26ML TINTED HI-LITE ORANGE 930815

## (undated) DEVICE — DRAPE LAP PEDS DISP 29492

## (undated) DEVICE — CATH TRAY FOLEY SURESTEP 16FR W/TMP PRB STLK LATEX A319416AM

## (undated) DEVICE — SU VICRYL 0 CT-1 27" UND J260H

## (undated) DEVICE — CABLE 5 CHANNEL INPUT  ALPHA OMEGA SYSTEM STR-000642-55

## (undated) DEVICE — PACK GOWN 3/PK DISP XL SBA32GPFCB

## (undated) DEVICE — SUTURE BOOTS 051003PBX

## (undated) DEVICE — COMB STERILE 7" PLASTIC 14-1200

## (undated) DEVICE — DRAPE IOBAN INCISE 13X13" 6640EZ

## (undated) DEVICE — SUCTION MANIFOLD NEPTUNE 2 SYS 4 PORT 0702-020-000

## (undated) DEVICE — SU DERMABOND ADVANCED .7ML DNX12

## (undated) DEVICE — DRSG PRIMAPORE 03 1/8X6" 66000318

## (undated) DEVICE — PREP CHLORAPREP CLEAR 3ML 930400

## (undated) DEVICE — TUBING SUCTION MEDI-VAC SOFT 3/16"X20' N520A

## (undated) DEVICE — DRAPE IOBAN INCISE 23X17" 6650EZ

## (undated) DEVICE — SOL NACL 0.9% INJ 1000ML BAG 2B1324X

## (undated) DEVICE — SHUNT TUNNELER SHEATH 46CM 901118

## (undated) DEVICE — SU SILK 2-0 TIE 12X30" A305H

## (undated) DEVICE — ESU CORD BIPOLAR GREEN 10-4000

## (undated) DEVICE — DRAPE U SPLIT 74X120" 29440

## (undated) RX ORDER — FENTANYL CITRATE 50 UG/ML
INJECTION, SOLUTION INTRAMUSCULAR; INTRAVENOUS
Status: DISPENSED
Start: 2021-03-05

## (undated) RX ORDER — SODIUM CHLORIDE 9 MG/ML
INJECTION, SOLUTION INTRAVENOUS
Status: DISPENSED
Start: 2021-03-05

## (undated) RX ORDER — PROPOFOL 10 MG/ML
INJECTION, EMULSION INTRAVENOUS
Status: DISPENSED
Start: 2018-08-21

## (undated) RX ORDER — EPHEDRINE SULFATE 50 MG/ML
INJECTION, SOLUTION INTRAMUSCULAR; INTRAVENOUS; SUBCUTANEOUS
Status: DISPENSED
Start: 2021-03-05

## (undated) RX ORDER — LIDOCAINE HYDROCHLORIDE 20 MG/ML
INJECTION, SOLUTION EPIDURAL; INFILTRATION; INTRACAUDAL; PERINEURAL
Status: DISPENSED
Start: 2021-03-05

## (undated) RX ORDER — ONDANSETRON 2 MG/ML
INJECTION INTRAMUSCULAR; INTRAVENOUS
Status: DISPENSED
Start: 2021-03-05

## (undated) RX ORDER — CEFAZOLIN SODIUM 2 G/100ML
INJECTION, SOLUTION INTRAVENOUS
Status: DISPENSED
Start: 2018-08-30

## (undated) RX ORDER — BUPIVACAINE HYDROCHLORIDE AND EPINEPHRINE 2.5; 5 MG/ML; UG/ML
INJECTION, SOLUTION EPIDURAL; INFILTRATION; INTRACAUDAL; PERINEURAL
Status: DISPENSED
Start: 2018-08-30

## (undated) RX ORDER — LIDOCAINE HYDROCHLORIDE 20 MG/ML
INJECTION, SOLUTION EPIDURAL; INFILTRATION; INTRACAUDAL; PERINEURAL
Status: DISPENSED
Start: 2022-10-24

## (undated) RX ORDER — PHENYLEPHRINE HCL IN 0.9% NACL 1 MG/10 ML
SYRINGE (ML) INTRAVENOUS
Status: DISPENSED
Start: 2018-08-21

## (undated) RX ORDER — CEFAZOLIN SODIUM 1 G/3ML
INJECTION, POWDER, FOR SOLUTION INTRAMUSCULAR; INTRAVENOUS
Status: DISPENSED
Start: 2018-08-21

## (undated) RX ORDER — BUPIVACAINE HYDROCHLORIDE 2.5 MG/ML
INJECTION, SOLUTION EPIDURAL; INFILTRATION; INTRACAUDAL
Status: DISPENSED
Start: 2021-03-12

## (undated) RX ORDER — CEFAZOLIN SODIUM 2 G/100ML
INJECTION, SOLUTION INTRAVENOUS
Status: DISPENSED
Start: 2021-03-12

## (undated) RX ORDER — PROPOFOL 10 MG/ML
INJECTION, EMULSION INTRAVENOUS
Status: DISPENSED
Start: 2018-08-30

## (undated) RX ORDER — LIDOCAINE HYDROCHLORIDE AND EPINEPHRINE 10; 10 MG/ML; UG/ML
INJECTION, SOLUTION INFILTRATION; PERINEURAL
Status: DISPENSED
Start: 2021-03-05

## (undated) RX ORDER — BUPIVACAINE HYDROCHLORIDE 2.5 MG/ML
INJECTION, SOLUTION EPIDURAL; INFILTRATION; INTRACAUDAL
Status: DISPENSED
Start: 2018-08-21

## (undated) RX ORDER — LABETALOL HYDROCHLORIDE 5 MG/ML
INJECTION, SOLUTION INTRAVENOUS
Status: DISPENSED
Start: 2021-03-05

## (undated) RX ORDER — FENTANYL CITRATE-0.9 % NACL/PF 10 MCG/ML
PLASTIC BAG, INJECTION (ML) INTRAVENOUS
Status: DISPENSED
Start: 2022-10-24

## (undated) RX ORDER — BUPIVACAINE HYDROCHLORIDE 2.5 MG/ML
INJECTION, SOLUTION EPIDURAL; INFILTRATION; INTRACAUDAL
Status: DISPENSED
Start: 2021-03-05

## (undated) RX ORDER — PROPOFOL 10 MG/ML
INJECTION, EMULSION INTRAVENOUS
Status: DISPENSED
Start: 2021-03-05

## (undated) RX ORDER — FENTANYL CITRATE 50 UG/ML
INJECTION, SOLUTION INTRAMUSCULAR; INTRAVENOUS
Status: DISPENSED
Start: 2018-08-21

## (undated) RX ORDER — LIDOCAINE HYDROCHLORIDE AND EPINEPHRINE 10; 10 MG/ML; UG/ML
INJECTION, SOLUTION INFILTRATION; PERINEURAL
Status: DISPENSED
Start: 2021-03-12

## (undated) RX ORDER — ONDANSETRON 2 MG/ML
INJECTION INTRAMUSCULAR; INTRAVENOUS
Status: DISPENSED
Start: 2018-08-21

## (undated) RX ORDER — BACITRACIN 50000 [IU]/1
INJECTION, POWDER, FOR SOLUTION INTRAMUSCULAR
Status: DISPENSED
Start: 2018-08-21

## (undated) RX ORDER — DEXAMETHASONE SODIUM PHOSPHATE 4 MG/ML
INJECTION, SOLUTION INTRA-ARTICULAR; INTRALESIONAL; INTRAMUSCULAR; INTRAVENOUS; SOFT TISSUE
Status: DISPENSED
Start: 2021-03-05

## (undated) RX ORDER — ONDANSETRON 2 MG/ML
INJECTION INTRAMUSCULAR; INTRAVENOUS
Status: DISPENSED
Start: 2021-03-12

## (undated) RX ORDER — CEFAZOLIN SODIUM 2 G/100ML
INJECTION, SOLUTION INTRAVENOUS
Status: DISPENSED
Start: 2021-03-05

## (undated) RX ORDER — CEFAZOLIN SODIUM 1 G/3ML
INJECTION, POWDER, FOR SOLUTION INTRAMUSCULAR; INTRAVENOUS
Status: DISPENSED
Start: 2021-03-05

## (undated) RX ORDER — SODIUM CHLORIDE 9 MG/ML
INJECTION, SOLUTION INTRAVENOUS
Status: DISPENSED
Start: 2018-08-21

## (undated) RX ORDER — FENTANYL CITRATE 50 UG/ML
INJECTION, SOLUTION INTRAMUSCULAR; INTRAVENOUS
Status: DISPENSED
Start: 2022-10-24

## (undated) RX ORDER — FENTANYL CITRATE 50 UG/ML
INJECTION, SOLUTION INTRAMUSCULAR; INTRAVENOUS
Status: DISPENSED
Start: 2018-08-30

## (undated) RX ORDER — LIDOCAINE HYDROCHLORIDE AND EPINEPHRINE 10; 10 MG/ML; UG/ML
INJECTION, SOLUTION INFILTRATION; PERINEURAL
Status: DISPENSED
Start: 2018-08-21

## (undated) RX ORDER — ONDANSETRON 2 MG/ML
INJECTION INTRAMUSCULAR; INTRAVENOUS
Status: DISPENSED
Start: 2018-08-30

## (undated) RX ORDER — BACITRACIN 500 [USP'U]/G
OINTMENT OPHTHALMIC
Status: DISPENSED
Start: 2018-08-21

## (undated) RX ORDER — CEFAZOLIN SODIUM/WATER 2 G/20 ML
SYRINGE (ML) INTRAVENOUS
Status: DISPENSED
Start: 2022-10-24

## (undated) RX ORDER — BUPIVACAINE HYDROCHLORIDE 2.5 MG/ML
INJECTION, SOLUTION EPIDURAL; INFILTRATION; INTRACAUDAL
Status: DISPENSED
Start: 2022-10-24

## (undated) RX ORDER — PROPOFOL 10 MG/ML
INJECTION, EMULSION INTRAVENOUS
Status: DISPENSED
Start: 2022-10-24

## (undated) RX ORDER — ACETAMINOPHEN 325 MG/1
TABLET ORAL
Status: DISPENSED
Start: 2021-03-05

## (undated) RX ORDER — LIDOCAINE HYDROCHLORIDE AND EPINEPHRINE 10; 10 MG/ML; UG/ML
INJECTION, SOLUTION INFILTRATION; PERINEURAL
Status: DISPENSED
Start: 2018-08-30

## (undated) RX ORDER — LIDOCAINE HYDROCHLORIDE 20 MG/ML
INJECTION, SOLUTION EPIDURAL; INFILTRATION; INTRACAUDAL; PERINEURAL
Status: DISPENSED
Start: 2021-03-12

## (undated) RX ORDER — LIDOCAINE HYDROCHLORIDE AND EPINEPHRINE 10; 10 MG/ML; UG/ML
INJECTION, SOLUTION INFILTRATION; PERINEURAL
Status: DISPENSED
Start: 2022-10-24

## (undated) RX ORDER — CEFAZOLIN SODIUM 2 G/100ML
INJECTION, SOLUTION INTRAVENOUS
Status: DISPENSED
Start: 2018-08-21

## (undated) RX ORDER — GLYCOPYRROLATE 0.2 MG/ML
INJECTION, SOLUTION INTRAMUSCULAR; INTRAVENOUS
Status: DISPENSED
Start: 2018-08-21

## (undated) RX ORDER — FENTANYL CITRATE 50 UG/ML
INJECTION, SOLUTION INTRAMUSCULAR; INTRAVENOUS
Status: DISPENSED
Start: 2021-03-12

## (undated) RX ORDER — ALBUTEROL SULFATE 90 UG/1
AEROSOL, METERED RESPIRATORY (INHALATION)
Status: DISPENSED
Start: 2018-08-21

## (undated) RX ORDER — HYDRALAZINE HYDROCHLORIDE 20 MG/ML
INJECTION INTRAMUSCULAR; INTRAVENOUS
Status: DISPENSED
Start: 2021-03-05

## (undated) RX ORDER — FENTANYL CITRATE-0.9 % NACL/PF 10 MCG/ML
PLASTIC BAG, INJECTION (ML) INTRAVENOUS
Status: DISPENSED
Start: 2021-03-12

## (undated) RX ORDER — DEXAMETHASONE SODIUM PHOSPHATE 4 MG/ML
INJECTION, SOLUTION INTRA-ARTICULAR; INTRALESIONAL; INTRAMUSCULAR; INTRAVENOUS; SOFT TISSUE
Status: DISPENSED
Start: 2021-03-12

## (undated) RX ORDER — BACITRACIN 50000 [IU]/1
INJECTION, POWDER, FOR SOLUTION INTRAMUSCULAR
Status: DISPENSED
Start: 2018-08-30

## (undated) RX ORDER — BUPIVACAINE HYDROCHLORIDE 5 MG/ML
INJECTION, SOLUTION PERINEURAL
Status: DISPENSED
Start: 2018-08-21